# Patient Record
Sex: MALE | Race: BLACK OR AFRICAN AMERICAN | NOT HISPANIC OR LATINO | Employment: OTHER | URBAN - METROPOLITAN AREA
[De-identification: names, ages, dates, MRNs, and addresses within clinical notes are randomized per-mention and may not be internally consistent; named-entity substitution may affect disease eponyms.]

---

## 2017-02-23 RX ORDER — TAMSULOSIN HYDROCHLORIDE 0.4 MG/1
CAPSULE ORAL
Qty: 90 CAPSULE | Refills: 6 | Status: SHIPPED | OUTPATIENT
Start: 2017-02-23 | End: 2017-04-05 | Stop reason: SDUPTHER

## 2017-03-29 ENCOUNTER — LAB VISIT (OUTPATIENT)
Dept: LAB | Facility: HOSPITAL | Age: 72
End: 2017-03-29
Attending: INTERNAL MEDICINE
Payer: MEDICARE

## 2017-03-29 DIAGNOSIS — E78.2 MIXED HYPERLIPIDEMIA: ICD-10-CM

## 2017-03-29 DIAGNOSIS — I10 ESSENTIAL HYPERTENSION: ICD-10-CM

## 2017-03-29 LAB
ANION GAP SERPL CALC-SCNC: 8 MMOL/L
BUN SERPL-MCNC: 18 MG/DL
CALCIUM SERPL-MCNC: 8.8 MG/DL
CHLORIDE SERPL-SCNC: 106 MMOL/L
CHOLEST/HDLC SERPL: 5.2 {RATIO}
CO2 SERPL-SCNC: 29 MMOL/L
CREAT SERPL-MCNC: 1.6 MG/DL
EST. GFR  (AFRICAN AMERICAN): 50.1 ML/MIN/1.73 M^2
EST. GFR  (NON AFRICAN AMERICAN): 43.3 ML/MIN/1.73 M^2
GLUCOSE SERPL-MCNC: 109 MG/DL
HDL/CHOLESTEROL RATIO: 19.4 %
HDLC SERPL-MCNC: 165 MG/DL
HDLC SERPL-MCNC: 32 MG/DL
LDLC SERPL CALC-MCNC: 106.4 MG/DL
NONHDLC SERPL-MCNC: 133 MG/DL
POTASSIUM SERPL-SCNC: 3.8 MMOL/L
SODIUM SERPL-SCNC: 143 MMOL/L
TRIGL SERPL-MCNC: 133 MG/DL

## 2017-03-29 PROCEDURE — 36415 COLL VENOUS BLD VENIPUNCTURE: CPT

## 2017-03-29 PROCEDURE — 80061 LIPID PANEL: CPT

## 2017-03-29 PROCEDURE — 80048 BASIC METABOLIC PNL TOTAL CA: CPT

## 2017-04-05 ENCOUNTER — OFFICE VISIT (OUTPATIENT)
Dept: CARDIOLOGY | Facility: CLINIC | Age: 72
End: 2017-04-05
Payer: MEDICARE

## 2017-04-05 VITALS
SYSTOLIC BLOOD PRESSURE: 176 MMHG | HEART RATE: 104 BPM | HEIGHT: 75 IN | DIASTOLIC BLOOD PRESSURE: 84 MMHG | BODY MASS INDEX: 26.62 KG/M2 | WEIGHT: 214.06 LBS

## 2017-04-05 DIAGNOSIS — N18.30 KIDNEY DISEASE, CHRONIC, STAGE III (GFR 30-59 ML/MIN): ICD-10-CM

## 2017-04-05 DIAGNOSIS — I10 ESSENTIAL HYPERTENSION: Primary | ICD-10-CM

## 2017-04-05 DIAGNOSIS — E78.00 PURE HYPERCHOLESTEROLEMIA: ICD-10-CM

## 2017-04-05 DIAGNOSIS — G47.33 OSA (OBSTRUCTIVE SLEEP APNEA): ICD-10-CM

## 2017-04-05 PROCEDURE — 99214 OFFICE O/P EST MOD 30 MIN: CPT | Mod: S$PBB,,, | Performed by: INTERNAL MEDICINE

## 2017-04-05 PROCEDURE — 99999 PR PBB SHADOW E&M-EST. PATIENT-LVL III: CPT | Mod: PBBFAC,,, | Performed by: INTERNAL MEDICINE

## 2017-04-05 PROCEDURE — 99213 OFFICE O/P EST LOW 20 MIN: CPT | Mod: PBBFAC | Performed by: INTERNAL MEDICINE

## 2017-04-05 RX ORDER — HYDROCHLOROTHIAZIDE 25 MG/1
TABLET ORAL
Qty: 45 TABLET | Refills: 3 | Status: SHIPPED | OUTPATIENT
Start: 2017-04-05 | End: 2018-05-21 | Stop reason: SDUPTHER

## 2017-04-05 RX ORDER — SPIRONOLACTONE 25 MG/1
25 TABLET ORAL DAILY
Qty: 30 TABLET | Refills: 11 | Status: SHIPPED | OUTPATIENT
Start: 2017-04-05 | End: 2018-03-18 | Stop reason: SDUPTHER

## 2017-04-05 RX ORDER — TAMSULOSIN HYDROCHLORIDE 0.4 MG/1
1 CAPSULE ORAL DAILY
Qty: 90 CAPSULE | Refills: 6 | Status: SHIPPED | OUTPATIENT
Start: 2017-04-05 | End: 2018-05-25 | Stop reason: SDUPTHER

## 2017-04-05 RX ORDER — ATORVASTATIN CALCIUM 80 MG/1
80 TABLET, FILM COATED ORAL DAILY
Qty: 90 TABLET | Refills: 3 | Status: SHIPPED | OUTPATIENT
Start: 2017-04-05 | End: 2018-05-07 | Stop reason: SDUPTHER

## 2017-04-05 RX ORDER — LISINOPRIL 40 MG/1
40 TABLET ORAL DAILY
Qty: 30 TABLET | Refills: 11 | Status: SHIPPED | OUTPATIENT
Start: 2017-04-05 | End: 2018-04-19 | Stop reason: SDUPTHER

## 2017-04-05 NOTE — PROGRESS NOTES
Subjective:    Patient ID:  Teodoro Mast is a 71 y.o. male who presents for follow-up of hypertension and CKD III    HPI  71 years old male followed with hypertension, hyperlipidemia and chronic renal insuffiencey. His home BP range137-160 systolic. He continues to do well, active and has no chest pain or FRAGA. He had a vague very mild discomfrt left brachial area recently that was not associated with exertion. He snores and has hypersomnolence.    Lab Results   Component Value Date     03/29/2017    K 3.8 03/29/2017     03/29/2017    CO2 29 03/29/2017    BUN 18 03/29/2017    CREATININE 1.6 (H) 03/29/2017     03/29/2017    HGBA1C 5.9 07/28/2006    AST 22 08/19/2016    ALT 25 08/19/2016    ALBUMIN 3.6 10/31/2016    PROT 6.6 08/19/2016    BILITOT 0.4 08/19/2016    WBC 5.01 10/31/2016    HGB 13.3 (L) 10/31/2016    HCT 40.1 10/31/2016    MCV 88 10/31/2016     10/31/2016    PSA 3.9 12/17/2013         Lab Results   Component Value Date    CHOL 165 03/29/2017    HDL 32 (L) 03/29/2017    TRIG 133 03/29/2017       Lab Results   Component Value Date    LDLCALC 106.4 03/29/2017     Past Medical History:   Diagnosis Date    Hyperlipidemia     Hypertension      Current Outpatient Prescriptions   Medication Sig    amlodipine (NORVASC) 10 MG tablet TAKE 1 TABLET BY MOUTH ONCE DAILY    atorvastatin (LIPITOR) 80 MG tablet TAKE 1 TABLET BY MOUTH ONCE DAILY    hydrochlorothiazide (HYDRODIURIL) 25 MG tablet TAKE 1/2 TABLET(12.5 MG) BY MOUTH EVERY DAY    lisinopril (PRINIVIL,ZESTRIL) 40 MG tablet Take 1 tablet (40 mg total) by mouth once daily.    tamsulosin (FLOMAX) 0.4 mg Cp24 TAKE ONE CAPSULE BY MOUTH ONCE DAILY    UNABLE TO FIND PT TAKEN TEASPOON OF BAKING SODA DAILY    spironolactone (ALDACTONE) 25 MG tablet Take 1 tablet (25 mg total) by mouth once daily.     No current facility-administered medications for this visit.      Review of Systems   Constitution: Negative for decreased appetite,  "diaphoresis, fever, weakness, malaise/fatigue, weight gain and weight loss.   HENT: Negative for congestion, ear discharge, ear pain, headaches and nosebleeds.    Eyes: Negative for blurred vision, double vision and visual disturbance.   Cardiovascular: Negative for chest pain, claudication, cyanosis, dyspnea on exertion, irregular heartbeat, leg swelling, near-syncope, orthopnea, palpitations, paroxysmal nocturnal dyspnea and syncope.   Respiratory: Negative for cough, hemoptysis, shortness of breath, sleep disturbances due to breathing, snoring, sputum production and wheezing.    Endocrine: Negative for polydipsia, polyphagia and polyuria.   Hematologic/Lymphatic: Negative for adenopathy and bleeding problem. Does not bruise/bleed easily.   Skin: Negative for color change, nail changes, poor wound healing and rash.   Musculoskeletal: Negative for muscle cramps and muscle weakness.   Gastrointestinal: Negative for abdominal pain, anorexia, change in bowel habit, hematochezia, nausea and vomiting.   Genitourinary: Negative for dysuria, frequency and hematuria.   Neurological: Negative for brief paralysis, difficulty with concentration, excessive daytime sleepiness, dizziness, focal weakness, light-headedness, seizures and vertigo.   Psychiatric/Behavioral: Negative for altered mental status and depression.   Allergic/Immunologic: Negative for persistent infections.        Objective:BP (!) 176/84 (BP Location: Left arm, Patient Position: Sitting, BP Method: Automatic)  Pulse 104  Ht 6' 3" (1.905 m)  Wt 97.1 kg (214 lb 1.1 oz)  BMI 26.76 kg/m2    Physical Exam   Constitutional: He is oriented to person, place, and time. He appears well-developed and well-nourished.   HENT:   Head: Normocephalic.   Right Ear: External ear normal.   Left Ear: External ear normal.   Nose: Nose normal.   Inspection of lips, teeth and gums normal   Eyes: EOM are normal. Pupils are equal, round, and reactive to light. No scleral " icterus.   Neck: Normal range of motion. Neck supple. No JVD present. No tracheal deviation present. No thyromegaly present.   Cardiovascular: Normal rate, regular rhythm and intact distal pulses.  Exam reveals no gallop and no friction rub.    No murmur heard.  Pulses:       Dorsalis pedis pulses are 2+ on the right side, and 2+ on the left side.        Posterior tibial pulses are 2+ on the right side, and 2+ on the left side.   Pulmonary/Chest: Effort normal and breath sounds normal.   Abdominal: Bowel sounds are normal. He exhibits no distension. There is no hepatosplenomegaly. There is no tenderness. There is no guarding.   Musculoskeletal: Normal range of motion. He exhibits no edema or tenderness.   Lymphadenopathy:   Palpation of neck and groin lymph nodes normal   Neurological: He is alert and oriented to person, place, and time. No cranial nerve deficit. He exhibits normal muscle tone. Coordination normal.   Skin: Skin is dry.   Palpation of skin normal   Psychiatric: His behavior is normal. Judgment and thought content normal.         Assessment:       1. Essential hypertension    2. Pure hypercholesterolemia    3. Kidney disease, chronic, stage III (GFR 30-59 ml/min)    4. GLYNN (obstructive sleep apnea)         Plan:       Teodoro was seen today for cri ( chronic renal insuffciency ), stage 3 ( morderate) and hypertension.    Diagnoses and all orders for this visit:    Essential hypertension  -     spironolactone (ALDACTONE) 25 MG tablet; Take 1 tablet (25 mg total) by mouth once daily.  -     Basic metabolic panel; Future; Expected date: 4/19/17  -     Comprehensive metabolic panel; Future; Expected date: 10/5/17    Pure hypercholesterolemia  -     Lipid panel; Future; Expected date: 10/5/17    Kidney disease, chronic, stage III (GFR 30-59 ml/min)  -     Basic metabolic panel; Future; Expected date: 4/19/17  -     Comprehensive metabolic panel; Future; Expected date: 10/5/17  -     CBC auto differential;  Future; Expected date: 10/5/17    GLYNN (obstructive sleep apnea)  -     Ambulatory consult to Sleep Disorders    Other orders  -     UNABLE TO FIND; PT TAKEN TEASPOON OF BAKING SODA DAILY

## 2017-04-05 NOTE — MR AVS SNAPSHOT
[facility] - Cardiology  [address] [facility] [facility] [facility] [address]  Phone: [phone]                  Teodoro Mast   2017 9:30 AM   Office Visit    Description:  Male : 1945   Provider:  Karl Masterson MD   Department:  [facility] - Cardiology           Reason for Visit     CRI ( chronic renal insuffciency ), stage 3 ( morderate)     Hypertension           Diagnoses this Visit        Comments    Essential hypertension    -  Primary     Pure hypercholesterolemia         Kidney disease, chronic, stage III (GFR 30-59 ml/min)         GLYNN (obstructive sleep apnea)                To Do List           Future Appointments        Provider Department Dept Phone    2017 9:00 AM LAB, SPECIMEN [facility] [phone]    2017 9:20 AM LAB, APPOINTMENT [facility] [phone]    2017 11:30 AM Shine Mcqueen MD [facility] - Nephrology [phone]      Goals (5 Years of Data)     None      Follow-Up and Disposition     Return in about 6 months (around 10/5/2017), or if symptoms worsen or fail to improve.    Follow-up and Disposition History       These Medications        Disp Refills Start End    spironolactone (ALDACTONE) 25 MG tablet 30 tablet 11 2017    Take 1 tablet (25 mg total) by mouth once daily. - Oral    Pharmacy: [facility] [address] AT [facility] Ph #: [phone]       tamsulosin (FLOMAX) 0.4 mg Cp24 90 capsule 6 2017     Take 1 capsule (0.4 mg total) by mouth once daily. - Oral    Pharmacy: [facility] [address] AT [facility] Ph #: [phone]       Notes to Pharmacy: **Patient requests 90 days supply**    lisinopril (PRINIVIL,ZESTRIL) 40 MG tablet 30 tablet 11 2017     Take 1 tablet (40 mg total) by mouth once daily. - Oral    Pharmacy: [facility] [place] [address] [facility]  [address] AT [facility] #: [phone]       hydrochlorothiazide (HYDRODIURIL) 25 MG tablet 45 tablet 3 4/5/2017     TAKE 1/2 TABLET(12.5 MG) BY MOUTH EVERY DAY    Pharmacy: [facility] [address] AT [facility] Ph #: [phone]       Notes to Pharmacy: **Patient requests 90 days supply**    atorvastatin (LIPITOR) 80 MG tablet 90 tablet 3 4/5/2017     Take 1 tablet (80 mg total) by mouth once daily. - Oral    Pharmacy: [facility] [address] AT [facility] Ph #: [phone]       Notes to Pharmacy: **Patient requests 90 days supply**      [facility]     [facility] On Call Nurse Care Line - 24/7 Assistance  Unless otherwise directed by your provider, please contact [facility], our nurse care line that is available for 24/7 assistance.     Registered nurses in the [facility] provide: appointment scheduling, clinical advisement, health education, and other advisory services.  Call: [phone] (toll free)               Medications           Message regarding Medications     Verify the changes and/or additions to your medication regime listed below are the same as discussed with your clinician today.  If any of these changes or additions are incorrect, please notify your healthcare provider.        START taking these NEW medications        Refills    spironolactone (ALDACTONE) 25 MG tablet 11    Sig: Take 1 tablet (25 mg total) by mouth once daily.    Class: Normal    Route: Oral      CHANGE how you are taking these medications     Start Taking Instead of    tamsulosin (FLOMAX) 0.4 mg Cp24 tamsulosin (FLOMAX) 0.4 mg Cp24    Dosage:  Take 1 capsule (0.4 mg total) by mouth once daily. Dosage:  TAKE ONE CAPSULE BY MOUTH ONCE DAILY    Reason for Change:  Reorder     atorvastatin (LIPITOR) 80 MG tablet atorvastatin (LIPITOR) 80 MG tablet    Dosage:   "Take 1 tablet (80 mg total) by mouth once daily. Dosage:  TAKE 1 TABLET BY MOUTH ONCE DAILY    Reason for Change:  Reorder       STOP taking these medications     sodium bicarbonate 325 MG tablet Take 1 tablet (325 mg total) by mouth once daily.           Verify that the below list of medications is an accurate representation of the medications you are currently taking.  If none reported, the list may be blank. If incorrect, please contact your healthcare provider. Carry this list with you in case of emergency.           Current Medications     amlodipine (NORVASC) 10 MG tablet TAKE 1 TABLET BY MOUTH ONCE DAILY    atorvastatin (LIPITOR) 80 MG tablet Take 1 tablet (80 mg total) by mouth once daily.    hydrochlorothiazide (HYDRODIURIL) 25 MG tablet TAKE 1/2 TABLET(12.5 MG) BY MOUTH EVERY DAY    lisinopril (PRINIVIL,ZESTRIL) 40 MG tablet Take 1 tablet (40 mg total) by mouth once daily.    tamsulosin (FLOMAX) 0.4 mg Cp24 Take 1 capsule (0.4 mg total) by mouth once daily.    UNABLE TO FIND PT TAKEN TEASPOON OF BAKING SODA DAILY    spironolactone (ALDACTONE) 25 MG tablet Take 1 tablet (25 mg total) by mouth once daily.           Clinical Reference Information           Your Vitals Were     BP Pulse Height Weight BMI    176/84 (BP Location: Left arm, Patient Position: Sitting, BP Method: Automatic) 104 6' 3" (1.905 m) 97.1 kg (214 lb 1.1 oz) 26.76 kg/m2      Blood Pressure          Most Recent Value    Right Arm BP - Sitting  180/90    Left Arm BP - Sitting  176/84    BP  (!)  176/84      Allergies as of 4/5/2017     No Known Allergies      Immunizations Administered on Date of Encounter - 4/5/2017     None      Orders Placed During Today's Visit      Normal Orders This Visit    Ambulatory consult to Sleep Disorders     Future Labs/Procedures Expected by Expires    Basic metabolic panel  4/19/2017 (Approximate) 4/5/2018    CBC auto differential  10/5/2017 4/5/2018    Comprehensive metabolic panel  10/5/2017 (Approximate) " 4/5/2018    Lipid panel  10/5/2017 (Approximate) 4/5/2018      Instructions    BP goal < 130/80  email BP recording in 2 weeks       Language Assistance Services     ATTENTION: Language assistance services are available, free of charge. Please call [phone].      ATENCIÓN: Si habla dianelys, tiene a cedeno disposición servicios gratuitos de asistencia lingüística. Llame al [phone].     CHÚ Ý: N?u b?n nói Ti?ng Vi?t, có các d?ch v? h? tr? ngôn ng? mi?n phí dành cho b?n. G?i s? [phone].         [facility] complies with applicable Federal civil rights laws and does not discriminate on the basis of race, color, national origin, age, disability, or sex.

## 2017-04-19 ENCOUNTER — PATIENT MESSAGE (OUTPATIENT)
Dept: CARDIOLOGY | Facility: CLINIC | Age: 72
End: 2017-04-19

## 2017-04-19 ENCOUNTER — LAB VISIT (OUTPATIENT)
Dept: LAB | Facility: HOSPITAL | Age: 72
End: 2017-04-19
Attending: INTERNAL MEDICINE
Payer: MEDICARE

## 2017-04-19 DIAGNOSIS — N18.30 KIDNEY DISEASE, CHRONIC, STAGE III (GFR 30-59 ML/MIN): ICD-10-CM

## 2017-04-19 DIAGNOSIS — I10 ESSENTIAL HYPERTENSION: ICD-10-CM

## 2017-04-19 LAB
ANION GAP SERPL CALC-SCNC: 9 MMOL/L
BUN SERPL-MCNC: 24 MG/DL
CALCIUM SERPL-MCNC: 8.9 MG/DL
CHLORIDE SERPL-SCNC: 107 MMOL/L
CO2 SERPL-SCNC: 26 MMOL/L
CREAT SERPL-MCNC: 1.8 MG/DL
EST. GFR  (AFRICAN AMERICAN): 43.4 ML/MIN/1.73 M^2
EST. GFR  (NON AFRICAN AMERICAN): 37.6 ML/MIN/1.73 M^2
GLUCOSE SERPL-MCNC: 114 MG/DL
POTASSIUM SERPL-SCNC: 4.1 MMOL/L
SODIUM SERPL-SCNC: 142 MMOL/L

## 2017-04-19 PROCEDURE — 80048 BASIC METABOLIC PNL TOTAL CA: CPT

## 2017-04-19 PROCEDURE — 36415 COLL VENOUS BLD VENIPUNCTURE: CPT

## 2017-04-26 ENCOUNTER — OFFICE VISIT (OUTPATIENT)
Dept: SLEEP MEDICINE | Facility: CLINIC | Age: 72
End: 2017-04-26
Payer: MEDICARE

## 2017-04-26 VITALS
BODY MASS INDEX: 26.04 KG/M2 | DIASTOLIC BLOOD PRESSURE: 86 MMHG | SYSTOLIC BLOOD PRESSURE: 148 MMHG | HEIGHT: 75 IN | HEART RATE: 78 BPM | WEIGHT: 209.44 LBS

## 2017-04-26 DIAGNOSIS — J30.1 SEASONAL ALLERGIC RHINITIS DUE TO POLLEN: ICD-10-CM

## 2017-04-26 DIAGNOSIS — G47.30 SLEEP APNEA, UNSPECIFIED: Primary | ICD-10-CM

## 2017-04-26 PROCEDURE — 99999 PR PBB SHADOW E&M-EST. PATIENT-LVL III: CPT | Mod: PBBFAC,,, | Performed by: NURSE PRACTITIONER

## 2017-04-26 PROCEDURE — 99213 OFFICE O/P EST LOW 20 MIN: CPT | Mod: PBBFAC | Performed by: NURSE PRACTITIONER

## 2017-04-26 PROCEDURE — 99214 OFFICE O/P EST MOD 30 MIN: CPT | Mod: S$PBB,,, | Performed by: NURSE PRACTITIONER

## 2017-04-26 RX ORDER — FLUTICASONE PROPIONATE 50 MCG
2 SPRAY, SUSPENSION (ML) NASAL DAILY
Qty: 1 BOTTLE | Refills: 11 | Status: SHIPPED | OUTPATIENT
Start: 2017-04-26 | End: 2017-09-25

## 2017-04-26 RX ORDER — CETIRIZINE HYDROCHLORIDE 10 MG/1
10 TABLET ORAL DAILY
Refills: 0 | COMMUNITY
Start: 2017-04-26 | End: 2017-08-30

## 2017-04-26 RX ORDER — CHOLECALCIFEROL (VITAMIN D3) 25 MCG
2000 TABLET ORAL DAILY
COMMUNITY
End: 2021-02-17 | Stop reason: SDUPTHER

## 2017-04-26 NOTE — PROGRESS NOTES
Teodoro Mast  was seen as a new patient, self-referred, for the evaluation of obstructive sleep apnea.    CHIEF COMPLAINT:    Chief Complaint   Patient presents with    Sleep Apnea       HISTORY OF PRESENT ILLNESS: Teodoro Mast is a 71 y.o. male is here for sleep evaluation.        Diagnosed with HTN 43 years ago and BP has been controlled until a month ago. Pt's cardiologist recommended sleep eval for secondary causes of refractory HTN. Currently on 3 meds.     Recently started taking Flomax for BPH, which has improved nocturia to 3x per night vs. Several times as prior.     Patient complaints include: snoring, interrupted sleep, and excessive daytime sleepiness.     Reports nasal congestion 1 - 2 x per week usually at bedtime. Symptoms relieved by taking OTC antihistamine.     Denies symptoms of restless legs or kicking during sleep.    Occupation: Retired    [place] Sleepiness Scale score during initial sleep evaluation was 1.    SLEEP ROUTINE:  Activity the hour prior to sleep: TV. Falls asleep to TV in bedroom    Bed partner:  wife  Time to bed:  9 - 10 pm  Lights off:  Yes but TV on  Sleep onset latency:  <1 hour       Disruptions or awakenings:  2 - 3    Wakeup time:  5:30 - 6 am  Perceived sleep quality: 3/5       Daytime naps:  none  Weekend sleep routine: same as above  Caffeine use: some  Exercise habit:   No      PAST MEDICAL HISTORY:    Active Ambulatory Problems     Diagnosis Date Noted    Hyperlipidemia     Hypertension     BPH with urinary obstruction 06/11/2013    Kidney disease, chronic, stage III (GFR 30-59 ml/min) 06/25/2015    Hypertensive CKD (chronic kidney disease) 11/09/2016    Kidney cysts 11/09/2016    Proteinuria 11/09/2016    Metabolic acidosis 11/09/2016    GLYNN (obstructive sleep apnea) 04/05/2017     Resolved Ambulatory Problems     Diagnosis Date Noted    No Resolved Ambulatory Problems     Past Medical History:   Diagnosis Date    Hyperlipidemia     Hypertension                  PAST SURGICAL HISTORY:    Past Surgical History:   Procedure Laterality Date    FINGER SURGERY      hemorriodectomy           FAMILY HISTORY:                Family History   Problem Relation Age of Onset    Heart disease Mother     Hypertension Mother        SOCIAL HISTORY:          Tobacco:   History   Smoking Status    Former Smoker   Smokeless Tobacco    Not on file       Alcohol use:    History   Alcohol Use    Yes                 ALLERGIES:  Review of patient's allergies indicates:  No Known Allergies    CURRENT MEDICATIONS:    Current Outpatient Prescriptions   Medication Sig Dispense Refill    AMINO ACIDS/MV,FE,MIN (OCUVITE EXTRA ORAL) Take by mouth.      amlodipine (NORVASC) 10 MG tablet TAKE 1 TABLET BY MOUTH ONCE DAILY 90 tablet 3    atorvastatin (LIPITOR) 80 MG tablet Take 1 tablet (80 mg total) by mouth once daily. 90 tablet 3    hydrochlorothiazide (HYDRODIURIL) 25 MG tablet TAKE 1/2 TABLET(12.5 MG) BY MOUTH EVERY DAY 45 tablet 3    lisinopril (PRINIVIL,ZESTRIL) 40 MG tablet Take 1 tablet (40 mg total) by mouth once daily. 30 tablet 11    spironolactone (ALDACTONE) 25 MG tablet Take 1 tablet (25 mg total) by mouth once daily. 30 tablet 11    tamsulosin (FLOMAX) 0.4 mg Cp24 Take 1 capsule (0.4 mg total) by mouth once daily. 90 capsule 6    UNABLE TO FIND PT TAKEN TEASPOON OF BAKING SODA DAILY      vitamin D 1000 units Tab Take 2,000 mg by mouth once daily.      cetirizine (ZYRTEC) 10 MG tablet Take 1 tablet (10 mg total) by mouth once daily.  0    fluticasone (FLONASE) 50 mcg/actuation nasal spray 2 sprays by Each Nare route once daily. 1 Bottle 11     No current facility-administered medications for this visit.                   REVIEW OF SYSTEMS:     Sleep related symptoms as per HPI.  CONST:Denies weight gain    HEENT: Occasional sinus congestion  PULM: Denies dyspnea  CARD:  Denies palpitations   GI:  Denies acid reflux  : Denies polyuria  NEURO: Denies  "headaches  PSYCH: Denies mood disturbance  HEME: Denies anemia    Otherwise, a balance of 10 systems reviewed is negative.          PHYSICAL EXAM:  Vitals:    04/26/17 0812   BP: (!) 148/86   Pulse: 78   Weight: 95 kg (209 lb 7 oz)   Height: 6' 3" (1.905 m)   PainSc: 0-No pain     Body mass index is 26.18 kg/(m^2).     GENERAL: Normal development, well groomed  HEENT:  Conjunctivae are non-erythematous; Pupils equal, round, and reactive to light; Nose is symmetrical; Nasal mucosa is pink and moist; Septum is midline; Inferior turbinates are normal; Nasal airflow is normal; Posterior pharynx is pink; Modified Mallampati: IV; Posterior palate is normal; Tonsils not visualized; Uvula is normal and pink;Tongue is normal; Dentition is fair; No TMJ tenderness; Jaw opening and protrusion without click and without discomfort.  NECK: Supple. Neck circumference is 14.5 inches. No thyromegaly. No palpable nodes.    SKIN: On face and neck: No abrasions, no rashes, no lesions.  No subcutaneous nodules are palpable.  RESPIRATORY: Chest is clear to auscultation.  Normal chest expansion and non-labored breathing at rest.  CARDIOVASCULAR: Normal S1, S2.  No murmurs, gallops or rubs. No carotid bruits bilaterally.  EXTREMITIES: No edema. No clubbing. No cyanosis. Station normal. Gait normal.        NEURO/PSYCH: Oriented to time, place and person. Normal attention span and concentration. Affect is full. Mood is normal.                                              ASSESSMENT:    Sleep apnea, unspecified. The patient symptomatically has snoring, interrupted sleep, and excessive daytime sleepiness with findings of crowded oral airway and elevated body mass index. Medical co-morbidities: HTN, HLD.  This warrants further investigation for possible obstructive sleep apnea.      Allergic rhinitis, stable, currently uses OTC antihistamine prn basis with relief     PLAN:    - Diagnostic: Polysomnogram. The nature of this procedure and its " indication was discussed with the patient. Patient will be contacted after sleep study is done.  RTC to discuss sleep study results.     -Antihistamine and Flonase daily use    - Education: During our discussion today, we talked about the etiology of obstructive sleep apnea as well as the potential ramifications of untreated sleep apnea, which could include daytime sleepiness, hypertension, heart disease and/or stroke. We discussed potential treatment options, which could include weight loss, body positioning, continuous positive airway pressure (CPAP), OA, EPAP,  or referral for surgical consideration.     - Precautions: The patient was advised to abstain from driving should they feel sleepy  or drowsy.

## 2017-04-26 NOTE — PATIENT INSTRUCTIONS
Meera or Sergei will contact you to schedule your sleep study. Their number is [phone] (ext 1). The [facility] is located on 7th floor of the [facility].    We will call you when the sleep study results are ready - if you have not heard from us by 2 weeks from the date of the study, please call [phone] (ext 2).    You are advised to abstain from driving should you feel sleepy or drowsy.

## 2017-04-26 NOTE — MR AVS SNAPSHOT
[facility]  [address]  [facility] [address]  Phone: [phone]                  Teodoro Mast   2017 8:00 AM   Office Visit    Description:  Male : 1945   Provider:  Akiko Cornejo NP   Department:  [facility]           Diagnoses this Visit        Comments    Seasonal allergic rhinitis due to pollen    -  Primary     Sleep apnea, unspecified                To Do List           Future Appointments        Provider Department Dept Phone    2017 9:00 AM LAB, SPECIMEN [facility] [phone]    2017 9:20 AM LAB, APPOINTMENT [facility] [phone]    2017 11:30 AM Shine Mcqueen MD [facility] - Nephrology [phone]      Goals (5 Years of Data)     None      Follow-Up and Disposition     Return for after sleep study .       These Medications        Disp Refills Start End    fluticasone (FLONASE) 50 mcg/actuation nasal spray 1 Bottle 11 2017     2 sprays by Each Nare route once daily. - Each Nare    Pharmacy: [facility] [address] [place] [address] AT [facility] #: [phone]         PURCHASE these Medications (No prescription required)        Start End    cetirizine (ZYRTEC) 10 MG tablet 2017    Sig - Route: Take 1 tablet (10 mg total) by mouth once daily. - Oral    Class: OTC      [facility] On [facility]     [facility] On Call Nurse Care Line - 24/ Assistance  Unless otherwise directed by your provider, please contact [facility] On-Call, our nurse care line that is available for / assistance.     Registered nurses in the [facility] On Call Center provide: appointment scheduling, clinical advisement, health education, and other advisory services.  Call: [phone] (toll free)               Medications           Message regarding Medications     Verify the changes and/or additions to your medication  "regime listed below are the same as discussed with your clinician today.  If any of these changes or additions are incorrect, please notify your healthcare provider.        START taking these NEW medications        Refills    cetirizine (ZYRTEC) 10 MG tablet 0    Sig: Take 1 tablet (10 mg total) by mouth once daily.    Class: OTC    Route: Oral    fluticasone (FLONASE) 50 mcg/actuation nasal spray 11    Si sprays by Each Nare route once daily.    Class: Normal    Route: Each Nare           Verify that the below list of medications is an accurate representation of the medications you are currently taking.  If none reported, the list may be blank. If incorrect, please contact your healthcare provider. Carry this list with you in case of emergency.           Current Medications     AMINO ACIDS/MV,FE,MIN (OCUVITE EXTRA ORAL) Take by mouth.    amlodipine (NORVASC) 10 MG tablet TAKE 1 TABLET BY MOUTH ONCE DAILY    atorvastatin (LIPITOR) 80 MG tablet Take 1 tablet (80 mg total) by mouth once daily.    hydrochlorothiazide (HYDRODIURIL) 25 MG tablet TAKE 1/2 TABLET(12.5 MG) BY MOUTH EVERY DAY    lisinopril (PRINIVIL,ZESTRIL) 40 MG tablet Take 1 tablet (40 mg total) by mouth once daily.    spironolactone (ALDACTONE) 25 MG tablet Take 1 tablet (25 mg total) by mouth once daily.    tamsulosin (FLOMAX) 0.4 mg Cp24 Take 1 capsule (0.4 mg total) by mouth once daily.    UNABLE TO FIND PT TAKEN TEASPOON OF BAKING SODA DAILY    vitamin D 1000 units Tab Take 2,000 mg by mouth once daily.    cetirizine (ZYRTEC) 10 MG tablet Take 1 tablet (10 mg total) by mouth once daily.    fluticasone (FLONASE) 50 mcg/actuation nasal spray 2 sprays by Each Nare route once daily.           Clinical Reference Information           Your Vitals Were     BP Pulse Height Weight BMI    148/86 78 6' 3" (1.905 m) 95 kg (209 lb 7 oz) 26.18 kg/m2      Blood Pressure          Most Recent Value    BP  (!)  148/86      Allergies as of 2017     No Known " Allergies      Immunizations Administered on Date of Encounter - 4/26/2017     None      Orders Placed During Today's Visit     Future Labs/Procedures Expected by Expires    Polysomnogram (CPAP will be added if patient meets diagnostic criteria.)  As directed 4/26/2018      Instructions    Meera or Sergei will contact you to schedule your sleep study. Their number is [phone] (ext 1). The [facility] is located on 7th floor of the [facility].    We will call you when the sleep study results are ready - if you have not heard from us by 2 weeks from the date of the study, please call [phone] (ext 2).    You are advised to abstain from driving should you feel sleepy or drowsy.         Language Assistance Services     ATTENTION: Language assistance services are available, free of charge. Please call [phone].      ATENCIÓN: Si habla dianelys, tiene a cedeno disposición servicios gratuitos de asistencia lingüística. Llame al [phone].     CHÚ Ý: N?u b?n nói Ti?ng Vi?t, có các d?ch v? h? tr? ngôn ng? mi?n phí dành cho b?n. G?i s? [phone].         [facility] complies with applicable Federal civil rights laws and does not discriminate on the basis of race, color, national origin, age, disability, or sex.

## 2017-05-01 ENCOUNTER — TELEPHONE (OUTPATIENT)
Dept: SLEEP MEDICINE | Facility: OTHER | Age: 72
End: 2017-05-01

## 2017-05-08 ENCOUNTER — LAB VISIT (OUTPATIENT)
Dept: LAB | Facility: HOSPITAL | Age: 72
End: 2017-05-08
Attending: INTERNAL MEDICINE
Payer: MEDICARE

## 2017-05-08 DIAGNOSIS — N18.30 KIDNEY DISEASE, CHRONIC, STAGE III (GFR 30-59 ML/MIN): ICD-10-CM

## 2017-05-08 LAB
BACTERIA #/AREA URNS AUTO: ABNORMAL /HPF
BILIRUB UR QL STRIP: NEGATIVE
CLARITY UR REFRACT.AUTO: CLEAR
COLOR UR AUTO: YELLOW
CREAT UR-MCNC: 186 MG/DL
GLUCOSE UR QL STRIP: NEGATIVE
HGB UR QL STRIP: NEGATIVE
HYALINE CASTS UR QL AUTO: 4 /LPF
KETONES UR QL STRIP: NEGATIVE
LEUKOCYTE ESTERASE UR QL STRIP: NEGATIVE
MICROSCOPIC COMMENT: ABNORMAL
NITRITE UR QL STRIP: NEGATIVE
PH UR STRIP: 7 [PH] (ref 5–8)
PROT UR QL STRIP: ABNORMAL
PROT UR-MCNC: 357 MG/DL
PROT/CREAT RATIO, UR: 1.92
RBC #/AREA URNS AUTO: 0 /HPF (ref 0–4)
SP GR UR STRIP: 1.01 (ref 1–1.03)
URN SPEC COLLECT METH UR: ABNORMAL
UROBILINOGEN UR STRIP-ACNC: NEGATIVE EU/DL
WBC #/AREA URNS AUTO: 2 /HPF (ref 0–5)

## 2017-05-08 PROCEDURE — 81001 URINALYSIS AUTO W/SCOPE: CPT

## 2017-05-08 PROCEDURE — 84156 ASSAY OF PROTEIN URINE: CPT

## 2017-05-09 ENCOUNTER — TELEPHONE (OUTPATIENT)
Dept: SLEEP MEDICINE | Facility: OTHER | Age: 72
End: 2017-05-09

## 2017-05-11 ENCOUNTER — TELEPHONE (OUTPATIENT)
Dept: SLEEP MEDICINE | Facility: OTHER | Age: 72
End: 2017-05-11

## 2017-05-17 ENCOUNTER — OFFICE VISIT (OUTPATIENT)
Dept: NEPHROLOGY | Facility: CLINIC | Age: 72
End: 2017-05-17
Payer: MEDICARE

## 2017-05-17 VITALS
HEART RATE: 99 BPM | HEIGHT: 75 IN | DIASTOLIC BLOOD PRESSURE: 84 MMHG | BODY MASS INDEX: 26.24 KG/M2 | WEIGHT: 211 LBS | OXYGEN SATURATION: 96 % | SYSTOLIC BLOOD PRESSURE: 146 MMHG

## 2017-05-17 DIAGNOSIS — N18.30 KIDNEY DISEASE, CHRONIC, STAGE III (GFR 30-59 ML/MIN): Primary | ICD-10-CM

## 2017-05-17 DIAGNOSIS — R80.9 PROTEINURIA, UNSPECIFIED TYPE: ICD-10-CM

## 2017-05-17 DIAGNOSIS — I12.9 HYPERTENSIVE CKD (CHRONIC KIDNEY DISEASE): ICD-10-CM

## 2017-05-17 DIAGNOSIS — E87.20 METABOLIC ACIDOSIS: ICD-10-CM

## 2017-05-17 PROCEDURE — 99214 OFFICE O/P EST MOD 30 MIN: CPT | Mod: S$PBB,,, | Performed by: INTERNAL MEDICINE

## 2017-05-17 PROCEDURE — 99999 PR PBB SHADOW E&M-EST. PATIENT-LVL III: CPT | Mod: PBBFAC,,, | Performed by: INTERNAL MEDICINE

## 2017-05-17 PROCEDURE — 99213 OFFICE O/P EST LOW 20 MIN: CPT | Mod: PBBFAC | Performed by: INTERNAL MEDICINE

## 2017-05-17 NOTE — PROGRESS NOTES
Subjective:       Patient ID: Teodoro Mast is a 71 y.o. Black or  male who presents for new evaluation of Chronic Kidney Disease    HPI     Mr. Mast was seen for new patient evaluation of CKD. He was seen on 9/6/16 in new patient evaluation and last followed on 11/9/16. Pt has longstanding hypertension since 1974, remote h/o tobacco smoking, hyperlipidemia, BPH. He reports family h/o kidney disease in his father that was not diagnosed until he was in his 80's. Pt reports he was diagnosed with hypertension when he was 29 years old. He reports in the past he had some obstruction to the urinary tract but unable to provide any details.     Since his last visit, he reports he has been doing fine. He denies any recent acute illness. He denies any urinary symptoms. He denies any leg swelling. He reports he has changed his diet and has now improved water intake, less intake of soda, and has been watching his diet for salt. He reports he had prior episodes of gout and he feels intake of beer, craw fish triggers it for him. He does not want to take allopurinol regularly.     He has slow progression of CKD. Labs were reviewed and d/w him. His creatinine ha gradually increased from 1.4 t0 1.9 since 2010. Most recent baseline has been around 1.6 to 1.9. Prior urine studies show proteinuria. He appears to have chronic anemia. He has been on lisinopril containing regimen. Interim since his last visit, he was placed on spironolactone by his cardiologist due to uncontrolled hypertension. Since then he had labs followed which shows stable K level. He brought his BP readings since starting spironolactone and it appears to be SBP in the range of 130-140's. He denies any episode of low BP or lightheadedness. He denies any urinary symptoms.     Labs from 5/8/17 noted for Hb 13.4, Na 142, K 3.7, bicarbonate 26, BUN 22, creatinine 1.8, eGFR 43.4, Ca 8.6. Albumin 3.2, . prior labs showed SPEP/ SHELIA was normal.  Urine PC ratio 1.92, hep B/C screen was negative. US kidneys from 10/16 showed 10.1 and 12.9 cm kidneys, changes of CKD, cortical thinning, multiple cysts on both sides and enlarged prostate.     Review of Systems   Constitutional: Negative for activity change, appetite change, chills, fatigue and fever.   Eyes: Negative for pain and discharge.   Respiratory: Negative for cough, shortness of breath and wheezing.    Cardiovascular: Negative for chest pain and leg swelling.   Gastrointestinal: Negative for abdominal pain, diarrhea, nausea and vomiting.   Endocrine: Negative for polydipsia and polyuria.   Genitourinary: Negative for decreased urine volume, difficulty urinating, dysuria, flank pain, frequency and hematuria.   Musculoskeletal: Negative for back pain and myalgias.   Skin: Negative for pallor and rash.   Neurological: Negative for dizziness, light-headedness and headaches.   Psychiatric/Behavioral: The patient is not nervous/anxious.        Objective:      Physical Exam   Constitutional: He is oriented to person, place, and time. He appears well-developed and well-nourished. No distress.   Eyes: Right eye exhibits no discharge. Left eye exhibits no discharge.   Neck: Neck supple.   Cardiovascular: Normal rate, regular rhythm and normal heart sounds.    Pulmonary/Chest: Effort normal and breath sounds normal. No respiratory distress. He has no wheezes. He has no rales.   Musculoskeletal: He exhibits no edema.   Neurological: He is alert and oriented to person, place, and time.   Skin: Skin is warm. He is not diaphoretic.   Psychiatric: He has a normal mood and affect. His behavior is normal.   Vitals reviewed.      Assessment:       1. Kidney disease, chronic, stage III (GFR 30-59 ml/min)    2. Hypertensive CKD (chronic kidney disease)    3. Proteinuria, unspecified type    4. Metabolic acidosis        Plan:     Mr. Mast has longstanding hypertension, hyperlipidemia, BPH, CKD III. 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 He denies any NSAID use. He seems to have slow progression of CKD. Also he has progression of proteinuria. Most recently he had uncontrolled hypertension which could have led to progression of CKD and proteinuria. As such he has hypertension since 1970's.     I explained to him about CKD staging, potential for progression, risk factors for CKD etc. Stressed importance of good BP control, low salt diet, keeping hydrated, avoiding NSAID, nephrotoxins etc. Previously noted, cysts on both kidneys and changes of CKD on US.     Pt has hyperuricemia but does not want to take allopurinol. Will trend uric acid level. With K sparing diuretic and lisinopril, will follow K levels closely. Pt advised to avoid high K containing foods. He has mildly low phos but no muscle weakness. Advised on improving phos intake. Follow labs periodically.     Plan  - periodically monitor renal panel for electrolytes, acid base status, eGFR  - risk of CKD progression d/w patient  - low salt diet  - follow PTH levels, vit D, decrease OTC vitamin D, follow corrected Ca  - follow urine studies for proteinuria  - avoid NSAID/ bactrim/ IV contrast/ gadolinium/ aminoglycoside where possible  - continue ACE-I containing regimen for RAAS blockade. He is on lisinopril  - he has chronic anemia, periodically trend Hb  - continue to follow with urology, continue Flomax   - management of hyperlipidemia per cardiology   - continue home BP monitoring   - continue OTC sodium bicarbonate for acidosis, acidosis under control now      RTC 2-4 months to discuss labs  Plan, labs, US, recommendations were discussed with patient, his questions were answered to his satisfaction.

## 2017-05-19 ENCOUNTER — HOSPITAL ENCOUNTER (OUTPATIENT)
Dept: SLEEP MEDICINE | Facility: OTHER | Age: 72
Discharge: HOME | End: 2017-05-19
Attending: NURSE PRACTITIONER
Payer: MEDICARE

## 2017-05-19 DIAGNOSIS — G47.30 SLEEP APNEA, UNSPECIFIED: ICD-10-CM

## 2017-05-19 DIAGNOSIS — G47.33 OSA (OBSTRUCTIVE SLEEP APNEA): ICD-10-CM

## 2017-05-19 PROCEDURE — 95811 POLYSOM 6/>YRS CPAP 4/> PARM: CPT | Mod: 26,,, | Performed by: PSYCHIATRY & NEUROLOGY

## 2017-05-19 PROCEDURE — 95811 POLYSOM 6/>YRS CPAP 4/> PARM: CPT

## 2017-05-20 PROBLEM — G47.30 SLEEP APNEA, UNSPECIFIED: Status: ACTIVE | Noted: 2017-04-05

## 2017-05-20 NOTE — PROGRESS NOTES
This is a screen study for 71 year old Teodoro Mast.  EKG appeared to be NSR.  Sleep disorder breathing significant in supine position.  He stated he does not sleep on his back at home.    He met criteria for a split night study. Started CPAP with a Med. Simplus full face mask.  Explored pressures 4 - 16.   Pt did not show much supine sleep. After requesting supine several times, he laid awake.   At 5:40 he turned supine. Supine REM at a pressure of 11.

## 2017-06-01 ENCOUNTER — TELEPHONE (OUTPATIENT)
Dept: SLEEP MEDICINE | Facility: CLINIC | Age: 72
End: 2017-06-01

## 2017-06-01 NOTE — TELEPHONE ENCOUNTER
05/19/2017 in-lab sleep study results available. Please schedule patient for follow-up to discuss results.

## 2017-06-01 NOTE — TELEPHONE ENCOUNTER
Notified pt that sleep study result just became avail. And I can schedule him an appt for treatment options and those results he refused and suggested that he call back at his convenient to schedule an appt.    Pt had no further question or concerns.

## 2017-06-23 ENCOUNTER — OFFICE VISIT (OUTPATIENT)
Dept: SLEEP MEDICINE | Facility: CLINIC | Age: 72
End: 2017-06-23
Payer: MEDICARE

## 2017-06-23 VITALS
HEART RATE: 86 BPM | HEIGHT: 75 IN | BODY MASS INDEX: 26.13 KG/M2 | WEIGHT: 210.13 LBS | DIASTOLIC BLOOD PRESSURE: 70 MMHG | OXYGEN SATURATION: 96 % | SYSTOLIC BLOOD PRESSURE: 148 MMHG

## 2017-06-23 DIAGNOSIS — G47.33 OSA (OBSTRUCTIVE SLEEP APNEA): Primary | ICD-10-CM

## 2017-06-23 DIAGNOSIS — J30.9 ALLERGIC RHINITIS, UNSPECIFIED ALLERGIC RHINITIS TRIGGER, UNSPECIFIED RHINITIS SEASONALITY: ICD-10-CM

## 2017-06-23 PROBLEM — G47.30 SLEEP APNEA, UNSPECIFIED: Status: RESOLVED | Noted: 2017-04-05 | Resolved: 2017-06-23

## 2017-06-23 PROCEDURE — 99213 OFFICE O/P EST LOW 20 MIN: CPT | Mod: PBBFAC | Performed by: NURSE PRACTITIONER

## 2017-06-23 PROCEDURE — 1126F AMNT PAIN NOTED NONE PRSNT: CPT | Mod: ,,, | Performed by: NURSE PRACTITIONER

## 2017-06-23 PROCEDURE — 99999 PR PBB SHADOW E&M-EST. PATIENT-LVL III: CPT | Mod: PBBFAC,,, | Performed by: NURSE PRACTITIONER

## 2017-06-23 PROCEDURE — 1159F MED LIST DOCD IN RCRD: CPT | Mod: ,,, | Performed by: NURSE PRACTITIONER

## 2017-06-23 PROCEDURE — 99214 OFFICE O/P EST MOD 30 MIN: CPT | Mod: S$PBB,,, | Performed by: NURSE PRACTITIONER

## 2017-06-23 NOTE — PROGRESS NOTES
Teodoor Mast seen in follow-up to discuss sleep study results and potential treatment options.     CHIEF COMPLAINT:    Chief Complaint   Patient presents with    Sleep Apnea    Snoring       04/26/2017 LANRE Cornejo NP: HISTORY OF PRESENT ILLNESS: Teodoro Mast is a 72 y.o. male is here for sleep evaluation.        Diagnosed with HTN 43 years ago and BP has been controlled until a month ago. Pt's cardiologist recommended sleep eval for secondary causes of refractory HTN. Currently on 3 meds.     Recently started taking Flomax for BPH, which has improved nocturia to 3x per night vs. Several times as prior.     Patient complaints include: snoring, interrupted sleep, and excessive daytime sleepiness.     Reports nasal congestion 1 - 2 x per week usually at bedtime. Symptoms relieved by taking OTC antihistamine.     Denies symptoms of restless legs or kicking during sleep.    Occupation: Retired    [place] Sleepiness Scale score during initial sleep evaluation was 1.    SLEEP ROUTINE:  Activity the hour prior to sleep: TV. Falls asleep to TV in bedroom    Bed partner:  wife  Time to bed:  9 - 10 pm  Lights off:  Yes but TV on  Sleep onset latency:  <1 hour       Disruptions or awakenings:  2 - 3    Wakeup time:  5:30 - 6 am  Perceived sleep quality: 3/5       Daytime naps:  none  Weekend sleep routine: same as above  Caffeine use: some  Exercise habit:   No        INTERVAL HISTORY:    06/23/2017 LANRE Cornejo NP: Discussed 05/19/2017 in-lab sleep study in detail. Patient complaints of snoring, interrupted sleep, and excessive daytime sleepiness remain the same. ESS 2.     Baseline Sleep Study: 05/19/2017 Split Night Study 210 lb. The overall AHI was 49.3 with an oxygen jostin of 83.0%. Effective control of sleep disordered breathing was seen during supine REM stage sleep at a pressure of 11 cm of water. Higher pressures were associated with central apneas. Used medium Simplus FFM.       PAST MEDICAL HISTORY:    Active  Ambulatory Problems     Diagnosis Date Noted    Hyperlipidemia     Hypertension     BPH with urinary obstruction 06/11/2013    Kidney disease, chronic, stage III (GFR 30-59 ml/min) 06/25/2015    Hypertensive CKD (chronic kidney disease) 11/09/2016    Kidney cysts 11/09/2016    Proteinuria 11/09/2016    Metabolic acidosis 11/09/2016    GLYNN (obstructive sleep apnea)      Resolved Ambulatory Problems     Diagnosis Date Noted    Sleep apnea, unspecified 04/05/2017     Past Medical History:   Diagnosis Date    Hyperlipidemia     Hypertension                 PAST SURGICAL HISTORY:    Past Surgical History:   Procedure Laterality Date    FINGER SURGERY      hemorriodectomy           FAMILY HISTORY:                Family History   Problem Relation Age of Onset    Heart disease Mother     Hypertension Mother        SOCIAL HISTORY:          Tobacco:   History   Smoking Status    Former Smoker   Smokeless Tobacco    Not on file       Alcohol use:    History   Alcohol Use    Yes                 ALLERGIES:  Review of patient's allergies indicates:  No Known Allergies    CURRENT MEDICATIONS:    Current Outpatient Prescriptions   Medication Sig Dispense Refill    amlodipine (NORVASC) 10 MG tablet TAKE 1 TABLET BY MOUTH ONCE DAILY 90 tablet 3    atorvastatin (LIPITOR) 80 MG tablet Take 1 tablet (80 mg total) by mouth once daily. 90 tablet 3    hydrochlorothiazide (HYDRODIURIL) 25 MG tablet TAKE 1/2 TABLET(12.5 MG) BY MOUTH EVERY DAY 45 tablet 3    lisinopril (PRINIVIL,ZESTRIL) 40 MG tablet Take 1 tablet (40 mg total) by mouth once daily. 30 tablet 11    spironolactone (ALDACTONE) 25 MG tablet Take 1 tablet (25 mg total) by mouth once daily. 30 tablet 11    tamsulosin (FLOMAX) 0.4 mg Cp24 Take 1 capsule (0.4 mg total) by mouth once daily. 90 capsule 6    vitamin D 1000 units Tab Take 2,000 mg by mouth once daily.      AMINO ACIDS/MV,FE,MIN (OCUVITE EXTRA ORAL) Take by mouth.      cetirizine (ZYRTEC) 10 MG  "tablet Take 1 tablet (10 mg total) by mouth once daily.  0    fluticasone (FLONASE) 50 mcg/actuation nasal spray 2 sprays by Each Nare route once daily. 1 Bottle 11    UNABLE TO FIND PT TAKEN TEASPOON OF BAKING SODA DAILY       No current facility-administered medications for this visit.                   REVIEW OF SYSTEMS:     Sleep related symptoms as per HPI.  CONST:Denies weight gain    HEENT: Occasional sinus congestion  PULM: Denies dyspnea  CARD:  Denies palpitations   GI:  Denies acid reflux  : Denies polyuria  NEURO: Denies headaches  PSYCH: Denies mood disturbance  HEME: Denies anemia    Otherwise, a balance of 10 systems reviewed is negative.          PHYSICAL EXAM:  Vitals:    06/23/17 1043   BP: (!) 148/70   Pulse: 86   SpO2: 96%   Weight: 95.3 kg (210 lb 1.6 oz)   Height: 6' 3" (1.905 m)   PainSc: 0-No pain     Body mass index is 26.26 kg/m².     GENERAL: Normal development, well groomed  HEENT:  Conjunctivae are non-erythematous; Pupils equal, round, and reactive to light; Nose is symmetrical; Nasal mucosa is pink and moist; Septum is midline; Inferior turbinates are normal; Nasal airflow is normal; Posterior pharynx is pink; Modified Mallampati: IV; Posterior palate is normal; Tonsils not visualized; Uvula is normal and pink;Tongue is normal; Dentition is fair; No TMJ tenderness; Jaw opening and protrusion without click and without discomfort.  NECK: Supple. Neck circumference is 14.5 inches. No thyromegaly. No palpable nodes.    SKIN: On face and neck: No abrasions, no rashes, no lesions.  No subcutaneous nodules are palpable.  RESPIRATORY: Chest is clear to auscultation.  Normal chest expansion and non-labored breathing at rest.  CARDIOVASCULAR: Normal S1, S2.  No murmurs, gallops or rubs. No carotid bruits bilaterally.  EXTREMITIES: No edema. No clubbing. No cyanosis. Station normal. Gait normal.        NEURO/PSYCH: Oriented to time, place and person. Normal attention span and concentration. " Affect is full. Mood is normal.                                              ASSESSMENT:    Sleep apnea, unspecified. The patient symptomatically has snoring, interrupted sleep, and excessive daytime sleepiness with findings of crowded oral airway and elevated body mass index. Medical co-morbidities: HTN, HLD.  This warrants treatment for obstructive sleep apnea.  Ready to trial PAP therapy.     Allergic rhinitis, stable, currently uses OTC antihistamine prn basis with relief     PLAN:    - Education: During our discussion today, we talked about the etiology of obstructive sleep apnea as well as the potential ramifications of untreated sleep apnea, which could include daytime sleepiness, hypertension, heart disease and/or stroke. We discussed potential treatment options, which could include weight loss, body positioning, continuous positive airway pressure (CPAP), OA, EPAP,  or referral for surgical consideration.     -Treatment: CPAP 11 cm. RTC 31 - 90 days after CPAP use.      -Antihistamine and Flonase daily use    - Precautions: The patient was advised to abstain from driving should they feel sleepy  or drowsy.

## 2017-06-23 NOTE — PATIENT INSTRUCTIONS
Your prescription for CPAP has been sent through our system. You should receive a call from [facility] in the next few days regarding your CPAP set up.     For any questions regarding your machine or supplies, please contact [facility] at [phone] (Ext [phone]).

## 2017-07-17 ENCOUNTER — LAB VISIT (OUTPATIENT)
Dept: LAB | Facility: HOSPITAL | Age: 72
End: 2017-07-17
Attending: INTERNAL MEDICINE
Payer: MEDICARE

## 2017-07-17 DIAGNOSIS — N18.30 KIDNEY DISEASE, CHRONIC, STAGE III (GFR 30-59 ML/MIN): ICD-10-CM

## 2017-07-17 LAB
BACTERIA #/AREA URNS AUTO: NORMAL /HPF
BILIRUB UR QL STRIP: NEGATIVE
CLARITY UR REFRACT.AUTO: CLEAR
COLOR UR AUTO: YELLOW
CREAT UR-MCNC: 96 MG/DL
GLUCOSE UR QL STRIP: NEGATIVE
HGB UR QL STRIP: NEGATIVE
HYALINE CASTS UR QL AUTO: 1 /LPF
KETONES UR QL STRIP: NEGATIVE
LEUKOCYTE ESTERASE UR QL STRIP: NEGATIVE
MICROSCOPIC COMMENT: NORMAL
NITRITE UR QL STRIP: NEGATIVE
PH UR STRIP: 8 [PH] (ref 5–8)
PROT UR QL STRIP: ABNORMAL
PROT UR-MCNC: 111 MG/DL
PROT/CREAT RATIO, UR: 1.16
RBC #/AREA URNS AUTO: 0 /HPF (ref 0–4)
SP GR UR STRIP: 1.01 (ref 1–1.03)
URN SPEC COLLECT METH UR: ABNORMAL
UROBILINOGEN UR STRIP-ACNC: NEGATIVE EU/DL
WBC #/AREA URNS AUTO: 1 /HPF (ref 0–5)

## 2017-07-17 PROCEDURE — 81001 URINALYSIS AUTO W/SCOPE: CPT

## 2017-07-17 PROCEDURE — 82570 ASSAY OF URINE CREATININE: CPT

## 2017-08-21 RX ORDER — AMLODIPINE BESYLATE 10 MG/1
TABLET ORAL
Qty: 90 TABLET | Refills: 3 | Status: SHIPPED | OUTPATIENT
Start: 2017-08-21 | End: 2017-08-30 | Stop reason: SDUPTHER

## 2017-08-30 ENCOUNTER — OFFICE VISIT (OUTPATIENT)
Dept: NEPHROLOGY | Facility: CLINIC | Age: 72
End: 2017-08-30
Payer: MEDICARE

## 2017-08-30 VITALS
OXYGEN SATURATION: 98 % | SYSTOLIC BLOOD PRESSURE: 140 MMHG | DIASTOLIC BLOOD PRESSURE: 70 MMHG | HEIGHT: 75 IN | HEART RATE: 88 BPM | WEIGHT: 210.31 LBS | BODY MASS INDEX: 26.15 KG/M2

## 2017-08-30 DIAGNOSIS — N28.1 KIDNEY CYSTS: ICD-10-CM

## 2017-08-30 DIAGNOSIS — R80.9 PROTEINURIA, UNSPECIFIED TYPE: ICD-10-CM

## 2017-08-30 DIAGNOSIS — E79.0 HYPERURICEMIA: ICD-10-CM

## 2017-08-30 DIAGNOSIS — N18.30 KIDNEY DISEASE, CHRONIC, STAGE III (GFR 30-59 ML/MIN): Primary | ICD-10-CM

## 2017-08-30 DIAGNOSIS — I10 ESSENTIAL HYPERTENSION: ICD-10-CM

## 2017-08-30 DIAGNOSIS — I12.9 HYPERTENSIVE CKD (CHRONIC KIDNEY DISEASE): ICD-10-CM

## 2017-08-30 DIAGNOSIS — E87.20 METABOLIC ACIDOSIS: ICD-10-CM

## 2017-08-30 PROCEDURE — 3078F DIAST BP <80 MM HG: CPT | Mod: ,,, | Performed by: INTERNAL MEDICINE

## 2017-08-30 PROCEDURE — 1126F AMNT PAIN NOTED NONE PRSNT: CPT | Mod: ,,, | Performed by: INTERNAL MEDICINE

## 2017-08-30 PROCEDURE — 99999 PR PBB SHADOW E&M-EST. PATIENT-LVL III: CPT | Mod: PBBFAC,,, | Performed by: INTERNAL MEDICINE

## 2017-08-30 PROCEDURE — 3077F SYST BP >= 140 MM HG: CPT | Mod: ,,, | Performed by: INTERNAL MEDICINE

## 2017-08-30 PROCEDURE — 1159F MED LIST DOCD IN RCRD: CPT | Mod: ,,, | Performed by: INTERNAL MEDICINE

## 2017-08-30 PROCEDURE — 99213 OFFICE O/P EST LOW 20 MIN: CPT | Mod: PBBFAC | Performed by: INTERNAL MEDICINE

## 2017-08-30 PROCEDURE — 99214 OFFICE O/P EST MOD 30 MIN: CPT | Mod: S$PBB,,, | Performed by: INTERNAL MEDICINE

## 2017-08-30 RX ORDER — ALLOPURINOL 100 MG/1
100 TABLET ORAL DAILY
Qty: 30 TABLET | Refills: 6 | Status: SHIPPED | OUTPATIENT
Start: 2017-08-30 | End: 2018-02-22 | Stop reason: SDUPTHER

## 2017-08-30 NOTE — PROGRESS NOTES
Subjective:       Patient ID: Teodoro Mast is a 72 y.o. Black or  male who presents for new evaluation of Chronic Kidney Disease    HPI     Mr. Mast was seen for new patient evaluation of CKD. He was seen on 9/6/16 in new patient evaluation and last followed on 5/17/17. Pt has longstanding hypertension since 1974, remote h/o tobacco smoking, hyperlipidemia, BPH. He reports family h/o kidney disease in his father that was not diagnosed until he was in his 80's. Pt reports he was diagnosed with hypertension when he was 29 years old. He reports in the past he had some obstruction to the urinary tract but unable to provide any details. He has h/o gout and he reports in the past he was on allopurinol. It was stopped for unclear reasons. He states he takes it as needed only for now. He has hyperuricemia.    In the past he was using medicines like Indomethacin, ibuprofen. Interim since his last visit in 5/17, he states he was diagnosed with sleep apnea. He reports his BP control started to worsen and that's how further work up led to the diagnosis of sleep apnea. He has been recently started on CPAP. He states since then he has noticed his BP control has started to improve.     Overall, he reports he has been doing fine. He denies any recent acute illness. He still has symptoms of difficulty with emptying the bladder and hesitance and is under follow up of urology for BPH. He denies any leg swelling. He reports he has changed his diet and has now improved water intake, less intake of soda, and has been watching his diet for salt. He reports he had prior episodes of gout and he feels intake of beer, craw fish triggers it for him. He is now ok to use allopurinol regularly.    He has slow progression of CKD. Labs were reviewed and d/w him. His creatinine ha gradually increased from 1.4 t0 1.9 since 2010. Most recent baseline has been around 2.1. Prior urine studies show proteinuria. He appears to have  chronic anemia. He has been on lisinopril and aldactone containing regimen. He denies any episode of low BP or lightheadedness.     Labs from 7/17/17 noted for Hb 12.9, Na 139, K 4.7, bicarbonate 25, BUN 27, creatinine 2.1, eGFR 35.8, Ca 8.8, phos 2, Albumin 3.3, uric acid 8.5, , urinalysis with protein, urine PC ratio 1.16. prior labs showed SPEP/ SHELIA was normal. hep B/C screen was negative. US kidneys from 10/16 showed 10.1 and 12.9 cm kidneys, changes of CKD, cortical thinning, multiple cysts on both sides and enlarged prostate.     Review of Systems   Constitutional: Negative for activity change, appetite change, chills, fatigue and fever.   Eyes: Negative for pain and discharge.   Respiratory: Negative for cough, shortness of breath and wheezing.    Cardiovascular: Negative for chest pain and leg swelling.   Gastrointestinal: Negative for abdominal pain, diarrhea, nausea and vomiting.   Endocrine: Negative for polydipsia and polyuria.   Genitourinary: Negative for decreased urine volume, difficulty urinating, dysuria, flank pain, frequency and hematuria.   Musculoskeletal: Negative for back pain and myalgias.   Skin: Negative for pallor and rash.   Neurological: Negative for dizziness, light-headedness and headaches.   Psychiatric/Behavioral: The patient is not nervous/anxious.        Objective:      Physical Exam   Constitutional: He is oriented to person, place, and time. He appears well-developed and well-nourished. No distress.   Eyes: Right eye exhibits no discharge. Left eye exhibits no discharge.   Neck: Neck supple.   Cardiovascular: Normal rate, regular rhythm and normal heart sounds.    Pulmonary/Chest: Effort normal and breath sounds normal. No respiratory distress. He has no wheezes. He has no rales.   Musculoskeletal: He exhibits no edema.   Neurological: He is alert and oriented to person, place, and time.   Skin: Skin is warm. He is not diaphoretic.   Psychiatric: He has a normal mood and  affect. His behavior is normal.   Vitals reviewed.      Assessment:       1. Kidney disease, chronic, stage III (GFR 30-59 ml/min)    2. Hypertensive CKD (chronic kidney disease)    3. Kidney cysts    4. Proteinuria, unspecified type    5. Metabolic acidosis    6. Essential hypertension    7. Hyperuricemia        Plan:     Mr. Mast has longstanding hypertension, hyperlipidemia, BPH, CKD III. 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 He denies any NSAID use currently but it appears in the past he was taking indomethacin. He seems to have slow progression of CKD. Also he has progression of proteinuria. Most recently he had uncontrolled hypertension which could have led to progression of CKD and proteinuria. As such he has hypertension since 1970's.     I explained to him about CKD staging, potential for progression, risk factors for CKD etc. Stressed importance of good BP control, low salt diet, keeping hydrated, avoiding NSAID, nephrotoxins etc. Previously noted, cysts on both kidneys and changes of CKD on US.     Pt has hyperuricemia, d/w him again, he is now agreeable to start allopurinol, will start 100 mg daily. Will trend uric acid level. With K sparing diuretic and lisinopril, will follow K levels closely. Pt advised to avoid high K containing foods. He has mildly low phos but no muscle weakness. Advised on improving phos intake. Follow labs periodically.     Plan  - start allopurinol, renal dosing  - periodically monitor renal panel for electrolytes, acid base status, eGFR  - risk of CKD progression d/w patient  - low salt diet  - follow PTH levels, vit D, decrease OTC vitamin D, follow corrected Ca  - follow urine studies for proteinuria  - avoid NSAID/ bactrim/ IV contrast/ gadolinium/ aminoglycoside where possible  - continue ACE-I containing regimen for RAAS blockade. He is on lisinopril  - he has chronic anemia, periodically trend Hb  - continue to follow with urology, continue Flomax    - management of hyperlipidemia per cardiology   - continue home BP monitoring   - continue OTC sodium bicarbonate for acidosis, acidosis under control now  - management of sleep apnea per sleep lab  - follow labs again in 4 weeks for creatinine, eGFR      RTC 8 weeks to discuss labs  Plan, labs, recommendations were discussed with patient, his questions were answered to his satisfaction.

## 2017-09-25 ENCOUNTER — TELEPHONE (OUTPATIENT)
Dept: SLEEP MEDICINE | Facility: CLINIC | Age: 72
End: 2017-09-25

## 2017-09-25 ENCOUNTER — OFFICE VISIT (OUTPATIENT)
Dept: SLEEP MEDICINE | Facility: CLINIC | Age: 72
End: 2017-09-25
Payer: MEDICARE

## 2017-09-25 VITALS
SYSTOLIC BLOOD PRESSURE: 134 MMHG | WEIGHT: 210.31 LBS | OXYGEN SATURATION: 96 % | HEART RATE: 90 BPM | DIASTOLIC BLOOD PRESSURE: 82 MMHG | HEIGHT: 75 IN | BODY MASS INDEX: 26.15 KG/M2

## 2017-09-25 DIAGNOSIS — G47.33 OBSTRUCTIVE SLEEP APNEA: Primary | ICD-10-CM

## 2017-09-25 DIAGNOSIS — G47.33 OSA (OBSTRUCTIVE SLEEP APNEA): ICD-10-CM

## 2017-09-25 PROCEDURE — 99213 OFFICE O/P EST LOW 20 MIN: CPT | Mod: S$PBB,,, | Performed by: NURSE PRACTITIONER

## 2017-09-25 PROCEDURE — 99213 OFFICE O/P EST LOW 20 MIN: CPT | Mod: PBBFAC | Performed by: NURSE PRACTITIONER

## 2017-09-25 PROCEDURE — 99999 PR PBB SHADOW E&M-EST. PATIENT-LVL III: CPT | Mod: PBBFAC,,, | Performed by: NURSE PRACTITIONER

## 2017-09-25 PROCEDURE — 1126F AMNT PAIN NOTED NONE PRSNT: CPT | Mod: ,,, | Performed by: NURSE PRACTITIONER

## 2017-09-25 PROCEDURE — 1159F MED LIST DOCD IN RCRD: CPT | Mod: ,,, | Performed by: NURSE PRACTITIONER

## 2017-09-25 PROCEDURE — 3075F SYST BP GE 130 - 139MM HG: CPT | Mod: ,,, | Performed by: NURSE PRACTITIONER

## 2017-09-25 PROCEDURE — 3079F DIAST BP 80-89 MM HG: CPT | Mod: ,,, | Performed by: NURSE PRACTITIONER

## 2017-09-25 NOTE — TELEPHONE ENCOUNTER
· Requested pt Specific set up dated from [facility]:  Aug 22,2017  · Compliance Report- AHI    Spoke with Andrew from [facility] she said she is going to check for pt compliance report.. And might have to wait a week before that can be downloaded.    Place a remind me to check on pt compliance report from [facility]. in a week

## 2017-09-25 NOTE — PROGRESS NOTES
Teodoro Mast seen in follow-up for GLYNN management and CPAP equipment check after set up of CPAP.     CHIEF COMPLAINT:    Chief Complaint   Patient presents with    Sleep Apnea       04/26/2017 LANRE Cornejo NP: HISTORY OF PRESENT ILLNESS: Teodoro Mast is a 72 y.o. male is here for sleep evaluation.        Diagnosed with HTN 43 years ago and BP has been controlled until a month ago. Pt's cardiologist recommended sleep eval for secondary causes of refractory HTN. Currently on 3 meds.     Recently started taking Flomax for BPH, which has improved nocturia to 3x per night vs. Several times as prior.     Patient complaints include: snoring, interrupted sleep, and excessive daytime sleepiness.     Reports nasal congestion 1 - 2 x per week usually at bedtime. Symptoms relieved by taking OTC antihistamine.     Denies symptoms of restless legs or kicking during sleep.    Occupation: Retired    Pledger Sleepiness Scale score during initial sleep evaluation was 1.    SLEEP ROUTINE:  Activity the hour prior to sleep: TV. Falls asleep to TV in bedroom    Bed partner:  wife  Time to bed:  9 - 10 pm  Lights off:  Yes but TV on  Sleep onset latency:  <1 hour       Disruptions or awakenings:  2 - 3    Wakeup time:  5:30 - 6 am  Perceived sleep quality: 3/5       Daytime naps:  none  Weekend sleep routine: same as above  Caffeine use: some  Exercise habit:   No        INTERVAL HISTORY:    06/23/2017 LANRE Cornejo NP: Discussed 05/19/2017 in-lab sleep study in detail. Patient complaints of snoring, interrupted sleep, and excessive daytime sleepiness remain the same. ESS 2.     09/25/2017 LANRE Cornejo NP: Pt returns after CPAP set up on 08/22/2017 at [facility]. Pt complaints of snoring, interrupted sleep, and excessive daytime sleepiness now resolved with CPAP use. Denies oral/nasal drying with Simplus FFM.  Denies pressure intolerance. Reports that fasting blood sugars have improved since starting CPAP from 120s vs 80-90s  with CPAP and blood pressure controlled now regularly SBP <120. ESS 5.     CPAP Interrogation: Floyd Gaines, CPAP 11 cm, Limited usage information on actual machine. Total Usage: 172 hours, Average daily use: 5 hours.   Contacted [facility] 09/25/2017 while pt in clinic, and DME has not downloaded pt compliance to provide Sleep Clinic detailed compliance report. DME to fax to Sleep Clinic when available.   Addendum: Info Received via Fax on 09/29/2017 Total Days Device Used: 100% compliance, Days >4 hours: 30/30, Average Used: 5 h 13 m    Baseline Sleep Study: 05/19/2017 Split Night Study 210 lb. The overall AHI was 49.3 with an oxygen jostin of 83.0%. Effective control of sleep disordered breathing was seen during supine REM stage sleep at a pressure of 11 cm of water. Higher pressures were associated with central apneas. Used medium Simplus FFM.       PAST MEDICAL HISTORY:    Active Ambulatory Problems     Diagnosis Date Noted    Hyperlipidemia     Hypertension     BPH with urinary obstruction 06/11/2013    Kidney disease, chronic, stage III (GFR 30-59 ml/min) 06/25/2015    Hypertensive CKD (chronic kidney disease) 11/09/2016    Kidney cysts 11/09/2016    Proteinuria 11/09/2016    Metabolic acidosis 11/09/2016    GLYNN (obstructive sleep apnea)     Hyperuricemia 08/30/2017     Resolved Ambulatory Problems     Diagnosis Date Noted    Sleep apnea, unspecified 04/05/2017     Past Medical History:   Diagnosis Date    Hyperlipidemia     Hypertension                 PAST SURGICAL HISTORY:    Past Surgical History:   Procedure Laterality Date    FINGER SURGERY      hemorriodectomy           FAMILY HISTORY:                Family History   Problem Relation Age of Onset    Heart disease Mother     Hypertension Mother        SOCIAL HISTORY:          Tobacco:   History   Smoking Status    Former Smoker   Smokeless Tobacco    Not on file       Alcohol use:    History   Alcohol Use    Yes              "    ALLERGIES:  Review of patient's allergies indicates:  No Known Allergies    CURRENT MEDICATIONS:    Current Outpatient Prescriptions   Medication Sig Dispense Refill    allopurinol (ZYLOPRIM) 100 MG tablet Take 1 tablet (100 mg total) by mouth once daily. 30 tablet 6    AMINO ACIDS/MV,FE,MIN (OCUVITE EXTRA ORAL) Take by mouth.      amlodipine (NORVASC) 10 MG tablet TAKE 1 TABLET BY MOUTH ONCE DAILY 90 tablet 3    atorvastatin (LIPITOR) 80 MG tablet Take 1 tablet (80 mg total) by mouth once daily. 90 tablet 3    fluticasone (FLONASE) 50 mcg/actuation nasal spray 2 sprays by Each Nare route once daily. 1 Bottle 11    hydrochlorothiazide (HYDRODIURIL) 25 MG tablet TAKE 1/2 TABLET(12.5 MG) BY MOUTH EVERY DAY 45 tablet 3    lisinopril (PRINIVIL,ZESTRIL) 40 MG tablet Take 1 tablet (40 mg total) by mouth once daily. 30 tablet 11    spironolactone (ALDACTONE) 25 MG tablet Take 1 tablet (25 mg total) by mouth once daily. 30 tablet 11    tamsulosin (FLOMAX) 0.4 mg Cp24 Take 1 capsule (0.4 mg total) by mouth once daily. 90 capsule 6    UNABLE TO FIND PT TAKEN TEASPOON OF BAKING SODA DAILY      vitamin D 1000 units Tab Take 2,000 mg by mouth once daily.       No current facility-administered medications for this visit.                   REVIEW OF SYSTEMS:     Sleep related symptoms as per HPI.  CONST:Denies weight gain    HEENT: Occasional sinus congestion  PULM: Denies dyspnea  CARD:  Denies palpitations   GI:  Denies acid reflux  : Denies polyuria  NEURO: Denies headaches  PSYCH: Denies mood disturbance  HEME: Denies anemia    Otherwise, a balance of 10 systems reviewed is negative.          PHYSICAL EXAM:  Vitals:    09/25/17 1046   BP: 134/82   Pulse: 90   SpO2: 96%   Weight: 95.4 kg (210 lb 4.8 oz)   Height: 6' 3" (1.905 m)   PainSc: 0-No pain     Body mass index is 26.29 kg/m².     GENERAL: Normal development, well groomed  HEENT:  Conjunctivae are non-erythematous; Pupils equal, round, and reactive to " light; Nose is symmetrical; Nasal mucosa is pink and moist; Septum is midline; Inferior turbinates are normal; Nasal airflow is normal; Posterior pharynx is pink; Modified Mallampati: IV; Posterior palate is normal; Tonsils not visualized; Uvula is normal and pink;Tongue is normal; Dentition is fair; No TMJ tenderness; Jaw opening and protrusion without click and without discomfort.  NECK: Supple. Neck circumference is 14.5 inches. No thyromegaly. No palpable nodes.    SKIN: On face and neck: No abrasions, no rashes, no lesions.  No subcutaneous nodules are palpable.  RESPIRATORY: Chest is clear to auscultation.  Normal chest expansion and non-labored breathing at rest.  CARDIOVASCULAR: Normal S1, S2.  No murmurs, gallops or rubs. No carotid bruits bilaterally.  EXTREMITIES: No edema. No clubbing. No cyanosis. Station normal. Gait normal.        NEURO/PSYCH: Oriented to time, place and person. Normal attention span and concentration. Affect is full. Mood is normal.                                              ASSESSMENT:    Obstructive sleep apnea, severe by AHI.  The patient symptomatically has snoring, interrupted sleep, and excessive daytime sleepiness now resolved with CPAP use. The patient is adherent on CPAP and experiencing symptomatic benefit. Medical co-morbidities: HTN, HLD.  This warrants treatment for obstructive sleep apnea.      Allergic rhinitis, stable, currently uses OTC antihistamine prn basis with relief     PLAN:    -Treatment: continue CPAP 11 cm. RTC 6 months for adherence monitoring, then annually thereafter.     - Education: During our discussion today, we talked about the etiology of obstructive sleep apnea as well as the potential ramifications of untreated sleep apnea, which could include daytime sleepiness, hypertension, heart disease and/or stroke. We discussed potential treatment options, which could include weight loss, body positioning, continuous positive airway pressure (CPAP), OA,  EPAP,  or referral for surgical consideration.     -continue Antihistamine and Flonase daily use    - Precautions: The patient was advised to abstain from driving should they feel sleepy  or drowsy.

## 2017-09-27 ENCOUNTER — TELEPHONE (OUTPATIENT)
Dept: SLEEP MEDICINE | Facility: CLINIC | Age: 72
End: 2017-09-27

## 2017-09-27 NOTE — TELEPHONE ENCOUNTER
Receive signature request for new supplies for CPAP for [facility]. The current supplies are worn and no longer effective   Gave to provider   Fax sign DME Rx to [facility] equipment  Sent to scan

## 2017-09-29 ENCOUNTER — TELEPHONE (OUTPATIENT)
Dept: SLEEP MEDICINE | Facility: CLINIC | Age: 72
End: 2017-09-29

## 2017-09-29 NOTE — TELEPHONE ENCOUNTER
Received fax requesting signed copy of pt recent office visit and evaluation of PAP device and download with pt compliant.     Also requesting a completed provider PAP eval sign to be sent to [facility]   Gave to provider for sign and check    Faxed everything signed back to [facility]  Sent to be scan

## 2017-09-29 NOTE — PROGRESS NOTES
Received compliance report from [facility] 09/29/2017.    Total Days Device Used: 100% compliance, Days >4 hours: 30/30, Average Used: 5 h 13 m    The patient is adherent on CPAP and experiencing symptomatic benefit.

## 2017-10-09 ENCOUNTER — LAB VISIT (OUTPATIENT)
Dept: LAB | Facility: HOSPITAL | Age: 72
End: 2017-10-09
Attending: INTERNAL MEDICINE
Payer: MEDICARE

## 2017-10-09 DIAGNOSIS — N18.30 KIDNEY DISEASE, CHRONIC, STAGE III (GFR 30-59 ML/MIN): ICD-10-CM

## 2017-10-09 LAB
ALBUMIN SERPL BCP-MCNC: 3.6 G/DL
ANION GAP SERPL CALC-SCNC: 10 MMOL/L
ANION GAP SERPL CALC-SCNC: 10 MMOL/L
BASOPHILS # BLD AUTO: 0.04 K/UL
BASOPHILS NFR BLD: 0.7 %
BUN SERPL-MCNC: 23 MG/DL
BUN SERPL-MCNC: 23 MG/DL
CALCIUM SERPL-MCNC: 9.5 MG/DL
CALCIUM SERPL-MCNC: 9.5 MG/DL
CHLORIDE SERPL-SCNC: 112 MMOL/L
CHLORIDE SERPL-SCNC: 112 MMOL/L
CO2 SERPL-SCNC: 20 MMOL/L
CO2 SERPL-SCNC: 20 MMOL/L
CREAT SERPL-MCNC: 2 MG/DL
CREAT SERPL-MCNC: 2 MG/DL
DIFFERENTIAL METHOD: ABNORMAL
EOSINOPHIL # BLD AUTO: 0.1 K/UL
EOSINOPHIL NFR BLD: 0.9 %
ERYTHROCYTE [DISTWIDTH] IN BLOOD BY AUTOMATED COUNT: 14.8 %
EST. GFR  (AFRICAN AMERICAN): 38 ML/MIN/1.73 M^2
EST. GFR  (AFRICAN AMERICAN): 38 ML/MIN/1.73 M^2
EST. GFR  (NON AFRICAN AMERICAN): 32.8 ML/MIN/1.73 M^2
EST. GFR  (NON AFRICAN AMERICAN): 32.8 ML/MIN/1.73 M^2
GLUCOSE SERPL-MCNC: 114 MG/DL
GLUCOSE SERPL-MCNC: 114 MG/DL
HCT VFR BLD AUTO: 39.5 %
HGB BLD-MCNC: 13.2 G/DL
LYMPHOCYTES # BLD AUTO: 0.9 K/UL
LYMPHOCYTES NFR BLD: 16.9 %
MCH RBC QN AUTO: 29.3 PG
MCHC RBC AUTO-ENTMCNC: 33.4 G/DL
MCV RBC AUTO: 88 FL
MONOCYTES # BLD AUTO: 0.5 K/UL
MONOCYTES NFR BLD: 9.2 %
NEUTROPHILS # BLD AUTO: 3.9 K/UL
NEUTROPHILS NFR BLD: 72.1 %
PHOSPHATE SERPL-MCNC: 2 MG/DL
PLATELET # BLD AUTO: 173 K/UL
PMV BLD AUTO: 11.2 FL
POTASSIUM SERPL-SCNC: 4.7 MMOL/L
POTASSIUM SERPL-SCNC: 4.7 MMOL/L
PTH-INTACT SERPL-MCNC: 104 PG/ML
RBC # BLD AUTO: 4.5 M/UL
SODIUM SERPL-SCNC: 142 MMOL/L
SODIUM SERPL-SCNC: 142 MMOL/L
URATE SERPL-MCNC: 6.7 MG/DL
WBC # BLD AUTO: 5.43 K/UL

## 2017-10-09 PROCEDURE — 83970 ASSAY OF PARATHORMONE: CPT

## 2017-10-09 PROCEDURE — 85025 COMPLETE CBC W/AUTO DIFF WBC: CPT

## 2017-10-09 PROCEDURE — 80069 RENAL FUNCTION PANEL: CPT

## 2017-10-09 PROCEDURE — 84550 ASSAY OF BLOOD/URIC ACID: CPT

## 2017-10-09 PROCEDURE — 36415 COLL VENOUS BLD VENIPUNCTURE: CPT

## 2017-10-18 ENCOUNTER — OFFICE VISIT (OUTPATIENT)
Dept: NEPHROLOGY | Facility: CLINIC | Age: 72
End: 2017-10-18
Payer: MEDICARE

## 2017-10-18 VITALS
DIASTOLIC BLOOD PRESSURE: 80 MMHG | SYSTOLIC BLOOD PRESSURE: 140 MMHG | HEART RATE: 94 BPM | HEIGHT: 75 IN | WEIGHT: 207 LBS | OXYGEN SATURATION: 97 % | BODY MASS INDEX: 25.74 KG/M2

## 2017-10-18 DIAGNOSIS — N18.30 KIDNEY DISEASE, CHRONIC, STAGE III (GFR 30-59 ML/MIN): Primary | ICD-10-CM

## 2017-10-18 DIAGNOSIS — E87.20 METABOLIC ACIDOSIS: ICD-10-CM

## 2017-10-18 DIAGNOSIS — N13.8 BPH WITH URINARY OBSTRUCTION: ICD-10-CM

## 2017-10-18 DIAGNOSIS — I12.9 HYPERTENSIVE CKD (CHRONIC KIDNEY DISEASE): ICD-10-CM

## 2017-10-18 DIAGNOSIS — N28.1 KIDNEY CYSTS: ICD-10-CM

## 2017-10-18 DIAGNOSIS — E79.0 HYPERURICEMIA: ICD-10-CM

## 2017-10-18 DIAGNOSIS — N40.1 BPH WITH URINARY OBSTRUCTION: ICD-10-CM

## 2017-10-18 DIAGNOSIS — R80.1 PERSISTENT PROTEINURIA: ICD-10-CM

## 2017-10-18 PROCEDURE — 99213 OFFICE O/P EST LOW 20 MIN: CPT | Mod: PBBFAC | Performed by: INTERNAL MEDICINE

## 2017-10-18 PROCEDURE — 99214 OFFICE O/P EST MOD 30 MIN: CPT | Mod: S$PBB,,, | Performed by: INTERNAL MEDICINE

## 2017-10-18 PROCEDURE — 99999 PR PBB SHADOW E&M-EST. PATIENT-LVL III: CPT | Mod: PBBFAC,,, | Performed by: INTERNAL MEDICINE

## 2017-10-18 RX ORDER — SODIUM BICARBONATE 325 MG/1
325 TABLET ORAL DAILY
Qty: 120 TABLET | Refills: 11 | Status: SHIPPED | OUTPATIENT
Start: 2017-10-18 | End: 2018-09-18

## 2017-10-18 NOTE — PROGRESS NOTES
Subjective:       Patient ID: Teodoro Mast is a 72 y.o. Black or  male who presents for follow up of Chronic Kidney Disease and Proteinuria    HPI     Mr. Mast was seen for follow up of CKD. He was seen on 9/6/16 in new patient evaluation and last followed on 8/30/17. Pt has longstanding hypertension since 1974, remote h/o tobacco smoking, hyperlipidemia, BPH, CKD, proteinuria. He reports family h/o kidney disease in his father that was not diagnosed until he was in his 80's. Pt reports he was diagnosed with hypertension when he was 29 years old. He reports in the past he had some obstruction to the urinary tract but unable to provide any details. He was on allopurinol in the past for gout, he was off it for quite some time. At the time of last visit he was placed back on allopurinol. It appears his uric acid level did improve since then from 8.5 to 6.7.     In the past he was using medicines like Indomethacin, ibuprofen. He has been diagnosed with sleep apnea. He is using CPAP regularly which has helped improve his hypertension control.    Overall, he reports he has been doing fine. He denies any recent acute illness. He still has symptoms of difficulty with emptying the bladder and hesitance and is under follow up of urology for BPH. He denies any leg swelling. He reports he has changed his diet and has now improved water intake, less intake of soda, and has been watching his diet for salt. He reports he had prior episodes of gout and he feels intake of beer, craw fish triggers it for him. He is now ok to use allopurinol regularly.    He has slow progression of CKD. Labs were reviewed and d/w him. His creatinine ha gradually increased from 1.4 t0 1.9 since 2010. Most recent baseline has been around 2.1. Prior urine studies show proteinuria. He appears to have chronic anemia. He has been on lisinopril and aldactone containing regimen. He denies any episode of low BP or lightheadedness.      Labs from 10/9/17 noted for Hb 13.2, Na 142, K 4.7, bicarbonate 20, BUN 23, creatinine 2, eGFR 38, Ca 9.5, phos 2, Albumin 3.6, uric acid down to 6.7 from 8.5, , urinalysis with protein, urine PC ratio 1.04, UA with no RBC, WBC 1. prior labs showed SPEP/ SHELIA was normal. hep B/C screen was negative. US kidneys from 10/16 showed 10.1 and 12.9 cm kidneys, changes of CKD, cortical thinning, multiple cysts on both sides and enlarged prostate.     Review of Systems   Constitutional: Negative for activity change, appetite change, chills, fatigue and fever.   Eyes: Negative for pain and discharge.   Respiratory: Negative for cough, shortness of breath and wheezing.    Cardiovascular: Negative for chest pain and leg swelling.   Gastrointestinal: Negative for abdominal pain, diarrhea, nausea and vomiting.   Endocrine: Negative for polydipsia and polyuria.   Genitourinary: Negative for decreased urine volume, difficulty urinating, dysuria, flank pain, frequency and hematuria.   Musculoskeletal: Negative for back pain and myalgias.   Skin: Negative for pallor and rash.   Neurological: Negative for dizziness, light-headedness and headaches.   Psychiatric/Behavioral: The patient is not nervous/anxious.        Objective:      Physical Exam   Constitutional: He is oriented to person, place, and time. He appears well-developed and well-nourished. No distress.   Eyes: Right eye exhibits no discharge. Left eye exhibits no discharge.   Neck: Neck supple.   Cardiovascular: Normal rate, regular rhythm and normal heart sounds.    Pulmonary/Chest: Effort normal and breath sounds normal. No respiratory distress. He has no wheezes. He has no rales.   Musculoskeletal: He exhibits no edema.   Neurological: He is alert and oriented to person, place, and time.   Skin: Skin is warm. He is not diaphoretic.   Psychiatric: He has a normal mood and affect. His behavior is normal.   Vitals reviewed.      Assessment:       1. Kidney disease,  chronic, stage III (GFR 30-59 ml/min)    2. Hypertensive CKD (chronic kidney disease)    3. Persistent proteinuria    4. Metabolic acidosis    5. Kidney cysts    6. BPH with urinary obstruction    7. Hyperuricemia        Plan:     Mr. Mast has longstanding hypertension, hyperlipidemia, BPH, CKD III with proteinuria. 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 He denies any NSAID use currently but it appears in the past he was taking indomethacin. He has slow progression of CKD. Also he has slow progression of proteinuria. Most recently he had uncontrolled hypertension which could have led to progression of CKD and proteinuria. As such he has hypertension since 1970's.     Since adding allopurinol at a lower dose his hyperuricemia seems to have improved. Continue current dose for now.    I explained to him about CKD staging, potential for progression, risk factors for CKD etc. Stressed importance of good BP control, low salt diet, keeping hydrated, avoiding NSAID, nephrotoxins etc. Previously noted, cysts on both kidneys and changes of CKD on US.     With K sparing diuretic and lisinopril, will follow K levels closely. Pt advised to avoid high K containing foods. He has mildly low phos but no muscle weakness. Advised on improving phos intake. Follow labs periodically.     Add sodium bicarbonate for metabolic acidosis. Pt states his insurance does not cover for that so he was not actually taking it but he states it will cost him only small amount and he is willing to take it now. Trend labs to monitor it.     Plan  - continue allopurinol, renal dosing  - periodically monitor renal panel for electrolytes, acid base status, eGFR  - risk of CKD progression d/w patient  - sodium bicarbonate for correction of metabolic acidosis  - low salt diet  - follow PTH levels, vit D, decrease OTC vitamin D, follow corrected Ca  - trend urine studies for proteinuria  - avoid NSAID/ bactrim/ IV contrast/ gadolinium/  aminoglycoside where possible  - continue ACE-I containing regimen for RAAS blockade. He is on lisinopril  - he has chronic anemia, periodically trend Hb  - continue to follow with urology, continue Flomax   - management of hyperlipidemia per cardiology   - continue home BP monitoring   - continue OTC sodium bicarbonate for acidosis, acidosis under control now  - management of sleep apnea per sleep lab  - labs every 2 months    Renal panel every 2 months standing order   RTC 4 months         Plan, labs, recommendations were discussed with patient, his questions were answered to his satisfaction.

## 2017-10-30 ENCOUNTER — LAB VISIT (OUTPATIENT)
Dept: LAB | Facility: HOSPITAL | Age: 72
End: 2017-10-30
Attending: INTERNAL MEDICINE
Payer: MEDICARE

## 2017-10-30 DIAGNOSIS — E78.00 PURE HYPERCHOLESTEROLEMIA: ICD-10-CM

## 2017-10-30 DIAGNOSIS — I10 ESSENTIAL HYPERTENSION: ICD-10-CM

## 2017-10-30 DIAGNOSIS — N18.30 KIDNEY DISEASE, CHRONIC, STAGE III (GFR 30-59 ML/MIN): ICD-10-CM

## 2017-10-30 LAB
ALBUMIN SERPL BCP-MCNC: 3.2 G/DL
ALP SERPL-CCNC: 96 U/L
ALT SERPL W/O P-5'-P-CCNC: 24 U/L
ANION GAP SERPL CALC-SCNC: 5 MMOL/L
AST SERPL-CCNC: 22 U/L
BASOPHILS # BLD AUTO: 0.04 K/UL
BASOPHILS NFR BLD: 0.8 %
BILIRUB SERPL-MCNC: 0.5 MG/DL
BUN SERPL-MCNC: 23 MG/DL
CALCIUM SERPL-MCNC: 9.4 MG/DL
CHLORIDE SERPL-SCNC: 112 MMOL/L
CHOLEST SERPL-MCNC: 138 MG/DL
CHOLEST/HDLC SERPL: 4.6 {RATIO}
CO2 SERPL-SCNC: 25 MMOL/L
CREAT SERPL-MCNC: 1.8 MG/DL
DIFFERENTIAL METHOD: ABNORMAL
EOSINOPHIL # BLD AUTO: 0.1 K/UL
EOSINOPHIL NFR BLD: 2.8 %
ERYTHROCYTE [DISTWIDTH] IN BLOOD BY AUTOMATED COUNT: 14.4 %
EST. GFR  (AFRICAN AMERICAN): 43.1 ML/MIN/1.73 M^2
EST. GFR  (NON AFRICAN AMERICAN): 37.3 ML/MIN/1.73 M^2
GLUCOSE SERPL-MCNC: 105 MG/DL
HCT VFR BLD AUTO: 35.8 %
HDLC SERPL-MCNC: 30 MG/DL
HDLC SERPL: 21.7 %
HGB BLD-MCNC: 11.9 G/DL
IMM GRANULOCYTES # BLD AUTO: 0.01 K/UL
IMM GRANULOCYTES NFR BLD AUTO: 0.2 %
LDLC SERPL CALC-MCNC: 85 MG/DL
LYMPHOCYTES # BLD AUTO: 0.8 K/UL
LYMPHOCYTES NFR BLD: 16.4 %
MCH RBC QN AUTO: 29.5 PG
MCHC RBC AUTO-ENTMCNC: 33.2 G/DL
MCV RBC AUTO: 89 FL
MONOCYTES # BLD AUTO: 0.4 K/UL
MONOCYTES NFR BLD: 8.8 %
NEUTROPHILS # BLD AUTO: 3.5 K/UL
NEUTROPHILS NFR BLD: 71 %
NONHDLC SERPL-MCNC: 108 MG/DL
NRBC BLD-RTO: 0 /100 WBC
PLATELET # BLD AUTO: 215 K/UL
PMV BLD AUTO: 10.2 FL
POTASSIUM SERPL-SCNC: 4.5 MMOL/L
PROT SERPL-MCNC: 6.9 G/DL
RBC # BLD AUTO: 4.04 M/UL
SODIUM SERPL-SCNC: 142 MMOL/L
TRIGL SERPL-MCNC: 115 MG/DL
WBC # BLD AUTO: 4.99 K/UL

## 2017-10-30 PROCEDURE — 80061 LIPID PANEL: CPT

## 2017-10-30 PROCEDURE — 36415 COLL VENOUS BLD VENIPUNCTURE: CPT

## 2017-10-30 PROCEDURE — 85025 COMPLETE CBC W/AUTO DIFF WBC: CPT

## 2017-10-30 PROCEDURE — 80053 COMPREHEN METABOLIC PANEL: CPT

## 2017-11-03 ENCOUNTER — OFFICE VISIT (OUTPATIENT)
Dept: CARDIOLOGY | Facility: CLINIC | Age: 72
End: 2017-11-03
Payer: MEDICARE

## 2017-11-03 VITALS
SYSTOLIC BLOOD PRESSURE: 144 MMHG | BODY MASS INDEX: 25.64 KG/M2 | HEIGHT: 76 IN | WEIGHT: 210.56 LBS | DIASTOLIC BLOOD PRESSURE: 81 MMHG | HEART RATE: 86 BPM

## 2017-11-03 DIAGNOSIS — G47.33 OSA (OBSTRUCTIVE SLEEP APNEA): ICD-10-CM

## 2017-11-03 DIAGNOSIS — E78.2 MIXED HYPERLIPIDEMIA: ICD-10-CM

## 2017-11-03 DIAGNOSIS — N18.30 KIDNEY DISEASE, CHRONIC, STAGE III (GFR 30-59 ML/MIN): ICD-10-CM

## 2017-11-03 DIAGNOSIS — I10 ESSENTIAL HYPERTENSION: Primary | ICD-10-CM

## 2017-11-03 PROCEDURE — 99213 OFFICE O/P EST LOW 20 MIN: CPT | Mod: PBBFAC | Performed by: INTERNAL MEDICINE

## 2017-11-03 PROCEDURE — 99214 OFFICE O/P EST MOD 30 MIN: CPT | Mod: S$PBB,,, | Performed by: INTERNAL MEDICINE

## 2017-11-03 PROCEDURE — 99999 PR PBB SHADOW E&M-EST. PATIENT-LVL III: CPT | Mod: PBBFAC,,, | Performed by: INTERNAL MEDICINE

## 2017-11-03 NOTE — PROGRESS NOTES
Subjective:    Patient ID:  Teodoro Mast is a 72 y.o. male who presents for follow-up of hypertension    HPI     72 years old male followed with hypertension, hyperlipidemia and chronic renal insuffiencey followed by Nephrology. He continues to do well and has no chest pain or FRAGA. A recent gout attack left his with a small effusion right great MPJ. He has no chest pain or FRAGA Home BPs were improved after using CPAP.  Lab Results   Component Value Date     10/30/2017    K 4.5 10/30/2017     (H) 10/30/2017    CO2 25 10/30/2017    BUN 23 10/30/2017    CREATININE 1.8 (H) 10/30/2017     10/30/2017    HGBA1C 5.9 07/28/2006    AST 22 10/30/2017    ALT 24 10/30/2017    ALBUMIN 3.2 (L) 10/30/2017    PROT 6.9 10/30/2017    BILITOT 0.5 10/30/2017    WBC 4.99 10/30/2017    HGB 11.9 (L) 10/30/2017    HCT 35.8 (L) 10/30/2017    MCV 89 10/30/2017     10/30/2017    PSA 3.9 12/17/2013         Lab Results   Component Value Date    CHOL 138 10/30/2017    HDL 30 (L) 10/30/2017    TRIG 115 10/30/2017       Lab Results   Component Value Date    LDLCALC 85.0 10/30/2017       Past Medical History:   Diagnosis Date    Hyperlipidemia     Hypertension        Current Outpatient Prescriptions:     allopurinol (ZYLOPRIM) 100 MG tablet, Take 1 tablet (100 mg total) by mouth once daily., Disp: 30 tablet, Rfl: 6    amlodipine (NORVASC) 10 MG tablet, TAKE 1 TABLET BY MOUTH ONCE DAILY, Disp: 90 tablet, Rfl: 3    atorvastatin (LIPITOR) 80 MG tablet, Take 1 tablet (80 mg total) by mouth once daily., Disp: 90 tablet, Rfl: 3    hydrochlorothiazide (HYDRODIURIL) 25 MG tablet, TAKE 1/2 TABLET(12.5 MG) BY MOUTH EVERY DAY, Disp: 45 tablet, Rfl: 3    lisinopril (PRINIVIL,ZESTRIL) 40 MG tablet, Take 1 tablet (40 mg total) by mouth once daily., Disp: 30 tablet, Rfl: 11    sodium bicarbonate 325 MG tablet, Take 1 tablet (325 mg total) by mouth once daily., Disp: 120 tablet, Rfl: 11    spironolactone (ALDACTONE) 25 MG  tablet, Take 1 tablet (25 mg total) by mouth once daily., Disp: 30 tablet, Rfl: 11    tamsulosin (FLOMAX) 0.4 mg Cp24, Take 1 capsule (0.4 mg total) by mouth once daily., Disp: 90 capsule, Rfl: 6    VIT C/VIT E/LUTEIN/MIN/OMEGA-3 (OCUVITE ORAL), Take by mouth., Disp: , Rfl:     vitamin D 1000 units Tab, Take 2,000 mg by mouth once daily., Disp: , Rfl:         Review of Systems   Constitution: Negative for decreased appetite, diaphoresis, fever, weakness, malaise/fatigue, weight gain and weight loss.   HENT: Negative for congestion, ear discharge, ear pain and nosebleeds.    Eyes: Negative for blurred vision, double vision and visual disturbance.   Cardiovascular: Negative for chest pain, claudication, cyanosis, dyspnea on exertion, irregular heartbeat, leg swelling, near-syncope, orthopnea, palpitations, paroxysmal nocturnal dyspnea and syncope.   Respiratory: Negative for cough, hemoptysis, shortness of breath, sleep disturbances due to breathing, snoring, sputum production and wheezing.    Endocrine: Negative for polydipsia, polyphagia and polyuria.   Hematologic/Lymphatic: Negative for adenopathy and bleeding problem. Does not bruise/bleed easily.   Skin: Negative for color change, nail changes, poor wound healing and rash.   Musculoskeletal: Negative for muscle cramps and muscle weakness.   Gastrointestinal: Negative for abdominal pain, anorexia, change in bowel habit, hematochezia, nausea and vomiting.   Genitourinary: Negative for dysuria, frequency and hematuria.   Neurological: Negative for brief paralysis, difficulty with concentration, excessive daytime sleepiness, dizziness, focal weakness, headaches, light-headedness, seizures and vertigo.   Psychiatric/Behavioral: Negative for altered mental status and depression.   Allergic/Immunologic: Negative for persistent infections.        Objective:BP (!) 144/81 (BP Location: Left arm, Patient Position: Sitting, BP Method: Medium (Automatic))   Pulse 86    "Ht 6' 4" (1.93 m)   Wt 95.5 kg (210 lb 8.6 oz)   BMI 25.63 kg/m²           Physical Exam   Constitutional: He is oriented to person, place, and time. He appears well-developed and well-nourished.   HENT:   Head: Normocephalic.   Right Ear: External ear normal.   Left Ear: External ear normal.   Nose: Nose normal.   Inspection of lips, teeth and gums normal   Eyes: EOM are normal. Pupils are equal, round, and reactive to light. No scleral icterus.   Neck: Normal range of motion. Neck supple. No JVD present. No tracheal deviation present. No thyromegaly present.   Cardiovascular: Normal rate, regular rhythm and intact distal pulses.  Exam reveals no gallop and no friction rub.    No murmur heard.  Pulses:       Carotid pulses are 2+ on the right side, and 2+ on the left side.       Dorsalis pedis pulses are 2+ on the right side, and 2+ on the left side.        Posterior tibial pulses are 2+ on the right side, and 2+ on the left side.   Pulmonary/Chest: Effort normal and breath sounds normal.   Abdominal: Bowel sounds are normal. He exhibits no distension. There is no hepatosplenomegaly. There is no tenderness. There is no guarding.   Musculoskeletal: Normal range of motion. He exhibits no edema or tenderness.   Lymphadenopathy:   Palpation of neck and groin lymph nodes normal   Neurological: He is alert and oriented to person, place, and time. No cranial nerve deficit. He exhibits normal muscle tone. Coordination normal.   Skin: Skin is dry.        Palpation of skin normal   Psychiatric: His behavior is normal. Judgment and thought content normal.         Assessment:       1. Essential hypertension    2. Kidney disease, chronic, stage III (GFR 30-59 ml/min)    3. GLYNN (obstructive sleep apnea)    4. Mixed hyperlipidemia         Plan:       Teodoro was seen today for hypertension.    Diagnoses and all orders for this visit:    Essential hypertension  -     Comprehensive metabolic panel; Future; Expected date: " 05/05/2018    Kidney disease, chronic, stage III (GFR 30-59 ml/min)  -     Comprehensive metabolic panel; Future; Expected date: 05/05/2018  -     CBC auto differential; Future; Expected date: 05/05/2018    GLYNN (obstructive sleep apnea)    Mixed hyperlipidemia  -     Lipid panel; Future; Expected date: 05/05/2018    Other orders  -     VIT C/VIT E/LUTEIN/MIN/OMEGA-3 (OCUVITE ORAL); Take by mouth.

## 2017-12-06 ENCOUNTER — LAB VISIT (OUTPATIENT)
Dept: LAB | Facility: HOSPITAL | Age: 72
End: 2017-12-06
Attending: UROLOGY
Payer: MEDICARE

## 2017-12-06 DIAGNOSIS — N40.1 BENIGN PROSTATIC HYPERPLASIA WITH URINARY OBSTRUCTION: ICD-10-CM

## 2017-12-06 DIAGNOSIS — N13.8 BENIGN PROSTATIC HYPERPLASIA WITH URINARY OBSTRUCTION: ICD-10-CM

## 2017-12-06 LAB — COMPLEXED PSA SERPL-MCNC: 4.4 NG/ML

## 2017-12-06 PROCEDURE — 84153 ASSAY OF PSA TOTAL: CPT

## 2017-12-06 PROCEDURE — 36415 COLL VENOUS BLD VENIPUNCTURE: CPT

## 2017-12-11 ENCOUNTER — OFFICE VISIT (OUTPATIENT)
Dept: UROLOGY | Facility: CLINIC | Age: 72
End: 2017-12-11
Payer: MEDICARE

## 2017-12-11 VITALS
DIASTOLIC BLOOD PRESSURE: 80 MMHG | WEIGHT: 211.63 LBS | HEART RATE: 87 BPM | BODY MASS INDEX: 25.77 KG/M2 | HEIGHT: 76 IN | SYSTOLIC BLOOD PRESSURE: 147 MMHG

## 2017-12-11 DIAGNOSIS — R97.20 ELEVATED PSA: Primary | ICD-10-CM

## 2017-12-11 PROCEDURE — 99213 OFFICE O/P EST LOW 20 MIN: CPT | Mod: PBBFAC | Performed by: UROLOGY

## 2017-12-11 PROCEDURE — 99213 OFFICE O/P EST LOW 20 MIN: CPT | Mod: S$PBB,,, | Performed by: UROLOGY

## 2017-12-11 PROCEDURE — 99999 PR PBB SHADOW E&M-EST. PATIENT-LVL III: CPT | Mod: PBBFAC,,, | Performed by: UROLOGY

## 2017-12-11 NOTE — PROGRESS NOTES
Subjective:       Patient ID: Teodoro Mast is a 72 y.o. male.    Chief Complaint: family history of prostate cancer. (annual check upl last psa was 12/16/17 4.4. nocturia x2 taking flomax before flomax he state it was x4 times he does see improvement.)    HPI patient has a family history of prostate cancer in his PSAs risen to 4.4.  We discussed the pros and cons of biopsy and he had a negative 1:15 years ago.  He would like to wait a while and repeat a PSA    Past Medical History:   Diagnosis Date    Hyperlipidemia     Hypertension        Past Surgical History:   Procedure Laterality Date    FINGER SURGERY      hemorriodectomy         Family History   Problem Relation Age of Onset    Heart disease Mother     Hypertension Mother        Social History     Social History    Marital status:      Spouse name: N/A    Number of children: N/A    Years of education: N/A     Occupational History    Not on file.     Social History Main Topics    Smoking status: Former Smoker    Smokeless tobacco: Never Used    Alcohol use Yes    Drug use: No    Sexual activity: Not on file     Other Topics Concern    Not on file     Social History Narrative    No narrative on file       Allergies:  Patient has no known allergies.    Medications:    Current Outpatient Prescriptions:     allopurinol (ZYLOPRIM) 100 MG tablet, Take 1 tablet (100 mg total) by mouth once daily., Disp: 30 tablet, Rfl: 6    amlodipine (NORVASC) 10 MG tablet, TAKE 1 TABLET BY MOUTH ONCE DAILY, Disp: 90 tablet, Rfl: 3    atorvastatin (LIPITOR) 80 MG tablet, Take 1 tablet (80 mg total) by mouth once daily., Disp: 90 tablet, Rfl: 3    hydrochlorothiazide (HYDRODIURIL) 25 MG tablet, TAKE 1/2 TABLET(12.5 MG) BY MOUTH EVERY DAY, Disp: 45 tablet, Rfl: 3    lisinopril (PRINIVIL,ZESTRIL) 40 MG tablet, Take 1 tablet (40 mg total) by mouth once daily., Disp: 30 tablet, Rfl: 11    sodium bicarbonate 325 MG tablet, Take 1 tablet (325 mg total) by  mouth once daily., Disp: 120 tablet, Rfl: 11    spironolactone (ALDACTONE) 25 MG tablet, Take 1 tablet (25 mg total) by mouth once daily., Disp: 30 tablet, Rfl: 11    tamsulosin (FLOMAX) 0.4 mg Cp24, Take 1 capsule (0.4 mg total) by mouth once daily., Disp: 90 capsule, Rfl: 6    VIT C/VIT E/LUTEIN/MIN/OMEGA-3 (OCUVITE ORAL), Take by mouth., Disp: , Rfl:     vitamin D 1000 units Tab, Take 2,000 mg by mouth once daily., Disp: , Rfl:     Review of Systems   Constitutional: Negative for activity change, appetite change, chills, diaphoresis, fatigue, fever and unexpected weight change.   HENT: Negative for congestion, dental problem, hearing loss, mouth sores, postnasal drip, rhinorrhea, sinus pressure and trouble swallowing.    Eyes: Negative for pain, discharge and itching.   Respiratory: Negative for apnea, cough, choking, chest tightness, shortness of breath and wheezing.    Cardiovascular: Negative for chest pain, palpitations and leg swelling.   Gastrointestinal: Negative for abdominal distention, abdominal pain, anal bleeding, blood in stool, constipation, diarrhea, nausea, rectal pain and vomiting.   Endocrine: Negative for polydipsia and polyuria.   Genitourinary: Negative for decreased urine volume, difficulty urinating, discharge, dysuria, enuresis, flank pain, frequency, genital sores, hematuria, penile pain, penile swelling, scrotal swelling, testicular pain and urgency.   Musculoskeletal: Negative for arthralgias, back pain and myalgias.   Skin: Negative for color change, rash and wound.   Neurological: Negative for dizziness, syncope, speech difficulty, light-headedness and headaches.   Hematological: Negative for adenopathy. Does not bruise/bleed easily.   Psychiatric/Behavioral: Negative for behavioral problems, confusion, hallucinations and sleep disturbance.       Objective:      Physical Exam    Assessment:       1. Elevated PSA        Plan:       Teodoro was seen today for family history of  prostate cancer..    Diagnoses and all orders for this visit:    Elevated PSA  -     Prostate Specific Antigen, Diagnostic; Future        return to clinic 3 months with PSA and repeat truss with biopsy when necessary

## 2018-02-22 RX ORDER — ALLOPURINOL 100 MG/1
TABLET ORAL
Qty: 90 TABLET | Refills: 6 | Status: SHIPPED | OUTPATIENT
Start: 2018-02-22 | End: 2018-05-25 | Stop reason: SDUPTHER

## 2018-03-05 ENCOUNTER — LAB VISIT (OUTPATIENT)
Dept: LAB | Facility: HOSPITAL | Age: 73
End: 2018-03-05
Attending: UROLOGY
Payer: MEDICARE

## 2018-03-05 DIAGNOSIS — R97.20 ELEVATED PSA: ICD-10-CM

## 2018-03-05 LAB — COMPLEXED PSA SERPL-MCNC: 4.5 NG/ML

## 2018-03-05 PROCEDURE — 84153 ASSAY OF PSA TOTAL: CPT

## 2018-03-05 PROCEDURE — 36415 COLL VENOUS BLD VENIPUNCTURE: CPT

## 2018-03-12 ENCOUNTER — OFFICE VISIT (OUTPATIENT)
Dept: UROLOGY | Facility: CLINIC | Age: 73
End: 2018-03-12
Payer: MEDICARE

## 2018-03-12 VITALS
SYSTOLIC BLOOD PRESSURE: 159 MMHG | HEART RATE: 92 BPM | BODY MASS INDEX: 25.69 KG/M2 | DIASTOLIC BLOOD PRESSURE: 83 MMHG | WEIGHT: 211 LBS | HEIGHT: 76 IN

## 2018-03-12 DIAGNOSIS — R97.20 ELEVATED PSA: Primary | ICD-10-CM

## 2018-03-12 PROCEDURE — 99999 PR PBB SHADOW E&M-EST. PATIENT-LVL III: CPT | Mod: PBBFAC,,, | Performed by: UROLOGY

## 2018-03-12 PROCEDURE — 99213 OFFICE O/P EST LOW 20 MIN: CPT | Mod: S$PBB,,, | Performed by: UROLOGY

## 2018-03-12 PROCEDURE — 99213 OFFICE O/P EST LOW 20 MIN: CPT | Mod: PBBFAC | Performed by: UROLOGY

## 2018-03-12 NOTE — PROGRESS NOTES
Subjective:       Patient ID: Teodoro Mast is a 72 y.o. male.    Chief Complaint: Elevated PSA (last psa was 03/05/2018 psa was 4.5. pt was concern about psa level at this vist . because his wife just had stroke  about 3odays ago.)    HPI patient is here with an elevated PSA.  It's 4.5.  It was 4.4 a few months ago.  It was as high as 7.5 in the past and the patient is status post negative truss with biopsy.  He is concerned because his wife recently had a hemorrhagic stroke and he is the primary caretaker.  She seems to be recovering well    Past Medical History:   Diagnosis Date    Hyperlipidemia     Hypertension        Past Surgical History:   Procedure Laterality Date    FINGER SURGERY      hemorriodectomy         Family History   Problem Relation Age of Onset    Heart disease Mother     Hypertension Mother        Social History     Social History    Marital status:      Spouse name: N/A    Number of children: N/A    Years of education: N/A     Occupational History    Not on file.     Social History Main Topics    Smoking status: Former Smoker    Smokeless tobacco: Never Used    Alcohol use Yes    Drug use: No    Sexual activity: Not on file     Other Topics Concern    Not on file     Social History Narrative    No narrative on file       Allergies:  Patient has no known allergies.    Medications:    Current Outpatient Prescriptions:     allopurinol (ZYLOPRIM) 100 MG tablet, TAKE 1 TABLET(100 MG) BY MOUTH EVERY DAY, Disp: 90 tablet, Rfl: 6    amlodipine (NORVASC) 10 MG tablet, TAKE 1 TABLET BY MOUTH ONCE DAILY, Disp: 90 tablet, Rfl: 3    atorvastatin (LIPITOR) 80 MG tablet, Take 1 tablet (80 mg total) by mouth once daily., Disp: 90 tablet, Rfl: 3    hydrochlorothiazide (HYDRODIURIL) 25 MG tablet, TAKE 1/2 TABLET(12.5 MG) BY MOUTH EVERY DAY, Disp: 45 tablet, Rfl: 3    lisinopril (PRINIVIL,ZESTRIL) 40 MG tablet, Take 1 tablet (40 mg total) by mouth once daily., Disp: 30 tablet,  Rfl: 11    sodium bicarbonate 325 MG tablet, Take 1 tablet (325 mg total) by mouth once daily., Disp: 120 tablet, Rfl: 11    spironolactone (ALDACTONE) 25 MG tablet, Take 1 tablet (25 mg total) by mouth once daily., Disp: 30 tablet, Rfl: 11    tamsulosin (FLOMAX) 0.4 mg Cp24, Take 1 capsule (0.4 mg total) by mouth once daily., Disp: 90 capsule, Rfl: 6    VIT C/VIT E/LUTEIN/MIN/OMEGA-3 (OCUVITE ORAL), Take by mouth., Disp: , Rfl:     vitamin D 1000 units Tab, Take 2,000 mg by mouth once daily., Disp: , Rfl:     Review of Systems   Constitutional: Negative for activity change, appetite change, chills, diaphoresis, fatigue, fever and unexpected weight change.   HENT: Negative for congestion, dental problem, hearing loss, mouth sores, postnasal drip, rhinorrhea, sinus pressure and trouble swallowing.    Eyes: Negative for pain, discharge and itching.   Respiratory: Negative for apnea, cough, choking, chest tightness, shortness of breath and wheezing.    Cardiovascular: Negative for chest pain, palpitations and leg swelling.   Gastrointestinal: Negative for abdominal distention, abdominal pain, anal bleeding, blood in stool, constipation, diarrhea, nausea, rectal pain and vomiting.   Endocrine: Negative for polydipsia and polyuria.   Genitourinary: Negative for decreased urine volume, difficulty urinating, discharge, dysuria, enuresis, flank pain, frequency, genital sores, hematuria, penile pain, penile swelling, scrotal swelling, testicular pain and urgency.   Musculoskeletal: Negative for arthralgias, back pain and myalgias.   Skin: Negative for color change, rash and wound.   Neurological: Negative for dizziness, syncope, speech difficulty, light-headedness and headaches.   Hematological: Negative for adenopathy. Does not bruise/bleed easily.   Psychiatric/Behavioral: Negative for behavioral problems, confusion, hallucinations and sleep disturbance.       Objective:      Physical Exam   Constitutional: He appears  well-developed.   HENT:   Head: Normocephalic.   Cardiovascular: Normal rate.    Pulmonary/Chest: Effort normal.   Abdominal: Soft.   Genitourinary: Prostate normal.   Neurological: He is alert.   Skin: Skin is warm.     Psychiatric: He has a normal mood and affect.       Assessment:       1. Elevated PSA        Plan:       Teodoro was seen today for elevated psa.    Diagnoses and all orders for this visit:    Elevated PSA  -     Prostate Specific Antigen, Diagnostic; Future        return clinic 6 months with PSA and if stable then we will do yearly SUNIL use

## 2018-03-18 DIAGNOSIS — I10 ESSENTIAL HYPERTENSION: ICD-10-CM

## 2018-03-19 RX ORDER — SPIRONOLACTONE 25 MG/1
TABLET ORAL
Qty: 30 TABLET | Refills: 0 | Status: SHIPPED | OUTPATIENT
Start: 2018-03-19 | End: 2018-04-17 | Stop reason: SDUPTHER

## 2018-03-23 ENCOUNTER — PATIENT MESSAGE (OUTPATIENT)
Dept: CARDIOLOGY | Facility: CLINIC | Age: 73
End: 2018-03-23

## 2018-04-03 ENCOUNTER — OFFICE VISIT (OUTPATIENT)
Dept: SLEEP MEDICINE | Facility: CLINIC | Age: 73
End: 2018-04-03
Payer: MEDICARE

## 2018-04-03 VITALS
DIASTOLIC BLOOD PRESSURE: 78 MMHG | WEIGHT: 207.25 LBS | HEART RATE: 87 BPM | SYSTOLIC BLOOD PRESSURE: 130 MMHG | OXYGEN SATURATION: 97 % | BODY MASS INDEX: 25.23 KG/M2

## 2018-04-03 DIAGNOSIS — G47.33 OSA (OBSTRUCTIVE SLEEP APNEA): Primary | ICD-10-CM

## 2018-04-03 PROCEDURE — 99999 PR PBB SHADOW E&M-EST. PATIENT-LVL III: CPT | Mod: PBBFAC,,, | Performed by: NURSE PRACTITIONER

## 2018-04-03 PROCEDURE — 99214 OFFICE O/P EST MOD 30 MIN: CPT | Mod: S$PBB,,, | Performed by: NURSE PRACTITIONER

## 2018-04-03 PROCEDURE — 99213 OFFICE O/P EST LOW 20 MIN: CPT | Mod: PBBFAC | Performed by: NURSE PRACTITIONER

## 2018-04-03 NOTE — PROGRESS NOTES
CPAP set up Date :17  Overall CPAP use: nightly  Is CPAP helping?  Yes, bp has improved, sleeps a little better  Mask Style, Comfort, fit, chin strap:  Small simplus ffm, uses chin strap  Pressure Tolerance:  ok  Humidification: has heated tubing, no issues with oral/nasal  drying    CPAP Device interrogation last 30 days:   Machine type : adaffix  Machine condition:  Good, filter is ok, will change/clean  monthly  Pressure setting and range:  cpap 11        Total usage: 1286 hours      Average daily usage 30 days:  6.1  Hours    Days > 4 Hours: 30 days  Manometer readin cm H20  May want to try a new mask

## 2018-04-03 NOTE — PROGRESS NOTES
Teodoro rPo seen in follow-up for GLYNN management and CPAP equipment check.    CHIEF COMPLAINT:    Chief Complaint   Patient presents with    Sleep Apnea     INTERVAL HISTORY:    2018 LANRE Cornejo NP: Continues to use CPAP nightly. Denies breakthrough symptoms of snoring, interrupted sleep, or excessive daytime sleepiness. Nocturia improved to only once nightly, if that; continues to take Flomax as well. Denies oral/nasal drying with Simplus FFM. Denies pressure intolerance. Since initiating CPAP, reports nasal airflow improved no longer requiring nightly nasal rinses.   EPWORTH SLEEPINESS SCALE 2018   Sitting and reading 0   Watching TV 1   Sitting, inactive in a public place (e.g. a theatre or a meeting) 0   As a passenger in a car for an hour without a break 0   Lying down to rest in the afternoon when circumstances permit 2   Sitting and talking to someone 0   Sitting quietly after a lunch without alcohol 0   In a car, while stopped for a few minutes in traffic 0   Total score 3     CPAP Interrogation: Machine type : [place]  Machine condition:  Good, filter is ok, will change/clean  monthly  Pressure setting and range:  cpap 11        Total usage: 1286 hours      Average daily usage 30 days:  6.1  Hours    Days > 4 Hours: 30 days  Manometer readin cm H20    2017 LANRE Cornejo NP: Pt returns after CPAP set up on 2017 at [facility]. Pt complaints of snoring, interrupted sleep, and excessive daytime sleepiness now resolved with CPAP use. Denies oral/nasal drying with Simplus FFM.  Denies pressure intolerance. Reports that fasting blood sugars have improved since starting CPAP from 120s vs 80-90s with CPAP and blood pressure controlled now regularly SBP <120. ESS 5.     CPAP Interrogation: Floyd Gaines, CPAP 11 cm, Limited usage information on actual machine. Total Usage: 172 hours, Average daily use: 5 hours.   Contacted [facility] 2017 while pt in clinic, and [facility] has  not downloaded pt compliance to provide Sleep Clinic detailed compliance report. DME to fax to Sleep Clinic when available.   Addendum: Info Received via Fax on 09/29/2017 Total Days Device Used: 100% compliance, Days >4 hours: 30/30, Average Used: 5 h 13 m      06/23/2017 LANRE Cornejo NP: Discussed 05/19/2017 in-lab sleep study in detail. Patient complaints of snoring, interrupted sleep, and excessive daytime sleepiness remain the same. ESS 2.       04/26/2017 LANRE Cornejo NP: HISTORY OF PRESENT ILLNESS: Teodoro Mast is a 72 y.o. male is here for sleep evaluation.        Diagnosed with HTN 43 years ago and BP has been controlled until a month ago. Pt's cardiologist recommended sleep eval for secondary causes of refractory HTN. Currently on 3 meds.     Recently started taking Flomax for BPH, which has improved nocturia to 3x per night vs. Several times as prior.     Patient complaints include: snoring, interrupted sleep, and excessive daytime sleepiness.     Reports nasal congestion 1 - 2 x per week usually at bedtime. Symptoms relieved by taking OTC antihistamine.     Denies symptoms of restless legs or kicking during sleep.    Occupation: Retired    Printer Sleepiness Scale score during initial sleep evaluation was 1.    SLEEP ROUTINE:  Activity the hour prior to sleep: TV. Falls asleep to TV in bedroom    Bed partner:  wife  Time to bed:  9 - 10 pm  Lights off:  Yes but TV on  Sleep onset latency:  <1 hour       Disruptions or awakenings:  2 - 3    Wakeup time:  5:30 - 6 am  Perceived sleep quality: 3/5       Daytime naps:  none  Weekend sleep routine: same as above  Caffeine use: some  Exercise habit:   No      Baseline Sleep Study: 05/19/2017 Split Night Study 210 lb. The overall AHI was 49.3 with an oxygen jostin of 83.0%. Effective control of sleep disordered breathing was seen during supine REM stage sleep at a pressure of 11 cm of water. Higher pressures were associated with central apneas. Used medium  Simplus FFM.       PAST MEDICAL HISTORY:    Active Ambulatory Problems     Diagnosis Date Noted    Mixed hyperlipidemia     Essential hypertension     BPH with urinary obstruction 06/11/2013    Kidney disease, chronic, stage III (GFR 30-59 ml/min) 06/25/2015    Hypertensive CKD (chronic kidney disease) 11/09/2016    Kidney cysts 11/09/2016    Proteinuria 11/09/2016    Metabolic acidosis 11/09/2016    GLYNN (obstructive sleep apnea)     Hyperuricemia 08/30/2017     Resolved Ambulatory Problems     Diagnosis Date Noted    Sleep apnea, unspecified 04/05/2017     Past Medical History:   Diagnosis Date    Hyperlipidemia     Hypertension                 PAST SURGICAL HISTORY:    Past Surgical History:   Procedure Laterality Date    FINGER SURGERY      hemorriodectomy           FAMILY HISTORY:                Family History   Problem Relation Age of Onset    Heart disease Mother     Hypertension Mother        SOCIAL HISTORY:          Tobacco:   History   Smoking Status    Former Smoker   Smokeless Tobacco    Never Used       Alcohol use:    History   Alcohol Use    Yes                 ALLERGIES:  Review of patient's allergies indicates:  No Known Allergies    CURRENT MEDICATIONS:    Current Outpatient Prescriptions   Medication Sig Dispense Refill    allopurinol (ZYLOPRIM) 100 MG tablet TAKE 1 TABLET(100 MG) BY MOUTH EVERY DAY 90 tablet 6    amlodipine (NORVASC) 10 MG tablet TAKE 1 TABLET BY MOUTH ONCE DAILY 90 tablet 3    atorvastatin (LIPITOR) 80 MG tablet Take 1 tablet (80 mg total) by mouth once daily. 90 tablet 3    hydrochlorothiazide (HYDRODIURIL) 25 MG tablet TAKE 1/2 TABLET(12.5 MG) BY MOUTH EVERY DAY 45 tablet 3    lisinopril (PRINIVIL,ZESTRIL) 40 MG tablet Take 1 tablet (40 mg total) by mouth once daily. 30 tablet 11    sodium bicarbonate 325 MG tablet Take 1 tablet (325 mg total) by mouth once daily. 120 tablet 11    spironolactone (ALDACTONE) 25 MG tablet TAKE 1 TABLET(25 MG) BY MOUTH  EVERY DAY 30 tablet 0    tamsulosin (FLOMAX) 0.4 mg Cp24 Take 1 capsule (0.4 mg total) by mouth once daily. 90 capsule 6    VIT C/VIT E/LUTEIN/MIN/OMEGA-3 (OCUVITE ORAL) Take by mouth.      vitamin D 1000 units Tab Take 2,000 mg by mouth once daily.       No current facility-administered medications for this visit.                   REVIEW OF SYSTEMS:     Sleep related symptoms as per HPI.  CONST:Denies weight gain    HEENT: Occasional sinus congestion  PULM: Denies dyspnea  CARD:  Denies palpitations   GI:  Denies acid reflux  : Denies polyuria  NEURO: Denies headaches  PSYCH: Denies mood disturbance  HEME: Denies anemia    Otherwise, a balance of 10 systems reviewed is negative.          PHYSICAL EXAM:  Vitals:    04/03/18 1030   BP: 130/78   Pulse: 87   SpO2: 97%   Weight: 94 kg (207 lb 3.7 oz)   PainSc: 0-No pain     Body mass index is 25.23 kg/m².     GENERAL: Normal development, well groomed  HEENT:  Conjunctivae are non-erythematous; Pupils equal, round, and reactive to light; Nose is symmetrical; Nasal mucosa is pink and moist; Septum is midline; Inferior turbinates are normal; Nasal airflow is normal; Posterior pharynx is pink; Modified Mallampati: IV; Posterior palate is normal; Tonsils not visualized; Uvula is normal and pink;Tongue is normal; Dentition is fair; No TMJ tenderness; Jaw opening and protrusion without click and without discomfort.  NECK: Supple. Neck circumference is 14.5 inches. No thyromegaly. No palpable nodes.    SKIN: On face and neck: No abrasions, no rashes, no lesions.  No subcutaneous nodules are palpable.  RESPIRATORY: Chest is clear to auscultation.  Normal chest expansion and non-labored breathing at rest.  CARDIOVASCULAR: Normal S1, S2.  No murmurs, gallops or rubs. No carotid bruits bilaterally.  EXTREMITIES: No edema. No clubbing. No cyanosis. Station normal. Gait normal.        NEURO/PSYCH: Oriented to time, place and person. Normal attention span and concentration.  Affect is full. Mood is normal.                                              ASSESSMENT:    Obstructive sleep apnea, severe by AHI.  The patient symptomatically has snoring, interrupted sleep, and excessive daytime sleepiness now resolved with CPAP use. The patient is adherent on CPAP and experiencing symptomatic benefit. Medical co-morbidities: HTN, HLD.  This warrants treatment for obstructive sleep apnea.      Allergic rhinitis, stable, currently uses OTC antihistamine prn basis with relief     PLAN:    Treatment:   -continue CPAP 11 cm. Discussed options for different types of FFM, will leave it up to pt to contact [facility] regarding mask fitting of his choice.   -RTC 12 months, sooner if needed.     Education: During our discussion today, we talked about the etiology of obstructive sleep apnea as well as the potential ramifications of untreated sleep apnea, which could include daytime sleepiness, hypertension, heart disease and/or stroke. We discussed potential treatment options, which could include weight loss, body positioning, continuous positive airway pressure (CPAP), OA, EPAP,  or referral for surgical consideration.     continue Antihistamine and Flonase daily use    Precautions: The patient was advised to abstain from driving should they feel sleepy  or drowsy.

## 2018-04-09 ENCOUNTER — TELEPHONE (OUTPATIENT)
Dept: NEPHROLOGY | Facility: CLINIC | Age: 73
End: 2018-04-09

## 2018-04-09 NOTE — TELEPHONE ENCOUNTER
----- Message from Brittany Leal sent at 4/9/2018 10:48 AM CDT -----  Contact: Pt  Pt is needing to schedule a visit with Dr Mcqueen attempted to schedule visit only appt available was 04/10/2018. Pt says he can't come in tomorrow and needs to schedule a later date.     Pt can be reached at [phone] thanks

## 2018-04-17 DIAGNOSIS — I10 ESSENTIAL HYPERTENSION: ICD-10-CM

## 2018-04-17 RX ORDER — SPIRONOLACTONE 25 MG/1
TABLET ORAL
Qty: 30 TABLET | Refills: 5 | Status: SHIPPED | OUTPATIENT
Start: 2018-04-17 | End: 2018-05-25 | Stop reason: SDUPTHER

## 2018-04-19 DIAGNOSIS — I10 ESSENTIAL HYPERTENSION: ICD-10-CM

## 2018-04-19 RX ORDER — LISINOPRIL 40 MG/1
TABLET ORAL
Qty: 30 TABLET | Refills: 6 | Status: SHIPPED | OUTPATIENT
Start: 2018-04-19 | End: 2018-05-25 | Stop reason: SDUPTHER

## 2018-05-07 RX ORDER — ATORVASTATIN CALCIUM 80 MG/1
TABLET, FILM COATED ORAL
Qty: 90 TABLET | Refills: 0 | Status: SHIPPED | OUTPATIENT
Start: 2018-05-07 | End: 2018-05-25 | Stop reason: SDUPTHER

## 2018-05-07 RX ORDER — TAMSULOSIN HYDROCHLORIDE 0.4 MG/1
CAPSULE ORAL
Qty: 90 CAPSULE | Refills: 0 | Status: SHIPPED | OUTPATIENT
Start: 2018-05-07 | End: 2019-01-24 | Stop reason: SDUPTHER

## 2018-05-18 ENCOUNTER — LAB VISIT (OUTPATIENT)
Dept: LAB | Facility: HOSPITAL | Age: 73
End: 2018-05-18
Attending: INTERNAL MEDICINE
Payer: MEDICARE

## 2018-05-18 DIAGNOSIS — E78.2 MIXED HYPERLIPIDEMIA: ICD-10-CM

## 2018-05-18 DIAGNOSIS — I10 ESSENTIAL HYPERTENSION: ICD-10-CM

## 2018-05-18 DIAGNOSIS — N18.30 KIDNEY DISEASE, CHRONIC, STAGE III (GFR 30-59 ML/MIN): ICD-10-CM

## 2018-05-18 LAB
ALBUMIN SERPL BCP-MCNC: 3.5 G/DL
ALP SERPL-CCNC: 92 U/L
ALT SERPL W/O P-5'-P-CCNC: 23 U/L
ANION GAP SERPL CALC-SCNC: 8 MMOL/L
AST SERPL-CCNC: 20 U/L
BASOPHILS # BLD AUTO: 0.04 K/UL
BASOPHILS NFR BLD: 0.9 %
BILIRUB SERPL-MCNC: 0.5 MG/DL
BUN SERPL-MCNC: 24 MG/DL
CALCIUM SERPL-MCNC: 9.2 MG/DL
CHLORIDE SERPL-SCNC: 112 MMOL/L
CHOLEST SERPL-MCNC: 123 MG/DL
CHOLEST/HDLC SERPL: 3.5 {RATIO}
CO2 SERPL-SCNC: 22 MMOL/L
CREAT SERPL-MCNC: 1.8 MG/DL
DIFFERENTIAL METHOD: ABNORMAL
EOSINOPHIL # BLD AUTO: 0 K/UL
EOSINOPHIL NFR BLD: 0.4 %
ERYTHROCYTE [DISTWIDTH] IN BLOOD BY AUTOMATED COUNT: 14 %
EST. GFR  (AFRICAN AMERICAN): 43.1 ML/MIN/1.73 M^2
EST. GFR  (NON AFRICAN AMERICAN): 37.3 ML/MIN/1.73 M^2
GLUCOSE SERPL-MCNC: 102 MG/DL
HCT VFR BLD AUTO: 35 %
HDLC SERPL-MCNC: 35 MG/DL
HDLC SERPL: 28.5 %
HGB BLD-MCNC: 11.4 G/DL
IMM GRANULOCYTES # BLD AUTO: 0.01 K/UL
IMM GRANULOCYTES NFR BLD AUTO: 0.2 %
LDLC SERPL CALC-MCNC: 70.2 MG/DL
LYMPHOCYTES # BLD AUTO: 0.7 K/UL
LYMPHOCYTES NFR BLD: 14.6 %
MCH RBC QN AUTO: 29.9 PG
MCHC RBC AUTO-ENTMCNC: 32.6 G/DL
MCV RBC AUTO: 92 FL
MONOCYTES # BLD AUTO: 0.4 K/UL
MONOCYTES NFR BLD: 9.7 %
NEUTROPHILS # BLD AUTO: 3.4 K/UL
NEUTROPHILS NFR BLD: 74.2 %
NONHDLC SERPL-MCNC: 88 MG/DL
NRBC BLD-RTO: 0 /100 WBC
PLATELET # BLD AUTO: 185 K/UL
PMV BLD AUTO: 10.8 FL
POTASSIUM SERPL-SCNC: 4.8 MMOL/L
PROT SERPL-MCNC: 6.8 G/DL
RBC # BLD AUTO: 3.81 M/UL
SODIUM SERPL-SCNC: 142 MMOL/L
TRIGL SERPL-MCNC: 89 MG/DL
WBC # BLD AUTO: 4.53 K/UL

## 2018-05-18 PROCEDURE — 80053 COMPREHEN METABOLIC PANEL: CPT

## 2018-05-18 PROCEDURE — 80061 LIPID PANEL: CPT

## 2018-05-18 PROCEDURE — 36415 COLL VENOUS BLD VENIPUNCTURE: CPT

## 2018-05-18 PROCEDURE — 85025 COMPLETE CBC W/AUTO DIFF WBC: CPT

## 2018-05-21 DIAGNOSIS — I10 ESSENTIAL HYPERTENSION: ICD-10-CM

## 2018-05-21 RX ORDER — HYDROCHLOROTHIAZIDE 25 MG/1
TABLET ORAL
Qty: 45 TABLET | Refills: 0 | Status: SHIPPED | OUTPATIENT
Start: 2018-05-21 | End: 2018-05-25 | Stop reason: SDUPTHER

## 2018-05-25 ENCOUNTER — OFFICE VISIT (OUTPATIENT)
Dept: CARDIOLOGY | Facility: CLINIC | Age: 73
End: 2018-05-25
Payer: MEDICARE

## 2018-05-25 VITALS
WEIGHT: 203.69 LBS | BODY MASS INDEX: 25.33 KG/M2 | DIASTOLIC BLOOD PRESSURE: 78 MMHG | HEIGHT: 75 IN | SYSTOLIC BLOOD PRESSURE: 159 MMHG | HEART RATE: 87 BPM

## 2018-05-25 DIAGNOSIS — E78.2 MIXED HYPERLIPIDEMIA: ICD-10-CM

## 2018-05-25 DIAGNOSIS — G47.33 OSA (OBSTRUCTIVE SLEEP APNEA): ICD-10-CM

## 2018-05-25 DIAGNOSIS — N18.30 KIDNEY DISEASE, CHRONIC, STAGE III (GFR 30-59 ML/MIN): ICD-10-CM

## 2018-05-25 DIAGNOSIS — N13.8 BPH WITH URINARY OBSTRUCTION: ICD-10-CM

## 2018-05-25 DIAGNOSIS — I10 ESSENTIAL HYPERTENSION: Primary | ICD-10-CM

## 2018-05-25 DIAGNOSIS — N40.1 BPH WITH URINARY OBSTRUCTION: ICD-10-CM

## 2018-05-25 PROCEDURE — 99999 PR PBB SHADOW E&M-EST. PATIENT-LVL III: CPT | Mod: PBBFAC,,, | Performed by: INTERNAL MEDICINE

## 2018-05-25 PROCEDURE — 99214 OFFICE O/P EST MOD 30 MIN: CPT | Mod: S$PBB,,, | Performed by: INTERNAL MEDICINE

## 2018-05-25 PROCEDURE — 99213 OFFICE O/P EST LOW 20 MIN: CPT | Mod: PBBFAC | Performed by: INTERNAL MEDICINE

## 2018-05-25 RX ORDER — ATORVASTATIN CALCIUM 80 MG/1
80 TABLET, FILM COATED ORAL DAILY
Qty: 90 TABLET | Refills: 3 | Status: SHIPPED | OUTPATIENT
Start: 2018-05-25 | End: 2019-01-24 | Stop reason: SDUPTHER

## 2018-05-25 RX ORDER — SPIRONOLACTONE 25 MG/1
TABLET ORAL
Qty: 90 TABLET | Refills: 3 | Status: SHIPPED | OUTPATIENT
Start: 2018-05-25 | End: 2018-08-18

## 2018-05-25 RX ORDER — HYDROCHLOROTHIAZIDE 25 MG/1
12.5 TABLET ORAL DAILY
Qty: 45 TABLET | Refills: 3 | Status: SHIPPED | OUTPATIENT
Start: 2018-05-25 | End: 2018-08-18

## 2018-05-25 RX ORDER — ALLOPURINOL 100 MG/1
TABLET ORAL
Qty: 90 TABLET | Refills: 6 | Status: SHIPPED | OUTPATIENT
Start: 2018-05-25 | End: 2019-01-24 | Stop reason: SDUPTHER

## 2018-05-25 RX ORDER — AMLODIPINE BESYLATE 10 MG/1
10 TABLET ORAL DAILY
Qty: 90 TABLET | Refills: 3 | Status: SHIPPED | OUTPATIENT
Start: 2018-05-25 | End: 2019-01-24 | Stop reason: SDUPTHER

## 2018-05-25 RX ORDER — TAMSULOSIN HYDROCHLORIDE 0.4 MG/1
1 CAPSULE ORAL DAILY
Qty: 90 CAPSULE | Refills: 3 | Status: SHIPPED | OUTPATIENT
Start: 2018-05-25 | End: 2019-06-17

## 2018-05-25 RX ORDER — LISINOPRIL 40 MG/1
TABLET ORAL
Qty: 90 TABLET | Refills: 3 | Status: SHIPPED | OUTPATIENT
Start: 2018-05-25 | End: 2019-01-24 | Stop reason: SDUPTHER

## 2018-05-25 NOTE — PROGRESS NOTES
Subjective:    Patient ID:  Teodoro Mast is a 72 y.o. male who presents for follow-up of Hypertension (6 month f/u )      HPI       72 years old male followed with hypertension, hyperlipidemia and chronic renal insuffiencey followed by Nephrology. He continues to do well and has no chest pain or FRAGA. His wife recently  suddenly. He other wise has been doing well and has no chest pain or FRAGA. His home BP range 121-149 systolic and the mean appears below 130/80.  Lab Results   Component Value Date     2018    K 4.8 2018     (H) 2018    CO2 22 (L) 2018    BUN 24 (H) 2018    CREATININE 1.8 (H) 2018     2018    HGBA1C 5.9 2006    AST 20 2018    ALT 23 2018    ALBUMIN 3.5 2018    PROT 6.8 2018    BILITOT 0.5 2018    WBC 4.53 2018    HGB 11.4 (L) 2018    HCT 35.0 (L) 2018    MCV 92 2018     2018    PSA 3.9 2013         Lab Results   Component Value Date    CHOL 123 2018    HDL 35 (L) 2018    TRIG 89 2018       Lab Results   Component Value Date    LDLCALC 70.2 2018       Past Medical History:   Diagnosis Date    Hyperlipidemia     Hypertension        Current Outpatient Prescriptions:     allopurinol (ZYLOPRIM) 100 MG tablet, TAKE 1 TABLET(100 MG) BY MOUTH EVERY DAY, Disp: 90 tablet, Rfl: 6    amLODIPine (NORVASC) 10 MG tablet, Take 1 tablet (10 mg total) by mouth once daily., Disp: 90 tablet, Rfl: 3    atorvastatin (LIPITOR) 80 MG tablet, Take 1 tablet (80 mg total) by mouth once daily., Disp: 90 tablet, Rfl: 3    hydroCHLOROthiazide (HYDRODIURIL) 25 MG tablet, Take 0.5 tablets (12.5 mg total) by mouth once daily., Disp: 45 tablet, Rfl: 3    lisinopril (PRINIVIL,ZESTRIL) 40 MG tablet, TAKE 1 TABLET(40 MG) BY MOUTH EVERY DAY, Disp: 90 tablet, Rfl: 3    sodium bicarbonate 325 MG tablet, Take 1 tablet (325 mg total) by mouth once daily., Disp: 120  tablet, Rfl: 11    spironolactone (ALDACTONE) 25 MG tablet, TAKE 1 TABLET(25 MG) BY MOUTH EVERY DAY, Disp: 90 tablet, Rfl: 3    tamsulosin (FLOMAX) 0.4 mg Cp24, TAKE ONE CAPSULE BY MOUTH ONCE DAILY, Disp: 90 capsule, Rfl: 0    tamsulosin (FLOMAX) 0.4 mg Cp24, Take 1 capsule (0.4 mg total) by mouth once daily., Disp: 90 capsule, Rfl: 3    VIT C/VIT E/LUTEIN/MIN/OMEGA-3 (OCUVITE ORAL), Take by mouth., Disp: , Rfl:     vitamin D 1000 units Tab, Take 2,000 mg by mouth once daily., Disp: , Rfl:         Review of Systems   Constitution: Negative for decreased appetite, diaphoresis, fever, weakness, malaise/fatigue, weight gain and weight loss.   HENT: Negative for congestion, ear discharge, ear pain and nosebleeds.    Eyes: Negative for blurred vision, double vision and visual disturbance.   Cardiovascular: Negative for chest pain, claudication, cyanosis, dyspnea on exertion, irregular heartbeat, leg swelling, near-syncope, orthopnea, palpitations, paroxysmal nocturnal dyspnea and syncope.   Respiratory: Negative for cough, hemoptysis, shortness of breath, sleep disturbances due to breathing, snoring, sputum production and wheezing.    Endocrine: Negative for polydipsia, polyphagia and polyuria.   Hematologic/Lymphatic: Negative for adenopathy and bleeding problem. Does not bruise/bleed easily.   Skin: Negative for color change, nail changes, poor wound healing and rash.   Musculoskeletal: Negative for muscle cramps and muscle weakness.   Gastrointestinal: Negative for abdominal pain, anorexia, change in bowel habit, hematochezia, nausea and vomiting.   Genitourinary: Negative for dysuria, frequency and hematuria.   Neurological: Negative for brief paralysis, difficulty with concentration, excessive daytime sleepiness, dizziness, focal weakness, headaches, light-headedness, seizures and vertigo.   Psychiatric/Behavioral: Positive for depression. Negative for altered mental status.   Allergic/Immunologic: Negative for  "persistent infections.        Objective:BP (!) 159/78 (BP Location: Left arm, Patient Position: Sitting, BP Method: Medium (Automatic))   Pulse 87   Ht 6' 3" (1.905 m)   Wt 92.4 kg (203 lb 11.3 oz)   BMI 25.46 kg/m²           Physical Exam   Constitutional: He is oriented to person, place, and time. He appears well-developed and well-nourished.   HENT:   Head: Normocephalic.   Right Ear: External ear normal.   Left Ear: External ear normal.   Nose: Nose normal.   Inspection of lips, teeth and gums normal   Eyes: EOM are normal. Pupils are equal, round, and reactive to light. No scleral icterus.   Neck: Normal range of motion. Neck supple. No JVD present. No tracheal deviation present. No thyromegaly present.   Cardiovascular: Normal rate, regular rhythm and intact distal pulses.  Exam reveals no gallop and no friction rub.    No murmur heard.  Pulses:       Dorsalis pedis pulses are 2+ on the right side, and 2+ on the left side.        Posterior tibial pulses are 2+ on the right side, and 2+ on the left side.   Pulmonary/Chest: Effort normal and breath sounds normal.   Abdominal: Bowel sounds are normal. He exhibits no distension. There is no hepatosplenomegaly. There is no tenderness. There is no guarding.   Musculoskeletal: Normal range of motion. He exhibits no edema or tenderness.   Lymphadenopathy:   Palpation of neck and groin lymph nodes normal   Neurological: He is alert and oriented to person, place, and time. No cranial nerve deficit. He exhibits normal muscle tone. Coordination normal.   Skin: Skin is dry.   Palpation of skin normal   Psychiatric: His behavior is normal. Judgment and thought content normal.         Assessment:       1. Essential hypertension    2. Kidney disease, chronic, stage III (GFR 30-59 ml/min)    3. GLYNN (obstructive sleep apnea)    4. Mixed hyperlipidemia    5. BPH with urinary obstruction         Plan:       Teodoro was seen today for hypertension.    Diagnoses and all orders " for this visit:    Essential hypertension  -     hydroCHLOROthiazide (HYDRODIURIL) 25 MG tablet; Take 0.5 tablets (12.5 mg total) by mouth once daily.  -     lisinopril (PRINIVIL,ZESTRIL) 40 MG tablet; TAKE 1 TABLET(40 MG) BY MOUTH EVERY DAY  -     spironolactone (ALDACTONE) 25 MG tablet; TAKE 1 TABLET(25 MG) BY MOUTH EVERY DAY    Kidney disease, chronic, stage III (GFR 30-59 ml/min)  -     Basic metabolic panel; Future; Expected date: 11/24/2018  -     CBC auto differential; Future; Expected date: 11/24/2018    GLYNN (obstructive sleep apnea)    Mixed hyperlipidemia  -     Lipid panel; Future; Expected date: 11/24/2018    BPH with urinary obstruction    Other orders  -     allopurinol (ZYLOPRIM) 100 MG tablet; TAKE 1 TABLET(100 MG) BY MOUTH EVERY DAY  -     amLODIPine (NORVASC) 10 MG tablet; Take 1 tablet (10 mg total) by mouth once daily.  -     atorvastatin (LIPITOR) 80 MG tablet; Take 1 tablet (80 mg total) by mouth once daily.  -     tamsulosin (FLOMAX) 0.4 mg Cp24; Take 1 capsule (0.4 mg total) by mouth once daily.

## 2018-05-28 ENCOUNTER — LAB VISIT (OUTPATIENT)
Dept: LAB | Facility: HOSPITAL | Age: 73
End: 2018-05-28
Attending: INTERNAL MEDICINE
Payer: MEDICARE

## 2018-05-28 DIAGNOSIS — N18.30 KIDNEY DISEASE, CHRONIC, STAGE III (GFR 30-59 ML/MIN): ICD-10-CM

## 2018-05-28 LAB
ALBUMIN SERPL BCP-MCNC: 3.3 G/DL
ANION GAP SERPL CALC-SCNC: 8 MMOL/L
BUN SERPL-MCNC: 23 MG/DL
CALCIUM SERPL-MCNC: 9.2 MG/DL
CHLORIDE SERPL-SCNC: 112 MMOL/L
CO2 SERPL-SCNC: 21 MMOL/L
CREAT SERPL-MCNC: 1.6 MG/DL
EST. GFR  (AFRICAN AMERICAN): 49.7 ML/MIN/1.73 M^2
EST. GFR  (NON AFRICAN AMERICAN): 43 ML/MIN/1.73 M^2
GLUCOSE SERPL-MCNC: 103 MG/DL
PHOSPHATE SERPL-MCNC: 2.9 MG/DL
POTASSIUM SERPL-SCNC: 4.6 MMOL/L
PTH-INTACT SERPL-MCNC: 106 PG/ML
SODIUM SERPL-SCNC: 141 MMOL/L
URATE SERPL-MCNC: 6.6 MG/DL

## 2018-05-28 PROCEDURE — 84550 ASSAY OF BLOOD/URIC ACID: CPT

## 2018-05-28 PROCEDURE — 36415 COLL VENOUS BLD VENIPUNCTURE: CPT

## 2018-05-28 PROCEDURE — 83970 ASSAY OF PARATHORMONE: CPT

## 2018-05-28 PROCEDURE — 80069 RENAL FUNCTION PANEL: CPT

## 2018-06-04 PROBLEM — N25.81 SECONDARY HYPERPARATHYROIDISM: Status: ACTIVE | Noted: 2018-06-04

## 2018-06-05 ENCOUNTER — OFFICE VISIT (OUTPATIENT)
Dept: NEPHROLOGY | Facility: CLINIC | Age: 73
End: 2018-06-05
Payer: MEDICARE

## 2018-06-05 VITALS
OXYGEN SATURATION: 97 % | SYSTOLIC BLOOD PRESSURE: 152 MMHG | HEART RATE: 85 BPM | DIASTOLIC BLOOD PRESSURE: 68 MMHG | WEIGHT: 203.94 LBS | BODY MASS INDEX: 25.36 KG/M2 | HEIGHT: 75 IN

## 2018-06-05 DIAGNOSIS — E79.0 HYPERURICEMIA: ICD-10-CM

## 2018-06-05 DIAGNOSIS — N28.1 KIDNEY CYSTS: ICD-10-CM

## 2018-06-05 DIAGNOSIS — N18.30 KIDNEY DISEASE, CHRONIC, STAGE III (GFR 30-59 ML/MIN): ICD-10-CM

## 2018-06-05 DIAGNOSIS — N25.81 SECONDARY HYPERPARATHYROIDISM: ICD-10-CM

## 2018-06-05 DIAGNOSIS — I12.9 HYPERTENSIVE KIDNEY DISEASE WITH STAGE 3 CHRONIC KIDNEY DISEASE: Primary | ICD-10-CM

## 2018-06-05 DIAGNOSIS — R80.1 PERSISTENT PROTEINURIA: ICD-10-CM

## 2018-06-05 DIAGNOSIS — I10 ESSENTIAL HYPERTENSION: ICD-10-CM

## 2018-06-05 DIAGNOSIS — N18.30 HYPERTENSIVE KIDNEY DISEASE WITH STAGE 3 CHRONIC KIDNEY DISEASE: Primary | ICD-10-CM

## 2018-06-05 DIAGNOSIS — E87.20 METABOLIC ACIDOSIS: ICD-10-CM

## 2018-06-05 PROCEDURE — 99999 PR PBB SHADOW E&M-EST. PATIENT-LVL III: CPT | Mod: PBBFAC,,, | Performed by: INTERNAL MEDICINE

## 2018-06-05 PROCEDURE — 99214 OFFICE O/P EST MOD 30 MIN: CPT | Mod: S$PBB,,, | Performed by: INTERNAL MEDICINE

## 2018-06-05 PROCEDURE — 99213 OFFICE O/P EST LOW 20 MIN: CPT | Mod: PBBFAC | Performed by: INTERNAL MEDICINE

## 2018-06-05 RX ORDER — HYDROCHLOROTHIAZIDE 12.5 MG/1
TABLET ORAL
Refills: 3 | COMMUNITY
Start: 2018-05-27 | End: 2018-08-18

## 2018-06-05 NOTE — PROGRESS NOTES
Subjective:       Patient ID: Teodoro Mast is a 72 y.o. Black or  male who presents for follow up of CKD.    HPI     Mr. Mast was seen for follow up of CKD. He was seen on 9/6/16 in new patient evaluation and last followed on 10/18/17. Pt has longstanding hypertension since 1974, remote h/o tobacco smoking, hyperlipidemia, BPH, CKD, proteinuria. He reports family h/o kidney disease in his father that was not diagnosed until he was in his 80's. Pt reports he was diagnosed with hypertension when he was 29 years old. He reports in the past he had some obstruction to the urinary tract but unable to provide any details.    In the past he was using medicines like Indomethacin, ibuprofen. He has been diagnosed with sleep apnea. He is using CPAP regularly which has helped improve his hypertension control.    Overall, he reports he has been doing fine. He denies any recent acute illness. He still has symptoms of difficulty with emptying the bladder and hesitance and is under follow up of urology for BPH. He denies any leg swelling. He did not bring his home BP readings. But states he reviews his home BP readings with his PCP/ cardiologist and usually it runs in upper 130's. He admits he has higher BP readings during MD visits. He states he has been using CPAP machine. He admits he could do better with low sodium diet.     He reports he had prior episodes of gout and he feels intake of beer, craw fish triggers it for him. He has been taking allopurinol regularly which has helped lower the uric acid levels gradually.    He has slow progression of CKD. Labs were reviewed and d/w him. His creatinine ha gradually increased from 1.4 t0 1.9 since 2010. Prior urine studies show proteinuria. He appears to have chronic anemia. He has been on lisinopril and aldactone containing regimen. He denies any episode of low BP or lightheadedness.     Labs from 10/9/17 noted for Hb 13.2, Na 142, K 4.7, bicarbonate 20, BUN  23, creatinine 2, eGFR 38, Ca 9.5, phos 2, Albumin 3.6, uric acid down to 6.7 from 8.5, , urinalysis with protein, urine PC ratio 1.04, UA with no RBC, WBC 1. prior labs showed SPEP/ SHELIA was normal. hep B/C screen was negative. US kidneys from 10/16 showed 10.1 and 12.9 cm kidneys, changes of CKD, cortical thinning, multiple cysts on both sides and enlarged prostate.     Renal Function:  Lab Results   Component Value Date     05/28/2018     05/18/2018     05/28/2018     05/18/2018    K 4.6 05/28/2018    K 4.8 05/18/2018     (H) 05/28/2018     (H) 05/18/2018    CO2 21 (L) 05/28/2018    CO2 22 (L) 05/18/2018    BUN 23 05/28/2018    BUN 24 (H) 05/18/2018    CALCIUM 9.2 05/28/2018    CALCIUM 9.2 05/18/2018    CREATININE 1.6 (H) 05/28/2018    CREATININE 1.8 (H) 05/18/2018    ALBUMIN 3.3 (L) 05/28/2018    ALBUMIN 3.5 05/18/2018    PHOS 2.9 05/28/2018    PHOS 2.0 (L) 10/09/2017    ESTGFRAFRICA 49.7 (A) 05/28/2018    ESTGFRAFRICA 43.1 (A) 05/18/2018    EGFRNONAA 43.0 (A) 05/28/2018    EGFRNONAA 37.3 (A) 05/18/2018       Urinalysis:  Lab Results   Component Value Date    APPEARANCEUA Clear 05/28/2018    PHUR 5.0 05/28/2018    SPECGRAV 1.010 05/28/2018    PROTEINUA 2+ (A) 05/28/2018    GLUCUA Negative 05/28/2018    OCCULTUA Negative 05/28/2018    NITRITE Negative 05/28/2018    LEUKOCYTESUR Negative 05/28/2018       Protein/Creatinine Ratio:  Lab Results   Component Value Date    PROTEINURINE 153 (H) 05/28/2018    CREATRANDUR 89.0 05/28/2018    UTPCR 1.72 (H) 05/28/2018       CBC:  Lab Results   Component Value Date    WBC 4.53 05/18/2018    HGB 11.4 (L) 05/18/2018    HCT 35.0 (L) 05/18/2018       Uric acid 6.6    Review of Systems   Constitutional: Negative for activity change, appetite change, chills, fatigue and fever.   Eyes: Negative for pain and discharge.   Respiratory: Negative for cough, shortness of breath and wheezing.    Cardiovascular: Negative for chest pain and  leg swelling.   Gastrointestinal: Negative for abdominal pain, diarrhea, nausea and vomiting.   Endocrine: Negative for polydipsia and polyuria.   Genitourinary: Negative for decreased urine volume, difficulty urinating, dysuria, flank pain, frequency and hematuria.   Musculoskeletal: Negative for back pain and myalgias.   Skin: Negative for pallor and rash.   Neurological: Negative for dizziness, light-headedness and headaches.   Psychiatric/Behavioral: The patient is not nervous/anxious.        Objective:      Physical Exam   Constitutional: He is oriented to person, place, and time. He appears well-developed and well-nourished. No distress.   Eyes: Right eye exhibits no discharge. Left eye exhibits no discharge.   Neck: Neck supple.   Cardiovascular: Normal rate, regular rhythm and normal heart sounds.    Pulmonary/Chest: Effort normal and breath sounds normal. No respiratory distress. He has no wheezes. He has no rales.   Musculoskeletal: He exhibits no edema.   Neurological: He is alert and oriented to person, place, and time.   Skin: Skin is warm. He is not diaphoretic.   Psychiatric: He has a normal mood and affect. His behavior is normal.   Vitals reviewed.      Assessment:       1. Hypertensive kidney disease with stage 3 chronic kidney disease    2. Essential hypertension    3. Kidney cysts    4. Kidney disease, chronic, stage III (GFR 30-59 ml/min)    5. Metabolic acidosis    6. Persistent proteinuria    7. Hyperuricemia    8. Secondary hyperparathyroidism        Plan:     Mr. Mast has longstanding hypertension, hyperlipidemia, BPH, CKD III with proteinuria. He has hypertensive glomerulosclerosis causing CKD as well has NSAID induced kidney damage. He denies any NSAID use currently but it appears in the past he was taking indomethacin. He has slow progression of CKD. Also he has slow progression of proteinuria. Most recently he had uncontrolled hypertension which could have led to progression of CKD  and proteinuria. As such he has hypertension since 1970's.     Since adding allopurinol at a lower dose his hyperuricemia seems to have improved. Continue current dose for now.    I explained to him about CKD staging, potential for progression, risk factors for CKD etc. Stressed importance of good BP control, low salt diet, keeping hydrated, avoiding NSAID, nephrotoxins etc. Previously noted, cysts on both kidneys and changes of CKD on US.     With K sparing diuretic and lisinopril, will follow K levels closely. Pt advised to avoid high K containing foods.     Stressed to follow low salt diet. His goal BP would be less than 130/80 given proteinuria but he does not seem to be at goal. Patient would like to continue to follow with his PCP for hypertension control. Goal BP was discussed with him in detail, stressed to watch for hypotensive episodes. Current labs show CKD is stable.      Plan  - continue allopurinol, renal dosing  - periodically monitor renal panel for electrolytes, acid base status, eGFR  - risk of CKD progression d/w patient  - sodium bicarbonate for correction of metabolic acidosis, consider increasing dose if serum bicarbonate level drops further   - low salt diet  - follow PTH levels, vit D, decrease OTC vitamin D, follow corrected Ca  - trend urine studies for proteinuria  - avoid NSAID/ bactrim/ IV contrast/ gadolinium/ aminoglycoside where possible  - continue ACE-I containing regimen for RAAS blockade. He is on lisinopril  - he has chronic anemia, periodically trend Hb  - continue to follow with urology, continue Flomax   - management of hyperlipidemia per cardiology   - continue home BP monitoring   - management of sleep apnea per sleep lab  - repeat US kidneys for follow up on kidney cysts     RTC 4 months         Plan, labs, recommendations were discussed with patient, his questions were answered to his satisfaction.

## 2018-06-26 ENCOUNTER — HOSPITAL ENCOUNTER (OUTPATIENT)
Dept: RADIOLOGY | Facility: HOSPITAL | Age: 73
Discharge: HOME | End: 2018-06-26
Attending: INTERNAL MEDICINE
Payer: MEDICARE

## 2018-06-26 DIAGNOSIS — N18.30 HYPERTENSIVE KIDNEY DISEASE WITH STAGE 3 CHRONIC KIDNEY DISEASE: ICD-10-CM

## 2018-06-26 DIAGNOSIS — I12.9 HYPERTENSIVE KIDNEY DISEASE WITH STAGE 3 CHRONIC KIDNEY DISEASE: ICD-10-CM

## 2018-06-26 PROCEDURE — 76770 US EXAM ABDO BACK WALL COMP: CPT | Mod: TC

## 2018-06-26 PROCEDURE — 76770 US EXAM ABDO BACK WALL COMP: CPT | Mod: 26,,, | Performed by: RADIOLOGY

## 2018-06-28 ENCOUNTER — TELEPHONE (OUTPATIENT)
Dept: NEPHROLOGY | Facility: CLINIC | Age: 73
End: 2018-06-28

## 2018-06-28 NOTE — TELEPHONE ENCOUNTER
----- Message from Shine Mcqueen MD sent at 6/26/2018  4:34 PM CDT -----  Kidney cysts have not changed, no mass or blockage seen. Prostate is enlarged for which please continue to see urology doctor. Thanks and please inform this to patient.

## 2018-08-18 DIAGNOSIS — I10 ESSENTIAL HYPERTENSION: ICD-10-CM

## 2018-08-18 RX ORDER — HYDROCHLOROTHIAZIDE 25 MG/1
TABLET ORAL
Qty: 45 TABLET | Refills: 0 | Status: SHIPPED | OUTPATIENT
Start: 2018-08-18 | End: 2018-10-03

## 2018-09-04 ENCOUNTER — LAB VISIT (OUTPATIENT)
Dept: LAB | Facility: HOSPITAL | Age: 73
End: 2018-09-04
Attending: INTERNAL MEDICINE
Payer: MEDICARE

## 2018-09-04 DIAGNOSIS — N17.9 AKI (ACUTE KIDNEY INJURY): ICD-10-CM

## 2018-09-04 DIAGNOSIS — N18.30 HYPERTENSIVE KIDNEY DISEASE WITH STAGE 3 CHRONIC KIDNEY DISEASE: ICD-10-CM

## 2018-09-04 DIAGNOSIS — I12.9 HYPERTENSIVE KIDNEY DISEASE WITH STAGE 3 CHRONIC KIDNEY DISEASE: ICD-10-CM

## 2018-09-04 DIAGNOSIS — N25.81 SECONDARY HYPERPARATHYROIDISM: ICD-10-CM

## 2018-09-04 LAB
ALBUMIN SERPL BCP-MCNC: 3.6 G/DL
ANION GAP SERPL CALC-SCNC: 8 MMOL/L
BUN SERPL-MCNC: 36 MG/DL
CALCIUM SERPL-MCNC: 9.4 MG/DL
CHLORIDE SERPL-SCNC: 111 MMOL/L
CO2 SERPL-SCNC: 20 MMOL/L
CREAT SERPL-MCNC: 2.1 MG/DL
EST. GFR  (AFRICAN AMERICAN): 35.5 ML/MIN/1.73 M^2
EST. GFR  (NON AFRICAN AMERICAN): 30.7 ML/MIN/1.73 M^2
GLUCOSE SERPL-MCNC: 108 MG/DL
PHOSPHATE SERPL-MCNC: 2.7 MG/DL
POTASSIUM SERPL-SCNC: 4.7 MMOL/L
PTH-INTACT SERPL-MCNC: 119 PG/ML
SODIUM SERPL-SCNC: 139 MMOL/L

## 2018-09-04 PROCEDURE — 83970 ASSAY OF PARATHORMONE: CPT

## 2018-09-04 PROCEDURE — 36415 COLL VENOUS BLD VENIPUNCTURE: CPT

## 2018-09-04 PROCEDURE — 80069 RENAL FUNCTION PANEL: CPT

## 2018-09-05 ENCOUNTER — TELEPHONE (OUTPATIENT)
Dept: NEPHROLOGY | Facility: CLINIC | Age: 73
End: 2018-09-05

## 2018-09-07 ENCOUNTER — TELEPHONE (OUTPATIENT)
Dept: NEPHROLOGY | Facility: CLINIC | Age: 73
End: 2018-09-07

## 2018-09-18 ENCOUNTER — LAB VISIT (OUTPATIENT)
Dept: LAB | Facility: HOSPITAL | Age: 73
End: 2018-09-18
Attending: INTERNAL MEDICINE
Payer: MEDICARE

## 2018-09-18 DIAGNOSIS — N18.30 KIDNEY DISEASE, CHRONIC, STAGE III (GFR 30-59 ML/MIN): ICD-10-CM

## 2018-09-18 LAB
ALBUMIN SERPL BCP-MCNC: 3.3 G/DL
ANION GAP SERPL CALC-SCNC: 9 MMOL/L
BUN SERPL-MCNC: 30 MG/DL
CALCIUM SERPL-MCNC: 9 MG/DL
CHLORIDE SERPL-SCNC: 116 MMOL/L
CO2 SERPL-SCNC: 16 MMOL/L
CREAT SERPL-MCNC: 1.9 MG/DL
EST. GFR  (AFRICAN AMERICAN): 40.1 ML/MIN/1.73 M^2
EST. GFR  (NON AFRICAN AMERICAN): 34.7 ML/MIN/1.73 M^2
GLUCOSE SERPL-MCNC: 96 MG/DL
PHOSPHATE SERPL-MCNC: 2.4 MG/DL
POTASSIUM SERPL-SCNC: 4.7 MMOL/L
SODIUM SERPL-SCNC: 141 MMOL/L

## 2018-09-18 PROCEDURE — 80069 RENAL FUNCTION PANEL: CPT

## 2018-09-18 PROCEDURE — 36415 COLL VENOUS BLD VENIPUNCTURE: CPT

## 2018-09-18 RX ORDER — SODIUM BICARBONATE 325 MG/1
325 TABLET ORAL 2 TIMES DAILY
Qty: 180 TABLET | Refills: 11 | Status: SHIPPED | OUTPATIENT
Start: 2018-09-18 | End: 2020-08-27 | Stop reason: SDUPTHER

## 2018-09-18 NOTE — PROGRESS NOTES
Repeat labs followed. Creatinine slightly improved. But metabolic acidosis has worsened. I called him to discuss these labs. He reports he has been doing fine and has improved his fluid intake.     I advised him to increase the dose of sodium bicarbonate from 325 mg daily yo 325 mg bid.    Pt has an appointment in October, reminded to bring BP readings.

## 2018-10-01 ENCOUNTER — TELEPHONE (OUTPATIENT)
Dept: NEPHROLOGY | Facility: CLINIC | Age: 73
End: 2018-10-01

## 2018-10-03 ENCOUNTER — OFFICE VISIT (OUTPATIENT)
Dept: NEPHROLOGY | Facility: CLINIC | Age: 73
End: 2018-10-03
Payer: MEDICARE

## 2018-10-03 VITALS
SYSTOLIC BLOOD PRESSURE: 140 MMHG | DIASTOLIC BLOOD PRESSURE: 80 MMHG | BODY MASS INDEX: 25.77 KG/M2 | WEIGHT: 207.25 LBS | HEART RATE: 77 BPM | HEIGHT: 75 IN | OXYGEN SATURATION: 95 %

## 2018-10-03 DIAGNOSIS — I12.9 HYPERTENSIVE KIDNEY DISEASE WITH STAGE 3 CHRONIC KIDNEY DISEASE: ICD-10-CM

## 2018-10-03 DIAGNOSIS — N28.1 KIDNEY CYSTS: ICD-10-CM

## 2018-10-03 DIAGNOSIS — E79.0 HYPERURICEMIA: ICD-10-CM

## 2018-10-03 DIAGNOSIS — E87.20 METABOLIC ACIDOSIS: ICD-10-CM

## 2018-10-03 DIAGNOSIS — N18.30 HYPERTENSIVE KIDNEY DISEASE WITH STAGE 3 CHRONIC KIDNEY DISEASE: ICD-10-CM

## 2018-10-03 DIAGNOSIS — N18.30 KIDNEY DISEASE, CHRONIC, STAGE III (GFR 30-59 ML/MIN): Primary | ICD-10-CM

## 2018-10-03 DIAGNOSIS — N25.81 SECONDARY HYPERPARATHYROIDISM: ICD-10-CM

## 2018-10-03 PROBLEM — N17.9 AKI (ACUTE KIDNEY INJURY): Status: RESOLVED | Noted: 2018-09-04 | Resolved: 2018-10-03

## 2018-10-03 PROCEDURE — 99213 OFFICE O/P EST LOW 20 MIN: CPT | Mod: PBBFAC | Performed by: INTERNAL MEDICINE

## 2018-10-03 PROCEDURE — 99214 OFFICE O/P EST MOD 30 MIN: CPT | Mod: S$PBB,,, | Performed by: INTERNAL MEDICINE

## 2018-10-03 PROCEDURE — 99999 PR PBB SHADOW E&M-EST. PATIENT-LVL III: CPT | Mod: PBBFAC,,, | Performed by: INTERNAL MEDICINE

## 2018-10-03 RX ORDER — HYDROCHLOROTHIAZIDE 12.5 MG/1
TABLET ORAL
Refills: 3 | COMMUNITY
Start: 2018-08-24 | End: 2019-01-24 | Stop reason: ALTCHOICE

## 2018-10-03 NOTE — PROGRESS NOTES
Subjective:       Patient ID: Teodoro Mast is a 73 y.o. Black or  male who presents for follow up of CKD.    HPI     Mr. Mast was seen for follow up of CKD. He was seen on 9/6/16 in new patient evaluation and last followed on 6/5/18. Pt has longstanding hypertension since 1974, remote h/o tobacco smoking, hyperlipidemia, BPH, CKD, proteinuria. He reports family h/o kidney disease in his father that was not diagnosed until he was in his 80's. Pt reports he was diagnosed with hypertension when he was 29 years old. He reports in the past he had some obstruction to the urinary tract but unable to provide any details.    In the past he was using medicines like Indomethacin, ibuprofen. He has been diagnosed with sleep apnea. He is using CPAP regularly which has helped improve his hypertension control.    Overall, he reports he has been doing fine. He denies any recent acute illness. He still has symptoms of difficulty with emptying the bladder and hesitance and is under follow up of urology for BPH. He denies any leg swelling. He states he has been using CPAP machine. He admits he could do better with low sodium diet.     He brought his home BP readings which show SBP in 130 range mostly, DBP in 80-85 range. He did have worsened renal function on recent labs, it also showed pre renal azotemia. He has been on HCTZ. He was instructed to improve fluid intake and labs were repeated which show slight improvement in creatinine. He does not have SOB/ peripheral edema. He does have metabolic acidosis worsened on most recent labs, but dose of sodium bicarbonate was increased just recently. He denies any nausea/ vomiting/ diarrhea recently.    He reports he had prior episodes of gout and he feels intake of beer, craw fish triggers it for him. He has been taking allopurinol regularly which has helped lower the uric acid levels gradually.    He has slow progression of CKD. Labs were reviewed and d/w him. His  creatinine ha gradually increased from 1.4 t0 1.9 since 2010. Prior urine studies show proteinuria. He appears to have chronic anemia. He has been on lisinopril and aldactone containing regimen. He denies any episode of low BP or lightheadedness.     prior labs showed SPEP/ SHELIA was normal. hep B/C screen was negative. US kidneys from 10/16 showed 10.1 and 12.9 cm kidneys, changes of CKD, cortical thinning, multiple cysts on both sides and enlarged prostate.     Renal Function:  Lab Results   Component Value Date    GLU 96 09/18/2018     09/04/2018     09/18/2018     09/04/2018    K 4.7 09/18/2018    K 4.7 09/04/2018     (H) 09/18/2018     (H) 09/04/2018    CO2 16 (L) 09/18/2018    CO2 20 (L) 09/04/2018    BUN 30 (H) 09/18/2018    BUN 36 (H) 09/04/2018    CALCIUM 9.0 09/18/2018    CALCIUM 9.4 09/04/2018    CREATININE 1.9 (H) 09/18/2018    CREATININE 2.1 (H) 09/04/2018    ALBUMIN 3.3 (L) 09/18/2018    ALBUMIN 3.6 09/04/2018    PHOS 2.4 (L) 09/18/2018    PHOS 2.7 09/04/2018    ESTGFRAFRICA 40.1 (A) 09/18/2018    ESTGFRAFRICA 35.5 (A) 09/04/2018    EGFRNONAA 34.7 (A) 09/18/2018    EGFRNONAA 30.7 (A) 09/04/2018       Urinalysis:  Lab Results   Component Value Date    APPEARANCEUA Clear 09/04/2018    PHUR 5.0 09/04/2018    SPECGRAV 1.010 09/04/2018    PROTEINUA 2+ (A) 09/04/2018    GLUCUA Negative 09/04/2018    OCCULTUA Negative 09/04/2018    NITRITE Negative 09/04/2018    LEUKOCYTESUR Negative 09/04/2018       Protein/Creatinine Ratio:  Lab Results   Component Value Date    PROTEINURINE 105 (H) 09/04/2018    CREATRANDUR 96.0 09/04/2018    UTPCR 1.09 (H) 09/04/2018       CBC:  Lab Results   Component Value Date    WBC 4.53 05/18/2018    HGB 11.4 (L) 05/18/2018    HCT 35.0 (L) 05/18/2018       Uric acid 6.6    US Kidneys 6/2018  10.4 and 13.6 cm kidneys, cortical thinning, loss of corticomedullary distinction, multiple cysts on both kidneys, prostatomegaly.    Review of Systems    Constitutional: Negative for activity change, appetite change, chills, fatigue and fever.   Eyes: Negative for pain and discharge.   Respiratory: Negative for cough, shortness of breath and wheezing.    Cardiovascular: Negative for chest pain and leg swelling.   Gastrointestinal: Negative for abdominal pain, diarrhea, nausea and vomiting.   Endocrine: Negative for polydipsia and polyuria.   Genitourinary: Negative for decreased urine volume, difficulty urinating, dysuria, flank pain, frequency and hematuria.   Musculoskeletal: Negative for back pain and myalgias.   Skin: Negative for pallor and rash.   Neurological: Negative for dizziness, light-headedness and headaches.   Psychiatric/Behavioral: The patient is not nervous/anxious.        Objective:      Physical Exam   Constitutional: He is oriented to person, place, and time. He appears well-developed and well-nourished. No distress.   Eyes: Right eye exhibits no discharge. Left eye exhibits no discharge.   Neck: Neck supple.   Cardiovascular: Normal rate, regular rhythm and normal heart sounds.   Pulmonary/Chest: Effort normal and breath sounds normal. No respiratory distress. He has no wheezes. He has no rales.   Musculoskeletal: He exhibits no edema.   Neurological: He is alert and oriented to person, place, and time.   Skin: Skin is warm. He is not diaphoretic.   Psychiatric: He has a normal mood and affect. His behavior is normal.   Vitals reviewed.      Assessment:       1. Kidney disease, chronic, stage III (GFR 30-59 ml/min)    2. Hypertensive kidney disease with stage 3 chronic kidney disease    3. Metabolic acidosis    4. Secondary hyperparathyroidism    5. Hyperuricemia    6. Kidney cysts        Plan:     Mr. Mast has longstanding hypertension, hyperlipidemia, BPH, CKD III with proteinuria. He has hypertensive glomerulosclerosis causing CKD as well has NSAID induced kidney damage. He denies any NSAID use currently but it appears in the past he was  taking indomethacin. He has slow progression of CKD. Also he has slow progression of proteinuria. Most recently he had uncontrolled hypertension which could have led to progression of CKD and proteinuria. As such he has hypertension since 1970's.     Since adding allopurinol at a lower dose his hyperuricemia seems to have improved. Continue current dose for now.    I explained to him about CKD staging, potential for progression, risk factors for CKD etc. Stressed importance of good BP control, low salt diet, keeping hydrated, avoiding NSAID, nephrotoxins etc. Previously noted, cysts on both kidneys and changes of CKD on US.     With K sparing diuretic and lisinopril, will follow K levels closely. Pt advised to avoid high K containing foods.     Stressed to follow low salt diet. His goal BP would be less than 130/80 given proteinuria but he does not seem to be at goal. Patient would like to continue to follow with his PCP for hypertension control. Goal BP was discussed with him in detail, stressed to watch for hypotensive episodes. Current labs show CKD is stable. Severity of his longstanding proteinuria has been unchanged.      Plan  - continue allopurinol, renal dosing  - periodically monitor renal panel for electrolytes, acid base status, eGFR  - risk of CKD progression d/w patient  - sodium bicarbonate for correction of metabolic acidosis, consider increasing dose if serum bicarbonate level drops further, dose was increased from daily to bid recently. Follow repeat renal panel next month to see if he needs higher dose  - low salt diet  - follow PTH levels, vit D, decrease OTC vitamin D, follow corrected Ca  - trend urine studies for proteinuria  - avoid NSAID/ bactrim/ IV contrast/ gadolinium/ aminoglycoside where possible  - continue ACE-I containing regimen for RAAS blockade. He is on lisinopril  - he has chronic anemia, periodically trend Hb  - continue to follow with urology, continue Flomax. He has prostate  enlargement and elevated PSA for several years. He has occasional microhematuria seen on urine studies  - management of hyperlipidemia per cardiology   - continue home BP monitoring, home BP readings in 130 range mostly on most recent BP readings over 2 weeks  - management of sleep apnea per sleep lab  - repeat US kidneys in 2019 for follow up on kidney cysts     RTC 4 months         Plan, labs, recommendations were discussed with patient, his questions were answered to his satisfaction.

## 2018-10-11 DIAGNOSIS — R97.20 ELEVATED PSA: Primary | ICD-10-CM

## 2018-11-01 RX ORDER — SODIUM BICARBONATE 325 MG/1
TABLET ORAL
Qty: 120 TABLET | Refills: 0 | Status: SHIPPED | OUTPATIENT
Start: 2018-11-01 | End: 2019-04-25 | Stop reason: SDUPTHER

## 2018-11-02 ENCOUNTER — LAB VISIT (OUTPATIENT)
Dept: LAB | Facility: HOSPITAL | Age: 73
End: 2018-11-02
Attending: INTERNAL MEDICINE
Payer: MEDICARE

## 2018-11-02 DIAGNOSIS — N18.30 KIDNEY DISEASE, CHRONIC, STAGE III (GFR 30-59 ML/MIN): ICD-10-CM

## 2018-11-02 DIAGNOSIS — R97.20 ELEVATED PSA: ICD-10-CM

## 2018-11-02 LAB
ALBUMIN SERPL BCP-MCNC: 3.3 G/DL
ANION GAP SERPL CALC-SCNC: 7 MMOL/L
BUN SERPL-MCNC: 21 MG/DL
CALCIUM SERPL-MCNC: 9.1 MG/DL
CHLORIDE SERPL-SCNC: 113 MMOL/L
CO2 SERPL-SCNC: 23 MMOL/L
COMPLEXED PSA SERPL-MCNC: 5.5 NG/ML
CREAT SERPL-MCNC: 1.8 MG/DL
EST. GFR  (AFRICAN AMERICAN): 42.8 ML/MIN/1.73 M^2
EST. GFR  (NON AFRICAN AMERICAN): 37 ML/MIN/1.73 M^2
GLUCOSE SERPL-MCNC: 108 MG/DL
PHOSPHATE SERPL-MCNC: 2.4 MG/DL
POTASSIUM SERPL-SCNC: 4 MMOL/L
SODIUM SERPL-SCNC: 143 MMOL/L

## 2018-11-02 PROCEDURE — 84153 ASSAY OF PSA TOTAL: CPT

## 2018-11-02 PROCEDURE — 80069 RENAL FUNCTION PANEL: CPT

## 2018-11-02 PROCEDURE — 36415 COLL VENOUS BLD VENIPUNCTURE: CPT

## 2018-11-06 ENCOUNTER — OFFICE VISIT (OUTPATIENT)
Dept: UROLOGY | Facility: CLINIC | Age: 73
End: 2018-11-06
Payer: MEDICARE

## 2018-11-06 VITALS
WEIGHT: 211 LBS | DIASTOLIC BLOOD PRESSURE: 87 MMHG | HEIGHT: 75 IN | HEART RATE: 80 BPM | SYSTOLIC BLOOD PRESSURE: 165 MMHG | BODY MASS INDEX: 26.24 KG/M2

## 2018-11-06 DIAGNOSIS — R97.20 ELEVATED PSA: Primary | ICD-10-CM

## 2018-11-06 PROCEDURE — 99999 PR PBB SHADOW E&M-EST. PATIENT-LVL III: CPT | Mod: PBBFAC,,, | Performed by: UROLOGY

## 2018-11-06 PROCEDURE — 99213 OFFICE O/P EST LOW 20 MIN: CPT | Mod: PBBFAC | Performed by: UROLOGY

## 2018-11-06 PROCEDURE — 99213 OFFICE O/P EST LOW 20 MIN: CPT | Mod: S$PBB,,, | Performed by: UROLOGY

## 2018-11-06 RX ORDER — CIPROFLOXACIN 500 MG/1
500 TABLET ORAL EVERY 12 HOURS
Qty: 4 TABLET | Refills: 0 | Status: SHIPPED | OUTPATIENT
Start: 2018-11-06 | End: 2018-11-08

## 2018-11-06 NOTE — PROGRESS NOTES
Subjective:       Patient ID: Teodoro Mast is a 73 y.o. male.    Chief Complaint: Elevated PSA (rtc with psa level last psa was 11/02/2018 5.5)    HPI    Teodoro Mast is a 73 y.o. male with PMHx of HLD and HTN here for prostate evaluation. His PSA 4 days ago was 5.5 and 8 months ago was 4.5. He had a negative TRUS w/ biopsy about 15 years ago. His wife recently passed away. He is not allergic to anything.    Past Medical History:   Diagnosis Date    Hyperlipidemia     Hypertension        Past Surgical History:   Procedure Laterality Date    FINGER SURGERY      hemorriodectomy         Family History   Problem Relation Age of Onset    Heart disease Mother     Hypertension Mother        Social History     Socioeconomic History    Marital status:      Spouse name: Not on file    Number of children: Not on file    Years of education: Not on file    Highest education level: Not on file   Social Needs    Financial resource strain: Not on file    Food insecurity - worry: Not on file    Food insecurity - inability: Not on file    Transportation needs - medical: Not on file    Transportation needs - non-medical: Not on file   Occupational History    Not on file   Tobacco Use    Smoking status: Former Smoker    Smokeless tobacco: Never Used   Substance and Sexual Activity    Alcohol use: Yes    Drug use: No    Sexual activity: Not on file   Other Topics Concern    Not on file   Social History Narrative    Not on file       Allergies:  Patient has no known allergies.    Medications:    Current Outpatient Medications:     allopurinol (ZYLOPRIM) 100 MG tablet, TAKE 1 TABLET(100 MG) BY MOUTH EVERY DAY, Disp: 90 tablet, Rfl: 6    amLODIPine (NORVASC) 10 MG tablet, Take 1 tablet (10 mg total) by mouth once daily., Disp: 90 tablet, Rfl: 3    atorvastatin (LIPITOR) 80 MG tablet, Take 1 tablet (80 mg total) by mouth once daily., Disp: 90 tablet, Rfl: 3    hydroCHLOROthiazide (HYDRODIURIL)  12.5 MG Tab, TK 1 T PO D, Disp: , Rfl: 3    lisinopril (PRINIVIL,ZESTRIL) 40 MG tablet, TAKE 1 TABLET(40 MG) BY MOUTH EVERY DAY, Disp: 90 tablet, Rfl: 3    sodium bicarbonate 325 MG tablet, Take 1 tablet (325 mg total) by mouth 2 (two) times daily., Disp: 180 tablet, Rfl: 11    sodium bicarbonate 325 MG tablet, TAKE 1 TABLET BY MOUTH EVERY DAY, Disp: 120 tablet, Rfl: 0    tamsulosin (FLOMAX) 0.4 mg Cp24, TAKE ONE CAPSULE BY MOUTH ONCE DAILY, Disp: 90 capsule, Rfl: 0    tamsulosin (FLOMAX) 0.4 mg Cp24, Take 1 capsule (0.4 mg total) by mouth once daily., Disp: 90 capsule, Rfl: 3    VIT C/VIT E/LUTEIN/MIN/OMEGA-3 (OCUVITE ORAL), Take by mouth., Disp: , Rfl:     ciprofloxacin HCl (CIPRO) 500 MG tablet, Take 1 tablet (500 mg total) by mouth every 12 (twelve) hours. Start night before prostate biopsy for 4 doses, Disp: 4 tablet, Rfl: 0    vitamin D 1000 units Tab, Take 2,000 mg by mouth once daily., Disp: , Rfl:     Review of Systems   Constitutional: Negative for activity change, appetite change, chills, diaphoresis, fatigue, fever and unexpected weight change.   HENT: Negative for congestion, dental problem, hearing loss, mouth sores, postnasal drip, rhinorrhea, sinus pressure and trouble swallowing.    Eyes: Negative for pain, discharge and itching.   Respiratory: Negative for apnea, cough, choking, chest tightness, shortness of breath and wheezing.    Cardiovascular: Negative for chest pain, palpitations and leg swelling.   Gastrointestinal: Negative for abdominal distention, abdominal pain, anal bleeding, blood in stool, constipation, diarrhea, nausea, rectal pain and vomiting.   Endocrine: Negative for polydipsia and polyuria.   Genitourinary: Negative for decreased urine volume, difficulty urinating, discharge, dysuria, enuresis, flank pain, frequency, genital sores, hematuria, penile pain, penile swelling and scrotal swelling.   Musculoskeletal: Negative for arthralgias, back pain and myalgias.   Skin:  Negative for color change, rash and wound.   Neurological: Negative for dizziness, syncope, speech difficulty, light-headedness and headaches.   Hematological: Negative for adenopathy. Does not bruise/bleed easily.   Psychiatric/Behavioral: Negative for behavioral problems, confusion and sleep disturbance.       Objective:      Physical Exam   Constitutional: He appears well-developed.   HENT:   Head: Normocephalic.   Neck: Neck supple.   Cardiovascular: Normal rate.    Pulmonary/Chest: Effort normal.   Abdominal: Soft.   Neurological: He is alert.   Skin: Skin is warm.     Psychiatric: He has a normal mood and affect.       Imaging:    US Retroperitoneal 06/26/18  Bilateral medical renal disease.    Multiple bilateral renal cysts, similar to prior.    Prostatomegaly. Prostatomegaly with the prostate measuring 5.8 x 4.3 x 5.5 cm.    Assessment:       1. Elevated PSA        Plan:       Teodoro was seen today for elevated psa.    Diagnoses and all orders for this visit:    Elevated PSA  -     Transrectal Ultrasound w/ Biopsy    Other orders  -     ciprofloxacin HCl (CIPRO) 500 MG tablet; Take 1 tablet (500 mg total) by mouth every 12 (twelve) hours. Start night before prostate biopsy for 4 doses          Schedule TRUS w/ biopsy.    The patient will be scheduled for a prostate biopsy.    The risks and benefits of the procedure were discussed with the patient in detail.  The risks include but are not limited to bleeding, infection, pain, bloody ejaculation, and need for further procedures.    The patient was told to stop all blood thinners at least one week prior to the procedure.    The patient will do a fleets enema the AM of the biopsy and take antibiotics beginning the PM prior to the procedure.      Marck SKINNER, am acting as a scribe on this patient encounter in the presence and under the supervision of Dr. Salcido.    11/06/2018 9:20 AM      Dr. Omari SKINNER, personally performed the services described in this  documentation.   All medical record entries made by the scribe were at my direction and in my presence.   I have reviewed the chart and agree that the record is accurate and complete.   Akbar Salcido MD.  9:33 AM 11/06/2018

## 2018-12-10 ENCOUNTER — HOSPITAL ENCOUNTER (OUTPATIENT)
Dept: UROLOGY | Facility: HOSPITAL | Age: 73
Discharge: HOME | End: 2018-12-10
Attending: UROLOGY
Payer: MEDICARE

## 2018-12-10 VITALS
DIASTOLIC BLOOD PRESSURE: 87 MMHG | TEMPERATURE: 98 F | RESPIRATION RATE: 16 BRPM | SYSTOLIC BLOOD PRESSURE: 185 MMHG | BODY MASS INDEX: 26.37 KG/M2 | HEART RATE: 92 BPM | HEIGHT: 75 IN

## 2018-12-10 DIAGNOSIS — R97.20 ELEVATED PSA: Primary | ICD-10-CM

## 2018-12-10 PROCEDURE — 88305 TISSUE EXAM BY PATHOLOGIST: CPT | Performed by: PATHOLOGY

## 2018-12-10 PROCEDURE — 76942 ECHO GUIDE FOR BIOPSY: CPT | Mod: 26,59,, | Performed by: UROLOGY

## 2018-12-10 PROCEDURE — 88305 TISSUE EXAM BY PATHOLOGIST: CPT | Mod: 26,,, | Performed by: PATHOLOGY

## 2018-12-10 PROCEDURE — 76942 ECHO GUIDE FOR BIOPSY: CPT | Mod: 59

## 2018-12-10 PROCEDURE — 76872 US TRANSRECTAL: CPT | Mod: 26,,, | Performed by: UROLOGY

## 2018-12-10 PROCEDURE — 76872 US TRANSRECTAL: CPT

## 2018-12-10 PROCEDURE — 55700 PR BIOPSY OF PROSTATE,NEEDLE/PUNCH: CPT | Mod: ,,, | Performed by: UROLOGY

## 2018-12-10 PROCEDURE — 55700 HC BIOPSY OF PROSTATE - NEEDLE OR PUNCH: CPT

## 2018-12-10 RX ORDER — LIDOCAINE HYDROCHLORIDE 20 MG/ML
JELLY TOPICAL ONCE
Status: DISCONTINUED | OUTPATIENT
Start: 2018-12-10 | End: 2019-06-17

## 2018-12-10 RX ORDER — LIDOCAINE HYDROCHLORIDE 10 MG/ML
10 INJECTION INFILTRATION; PERINEURAL ONCE
Status: COMPLETED | OUTPATIENT
Start: 2018-12-10 | End: 2018-12-10

## 2018-12-10 RX ORDER — LIDOCAINE HYDROCHLORIDE 20 MG/ML
JELLY TOPICAL
Status: COMPLETED | OUTPATIENT
Start: 2018-12-10 | End: 2018-12-10

## 2018-12-10 RX ADMIN — LIDOCAINE HYDROCHLORIDE 10 ML: 10 INJECTION INFILTRATION; PERINEURAL at 02:12

## 2018-12-10 RX ADMIN — LIDOCAINE HYDROCHLORIDE 20 ML: 20 JELLY TOPICAL at 02:12

## 2018-12-10 NOTE — PATIENT INSTRUCTIONS

## 2018-12-11 NOTE — OP NOTE
DATE OF PROCEDURE:  12/10/2018    PREOPERATIVE DIAGNOSIS:  Elevated PSA.    POSTOPERATIVE DIAGNOSIS:  Elevated PSA.    OPERATION PERFORMED:  Transrectal prostate ultrasound-guided needle biopsy.    ANESTHESIA:  Bilateral pudendal nerve block.    ESTIMATED BLOOD LOSS:  Minimal.    SURGEON:  Akbar Salcido Jr., M.D.    NUMBER OF BIOPSY SPECIMENS:  12.    PROCEDURE IN DETAIL:  The patient was prepped and draped in the left lateral   decubitus position.  Sagittal and transverse views of the prostate were made.    No obvious hypo or hyperechoic lesions were noted.  PSA was 5.5.  Prostate   measured 5.7 x 3.9 x 5.4 cm for a total volume of 64.6 g.  PSA density was 0.09.    Bilateral pudendal nerve blocks were performed using 10 mL of 2% plain   Xylocaine bilaterally.  Random sextant biopsies were performed.  A total of 12   biopsies were made.  The patient tolerated the procedure well.  Usual   post-biopsy precautions were given.  The patient will be called with the results   within a week and will call me should he develop fever, chills, excessive   bleeding or any problems whatsoever.  If negative, he will return in six months   with a PSA.      ALISHA/ARASH  dd: 12/10/2018 14:23:10 (CST)  td: 12/10/2018 18:06:33 (CST)  Doc ID   #[id number]  Job ID #[id number]    CC:

## 2018-12-15 ENCOUNTER — TELEPHONE (OUTPATIENT)
Dept: UROLOGY | Facility: CLINIC | Age: 73
End: 2018-12-15

## 2018-12-15 NOTE — TELEPHONE ENCOUNTER
----- Message from Evette Reynolds LPN sent at 12/14/2018  2:55 PM CST -----      ----- Message -----  From: Akbar Salcido Jr., MD  Sent: 12/14/2018   1:52 PM  To: Omari MCCULLOUGH Jr Staff    Negative prostate bx

## 2019-01-16 ENCOUNTER — LAB VISIT (OUTPATIENT)
Dept: LAB | Facility: HOSPITAL | Age: 74
End: 2019-01-16
Attending: INTERNAL MEDICINE
Payer: MEDICARE

## 2019-01-16 DIAGNOSIS — E78.2 MIXED HYPERLIPIDEMIA: ICD-10-CM

## 2019-01-16 DIAGNOSIS — N18.30 KIDNEY DISEASE, CHRONIC, STAGE III (GFR 30-59 ML/MIN): ICD-10-CM

## 2019-01-16 LAB
ANION GAP SERPL CALC-SCNC: 6 MMOL/L
BASOPHILS # BLD AUTO: 0.04 K/UL
BASOPHILS NFR BLD: 0.8 %
BUN SERPL-MCNC: 23 MG/DL
CALCIUM SERPL-MCNC: 8.8 MG/DL
CHLORIDE SERPL-SCNC: 112 MMOL/L
CHOLEST SERPL-MCNC: 128 MG/DL
CHOLEST/HDLC SERPL: 3.8 {RATIO}
CO2 SERPL-SCNC: 25 MMOL/L
CREAT SERPL-MCNC: 2 MG/DL
DIFFERENTIAL METHOD: ABNORMAL
EOSINOPHIL # BLD AUTO: 0.1 K/UL
EOSINOPHIL NFR BLD: 2 %
ERYTHROCYTE [DISTWIDTH] IN BLOOD BY AUTOMATED COUNT: 14.4 %
EST. GFR  (AFRICAN AMERICAN): 37.7 ML/MIN/1.73 M^2
EST. GFR  (NON AFRICAN AMERICAN): 32.6 ML/MIN/1.73 M^2
GLUCOSE SERPL-MCNC: 107 MG/DL
HCT VFR BLD AUTO: 36 %
HDLC SERPL-MCNC: 34 MG/DL
HDLC SERPL: 26.6 %
HGB BLD-MCNC: 11.4 G/DL
IMM GRANULOCYTES # BLD AUTO: 0.02 K/UL
IMM GRANULOCYTES NFR BLD AUTO: 0.4 %
LDLC SERPL CALC-MCNC: 76.2 MG/DL
LYMPHOCYTES # BLD AUTO: 0.7 K/UL
LYMPHOCYTES NFR BLD: 13.2 %
MCH RBC QN AUTO: 29.4 PG
MCHC RBC AUTO-ENTMCNC: 31.7 G/DL
MCV RBC AUTO: 93 FL
MONOCYTES # BLD AUTO: 0.5 K/UL
MONOCYTES NFR BLD: 10 %
NEUTROPHILS # BLD AUTO: 3.6 K/UL
NEUTROPHILS NFR BLD: 73.6 %
NONHDLC SERPL-MCNC: 94 MG/DL
NRBC BLD-RTO: 0 /100 WBC
PLATELET # BLD AUTO: 191 K/UL
PMV BLD AUTO: 10.9 FL
POTASSIUM SERPL-SCNC: 4.2 MMOL/L
RBC # BLD AUTO: 3.88 M/UL
SODIUM SERPL-SCNC: 143 MMOL/L
TRIGL SERPL-MCNC: 89 MG/DL
WBC # BLD AUTO: 4.92 K/UL

## 2019-01-16 PROCEDURE — 80048 BASIC METABOLIC PNL TOTAL CA: CPT

## 2019-01-16 PROCEDURE — 80061 LIPID PANEL: CPT

## 2019-01-16 PROCEDURE — 85025 COMPLETE CBC W/AUTO DIFF WBC: CPT

## 2019-01-16 PROCEDURE — 36415 COLL VENOUS BLD VENIPUNCTURE: CPT

## 2019-01-24 ENCOUNTER — OFFICE VISIT (OUTPATIENT)
Dept: CARDIOLOGY | Facility: CLINIC | Age: 74
End: 2019-01-24
Payer: MEDICARE

## 2019-01-24 VITALS
HEART RATE: 107 BPM | DIASTOLIC BLOOD PRESSURE: 92 MMHG | BODY MASS INDEX: 26.76 KG/M2 | SYSTOLIC BLOOD PRESSURE: 193 MMHG | WEIGHT: 215.19 LBS | HEIGHT: 75 IN

## 2019-01-24 DIAGNOSIS — E78.2 MIXED HYPERLIPIDEMIA: ICD-10-CM

## 2019-01-24 DIAGNOSIS — I10 ESSENTIAL HYPERTENSION: Primary | ICD-10-CM

## 2019-01-24 DIAGNOSIS — G47.33 OSA (OBSTRUCTIVE SLEEP APNEA): ICD-10-CM

## 2019-01-24 DIAGNOSIS — N13.8 BPH WITH URINARY OBSTRUCTION: ICD-10-CM

## 2019-01-24 DIAGNOSIS — M1A.9XX0 CHRONIC GOUT WITHOUT TOPHUS, UNSPECIFIED CAUSE, UNSPECIFIED SITE: ICD-10-CM

## 2019-01-24 DIAGNOSIS — N40.1 BPH WITH URINARY OBSTRUCTION: ICD-10-CM

## 2019-01-24 DIAGNOSIS — N18.30 KIDNEY DISEASE, CHRONIC, STAGE III (GFR 30-59 ML/MIN): ICD-10-CM

## 2019-01-24 PROCEDURE — 99214 PR OFFICE/OUTPT VISIT, EST, LEVL IV, 30-39 MIN: ICD-10-PCS | Mod: S$PBB,,, | Performed by: INTERNAL MEDICINE

## 2019-01-24 PROCEDURE — 99214 OFFICE O/P EST MOD 30 MIN: CPT | Mod: S$PBB,,, | Performed by: INTERNAL MEDICINE

## 2019-01-24 PROCEDURE — 99999 PR PBB SHADOW E&M-EST. PATIENT-LVL III: ICD-10-PCS | Mod: PBBFAC,,, | Performed by: INTERNAL MEDICINE

## 2019-01-24 PROCEDURE — 99999 PR PBB SHADOW E&M-EST. PATIENT-LVL III: CPT | Mod: PBBFAC,,, | Performed by: INTERNAL MEDICINE

## 2019-01-24 PROCEDURE — 99213 OFFICE O/P EST LOW 20 MIN: CPT | Mod: PBBFAC | Performed by: INTERNAL MEDICINE

## 2019-01-24 RX ORDER — AMLODIPINE BESYLATE 10 MG/1
10 TABLET ORAL DAILY
Qty: 90 TABLET | Refills: 3 | Status: SHIPPED | OUTPATIENT
Start: 2019-01-24 | End: 2019-08-12 | Stop reason: SDUPTHER

## 2019-01-24 RX ORDER — TORSEMIDE 20 MG/1
20 TABLET ORAL DAILY
Qty: 30 TABLET | Refills: 11 | Status: SHIPPED | OUTPATIENT
Start: 2019-01-24 | End: 2019-08-12 | Stop reason: SDUPTHER

## 2019-01-24 RX ORDER — TAMSULOSIN HYDROCHLORIDE 0.4 MG/1
1 CAPSULE ORAL DAILY
Qty: 90 CAPSULE | Refills: 0 | Status: SHIPPED | OUTPATIENT
Start: 2019-01-24 | End: 2019-04-19 | Stop reason: SDUPTHER

## 2019-01-24 RX ORDER — ALLOPURINOL 100 MG/1
TABLET ORAL
Qty: 90 TABLET | Refills: 6 | Status: SHIPPED | OUTPATIENT
Start: 2019-01-24 | End: 2019-04-25 | Stop reason: SDUPTHER

## 2019-01-24 RX ORDER — LISINOPRIL 40 MG/1
TABLET ORAL
Qty: 90 TABLET | Refills: 3 | Status: SHIPPED | OUTPATIENT
Start: 2019-01-24 | End: 2019-08-12 | Stop reason: SDUPTHER

## 2019-01-24 RX ORDER — ATORVASTATIN CALCIUM 80 MG/1
80 TABLET, FILM COATED ORAL DAILY
Qty: 90 TABLET | Refills: 3 | Status: SHIPPED | OUTPATIENT
Start: 2019-01-24 | End: 2019-08-12 | Stop reason: SDUPTHER

## 2019-01-24 NOTE — PROGRESS NOTES
Subjective:    Patient ID:  Teodoro Mast is a 73 y.o. male who presents for follow-up of Essential hypertension (6 months fu)      HPI     73 years old male followed with hypertension, hyperlipidemia and chronic renal insuffiencey followed by Nephrology. He continues to do well and has no chest pain or FRAGA. His home BP range in the mid 140 systolic.  Lab Results   Component Value Date     01/16/2019    K 4.2 01/16/2019     (H) 01/16/2019    CO2 25 01/16/2019    BUN 23 01/16/2019    CREATININE 2.0 (H) 01/16/2019     01/16/2019    HGBA1C 5.9 07/28/2006    AST 20 05/18/2018    ALT 23 05/18/2018    ALBUMIN 3.3 (L) 11/02/2018    PROT 6.8 05/18/2018    BILITOT 0.5 05/18/2018    WBC 4.92 01/16/2019    HGB 11.4 (L) 01/16/2019    HCT 36.0 (L) 01/16/2019    MCV 93 01/16/2019     01/16/2019    PSA 3.9 12/17/2013         Lab Results   Component Value Date    CHOL 128 01/16/2019    HDL 34 (L) 01/16/2019    TRIG 89 01/16/2019       Lab Results   Component Value Date    LDLCALC 76.2 01/16/2019       Past Medical History:   Diagnosis Date    Hyperlipidemia     Hypertension        Current Outpatient Medications:     allopurinol (ZYLOPRIM) 100 MG tablet, TAKE 1 TABLET(100 MG) BY MOUTH EVERY DAY, Disp: 90 tablet, Rfl: 6    amLODIPine (NORVASC) 10 MG tablet, Take 1 tablet (10 mg total) by mouth once daily., Disp: 90 tablet, Rfl: 3    atorvastatin (LIPITOR) 80 MG tablet, Take 1 tablet (80 mg total) by mouth once daily., Disp: 90 tablet, Rfl: 3    lisinopril (PRINIVIL,ZESTRIL) 40 MG tablet, TAKE 1 TABLET(40 MG) BY MOUTH EVERY DAY, Disp: 90 tablet, Rfl: 3    sodium bicarbonate 325 MG tablet, Take 1 tablet (325 mg total) by mouth 2 (two) times daily., Disp: 180 tablet, Rfl: 11    tamsulosin (FLOMAX) 0.4 mg Cp24, Take 1 capsule (0.4 mg total) by mouth once daily., Disp: 90 capsule, Rfl: 3    VIT C/VIT E/LUTEIN/MIN/OMEGA-3 (OCUVITE ORAL), Take by mouth once daily. , Disp: , Rfl:     vitamin D 1000  units Tab, Take 2,000 mg by mouth once daily., Disp: , Rfl:     sodium bicarbonate 325 MG tablet, TAKE 1 TABLET BY MOUTH EVERY DAY, Disp: 120 tablet, Rfl: 0    tamsulosin (FLOMAX) 0.4 mg Cap, Take 1 capsule (0.4 mg total) by mouth once daily., Disp: 90 capsule, Rfl: 0    torsemide (DEMADEX) 20 MG Tab, Take 1 tablet (20 mg total) by mouth once daily., Disp: 30 tablet, Rfl: 11    Current Facility-Administered Medications:     lidocaine HCL 2% jelly, , Topical (Top), Once, Akbar Salcido Jr., MD          Review of Systems   Constitution: Negative for decreased appetite, diaphoresis, fever, weakness, malaise/fatigue, weight gain and weight loss.   HENT: Negative for congestion, ear discharge, ear pain and nosebleeds.    Eyes: Negative for blurred vision, double vision and visual disturbance.   Cardiovascular: Negative for chest pain, claudication, cyanosis, dyspnea on exertion, irregular heartbeat, leg swelling, near-syncope, orthopnea, palpitations, paroxysmal nocturnal dyspnea and syncope.   Respiratory: Negative for cough, hemoptysis, shortness of breath, sleep disturbances due to breathing, snoring, sputum production and wheezing.    Endocrine: Negative for polydipsia, polyphagia and polyuria.   Hematologic/Lymphatic: Negative for adenopathy and bleeding problem. Does not bruise/bleed easily.   Skin: Negative for color change, nail changes, poor wound healing and rash.   Musculoskeletal: Negative for muscle cramps and muscle weakness.   Gastrointestinal: Negative for abdominal pain, anorexia, change in bowel habit, hematochezia, nausea and vomiting.   Genitourinary: Negative for dysuria, frequency and hematuria.   Neurological: Negative for brief paralysis, difficulty with concentration, excessive daytime sleepiness, dizziness, focal weakness, headaches, light-headedness, seizures and vertigo.   Psychiatric/Behavioral: Negative for altered mental status and depression.   Allergic/Immunologic: Negative for  "persistent infections.        Objective:BP (!) 193/92 (BP Location: Left arm, Patient Position: Sitting, BP Method: Large (Automatic))   Pulse 107   Ht 6' 3" (1.905 m)   Wt 97.6 kg (215 lb 2.7 oz)   BMI 26.89 kg/m²             Physical Exam   Constitutional: He is oriented to person, place, and time. He appears well-developed and well-nourished.   HENT:   Head: Normocephalic.   Right Ear: External ear normal.   Left Ear: External ear normal.   Nose: Nose normal.   Inspection of lips, teeth and gums normal   Eyes: EOM are normal. Pupils are equal, round, and reactive to light. No scleral icterus.   Neck: Normal range of motion. Neck supple. No JVD present. No tracheal deviation present. No thyromegaly present.   Cardiovascular: Normal rate, regular rhythm and intact distal pulses. Exam reveals no gallop and no friction rub.   No murmur heard.  Pulses:       Dorsalis pedis pulses are 2+ on the right side, and 2+ on the left side.        Posterior tibial pulses are 2+ on the right side, and 2+ on the left side.   Pulmonary/Chest: Effort normal and breath sounds normal.   Abdominal: Bowel sounds are normal. He exhibits no distension. There is no hepatosplenomegaly. There is no tenderness. There is no guarding.   Musculoskeletal: Normal range of motion. He exhibits no edema or tenderness.   Lymphadenopathy:   Palpation of neck and groin lymph nodes normal   Neurological: He is alert and oriented to person, place, and time. No cranial nerve deficit. He exhibits normal muscle tone. Coordination normal.   Skin: Skin is dry.   Palpation of skin normal   Psychiatric: His behavior is normal. Judgment and thought content normal.         Assessment:       1. Essential hypertension    2. Kidney disease, chronic, stage III (GFR 30-59 ml/min)    3. GLYNN (obstructive sleep apnea)    4. Mixed hyperlipidemia    5. BPH with urinary obstruction    6. Chronic gout without tophus, unspecified cause, unspecified site         Plan:     "   Teodoro was seen today for essential hypertension.    Diagnoses and all orders for this visit:    Essential hypertension  -     lisinopril (PRINIVIL,ZESTRIL) 40 MG tablet; TAKE 1 TABLET(40 MG) BY MOUTH EVERY DAY  -     Comprehensive metabolic panel; Future; Expected date: 07/26/2019  -     CBC auto differential; Future; Expected date: 07/26/2019  -     torsemide (DEMADEX) 20 MG Tab; Take 1 tablet (20 mg total) by mouth once daily.  -     Basic metabolic panel; Future; Expected date: 02/07/2019    Kidney disease, chronic, stage III (GFR 30-59 ml/min)  -     Comprehensive metabolic panel; Future; Expected date: 07/26/2019  -     CBC auto differential; Future; Expected date: 07/26/2019    GLYNN (obstructive sleep apnea)    Mixed hyperlipidemia  -     Lipid panel; Future; Expected date: 07/26/2019    BPH with urinary obstruction    Chronic gout without tophus, unspecified cause, unspecified site  -     URIC ACID; Future; Expected date: 07/24/2019    Other orders  -     tamsulosin (FLOMAX) 0.4 mg Cap; Take 1 capsule (0.4 mg total) by mouth once daily.  -     atorvastatin (LIPITOR) 80 MG tablet; Take 1 tablet (80 mg total) by mouth once daily.  -     amLODIPine (NORVASC) 10 MG tablet; Take 1 tablet (10 mg total) by mouth once daily.  -     allopurinol (ZYLOPRIM) 100 MG tablet; TAKE 1 TABLET(100 MG) BY MOUTH EVERY DAY

## 2019-02-07 ENCOUNTER — LAB VISIT (OUTPATIENT)
Dept: LAB | Facility: HOSPITAL | Age: 74
End: 2019-02-07
Attending: INTERNAL MEDICINE
Payer: MEDICARE

## 2019-02-07 ENCOUNTER — PATIENT MESSAGE (OUTPATIENT)
Dept: CARDIOLOGY | Facility: CLINIC | Age: 74
End: 2019-02-07

## 2019-02-07 DIAGNOSIS — I10 ESSENTIAL HYPERTENSION: ICD-10-CM

## 2019-02-07 LAB
ANION GAP SERPL CALC-SCNC: 7 MMOL/L
BUN SERPL-MCNC: 39 MG/DL
CALCIUM SERPL-MCNC: 8.5 MG/DL
CHLORIDE SERPL-SCNC: 109 MMOL/L
CO2 SERPL-SCNC: 27 MMOL/L
CREAT SERPL-MCNC: 2.4 MG/DL
EST. GFR  (AFRICAN AMERICAN): 30.2 ML/MIN/1.73 M^2
EST. GFR  (NON AFRICAN AMERICAN): 26.2 ML/MIN/1.73 M^2
GLUCOSE SERPL-MCNC: 102 MG/DL
POTASSIUM SERPL-SCNC: 4 MMOL/L
SODIUM SERPL-SCNC: 143 MMOL/L

## 2019-02-07 PROCEDURE — 80048 BASIC METABOLIC PNL TOTAL CA: CPT

## 2019-02-07 PROCEDURE — 36415 COLL VENOUS BLD VENIPUNCTURE: CPT

## 2019-02-13 ENCOUNTER — PATIENT MESSAGE (OUTPATIENT)
Dept: SLEEP MEDICINE | Facility: CLINIC | Age: 74
End: 2019-02-13

## 2019-02-20 ENCOUNTER — TELEPHONE (OUTPATIENT)
Dept: SLEEP MEDICINE | Facility: CLINIC | Age: 74
End: 2019-02-20

## 2019-02-26 ENCOUNTER — OFFICE VISIT (OUTPATIENT)
Dept: SLEEP MEDICINE | Facility: CLINIC | Age: 74
End: 2019-02-26
Payer: MEDICARE

## 2019-02-26 VITALS
HEIGHT: 75 IN | BODY MASS INDEX: 26.92 KG/M2 | DIASTOLIC BLOOD PRESSURE: 88 MMHG | SYSTOLIC BLOOD PRESSURE: 155 MMHG | HEART RATE: 85 BPM | WEIGHT: 216.5 LBS

## 2019-02-26 DIAGNOSIS — G47.33 OBSTRUCTIVE SLEEP APNEA: Primary | ICD-10-CM

## 2019-02-26 PROCEDURE — 99214 OFFICE O/P EST MOD 30 MIN: CPT | Mod: S$PBB,,, | Performed by: NURSE PRACTITIONER

## 2019-02-26 PROCEDURE — 99214 PR OFFICE/OUTPT VISIT, EST, LEVL IV, 30-39 MIN: ICD-10-PCS | Mod: S$PBB,,, | Performed by: NURSE PRACTITIONER

## 2019-02-26 PROCEDURE — 99999 PR PBB SHADOW E&M-EST. PATIENT-LVL III: ICD-10-PCS | Mod: PBBFAC,,, | Performed by: NURSE PRACTITIONER

## 2019-02-26 PROCEDURE — 99213 OFFICE O/P EST LOW 20 MIN: CPT | Mod: PBBFAC | Performed by: NURSE PRACTITIONER

## 2019-02-26 PROCEDURE — 99999 PR PBB SHADOW E&M-EST. PATIENT-LVL III: CPT | Mod: PBBFAC,,, | Performed by: NURSE PRACTITIONER

## 2019-02-26 NOTE — PROGRESS NOTES
Teodoro Mast seen in follow-up for GLYNN management and CPAP equipment check.    CHIEF COMPLAINT:    Chief Complaint   Patient presents with    Sleep Apnea     INTERVAL HISTORY:    02/26/2019 LANRE Cornejo NP: Continues to use CPAP nightly. Denies breakthrough symptoms of snoring, interrupted sleep, or excessive daytime sleepiness. Denies oral/nasal drying with Simplus FFM. Denies pressure intolerance. Machine fell off counter this morning and knob fell out. Machine still runs, but knob detached.     CPAP Interrogation: F & P, CPAP 11 cm, 3441 hours, 30-day ave: 6.5 hours, > 4 hours: 100%, Manometer: 11 cm     EPWORTH SLEEPINESS SCALE 2/25/2019   Sitting and reading 0   Watching TV 0   Sitting, inactive in a public place (e.g. a theatre or a meeting) 0   As a passenger in a car for an hour without a break 0   Lying down to rest in the afternoon when circumstances permit 2   Sitting and talking to someone 0   Sitting quietly after a lunch without alcohol 0   In a car, while stopped for a few minutes in traffic 0   Total score 2       04/03/2018 LANRE Cornejo NP: Continues to use CPAP nightly. Denies breakthrough symptoms of snoring, interrupted sleep, or excessive daytime sleepiness. Nocturia improved to only once nightly, if that; continues to take Flomax as well. Denies oral/nasal drying with Simplus FFM. Denies pressure intolerance. Since initiating CPAP, reports nasal airflow improved no longer requiring nightly nasal rinses.   EPWORTH SLEEPINESS SCALE 4/2/2018   Sitting and reading 0   Watching TV 1   Sitting, inactive in a public place (e.g. a theatre or a meeting) 0   As a passenger in a car for an hour without a break 0   Lying down to rest in the afternoon when circumstances permit 2   Sitting and talking to someone 0   Sitting quietly after a lunch without alcohol 0   In a car, while stopped for a few minutes in traffic 0   Total score 3     CPAP Interrogation: Machine type : Ikon  Machine condition:  Good,  filter is ok, will change/clean  monthly  Pressure setting and range:  cpap 11        Total usage: 1286 hours      Average daily usage 30 days:  6.1  Hours    Days > 4 Hours: 30 days  Manometer readin cm H20    2017 LANRE Cornejo NP: Pt returns after CPAP set up on 2017 at [facility]. Pt complaints of snoring, interrupted sleep, and excessive daytime sleepiness now resolved with CPAP use. Denies oral/nasal drying with Simplus FFM.  Denies pressure intolerance. Reports that fasting blood sugars have improved since starting CPAP from 120s vs 80-90s with CPAP and blood pressure controlled now regularly SBP <120. ESS 5.     CPAP Interrogation: Floyd & Eder, CPAP 11 cm, Limited usage information on actual machine. Total Usage: 172 hours, Average daily use: 5 hours.   Contacted [facility] 2017 while pt in clinic, and [facility] [place] compliance to provide Sleep Clinic detailed compliance report. DME to fax to Sleep Clinic when available.   Addendum: Info Received via Fax on 2017 Total Days Device Used: 100% compliance, Days >4 hours: , Average Used: 5 h 13 m      2017 LANRE Cornejo NP: Discussed 2017 in-lab sleep study in detail. Patient complaints of snoring, interrupted sleep, and excessive daytime sleepiness remain the same. ESS 2.       2017 LANRE Cornejo NP: HISTORY OF PRESENT ILLNESS: Teodoro Mast is a 73 y.o. male is here for sleep evaluation.        Diagnosed with HTN 43 years ago and BP has been controlled until a month ago. Pt's cardiologist recommended sleep eval for secondary causes of refractory HTN. Currently on 3 meds.     Recently started taking Flomax for BPH, which has improved nocturia to 3x per night vs. Several times as prior.     Patient complaints include: snoring, interrupted sleep, and excessive daytime sleepiness.     Reports nasal congestion 1 - 2 x per week usually at bedtime. Symptoms relieved by taking OTC antihistamine.      Denies symptoms of restless legs or kicking during sleep.    Occupation: Retired    Donahue Sleepiness Scale score during initial sleep evaluation was 1.    SLEEP ROUTINE:  Activity the hour prior to sleep: TV. Falls asleep to TV in bedroom    Bed partner:  wife  Time to bed:  9 - 10 pm  Lights off:  Yes but TV on  Sleep onset latency:  <1 hour       Disruptions or awakenings:  2 - 3    Wakeup time:  5:30 - 6 am  Perceived sleep quality: 3/5       Daytime naps:  none  Weekend sleep routine: same as above  Caffeine use: some  Exercise habit:   No      Baseline Sleep Study: 05/19/2017 Split Night Study 210 lb. The overall AHI was 49.3 with an oxygen jostin of 83.0%. Effective control of sleep disordered breathing was seen during supine REM stage sleep at a pressure of 11 cm of water. Higher pressures were associated with central apneas. Used medium Simplus FFM.       PAST MEDICAL HISTORY:    Active Ambulatory Problems     Diagnosis Date Noted    Mixed hyperlipidemia     Essential hypertension     BPH with urinary obstruction 06/11/2013    Kidney disease, chronic, stage III (GFR 30-59 ml/min) 06/25/2015    Hypertensive CKD (chronic kidney disease) 11/09/2016    Kidney cysts 11/09/2016    Proteinuria 11/09/2016    Metabolic acidosis 11/09/2016    GLYNN (obstructive sleep apnea)     Hyperuricemia 08/30/2017    Secondary hyperparathyroidism 06/04/2018     Resolved Ambulatory Problems     Diagnosis Date Noted    Sleep apnea, unspecified 04/05/2017    KINGS (acute kidney injury) 09/04/2018     Past Medical History:   Diagnosis Date    Hyperlipidemia     Hypertension                 PAST SURGICAL HISTORY:    Past Surgical History:   Procedure Laterality Date    FINGER SURGERY      hemorriodectomy           FAMILY HISTORY:                Family History   Problem Relation Age of Onset    Heart disease Mother     Hypertension Mother        SOCIAL HISTORY:          Tobacco:   Social History     Tobacco Use    Smoking Status Former Smoker   Smokeless Tobacco Never Used       Alcohol use:    Social History     Substance and Sexual Activity   Alcohol Use Yes                 ALLERGIES:  Review of patient's allergies indicates:  No Known Allergies    CURRENT MEDICATIONS:    Current Outpatient Medications   Medication Sig Dispense Refill    allopurinol (ZYLOPRIM) 100 MG tablet TAKE 1 TABLET(100 MG) BY MOUTH EVERY DAY 90 tablet 6    amLODIPine (NORVASC) 10 MG tablet Take 1 tablet (10 mg total) by mouth once daily. 90 tablet 3    atorvastatin (LIPITOR) 80 MG tablet Take 1 tablet (80 mg total) by mouth once daily. 90 tablet 3    lisinopril (PRINIVIL,ZESTRIL) 40 MG tablet TAKE 1 TABLET(40 MG) BY MOUTH EVERY DAY 90 tablet 3    sodium bicarbonate 325 MG tablet Take 1 tablet (325 mg total) by mouth 2 (two) times daily. 180 tablet 11    sodium bicarbonate 325 MG tablet TAKE 1 TABLET BY MOUTH EVERY  tablet 0    tamsulosin (FLOMAX) 0.4 mg Cap Take 1 capsule (0.4 mg total) by mouth once daily. 90 capsule 0    tamsulosin (FLOMAX) 0.4 mg Cp24 Take 1 capsule (0.4 mg total) by mouth once daily. 90 capsule 3    torsemide (DEMADEX) 20 MG Tab Take 1 tablet (20 mg total) by mouth once daily. 30 tablet 11    VIT C/VIT E/LUTEIN/MIN/OMEGA-3 (OCUVITE ORAL) Take by mouth once daily.       vitamin D 1000 units Tab Take 2,000 mg by mouth once daily.       Current Facility-Administered Medications   Medication Dose Route Frequency Provider Last Rate Last Dose    lidocaine HCL 2% jelly   Topical (Top) Once Akbar Salcido Jr., MD                      REVIEW OF SYSTEMS:     Sleep related symptoms as per HPI.  CONST:Denies weight gain    HEENT: Occasional sinus congestion  PULM: Denies dyspnea  CARD:  Denies palpitations   GI:  Denies acid reflux  : Denies polyuria  NEURO: Denies headaches  PSYCH: Denies mood disturbance  HEME: Denies anemia    Otherwise, a balance of 10 systems reviewed is negative.          PHYSICAL EXAM:  BP (!)  "155/88   Pulse 85   Ht 6' 3" (1.905 m)   Wt 98.2 kg (216 lb 7.9 oz)   BMI 27.06 kg/m²   GENERAL: Normal development, well groomed    ASSESSMENT:    Obstructive sleep apnea, severe by AHI.  The patient symptomatically has snoring, interrupted sleep, and excessive daytime sleepiness now resolved with CPAP use. The patient is adherent on CPAP and experiencing symptomatic benefit. Medical co-morbidities: HTN, HLD.  This warrants treatment for obstructive sleep apnea.      Allergic rhinitis, stable, currently uses OTC antihistamine prn basis with relief     PLAN:    Treatment:   -continue CPAP 11 cm. Pt to contact [facility] to get machine knob repaired  -RTC 12 months, sooner if needed.     Education: During our discussion today, we talked about the etiology of obstructive sleep apnea as well as the potential ramifications of untreated sleep apnea, which could include daytime sleepiness, hypertension, heart disease and/or stroke. We discussed potential treatment options, which could include weight loss, body positioning, continuous positive airway pressure (CPAP), OA, EPAP,  or referral for surgical consideration.     continue Antihistamine and Flonase daily use    Precautions: The patient was advised to abstain from driving should they feel sleepy  or drowsy.               "

## 2019-04-18 ENCOUNTER — LAB VISIT (OUTPATIENT)
Dept: LAB | Facility: HOSPITAL | Age: 74
End: 2019-04-18
Attending: INTERNAL MEDICINE
Payer: MEDICARE

## 2019-04-18 DIAGNOSIS — I12.9 HYPERTENSIVE KIDNEY DISEASE WITH STAGE 3 CHRONIC KIDNEY DISEASE: ICD-10-CM

## 2019-04-18 DIAGNOSIS — E87.20 METABOLIC ACIDOSIS: ICD-10-CM

## 2019-04-18 DIAGNOSIS — E79.0 HYPERURICEMIA: ICD-10-CM

## 2019-04-18 DIAGNOSIS — N18.30 HYPERTENSIVE KIDNEY DISEASE WITH STAGE 3 CHRONIC KIDNEY DISEASE: ICD-10-CM

## 2019-04-18 DIAGNOSIS — N25.81 SECONDARY HYPERPARATHYROIDISM: ICD-10-CM

## 2019-04-18 DIAGNOSIS — N18.30 KIDNEY DISEASE, CHRONIC, STAGE III (GFR 30-59 ML/MIN): ICD-10-CM

## 2019-04-18 LAB
25(OH)D3+25(OH)D2 SERPL-MCNC: 33 NG/ML (ref 30–96)
ALBUMIN SERPL BCP-MCNC: 3.3 G/DL (ref 3.5–5.2)
ANION GAP SERPL CALC-SCNC: 7 MMOL/L (ref 8–16)
BUN SERPL-MCNC: 32 MG/DL (ref 8–23)
CALCIUM SERPL-MCNC: 9.1 MG/DL (ref 8.7–10.5)
CHLORIDE SERPL-SCNC: 112 MMOL/L (ref 95–110)
CO2 SERPL-SCNC: 23 MMOL/L (ref 23–29)
CREAT SERPL-MCNC: 2.3 MG/DL (ref 0.5–1.4)
EST. GFR  (AFRICAN AMERICAN): 31.8 ML/MIN/1.73 M^2
EST. GFR  (NON AFRICAN AMERICAN): 27.5 ML/MIN/1.73 M^2
GLUCOSE SERPL-MCNC: 97 MG/DL (ref 70–110)
PHOSPHATE SERPL-MCNC: 2.4 MG/DL (ref 2.7–4.5)
POTASSIUM SERPL-SCNC: 4.3 MMOL/L (ref 3.5–5.1)
PTH-INTACT SERPL-MCNC: 189 PG/ML (ref 9–77)
SODIUM SERPL-SCNC: 142 MMOL/L (ref 136–145)
URATE SERPL-MCNC: 7.8 MG/DL (ref 3.4–7)

## 2019-04-18 PROCEDURE — 84550 ASSAY OF BLOOD/URIC ACID: CPT

## 2019-04-18 PROCEDURE — 80069 RENAL FUNCTION PANEL: CPT

## 2019-04-18 PROCEDURE — 82306 VITAMIN D 25 HYDROXY: CPT

## 2019-04-18 PROCEDURE — 83970 ASSAY OF PARATHORMONE: CPT

## 2019-04-18 PROCEDURE — 36415 COLL VENOUS BLD VENIPUNCTURE: CPT

## 2019-04-22 RX ORDER — TAMSULOSIN HYDROCHLORIDE 0.4 MG/1
CAPSULE ORAL
Qty: 90 CAPSULE | Refills: 0 | Status: SHIPPED | OUTPATIENT
Start: 2019-04-22 | End: 2019-08-12 | Stop reason: SDUPTHER

## 2019-04-25 ENCOUNTER — OFFICE VISIT (OUTPATIENT)
Dept: NEPHROLOGY | Facility: CLINIC | Age: 74
End: 2019-04-25
Payer: MEDICARE

## 2019-04-25 VITALS
DIASTOLIC BLOOD PRESSURE: 90 MMHG | OXYGEN SATURATION: 98 % | WEIGHT: 212.5 LBS | HEIGHT: 75 IN | SYSTOLIC BLOOD PRESSURE: 180 MMHG | HEART RATE: 85 BPM | BODY MASS INDEX: 26.42 KG/M2

## 2019-04-25 DIAGNOSIS — N25.81 SECONDARY HYPERPARATHYROIDISM: ICD-10-CM

## 2019-04-25 DIAGNOSIS — N40.1 BPH WITH URINARY OBSTRUCTION: ICD-10-CM

## 2019-04-25 DIAGNOSIS — E55.9 VITAMIN D DEFICIENCY: ICD-10-CM

## 2019-04-25 DIAGNOSIS — R80.9 PROTEINURIA, UNSPECIFIED TYPE: ICD-10-CM

## 2019-04-25 DIAGNOSIS — N18.30 HYPERTENSIVE KIDNEY DISEASE WITH STAGE 3 CHRONIC KIDNEY DISEASE: Primary | ICD-10-CM

## 2019-04-25 DIAGNOSIS — N13.8 BPH WITH URINARY OBSTRUCTION: ICD-10-CM

## 2019-04-25 DIAGNOSIS — E87.20 METABOLIC ACIDOSIS: ICD-10-CM

## 2019-04-25 DIAGNOSIS — E79.0 HYPERURICEMIA: ICD-10-CM

## 2019-04-25 DIAGNOSIS — N28.1 KIDNEY CYSTS: ICD-10-CM

## 2019-04-25 DIAGNOSIS — I12.9 HYPERTENSIVE KIDNEY DISEASE WITH STAGE 3 CHRONIC KIDNEY DISEASE: Primary | ICD-10-CM

## 2019-04-25 PROCEDURE — 99213 OFFICE O/P EST LOW 20 MIN: CPT | Mod: PBBFAC | Performed by: INTERNAL MEDICINE

## 2019-04-25 PROCEDURE — 99215 OFFICE O/P EST HI 40 MIN: CPT | Mod: S$PBB,,, | Performed by: INTERNAL MEDICINE

## 2019-04-25 PROCEDURE — 99215 PR OFFICE/OUTPT VISIT, EST, LEVL V, 40-54 MIN: ICD-10-PCS | Mod: S$PBB,,, | Performed by: INTERNAL MEDICINE

## 2019-04-25 PROCEDURE — 99999 PR PBB SHADOW E&M-EST. PATIENT-LVL III: ICD-10-PCS | Mod: PBBFAC,,, | Performed by: INTERNAL MEDICINE

## 2019-04-25 PROCEDURE — 99999 PR PBB SHADOW E&M-EST. PATIENT-LVL III: CPT | Mod: PBBFAC,,, | Performed by: INTERNAL MEDICINE

## 2019-04-25 RX ORDER — SODIUM BICARBONATE 325 MG/1
325 TABLET ORAL 2 TIMES DAILY
Qty: 180 TABLET | Refills: 4 | Status: SHIPPED | OUTPATIENT
Start: 2019-04-25 | End: 2020-05-07

## 2019-04-25 RX ORDER — ALLOPURINOL 100 MG/1
TABLET ORAL
Qty: 90 TABLET | Refills: 6 | Status: SHIPPED | OUTPATIENT
Start: 2019-04-25 | End: 2020-04-09

## 2019-05-05 RX ORDER — ALLOPURINOL 100 MG/1
TABLET ORAL
Qty: 90 TABLET | Refills: 0 | Status: SHIPPED | OUTPATIENT
Start: 2019-05-05 | End: 2019-08-12 | Stop reason: SDUPTHER

## 2019-05-06 RX ORDER — TAMSULOSIN HYDROCHLORIDE 0.4 MG/1
CAPSULE ORAL
Qty: 90 CAPSULE | Refills: 0 | Status: SHIPPED | OUTPATIENT
Start: 2019-05-06 | End: 2019-06-17

## 2019-05-06 RX ORDER — AMLODIPINE BESYLATE 10 MG/1
TABLET ORAL
Qty: 90 TABLET | Refills: 0 | Status: SHIPPED | OUTPATIENT
Start: 2019-05-06 | End: 2019-08-12 | Stop reason: SDUPTHER

## 2019-05-06 RX ORDER — ATORVASTATIN CALCIUM 80 MG/1
TABLET, FILM COATED ORAL
Qty: 90 TABLET | Refills: 0 | Status: SHIPPED | OUTPATIENT
Start: 2019-05-06 | End: 2019-08-12 | Stop reason: SDUPTHER

## 2019-05-16 ENCOUNTER — TELEPHONE (OUTPATIENT)
Dept: SLEEP MEDICINE | Facility: CLINIC | Age: 74
End: 2019-05-16

## 2019-06-05 DIAGNOSIS — R97.20 ELEVATED PSA: Primary | ICD-10-CM

## 2019-06-10 ENCOUNTER — LAB VISIT (OUTPATIENT)
Dept: LAB | Facility: HOSPITAL | Age: 74
End: 2019-06-10
Payer: MEDICARE

## 2019-06-10 DIAGNOSIS — R97.20 ELEVATED PSA: ICD-10-CM

## 2019-06-10 LAB — COMPLEXED PSA SERPL-MCNC: 6.4 NG/ML (ref 0–4)

## 2019-06-10 PROCEDURE — 84153 ASSAY OF PSA TOTAL: CPT

## 2019-06-10 PROCEDURE — 36415 COLL VENOUS BLD VENIPUNCTURE: CPT

## 2019-06-17 ENCOUNTER — OFFICE VISIT (OUTPATIENT)
Dept: UROLOGY | Facility: CLINIC | Age: 74
End: 2019-06-17
Payer: MEDICARE

## 2019-06-17 VITALS
HEIGHT: 75 IN | SYSTOLIC BLOOD PRESSURE: 145 MMHG | DIASTOLIC BLOOD PRESSURE: 87 MMHG | WEIGHT: 206.38 LBS | HEART RATE: 84 BPM | BODY MASS INDEX: 25.66 KG/M2

## 2019-06-17 DIAGNOSIS — R97.20 ELEVATED PSA: Primary | ICD-10-CM

## 2019-06-17 PROCEDURE — 99213 OFFICE O/P EST LOW 20 MIN: CPT | Mod: S$PBB,,, | Performed by: UROLOGY

## 2019-06-17 PROCEDURE — 99999 PR PBB SHADOW E&M-EST. PATIENT-LVL III: ICD-10-PCS | Mod: PBBFAC,,, | Performed by: UROLOGY

## 2019-06-17 PROCEDURE — 99213 OFFICE O/P EST LOW 20 MIN: CPT | Mod: PBBFAC | Performed by: UROLOGY

## 2019-06-17 PROCEDURE — 99999 PR PBB SHADOW E&M-EST. PATIENT-LVL III: CPT | Mod: PBBFAC,,, | Performed by: UROLOGY

## 2019-06-17 PROCEDURE — 99213 PR OFFICE/OUTPT VISIT, EST, LEVL III, 20-29 MIN: ICD-10-PCS | Mod: S$PBB,,, | Performed by: UROLOGY

## 2019-06-17 NOTE — PROGRESS NOTES
Subjective:       Patient ID: Teodoro Mast is a 74 y.o. male.    Chief Complaint: Elevated PSA (6month check up last psa was 6/10/2019 6.4 pt had a truss bx in 01/2019 he state results was negative but he state his psa level keep increasing.)    HPI    Teodoro Mast is a 74 y.o. male with PMHx of HTN who presents today for management and evaluation of elevated PSA. His PSA 7 days ago was 6.4 and 7 months ago was 5.5. He had a negative TRUS w/ biopsy in 2018 when his PSA was 5.5 and measured at 64.6 g. States he drinks a lot of water and experiences nocturia at least x2. No other urological complaints at this time. Denies FHx of prostate cancer.     Past Medical History:   Diagnosis Date    Hyperlipidemia     Hypertension        Past Surgical History:   Procedure Laterality Date    FINGER SURGERY      hemorriodectomy         Family History   Problem Relation Age of Onset    Heart disease Mother     Hypertension Mother        Social History     Socioeconomic History    Marital status:      Spouse name: Not on file    Number of children: Not on file    Years of education: Not on file    Highest education level: Not on file   Occupational History    Not on file   Social Needs    Financial resource strain: Not on file    Food insecurity:     Worry: Not on file     Inability: Not on file    Transportation needs:     Medical: Not on file     Non-medical: Not on file   Tobacco Use    Smoking status: Former Smoker    Smokeless tobacco: Never Used   Substance and Sexual Activity    Alcohol use: Yes    Drug use: No    Sexual activity: Not on file   Lifestyle    Physical activity:     Days per week: Not on file     Minutes per session: Not on file    Stress: Not on file   Relationships    Social connections:     Talks on phone: Not on file     Gets together: Not on file     Attends Nondenominational service: Not on file     Active member of club or organization: Not on file     Attends meetings of  clubs or organizations: Not on file     Relationship status: Not on file   Other Topics Concern    Not on file   Social History Narrative    Not on file       Allergies:  Patient has no known allergies.    Medications:    Current Outpatient Medications:     allopurinol (ZYLOPRIM) 100 MG tablet, TAKE 1 TABLET(100 MG) BY MOUTH EVERY DAY, Disp: 90 tablet, Rfl: 6    allopurinol (ZYLOPRIM) 100 MG tablet, TAKE 1 TABLET(100 MG) BY MOUTH EVERY DAY, Disp: 90 tablet, Rfl: 0    amLODIPine (NORVASC) 10 MG tablet, Take 1 tablet (10 mg total) by mouth once daily., Disp: 90 tablet, Rfl: 3    amLODIPine (NORVASC) 10 MG tablet, TAKE 1 TABLET BY MOUTH ONCE DAILY, Disp: 90 tablet, Rfl: 0    atorvastatin (LIPITOR) 80 MG tablet, Take 1 tablet (80 mg total) by mouth once daily., Disp: 90 tablet, Rfl: 3    atorvastatin (LIPITOR) 80 MG tablet, TAKE 1 TABLET BY MOUTH DAILY, Disp: 90 tablet, Rfl: 0    lisinopril (PRINIVIL,ZESTRIL) 40 MG tablet, TAKE 1 TABLET(40 MG) BY MOUTH EVERY DAY, Disp: 90 tablet, Rfl: 3    sodium bicarbonate 325 MG tablet, Take 1 tablet (325 mg total) by mouth 2 (two) times daily., Disp: 180 tablet, Rfl: 4    tamsulosin (FLOMAX) 0.4 mg Cap, TAKE 1 CAPSULE(0.4 MG) BY MOUTH EVERY DAY, Disp: 90 capsule, Rfl: 0    torsemide (DEMADEX) 20 MG Tab, Take 1 tablet (20 mg total) by mouth once daily., Disp: 30 tablet, Rfl: 11    VIT C/VIT E/LUTEIN/MIN/OMEGA-3 (OCUVITE ORAL), Take by mouth once daily. , Disp: , Rfl:     vitamin D 1000 units Tab, Take 2,000 mg by mouth once daily., Disp: , Rfl:     sodium bicarbonate 325 MG tablet, Take 1 tablet (325 mg total) by mouth 2 (two) times daily., Disp: 180 tablet, Rfl: 11  No current facility-administered medications for this visit.     Review of Systems   Constitutional: Negative for activity change, appetite change, chills, diaphoresis, fatigue, fever and unexpected weight change.   HENT: Negative for congestion, dental problem, hearing loss, mouth sores, postnasal drip,  rhinorrhea, sinus pressure and trouble swallowing.    Eyes: Negative for pain, discharge and itching.   Respiratory: Negative for apnea, cough, choking, chest tightness, shortness of breath and wheezing.    Cardiovascular: Negative for chest pain, palpitations and leg swelling.   Gastrointestinal: Negative for abdominal distention, abdominal pain, anal bleeding, blood in stool, constipation, diarrhea, nausea, rectal pain and vomiting.   Endocrine: Negative for polydipsia and polyuria.   Genitourinary: Negative for decreased urine volume, difficulty urinating, discharge, dysuria, enuresis, flank pain, frequency, genital sores, hematuria, penile pain, penile swelling and scrotal swelling.        Positive for nocturia.    Musculoskeletal: Negative for arthralgias, back pain and myalgias.   Skin: Negative for color change, rash and wound.   Neurological: Negative for dizziness, syncope, speech difficulty, light-headedness and headaches.   Hematological: Negative for adenopathy. Does not bruise/bleed easily.   Psychiatric/Behavioral: Negative for behavioral problems, confusion and sleep disturbance.       Objective:      Physical Exam   Constitutional: He appears well-developed.   HENT:   Head: Normocephalic.   Neck: Neck supple.   Cardiovascular: Normal rate.    Pulmonary/Chest: Effort normal.   Abdominal: Soft.   Genitourinary:   Genitourinary Comments: Prostate was smooth without nodularity. No rectal masses. >40 grams. Normal perineum.     Neurological: He is alert.   Skin: Skin is warm.     Psychiatric: He has a normal mood and affect.       Assessment:       1. Elevated PSA        Plan:       Teodoro was seen today for elevated psa.    Diagnoses and all orders for this visit:    Elevated PSA  -     Prostate Specific Antigen, Diagnostic; Future          Discussed prostate MRI if PSA continues to increase.   RTC 6 months with PSA.    Marck SKINNER, am acting as a scribe on this patient encounter in the presence and  under the supervision of Dr. Salcido.    06/17/2019 7:58 AM    I, Dr. Salcido, personally performed the services described in this documentation.   All medical record entries made by the scribe were at my direction and in my presence.   I have reviewed the chart and agree that the record is accurate and complete.   Akbar Salcido MD.  8:02 AM 06/17/2019

## 2019-07-22 ENCOUNTER — TELEPHONE (OUTPATIENT)
Dept: NEPHROLOGY | Facility: CLINIC | Age: 74
End: 2019-07-22

## 2019-08-05 ENCOUNTER — LAB VISIT (OUTPATIENT)
Dept: LAB | Facility: HOSPITAL | Age: 74
End: 2019-08-05
Attending: INTERNAL MEDICINE
Payer: MEDICARE

## 2019-08-05 DIAGNOSIS — I10 ESSENTIAL HYPERTENSION: ICD-10-CM

## 2019-08-05 DIAGNOSIS — N18.30 KIDNEY DISEASE, CHRONIC, STAGE III (GFR 30-59 ML/MIN): ICD-10-CM

## 2019-08-05 DIAGNOSIS — E78.2 MIXED HYPERLIPIDEMIA: ICD-10-CM

## 2019-08-05 LAB
ALBUMIN SERPL BCP-MCNC: 3.3 G/DL (ref 3.5–5.2)
ALP SERPL-CCNC: 95 U/L (ref 55–135)
ALT SERPL W/O P-5'-P-CCNC: 22 U/L (ref 10–44)
ANION GAP SERPL CALC-SCNC: 9 MMOL/L (ref 8–16)
AST SERPL-CCNC: 18 U/L (ref 10–40)
BASOPHILS # BLD AUTO: 0.05 K/UL (ref 0–0.2)
BASOPHILS NFR BLD: 1 % (ref 0–1.9)
BILIRUB SERPL-MCNC: 0.3 MG/DL (ref 0.1–1)
BUN SERPL-MCNC: 43 MG/DL (ref 8–23)
CALCIUM SERPL-MCNC: 8.9 MG/DL (ref 8.7–10.5)
CHLORIDE SERPL-SCNC: 116 MMOL/L (ref 95–110)
CHOLEST SERPL-MCNC: 139 MG/DL (ref 120–199)
CHOLEST/HDLC SERPL: 5 {RATIO} (ref 2–5)
CO2 SERPL-SCNC: 19 MMOL/L (ref 23–29)
CREAT SERPL-MCNC: 2.6 MG/DL (ref 0.5–1.4)
DIFFERENTIAL METHOD: ABNORMAL
EOSINOPHIL # BLD AUTO: 0.1 K/UL (ref 0–0.5)
EOSINOPHIL NFR BLD: 2.2 % (ref 0–8)
ERYTHROCYTE [DISTWIDTH] IN BLOOD BY AUTOMATED COUNT: 14.3 % (ref 11.5–14.5)
EST. GFR  (AFRICAN AMERICAN): 27.3 ML/MIN/1.73 M^2
EST. GFR  (NON AFRICAN AMERICAN): 23.6 ML/MIN/1.73 M^2
GLUCOSE SERPL-MCNC: 90 MG/DL (ref 70–110)
HCT VFR BLD AUTO: 35.7 % (ref 40–54)
HDLC SERPL-MCNC: 28 MG/DL (ref 40–75)
HDLC SERPL: 20.1 % (ref 20–50)
HGB BLD-MCNC: 11.3 G/DL (ref 14–18)
IMM GRANULOCYTES # BLD AUTO: 0.02 K/UL (ref 0–0.04)
IMM GRANULOCYTES NFR BLD AUTO: 0.4 % (ref 0–0.5)
LDLC SERPL CALC-MCNC: 82.6 MG/DL (ref 63–159)
LYMPHOCYTES # BLD AUTO: 0.8 K/UL (ref 1–4.8)
LYMPHOCYTES NFR BLD: 15.5 % (ref 18–48)
MCH RBC QN AUTO: 28.8 PG (ref 27–31)
MCHC RBC AUTO-ENTMCNC: 31.7 G/DL (ref 32–36)
MCV RBC AUTO: 91 FL (ref 82–98)
MONOCYTES # BLD AUTO: 0.5 K/UL (ref 0.3–1)
MONOCYTES NFR BLD: 10.5 % (ref 4–15)
NEUTROPHILS # BLD AUTO: 3.5 K/UL (ref 1.8–7.7)
NEUTROPHILS NFR BLD: 70.4 % (ref 38–73)
NONHDLC SERPL-MCNC: 111 MG/DL
NRBC BLD-RTO: 0 /100 WBC
PLATELET # BLD AUTO: 171 K/UL (ref 150–350)
PMV BLD AUTO: 11.4 FL (ref 9.2–12.9)
POTASSIUM SERPL-SCNC: 4.2 MMOL/L (ref 3.5–5.1)
PROT SERPL-MCNC: 6.4 G/DL (ref 6–8.4)
RBC # BLD AUTO: 3.93 M/UL (ref 4.6–6.2)
SODIUM SERPL-SCNC: 144 MMOL/L (ref 136–145)
TRIGL SERPL-MCNC: 142 MG/DL (ref 30–150)
WBC # BLD AUTO: 5.03 K/UL (ref 3.9–12.7)

## 2019-08-05 PROCEDURE — 80053 COMPREHEN METABOLIC PANEL: CPT

## 2019-08-05 PROCEDURE — 85025 COMPLETE CBC W/AUTO DIFF WBC: CPT

## 2019-08-05 PROCEDURE — 80061 LIPID PANEL: CPT

## 2019-08-05 PROCEDURE — 36415 COLL VENOUS BLD VENIPUNCTURE: CPT

## 2019-08-12 ENCOUNTER — OFFICE VISIT (OUTPATIENT)
Dept: CARDIOLOGY | Facility: CLINIC | Age: 74
End: 2019-08-12
Payer: MEDICARE

## 2019-08-12 VITALS
SYSTOLIC BLOOD PRESSURE: 157 MMHG | WEIGHT: 204.13 LBS | DIASTOLIC BLOOD PRESSURE: 79 MMHG | HEART RATE: 87 BPM | BODY MASS INDEX: 25.38 KG/M2 | HEIGHT: 75 IN

## 2019-08-12 DIAGNOSIS — E79.0 HYPERURICEMIA: ICD-10-CM

## 2019-08-12 DIAGNOSIS — N40.1 BPH WITH URINARY OBSTRUCTION: ICD-10-CM

## 2019-08-12 DIAGNOSIS — N13.8 BPH WITH URINARY OBSTRUCTION: ICD-10-CM

## 2019-08-12 DIAGNOSIS — G47.33 OSA (OBSTRUCTIVE SLEEP APNEA): ICD-10-CM

## 2019-08-12 DIAGNOSIS — N18.30 KIDNEY DISEASE, CHRONIC, STAGE III (GFR 30-59 ML/MIN): ICD-10-CM

## 2019-08-12 DIAGNOSIS — I10 ESSENTIAL HYPERTENSION: ICD-10-CM

## 2019-08-12 DIAGNOSIS — E78.2 MIXED HYPERLIPIDEMIA: Primary | ICD-10-CM

## 2019-08-12 DIAGNOSIS — N18.30 HYPERTENSIVE KIDNEY DISEASE WITH STAGE 3 CHRONIC KIDNEY DISEASE: ICD-10-CM

## 2019-08-12 DIAGNOSIS — I12.9 HYPERTENSIVE KIDNEY DISEASE WITH STAGE 3 CHRONIC KIDNEY DISEASE: ICD-10-CM

## 2019-08-12 DIAGNOSIS — N25.81 SECONDARY HYPERPARATHYROIDISM: ICD-10-CM

## 2019-08-12 PROCEDURE — 99214 OFFICE O/P EST MOD 30 MIN: CPT | Mod: S$PBB,,, | Performed by: INTERNAL MEDICINE

## 2019-08-12 PROCEDURE — 99213 OFFICE O/P EST LOW 20 MIN: CPT | Mod: PBBFAC | Performed by: INTERNAL MEDICINE

## 2019-08-12 PROCEDURE — 99214 PR OFFICE/OUTPT VISIT, EST, LEVL IV, 30-39 MIN: ICD-10-PCS | Mod: S$PBB,,, | Performed by: INTERNAL MEDICINE

## 2019-08-12 PROCEDURE — 99999 PR PBB SHADOW E&M-EST. PATIENT-LVL III: CPT | Mod: PBBFAC,,, | Performed by: INTERNAL MEDICINE

## 2019-08-12 PROCEDURE — 99999 PR PBB SHADOW E&M-EST. PATIENT-LVL III: ICD-10-PCS | Mod: PBBFAC,,, | Performed by: INTERNAL MEDICINE

## 2019-08-12 RX ORDER — TORSEMIDE 20 MG/1
20 TABLET ORAL DAILY
Qty: 90 TABLET | Refills: 3 | Status: SHIPPED | OUTPATIENT
Start: 2019-08-12 | End: 2020-01-13

## 2019-08-12 RX ORDER — AMLODIPINE BESYLATE 10 MG/1
10 TABLET ORAL DAILY
Qty: 90 TABLET | Refills: 3 | Status: SHIPPED | OUTPATIENT
Start: 2019-08-12 | End: 2020-01-13

## 2019-08-12 RX ORDER — ATORVASTATIN CALCIUM 80 MG/1
80 TABLET, FILM COATED ORAL DAILY
Qty: 90 TABLET | Refills: 3 | Status: SHIPPED | OUTPATIENT
Start: 2019-08-12 | End: 2020-09-11 | Stop reason: SDUPTHER

## 2019-08-12 RX ORDER — TAMSULOSIN HYDROCHLORIDE 0.4 MG/1
CAPSULE ORAL
Qty: 90 CAPSULE | Refills: 3 | Status: SHIPPED | OUTPATIENT
Start: 2019-08-12 | End: 2020-11-23 | Stop reason: SDUPTHER

## 2019-08-12 RX ORDER — LISINOPRIL 40 MG/1
TABLET ORAL
Qty: 90 TABLET | Refills: 3 | Status: SHIPPED | OUTPATIENT
Start: 2019-08-12 | End: 2020-04-09

## 2019-08-12 NOTE — PROGRESS NOTES
Subjective:    Patient ID:  Teodoro Mast is a 74 y.o. male who presents for follow-up of Essential hypertension (6 month f/u )      HPI     73 years old male followed with hypertension, hyperlipidemia and chronic renal insuffiencey followed by Dr Mcqueen [Nephrology] and Juan[ Urology] for elevated PSA. He continues to do well and has no chest pain or FRAGA. Mean home systolic BP in low 140s  Lab Results   Component Value Date     08/05/2019    K 4.2 08/05/2019     (H) 08/05/2019    CO2 19 (L) 08/05/2019    BUN 43 (H) 08/05/2019    CREATININE 2.6 (H) 08/05/2019    GLU 90 08/05/2019    HGBA1C 5.9 07/28/2006    AST 18 08/05/2019    ALT 22 08/05/2019    ALBUMIN 3.3 (L) 08/05/2019    PROT 6.4 08/05/2019    BILITOT 0.3 08/05/2019    WBC 5.03 08/05/2019    HGB 11.3 (L) 08/05/2019    HCT 35.7 (L) 08/05/2019    MCV 91 08/05/2019     08/05/2019    PSA 3.9 12/17/2013         Lab Results   Component Value Date    CHOL 139 08/05/2019    HDL 28 (L) 08/05/2019    TRIG 142 08/05/2019       Lab Results   Component Value Date    LDLCALC 82.6 08/05/2019       Past Medical History:   Diagnosis Date    Hyperlipidemia     Hypertension        Current Outpatient Medications:     allopurinol (ZYLOPRIM) 100 MG tablet, TAKE 1 TABLET(100 MG) BY MOUTH EVERY DAY, Disp: 90 tablet, Rfl: 6    amLODIPine (NORVASC) 10 MG tablet, Take 1 tablet (10 mg total) by mouth once daily., Disp: 90 tablet, Rfl: 3    atorvastatin (LIPITOR) 80 MG tablet, Take 1 tablet (80 mg total) by mouth once daily., Disp: 90 tablet, Rfl: 3    lisinopril (PRINIVIL,ZESTRIL) 40 MG tablet, TAKE 1 TABLET(40 MG) BY MOUTH EVERY DAY, Disp: 90 tablet, Rfl: 3    sodium bicarbonate 325 MG tablet, Take 1 tablet (325 mg total) by mouth 2 (two) times daily., Disp: 180 tablet, Rfl: 11    sodium bicarbonate 325 MG tablet, Take 1 tablet (325 mg total) by mouth 2 (two) times daily., Disp: 180 tablet, Rfl: 4    tamsulosin (FLOMAX) 0.4 mg Cap, TAKE 1 CAPSULE(0.4  "MG) BY MOUTH EVERY DAY, Disp: 90 capsule, Rfl: 3    torsemide (DEMADEX) 20 MG Tab, Take 1 tablet (20 mg total) by mouth once daily., Disp: 90 tablet, Rfl: 3    VIT C/VIT E/LUTEIN/MIN/OMEGA-3 (OCUVITE ORAL), Take by mouth once daily. , Disp: , Rfl:     vitamin D 1000 units Tab, Take 2,000 mg by mouth once daily., Disp: , Rfl:           Review of Systems   Constitution: Negative for decreased appetite, diaphoresis, fever, malaise/fatigue, weight gain and weight loss.   HENT: Negative for congestion, ear discharge, ear pain and nosebleeds.    Eyes: Negative for blurred vision, double vision and visual disturbance.   Cardiovascular: Negative for chest pain, claudication, cyanosis, dyspnea on exertion, irregular heartbeat, leg swelling, near-syncope, orthopnea, palpitations, paroxysmal nocturnal dyspnea and syncope.   Respiratory: Negative for cough, hemoptysis, shortness of breath, sleep disturbances due to breathing, snoring, sputum production and wheezing.    Endocrine: Negative for polydipsia, polyphagia and polyuria.   Hematologic/Lymphatic: Negative for adenopathy and bleeding problem. Does not bruise/bleed easily.   Skin: Negative for color change, nail changes, poor wound healing and rash.   Musculoskeletal: Negative for muscle cramps and muscle weakness.   Gastrointestinal: Negative for abdominal pain, anorexia, change in bowel habit, hematochezia, nausea and vomiting.   Genitourinary: Negative for dysuria, frequency and hematuria.   Neurological: Negative for brief paralysis, difficulty with concentration, excessive daytime sleepiness, dizziness, focal weakness, headaches, light-headedness, seizures, vertigo and weakness.   Psychiatric/Behavioral: Negative for altered mental status and depression.   Allergic/Immunologic: Negative for persistent infections.        Objective:BP (!) 157/79 (BP Location: Left arm, Patient Position: Sitting, BP Method: Medium (Automatic))   Pulse 87   Ht 6' 3" (1.905 m)   Wt " 92.6 kg (204 lb 2.3 oz)   BMI 25.52 kg/m²             Physical Exam   Constitutional: He is oriented to person, place, and time. He appears well-developed and well-nourished.   HENT:   Head: Normocephalic.   Right Ear: External ear normal.   Left Ear: External ear normal.   Nose: Nose normal.   Inspection of lips, teeth and gums normal   Eyes: Pupils are equal, round, and reactive to light. EOM are normal. No scleral icterus.   Neck: Normal range of motion. Neck supple. No JVD present. No tracheal deviation present. No thyromegaly present.   Cardiovascular: Normal rate, regular rhythm and intact distal pulses. Exam reveals no gallop and no friction rub.   No murmur heard.  Pulses:       Dorsalis pedis pulses are 2+ on the right side, and 2+ on the left side.        Posterior tibial pulses are 2+ on the right side, and 2+ on the left side.   Pulmonary/Chest: Effort normal and breath sounds normal.   Abdominal: Bowel sounds are normal. He exhibits no distension. There is no hepatosplenomegaly. There is no tenderness. There is no guarding.   Musculoskeletal: Normal range of motion. He exhibits no edema or tenderness.   Lymphadenopathy:   Palpation of neck and groin lymph nodes normal   Neurological: He is alert and oriented to person, place, and time. No cranial nerve deficit. He exhibits normal muscle tone. Coordination normal.   Skin: Skin is dry.   Palpation of skin normal   Psychiatric: His behavior is normal. Judgment and thought content normal.         Assessment:       1. Mixed hyperlipidemia    2. Essential hypertension    3. Hypertensive kidney disease with stage 3 chronic kidney disease    4. BPH with urinary obstruction    5. Secondary hyperparathyroidism    6. Hyperuricemia    7. GLYNN (obstructive sleep apnea)    8. Kidney disease, chronic, stage III (GFR 30-59 ml/min)         Plan:       Teodoro was seen today for essential hypertension.    Diagnoses and all orders for this visit:    Mixed  hyperlipidemia  -     atorvastatin (LIPITOR) 80 MG tablet; Take 1 tablet (80 mg total) by mouth once daily.  -     Lipid panel; Future; Expected date: 02/11/2020    Essential hypertension  -     amLODIPine (NORVASC) 10 MG tablet; Take 1 tablet (10 mg total) by mouth once daily.  -     lisinopril (PRINIVIL,ZESTRIL) 40 MG tablet; TAKE 1 TABLET(40 MG) BY MOUTH EVERY DAY  -     torsemide (DEMADEX) 20 MG Tab; Take 1 tablet (20 mg total) by mouth once daily.  -     Comprehensive metabolic panel; Future; Expected date: 02/11/2020    Hypertensive kidney disease with stage 3 chronic kidney disease    BPH with urinary obstruction  -     tamsulosin (FLOMAX) 0.4 mg Cap; TAKE 1 CAPSULE(0.4 MG) BY MOUTH EVERY DAY    Secondary hyperparathyroidism    Hyperuricemia    GLYNN (obstructive sleep apnea)    Kidney disease, chronic, stage III (GFR 30-59 ml/min)  -     Comprehensive metabolic panel; Future; Expected date: 02/11/2020  -     CBC auto differential; Future; Expected date: 02/11/2020

## 2019-09-09 ENCOUNTER — LAB VISIT (OUTPATIENT)
Dept: LAB | Facility: HOSPITAL | Age: 74
End: 2019-09-09
Payer: MEDICARE

## 2019-09-09 DIAGNOSIS — N18.30 HYPERTENSIVE KIDNEY DISEASE WITH STAGE 3 CHRONIC KIDNEY DISEASE: ICD-10-CM

## 2019-09-09 DIAGNOSIS — E55.9 VITAMIN D DEFICIENCY: ICD-10-CM

## 2019-09-09 DIAGNOSIS — E79.0 HYPERURICEMIA: ICD-10-CM

## 2019-09-09 DIAGNOSIS — E87.20 METABOLIC ACIDOSIS: ICD-10-CM

## 2019-09-09 DIAGNOSIS — N25.81 SECONDARY HYPERPARATHYROIDISM: ICD-10-CM

## 2019-09-09 DIAGNOSIS — R80.9 PROTEINURIA, UNSPECIFIED TYPE: ICD-10-CM

## 2019-09-09 DIAGNOSIS — I12.9 HYPERTENSIVE KIDNEY DISEASE WITH STAGE 3 CHRONIC KIDNEY DISEASE: ICD-10-CM

## 2019-09-09 LAB
25(OH)D3+25(OH)D2 SERPL-MCNC: 30 NG/ML (ref 30–96)
ALBUMIN SERPL BCP-MCNC: 3.3 G/DL (ref 3.5–5.2)
ANION GAP SERPL CALC-SCNC: 8 MMOL/L (ref 8–16)
BASOPHILS # BLD AUTO: 0.02 K/UL (ref 0–0.2)
BASOPHILS NFR BLD: 0.3 % (ref 0–1.9)
BUN SERPL-MCNC: 26 MG/DL (ref 8–23)
CALCIUM SERPL-MCNC: 8.8 MG/DL (ref 8.7–10.5)
CHLORIDE SERPL-SCNC: 112 MMOL/L (ref 95–110)
CO2 SERPL-SCNC: 24 MMOL/L (ref 23–29)
CREAT SERPL-MCNC: 2.2 MG/DL (ref 0.5–1.4)
DIFFERENTIAL METHOD: ABNORMAL
EOSINOPHIL # BLD AUTO: 0.1 K/UL (ref 0–0.5)
EOSINOPHIL NFR BLD: 0.9 % (ref 0–8)
ERYTHROCYTE [DISTWIDTH] IN BLOOD BY AUTOMATED COUNT: 14.8 % (ref 11.5–14.5)
EST. GFR  (AFRICAN AMERICAN): 33.4 ML/MIN/1.73 M^2
EST. GFR  (NON AFRICAN AMERICAN): 28.9 ML/MIN/1.73 M^2
GLUCOSE SERPL-MCNC: 131 MG/DL (ref 70–110)
HCT VFR BLD AUTO: 36.5 % (ref 40–54)
HGB BLD-MCNC: 11.3 G/DL (ref 14–18)
IMM GRANULOCYTES # BLD AUTO: 0.02 K/UL (ref 0–0.04)
IMM GRANULOCYTES NFR BLD AUTO: 0.3 % (ref 0–0.5)
LYMPHOCYTES # BLD AUTO: 0.6 K/UL (ref 1–4.8)
LYMPHOCYTES NFR BLD: 9.3 % (ref 18–48)
MCH RBC QN AUTO: 29 PG (ref 27–31)
MCHC RBC AUTO-ENTMCNC: 31 G/DL (ref 32–36)
MCV RBC AUTO: 94 FL (ref 82–98)
MONOCYTES # BLD AUTO: 0.6 K/UL (ref 0.3–1)
MONOCYTES NFR BLD: 8.6 % (ref 4–15)
NEUTROPHILS # BLD AUTO: 5.2 K/UL (ref 1.8–7.7)
NEUTROPHILS NFR BLD: 80.6 % (ref 38–73)
NRBC BLD-RTO: 0 /100 WBC
PHOSPHATE SERPL-MCNC: 2.8 MG/DL (ref 2.7–4.5)
PLATELET # BLD AUTO: 183 K/UL (ref 150–350)
PMV BLD AUTO: 11.6 FL (ref 9.2–12.9)
POTASSIUM SERPL-SCNC: 4.5 MMOL/L (ref 3.5–5.1)
PTH-INTACT SERPL-MCNC: 211 PG/ML (ref 9–77)
RBC # BLD AUTO: 3.89 M/UL (ref 4.6–6.2)
SODIUM SERPL-SCNC: 144 MMOL/L (ref 136–145)
URATE SERPL-MCNC: 7.3 MG/DL (ref 3.4–7)
WBC # BLD AUTO: 6.43 K/UL (ref 3.9–12.7)

## 2019-09-09 PROCEDURE — 84550 ASSAY OF BLOOD/URIC ACID: CPT

## 2019-09-09 PROCEDURE — 83970 ASSAY OF PARATHORMONE: CPT

## 2019-09-09 PROCEDURE — 85025 COMPLETE CBC W/AUTO DIFF WBC: CPT

## 2019-09-09 PROCEDURE — 80069 RENAL FUNCTION PANEL: CPT

## 2019-09-09 PROCEDURE — 82306 VITAMIN D 25 HYDROXY: CPT

## 2019-09-09 PROCEDURE — 36415 COLL VENOUS BLD VENIPUNCTURE: CPT

## 2019-09-17 ENCOUNTER — OFFICE VISIT (OUTPATIENT)
Dept: NEPHROLOGY | Facility: CLINIC | Age: 74
End: 2019-09-17
Payer: MEDICARE

## 2019-09-17 VITALS
WEIGHT: 202.38 LBS | HEART RATE: 70 BPM | SYSTOLIC BLOOD PRESSURE: 158 MMHG | BODY MASS INDEX: 25.16 KG/M2 | HEIGHT: 75 IN | OXYGEN SATURATION: 97 % | DIASTOLIC BLOOD PRESSURE: 96 MMHG

## 2019-09-17 DIAGNOSIS — R80.9 PROTEINURIA, UNSPECIFIED TYPE: ICD-10-CM

## 2019-09-17 DIAGNOSIS — E55.9 VITAMIN D DEFICIENCY: ICD-10-CM

## 2019-09-17 DIAGNOSIS — I12.9 HYPERTENSIVE KIDNEY DISEASE WITH STAGE 3 CHRONIC KIDNEY DISEASE: ICD-10-CM

## 2019-09-17 DIAGNOSIS — N25.81 SECONDARY HYPERPARATHYROIDISM: ICD-10-CM

## 2019-09-17 DIAGNOSIS — N13.8 BPH WITH URINARY OBSTRUCTION: ICD-10-CM

## 2019-09-17 DIAGNOSIS — E79.0 HYPERURICEMIA: ICD-10-CM

## 2019-09-17 DIAGNOSIS — E87.20 METABOLIC ACIDOSIS: ICD-10-CM

## 2019-09-17 DIAGNOSIS — N40.1 BPH WITH URINARY OBSTRUCTION: ICD-10-CM

## 2019-09-17 DIAGNOSIS — N28.1 KIDNEY CYSTS: ICD-10-CM

## 2019-09-17 DIAGNOSIS — N18.30 HYPERTENSIVE KIDNEY DISEASE WITH STAGE 3 CHRONIC KIDNEY DISEASE: ICD-10-CM

## 2019-09-17 DIAGNOSIS — N18.30 KIDNEY DISEASE, CHRONIC, STAGE III (GFR 30-59 ML/MIN): Primary | ICD-10-CM

## 2019-09-17 PROCEDURE — 99214 PR OFFICE/OUTPT VISIT, EST, LEVL IV, 30-39 MIN: ICD-10-PCS | Mod: S$PBB,,, | Performed by: INTERNAL MEDICINE

## 2019-09-17 PROCEDURE — 99999 PR PBB SHADOW E&M-EST. PATIENT-LVL III: CPT | Mod: PBBFAC,,, | Performed by: INTERNAL MEDICINE

## 2019-09-17 PROCEDURE — 99999 PR PBB SHADOW E&M-EST. PATIENT-LVL III: ICD-10-PCS | Mod: PBBFAC,,, | Performed by: INTERNAL MEDICINE

## 2019-09-17 PROCEDURE — 99213 OFFICE O/P EST LOW 20 MIN: CPT | Mod: PBBFAC | Performed by: INTERNAL MEDICINE

## 2019-09-17 PROCEDURE — 99214 OFFICE O/P EST MOD 30 MIN: CPT | Mod: S$PBB,,, | Performed by: INTERNAL MEDICINE

## 2019-09-17 RX ORDER — HYDRALAZINE HYDROCHLORIDE 10 MG/1
10 TABLET, FILM COATED ORAL DAILY
Qty: 30 TABLET | Refills: 11 | Status: SHIPPED | OUTPATIENT
Start: 2019-09-17 | End: 2019-10-14

## 2019-09-17 NOTE — PATIENT INSTRUCTIONS
Send BP readings over next week since we are starting hydralazine for further BP control.    Hold all BP medicines if systolic BP is less than 120 and/or you feel dizzy.

## 2019-09-17 NOTE — PROGRESS NOTES
Subjective:       Patient ID: Teodoro Mast is a 74 y.o. Black or  male who presents for follow up of CKD.    HPI     Mr. Mast was seen for follow up of CKD. He was seen on 9/6/16 in new patient evaluation and last followed on 4/25/19. Pt has longstanding hypertension since 1974, remote h/o tobacco smoking, hyperlipidemia, BPH, CKD, proteinuria. He reports family h/o kidney disease in his father that was not diagnosed until he was in his 80's. Pt reports he was diagnosed with hypertension when he was 29 years old. He reports in the past he had some obstruction to the urinary tract but unable to provide any details.    In the past he was using medicines like Indomethacin, ibuprofen. He has been diagnosed with sleep apnea. He is using CPAP regularly.    Overall, he reports he has been doing fine. He denies any recent acute illness. He still has symptoms of difficulty with emptying the bladder and hesitance and is under follow up of urology for BPH. He feels his symptoms of prostate enlargement are better since started using Flomax. He also reports his sleep quality is better with CPAP machine.    He denies any leg swelling. He brought his home BP readings, mostly in  range, occasional in low 150 range and may be one reading in upper 130's in the last 3 weeks. He does not have SOB/ peripheral edema. He denies any nausea/ vomiting/ diarrhea recently.    Of note, he saw his PCP/ cardiologist in January 2019 when his BP was noted to be 190's/90's. He was started on torsemide by his cardiologist for better BP control. Pre clinic that time creatinine was 2.0, it did rise to 2.4 in February 2019 labs along with rise in BUN to 39. His cardiologist reduced the dose of diuretic in response to this. But overall, he has progression of CKD at this point.    He reports he had prior episodes of gout and he feels intake of beer, craw fish triggers it for him. He has been taking allopurinol regularly  which has helped lower the uric acid levels gradually.    He has slow progression of CKD. Labs were reviewed and d/w him. His creatinine ha gradually increased from 1.4 to 1.9 since 2010. Prior urine studies show proteinuria. He appears to have chronic anemia.     prior labs showed SPEP/ SHELIA was normal. hep B/C screen was negative. US kidneys from 10/16 showed 10.1 and 12.9 cm kidneys, changes of CKD, cortical thinning, multiple cysts on both sides and enlarged prostate.     Renal Function:  Lab Results   Component Value Date     (H) 09/09/2019    GLU 90 08/05/2019     09/09/2019     08/05/2019    K 4.5 09/09/2019    K 4.2 08/05/2019     (H) 09/09/2019     (H) 08/05/2019    CO2 24 09/09/2019    CO2 19 (L) 08/05/2019    BUN 26 (H) 09/09/2019    BUN 43 (H) 08/05/2019    CALCIUM 8.8 09/09/2019    CALCIUM 8.9 08/05/2019    CREATININE 2.2 (H) 09/09/2019    CREATININE 2.6 (H) 08/05/2019    ALBUMIN 3.3 (L) 09/09/2019    ALBUMIN 3.3 (L) 08/05/2019    PHOS 2.8 09/09/2019    PHOS 2.4 (L) 04/18/2019    ESTGFRAFRICA 33.4 (A) 09/09/2019    ESTGFRAFRICA 27.3 (A) 08/05/2019    EGFRNONAA 28.9 (A) 09/09/2019    EGFRNONAA 23.6 (A) 08/05/2019       Urinalysis:  Lab Results   Component Value Date    APPEARANCEUA Clear 09/09/2019    PHUR 5.0 09/09/2019    SPECGRAV 1.010 09/09/2019    PROTEINUA 3+ (A) 09/09/2019    GLUCUA Negative 09/09/2019    OCCULTUA Negative 09/09/2019    NITRITE Negative 09/09/2019    LEUKOCYTESUR Negative 09/09/2019       Protein/Creatinine Ratio:  Lab Results   Component Value Date    PROTEINURINE 288 (H) 09/09/2019    CREATRANDUR 101.0 09/09/2019    UTPCR 2.85 (H) 09/09/2019       CBC:  Lab Results   Component Value Date    WBC 6.43 09/09/2019    HGB 11.3 (L) 09/09/2019    HCT 36.5 (L) 09/09/2019       Vit D 30  Uric acid 7.3     US Kidneys 6/2018  10.4 and 13.6 cm kidneys, cortical thinning, loss of corticomedullary distinction, multiple cysts on both kidneys,  prostatomegaly.    Review of Systems   Constitutional: Negative for activity change, appetite change, chills, fatigue and fever.   Eyes: Negative for pain and discharge.   Respiratory: Negative for cough, shortness of breath and wheezing.    Cardiovascular: Negative for chest pain and leg swelling.   Gastrointestinal: Negative for abdominal pain, diarrhea, nausea and vomiting.   Endocrine: Negative for polydipsia and polyuria.   Genitourinary: Negative for decreased urine volume, difficulty urinating, dysuria, flank pain, frequency and hematuria.   Musculoskeletal: Negative for back pain and myalgias.   Skin: Negative for pallor and rash.   Neurological: Negative for dizziness, light-headedness and headaches.   Psychiatric/Behavioral: The patient is not nervous/anxious.        Objective:      Physical Exam   Constitutional: He is oriented to person, place, and time. He appears well-developed and well-nourished. No distress.   Eyes: Right eye exhibits no discharge. Left eye exhibits no discharge.   Neck: Neck supple.   Cardiovascular: Normal rate, regular rhythm and normal heart sounds.   Pulmonary/Chest: Effort normal and breath sounds normal. No respiratory distress. He has no wheezes. He has no rales.   Musculoskeletal: He exhibits no edema.   Neurological: He is alert and oriented to person, place, and time.   Skin: Skin is warm. He is not diaphoretic.   Psychiatric: He has a normal mood and affect. His behavior is normal.   Vitals reviewed.      Assessment:       1. Kidney disease, chronic, stage III (GFR 30-59 ml/min)    2. Hypertensive kidney disease with stage 3 chronic kidney disease    3. Kidney cysts    4. BPH with urinary obstruction    5. Metabolic acidosis    6. Proteinuria, unspecified type    7. Secondary hyperparathyroidism    8. Vitamin D deficiency    9. Hyperuricemia        Plan:     Mr. Mast has longstanding hypertension, hyperlipidemia, BPH, CKD III with proteinuria. He has hypertensive  glomerulosclerosis causing CKD as well has NSAID induced kidney damage. He denies any NSAID use currently but it appears in the past he was taking indomethacin. He has slow progression of CKD. Also he has slow progression of proteinuria. Most recently he had uncontrolled hypertension which could have led to progression of CKD and proteinuria. As such he has hypertension since 1970's.     He developed KINGS in January/ February which is likely due to diuretic dosing which was added to address his severely uncontrolled hypertension around that time. Overall since then he does have further somewhat rapid progression of CKD. I brought this to his attention. Stressed need for better BP control. Ideally his SBP should be < 130 with sub nephrotic proteinuria he has been having. Pt acknowledged his BP control could improve.     Given recent BP readings from home are still sub optimal, will add hydralazine 10 mg daily dose for now. Pt reports previously he was treated with HCTZ, spironolactone but did not have much effect. He was advised to send BP readings from home over next one week. Holding parameters d/w him to avoid symptomatic hypotension.    Since adding allopurinol at a lower dose his hyperuricemia seems to have improved. Continue current dose for now.    I explained to him about CKD staging, potential for progression, risk factors for CKD etc. Stressed importance of good BP control, low salt diet, keeping hydrated, avoiding NSAID, nephrotoxins etc. Previously noted, cysts on both kidneys and changes of CKD on US.     will follow K levels closely. Pt advised to avoid high K containing foods.     Plan  - continue allopurinol, renal dosing  - periodically monitor renal panel for electrolytes, acid base status, eGFR  - risk of CKD progression d/w patient  - sodium bicarbonate bid for correction of metabolic acidosis  - low salt diet  - follow PTH levels, vit D, decrease OTC vitamin D, follow corrected Ca  - trend urine  studies for proteinuria  - avoid NSAID/ bactrim/ IV contrast/ gadolinium/ aminoglycoside where possible  - continue ACE-I containing regimen for RAAS blockade. He is on lisinopril  - he has chronic anemia, periodically trend Hb  - continue to follow with urology, continue Flomax. He has prostate enlargement and elevated PSA for several years. He has occasional microhematuria seen on urine studies    RTC 3 to 4 months         Plan, labs, recommendations were discussed with patient, his questions were answered to his satisfaction.

## 2019-09-26 ENCOUNTER — TELEPHONE (OUTPATIENT)
Dept: NEPHROLOGY | Facility: CLINIC | Age: 74
End: 2019-09-26

## 2019-09-26 ENCOUNTER — PATIENT MESSAGE (OUTPATIENT)
Dept: NEPHROLOGY | Facility: CLINIC | Age: 74
End: 2019-09-26

## 2019-10-07 ENCOUNTER — PATIENT MESSAGE (OUTPATIENT)
Dept: NEPHROLOGY | Facility: CLINIC | Age: 74
End: 2019-10-07

## 2019-10-14 RX ORDER — HYDRALAZINE HYDROCHLORIDE 10 MG/1
10 TABLET, FILM COATED ORAL EVERY 8 HOURS
Qty: 90 TABLET | Refills: 11 | Status: SHIPPED | OUTPATIENT
Start: 2019-10-14 | End: 2019-10-23

## 2019-10-23 ENCOUNTER — PATIENT MESSAGE (OUTPATIENT)
Dept: NEPHROLOGY | Facility: CLINIC | Age: 74
End: 2019-10-23

## 2019-10-23 RX ORDER — HYDRALAZINE HYDROCHLORIDE 25 MG/1
25 TABLET, FILM COATED ORAL EVERY 12 HOURS
Qty: 60 TABLET | Refills: 11 | Status: SHIPPED | OUTPATIENT
Start: 2019-10-23 | End: 2019-11-12

## 2019-10-24 ENCOUNTER — TELEPHONE (OUTPATIENT)
Dept: NEPHROLOGY | Facility: CLINIC | Age: 74
End: 2019-10-24

## 2019-10-24 DIAGNOSIS — N18.30 CHRONIC KIDNEY DISEASE, STAGE III (MODERATE): Primary | ICD-10-CM

## 2019-10-28 ENCOUNTER — PATIENT MESSAGE (OUTPATIENT)
Dept: NEPHROLOGY | Facility: CLINIC | Age: 74
End: 2019-10-28

## 2019-11-01 NOTE — PROGRESS NOTES
Subjective:       Patient ID: Teodoro Mast is a 74 y.o. AA male who presents for follow-up evaluation of   HTN     HPI   Patient is new to me. Last seen by Dr. Mcqueen in Sept 2019.  Prior pertinent chart reviewed since this is patient's first appointment with me.    Patient presents for f/u of HTN.  Baseline creatinine of 1.8-2.0 mg/dL until Feb 2019, when sCr began to trend upward c KINGS after being started on torsemide for uncontrolled BP.  sCr has remained elevated since then despite dose reduction of diuretic. Baseline since February appears to be 2.3-2.4 mg/dL.  Most recent sCr 2.3.  Proteinuria has also been increasing; most recently was 2.85g/g.    Dr. Mcqueen has been titrating his BP medications c a goal of SBP < 130 mmHg.  Pt has recently started hydralazine 50 mg TID.  Also on amlodipine 10 mg, lisinopril 40 mg, torsemide 10 mg.    Home BPs: 150-160s/80s in A.M.; 160-170/70s in P.M. Home BPs appear to have increased with increasing dose of hydralazine.  Pt also states he has noticed palpitations and increased HR since starting hydralazine.    Significant hx includes HLD, HTN since 1974, BPH c urinary obstruction, renal cysts, former smoker, previously used NSAIDs, hyperuricemia. GLYNN uses CPAP.  Social history: drinks beer once per week    The patient denies taking NSAIDs, herbal supplements, or new antibiotics, recreational drugs, recent episode of dehydration, nausea or vomiting, acute illness, hospitalization or exposure to IV radiocontrast. Occasional diarrhea.    Significant family hx includes: heart disease, HTN    Last renal US: June 2018, reviewed. Right kidney 10.4 cm, left kidney 13.6 cm. Multiple renal cysts.    Review of Systems   Respiratory: Negative for cough, shortness of breath, wheezing and stridor.    Cardiovascular: Positive for palpitations. Negative for chest pain and leg swelling.   Gastrointestinal: Positive for diarrhea (occassional). Negative for constipation, nausea and  "vomiting.   Genitourinary: Negative for difficulty urinating, dysuria, flank pain, frequency, hematuria and urgency.   Neurological: Negative for dizziness, weakness, light-headedness and headaches.       Objective:       Blood pressure (!) 182/84, pulse 102, height 6' 3" (1.905 m), weight 93.7 kg (206 lb 9.1 oz), SpO2 98 %.  Physical Exam   Constitutional: He is oriented to person, place, and time. He appears well-developed and well-nourished. No distress.   Neck: Neck supple.   Cardiovascular: Regular rhythm and normal heart sounds. Exam reveals no gallop and no friction rub.   No murmur heard.  tachycardic   Pulmonary/Chest: Effort normal and breath sounds normal. No stridor. No respiratory distress. He has no wheezes. He has no rales.   Abdominal: Soft. Bowel sounds are normal. He exhibits no distension. There is no tenderness.   Musculoskeletal: He exhibits no edema.   Neurological: He is alert and oriented to person, place, and time.   Skin: Skin is warm and dry. No lesion noted. He is not diaphoretic. No cyanosis. No pallor. Nails show no clubbing.   Psychiatric: He has a normal mood and affect. His behavior is normal.   Vitals reviewed.        Crt: 2.3 mg/dL  Hgb: 11.3 g/dL  CO2: 22 mmol/L  UPCR: 2.85 g/g  Corrected Ca: 9.12 mg/dL  PTH: 211 (from 189 pg/mL)    Assessment:       1. Hypertension, unspecified type    2. Kidney disease, chronic, stage III (GFR 30-59 ml/min)        Plan:     HTN - Home BPs not controlled on amlodipine 10 mg, lisinopril 40 mg, torsemide 10 mg (sCr elevated on full 20 mg), and hydralazine 50 mg TID.  Says he was previously on HCTZ and spironolactone without much effect.  Home BPs have actually worsened since starting hydralazine. Will stop hydralazine.  Start labetalol 300 mg BID. Educated pt on side effects of labetalol and gave HR and BP monitoring parameters to report (see AVS). Patient also will continue logging home BP and reporting it via [facility].  Will also obtain " aldosterone and renin levels and doppler of renal arteries to check for DEVON, given that pt was first diagnosed c HTN at age 29 yrs.    CKD stage 3B 30-33 mL/min - likely due to HTN and NSAID use. Progressing. Proteinuria also progressing.  Educated pt on importance of BP control to help protect kidneys.    UPCR Proteinuric. On 40 mg lisinopril.   Acid-base Mild acidosis on sodium bicarb 325 mg BID.   Renal osteodystrophy Corrected Ca, phos, vit D WNL. PTH trending upward. On vit D 2000 daily.   Anemia At goal for CKD.     All questions patient had were answered.  Asked if further questions. None. F/u BP c me in 1 mos. Keep f/u in January c Dr. Mcqueen.  F/u PRN sooner if needed.  ER for emergency concerns.    Summary of Plan:  1. Stop hydralazine.  2. Start labetalol 300 mg BID  3. Continue low salt diet  4. Continue to log and report home BPs  5. RTC in 1 mos for continued management of HTN  6. F/u c Dr. Mcqueen in January

## 2019-11-04 ENCOUNTER — PATIENT MESSAGE (OUTPATIENT)
Dept: NEPHROLOGY | Facility: CLINIC | Age: 74
End: 2019-11-04

## 2019-11-11 ENCOUNTER — LAB VISIT (OUTPATIENT)
Dept: LAB | Facility: HOSPITAL | Age: 74
End: 2019-11-11
Payer: MEDICARE

## 2019-11-11 DIAGNOSIS — N18.30 CHRONIC KIDNEY DISEASE, STAGE III (MODERATE): ICD-10-CM

## 2019-11-11 LAB
ALBUMIN SERPL BCP-MCNC: 3.1 G/DL (ref 3.5–5.2)
ANION GAP SERPL CALC-SCNC: 8 MMOL/L (ref 8–16)
BUN SERPL-MCNC: 28 MG/DL (ref 8–23)
CALCIUM SERPL-MCNC: 8.4 MG/DL (ref 8.7–10.5)
CHLORIDE SERPL-SCNC: 114 MMOL/L (ref 95–110)
CO2 SERPL-SCNC: 22 MMOL/L (ref 23–29)
CREAT SERPL-MCNC: 2.3 MG/DL (ref 0.5–1.4)
EST. GFR  (AFRICAN AMERICAN): 31.6 ML/MIN/1.73 M^2
EST. GFR  (NON AFRICAN AMERICAN): 27.3 ML/MIN/1.73 M^2
GLUCOSE SERPL-MCNC: 93 MG/DL (ref 70–110)
PHOSPHATE SERPL-MCNC: 2.7 MG/DL (ref 2.7–4.5)
POTASSIUM SERPL-SCNC: 4.4 MMOL/L (ref 3.5–5.1)
SODIUM SERPL-SCNC: 144 MMOL/L (ref 136–145)

## 2019-11-11 PROCEDURE — 80069 RENAL FUNCTION PANEL: CPT

## 2019-11-11 PROCEDURE — 36415 COLL VENOUS BLD VENIPUNCTURE: CPT

## 2019-11-12 ENCOUNTER — LAB VISIT (OUTPATIENT)
Dept: LAB | Facility: HOSPITAL | Age: 74
End: 2019-11-12
Payer: MEDICARE

## 2019-11-12 ENCOUNTER — OFFICE VISIT (OUTPATIENT)
Dept: NEPHROLOGY | Facility: CLINIC | Age: 74
End: 2019-11-12
Payer: MEDICARE

## 2019-11-12 ENCOUNTER — PATIENT MESSAGE (OUTPATIENT)
Dept: NEPHROLOGY | Facility: CLINIC | Age: 74
End: 2019-11-12

## 2019-11-12 VITALS
WEIGHT: 206.56 LBS | HEART RATE: 102 BPM | OXYGEN SATURATION: 98 % | HEIGHT: 75 IN | DIASTOLIC BLOOD PRESSURE: 84 MMHG | SYSTOLIC BLOOD PRESSURE: 182 MMHG | BODY MASS INDEX: 25.68 KG/M2

## 2019-11-12 DIAGNOSIS — I10 HYPERTENSION, UNSPECIFIED TYPE: ICD-10-CM

## 2019-11-12 DIAGNOSIS — I10 HYPERTENSION, UNSPECIFIED TYPE: Primary | ICD-10-CM

## 2019-11-12 DIAGNOSIS — N18.30 KIDNEY DISEASE, CHRONIC, STAGE III (GFR 30-59 ML/MIN): ICD-10-CM

## 2019-11-12 PROCEDURE — 99999 PR PBB SHADOW E&M-EST. PATIENT-LVL III: CPT | Mod: PBBFAC,,, | Performed by: NURSE PRACTITIONER

## 2019-11-12 PROCEDURE — 99213 OFFICE O/P EST LOW 20 MIN: CPT | Mod: PBBFAC | Performed by: NURSE PRACTITIONER

## 2019-11-12 PROCEDURE — 99214 PR OFFICE/OUTPT VISIT, EST, LEVL IV, 30-39 MIN: ICD-10-PCS | Mod: S$PBB,,, | Performed by: NURSE PRACTITIONER

## 2019-11-12 PROCEDURE — 36415 COLL VENOUS BLD VENIPUNCTURE: CPT

## 2019-11-12 PROCEDURE — 99999 PR PBB SHADOW E&M-EST. PATIENT-LVL III: ICD-10-PCS | Mod: PBBFAC,,, | Performed by: NURSE PRACTITIONER

## 2019-11-12 PROCEDURE — 99214 OFFICE O/P EST MOD 30 MIN: CPT | Mod: S$PBB,,, | Performed by: NURSE PRACTITIONER

## 2019-11-12 PROCEDURE — 82088 ASSAY OF ALDOSTERONE: CPT

## 2019-11-12 RX ORDER — LABETALOL 300 MG/1
300 TABLET, FILM COATED ORAL 2 TIMES DAILY
Qty: 60 TABLET | Refills: 11 | Status: SHIPPED | OUTPATIENT
Start: 2019-11-12 | End: 2020-08-27 | Stop reason: SDUPTHER

## 2019-11-12 NOTE — Clinical Note
Hi Dr. Mcqueen,I saw this pt for HTN today. I discussed the pt with Dr. Parr.  His BPs appear to have worsened c each increased dose of hydralazine. I wanted to send this to you so you could see what changes I made.Vandana

## 2019-11-12 NOTE — PATIENT INSTRUCTIONS
Start labetalol 300 mg twice daily. Notify us if systolic number is less than 110 persistently or less than 100 ever.  Notify us if diastolic number is less than 60 persistently.  Notify us if heart rate is less than 60 with dizziness or less than 50 ever.    Get labs today.  Get kidney ultrasound.  Continue low sodium diet.  Avoid NSAID (ibuprofen, advil, motrin, aleve, naprosyn, naproxen, [place], Goody's Powder). Tylenol is okay.  Avoid bactrim/ IV contrast/ gadolinium/ aminoglycoside where possible.  Avoid herbal supplements.  Stay hydrated.  Return to clinic in 1 month with labs (bloodwork and urine) or sooner if needed.  Go to the ER with any emergency concerns.        Low-Salt Choices  Eating salt (sodium) can make your body retain too much water. Excess water makes your heart work harder. Canned, packaged, and frozen foods are easy to prepare, but they are often high in sodium. Here are some ideas for low-salt foods you can easily prepare yourself.    For breakfast  · Fruit or 100% fruit juice  · Whole-wheat bread or an English muffin. Compare sodium content on labels.  · Low-fat milk or yogurt  · Unsalted eggs  · Shredded wheat  · Corn tortillas  · Unsalted steamed rice  · Regular (not instant) hot cereal, made without salt  Stay away from:  · Sausage, polo, and ham  · Flour tortillas  · Packaged muffins, pancakes, and biscuits  · Instant hot cereals  · Cottage cheese  For lunch and dinner  · Fresh fish, chicken, turkey, or meat--baked, broiled, or roasted without salt  · Dry beans, cooked without salt  · Tofu, stir-fried without salt  · Unsalted fresh fruit and vegetables, or frozen or canned fruit and vegetables with no added salt  Stay away from:  · Lunch or deli meat that is cured or smoked  · Cheese  · Tomato juice and catsup  · Canned vegetables, soups, and fish not labeled as no-salt-added or reduced sodium  · Packaged gravies and sauces  · Olives, pickles, and relish  · Bottled salad  dressings  For snacks and desserts  · Yogurt  · Unsalted, air popped popcorn  · Unsalted nuts or seeds  Stay away from:  · Pies and cakes  · Packaged dessert mixes  · Pizza  · Canned and packaged puddings  · Pretzels, chips, crackers, and nuts--unless the label says unsalted  Date Last Reviewed: 6/17/2015  © 5783-9867 [employer]. [address] [place] [address], [place] [address]. All rights reserved. This information is not intended as a substitute for professional medical care. Always follow your healthcare professional's instructions.        Labetalol tablets  What is this medicine?  LABETALOL (la BET a lole) is a beta-blocker. Beta-blockers reduce the workload on the heart and help it to beat more regularly. This medicine is used to treat high blood pressure.  How should I use this medicine?  Take this medicine by mouth with a glass of water. Follow the directions on the prescription label. Take your doses at regular intervals. Do not take your medicine more often than directed. Do not stop taking this medicine suddenly. This could lead to serious heart-related effects.  Talk to your pediatrician regarding the use of this medicine in children. Special care may be needed.  What side effects may I notice from receiving this medicine?  Side effects that you should report to your doctor or health care professional as soon as possible:  · allergic reactions like skin rash, itching or hives, swelling of the face, lips, or tongue  · breathing problems  · cold hands or feet  · dark urine  · depression  · general ill feeling or flu-like symptoms  · irregular heartbeat  · light-colored stools  · loss of appetite, nausea  · pain or trouble passing urine  · right upper belly pain  · slow heart rate (fewer than recommended by your doctor or health care professional)  · swollen legs or ankles  · tingling of the scalp or skin  · unusually weak or tired  · vomiting  · yellowing of the eyes or skin  Side effects that  usually do not require medical attention (report to your doctor or health care professional if they continue or are bothersome):  · decreased sexual function or desire  · dry itching skin  · headache  · tiredness  What may interact with this medicine?  This medicine also interact with the following medications:  · certain medicines for blood pressure, heart disease, irregular heart beat  · cimetidine  · general anesthetics  · medicines for asthma or lung disease like albuterol  · medicines for depression  · nitroglycerin  What if I miss a dose?  If you miss a dose, take it as soon as you can. If it is almost time for your next dose, take only that dose. Do not take double or extra doses.  Where should I keep my medicine?  Keep out of the reach of children.  Store at room temperature between 15 and 30 degrees C (59 and 86 degrees F). Protect from light. Keep container tightly closed. Throw away any unused medicine after the expiration date.  What should I tell my health care provider before I take this medicine?  They need to know if you have any of these conditions:  · diabetes  · history of heart attack, heart disease or heart failure  · kidney disease  · liver disease  · lung or breathing disease, like asthma or emphysema  · pheochromocytoma  · thyroid disease  · an unusual or allergic reaction to labetalol, other beta-blockers, medicines, foods, dyes, or preservatives  · pregnant or trying to get pregnant  · breast-feeding  What should I watch for while using this medicine?  Visit your doctor or health care professional for regular check ups. Check your blood pressure and pulse rate regularly. Ask your health care professional what your blood pressure and pulse rate should be, and when you should contact him or her.  You may get drowsy or dizzy. Do not drive, use machinery, or do anything that needs mental alertness until you know how this medicine affects you. Do not stand or sit up quickly. Alcohol may  interfere with the effect of this medicine. Avoid alcoholic drinks.  This medicine can affect blood sugar levels. If you have diabetes, check with your doctor or health care professional before you change your diet or the dose of your diabetic medicine.  Do not treat yourself for coughs, colds, or pain while you are taking this medicine without asking your doctor or health care professional for advice. Some ingredients may increase your blood pressure.  NOTE:This sheet is a summary. It may not cover all possible information. If you have questions about this medicine, talk to your doctor, pharmacist, or health care provider. Copyright© 2017 [employer]

## 2019-11-13 ENCOUNTER — HOSPITAL ENCOUNTER (OUTPATIENT)
Dept: RADIOLOGY | Facility: HOSPITAL | Age: 74
Discharge: HOME | End: 2019-11-13
Attending: NURSE PRACTITIONER
Payer: MEDICARE

## 2019-11-13 ENCOUNTER — TELEPHONE (OUTPATIENT)
Dept: NEPHROLOGY | Facility: CLINIC | Age: 74
End: 2019-11-13

## 2019-11-13 DIAGNOSIS — I10 HYPERTENSION, UNSPECIFIED TYPE: ICD-10-CM

## 2019-11-13 PROCEDURE — 93975 US RENAL ARTERY STENOSIS HYPERTEN (XPD): ICD-10-PCS | Mod: 26,,, | Performed by: RADIOLOGY

## 2019-11-13 PROCEDURE — 93975 VASCULAR STUDY: CPT | Mod: TC

## 2019-11-13 PROCEDURE — 93975 VASCULAR STUDY: CPT | Mod: 26,,, | Performed by: RADIOLOGY

## 2019-11-18 ENCOUNTER — PATIENT MESSAGE (OUTPATIENT)
Dept: NEPHROLOGY | Facility: CLINIC | Age: 74
End: 2019-11-18

## 2019-11-18 LAB
ALDOST SERPL-MCNC: 3.1 NG/DL
RENIN PLAS-CCNC: 11 NG/ML/H

## 2019-11-20 ENCOUNTER — TELEPHONE (OUTPATIENT)
Dept: NEPHROLOGY | Facility: CLINIC | Age: 74
End: 2019-11-20

## 2019-12-13 ENCOUNTER — PATIENT MESSAGE (OUTPATIENT)
Dept: NEPHROLOGY | Facility: CLINIC | Age: 74
End: 2019-12-13

## 2019-12-13 NOTE — TELEPHONE ENCOUNTER
BPs look good. He was supposed to follow up with me around now for his BP, but it doesn't look like appt was ever made. His home BPs are good though, so he does not need this BP appt with me.     He does definitely still need to f/u with Dr. Mcqueen in January for CKD/proteinuria. Pls make sure he gets scheduled with her in January.  Thanks

## 2019-12-31 ENCOUNTER — TELEPHONE (OUTPATIENT)
Dept: NEPHROLOGY | Facility: CLINIC | Age: 74
End: 2019-12-31

## 2019-12-31 DIAGNOSIS — N18.30 STAGE 3 CHRONIC KIDNEY DISEASE: Primary | ICD-10-CM

## 2019-12-31 NOTE — TELEPHONE ENCOUNTER
----- Message from Shine Mcqueen MD sent at 12/31/2019 12:40 AM CST -----  Feb would be better if anything still available if not then march.      ----- Message -----  From: Madison Rey MA  Sent: 12/30/2019  11:09 AM CST  To: Shine Mcqueen MD    Good morning, this is just the reminder. You wanted to see which day fits to schd him for jan/feb f/u  ?

## 2020-01-06 ENCOUNTER — LAB VISIT (OUTPATIENT)
Dept: LAB | Facility: HOSPITAL | Age: 75
End: 2020-01-06
Attending: UROLOGY
Payer: MEDICARE

## 2020-01-06 DIAGNOSIS — R97.20 ELEVATED PSA: ICD-10-CM

## 2020-01-06 DIAGNOSIS — N18.30 HYPERTENSIVE KIDNEY DISEASE WITH STAGE 3 CHRONIC KIDNEY DISEASE: ICD-10-CM

## 2020-01-06 DIAGNOSIS — E55.9 VITAMIN D DEFICIENCY: ICD-10-CM

## 2020-01-06 DIAGNOSIS — E87.20 METABOLIC ACIDOSIS: ICD-10-CM

## 2020-01-06 DIAGNOSIS — R80.9 PROTEINURIA, UNSPECIFIED TYPE: ICD-10-CM

## 2020-01-06 DIAGNOSIS — N18.30 KIDNEY DISEASE, CHRONIC, STAGE III (GFR 30-59 ML/MIN): ICD-10-CM

## 2020-01-06 DIAGNOSIS — I12.9 HYPERTENSIVE KIDNEY DISEASE WITH STAGE 3 CHRONIC KIDNEY DISEASE: ICD-10-CM

## 2020-01-06 DIAGNOSIS — E79.0 HYPERURICEMIA: ICD-10-CM

## 2020-01-06 DIAGNOSIS — N25.81 SECONDARY HYPERPARATHYROIDISM: ICD-10-CM

## 2020-01-06 LAB
25(OH)D3+25(OH)D2 SERPL-MCNC: 30 NG/ML (ref 30–96)
ALBUMIN SERPL BCP-MCNC: 3.2 G/DL (ref 3.5–5.2)
ANION GAP SERPL CALC-SCNC: 8 MMOL/L (ref 8–16)
BASOPHILS # BLD AUTO: 0.05 K/UL (ref 0–0.2)
BASOPHILS NFR BLD: 1 % (ref 0–1.9)
BUN SERPL-MCNC: 35 MG/DL (ref 8–23)
CALCIUM SERPL-MCNC: 8.9 MG/DL (ref 8.7–10.5)
CHLORIDE SERPL-SCNC: 111 MMOL/L (ref 95–110)
CO2 SERPL-SCNC: 24 MMOL/L (ref 23–29)
COMPLEXED PSA SERPL-MCNC: 7.9 NG/ML (ref 0–4)
CREAT SERPL-MCNC: 2.7 MG/DL (ref 0.5–1.4)
DIFFERENTIAL METHOD: ABNORMAL
EOSINOPHIL # BLD AUTO: 0.1 K/UL (ref 0–0.5)
EOSINOPHIL NFR BLD: 2.4 % (ref 0–8)
ERYTHROCYTE [DISTWIDTH] IN BLOOD BY AUTOMATED COUNT: 14.4 % (ref 11.5–14.5)
EST. GFR  (AFRICAN AMERICAN): 26 ML/MIN/1.73 M^2
EST. GFR  (NON AFRICAN AMERICAN): 22.5 ML/MIN/1.73 M^2
GLUCOSE SERPL-MCNC: 102 MG/DL (ref 70–110)
HCT VFR BLD AUTO: 35.1 % (ref 40–54)
HGB BLD-MCNC: 10.9 G/DL (ref 14–18)
IMM GRANULOCYTES # BLD AUTO: 0.01 K/UL (ref 0–0.04)
IMM GRANULOCYTES NFR BLD AUTO: 0.2 % (ref 0–0.5)
LYMPHOCYTES # BLD AUTO: 0.7 K/UL (ref 1–4.8)
LYMPHOCYTES NFR BLD: 14.1 % (ref 18–48)
MCH RBC QN AUTO: 29.3 PG (ref 27–31)
MCHC RBC AUTO-ENTMCNC: 31.1 G/DL (ref 32–36)
MCV RBC AUTO: 94 FL (ref 82–98)
MONOCYTES # BLD AUTO: 0.5 K/UL (ref 0.3–1)
MONOCYTES NFR BLD: 10 % (ref 4–15)
NEUTROPHILS # BLD AUTO: 3.5 K/UL (ref 1.8–7.7)
NEUTROPHILS NFR BLD: 72.3 % (ref 38–73)
NRBC BLD-RTO: 0 /100 WBC
PHOSPHATE SERPL-MCNC: 3.4 MG/DL (ref 2.7–4.5)
PLATELET # BLD AUTO: 185 K/UL (ref 150–350)
PMV BLD AUTO: 10.7 FL (ref 9.2–12.9)
POTASSIUM SERPL-SCNC: 4.6 MMOL/L (ref 3.5–5.1)
PTH-INTACT SERPL-MCNC: 221 PG/ML (ref 9–77)
RBC # BLD AUTO: 3.72 M/UL (ref 4.6–6.2)
SODIUM SERPL-SCNC: 143 MMOL/L (ref 136–145)
URATE SERPL-MCNC: 7.2 MG/DL (ref 3.4–7)
WBC # BLD AUTO: 4.9 K/UL (ref 3.9–12.7)

## 2020-01-06 PROCEDURE — 83970 ASSAY OF PARATHORMONE: CPT

## 2020-01-06 PROCEDURE — 84550 ASSAY OF BLOOD/URIC ACID: CPT

## 2020-01-06 PROCEDURE — 85025 COMPLETE CBC W/AUTO DIFF WBC: CPT

## 2020-01-06 PROCEDURE — 82306 VITAMIN D 25 HYDROXY: CPT

## 2020-01-06 PROCEDURE — 84153 ASSAY OF PSA TOTAL: CPT

## 2020-01-06 PROCEDURE — 80069 RENAL FUNCTION PANEL: CPT

## 2020-01-06 PROCEDURE — 36415 COLL VENOUS BLD VENIPUNCTURE: CPT

## 2020-01-09 ENCOUNTER — OFFICE VISIT (OUTPATIENT)
Dept: UROLOGY | Facility: CLINIC | Age: 75
End: 2020-01-09
Payer: MEDICARE

## 2020-01-09 VITALS
DIASTOLIC BLOOD PRESSURE: 97 MMHG | SYSTOLIC BLOOD PRESSURE: 178 MMHG | BODY MASS INDEX: 26.24 KG/M2 | WEIGHT: 211 LBS | HEART RATE: 78 BPM | HEIGHT: 75 IN

## 2020-01-09 DIAGNOSIS — R97.20 ELEVATED PSA: Primary | ICD-10-CM

## 2020-01-09 PROCEDURE — 1126F AMNT PAIN NOTED NONE PRSNT: CPT | Mod: ,,, | Performed by: UROLOGY

## 2020-01-09 PROCEDURE — 1159F MED LIST DOCD IN RCRD: CPT | Mod: ,,, | Performed by: UROLOGY

## 2020-01-09 PROCEDURE — 1159F PR MEDICATION LIST DOCUMENTED IN MEDICAL RECORD: ICD-10-PCS | Mod: ,,, | Performed by: UROLOGY

## 2020-01-09 PROCEDURE — 99214 PR OFFICE/OUTPT VISIT, EST, LEVL IV, 30-39 MIN: ICD-10-PCS | Mod: S$PBB,,, | Performed by: UROLOGY

## 2020-01-09 PROCEDURE — 99999 PR PBB SHADOW E&M-EST. PATIENT-LVL III: ICD-10-PCS | Mod: PBBFAC,,, | Performed by: UROLOGY

## 2020-01-09 PROCEDURE — 99214 OFFICE O/P EST MOD 30 MIN: CPT | Mod: S$PBB,,, | Performed by: UROLOGY

## 2020-01-09 PROCEDURE — 1126F PR PAIN SEVERITY QUANTIFIED, NO PAIN PRESENT: ICD-10-PCS | Mod: ,,, | Performed by: UROLOGY

## 2020-01-09 PROCEDURE — 99213 OFFICE O/P EST LOW 20 MIN: CPT | Mod: PBBFAC | Performed by: UROLOGY

## 2020-01-09 PROCEDURE — 99999 PR PBB SHADOW E&M-EST. PATIENT-LVL III: CPT | Mod: PBBFAC,,, | Performed by: UROLOGY

## 2020-01-09 NOTE — LETTER
January 9, 2020      Karl Masterson MD  [address]  [facility] [address]           [facility] 4th Floor  [address]  [facility] [address]  Phone: [phone]          Patient: Teodoro Mast   MR Number: [id number]   YOB: 1945   Date of Visit: 1/9/2020       Dear Dr. Karl Masterson:    Thank you for referring Teodoro Mast to me for evaluation. Attached you will find relevant portions of my assessment and plan of care.    If you have questions, please do not hesitate to call me. I look forward to following Teodoro Mast along with you.    Sincerely,    Akbar Salcido Jr., MD    Enclosure  CC:  No Recipients    If you would like to receive this communication electronically, please contact [email or web address] or [phone] to request more information on [employer] access.    For providers and/or their staff who would like to refer a patient to [facility], please contact us through our one-stop-shop provider referral line, [facility], at [phone].    If you feel you have received this communication in error or would no longer like to receive these types of communications, please e-mail [email or web address]

## 2020-01-09 NOTE — PROGRESS NOTES
Subjective:       Patient ID: Teodoro Mast is a 74 y.o. male.    Chief Complaint: Elevated PSA    HPI   Teodoro Mast is a 74 y.o. male with a PMHx of HLD, HTN, and BPH with urinary obstruction who presents to the clinic for prostate management. His PSA 3 days ago was 7.9. 6 months ago it was 6.4. Patient denies any new symptoms today. Urinating well. Denies weak stream, difficulty initiating stream. He feels that he empties his bladder well. Last prostate biopsy in 2018 showed no evidence of prostate cancer.      Past Medical History:   Diagnosis Date    Hyperlipidemia     Hypertension        Past Surgical History:   Procedure Laterality Date    FINGER SURGERY      hemorriodectomy         Family History   Problem Relation Age of Onset    Heart disease Mother     Hypertension Mother        Social History     Socioeconomic History    Marital status:      Spouse name: Not on file    Number of children: Not on file    Years of education: Not on file    Highest education level: Not on file   Occupational History    Not on file   Social Needs    Financial resource strain: Not on file    Food insecurity:     Worry: Not on file     Inability: Not on file    Transportation needs:     Medical: Not on file     Non-medical: Not on file   Tobacco Use    Smoking status: Former Smoker    Smokeless tobacco: Never Used   Substance and Sexual Activity    Alcohol use: Yes    Drug use: No    Sexual activity: Not on file   Lifestyle    Physical activity:     Days per week: Not on file     Minutes per session: Not on file    Stress: Not on file   Relationships    Social connections:     Talks on phone: Not on file     Gets together: Not on file     Attends [employer] service: Not on file     Active member of club or organization: Not on file     Attends meetings of clubs or organizations: Not on file     Relationship status: Not on file   Other Topics Concern    Not on file   Social History  Narrative    Not on file       Allergies:  Patient has no known allergies.    Medications:    Current Outpatient Medications:     allopurinol (ZYLOPRIM) 100 MG tablet, TAKE 1 TABLET(100 MG) BY MOUTH EVERY DAY, Disp: 90 tablet, Rfl: 6    amLODIPine (NORVASC) 10 MG tablet, Take 1 tablet (10 mg total) by mouth once daily., Disp: 90 tablet, Rfl: 3    atorvastatin (LIPITOR) 80 MG tablet, Take 1 tablet (80 mg total) by mouth once daily., Disp: 90 tablet, Rfl: 3    labetalol (NORMODYNE) 300 MG tablet, Take 1 tablet (300 mg total) by mouth 2 (two) times daily., Disp: 60 tablet, Rfl: 11    lisinopril (PRINIVIL,ZESTRIL) 40 MG tablet, TAKE 1 TABLET(40 MG) BY MOUTH EVERY DAY, Disp: 90 tablet, Rfl: 3    sodium bicarbonate 325 MG tablet, Take 1 tablet (325 mg total) by mouth 2 (two) times daily., Disp: 180 tablet, Rfl: 11    sodium bicarbonate 325 MG tablet, Take 1 tablet (325 mg total) by mouth 2 (two) times daily., Disp: 180 tablet, Rfl: 4    tamsulosin (FLOMAX) 0.4 mg Cap, TAKE 1 CAPSULE(0.4 MG) BY MOUTH EVERY DAY, Disp: 90 capsule, Rfl: 3    torsemide (DEMADEX) 20 MG Tab, Take 1 tablet (20 mg total) by mouth once daily., Disp: 90 tablet, Rfl: 3    VIT C/VIT E/LUTEIN/MIN/OMEGA-3 (OCUVITE ORAL), Take by mouth once daily. , Disp: , Rfl:     vitamin D 1000 units Tab, Take 2,000 mg by mouth once daily., Disp: , Rfl:     Review of Systems   Constitutional: Negative for activity change, appetite change, chills, diaphoresis, fatigue, fever and unexpected weight change.   HENT: Negative for congestion, dental problem, hearing loss, mouth sores, postnasal drip, rhinorrhea, sinus pressure and trouble swallowing.    Eyes: Negative for pain, discharge and itching.   Respiratory: Negative for apnea, cough, choking, chest tightness, shortness of breath and wheezing.    Cardiovascular: Negative for chest pain, palpitations and leg swelling.   Gastrointestinal: Negative for abdominal distention, abdominal pain, anal bleeding,  blood in stool, constipation, diarrhea, nausea, rectal pain and vomiting.   Endocrine: Negative for polydipsia and polyuria.   Genitourinary: Negative for decreased urine volume, difficulty urinating, discharge, dysuria, enuresis, flank pain, frequency, genital sores, hematuria, penile pain, penile swelling, scrotal swelling and urgency.   Musculoskeletal: Negative for arthralgias and back pain.   Skin: Negative for color change, rash and wound.   Neurological: Negative for dizziness, syncope, speech difficulty, light-headedness and headaches.   Hematological: Negative for adenopathy. Does not bruise/bleed easily.   Psychiatric/Behavioral: Negative for behavioral problems and confusion.       Objective:      Physical Exam   Constitutional: He appears well-developed.   HENT:   Head: Normocephalic.   Neck: Neck supple.   Cardiovascular: Normal rate.    Pulmonary/Chest: Effort normal.   Abdominal: Soft.   Genitourinary: Rectum normal, prostate normal and penis normal.   Genitourinary Comments: No meatal stenosis. Meatus in normal position. Prostate was smooth without nodularity. No rectal masses.  40 grams. Normal perineum.     Neurological: He is alert.   Skin: Skin is warm.     Psychiatric: He has a normal mood and affect.       LABS  PSA 01/06/2020   Ref Range & Units 3d ago   PSA DIAGNOSTIC 0.00 - 4.00 ng/mL 7.9High       PSA 06/10/2019   Ref Range & Units 7mo ago   PSA DIAGNOSTIC 0.00 - 4.00 ng/mL 6.4High         Assessment:       1. Elevated PSA        Plan:       Teodoro was seen today for elevated psa.    Diagnoses and all orders for this visit:    Elevated PSA  -     MRI Prostate W W/O Contrast; Future          - MRI prostate to evaluate for suspicious lesions  - Will discuss with patient about plans for any additional prostate biopsies after imaging results    Tami SKINNER, am acting as a scribe on this patient encounter in the presence and under the supervision of Dr. Salcido.    01/09/2020 9:37 AM  SUSANNE  Dr. Salcido, personally performed the services described in this documentation.   All medical record entries made by the scribe were at my direction and in my presence.   I have reviewed the chart and agree that the record is accurate and complete.   Akbar Salcido MD.  9:37 AM 01/09/2020

## 2020-01-11 DIAGNOSIS — I10 ESSENTIAL HYPERTENSION: ICD-10-CM

## 2020-01-13 RX ORDER — TORSEMIDE 20 MG/1
TABLET ORAL
Qty: 90 TABLET | Refills: 3 | Status: SHIPPED | OUTPATIENT
Start: 2020-01-13 | End: 2020-09-11 | Stop reason: SDUPTHER

## 2020-01-13 RX ORDER — AMLODIPINE BESYLATE 10 MG/1
TABLET ORAL
Qty: 90 TABLET | Refills: 3 | Status: SHIPPED | OUTPATIENT
Start: 2020-01-13 | End: 2020-09-11 | Stop reason: SDUPTHER

## 2020-01-22 ENCOUNTER — HOSPITAL ENCOUNTER (OUTPATIENT)
Dept: RADIOLOGY | Facility: HOSPITAL | Age: 75
Discharge: HOME | End: 2020-01-22
Attending: STUDENT IN AN ORGANIZED HEALTH CARE EDUCATION/TRAINING PROGRAM
Payer: MEDICARE

## 2020-01-22 DIAGNOSIS — R97.20 ELEVATED PSA: ICD-10-CM

## 2020-01-22 PROCEDURE — 72195 MRI PELVIS W/O DYE: CPT | Mod: 26,,, | Performed by: RADIOLOGY

## 2020-01-22 PROCEDURE — 72195 MRI PELVIS WITHOUT CONTRAST: ICD-10-PCS | Mod: 26,,, | Performed by: RADIOLOGY

## 2020-01-22 PROCEDURE — 72195 MRI PELVIS W/O DYE: CPT | Mod: TC

## 2020-01-24 LAB
CREAT SERPL-MCNC: 2.8 MG/DL (ref 0.5–1.4)
SAMPLE: ABNORMAL

## 2020-02-13 ENCOUNTER — LAB VISIT (OUTPATIENT)
Dept: LAB | Facility: HOSPITAL | Age: 75
End: 2020-02-13
Attending: INTERNAL MEDICINE
Payer: MEDICARE

## 2020-02-13 DIAGNOSIS — E78.2 MIXED HYPERLIPIDEMIA: ICD-10-CM

## 2020-02-13 DIAGNOSIS — N18.30 KIDNEY DISEASE, CHRONIC, STAGE III (GFR 30-59 ML/MIN): ICD-10-CM

## 2020-02-13 DIAGNOSIS — I10 ESSENTIAL HYPERTENSION: ICD-10-CM

## 2020-02-13 LAB
ALBUMIN SERPL BCP-MCNC: 3.1 G/DL (ref 3.5–5.2)
ALP SERPL-CCNC: 91 U/L (ref 55–135)
ALT SERPL W/O P-5'-P-CCNC: 19 U/L (ref 10–44)
ANION GAP SERPL CALC-SCNC: 10 MMOL/L (ref 8–16)
AST SERPL-CCNC: 23 U/L (ref 10–40)
BASOPHILS # BLD AUTO: 0.06 K/UL (ref 0–0.2)
BASOPHILS NFR BLD: 1.1 % (ref 0–1.9)
BILIRUB SERPL-MCNC: 0.5 MG/DL (ref 0.1–1)
BUN SERPL-MCNC: 27 MG/DL (ref 8–23)
CALCIUM SERPL-MCNC: 8.2 MG/DL (ref 8.7–10.5)
CHLORIDE SERPL-SCNC: 111 MMOL/L (ref 95–110)
CHOLEST SERPL-MCNC: 139 MG/DL (ref 120–199)
CHOLEST/HDLC SERPL: 4.2 {RATIO} (ref 2–5)
CO2 SERPL-SCNC: 21 MMOL/L (ref 23–29)
CREAT SERPL-MCNC: 2.7 MG/DL (ref 0.5–1.4)
DIFFERENTIAL METHOD: ABNORMAL
EOSINOPHIL # BLD AUTO: 0.1 K/UL (ref 0–0.5)
EOSINOPHIL NFR BLD: 2.7 % (ref 0–8)
ERYTHROCYTE [DISTWIDTH] IN BLOOD BY AUTOMATED COUNT: 14.5 % (ref 11.5–14.5)
EST. GFR  (AFRICAN AMERICAN): 26 ML/MIN/1.73 M^2
EST. GFR  (NON AFRICAN AMERICAN): 22.5 ML/MIN/1.73 M^2
GLUCOSE SERPL-MCNC: 97 MG/DL (ref 70–110)
HCT VFR BLD AUTO: 35.6 % (ref 40–54)
HDLC SERPL-MCNC: 33 MG/DL (ref 40–75)
HDLC SERPL: 23.7 % (ref 20–50)
HGB BLD-MCNC: 10.8 G/DL (ref 14–18)
IMM GRANULOCYTES # BLD AUTO: 0.02 K/UL (ref 0–0.04)
IMM GRANULOCYTES NFR BLD AUTO: 0.4 % (ref 0–0.5)
LDLC SERPL CALC-MCNC: 82 MG/DL (ref 63–159)
LYMPHOCYTES # BLD AUTO: 0.8 K/UL (ref 1–4.8)
LYMPHOCYTES NFR BLD: 14.5 % (ref 18–48)
MCH RBC QN AUTO: 29 PG (ref 27–31)
MCHC RBC AUTO-ENTMCNC: 30.3 G/DL (ref 32–36)
MCV RBC AUTO: 95 FL (ref 82–98)
MONOCYTES # BLD AUTO: 0.5 K/UL (ref 0.3–1)
MONOCYTES NFR BLD: 9.9 % (ref 4–15)
NEUTROPHILS # BLD AUTO: 3.8 K/UL (ref 1.8–7.7)
NEUTROPHILS NFR BLD: 71.4 % (ref 38–73)
NONHDLC SERPL-MCNC: 106 MG/DL
NRBC BLD-RTO: 0 /100 WBC
PLATELET # BLD AUTO: 169 K/UL (ref 150–350)
PMV BLD AUTO: 11.2 FL (ref 9.2–12.9)
POTASSIUM SERPL-SCNC: 4.3 MMOL/L (ref 3.5–5.1)
PROT SERPL-MCNC: 6.5 G/DL (ref 6–8.4)
RBC # BLD AUTO: 3.73 M/UL (ref 4.6–6.2)
SODIUM SERPL-SCNC: 142 MMOL/L (ref 136–145)
TRIGL SERPL-MCNC: 120 MG/DL (ref 30–150)
WBC # BLD AUTO: 5.25 K/UL (ref 3.9–12.7)

## 2020-02-13 PROCEDURE — 80053 COMPREHEN METABOLIC PANEL: CPT

## 2020-02-13 PROCEDURE — 80061 LIPID PANEL: CPT

## 2020-02-13 PROCEDURE — 36415 COLL VENOUS BLD VENIPUNCTURE: CPT

## 2020-02-13 PROCEDURE — 85025 COMPLETE CBC W/AUTO DIFF WBC: CPT

## 2020-02-19 ENCOUNTER — OFFICE VISIT (OUTPATIENT)
Dept: CARDIOLOGY | Facility: CLINIC | Age: 75
End: 2020-02-19
Payer: MEDICARE

## 2020-02-19 VITALS
WEIGHT: 206.56 LBS | BODY MASS INDEX: 25.68 KG/M2 | DIASTOLIC BLOOD PRESSURE: 81 MMHG | SYSTOLIC BLOOD PRESSURE: 155 MMHG | HEIGHT: 75 IN | HEART RATE: 66 BPM

## 2020-02-19 DIAGNOSIS — I10 ESSENTIAL HYPERTENSION: ICD-10-CM

## 2020-02-19 DIAGNOSIS — G47.33 OSA (OBSTRUCTIVE SLEEP APNEA): ICD-10-CM

## 2020-02-19 DIAGNOSIS — I12.9 HYPERTENSIVE KIDNEY DISEASE WITH STAGE 3 CHRONIC KIDNEY DISEASE: ICD-10-CM

## 2020-02-19 DIAGNOSIS — N18.30 HYPERTENSIVE KIDNEY DISEASE WITH STAGE 3 CHRONIC KIDNEY DISEASE: ICD-10-CM

## 2020-02-19 DIAGNOSIS — E78.2 MIXED HYPERLIPIDEMIA: Primary | ICD-10-CM

## 2020-02-19 DIAGNOSIS — N40.1 BPH WITH URINARY OBSTRUCTION: ICD-10-CM

## 2020-02-19 DIAGNOSIS — N18.4 CKD (CHRONIC KIDNEY DISEASE) STAGE 4, GFR 15-29 ML/MIN: ICD-10-CM

## 2020-02-19 DIAGNOSIS — N13.8 BPH WITH URINARY OBSTRUCTION: ICD-10-CM

## 2020-02-19 PROCEDURE — 99214 PR OFFICE/OUTPT VISIT, EST, LEVL IV, 30-39 MIN: ICD-10-PCS | Mod: S$PBB,,, | Performed by: INTERNAL MEDICINE

## 2020-02-19 PROCEDURE — 99214 OFFICE O/P EST MOD 30 MIN: CPT | Mod: S$PBB,,, | Performed by: INTERNAL MEDICINE

## 2020-02-19 PROCEDURE — 99213 OFFICE O/P EST LOW 20 MIN: CPT | Mod: PBBFAC | Performed by: INTERNAL MEDICINE

## 2020-02-19 PROCEDURE — 99999 PR PBB SHADOW E&M-EST. PATIENT-LVL III: ICD-10-PCS | Mod: PBBFAC,,, | Performed by: INTERNAL MEDICINE

## 2020-02-19 PROCEDURE — 99999 PR PBB SHADOW E&M-EST. PATIENT-LVL III: CPT | Mod: PBBFAC,,, | Performed by: INTERNAL MEDICINE

## 2020-02-19 NOTE — PROGRESS NOTES
Subjective:    Patient ID:  Teodoro Mast is a 74 y.o. male who presents for follow-up of Mixed hyperlipidemia (6 months fu)      HPI     74 years old male followed with hypertension, hyperlipidemia and chronic renal insuffiencey followed by Dr Mcqueen [Nephrology] and Juan[ Urology] for elevated PSA. He continues to do well and has no chest pain or FRAGA. his average home BP is about 130/80  Lab Results   Component Value Date     02/13/2020    K 4.3 02/13/2020     (H) 02/13/2020    CO2 21 (L) 02/13/2020    BUN 27 (H) 02/13/2020    CREATININE 2.7 (H) 02/13/2020    GLU 97 02/13/2020    HGBA1C 5.9 07/28/2006    AST 23 02/13/2020    ALT 19 02/13/2020    ALBUMIN 3.1 (L) 02/13/2020    PROT 6.5 02/13/2020    BILITOT 0.5 02/13/2020    WBC 5.25 02/13/2020    HGB 10.8 (L) 02/13/2020    HCT 35.6 (L) 02/13/2020    MCV 95 02/13/2020     02/13/2020    PSA 3.9 12/17/2013         Lab Results   Component Value Date    CHOL 139 02/13/2020    HDL 33 (L) 02/13/2020    TRIG 120 02/13/2020       Lab Results   Component Value Date    LDLCALC 82.0 02/13/2020       Past Medical History:   Diagnosis Date    Hyperlipidemia     Hypertension        Current Outpatient Medications:     allopurinol (ZYLOPRIM) 100 MG tablet, TAKE 1 TABLET(100 MG) BY MOUTH EVERY DAY, Disp: 90 tablet, Rfl: 6    amLODIPine (NORVASC) 10 MG tablet, TAKE 1 TABLET BY MOUTH ONCE DAILY, Disp: 90 tablet, Rfl: 3    atorvastatin (LIPITOR) 80 MG tablet, Take 1 tablet (80 mg total) by mouth once daily., Disp: 90 tablet, Rfl: 3    labetalol (NORMODYNE) 300 MG tablet, Take 1 tablet (300 mg total) by mouth 2 (two) times daily., Disp: 60 tablet, Rfl: 11    lisinopril (PRINIVIL,ZESTRIL) 40 MG tablet, TAKE 1 TABLET(40 MG) BY MOUTH EVERY DAY, Disp: 90 tablet, Rfl: 3    sodium bicarbonate 325 MG tablet, Take 1 tablet (325 mg total) by mouth 2 (two) times daily., Disp: 180 tablet, Rfl: 11    sodium bicarbonate 325 MG tablet, Take 1 tablet (325 mg total) by  mouth 2 (two) times daily., Disp: 180 tablet, Rfl: 4    tamsulosin (FLOMAX) 0.4 mg Cap, TAKE 1 CAPSULE(0.4 MG) BY MOUTH EVERY DAY, Disp: 90 capsule, Rfl: 3    torsemide (DEMADEX) 20 MG Tab, TAKE 1 TABLET(20 MG) BY MOUTH EVERY DAY, Disp: 90 tablet, Rfl: 3    VIT C/VIT E/LUTEIN/MIN/OMEGA-3 (OCUVITE ORAL), Take by mouth once daily. , Disp: , Rfl:     vitamin D 1000 units Tab, Take 2,000 mg by mouth once daily., Disp: , Rfl:           Review of Systems   Constitution: Negative for decreased appetite, diaphoresis, fever, malaise/fatigue, weight gain and weight loss.   HENT: Negative for congestion, ear discharge, ear pain and nosebleeds.    Eyes: Negative for blurred vision, double vision and visual disturbance.   Cardiovascular: Negative for chest pain, claudication, cyanosis, dyspnea on exertion, irregular heartbeat, leg swelling, near-syncope, orthopnea, palpitations, paroxysmal nocturnal dyspnea and syncope.   Respiratory: Negative for cough, hemoptysis, shortness of breath, sleep disturbances due to breathing, snoring, sputum production and wheezing.    Endocrine: Negative for polydipsia, polyphagia and polyuria.   Hematologic/Lymphatic: Negative for adenopathy and bleeding problem. Does not bruise/bleed easily.   Skin: Negative for color change, nail changes, poor wound healing and rash.   Musculoskeletal: Negative for muscle cramps and muscle weakness.   Gastrointestinal: Negative for abdominal pain, anorexia, change in bowel habit, hematochezia, nausea and vomiting.   Genitourinary: Positive for nocturia. Negative for dysuria, frequency and hematuria.   Neurological: Negative for brief paralysis, difficulty with concentration, excessive daytime sleepiness, dizziness, focal weakness, headaches, light-headedness, seizures, vertigo and weakness.   Psychiatric/Behavioral: Negative for altered mental status and depression.   Allergic/Immunologic: Negative for persistent infections.        Objective:BP (!) 155/81  "(BP Location: Left arm, Patient Position: Sitting, BP Method: Large (Automatic))   Pulse 66   Ht 6' 3" (1.905 m)   Wt 93.7 kg (206 lb 9.1 oz)   BMI 25.82 kg/m²             Physical Exam   Constitutional: He is oriented to person, place, and time. He appears well-developed and well-nourished.   HENT:   Head: Normocephalic.   Right Ear: External ear normal.   Left Ear: External ear normal.   Nose: Nose normal.   Inspection of lips, teeth and gums normal   Eyes: Pupils are equal, round, and reactive to light. EOM are normal. No scleral icterus.   Neck: Normal range of motion. Neck supple. No JVD present. No tracheal deviation present. No thyromegaly present.   Cardiovascular: Normal rate, regular rhythm, S1 normal, S2 normal and intact distal pulses. Exam reveals no gallop and no friction rub.   No murmur heard.  Pulses:       Carotid pulses are 2+ on the right side, and 2+ on the left side.       Dorsalis pedis pulses are 2+ on the right side, and 2+ on the left side.        Posterior tibial pulses are 2+ on the right side, and 2+ on the left side.   Pulmonary/Chest: Effort normal and breath sounds normal.   Abdominal: Bowel sounds are normal. He exhibits no distension. There is no hepatosplenomegaly. There is no tenderness. There is no guarding.   Musculoskeletal: Normal range of motion. He exhibits no edema or tenderness.   Lymphadenopathy:   Palpation of neck and groin lymph nodes normal   Neurological: He is alert and oriented to person, place, and time. No cranial nerve deficit. He exhibits normal muscle tone. Coordination normal.   Skin: Skin is dry.   Palpation of skin normal   Psychiatric: His behavior is normal. Judgment and thought content normal.         Assessment:       1. Mixed hyperlipidemia    2. Essential hypertension    3. BPH with urinary obstruction    4. Hypertensive kidney disease with stage 3 chronic kidney disease    5. CKD (chronic kidney disease) stage 4, GFR 15-29 ml/min    6. GLYNN " (obstructive sleep apnea)         Plan:       Teodoro was seen today for mixed hyperlipidemia.    Diagnoses and all orders for this visit:    Mixed hyperlipidemia    Essential hypertension  -     Basic metabolic panel; Future; Expected date: 08/20/2020    BPH with urinary obstruction    Hypertensive kidney disease with stage 3 chronic kidney disease  -     Basic metabolic panel; Future; Expected date: 08/20/2020    CKD (chronic kidney disease) stage 4, GFR 15-29 ml/min    GLYNN (obstructive sleep apnea)

## 2020-02-27 ENCOUNTER — TELEPHONE (OUTPATIENT)
Dept: SLEEP MEDICINE | Facility: CLINIC | Age: 75
End: 2020-02-27

## 2020-03-05 ENCOUNTER — PATIENT MESSAGE (OUTPATIENT)
Dept: NEPHROLOGY | Facility: CLINIC | Age: 75
End: 2020-03-05

## 2020-03-12 ENCOUNTER — HOSPITAL ENCOUNTER (OUTPATIENT)
Dept: RADIOLOGY | Facility: HOSPITAL | Age: 75
Discharge: HOME | End: 2020-03-12
Attending: INTERNAL MEDICINE
Payer: MEDICARE

## 2020-03-12 DIAGNOSIS — N28.1 KIDNEY CYSTS: ICD-10-CM

## 2020-03-12 PROCEDURE — 76770 US EXAM ABDO BACK WALL COMP: CPT | Mod: 26,,, | Performed by: RADIOLOGY

## 2020-03-12 PROCEDURE — 76770 US EXAM ABDO BACK WALL COMP: CPT | Mod: TC

## 2020-03-12 PROCEDURE — 76770 US RETROPERITONEAL COMPLETE: ICD-10-PCS | Mod: 26,,, | Performed by: RADIOLOGY

## 2020-03-13 ENCOUNTER — TELEPHONE (OUTPATIENT)
Dept: NEPHROLOGY | Facility: CLINIC | Age: 75
End: 2020-03-13

## 2020-03-13 NOTE — TELEPHONE ENCOUNTER
PER DR HALL      Kidney cysts not changed. No blockage or mass seen. Sonogram shows changes related to chronic kidney disease in the form of scars. Thanks.

## 2020-03-24 ENCOUNTER — LAB VISIT (OUTPATIENT)
Dept: LAB | Facility: HOSPITAL | Age: 75
End: 2020-03-24
Attending: INTERNAL MEDICINE
Payer: MEDICARE

## 2020-03-24 ENCOUNTER — TELEPHONE (OUTPATIENT)
Dept: NEPHROLOGY | Facility: CLINIC | Age: 75
End: 2020-03-24

## 2020-03-24 DIAGNOSIS — N18.30 STAGE 3 CHRONIC KIDNEY DISEASE: ICD-10-CM

## 2020-03-24 LAB
ALBUMIN SERPL BCP-MCNC: 3 G/DL (ref 3.5–5.2)
ANION GAP SERPL CALC-SCNC: 8 MMOL/L (ref 8–16)
BUN SERPL-MCNC: 30 MG/DL (ref 8–23)
CALCIUM SERPL-MCNC: 8.2 MG/DL (ref 8.7–10.5)
CHLORIDE SERPL-SCNC: 114 MMOL/L (ref 95–110)
CO2 SERPL-SCNC: 20 MMOL/L (ref 23–29)
CREAT SERPL-MCNC: 2.5 MG/DL (ref 0.5–1.4)
EST. GFR  (AFRICAN AMERICAN): 28.6 ML/MIN/1.73 M^2
EST. GFR  (NON AFRICAN AMERICAN): 24.7 ML/MIN/1.73 M^2
GLUCOSE SERPL-MCNC: 105 MG/DL (ref 70–110)
PHOSPHATE SERPL-MCNC: 2.9 MG/DL (ref 2.7–4.5)
POTASSIUM SERPL-SCNC: 4.3 MMOL/L (ref 3.5–5.1)
PTH-INTACT SERPL-MCNC: 224 PG/ML (ref 9–77)
SODIUM SERPL-SCNC: 142 MMOL/L (ref 136–145)

## 2020-03-24 PROCEDURE — 36415 COLL VENOUS BLD VENIPUNCTURE: CPT

## 2020-03-24 PROCEDURE — 83970 ASSAY OF PARATHORMONE: CPT

## 2020-03-24 PROCEDURE — 80069 RENAL FUNCTION PANEL: CPT

## 2020-04-09 DIAGNOSIS — I10 ESSENTIAL HYPERTENSION: ICD-10-CM

## 2020-04-09 RX ORDER — ALLOPURINOL 100 MG/1
TABLET ORAL
Qty: 90 TABLET | Refills: 6 | Status: SHIPPED | OUTPATIENT
Start: 2020-04-09 | End: 2021-04-26

## 2020-04-09 RX ORDER — LISINOPRIL 40 MG/1
TABLET ORAL
Qty: 90 TABLET | Refills: 3 | Status: SHIPPED | OUTPATIENT
Start: 2020-04-09 | End: 2020-09-11 | Stop reason: SDUPTHER

## 2020-04-14 ENCOUNTER — TELEPHONE (OUTPATIENT)
Dept: SLEEP MEDICINE | Facility: CLINIC | Age: 75
End: 2020-04-14

## 2020-04-14 NOTE — TELEPHONE ENCOUNTER
----- Message from Francoise Moreno sent at 4/14/2020  1:11 PM CDT -----  Contact: NEVILLE WALL  Type:  Patient Returning Call    Who Called: NEVILLE WALL    Who Left Message for Patient: Abbe Cunningham    Does the patient know what this is regarding?: Yes    Best Call Back Number: [phone]    Additional Information:

## 2020-04-22 ENCOUNTER — OFFICE VISIT (OUTPATIENT)
Dept: SLEEP MEDICINE | Facility: CLINIC | Age: 75
End: 2020-04-22
Payer: MEDICARE

## 2020-04-22 DIAGNOSIS — G47.33 OBSTRUCTIVE SLEEP APNEA: Primary | ICD-10-CM

## 2020-04-22 PROCEDURE — 99999 PR PBB SHADOW E&M-EST. PATIENT-LVL III: ICD-10-PCS | Mod: PBBFAC,,, | Performed by: NURSE PRACTITIONER

## 2020-04-22 PROCEDURE — 99999 PR PBB SHADOW E&M-EST. PATIENT-LVL III: CPT | Mod: PBBFAC,,, | Performed by: NURSE PRACTITIONER

## 2020-04-22 PROCEDURE — 99213 OFFICE O/P EST LOW 20 MIN: CPT | Mod: PBBFAC | Performed by: NURSE PRACTITIONER

## 2020-04-22 PROCEDURE — 99212 PR OFFICE/OUTPT VISIT, EST, LEVL II, 10-19 MIN: ICD-10-PCS | Mod: S$PBB,,, | Performed by: NURSE PRACTITIONER

## 2020-04-22 PROCEDURE — 99212 OFFICE O/P EST SF 10 MIN: CPT | Mod: S$PBB,,, | Performed by: NURSE PRACTITIONER

## 2020-04-22 NOTE — PROGRESS NOTES
"The patient location is: home in [place]   The chief complaint leading to consultation is: annual f/u CPAP   Visit type: audio only  Total time spent with patient: 8 minutes   Each patient to whom he or she provides medical services by telemedicine is:  (1) informed of the relationship between the physician and patient and the respective role of any other health care provider with respect to management of the patient; and (2) notified that he or she may decline to receive medical services by telemedicine and may withdraw from such care at any time.    Teodoro Mast 74 y.o. year-old male returns for management of obstructive sleep apnea and CPAP equipment check.     Continues to use CPAP with improvement in sleep complaints. Amount of use less lately due to fragmented sleep related to BPH - going to bathroom q2h. Plans on discussing this with urologist at upcoming appt. Otherwise, denies issues with mask fit or pressure intolerance/air hunger.     Have requested compliance report from [facility], but have not received it. Per pt DME has not come by his home to collect PAP data per usual.     Reports recently developing habit of getting in bed around 8 pm to watch TV until he falls asleep around 11 pm. "bored because of this pandemic". Some nights falls asleep without PAP on, puts it on in the middle of the night.     On today's [place] Sleepiness Scale the patient scores a 4.     Baseline Sleep Study: 05/19/2017 Split Night Study 210 lb. The overall AHI was 49.3 with an oxygen jostin of 83.0%. Effective control of sleep disordered breathing was seen during supine REM stage sleep at a pressure of 11 cm of water. Higher pressures were associated with central apneas. Used medium Simplus FFM.     Review of Systems: Sleep related symptoms as per HPI. Otherwise, a balance of 10 systems reviewed is negative.    ASSESSMENT:     Obstructive sleep apnea, severe by AHI.  The patient symptomatically has snoring, " interrupted sleep, and excessive daytime sleepiness which improves with CPAP use. The patient is subjectively adherent on CPAP and experiencing symptomatic benefit. Medical co-morbidities: HTN, HLD.  This warrants continued treatment for obstructive sleep apnea.       Allergic rhinitis, stable, currently uses OTC antihistamine prn basis with relief     PLAN:     Treatment:   -continue CPAP 11 cm. Ensure minimum CPAP use of 6 hours, ultimate goal duration of sleep  -Discussed that decreased PAP use could also be contributing to nocturia  -Recommended bed for sleep and intimacy only, reserve TV watching elsewhere. Advise against getting in bed before sleepy. If not asleep within 20-30 minutes recommended to leave bed/bedroom for relaxing activity until sleepy - do not perform stimulating activities.   -RTC 3 months, sooner if needed. If compliance/efficacy improved, then resume annual visits.      Education: During our discussion today, we talked about the etiology of obstructive sleep apnea as well as the potential ramifications of untreated sleep apnea, which could include daytime sleepiness, hypertension, heart disease and/or stroke. We discussed potential treatment options, which could include weight loss, body positioning, continuous positive airway pressure (CPAP), OA, EPAP,  or referral for surgical consideration.      continue Antihistamine and Flonase daily use     Precautions: The patient was advised to abstain from driving should they feel sleepy  or drowsy.

## 2020-05-07 RX ORDER — SODIUM BICARBONATE 325 MG/1
TABLET ORAL
Qty: 180 TABLET | Refills: 4 | Status: SHIPPED | OUTPATIENT
Start: 2020-05-07 | End: 2020-09-29

## 2020-05-18 ENCOUNTER — TELEPHONE (OUTPATIENT)
Dept: NEPHROLOGY | Facility: CLINIC | Age: 75
End: 2020-05-18

## 2020-06-05 ENCOUNTER — TELEPHONE (OUTPATIENT)
Dept: NEPHROLOGY | Facility: CLINIC | Age: 75
End: 2020-06-05

## 2020-06-05 DIAGNOSIS — N18.30 STAGE 3 CHRONIC KIDNEY DISEASE: Primary | ICD-10-CM

## 2020-07-21 ENCOUNTER — TELEPHONE (OUTPATIENT)
Dept: SLEEP MEDICINE | Facility: CLINIC | Age: 75
End: 2020-07-21

## 2020-07-21 ENCOUNTER — OFFICE VISIT (OUTPATIENT)
Dept: SLEEP MEDICINE | Facility: CLINIC | Age: 75
End: 2020-07-21
Payer: MEDICARE

## 2020-07-21 VITALS
HEART RATE: 80 BPM | BODY MASS INDEX: 24.89 KG/M2 | HEIGHT: 75 IN | DIASTOLIC BLOOD PRESSURE: 76 MMHG | WEIGHT: 200.19 LBS | SYSTOLIC BLOOD PRESSURE: 152 MMHG

## 2020-07-21 DIAGNOSIS — G47.33 OSA (OBSTRUCTIVE SLEEP APNEA): Primary | ICD-10-CM

## 2020-07-21 PROCEDURE — 99999 PR PBB SHADOW E&M-EST. PATIENT-LVL III: ICD-10-PCS | Mod: PBBFAC,,, | Performed by: NURSE PRACTITIONER

## 2020-07-21 PROCEDURE — 99999 PR PBB SHADOW E&M-EST. PATIENT-LVL III: CPT | Mod: PBBFAC,,, | Performed by: NURSE PRACTITIONER

## 2020-07-21 PROCEDURE — 99212 OFFICE O/P EST SF 10 MIN: CPT | Mod: S$PBB,,, | Performed by: NURSE PRACTITIONER

## 2020-07-21 PROCEDURE — 99212 PR OFFICE/OUTPT VISIT, EST, LEVL II, 10-19 MIN: ICD-10-PCS | Mod: S$PBB,,, | Performed by: NURSE PRACTITIONER

## 2020-07-21 PROCEDURE — 99213 OFFICE O/P EST LOW 20 MIN: CPT | Mod: PBBFAC | Performed by: NURSE PRACTITIONER

## 2020-07-21 NOTE — PROGRESS NOTES
"Teodoro Mast 75 y.o. year-old male returns for management of obstructive sleep apnea and CPAP equipment check.     Seen via audio-only visit April 2020. Here for CPAP interrogation and f/u on sleep hygiene progress. Sleep fragmentation significantly improved since starting Flomax for BPH. Wakes up only once to use bathroom around 3 to 4 am vs q 2 h. Half the time no problems falling back asleep, other half may take up to 1.5 hours to fall back asleep (ruminating on day ahead). When unable to resume sleep immediately typically just postpones wake up time, which is doable since he is retired. Reports he's also been mindful of amount of TV watching which is helping him fall asleep a lot easier at night.      CPAP Interrogation: F & P, CPAP 11 cm, 2699 hours, 30-day ave: 8.2 hours, > 4 hours: 100%    Baseline Sleep Study: 05/19/2017 Split Night Study 210 lb. The overall AHI was 49.3 with an oxygen jostin of 83.0%. Effective control of sleep disordered breathing was seen during supine REM stage sleep at a pressure of 11 cm of water. Higher pressures were associated with central apneas. Used medium Simplus FFM.     Review of Systems: Sleep related symptoms as per HPI. Otherwise, a balance of 10 systems reviewed is negative.    Physical Exam:   BP (!) 152/76   Pulse 80   Ht 6' 3" (1.905 m)   Wt 90.8 kg (200 lb 2.8 oz)   BMI 25.02 kg/m²   GENERAL: Well groomed    Assessment:     Obstructive sleep apnea, severe by AHI.  The patient symptomatically has snoring, interrupted sleep, and excessive daytime sleepiness which improves with CPAP use. The patient is adherent on CPAP and experiencing symptomatic benefit. Medical co-morbidities: HTN, HLD.  This warrants continued treatment for obstructive sleep apnea.    Plan:     Continue CPAP 11 cm. Continue sleep hygiene efforts. Try list making (notepad by bed) when unable to fall asleep. RTC 12 months, sooner if needed.         "

## 2020-07-21 NOTE — TELEPHONE ENCOUNTER
Called pt to ask if he would like to convert his appointment to a virtual visit. Pt didn't answer and has a voicemail that hasn't been set up yet.

## 2020-08-07 ENCOUNTER — TELEPHONE (OUTPATIENT)
Dept: INTERNAL MEDICINE | Facility: CLINIC | Age: 75
End: 2020-08-07

## 2020-08-07 ENCOUNTER — CLINICAL SUPPORT (OUTPATIENT)
Dept: INTERNAL MEDICINE | Facility: CLINIC | Age: 75
End: 2020-08-07
Payer: MEDICARE

## 2020-08-07 ENCOUNTER — OFFICE VISIT (OUTPATIENT)
Dept: INTERNAL MEDICINE | Facility: CLINIC | Age: 75
End: 2020-08-07
Payer: MEDICARE

## 2020-08-07 ENCOUNTER — HOSPITAL ENCOUNTER (OUTPATIENT)
Dept: RADIOLOGY | Facility: OTHER | Age: 75
Discharge: HOME | End: 2020-08-07
Attending: NURSE PRACTITIONER
Payer: MEDICARE

## 2020-08-07 VITALS
HEART RATE: 57 BPM | HEIGHT: 74 IN | WEIGHT: 200.38 LBS | SYSTOLIC BLOOD PRESSURE: 138 MMHG | DIASTOLIC BLOOD PRESSURE: 62 MMHG | BODY MASS INDEX: 25.72 KG/M2 | OXYGEN SATURATION: 97 %

## 2020-08-07 DIAGNOSIS — Z00.00 ENCOUNTER FOR PREVENTIVE HEALTH EXAMINATION: Primary | ICD-10-CM

## 2020-08-07 DIAGNOSIS — N18.30 HYPERTENSIVE KIDNEY DISEASE WITH STAGE 3 CHRONIC KIDNEY DISEASE: ICD-10-CM

## 2020-08-07 DIAGNOSIS — E79.0 HYPERURICEMIA: ICD-10-CM

## 2020-08-07 DIAGNOSIS — R80.9 PROTEINURIA, UNSPECIFIED TYPE: ICD-10-CM

## 2020-08-07 DIAGNOSIS — Z13.6 ENCOUNTER FOR ABDOMINAL AORTIC ANEURYSM SCREENING: ICD-10-CM

## 2020-08-07 DIAGNOSIS — I12.9 HYPERTENSIVE KIDNEY DISEASE WITH STAGE 3 CHRONIC KIDNEY DISEASE: ICD-10-CM

## 2020-08-07 DIAGNOSIS — N25.81 SECONDARY HYPERPARATHYROIDISM: ICD-10-CM

## 2020-08-07 DIAGNOSIS — G47.33 OSA (OBSTRUCTIVE SLEEP APNEA): ICD-10-CM

## 2020-08-07 DIAGNOSIS — E78.2 MIXED HYPERLIPIDEMIA: ICD-10-CM

## 2020-08-07 DIAGNOSIS — N18.30 KIDNEY DISEASE, CHRONIC, STAGE III (GFR 30-59 ML/MIN): ICD-10-CM

## 2020-08-07 DIAGNOSIS — H91.90 HEARING LOSS, UNSPECIFIED HEARING LOSS TYPE, UNSPECIFIED LATERALITY: ICD-10-CM

## 2020-08-07 DIAGNOSIS — E55.9 VITAMIN D DEFICIENCY: ICD-10-CM

## 2020-08-07 DIAGNOSIS — E87.20 METABOLIC ACIDOSIS: ICD-10-CM

## 2020-08-07 DIAGNOSIS — N13.8 BPH WITH URINARY OBSTRUCTION: ICD-10-CM

## 2020-08-07 DIAGNOSIS — N28.1 KIDNEY CYSTS: ICD-10-CM

## 2020-08-07 DIAGNOSIS — N40.1 BPH WITH URINARY OBSTRUCTION: ICD-10-CM

## 2020-08-07 DIAGNOSIS — N18.4 CKD (CHRONIC KIDNEY DISEASE) STAGE 4, GFR 15-29 ML/MIN: ICD-10-CM

## 2020-08-07 DIAGNOSIS — I10 ESSENTIAL HYPERTENSION: ICD-10-CM

## 2020-08-07 DIAGNOSIS — Z23 IMMUNIZATION DUE: Primary | ICD-10-CM

## 2020-08-07 PROCEDURE — 99214 OFFICE O/P EST MOD 30 MIN: CPT | Mod: PBBFAC,25 | Performed by: NURSE PRACTITIONER

## 2020-08-07 PROCEDURE — G0439 PR MEDICARE ANNUAL WELLNESS SUBSEQUENT VISIT: ICD-10-PCS | Mod: S$GLB,,, | Performed by: NURSE PRACTITIONER

## 2020-08-07 PROCEDURE — 99999 PR PBB SHADOW E&M-EST. PATIENT-LVL I: CPT | Mod: PBBFAC,,,

## 2020-08-07 PROCEDURE — 76706 US ABDL AORTA SCREEN AAA: CPT | Mod: 26,,, | Performed by: RADIOLOGY

## 2020-08-07 PROCEDURE — 99999 PR PBB SHADOW E&M-EST. PATIENT-LVL IV: ICD-10-PCS | Mod: PBBFAC,,, | Performed by: NURSE PRACTITIONER

## 2020-08-07 PROCEDURE — G0009 ADMIN PNEUMOCOCCAL VACCINE: HCPCS | Mod: PBBFAC

## 2020-08-07 PROCEDURE — 99999 PR PBB SHADOW E&M-EST. PATIENT-LVL I: ICD-10-PCS | Mod: PBBFAC,,,

## 2020-08-07 PROCEDURE — G0439 PPPS, SUBSEQ VISIT: HCPCS | Mod: S$GLB,,, | Performed by: NURSE PRACTITIONER

## 2020-08-07 PROCEDURE — 99211 OFF/OP EST MAY X REQ PHY/QHP: CPT | Mod: PBBFAC,27,25

## 2020-08-07 PROCEDURE — 76706 US AAA SCREENING: ICD-10-PCS | Mod: 26,,, | Performed by: RADIOLOGY

## 2020-08-07 PROCEDURE — 99999 PR PBB SHADOW E&M-EST. PATIENT-LVL IV: CPT | Mod: PBBFAC,,, | Performed by: NURSE PRACTITIONER

## 2020-08-07 PROCEDURE — 76706 US ABDL AORTA SCREEN AAA: CPT | Mod: TC

## 2020-08-07 NOTE — PROGRESS NOTES
"Patient was given vaccine information sheet for the [id number] (pneumococcal 13-valent conjugate) vaccine. The area of injection was palpated using the acromion process as a landmark. This area was cleaned with alcohol. Using a 25g 1" safety needle, 0.5mL of the vaccine was placed into the right deltoid muscle. The injection site was dressed with a bandage. Patient experienced no complications and was discharged in stable condition. [id number] (pneumococcal 13-valent conjugate) vaccine Lot: [id number] Exp: 04/30/2022.       "

## 2020-08-07 NOTE — PATIENT INSTRUCTIONS
Counseling and Referral of Other Preventative  (Italic type indicates deductible and co-insurance are waived)    Patient Name: Teodoro Mast  Today's Date: 8/7/2020    Health Maintenance       Date Due Completion Date    TETANUS VACCINE 06/08/1965 ---    Shingles Vaccine (1 of 2) 06/08/1997 ---    Colorectal Cancer Screening 06/08/1997 ---    Pneumococcal Vaccine (65+ Low/Medium Risk) (1 of 2 - PCV13) 06/08/2012 ---    Abdominal Aortic Aneurysm Screening 06/08/2012 ---    Influenza Vaccine (1) 09/01/2020 12/13/2019    Lipid Panel 02/13/2025 2/13/2020        No orders of the defined types were placed in this encounter.    The following information is provided to all patients.  This information is to help you find resources for any of the problems found today that may be affecting your health:                Living healthy guide: www.[facility].[place].gov      Understanding Diabetes: www.diabetes.org      Eating healthy: www.cdc.gov/healthyweight      [facility] home safety checklist: www.cdc.gov/steadi/patient.html      Agency on Aging: www.goea.[place].[facility]      Alcoholics anonymous (AA): www.aa.org      Physical Activity: www.maru.nih.gov/vy4rofy      Tobacco use: www.quitwithusla.org

## 2020-08-07 NOTE — PROGRESS NOTES
"  Teodoro Mast presented for a  Medicare AWV and comprehensive Health Risk Assessment today. The following components were reviewed and updated:    · Medical history  · Family History  · Social history  · Allergies and Current Medications  · Health Risk Assessment  · Health Maintenance  · Care Team     ** See Completed Assessments for Annual Wellness Visit within the encounter summary.**         The following assessments were completed:  · Living Situation  · CAGE  · Depression Screening  · Timed Get Up and Go  · Whisper Test  · Cognitive Function Screening  · Nutrition Screening  · ADL Screening  · PAQ Screening            Vitals:    08/07/20 0956   BP: 138/62   BP Location: Left arm   Patient Position: Sitting   Pulse: (!) 57   SpO2: 97%   Weight: 90.9 kg (200 lb 6.4 oz)   Height: 6' 2" (1.88 m)     Body mass index is 25.73 kg/m².     Physical Exam  Vitals signs reviewed.   Constitutional:       Appearance: He is well-developed.   HENT:      Head: Normocephalic and atraumatic. Not macrocephalic and not microcephalic. No raccoon eyes, Eaton's sign, abrasion, contusion, right periorbital erythema, left periorbital erythema or laceration. Hair is normal.      Right Ear: No decreased hearing noted. No laceration, drainage, swelling or tenderness. No middle ear effusion. No foreign body. No mastoid tenderness. No hemotympanum. Tympanic membrane is not injected, scarred, perforated, erythematous, retracted or bulging. Tympanic membrane has normal mobility.      Left Ear: No decreased hearing noted. No laceration, drainage, swelling or tenderness.  No middle ear effusion. No foreign body. No mastoid tenderness. No hemotympanum. Tympanic membrane is not injected, scarred, perforated, erythematous, retracted or bulging. Tympanic membrane has normal mobility.      Nose: Nose normal. No nasal deformity, laceration or mucosal edema.      Mouth/Throat:      Pharynx: Uvula midline.   Eyes:      General: Lids are normal. No " scleral icterus.     Conjunctiva/sclera: Conjunctivae normal.   Neck:      Musculoskeletal: Neck supple. Normal range of motion. No edema, erythema or spinous process tenderness.      Thyroid: No thyroid mass or thyromegaly.      Trachea: Trachea normal.   Cardiovascular:      Rate and Rhythm: Normal rate and regular rhythm.      Heart sounds: No murmur. No friction rub. No gallop.    Pulmonary:      Effort: Pulmonary effort is normal. No respiratory distress.      Breath sounds: Normal breath sounds. No stridor. No wheezing, rhonchi or rales.   Chest:      Chest wall: No tenderness.   Abdominal:      Palpations: Abdomen is soft.   Musculoskeletal: Normal range of motion.   Lymphadenopathy:      Head:      Right side of head: No submental, submandibular, tonsillar, preauricular or posterior auricular adenopathy.      Left side of head: No submental, submandibular, tonsillar, preauricular, posterior auricular or occipital adenopathy.   Skin:     General: Skin is warm and dry.   Neurological:      Mental Status: He is alert and oriented to person, place, and time.   Psychiatric:         Behavior: Behavior normal.         Thought Content: Thought content normal.         Judgment: Judgment normal.           Diagnoses and health risks identified today and associated recommendations/orders:    1. Encounter for preventive health examination  Annual Health Risk Assessment (HRA) visit today.  Counseling and referral of health maintenance and preventative health measures performed.  Patient given annual wellness paperwork to take home.  Encouraged to return in 1 year for subsequent HRA visit.     2. CKD (chronic kidney disease) stage 4, GFR 15-29 ml/min  Chronic. Stable. Continue current treatment plan as previously prescribed by PCP.    3. Hypertensive kidney disease with stage 3 chronic kidney disease  Chronic. Stable. Continue current treatment plan as previously prescribed by PCP.    4. Kidney disease, chronic, stage III  (GFR 30-59 ml/min)  Chronic. Stable. Continue current treatment plan as previously prescribed by PCP.    5. Metabolic acidosis  Chronic. Stable. Continue current treatment plan as previously prescribed by PCP.    6. Essential hypertension  Chronic. Stable. Controlled. Encouraged to increase exercise as tolerated (moderate-intensity aerobic activity and muscle-strengthening activities) improve diet to heart healthy, low sodium diet. Continue current treatment plan as previously prescribed by PCP.    7. Kidney cysts  Chronic. Stable. Continue current treatment plan as previously prescribed by PCP.    8. Secondary hyperparathyroidism  Chronic. Stable. Continue current treatment plan as previously prescribed by PCP.    9. Vitamin D deficiency  Chronic. Stable. Continue current treatment plan as previously prescribed by PCP.    10. Hyperuricemia  Chronic. Stable. Continue current treatment plan as previously prescribed by PCP.    11. GLYNN (obstructive sleep apnea)  Chronic. Stable. Continue current treatment plan as previously prescribed by PCP.    12. Proteinuria, unspecified type  Chronic. Stable. Continue current treatment plan as previously prescribed by PCP.    13. Encounter for abdominal aortic aneurysm screening  -  AAA Screening; Future    14. Hearing loss, unspecified hearing loss type, unspecified laterality  - Ambulatory referral/consult to ENT; Future    15. Mixed hyperlipidemia  Chronic. Stable. Continue current treatment plan as previously prescribed by PCP.    16. BPH with urinary obstruction  Chronic. Stable. Continue current treatment plan as previously prescribed by PCP.          Provided Teodoor with a 5-10 year written screening schedule and personal prevention plan. Recommendations were developed using the USPSTF age appropriate recommendations. Education, counseling, and referrals were provided as needed. After Visit Summary printed and given to patient which includes a list of additional  screenings\tests needed.    Follow up in about 1 year (around 8/7/2021).    Tito Bello NP  I offered to discuss end of life issues, including information on how to make advance directives that the patient could use to name someone who would make medical decisions on their behalf if they became too ill to make themselves.    ___Patient declined  _X_Patient is interested, I provided paper work and offered to discuss.

## 2020-08-11 ENCOUNTER — IMMUNIZATION (OUTPATIENT)
Dept: PHARMACY | Facility: CLINIC | Age: 75
End: 2020-08-11
Payer: MEDICARE

## 2020-08-17 ENCOUNTER — OFFICE VISIT (OUTPATIENT)
Dept: OTOLARYNGOLOGY | Facility: CLINIC | Age: 75
End: 2020-08-17
Payer: MEDICARE

## 2020-08-17 ENCOUNTER — CLINICAL SUPPORT (OUTPATIENT)
Dept: OTOLARYNGOLOGY | Facility: CLINIC | Age: 75
End: 2020-08-17
Payer: MEDICARE

## 2020-08-17 VITALS
TEMPERATURE: 98 F | WEIGHT: 201.19 LBS | HEART RATE: 61 BPM | HEIGHT: 74 IN | SYSTOLIC BLOOD PRESSURE: 127 MMHG | DIASTOLIC BLOOD PRESSURE: 68 MMHG | BODY MASS INDEX: 25.82 KG/M2

## 2020-08-17 DIAGNOSIS — H90.3 BILATERAL SENSORINEURAL HEARING LOSS: Primary | ICD-10-CM

## 2020-08-17 DIAGNOSIS — H61.23 BILATERAL IMPACTED CERUMEN: ICD-10-CM

## 2020-08-17 DIAGNOSIS — H90.3 SENSORINEURAL HEARING LOSS (SNHL) OF BOTH EARS: Primary | ICD-10-CM

## 2020-08-17 DIAGNOSIS — J34.2 NASAL SEPTAL DEVIATION: ICD-10-CM

## 2020-08-17 DIAGNOSIS — R29.2 ABNORMAL ACOUSTIC REFLEX: ICD-10-CM

## 2020-08-17 PROCEDURE — 99204 PR OFFICE/OUTPT VISIT, NEW, LEVL IV, 45-59 MIN: ICD-10-PCS | Mod: 25,S$GLB,, | Performed by: SPECIALIST

## 2020-08-17 PROCEDURE — 92550 PR TYMPANOMETRY AND REFLEX THRESHOLD MEASUREMENTS: ICD-10-PCS | Mod: S$GLB,,, | Performed by: AUDIOLOGIST-HEARING AID FITTER

## 2020-08-17 PROCEDURE — 92550 TYMPANOMETRY & REFLEX THRESH: CPT | Mod: S$GLB,,, | Performed by: AUDIOLOGIST-HEARING AID FITTER

## 2020-08-17 PROCEDURE — 92557 COMPREHENSIVE HEARING TEST: CPT | Mod: S$GLB,,, | Performed by: AUDIOLOGIST-HEARING AID FITTER

## 2020-08-17 PROCEDURE — 92557 PR COMPREHENSIVE HEARING TEST: ICD-10-PCS | Mod: S$GLB,,, | Performed by: AUDIOLOGIST-HEARING AID FITTER

## 2020-08-17 PROCEDURE — 99204 OFFICE O/P NEW MOD 45 MIN: CPT | Mod: 25,S$GLB,, | Performed by: SPECIALIST

## 2020-08-17 PROCEDURE — 69210 EAR CERUMEN REMOVAL: ICD-10-PCS | Mod: S$GLB,,, | Performed by: SPECIALIST

## 2020-08-17 PROCEDURE — 69210 REMOVE IMPACTED EAR WAX UNI: CPT | Mod: S$GLB,,, | Performed by: SPECIALIST

## 2020-08-17 NOTE — LETTER
August 17, 2020      Tito Bello, LOVE  [address]  [address]  [facility] [address]           [facility] [address]  [address]  [facility] [address]  Phone: [phone]  Fax: [phone]          Patient: Teodoro Mast   MR Number: [id number]   YOB: 1945   Date of Visit: 8/17/2020       Dear Tito Bello:    Thank you for referring Teodoro Mast to me for evaluation. Attached you will find relevant portions of my assessment and plan of care.    If you have questions, please do not hesitate to call me. I look forward to following Teodoro Mast along with you.    Sincerely,    HEIDI Alvarado MD    Enclosure  CC:  No Recipients    If you would like to receive this communication electronically, please contact [email or web address] or [phone] to request more information on [employer] Link access.    For providers and/or their staff who would like to refer a patient to [facility], please contact us through our one-stop-shop provider referral line, [facility], at [phone].    If you feel you have received this communication in error or would no longer like to receive these types of communications, please e-mail [email or web address]

## 2020-08-17 NOTE — PROCEDURES
"Ear Cerumen Removal    Date/Time: 8/17/2020 10:00 AM  Performed by: HEIDI Alvarado MD  Authorized by: HEIDI Alvarado MD     Time out: Immediately prior to procedure a "time out" was called to verify the correct patient, procedure, equipment, support staff and site/side marked as required.    Consent Done?:  Yes (Verbal)    Local anesthetic:  None  Location details:  Both ears  Procedure type comment:  Wire loop on the right and wire loop and irrigation on the left  Cerumen  Removal Results:  Cerumen completely removed  Patient tolerance:  Patient tolerated the procedure well with no immediate complications      "

## 2020-08-17 NOTE — PROGRESS NOTES
Lobo Ray, CCC-A  Audiologist - [facility] [address] [place] [address]  [email or web address]  [phone]    Patient: Teodoro Mast   MRN: [id number]  [address] [facility]   Home Phone [phone]   Work Phone Not on file.   Mobile [phone]   : 1945  FRAGA: 2020      AUDIOLOGICAL EVALUATION      RECOMMENDATIONS:   It is recommended that Teodoro Mast:   Follow up medically with Dr. Alvarado to assess his hearing loss.   Receive binaural hearing aids to improve speech understanding.   Continue to receive audiological monitoring annually.   Use precaution and/or hearing protection in noisy environments.    If you should have any questions or concerns regarding the above information, please do not hesitate to contact me at [phone].      _______________________________  Lobo Ray, CCC-A, F-AAA  Audiologist

## 2020-08-17 NOTE — PROGRESS NOTES
Subjective:       Patient ID: Teodoro Mast is a 75 y.o. male.    Chief Complaint: Cerumen Impaction    The patient is referred here for wax removal.  He was initial coming in for a hearing evaluation and was noted by the audiologist to have wax in his ears.  He work as an  at the [employer] in [employer] for the last 26 years of his professional life.  On his annual hearing test at skilled nursing he was noted to have a mild hearing loss.  He does not have significant occupational a hearing exposure.  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  He does not have significant recreational noise exposure.  Family history is negative for hearing loss.  He does not have tinnitus.  He does not have vertigo.    Review of Systems   Constitutional: Negative for activity change, appetite change, chills, fatigue and fever.   HENT: Positive for hearing loss. Negative for nasal congestion, drooling, ear discharge, ear pain, mouth sores, postnasal drip, rhinorrhea, sinus pressure/congestion, sneezing, sore throat, tinnitus, trouble swallowing and voice change.         Obstructive sleep apnea   Eyes: Negative for photophobia, pain, discharge, redness, itching and visual disturbance.   Respiratory: Negative for apnea, cough, choking, shortness of breath and wheezing.    Cardiovascular: Negative for chest pain and palpitations.   Gastrointestinal: Negative for abdominal distention, abdominal pain, nausea and vomiting.   Musculoskeletal: Negative for arthralgias, myalgias, neck pain and neck stiffness.   Integumentary:  Negative.   Allergic/Immunologic: Negative for environmental allergies and food allergies.   Neurological: Negative for dizziness, facial asymmetry, speech difficulty, weakness, light-headedness, numbness and headaches.   Hematological: Negative for adenopathy. Does not bruise/bleed easily.   Psychiatric/Behavioral: Negative for agitation, confusion, decreased  concentration and sleep disturbance.         Objective:      Physical Exam  Vitals signs reviewed.   Constitutional:       General: He is not in acute distress.     Appearance: Normal appearance. He is well-developed and normal weight.   HENT:      Head: Normocephalic.      Jaw: There is normal jaw occlusion.      Salivary Glands: Right salivary gland is not diffusely enlarged. Left salivary gland is not diffusely enlarged.      Right Ear: Tympanic membrane, ear canal and external ear normal. Decreased hearing noted. There is impacted cerumen (Partial impaction on the right).      Left Ear: Tympanic membrane, ear canal and external ear normal. Decreased hearing noted. There is impacted cerumen ( total impaction on the left).      Nose: Nose normal. No septal deviation, mucosal edema or rhinorrhea.      Right Turbinates: Pale. Not enlarged or swollen.      Left Turbinates: Pale. Not enlarged or swollen.      Mouth/Throat:      Lips: No lesions.      Mouth: No oral lesions.      Dentition: No gum lesions.      Tongue: No lesions.      Palate: No mass and lesions.      Pharynx: Uvula midline. No oropharyngeal exudate.      Tonsils: 1+ on the right. 1+ on the left.   Eyes:      General: Lids are normal.         Right eye: No discharge.         Left eye: No discharge.      Conjunctiva/sclera: Conjunctivae normal.      Right eye: Right conjunctiva is not injected.      Left eye: Left conjunctiva is not injected.      Pupils: Pupils are equal, round, and reactive to light.   Neck:      Musculoskeletal: Full passive range of motion without pain, normal range of motion and neck supple.      Thyroid: No thyroid mass or thyromegaly.      Trachea: Trachea normal. No tracheal deviation.   Cardiovascular:      Rate and Rhythm: Normal rate and regular rhythm.      Pulses: Normal pulses.      Heart sounds: Normal heart sounds. No murmur. No gallop.    Pulmonary:      Effort: Pulmonary effort is normal.      Breath sounds: Normal  breath sounds. No decreased breath sounds, wheezing, rhonchi or rales.   Abdominal:      General: Bowel sounds are normal.      Palpations: Abdomen is soft.      Tenderness: There is no abdominal tenderness.   Musculoskeletal: Normal range of motion.      Right shoulder: Normal.   Lymphadenopathy:      Head:      Right side of head: No submental, submandibular or occipital adenopathy.      Left side of head: No submental, submandibular or occipital adenopathy.      Cervical: No cervical adenopathy.   Skin:     General: Skin is warm and dry.      Findings: No rash.      Nails: There is no clubbing.     Neurological:      Mental Status: He is alert and oriented to person, place, and time.      Cranial Nerves: No cranial nerve deficit.      Sensory: No sensory deficit.      Gait: Gait normal.   Psychiatric:         Speech: Speech normal.         Behavior: Behavior normal.         Procedure-wax impaction is removed from the right ear using wire loop, wax impaction on the left side is removed with wire loop and irrigation.  The patient tolerated procedure well was discharged post procedure.      I personally reviewed the results of the above audiogram and discuss them in full detail with the patient during his visit.    Assessment:       1. Sensorineural hearing loss (SNHL) of both ears    2. Bilateral impacted cerumen    3. Nasal septal deviation        Plan:       I will refer the patient to the audiologist for hearing aid evaluation.  He needs to be recheck annually for generally ENT physical examination and hearing testing.

## 2020-08-17 NOTE — Clinical Note
Your patient, Teodoro Mast, was recently seen for an audiogram.  My assessment and recommendations are enclosed.    If you should have any questions or concerns, please contact me at [phone].     Sincerely,  Lobo Ray, CCC-A, F-[facility]  Audiologist  [facility]

## 2020-08-24 ENCOUNTER — TELEPHONE (OUTPATIENT)
Dept: NEPHROLOGY | Facility: CLINIC | Age: 75
End: 2020-08-24

## 2020-08-24 ENCOUNTER — LAB VISIT (OUTPATIENT)
Dept: LAB | Facility: HOSPITAL | Age: 75
End: 2020-08-24
Attending: INTERNAL MEDICINE
Payer: MEDICARE

## 2020-08-24 DIAGNOSIS — N18.30 STAGE 3 CHRONIC KIDNEY DISEASE: ICD-10-CM

## 2020-08-24 LAB
ALBUMIN SERPL BCP-MCNC: 3.3 G/DL (ref 3.5–5.2)
ANION GAP SERPL CALC-SCNC: 7 MMOL/L (ref 8–16)
BUN SERPL-MCNC: 41 MG/DL (ref 8–23)
CALCIUM SERPL-MCNC: 8.4 MG/DL (ref 8.7–10.5)
CHLORIDE SERPL-SCNC: 113 MMOL/L (ref 95–110)
CO2 SERPL-SCNC: 23 MMOL/L (ref 23–29)
CREAT SERPL-MCNC: 3 MG/DL (ref 0.5–1.4)
EST. GFR  (AFRICAN AMERICAN): 22.8 ML/MIN/1.73 M^2
EST. GFR  (NON AFRICAN AMERICAN): 19.7 ML/MIN/1.73 M^2
GLUCOSE SERPL-MCNC: 100 MG/DL (ref 70–110)
PHOSPHATE SERPL-MCNC: 3.2 MG/DL (ref 2.7–4.5)
POTASSIUM SERPL-SCNC: 4.7 MMOL/L (ref 3.5–5.1)
PTH-INTACT SERPL-MCNC: 281 PG/ML (ref 9–77)
SODIUM SERPL-SCNC: 143 MMOL/L (ref 136–145)

## 2020-08-24 PROCEDURE — 36415 COLL VENOUS BLD VENIPUNCTURE: CPT | Mod: PN

## 2020-08-24 PROCEDURE — 80069 RENAL FUNCTION PANEL: CPT

## 2020-08-24 PROCEDURE — 83970 ASSAY OF PARATHORMONE: CPT

## 2020-08-24 NOTE — TELEPHONE ENCOUNTER
Shine Mcqueen MD   8/24/2020  4:23 PM      Kidney filtration rate is further down as a result Kidney is filtering toxins slowly and sluggishly, hope you're doing ok. Potassium is normal. Urine leaking protein continues but that is slightly improved. Continue current medicine. Please report if any new symptoms. Thanks.

## 2020-08-24 NOTE — PROGRESS NOTES
Kidney filtration rate is further down as a result Kidney is filtering toxins slowly and sluggishly, hope you're doing ok. Potassium is normal. Urine leaking protein continues but that is slightly improved. Continue current medicine. Please report if any new symptoms. Thanks.

## 2020-08-27 ENCOUNTER — TELEPHONE (OUTPATIENT)
Dept: NEPHROLOGY | Facility: CLINIC | Age: 75
End: 2020-08-27

## 2020-08-27 ENCOUNTER — OFFICE VISIT (OUTPATIENT)
Dept: NEPHROLOGY | Facility: CLINIC | Age: 75
End: 2020-08-27
Payer: MEDICARE

## 2020-08-27 ENCOUNTER — HOSPITAL ENCOUNTER (OUTPATIENT)
Dept: RADIOLOGY | Facility: OTHER | Age: 75
Discharge: HOME | End: 2020-08-27
Attending: INTERNAL MEDICINE
Payer: MEDICARE

## 2020-08-27 VITALS
DIASTOLIC BLOOD PRESSURE: 72 MMHG | BODY MASS INDEX: 25.77 KG/M2 | OXYGEN SATURATION: 97 % | SYSTOLIC BLOOD PRESSURE: 128 MMHG | HEIGHT: 74 IN | WEIGHT: 200.81 LBS | HEART RATE: 66 BPM

## 2020-08-27 DIAGNOSIS — N40.1 BPH WITH URINARY OBSTRUCTION: ICD-10-CM

## 2020-08-27 DIAGNOSIS — E87.20 METABOLIC ACIDOSIS: ICD-10-CM

## 2020-08-27 DIAGNOSIS — N18.4 CKD (CHRONIC KIDNEY DISEASE) STAGE 4, GFR 15-29 ML/MIN: ICD-10-CM

## 2020-08-27 DIAGNOSIS — N25.81 SECONDARY HYPERPARATHYROIDISM: ICD-10-CM

## 2020-08-27 DIAGNOSIS — N28.1 KIDNEY CYSTS: ICD-10-CM

## 2020-08-27 DIAGNOSIS — I12.9 HYPERTENSIVE KIDNEY DISEASE WITH STAGE 4 CHRONIC KIDNEY DISEASE: ICD-10-CM

## 2020-08-27 DIAGNOSIS — N17.9 AKI (ACUTE KIDNEY INJURY): Primary | ICD-10-CM

## 2020-08-27 DIAGNOSIS — N18.4 HYPERTENSIVE KIDNEY DISEASE WITH STAGE 4 CHRONIC KIDNEY DISEASE: ICD-10-CM

## 2020-08-27 DIAGNOSIS — N13.8 BPH WITH URINARY OBSTRUCTION: ICD-10-CM

## 2020-08-27 DIAGNOSIS — I10 HYPERTENSION, UNSPECIFIED TYPE: ICD-10-CM

## 2020-08-27 DIAGNOSIS — N17.9 AKI (ACUTE KIDNEY INJURY): ICD-10-CM

## 2020-08-27 DIAGNOSIS — R80.1 PERSISTENT PROTEINURIA: ICD-10-CM

## 2020-08-27 DIAGNOSIS — E55.9 VITAMIN D DEFICIENCY: ICD-10-CM

## 2020-08-27 PROCEDURE — 99215 OFFICE O/P EST HI 40 MIN: CPT | Mod: S$PBB,,, | Performed by: INTERNAL MEDICINE

## 2020-08-27 PROCEDURE — 99215 PR OFFICE/OUTPT VISIT, EST, LEVL V, 40-54 MIN: ICD-10-PCS | Mod: S$PBB,,, | Performed by: INTERNAL MEDICINE

## 2020-08-27 PROCEDURE — 76770 US EXAM ABDO BACK WALL COMP: CPT | Mod: 26,,, | Performed by: RADIOLOGY

## 2020-08-27 PROCEDURE — 99999 PR PBB SHADOW E&M-EST. PATIENT-LVL V: ICD-10-PCS | Mod: PBBFAC,,, | Performed by: INTERNAL MEDICINE

## 2020-08-27 PROCEDURE — 76770 US EXAM ABDO BACK WALL COMP: CPT | Mod: TC

## 2020-08-27 PROCEDURE — 99215 OFFICE O/P EST HI 40 MIN: CPT | Mod: PBBFAC,25 | Performed by: INTERNAL MEDICINE

## 2020-08-27 PROCEDURE — 99999 PR PBB SHADOW E&M-EST. PATIENT-LVL V: CPT | Mod: PBBFAC,,, | Performed by: INTERNAL MEDICINE

## 2020-08-27 PROCEDURE — 76770 US RETROPERITONEAL COMPLETE: ICD-10-PCS | Mod: 26,,, | Performed by: RADIOLOGY

## 2020-08-27 RX ORDER — SODIUM BICARBONATE 325 MG/1
325 TABLET ORAL 2 TIMES DAILY
Qty: 180 TABLET | Refills: 11 | Status: SHIPPED | OUTPATIENT
Start: 2020-08-27 | End: 2020-09-29 | Stop reason: SDUPTHER

## 2020-08-27 RX ORDER — LABETALOL 300 MG/1
300 TABLET, FILM COATED ORAL 2 TIMES DAILY
Qty: 60 TABLET | Refills: 11 | Status: SHIPPED | OUTPATIENT
Start: 2020-08-27 | End: 2020-09-11 | Stop reason: SDUPTHER

## 2020-08-27 RX ORDER — CALCITRIOL 0.25 UG/1
0.25 CAPSULE ORAL DAILY
Qty: 30 CAPSULE | Refills: 11 | Status: SHIPPED | OUTPATIENT
Start: 2020-08-27 | End: 2021-07-10

## 2020-08-27 NOTE — TELEPHONE ENCOUNTER
Result Notes     Shine Mcqueen MD   8/27/2020  1:30 PM      I called pt to discuss labs obtained after the clinic visit. His creatinine is increased slightly further. Pt is instructed to  - hold torsemide for the next 3 days  - continue home BP monitoring  - increase labetalol dose potentially to 600 mg in the morning and 300 mg at night from current 300 mg twice per day, if SBP > 140 mm hg  - pt to see urologist sooner given small post void residual  - repeat renal panel off Torsemide on Monday 8/31/20  Thanks.  [place], I informed him of above. Please schedule renal panel for Monday, thanks.

## 2020-08-27 NOTE — Clinical Note
Refer to hematology. Please help in getting appointment. Please inform him due to abnormal protein level in the blood he is referred to hematology. Thanks.

## 2020-08-31 ENCOUNTER — LAB VISIT (OUTPATIENT)
Dept: LAB | Facility: HOSPITAL | Age: 75
End: 2020-08-31
Attending: INTERNAL MEDICINE
Payer: MEDICARE

## 2020-08-31 ENCOUNTER — TELEPHONE (OUTPATIENT)
Dept: HEMATOLOGY/ONCOLOGY | Facility: CLINIC | Age: 75
End: 2020-08-31

## 2020-08-31 DIAGNOSIS — N17.9 AKI (ACUTE KIDNEY INJURY): ICD-10-CM

## 2020-08-31 LAB
ALBUMIN SERPL BCP-MCNC: 3 G/DL (ref 3.5–5.2)
ANION GAP SERPL CALC-SCNC: 6 MMOL/L (ref 8–16)
BUN SERPL-MCNC: 36 MG/DL (ref 8–23)
CALCIUM SERPL-MCNC: 8.7 MG/DL (ref 8.7–10.5)
CHLORIDE SERPL-SCNC: 116 MMOL/L (ref 95–110)
CO2 SERPL-SCNC: 20 MMOL/L (ref 23–29)
CREAT SERPL-MCNC: 2.6 MG/DL (ref 0.5–1.4)
EST. GFR  (AFRICAN AMERICAN): 27.1 ML/MIN/1.73 M^2
EST. GFR  (NON AFRICAN AMERICAN): 23.4 ML/MIN/1.73 M^2
GLUCOSE SERPL-MCNC: 98 MG/DL (ref 70–110)
PHOSPHATE SERPL-MCNC: 3.1 MG/DL (ref 2.7–4.5)
POTASSIUM SERPL-SCNC: 4.9 MMOL/L (ref 3.5–5.1)
SODIUM SERPL-SCNC: 142 MMOL/L (ref 136–145)

## 2020-08-31 PROCEDURE — 36415 COLL VENOUS BLD VENIPUNCTURE: CPT | Mod: PN

## 2020-08-31 PROCEDURE — 80069 RENAL FUNCTION PANEL: CPT

## 2020-08-31 NOTE — PROGRESS NOTES
This is an addendum to email reply from earlier today.  Creatinine is 2.6 which is improved since last week. So holding torsemide and increasing water intake is helping.  Continue to do so. There is slight build up of acid in the blood so confirm consistent intake of sodium bicarbonate.   Please repeat renal panel in 2 days.  Please schedule appointment for hematology as one of the tests since last week's labs showed abnormal protein secreted by bone marrow. Thanks.

## 2020-09-01 ENCOUNTER — TELEPHONE (OUTPATIENT)
Dept: NEPHROLOGY | Facility: CLINIC | Age: 75
End: 2020-09-01

## 2020-09-01 DIAGNOSIS — N17.9 AKI (ACUTE KIDNEY INJURY): Primary | ICD-10-CM

## 2020-09-01 NOTE — TELEPHONE ENCOUNTER
Shine Mcqueen MD   8/31/2020  1:12 PM      This is an addendum to email reply from earlier today.  Creatinine is 2.6 which is improved since last week. So holding torsemide and increasing water intake is helping.  Continue to do so. There is slight build up of acid in the blood so confirm consistent intake of sodium bicarbonate.   Please repeat renal panel in 2 days.  Please schedule appointment for hematology as one of the tests since last week's labs showed abnormal protein secreted by bone marrow. Thanks.     Still take sod bicard

## 2020-09-02 ENCOUNTER — LAB VISIT (OUTPATIENT)
Dept: LAB | Facility: HOSPITAL | Age: 75
End: 2020-09-02
Attending: INTERNAL MEDICINE
Payer: MEDICARE

## 2020-09-02 DIAGNOSIS — N17.9 AKI (ACUTE KIDNEY INJURY): ICD-10-CM

## 2020-09-02 LAB
ALBUMIN SERPL BCP-MCNC: 3.2 G/DL (ref 3.5–5.2)
ANION GAP SERPL CALC-SCNC: 7 MMOL/L (ref 8–16)
BUN SERPL-MCNC: 37 MG/DL (ref 8–23)
CALCIUM SERPL-MCNC: 8.8 MG/DL (ref 8.7–10.5)
CHLORIDE SERPL-SCNC: 116 MMOL/L (ref 95–110)
CO2 SERPL-SCNC: 19 MMOL/L (ref 23–29)
CREAT SERPL-MCNC: 2.8 MG/DL (ref 0.5–1.4)
EST. GFR  (AFRICAN AMERICAN): 24.7 ML/MIN/1.73 M^2
EST. GFR  (NON AFRICAN AMERICAN): 21.4 ML/MIN/1.73 M^2
GLUCOSE SERPL-MCNC: 95 MG/DL (ref 70–110)
PHOSPHATE SERPL-MCNC: 3 MG/DL (ref 2.7–4.5)
POTASSIUM SERPL-SCNC: 4.8 MMOL/L (ref 3.5–5.1)
SODIUM SERPL-SCNC: 142 MMOL/L (ref 136–145)

## 2020-09-02 PROCEDURE — 80069 RENAL FUNCTION PANEL: CPT

## 2020-09-02 PROCEDURE — 36415 COLL VENOUS BLD VENIPUNCTURE: CPT | Mod: PN

## 2020-09-04 ENCOUNTER — TELEPHONE (OUTPATIENT)
Dept: SLEEP MEDICINE | Facility: CLINIC | Age: 75
End: 2020-09-04

## 2020-09-04 ENCOUNTER — TELEPHONE (OUTPATIENT)
Dept: NEPHROLOGY | Facility: CLINIC | Age: 75
End: 2020-09-04

## 2020-09-04 NOTE — TELEPHONE ENCOUNTER
----- Message from Zeina Alcaraz sent at 9/4/2020  8:46 AM CDT -----  Contact: Yessenia with [employer]  Name of Who is Calling: Yessenia with [employer]    What is the request in detail: Yessenia would like last visit notes re-faxed to [phone].       Can the clinic reply by [facility]: no      What Number to Call Back if not in [facility]: [phone]

## 2020-09-04 NOTE — TELEPHONE ENCOUNTER
Just spoke with Yessenia from [employer] to inform her that I will be re-faxing the pt chart notes back over to her.

## 2020-09-04 NOTE — TELEPHONE ENCOUNTER
Result Notes     Shine Mcqueen MD   9/3/2020  9:43 PM      Repeat lab did not show any worsening. But continue oral hydration. Restart torsemide at half of previous dose only if start noticing swelling on legs. Continue blood pressure monitoring at home. Inform if swelling on legs/ systolic BP rises above 140. Thanks.

## 2020-09-09 ENCOUNTER — LAB VISIT (OUTPATIENT)
Dept: LAB | Facility: HOSPITAL | Age: 75
End: 2020-09-09
Attending: INTERNAL MEDICINE
Payer: MEDICARE

## 2020-09-09 ENCOUNTER — OFFICE VISIT (OUTPATIENT)
Dept: HEMATOLOGY/ONCOLOGY | Facility: CLINIC | Age: 75
End: 2020-09-09
Payer: MEDICARE

## 2020-09-09 VITALS
TEMPERATURE: 98 F | HEIGHT: 74 IN | DIASTOLIC BLOOD PRESSURE: 66 MMHG | OXYGEN SATURATION: 96 % | WEIGHT: 200.19 LBS | RESPIRATION RATE: 18 BRPM | SYSTOLIC BLOOD PRESSURE: 121 MMHG | HEART RATE: 62 BPM | BODY MASS INDEX: 25.69 KG/M2

## 2020-09-09 DIAGNOSIS — R76.8 ELEVATED SERUM IMMUNOGLOBULIN FREE LIGHT CHAINS: ICD-10-CM

## 2020-09-09 DIAGNOSIS — D51.3 OTHER DIETARY VITAMIN B12 DEFICIENCY ANEMIA: ICD-10-CM

## 2020-09-09 DIAGNOSIS — D64.9 NORMOCYTIC ANEMIA: ICD-10-CM

## 2020-09-09 DIAGNOSIS — C90.00 MULTIPLE MYELOMA NOT HAVING ACHIEVED REMISSION: ICD-10-CM

## 2020-09-09 DIAGNOSIS — D52.0 DIETARY FOLATE DEFICIENCY ANEMIA: ICD-10-CM

## 2020-09-09 DIAGNOSIS — D64.9 NORMOCYTIC ANEMIA: Primary | ICD-10-CM

## 2020-09-09 DIAGNOSIS — E87.20 METABOLIC ACIDOSIS: ICD-10-CM

## 2020-09-09 DIAGNOSIS — G47.33 OSA (OBSTRUCTIVE SLEEP APNEA): ICD-10-CM

## 2020-09-09 DIAGNOSIS — N17.9 AKI (ACUTE KIDNEY INJURY): ICD-10-CM

## 2020-09-09 DIAGNOSIS — D89.89 LIGHT CHAIN DISEASE, KAPPA TYPE: ICD-10-CM

## 2020-09-09 DIAGNOSIS — E55.9 VITAMIN D DEFICIENCY: ICD-10-CM

## 2020-09-09 DIAGNOSIS — R80.1 PERSISTENT PROTEINURIA: ICD-10-CM

## 2020-09-09 LAB
BASOPHILS # BLD AUTO: 0.06 K/UL (ref 0–0.2)
BASOPHILS NFR BLD: 1 % (ref 0–1.9)
DAT IGG-SP REAG RBC-IMP: NORMAL
DIFFERENTIAL METHOD: ABNORMAL
EOSINOPHIL # BLD AUTO: 0.1 K/UL (ref 0–0.5)
EOSINOPHIL NFR BLD: 1.9 % (ref 0–8)
ERYTHROCYTE [DISTWIDTH] IN BLOOD BY AUTOMATED COUNT: 14.6 % (ref 11.5–14.5)
FOLATE SERPL-MCNC: 3 NG/ML (ref 4–24)
HAPTOGLOB SERPL-MCNC: 212 MG/DL (ref 30–250)
HCT VFR BLD AUTO: 36.7 % (ref 40–54)
HGB BLD-MCNC: 11.4 G/DL (ref 14–18)
IMM GRANULOCYTES # BLD AUTO: 0.02 K/UL (ref 0–0.04)
IMM GRANULOCYTES NFR BLD AUTO: 0.3 % (ref 0–0.5)
LYMPHOCYTES # BLD AUTO: 0.7 K/UL (ref 1–4.8)
LYMPHOCYTES NFR BLD: 11.5 % (ref 18–48)
MCH RBC QN AUTO: 29.5 PG (ref 27–31)
MCHC RBC AUTO-ENTMCNC: 31.1 G/DL (ref 32–36)
MCV RBC AUTO: 95 FL (ref 82–98)
MONOCYTES # BLD AUTO: 0.5 K/UL (ref 0.3–1)
MONOCYTES NFR BLD: 8.3 % (ref 4–15)
NEUTROPHILS # BLD AUTO: 4.6 K/UL (ref 1.8–7.7)
NEUTROPHILS NFR BLD: 77 % (ref 38–73)
NRBC BLD-RTO: 0 /100 WBC
PLATELET # BLD AUTO: 189 K/UL (ref 150–350)
PMV BLD AUTO: 11 FL (ref 9.2–12.9)
RBC # BLD AUTO: 3.87 M/UL (ref 4.6–6.2)
RETICS/RBC NFR AUTO: 1.5 % (ref 0.4–2)
VIT B12 SERPL-MCNC: 208 PG/ML (ref 210–950)
WBC # BLD AUTO: 5.92 K/UL (ref 3.9–12.7)

## 2020-09-09 PROCEDURE — 82746 ASSAY OF FOLIC ACID SERUM: CPT

## 2020-09-09 PROCEDURE — 82607 VITAMIN B-12: CPT

## 2020-09-09 PROCEDURE — 83010 ASSAY OF HAPTOGLOBIN QUANT: CPT

## 2020-09-09 PROCEDURE — 82668 ASSAY OF ERYTHROPOIETIN: CPT

## 2020-09-09 PROCEDURE — 85045 AUTOMATED RETICULOCYTE COUNT: CPT

## 2020-09-09 PROCEDURE — 99999 PR PBB SHADOW E&M-EST. PATIENT-LVL V: CPT | Mod: PBBFAC,,, | Performed by: INTERNAL MEDICINE

## 2020-09-09 PROCEDURE — 36415 COLL VENOUS BLD VENIPUNCTURE: CPT

## 2020-09-09 PROCEDURE — 99999 PR PBB SHADOW E&M-EST. PATIENT-LVL V: ICD-10-PCS | Mod: PBBFAC,,, | Performed by: INTERNAL MEDICINE

## 2020-09-09 PROCEDURE — 85025 COMPLETE CBC W/AUTO DIFF WBC: CPT

## 2020-09-09 PROCEDURE — 86880 COOMBS TEST DIRECT: CPT

## 2020-09-09 PROCEDURE — 99215 OFFICE O/P EST HI 40 MIN: CPT | Mod: PBBFAC,25 | Performed by: INTERNAL MEDICINE

## 2020-09-09 PROCEDURE — 99205 PR OFFICE/OUTPT VISIT, NEW, LEVL V, 60-74 MIN: ICD-10-PCS | Mod: S$PBB,,, | Performed by: INTERNAL MEDICINE

## 2020-09-09 PROCEDURE — 99205 OFFICE O/P NEW HI 60 MIN: CPT | Mod: S$PBB,,, | Performed by: INTERNAL MEDICINE

## 2020-09-09 NOTE — Clinical Note
1. PET in the next few weeks  2. Bone marrow biopsy in clinic with NP 1st available  3. See me in clinic atleast 1 week after marrow is done

## 2020-09-09 NOTE — PROGRESS NOTES
PATIENT: Teodoro Mast  MRN: [id number]  DATE: 9/9/2020      Diagnosis:   1. Normocytic anemia    2. KINGS (acute kidney injury)    3. Dietary folate deficiency anemia     4. Other dietary vitamin B12 deficiency anemia     5. Light chain disease, kappa type    6. Multiple myeloma not having achieved remission         Chief Complaint: Consult      Subjective:    Initial History: Mr. Mast is a 75 y.o. male who has long standing history of HTN and CKD IIIB/IV who is referred to us for normocytic anemia and abnormal serum light chains. He has had chronic mild normocytic anemia for a while however over the last 3 years his Hgb has continued to drop gradually. An anemia workup was performed which included iron panel, SPEP, SHELIA and serum light chains. Iron panel was not suggestive of iron deficiency. SPEP and SHELIA were negative for monoclonal paraproteins. Serum light chains revealed elevated Kappa and Lambda light chains, K/L ratio was 2.63. His baseline serum crt is around 2.8. His CMP did not reveal any hypercalcemia. He has not had any skeletal imaging yet. He is a retired , he stays fairly active. His appetite is good. He denies any unintentional weight loss, fevers or night sweats.     Past Medical History:   Past Medical History:   Diagnosis Date    Hyperlipidemia     Hypertension        Past Surgical HIstory:   Past Surgical History:   Procedure Laterality Date    EYE SURGERY Bilateral     cataract removal    FINGER SURGERY      hemorriodectomy         Family History:   Family History   Problem Relation Age of Onset    Heart disease Mother     Hypertension Mother     Heart disease Father         pacemaker    Seizures Sister     Hammer toes Brother     Heart disease Brother     Premature birth Son     Cancer Sister         throat    No Known Problems Sister     No Known Problems Sister     No Known Problems Brother     No Known Problems Son        Social History:  reports that he  quit smoking about 46 years ago. His smoking use included cigarettes. He has a 5.00 pack-year smoking history. He has never used smokeless tobacco. He reports current alcohol use. He reports that he does not use drugs.    Allergies:  Review of patient's allergies indicates:  No Known Allergies    Medications:  Current Outpatient Medications   Medication Sig Dispense Refill    allopurinoL (ZYLOPRIM) 100 MG tablet TAKE 1 TABLET BY MOUTH EVERY DAY 90 tablet 6    amLODIPine (NORVASC) 10 MG tablet TAKE 1 TABLET BY MOUTH ONCE DAILY 90 tablet 3    atorvastatin (LIPITOR) 80 MG tablet Take 1 tablet (80 mg total) by mouth once daily. 90 tablet 3    calcitRIOL (ROCALTROL) 0.25 MCG Cap Take 1 capsule (0.25 mcg total) by mouth once daily. 30 capsule 11    labetaloL (NORMODYNE) 300 MG tablet Take 1 tablet (300 mg total) by mouth 2 (two) times daily. 60 tablet 11    lisinopriL (PRINIVIL,ZESTRIL) 40 MG tablet TAKE 1 TABLET(40 MG) BY MOUTH EVERY DAY 90 tablet 3    sodium bicarbonate 325 MG tablet TAKE 1 TABLET(325 MG) BY MOUTH TWICE DAILY 180 tablet 4    sodium bicarbonate 325 MG tablet Take 1 tablet (325 mg total) by mouth 2 (two) times daily. 180 tablet 11    tamsulosin (FLOMAX) 0.4 mg Cap TAKE 1 CAPSULE(0.4 MG) BY MOUTH EVERY DAY 90 capsule 3    VIT C/VIT E/LUTEIN/MIN/OMEGA-3 (OCUVITE ORAL) Take by mouth once daily.       vitamin D 1000 units Tab Take 2,000 mg by mouth once daily.      torsemide (DEMADEX) 20 MG Tab TAKE 1 TABLET(20 MG) BY MOUTH EVERY DAY (Patient not taking: Reported on 9/9/2020) 90 tablet 3     No current facility-administered medications for this visit.        Review of Systems   Constitutional: Negative for activity change, appetite change, chills, diaphoresis, fatigue, fever and unexpected weight change.   HENT: Negative for congestion and rhinorrhea.    Eyes: Negative for visual disturbance.   Respiratory: Negative for cough and shortness of breath.    Cardiovascular: Negative for chest pain,  "palpitations and leg swelling.   Gastrointestinal: Negative for abdominal pain, blood in stool, constipation, diarrhea, nausea and vomiting.   Genitourinary: Negative for difficulty urinating, dysuria and hematuria.   Musculoskeletal: Negative for arthralgias, back pain, joint swelling and myalgias.   Skin: Negative for rash and wound.   Neurological: Negative for dizziness, seizures, weakness, light-headedness, numbness and headaches.   Hematological: Does not bruise/bleed easily.       ECOG Performance Status: 0   Objective:      Vitals:   Vitals:    09/09/20 0746   BP: 121/66   BP Location: Left arm   Patient Position: Sitting   BP Method: Large (Automatic)   Pulse: 62   Resp: 18   Temp: 98.2 °F (36.8 °C)   TempSrc: Oral   SpO2: 96%   Weight: 90.8 kg (200 lb 2.8 oz)   Height: 6' 2" (1.88 m)     BMI: Body mass index is 25.7 kg/m².    Physical Exam  Vitals signs and nursing note reviewed.   Constitutional:       General: He is not in acute distress.     Appearance: He is well-developed. He is not diaphoretic.   HENT:      Head: Normocephalic and atraumatic.      Mouth/Throat:      Pharynx: No oropharyngeal exudate.   Eyes:      General: No scleral icterus.        Right eye: No discharge.         Left eye: No discharge.      Conjunctiva/sclera: Conjunctivae normal.   Neck:      Vascular: No JVD.   Cardiovascular:      Rate and Rhythm: Normal rate and regular rhythm.      Heart sounds: Normal heart sounds. No murmur. No friction rub. No gallop.    Pulmonary:      Effort: Pulmonary effort is normal. No respiratory distress.      Breath sounds: Normal breath sounds. No stridor. No wheezing or rales.   Abdominal:      General: Bowel sounds are normal. There is no distension.      Palpations: Abdomen is soft. There is no mass.      Tenderness: There is no abdominal tenderness. There is no guarding.   Musculoskeletal:      Right lower leg: No edema.      Left lower leg: No edema.   Lymphadenopathy:      Cervical: No " cervical adenopathy.         Laboratory Data:  Lab Visit on 09/09/2020   Component Date Value Ref Range Status    Pathologist Review 09/09/2020 Review required   Final    Retic 09/09/2020 1.5  0.4 - 2.0 % Final    WBC 09/09/2020 5.92  3.90 - 12.70 K/uL Final    RBC 09/09/2020 3.87* 4.60 - 6.20 M/uL Final    Hemoglobin 09/09/2020 11.4* 14.0 - 18.0 g/dL Final    Hematocrit 09/09/2020 36.7* 40.0 - 54.0 % Final    Mean Corpuscular Volume 09/09/2020 95  82 - 98 fL Final    Mean Corpuscular Hemoglobin 09/09/2020 29.5  27.0 - 31.0 pg Final    Mean Corpuscular Hemoglobin Conc 09/09/2020 31.1* 32.0 - 36.0 g/dL Final    RDW 09/09/2020 14.6* 11.5 - 14.5 % Final    Platelets 09/09/2020 189  150 - 350 K/uL Final    MPV 09/09/2020 11.0  9.2 - 12.9 fL Final    Immature Granulocytes 09/09/2020 0.3  0.0 - 0.5 % Final    Gran # (ANC) 09/09/2020 4.6  1.8 - 7.7 K/uL Final    Immature Grans (Abs) 09/09/2020 0.02  0.00 - 0.04 K/uL Final    Comment: Mild elevation in immature granulocytes is non specific and   can be seen in a variety of conditions including stress response,   acute inflammation, trauma and pregnancy. Correlation with other   laboratory and clinical findings is essential.      Lymph # 09/09/2020 0.7* 1.0 - 4.8 K/uL Final    Mono # 09/09/2020 0.5  0.3 - 1.0 K/uL Final    Eos # 09/09/2020 0.1  0.0 - 0.5 K/uL Final    Baso # 09/09/2020 0.06  0.00 - 0.20 K/uL Final    nRBC 09/09/2020 0  0 /100 WBC Final    Gran% 09/09/2020 77.0* 38.0 - 73.0 % Final    Lymph% 09/09/2020 11.5* 18.0 - 48.0 % Final    Mono% 09/09/2020 8.3  4.0 - 15.0 % Final    Eosinophil% 09/09/2020 1.9  0.0 - 8.0 % Final    Basophil% 09/09/2020 1.0  0.0 - 1.9 % Final    Differential Method 09/09/2020 Automated   Final           Assessment and Plan:       1. Normocytic anemia    2. KINGS (acute kidney injury)    3. Dietary folate deficiency anemia     4. Other dietary vitamin B12 deficiency anemia     5. Light chain disease, kappa type     6. Multiple myeloma not having achieved remission         Very pleasant 74 yo gentleman with anemia, CKD and abnormal serum light chains. We had a long discussion with Mr. Mast today. We explained that there is low suspicion for multiple myeloma and that his elevated light chains are likely due to renal insufficieny. We will complete the MM rule-out workup with UPEP, 24 hour urine SHELIA, and a PET CT. We will also set him up for a bone marrow biopsy. We will also work up his anemia further with B12, Folate, KARYNA, Retic count, Haptoglobin, Epo level.      RTC 7-10 days after completion of bone marrow biopsy.       The patient was seen and discussed with attending Dr. Euceda.    Jimmie Quintero MD, PGY-5  Hematology and Oncology Fellow

## 2020-09-09 NOTE — LETTER
September 10, 2020      Shine Mcqueen MD  [address]  [facility] [address]           [facility]  [address]  [facility] [address]  Phone: [phone]          Patient: Teodoro Mast   MR Number: [id number]   YOB: 1945   Date of Visit: 9/9/2020       Dear Dr. Shine Mcqueen:    Thank you for referring Teodoro Mast to me for evaluation. Attached you will find relevant portions of my assessment and plan of care.    If you have questions, please do not hesitate to call me. I look forward to following Teodoro Mast along with you.    Sincerely,    Zeina Euceda MD    Enclosure  CC:  No Recipients    If you would like to receive this communication electronically, please contact [email or web address] or [phone] to request more information on [facility] access.    For providers and/or their staff who would like to refer a patient to [facility], please contact us through our one-stop-shop provider referral line, [facility], at [phone].    If you feel you have received this communication in error or would no longer like to receive these types of communications, please e-mail [email or web address]

## 2020-09-10 PROBLEM — R76.8 ELEVATED SERUM IMMUNOGLOBULIN FREE LIGHT CHAINS: Status: ACTIVE | Noted: 2020-09-10

## 2020-09-10 LAB — EPO SERPL-ACNC: 9.1 MIU/ML (ref 2.6–18.5)

## 2020-09-11 ENCOUNTER — OFFICE VISIT (OUTPATIENT)
Dept: CARDIOLOGY | Facility: CLINIC | Age: 75
End: 2020-09-11
Payer: MEDICARE

## 2020-09-11 VITALS
SYSTOLIC BLOOD PRESSURE: 170 MMHG | WEIGHT: 203.25 LBS | HEART RATE: 80 BPM | HEIGHT: 75 IN | BODY MASS INDEX: 25.27 KG/M2 | DIASTOLIC BLOOD PRESSURE: 99 MMHG

## 2020-09-11 DIAGNOSIS — G47.33 OSA (OBSTRUCTIVE SLEEP APNEA): ICD-10-CM

## 2020-09-11 DIAGNOSIS — M1A.9XX0 CHRONIC GOUT WITHOUT TOPHUS, UNSPECIFIED CAUSE, UNSPECIFIED SITE: ICD-10-CM

## 2020-09-11 DIAGNOSIS — I10 ESSENTIAL HYPERTENSION: Primary | ICD-10-CM

## 2020-09-11 DIAGNOSIS — E78.2 MIXED HYPERLIPIDEMIA: ICD-10-CM

## 2020-09-11 DIAGNOSIS — N40.1 BPH WITH URINARY OBSTRUCTION: ICD-10-CM

## 2020-09-11 DIAGNOSIS — I10 HYPERTENSION, UNSPECIFIED TYPE: ICD-10-CM

## 2020-09-11 DIAGNOSIS — N18.4 CKD (CHRONIC KIDNEY DISEASE) STAGE 4, GFR 15-29 ML/MIN: ICD-10-CM

## 2020-09-11 DIAGNOSIS — E79.0 HYPERURICEMIA: ICD-10-CM

## 2020-09-11 DIAGNOSIS — N13.8 BPH WITH URINARY OBSTRUCTION: ICD-10-CM

## 2020-09-11 PROCEDURE — 99999 PR PBB SHADOW E&M-EST. PATIENT-LVL III: CPT | Mod: PBBFAC,,, | Performed by: INTERNAL MEDICINE

## 2020-09-11 PROCEDURE — 99214 PR OFFICE/OUTPT VISIT, EST, LEVL IV, 30-39 MIN: ICD-10-PCS | Mod: S$PBB,,, | Performed by: INTERNAL MEDICINE

## 2020-09-11 PROCEDURE — 99999 PR PBB SHADOW E&M-EST. PATIENT-LVL III: ICD-10-PCS | Mod: PBBFAC,,, | Performed by: INTERNAL MEDICINE

## 2020-09-11 PROCEDURE — 99213 OFFICE O/P EST LOW 20 MIN: CPT | Mod: PBBFAC | Performed by: INTERNAL MEDICINE

## 2020-09-11 PROCEDURE — 99214 OFFICE O/P EST MOD 30 MIN: CPT | Mod: S$PBB,,, | Performed by: INTERNAL MEDICINE

## 2020-09-11 RX ORDER — LABETALOL 300 MG/1
300 TABLET, FILM COATED ORAL 2 TIMES DAILY
Qty: 60 TABLET | Refills: 11 | Status: SHIPPED | OUTPATIENT
Start: 2020-09-11 | End: 2020-10-26

## 2020-09-11 RX ORDER — AMLODIPINE BESYLATE 10 MG/1
10 TABLET ORAL DAILY
Qty: 90 TABLET | Refills: 3 | Status: SHIPPED | OUTPATIENT
Start: 2020-09-11 | End: 2020-12-01

## 2020-09-11 RX ORDER — ATORVASTATIN CALCIUM 80 MG/1
80 TABLET, FILM COATED ORAL DAILY
Qty: 90 TABLET | Refills: 3 | Status: SHIPPED | OUTPATIENT
Start: 2020-09-11 | End: 2021-05-31 | Stop reason: SDUPTHER

## 2020-09-11 RX ORDER — TORSEMIDE 20 MG/1
20 TABLET ORAL DAILY
Qty: 90 TABLET | Refills: 3 | Status: SHIPPED | OUTPATIENT
Start: 2020-09-11 | End: 2021-01-05

## 2020-09-11 RX ORDER — LISINOPRIL 40 MG/1
40 TABLET ORAL DAILY
Qty: 90 TABLET | Refills: 3 | Status: SHIPPED | OUTPATIENT
Start: 2020-09-11 | End: 2021-05-26

## 2020-09-11 NOTE — PROGRESS NOTES
Subjective:    Patient ID:  Teodoro Mast is a 75 y.o. male who presents for follow-up of hypertension    HPI     75 years old male followed with hypertension, hyperlipidemia and chronic renal insuffiencey followed by Dr Mcqueen [Nephrology] and Juan[ Urology] for elevated PSA. He is being evaluated by Heme-Onc for possible multiple myeloma .  A bone marrow biopsy is pending. He denies chest pain or FRAGA. His home BPs have been doing well  120s-130s  Lab Results   Component Value Date     09/02/2020    K 4.8 09/02/2020     (H) 09/02/2020    CO2 19 (L) 09/02/2020    BUN 37 (H) 09/02/2020    CREATININE 2.8 (H) 09/02/2020    GLU 95 09/02/2020    HGBA1C 5.9 07/28/2006    AST 23 02/13/2020    ALT 19 02/13/2020    ALBUMIN 3.2 (L) 09/02/2020    PROT 6.5 02/13/2020    BILITOT 0.5 02/13/2020    WBC 5.92 09/09/2020    HGB 11.4 (L) 09/09/2020    HCT 36.7 (L) 09/09/2020    MCV 95 09/09/2020     09/09/2020    PSA 3.9 12/17/2013         Lab Results   Component Value Date    CHOL 139 02/13/2020    HDL 33 (L) 02/13/2020    TRIG 120 02/13/2020       Lab Results   Component Value Date    LDLCALC 82.0 02/13/2020       Past Medical History:   Diagnosis Date    Hyperlipidemia     Hypertension        Current Outpatient Medications:     allopurinoL (ZYLOPRIM) 100 MG tablet, TAKE 1 TABLET BY MOUTH EVERY DAY, Disp: 90 tablet, Rfl: 6    amLODIPine (NORVASC) 10 MG tablet, TAKE 1 TABLET BY MOUTH ONCE DAILY, Disp: 90 tablet, Rfl: 3    atorvastatin (LIPITOR) 80 MG tablet, Take 1 tablet (80 mg total) by mouth once daily., Disp: 90 tablet, Rfl: 3    calcitRIOL (ROCALTROL) 0.25 MCG Cap, Take 1 capsule (0.25 mcg total) by mouth once daily., Disp: 30 capsule, Rfl: 11    labetaloL (NORMODYNE) 300 MG tablet, Take 1 tablet (300 mg total) by mouth 2 (two) times daily., Disp: 60 tablet, Rfl: 11    lisinopriL (PRINIVIL,ZESTRIL) 40 MG tablet, TAKE 1 TABLET(40 MG) BY MOUTH EVERY DAY, Disp: 90 tablet, Rfl: 3    sodium  bicarbonate 325 MG tablet, TAKE 1 TABLET(325 MG) BY MOUTH TWICE DAILY, Disp: 180 tablet, Rfl: 4    sodium bicarbonate 325 MG tablet, Take 1 tablet (325 mg total) by mouth 2 (two) times daily., Disp: 180 tablet, Rfl: 11    tamsulosin (FLOMAX) 0.4 mg Cap, TAKE 1 CAPSULE(0.4 MG) BY MOUTH EVERY DAY, Disp: 90 capsule, Rfl: 3    torsemide (DEMADEX) 20 MG Tab, TAKE 1 TABLET(20 MG) BY MOUTH EVERY DAY (Patient not taking: Reported on 9/9/2020), Disp: 90 tablet, Rfl: 3    VIT C/VIT E/LUTEIN/MIN/OMEGA-3 (OCUVITE ORAL), Take by mouth once daily. , Disp: , Rfl:     vitamin D 1000 units Tab, Take 2,000 mg by mouth once daily., Disp: , Rfl:           Review of Systems   Constitution: Negative for decreased appetite, diaphoresis, fever, malaise/fatigue, weight gain and weight loss.   HENT: Negative for congestion, ear discharge, ear pain and nosebleeds.    Eyes: Negative for blurred vision, double vision and visual disturbance.   Cardiovascular: Negative for chest pain, claudication, cyanosis, dyspnea on exertion, irregular heartbeat, leg swelling, near-syncope, orthopnea, palpitations, paroxysmal nocturnal dyspnea and syncope.   Respiratory: Negative for cough, hemoptysis, shortness of breath, sleep disturbances due to breathing, snoring, sputum production and wheezing.    Endocrine: Negative for polydipsia, polyphagia and polyuria.   Hematologic/Lymphatic: Negative for adenopathy and bleeding problem. Does not bruise/bleed easily.   Skin: Negative for color change, nail changes, poor wound healing and rash.   Musculoskeletal: Negative for muscle cramps and muscle weakness.   Gastrointestinal: Negative for abdominal pain, anorexia, change in bowel habit, hematochezia, nausea and vomiting.   Genitourinary: Negative for dysuria, frequency and hematuria.   Neurological: Negative for brief paralysis, difficulty with concentration, excessive daytime sleepiness, dizziness, focal weakness, headaches, light-headedness, seizures,  "vertigo and weakness.   Psychiatric/Behavioral: Negative for altered mental status and depression.   Allergic/Immunologic: Negative for persistent infections.        Objective:BP (!) 170/99 (BP Location: Left arm, Patient Position: Sitting, BP Method: Medium (Automatic))   Pulse 80   Ht 6' 3" (1.905 m)   Wt 92.2 kg (203 lb 4.2 oz)   BMI 25.41 kg/m²             Physical Exam   Constitutional: He is oriented to person, place, and time. He appears well-developed and well-nourished.   HENT:   Head: Normocephalic.   Right Ear: External ear normal.   Left Ear: External ear normal.   Nose: Nose normal.   Inspection of lips, teeth and gums normal   Eyes: Pupils are equal, round, and reactive to light. EOM are normal. No scleral icterus.   Neck: Normal range of motion. Neck supple. No JVD present. No tracheal deviation present. No thyromegaly present.   Cardiovascular: Normal rate, regular rhythm and intact distal pulses. Exam reveals no gallop and no friction rub.   No murmur heard.  Pulses:       Carotid pulses are 2+ on the right side and 2+ on the left side.       Dorsalis pedis pulses are 2+ on the right side and 2+ on the left side.        Posterior tibial pulses are 2+ on the right side and 2+ on the left side.   Pulmonary/Chest: Effort normal and breath sounds normal.   Abdominal: Bowel sounds are normal. He exhibits no distension. There is no hepatosplenomegaly. There is no abdominal tenderness. There is no guarding.   Musculoskeletal: Normal range of motion.         General: No tenderness or edema.   Lymphadenopathy:   Palpation of neck and groin lymph nodes normal   Neurological: He is alert and oriented to person, place, and time. No cranial nerve deficit. He exhibits normal muscle tone. Coordination normal.   Skin: Skin is dry.   Palpation of skin normal   Psychiatric: His behavior is normal. Judgment and thought content normal.         Assessment:       1. Essential hypertension    2. Mixed hyperlipidemia  "   3. CKD (chronic kidney disease) stage 4, GFR 15-29 ml/min    4. BPH with urinary obstruction    5. GLYNN (obstructive sleep apnea)    6. Hyperuricemia    7. Chronic gout without tophus, unspecified cause, unspecified site         Plan:       Teodoro was seen today for mixed hyperlipidemia.    Diagnoses and all orders for this visit:    Essential hypertension  -     Basic metabolic panel; Future; Expected date: 03/13/2021    Mixed hyperlipidemia  -     Lipid Panel; Future; Expected date: 03/13/2021    CKD (chronic kidney disease) stage 4, GFR 15-29 ml/min  -     Basic metabolic panel; Future; Expected date: 03/13/2021  -     CBC auto differential; Future; Expected date: 03/13/2021    BPH with urinary obstruction    GLYNN (obstructive sleep apnea)    Hyperuricemia    Chronic gout without tophus, unspecified cause, unspecified site

## 2020-09-22 ENCOUNTER — LAB VISIT (OUTPATIENT)
Dept: LAB | Facility: HOSPITAL | Age: 75
End: 2020-09-22
Payer: MEDICARE

## 2020-09-22 DIAGNOSIS — D64.9 NORMOCYTIC ANEMIA: ICD-10-CM

## 2020-09-22 LAB
PROT 24H UR-MRATE: 3079 MG/SPEC (ref 0–100)
PROT UR-MCNC: 179 MG/DL (ref 0–15)
URINE COLLECTION DURATION: 24 HR
URINE VOLUME: 1720 ML

## 2020-09-22 PROCEDURE — 86335 IMMUNFIX E-PHORSIS/URINE/CSF: CPT | Mod: 26,,, | Performed by: PATHOLOGY

## 2020-09-22 PROCEDURE — 84156 ASSAY OF PROTEIN URINE: CPT

## 2020-09-22 PROCEDURE — 86335 PATHOLOGIST INTERPRETATION UIFE: ICD-10-PCS | Mod: 26,,, | Performed by: PATHOLOGY

## 2020-09-22 PROCEDURE — 86335 IMMUNFIX E-PHORSIS/URINE/CSF: CPT

## 2020-09-23 ENCOUNTER — HOSPITAL ENCOUNTER (OUTPATIENT)
Dept: RADIOLOGY | Facility: HOSPITAL | Age: 75
Discharge: HOME | End: 2020-09-23
Payer: MEDICARE

## 2020-09-23 ENCOUNTER — PROCEDURE VISIT (OUTPATIENT)
Dept: HEMATOLOGY/ONCOLOGY | Facility: CLINIC | Age: 75
End: 2020-09-23
Payer: MEDICARE

## 2020-09-23 VITALS
TEMPERATURE: 98 F | HEART RATE: 78 BPM | SYSTOLIC BLOOD PRESSURE: 148 MMHG | RESPIRATION RATE: 18 BRPM | DIASTOLIC BLOOD PRESSURE: 76 MMHG | OXYGEN SATURATION: 98 %

## 2020-09-23 DIAGNOSIS — D89.89 LIGHT CHAIN DISEASE, KAPPA TYPE: ICD-10-CM

## 2020-09-23 DIAGNOSIS — E78.2 MIXED HYPERLIPIDEMIA: ICD-10-CM

## 2020-09-23 DIAGNOSIS — D64.9 NORMOCYTIC ANEMIA: ICD-10-CM

## 2020-09-23 DIAGNOSIS — C90.00 MULTIPLE MYELOMA NOT HAVING ACHIEVED REMISSION: ICD-10-CM

## 2020-09-23 DIAGNOSIS — D64.9 NORMOCYTIC ANEMIA: Primary | ICD-10-CM

## 2020-09-23 LAB
INTERPRETATION UR IFE-IMP: NORMAL
PATHOLOGIST INTERPRETATION UIFE: NORMAL
POCT GLUCOSE: 107 MG/DL (ref 70–110)

## 2020-09-23 PROCEDURE — 88341 IMHCHEM/IMCYTCHM EA ADD ANTB: CPT | Mod: 59 | Performed by: PATHOLOGY

## 2020-09-23 PROCEDURE — 88189 PR  FLOWCYTOMETRY/READ, 16 & > MARKERS: ICD-10-PCS | Mod: ,,, | Performed by: PATHOLOGY

## 2020-09-23 PROCEDURE — 88305 TISSUE EXAM BY PATHOLOGIST: CPT | Mod: 26,,, | Performed by: PATHOLOGY

## 2020-09-23 PROCEDURE — 85097 BONE MARROW INTERPRETATION: CPT | Mod: ,,, | Performed by: PATHOLOGY

## 2020-09-23 PROCEDURE — 38222 PR BONE MARROW BIOPSY(IES) W/ASPIRATION(S); DIAGNOSTIC: ICD-10-PCS | Mod: S$PBB,LT,, | Performed by: NURSE PRACTITIONER

## 2020-09-23 PROCEDURE — 38222 DX BONE MARROW BX & ASPIR: CPT | Mod: PBBFAC | Performed by: NURSE PRACTITIONER

## 2020-09-23 PROCEDURE — 88185 FLOWCYTOMETRY/TC ADD-ON: CPT | Mod: 59 | Performed by: PATHOLOGY

## 2020-09-23 PROCEDURE — 88341 IMHCHEM/IMCYTCHM EA ADD ANTB: CPT | Mod: 26,59,, | Performed by: PATHOLOGY

## 2020-09-23 PROCEDURE — 88313 SPECIAL STAINS GROUP 2: CPT | Mod: 26,,, | Performed by: PATHOLOGY

## 2020-09-23 PROCEDURE — 88313 SPECIAL STAINS GROUP 2: CPT | Performed by: PATHOLOGY

## 2020-09-23 PROCEDURE — 88184 FLOWCYTOMETRY/ TC 1 MARKER: CPT | Performed by: PATHOLOGY

## 2020-09-23 PROCEDURE — 88341 PR IHC OR ICC EACH ADD'L SINGLE ANTIBODY  STAINPR: ICD-10-PCS | Mod: 26,59,, | Performed by: PATHOLOGY

## 2020-09-23 PROCEDURE — 88311 DECALCIFY TISSUE: CPT | Mod: 26,,, | Performed by: PATHOLOGY

## 2020-09-23 PROCEDURE — 78816 PET IMAGE W/CT FULL BODY: CPT | Mod: TC

## 2020-09-23 PROCEDURE — 78816 PET IMAGE W/CT FULL BODY: CPT | Mod: 26,PI,, | Performed by: RADIOLOGY

## 2020-09-23 PROCEDURE — 85097 PR  BONE MARROW,SMEAR INTERPRETATION: ICD-10-PCS | Mod: ,,, | Performed by: PATHOLOGY

## 2020-09-23 PROCEDURE — 38222 DX BONE MARROW BX & ASPIR: CPT | Mod: S$PBB,LT,, | Performed by: NURSE PRACTITIONER

## 2020-09-23 PROCEDURE — 88311 PR  DECALCIFY TISSUE: ICD-10-PCS | Mod: 26,,, | Performed by: PATHOLOGY

## 2020-09-23 PROCEDURE — 38220 DX BONE MARROW ASPIRATIONS: CPT | Mod: PBBFAC | Performed by: NURSE PRACTITIONER

## 2020-09-23 PROCEDURE — 88305 TISSUE EXAM BY PATHOLOGIST: ICD-10-PCS | Mod: 26,,, | Performed by: PATHOLOGY

## 2020-09-23 PROCEDURE — 88313 PR  SPECIAL STAINS,GROUP II: ICD-10-PCS | Mod: 26,,, | Performed by: PATHOLOGY

## 2020-09-23 PROCEDURE — 88342 IMHCHEM/IMCYTCHM 1ST ANTB: CPT | Mod: 26,59,, | Performed by: PATHOLOGY

## 2020-09-23 PROCEDURE — 88311 DECALCIFY TISSUE: CPT | Performed by: PATHOLOGY

## 2020-09-23 PROCEDURE — 78816 NM PET CT WHOLE BODY: ICD-10-PCS | Mod: 26,PI,, | Performed by: RADIOLOGY

## 2020-09-23 PROCEDURE — 88342 CHG IMMUNOCYTOCHEMISTRY: ICD-10-PCS | Mod: 26,59,, | Performed by: PATHOLOGY

## 2020-09-23 PROCEDURE — 88342 IMHCHEM/IMCYTCHM 1ST ANTB: CPT | Performed by: PATHOLOGY

## 2020-09-23 PROCEDURE — 88305 TISSUE EXAM BY PATHOLOGIST: CPT | Mod: 59 | Performed by: PATHOLOGY

## 2020-09-23 PROCEDURE — 88189 FLOWCYTOMETRY/READ 16 & >: CPT | Mod: ,,, | Performed by: PATHOLOGY

## 2020-09-23 NOTE — PROCEDURES
Bone marrow    Date/Time: 9/23/2020 10:00 AM  Performed by: Ashley Johnson NP  Authorized by: Jimmie Quintero MD     Consent Done?:  Yes (Written)  Aspiration?: Yes    Biopsy?: Yes      PROCEDURE NOTE:  Date of Procedure: 09/23/2020  Bone Marrow Biopsy and Aspiration  Indication: normocytic anemia  Consent: Informed consent was obtained from patient.  Timeout: Done and documented.  Position: prone  Site: Left posterior illiac crest.  Prep: Betadine.  Needle used: 11 gauge Jamshidi needle.  Anesthetic: 2% lidocaine 5 cc.  Biopsy: The biopsy needle was introduced into the marrow cavity and an aspirate was obtained without complications and sent for flow cytometry, NGS, PCPD fish, DNA/RNA hold, and cytogenetics. Core biopsy obtained without difficulty and sent for routine histologic examination.  Complications: None.  Disposition: The patient was placed supine for 20min following procedure. RN to assess bandaid for bleeding prior to discharge home.  Blood loss: Minimal.     Ashley Johnson NP  Hematology/Oncology/BMT

## 2020-09-25 LAB
BODY SITE - BONE MARROW: NORMAL
CLINICAL DIAGNOSIS - BONE MARROW: NORMAL
FLOW CYTOMETRY ANTIBODIES ANALYZED - BONE MARROW: NORMAL
FLOW CYTOMETRY COMMENT - BONE MARROW: NORMAL
FLOW CYTOMETRY INTERPRETATION - BONE MARROW: NORMAL

## 2020-09-26 LAB
DNA/RNA EXTRACT AND HOLD RESULT: NORMAL
DNA/RNA EXTRACTION: NORMAL
EXHR SPECIMEN TYPE: NORMAL

## 2020-09-29 ENCOUNTER — OFFICE VISIT (OUTPATIENT)
Dept: NEPHROLOGY | Facility: CLINIC | Age: 75
End: 2020-09-29
Payer: MEDICARE

## 2020-09-29 ENCOUNTER — PATIENT MESSAGE (OUTPATIENT)
Dept: OTHER | Facility: OTHER | Age: 75
End: 2020-09-29

## 2020-09-29 VITALS
OXYGEN SATURATION: 95 % | WEIGHT: 203.5 LBS | HEART RATE: 59 BPM | HEIGHT: 75 IN | DIASTOLIC BLOOD PRESSURE: 80 MMHG | SYSTOLIC BLOOD PRESSURE: 140 MMHG | BODY MASS INDEX: 25.3 KG/M2

## 2020-09-29 DIAGNOSIS — I12.9 HYPERTENSIVE KIDNEY DISEASE WITH STAGE 4 CHRONIC KIDNEY DISEASE: ICD-10-CM

## 2020-09-29 DIAGNOSIS — N18.4 CKD (CHRONIC KIDNEY DISEASE) STAGE 4, GFR 15-29 ML/MIN: Primary | ICD-10-CM

## 2020-09-29 DIAGNOSIS — E87.20 METABOLIC ACIDOSIS: ICD-10-CM

## 2020-09-29 DIAGNOSIS — N18.4 HYPERTENSIVE KIDNEY DISEASE WITH STAGE 4 CHRONIC KIDNEY DISEASE: ICD-10-CM

## 2020-09-29 DIAGNOSIS — R80.9 PROTEINURIA, UNSPECIFIED TYPE: ICD-10-CM

## 2020-09-29 DIAGNOSIS — N25.81 SECONDARY HYPERPARATHYROIDISM: ICD-10-CM

## 2020-09-29 DIAGNOSIS — E79.0 HYPERURICEMIA: ICD-10-CM

## 2020-09-29 DIAGNOSIS — N28.1 KIDNEY CYSTS: ICD-10-CM

## 2020-09-29 DIAGNOSIS — E55.9 VITAMIN D DEFICIENCY: ICD-10-CM

## 2020-09-29 DIAGNOSIS — R76.8 ELEVATED SERUM IMMUNOGLOBULIN FREE LIGHT CHAINS: ICD-10-CM

## 2020-09-29 LAB
CHROM BANDING METHOD: NORMAL
CHROMOSOME ANALYSIS BM ADDITIONAL INFORMATION: NORMAL
CHROMOSOME ANALYSIS BM RELEASED BY: NORMAL
CHROMOSOME ANALYSIS BM RESULT SUMMARY: NORMAL
CLINICAL CYTOGENETICIST REVIEW: NORMAL
KARYOTYP MAR: NORMAL
REASON FOR REFERRAL (NARRATIVE): NORMAL
REF LAB TEST METHOD: NORMAL
SPECIMEN SOURCE: NORMAL
SPECIMEN: NORMAL

## 2020-09-29 PROCEDURE — 99214 PR OFFICE/OUTPT VISIT, EST, LEVL IV, 30-39 MIN: ICD-10-PCS | Mod: S$PBB,,, | Performed by: INTERNAL MEDICINE

## 2020-09-29 PROCEDURE — 99999 PR PBB SHADOW E&M-EST. PATIENT-LVL IV: CPT | Mod: PBBFAC,,, | Performed by: INTERNAL MEDICINE

## 2020-09-29 PROCEDURE — 99214 OFFICE O/P EST MOD 30 MIN: CPT | Mod: S$PBB,,, | Performed by: INTERNAL MEDICINE

## 2020-09-29 PROCEDURE — 99214 OFFICE O/P EST MOD 30 MIN: CPT | Mod: PBBFAC | Performed by: INTERNAL MEDICINE

## 2020-09-29 PROCEDURE — 99999 PR PBB SHADOW E&M-EST. PATIENT-LVL IV: ICD-10-PCS | Mod: PBBFAC,,, | Performed by: INTERNAL MEDICINE

## 2020-09-29 RX ORDER — SODIUM BICARBONATE 650 MG/1
650 TABLET ORAL 2 TIMES DAILY
Qty: 180 TABLET | Refills: 6 | Status: SHIPPED | OUTPATIENT
Start: 2020-09-29 | End: 2020-12-21

## 2020-09-29 NOTE — PATIENT INSTRUCTIONS
Increase sodium bicarbonate dose to 650 mg twice per day.    Monthly renal panel blood test.    Follow up in 2.5 months with all labs pre clinic.    Follow low salt diet.    Visit [employer] website for more information.

## 2020-09-29 NOTE — PROGRESS NOTES
Subjective:       Patient ID: Teodoro Mast is a 75 y.o. Black or  male who presents for follow up of CKD.    HPI     Mr. Mast was seen for follow up of CKD. He was seen on 9/6/16 in new patient evaluation and last followed on 8/27/20. On 11/12/19 he was followed by [facility].      At the time of his visit in August 2020 he was noted to have worsening kidney function. He was clinically felt to be behind on fluids. Hence he was advised to hold Torsemide transiently, adjust the dose of labetalol per BP readings in case rising as a result of lack of Torsemide and repeat labs which showed transient improvement but it did get slightly worse again on follow up labs. Other work up was obtained around that time which showed elevated free light chain. Hence pt was referred to hematology. He is s/p bone marrow biopsy. Path reports do not indicate myeloma. Pt had US kidneys, bladder in the context of worsened renal function. It showed a small post void residual. Pt was hence advised to see his urologist. He states he will see Dr. Salcido in 2 months. Currently denies any problem with emptying of the bladder.    Due to secondary hyperparathyroidism he was started on calcitriol.     He remains off Torsemide. He brought his home BP readings which are in the range of -130's, DBP 80's. He denies any episode of low BP. Off Torsemide he denies any SOB/ leg swelling.        Per prior clinic notes:  Interim his labs show worsening of kidney function. Pre clinic labs showing KINGS superimposed on CKD IV. He admits he does not drink enough fluids. He brought his home BP readings, noted in 120-130's range, occasionally SBP in 110 range. He denies any orthostatic symptoms. He has enlarged prostate. He feels at times problems with emptying of the bladder and has noticed difficulty emptying when urinating in standing position.     He has been on multiple antihypertensive medicines. He has been on Torsemide 20 mg per day.  He denies any leg swelling/ SOB. He states previously he was on HCTZ which was not effective in controlling BP. Hence his cardiologist changed to Torsemide.    He has been watching his blood glucose. So he states he tends to eat smaller meal at dinner time. He denies any nausea/ vomiting/ diarrhea/ fever/ chills/ night sweats/ SOB/ flank pain/ cloudy urine/ bone pain.    He denies any NSAID use recently. He does not have a recent exposure to IV iodine contrast.       Pt has longstanding hypertension since 1974, remote h/o tobacco smoking, hyperlipidemia, BPH, CKD, proteinuria. He reports family h/o kidney disease in his father that was not diagnosed until he was in his 80's. Pt reports he was diagnosed with hypertension when he was 29 years old. He reports in the past he had some obstruction to the urinary tract but unable to provide any details.    In the past he was using medicines like Indomethacin, ibuprofen. He has been diagnosed with sleep apnea. He is using CPAP regularly.    He also reports his sleep quality is better with CPAP machine.    Of note, he saw his PCP/ cardiologist in January 2019 when his BP was noted to be 190's/90's. He was started on torsemide by his cardiologist for better BP control. Pre clinic that time creatinine was 2.0, it did rise to 2.4 in February 2019 labs along with rise in BUN to 39. His cardiologist reduced the dose of diuretic in response to this. But overall, he has progression of CKD since then.    He reports he had prior episodes of gout and he feels intake of beer, craw fish triggers it for him. He has been taking allopurinol regularly which has helped lower the uric acid levels gradually.    He has slow progression of CKD. Labs were reviewed and d/w him. His creatinine has gradually increased from 1.4 to 1.9 since 2010. Prior urine studies show proteinuria. He appears to have chronic anemia.     prior labs showed SPEP/ SHELIA was normal. hep B/C screen was negative. US kidneys  from 10/16 showed 10.1 and 12.9 cm kidneys, changes of CKD, cortical thinning, multiple cysts on both sides and enlarged prostate.     Renal Function:  Lab Results   Component Value Date    GLU 95 09/02/2020    GLU 98 08/31/2020     09/02/2020     08/31/2020    K 4.8 09/02/2020    K 4.9 08/31/2020     (H) 09/02/2020     (H) 08/31/2020    CO2 19 (L) 09/02/2020    CO2 20 (L) 08/31/2020    BUN 37 (H) 09/02/2020    BUN 36 (H) 08/31/2020    CALCIUM 8.8 09/02/2020    CALCIUM 8.7 08/31/2020    CREATININE 2.8 (H) 09/02/2020    CREATININE 2.6 (H) 08/31/2020    ALBUMIN 3.2 (L) 09/02/2020    ALBUMIN 3.0 (L) 08/31/2020    PHOS 3.0 09/02/2020    PHOS 3.1 08/31/2020    ESTGFRAFRICA 24.7 (A) 09/02/2020    ESTGFRAFRICA 27.1 (A) 08/31/2020    EGFRNONAA 21.4 (A) 09/02/2020    EGFRNONAA 23.4 (A) 08/31/2020       Urinalysis:  Lab Results   Component Value Date    APPEARANCEUA Clear 08/24/2020    PHUR 5.0 08/24/2020    SPECGRAV 1.010 08/24/2020    PROTEINUA 2+ (A) 08/24/2020    GLUCUA Negative 08/24/2020    OCCULTUA Negative 08/24/2020    NITRITE Negative 08/24/2020    LEUKOCYTESUR Negative 08/24/2020       Protein/Creatinine Ratio:  Lab Results   Component Value Date    PROTEINURINE 179 (H) 09/22/2020    CREATRANDUR 110.0 08/24/2020    UTPCR 1.24 (H) 08/24/2020       CBC:  Lab Results   Component Value Date    WBC 5.92 09/09/2020    HGB 11.4 (L) 09/09/2020    HCT 36.7 (L) 09/09/2020       Vit D 32  Uric acid 7.7    US Kidneys 6/2018  10.4 and 13.6 cm kidneys, cortical thinning, loss of corticomedullary distinction, multiple cysts on both kidneys, prostatomegaly.      US kidneys 8/27/20  FINDINGS:  Right kidney: Measures 11 cm.  Resistive index measures 0.75.  Mild thinning of the cortex.  Multiple anechoic cysts, the largest measuring 3 x 3.1 x 2.6 cm in the upper pole.  No hydronephrosis.  No solid mass lesions.     Left kidney: Measures 13.6 cm.  Resistive index measures 0.76.  Mild thinning of the  cortex.  Many anechoic cystic lesions, the largest measuring 2.4 x 2.1 x 2.6 cm in the upper pole.  No hydronephrosis.  No solid mass lesions.     Urinary bladder volume measures 142 mL prevoid.  Postvoid residual 48.6 mL.     Impression:     1. Multiple renal cysts, generally similar to prior.  2. Mildly elevated renal artery resistive indices bilaterally, nonspecific but may be seen in medical renal disease.  3. Small postvoid residual.    Review of Systems   Constitutional: Negative for activity change, appetite change, chills, fatigue and fever.   Eyes: Negative for pain and discharge.   Respiratory: Negative for cough, shortness of breath and wheezing.    Cardiovascular: Negative for chest pain and leg swelling.   Gastrointestinal: Negative for abdominal pain, diarrhea, nausea and vomiting.   Endocrine: Negative for polydipsia and polyuria.   Genitourinary: Negative for decreased urine volume, difficulty urinating, dysuria, flank pain, frequency and hematuria.   Musculoskeletal: Negative for back pain and myalgias.   Skin: Negative for pallor and rash.   Neurological: Negative for dizziness, light-headedness and headaches.   Psychiatric/Behavioral: The patient is not nervous/anxious.        Objective:      Physical Exam   Constitutional: He is oriented to person, place, and time. He appears well-developed and well-nourished. No distress.   Eyes: Right eye exhibits no discharge. Left eye exhibits no discharge.   Neck: Neck supple.   Cardiovascular: Normal rate, regular rhythm.   Pulmonary/Chest: Effort normal. No respiratory distress. He has no audible wheezes.    Musculoskeletal: He exhibits no edema. no asterixis.  Neurological: He is alert and oriented to person, place, and time.   Skin: Skin is warm. He is not diaphoretic.   Psychiatric: He has a normal mood and affect. His behavior is normal.   Vitals reviewed.      Assessment:       1. CKD (chronic kidney disease) stage 4, GFR 15-29 ml/min    2.  Hypertensive kidney disease with stage 4 chronic kidney disease    3. Metabolic acidosis    4. Proteinuria, unspecified type    5. Elevated serum immunoglobulin free light chains    6. Kidney cysts    7. Secondary hyperparathyroidism    8. Vitamin D deficiency    9. Hyperuricemia        Plan:     Mr. Mast has longstanding hypertension, hyperlipidemia, BPH, CKD with sub nephrotic proteinuria. He has hypertensive glomerulosclerosis causing CKD as well has NSAID induced kidney damage. He denies any NSAID use currently but it appears in the past he was taking indomethacin. He has slow progression of CKD. Also he has slow progression of proteinuria. Most recently he had uncontrolled hypertension which could have led to progression of CKD and proteinuria. As such he has hypertension since 1970's.     He developed KINGS in January/ February which is likely due to diuretic dosing which was added to address his severely uncontrolled hypertension around that time. Overall since then he does have further somewhat rapid progression of CKD. I brought this to his attention. Stressed need for better BP control. Ideally his SBP should be < 130 with sub nephrotic proteinuria he has been having. Overall his BP control was sub optimal for quite some time.    Since adding allopurinol at a lower dose his hyperuricemia seems to have improved. Continue current dose for now.    I explained to him about CKD staging, potential for progression, risk factors for CKD etc. Stressed importance of good BP control, low salt diet, keeping hydrated, avoiding NSAID, nephrotoxins etc. Previously noted, cysts on both kidneys and changes of CKD on US.     will follow K levels closely. Pt advised to avoid high K containing foods.     He has KINGS superimposed on CKD IV. This is clinically suspected due to pre renal azotemia/ volume depletion. He admits to not drinking enough fluids.     Continue to follow with hematology.    Increase the dose of sodium  bicarbonate to 650 mg bid from 325 mg bid as met acidosis is not optimally controlled.    Monthly renal panel to monitor creatinine, acid base status, eGFR, electrolytes closely    Home BP monitoring, avoid hypotensive episodes, review holding parameters  Continue current antihypertensives with lisinopril, use caution given worsened kidney function, more frequent labs to monitor for electrolyte abnormalities/ acid base changes and possible change in Rx if worsening of pre renal azotemia/ hyperkalemia   Off Torsemide due to recurrent pre renal azotemia causing KINGS superimposed on CKD IV  Pt explained to watch for SOB/ fluid retention/ edema and inform of such promptly.    Return to see urologist   - continue to follow with urology, continue Flomax. He has prostate enlargement and elevated PSA for several years. He has occasional microhematuria seen on urine studies    Explained this could be still progression of CKD and he as such has CKD onset > 10 years ago. Interim he did have problems with BP control which could have accelerated CKD progression.    Trend uric acid level  continue allopurinol, renal dosing    Continue calcitriol 0.25 mcg daily for sec hyperparathyroidism  - follow PTH levels, vit D, decrease OTC vitamin D, follow corrected Ca    - periodically monitor renal panel for electrolytes, acid base status, eGFR  - risk of CKD progression d/w patient  - low salt diet  - trend urine studies for proteinuria  - avoid NSAID/ bactrim/ IV contrast/ gadolinium/ aminoglycoside where possible  - he has chronic anemia, periodically trend Hb     refer for AVF creation if eGFR stays < 20, pt made aware    He verbalized understanding.  RTC 10 weeks

## 2020-10-01 ENCOUNTER — PATIENT MESSAGE (OUTPATIENT)
Dept: NEPHROLOGY | Facility: CLINIC | Age: 75
End: 2020-10-01

## 2020-10-01 ENCOUNTER — OFFICE VISIT (OUTPATIENT)
Dept: HEMATOLOGY/ONCOLOGY | Facility: CLINIC | Age: 75
End: 2020-10-01
Payer: MEDICARE

## 2020-10-01 VITALS
DIASTOLIC BLOOD PRESSURE: 64 MMHG | SYSTOLIC BLOOD PRESSURE: 127 MMHG | HEIGHT: 75 IN | RESPIRATION RATE: 16 BRPM | TEMPERATURE: 98 F | HEART RATE: 62 BPM | WEIGHT: 206.38 LBS | OXYGEN SATURATION: 98 % | BODY MASS INDEX: 25.66 KG/M2

## 2020-10-01 DIAGNOSIS — N17.9 AKI (ACUTE KIDNEY INJURY): ICD-10-CM

## 2020-10-01 DIAGNOSIS — E53.8 FOLATE DEFICIENCY: ICD-10-CM

## 2020-10-01 DIAGNOSIS — E53.8 VITAMIN B12 DEFICIENCY: ICD-10-CM

## 2020-10-01 DIAGNOSIS — E55.9 VITAMIN D DEFICIENCY: Primary | ICD-10-CM

## 2020-10-01 DIAGNOSIS — R76.8 ELEVATED SERUM IMMUNOGLOBULIN FREE LIGHT CHAINS: ICD-10-CM

## 2020-10-01 LAB
GENETICIST REVIEW: NORMAL
PLASMA CELL PROLIF RELEASED BY: NORMAL
PLASMA CELL PROLIF RESULT SUMMARY: NORMAL
PLASMA CELL PROLIF RESULT TABLE: NORMAL
REASON FOR REFERRAL, PLASMA CELL PROLIF (PCPD), FISH: NORMAL
REF LAB TEST METHOD: NORMAL
RESULTS, PLASMA CELL PROLIF (PCPD), FISH: NORMAL
SERVICE CMNT-IMP: NORMAL
SERVICE CMNT-IMP: NORMAL
SPECIMEN SOURCE: NORMAL
SPECIMEN, PLASMA CELL PROLIF (PCPD), FISH: NORMAL

## 2020-10-01 PROCEDURE — 99999 PR PBB SHADOW E&M-EST. PATIENT-LVL IV: ICD-10-PCS | Mod: PBBFAC,,, | Performed by: INTERNAL MEDICINE

## 2020-10-01 PROCEDURE — 99999 PR PBB SHADOW E&M-EST. PATIENT-LVL IV: CPT | Mod: PBBFAC,,, | Performed by: INTERNAL MEDICINE

## 2020-10-01 PROCEDURE — 99215 OFFICE O/P EST HI 40 MIN: CPT | Mod: S$PBB,,, | Performed by: INTERNAL MEDICINE

## 2020-10-01 PROCEDURE — 99215 PR OFFICE/OUTPT VISIT, EST, LEVL V, 40-54 MIN: ICD-10-PCS | Mod: S$PBB,,, | Performed by: INTERNAL MEDICINE

## 2020-10-01 PROCEDURE — 99214 OFFICE O/P EST MOD 30 MIN: CPT | Mod: PBBFAC | Performed by: INTERNAL MEDICINE

## 2020-10-01 NOTE — PROGRESS NOTES
PATIENT: Teodoro Mast  MRN: [id number]  DATE: 10/1/2020      Diagnosis:   Anemia     Chief Complaint: Follow-up and Results      Subjective:    Initial History: Mr. Mast is a 75 y.o. male who has long standing history of HTN and CKD IIIB/IV who is referred to us for normocytic anemia and abnormal serum light chains. He has had chronic mild normocytic anemia for a while however over the last 3 years his Hgb has continued to drop gradually. An anemia workup was performed which included iron panel, SPEP, SHELIA and serum light chains. Iron panel was not suggestive of iron deficiency. SPEP and SHELIA were negative for monoclonal paraproteins. Serum light chains revealed elevated Kappa and Lambda light chains, K/L ratio was 2.63. His baseline serum crt is around 2.8. His CMP did not reveal any hypercalcemia. He has not had any skeletal imaging yet. He is a retired , he stays fairly active. His appetite is good. He denies any unintentional weight loss, fevers or night sweats.     Interval History:  Mr. Mast did well with the work up and is pleased to hear he does not have multiple myeloma. He reports the bone marrow biopsy hurt more than expected and far more than the 5 root canals he has had.     Past Medical History:   Past Medical History:   Diagnosis Date    Hyperlipidemia     Hypertension        Past Surgical HIstory:   Past Surgical History:   Procedure Laterality Date    EYE SURGERY Bilateral     cataract removal    FINGER SURGERY      hemorriodectomy         Family History:   Family History   Problem Relation Age of Onset    Heart disease Mother     Hypertension Mother     Heart disease Father         pacemaker    Seizures Sister     Hammer toes Brother     Heart disease Brother     Premature birth Son     Cancer Sister         throat    No Known Problems Sister     No Known Problems Sister     No Known Problems Brother     No Known Problems Son        Social History:  reports  that he quit smoking about 46 years ago. His smoking use included cigarettes. He has a 5.00 pack-year smoking history. He has never used smokeless tobacco. He reports current alcohol use. He reports that he does not use drugs.    Allergies:  Review of patient's allergies indicates:  No Known Allergies    Medications:  Current Outpatient Medications   Medication Sig Dispense Refill    allopurinoL (ZYLOPRIM) 100 MG tablet TAKE 1 TABLET BY MOUTH EVERY DAY 90 tablet 6    amLODIPine (NORVASC) 10 MG tablet Take 1 tablet (10 mg total) by mouth once daily. 90 tablet 3    atorvastatin (LIPITOR) 80 MG tablet Take 1 tablet (80 mg total) by mouth once daily. 90 tablet 3    calcitRIOL (ROCALTROL) 0.25 MCG Cap Take 1 capsule (0.25 mcg total) by mouth once daily. 30 capsule 11    labetaloL (NORMODYNE) 300 MG tablet Take 1 tablet (300 mg total) by mouth 2 (two) times daily. 60 tablet 11    lisinopriL (PRINIVIL,ZESTRIL) 40 MG tablet Take 1 tablet (40 mg total) by mouth once daily. 90 tablet 3    sodium bicarbonate 650 MG tablet Take 1 tablet (650 mg total) by mouth 2 (two) times daily. 180 tablet 6    tamsulosin (FLOMAX) 0.4 mg Cap TAKE 1 CAPSULE(0.4 MG) BY MOUTH EVERY DAY 90 capsule 3    VIT C/VIT E/LUTEIN/MIN/OMEGA-3 (OCUVITE ORAL) Take by mouth once daily.       vitamin D 1000 units Tab Take 2,000 mg by mouth once daily.      torsemide (DEMADEX) 20 MG Tab Take 1 tablet (20 mg total) by mouth once daily. (Patient not taking: Reported on 9/29/2020) 90 tablet 3     No current facility-administered medications for this visit.        Review of Systems   Constitutional: Negative for activity change, appetite change, chills, diaphoresis, fatigue, fever and unexpected weight change.   HENT: Negative for congestion and rhinorrhea.    Eyes: Negative for visual disturbance.   Respiratory: Negative for cough and shortness of breath.    Cardiovascular: Negative for chest pain, palpitations and leg swelling.   Gastrointestinal:  "Negative for abdominal pain, blood in stool, constipation, diarrhea, nausea and vomiting.   Genitourinary: Negative for difficulty urinating, dysuria and hematuria.   Musculoskeletal: Negative for arthralgias, back pain, joint swelling and myalgias.   Skin: Negative for rash and wound.   Neurological: Negative for dizziness, seizures, weakness, light-headedness, numbness and headaches.   Hematological: Does not bruise/bleed easily.       ECOG Performance Status: 0   Objective:      Vitals:   Vitals:    10/01/20 0952   BP: 127/64   BP Location: Left arm   Patient Position: Sitting   BP Method: Large (Automatic)   Pulse: 62   Resp: 16   Temp: 98.2 °F (36.8 °C)   TempSrc: Oral   SpO2: 98%   Weight: 93.6 kg (206 lb 5.6 oz)   Height: 6' 3" (1.905 m)     BMI: Body mass index is 25.79 kg/m².    Physical Exam  Vitals signs and nursing note reviewed.   Constitutional:       General: He is not in acute distress.     Appearance: He is well-developed. He is not diaphoretic.   HENT:      Head: Normocephalic and atraumatic.      Mouth/Throat:      Pharynx: No oropharyngeal exudate.   Eyes:      General: No scleral icterus.        Right eye: No discharge.         Left eye: No discharge.      Conjunctiva/sclera: Conjunctivae normal.   Neck:      Vascular: No JVD.   Cardiovascular:      Rate and Rhythm: Normal rate and regular rhythm.      Heart sounds: Normal heart sounds. No murmur. No friction rub. No gallop.    Pulmonary:      Effort: Pulmonary effort is normal. No respiratory distress.      Breath sounds: Normal breath sounds. No stridor. No wheezing or rales.   Abdominal:      General: Bowel sounds are normal. There is no distension.      Palpations: Abdomen is soft. There is no mass.      Tenderness: There is no abdominal tenderness. There is no guarding.   Musculoskeletal:      Right lower leg: No edema.      Left lower leg: No edema.   Lymphadenopathy:      Cervical: No cervical adenopathy.         Laboratory Data:  Lab " Results   Component Value Date    WBC 5.92 09/09/2020    HGB 11.4 (L) 09/09/2020    HCT 36.7 (L) 09/09/2020     09/09/2020    CHOL 139 02/13/2020    TRIG 120 02/13/2020    HDL 33 (L) 02/13/2020    ALT 19 02/13/2020    AST 23 02/13/2020     09/02/2020    K 4.8 09/02/2020     (H) 09/02/2020    CREATININE 2.8 (H) 09/02/2020    BUN 37 (H) 09/02/2020    CO2 19 (L) 09/02/2020    PSA 3.9 12/17/2013    HGBA1C 5.9 07/28/2006     Urine immunofixation: No monoclonal peaks identified.     Folate: 3.0 [low]  Vitamin B12: 208 [low]  Haptoglobin: 212  Erythropoietin: 9.1      PET: No evidence of hypermetabolic lesions in this patient with history of multiple myeloma.  Bony structures are unremarkable except for degenerative disease of the spine.  2. Several subcentimeter pulmonary nodules are identified with no corresponding hypermetabolic activity, as above.  Attention follow-up is recommended.      Assessment and Plan:       Mr. Mast is a pleasant 75 year old male who presents today to discuss work up for possible cause of anemia.      Very pleasant 74 yo gentleman with anemia, CKD and abnormal serum light chains. We had a long discussion with Mr. Mast today. We explained that there is low suspicion for multiple myeloma and that his elevated light chains are likely due to renal insufficieny. We will complete the MM rule-out workup with UPEP, 24 hour urine SHELIA, and a PET CT. We will also set him up for a bone marrow biopsy. Work up included  B12, Folate, KARYNA, Retic count, Haptoglobin, Epo level.      Work up was significant for B-12 and folate deficiency  --instructed to take daily supplements of these two vitamins  --okay to follow labs with PCP  --Encouraged to continue to do age appropriate cancer screening          30 minutes were spent face to face with the patient and his  family to discuss the disease, natural history, treatment options. I have provided the patient with an opportunity to ask  questions and have all questions answered to his satisfaction.       he will return to clinic PRN, but knows to call in the interim if symptoms change or should a problem arise.        Zeina Euceda MD  Hematology and Medical Oncology  Bone Marrow Transplant  [facility]

## 2020-10-09 LAB
ANNOTATION COMMENT IMP: NORMAL
NGS CLINCIAL TRIALS: NORMAL
NGS INDICATION OF TEST: NORMAL
NGS INTERPRETATION: NORMAL
NGS ONCOHEME PANEL GENE LIST: NORMAL
NGS PATHOGENIC MUTATIONS DETECTED: NORMAL
NGS REVIEWED BY:: NORMAL
NGS VARIANTS OF UNKNOWN SIGNIFICANCE: NORMAL
NGSHM RESULT, BONE MARROW: NORMAL
REF LAB TEST METHOD: NORMAL
SPECIMEN SOURCE: NORMAL
TEST PERFORMANCE INFO SPEC: NORMAL

## 2020-10-12 LAB
COMMENT: NORMAL
FINAL PATHOLOGIC DIAGNOSIS: NORMAL
GROSS: NORMAL
Lab: NORMAL
MICROSCOPIC EXAM: NORMAL
SUPPLEMENTAL DIAGNOSIS: NORMAL

## 2020-10-15 ENCOUNTER — PATIENT MESSAGE (OUTPATIENT)
Dept: NEPHROLOGY | Facility: CLINIC | Age: 75
End: 2020-10-15

## 2020-10-26 ENCOUNTER — PATIENT MESSAGE (OUTPATIENT)
Dept: NEPHROLOGY | Facility: CLINIC | Age: 75
End: 2020-10-26

## 2020-10-26 DIAGNOSIS — I10 HYPERTENSION, UNSPECIFIED TYPE: ICD-10-CM

## 2020-10-26 RX ORDER — LABETALOL 300 MG/1
300 TABLET, FILM COATED ORAL 3 TIMES DAILY
Qty: 90 TABLET | Refills: 11 | Status: SHIPPED | OUTPATIENT
Start: 2020-10-26 | End: 2021-05-31

## 2020-10-27 ENCOUNTER — TELEPHONE (OUTPATIENT)
Dept: NEPHROLOGY | Facility: CLINIC | Age: 75
End: 2020-10-27

## 2020-10-27 ENCOUNTER — LAB VISIT (OUTPATIENT)
Dept: LAB | Facility: HOSPITAL | Age: 75
End: 2020-10-27
Attending: INTERNAL MEDICINE
Payer: MEDICARE

## 2020-10-27 DIAGNOSIS — N18.4 CKD (CHRONIC KIDNEY DISEASE) STAGE 4, GFR 15-29 ML/MIN: ICD-10-CM

## 2020-10-27 LAB
ALBUMIN SERPL BCP-MCNC: 2.8 G/DL (ref 3.5–5.2)
ANION GAP SERPL CALC-SCNC: 5 MMOL/L (ref 8–16)
BUN SERPL-MCNC: 33 MG/DL (ref 8–23)
CALCIUM SERPL-MCNC: 8.3 MG/DL (ref 8.7–10.5)
CHLORIDE SERPL-SCNC: 112 MMOL/L (ref 95–110)
CO2 SERPL-SCNC: 24 MMOL/L (ref 23–29)
CREAT SERPL-MCNC: 2.7 MG/DL (ref 0.5–1.4)
EST. GFR  (AFRICAN AMERICAN): 25.9 ML/MIN/1.73 M^2
EST. GFR  (NON AFRICAN AMERICAN): 22.4 ML/MIN/1.73 M^2
GLUCOSE SERPL-MCNC: 101 MG/DL (ref 70–110)
PHOSPHATE SERPL-MCNC: 3.4 MG/DL (ref 2.7–4.5)
POTASSIUM SERPL-SCNC: 4.2 MMOL/L (ref 3.5–5.1)
SODIUM SERPL-SCNC: 141 MMOL/L (ref 136–145)

## 2020-10-27 PROCEDURE — 80069 RENAL FUNCTION PANEL: CPT

## 2020-10-27 PROCEDURE — 36415 COLL VENOUS BLD VENIPUNCTURE: CPT | Mod: PN

## 2020-10-27 NOTE — TELEPHONE ENCOUNTER
Shine Mcqueen MD   10/27/2020  3:45 PM      Please inform patient:     Your kidney function is around the same, no drastic change from recent baseline.  Continue your medications as prescribed and follow-up as scheduled. Please stay hydrated. Return appointment in December. Thanks.

## 2020-10-27 NOTE — PROGRESS NOTES
Please inform patient:    Your kidney function is around the same, no drastic change from recent baseline.  Continue your medications as prescribed and follow-up as scheduled. Please stay hydrated. Return appointment in December. Thanks.

## 2020-11-11 ENCOUNTER — TELEPHONE (OUTPATIENT)
Dept: NEPHROLOGY | Facility: CLINIC | Age: 75
End: 2020-11-11

## 2020-11-19 ENCOUNTER — PATIENT MESSAGE (OUTPATIENT)
Dept: NEPHROLOGY | Facility: CLINIC | Age: 75
End: 2020-11-19

## 2020-11-23 DIAGNOSIS — N40.1 BPH WITH URINARY OBSTRUCTION: ICD-10-CM

## 2020-11-23 DIAGNOSIS — N13.8 BPH WITH URINARY OBSTRUCTION: ICD-10-CM

## 2020-11-24 ENCOUNTER — LAB VISIT (OUTPATIENT)
Dept: LAB | Facility: HOSPITAL | Age: 75
End: 2020-11-24
Attending: INTERNAL MEDICINE
Payer: MEDICARE

## 2020-11-24 DIAGNOSIS — N28.1 KIDNEY CYSTS: ICD-10-CM

## 2020-11-24 DIAGNOSIS — E55.9 VITAMIN D DEFICIENCY: ICD-10-CM

## 2020-11-24 DIAGNOSIS — R76.8 ELEVATED SERUM IMMUNOGLOBULIN FREE LIGHT CHAINS: ICD-10-CM

## 2020-11-24 DIAGNOSIS — R80.9 PROTEINURIA, UNSPECIFIED TYPE: ICD-10-CM

## 2020-11-24 DIAGNOSIS — E79.0 HYPERURICEMIA: ICD-10-CM

## 2020-11-24 DIAGNOSIS — E87.20 METABOLIC ACIDOSIS: ICD-10-CM

## 2020-11-24 DIAGNOSIS — N18.4 CKD (CHRONIC KIDNEY DISEASE) STAGE 4, GFR 15-29 ML/MIN: ICD-10-CM

## 2020-11-24 DIAGNOSIS — N18.4 HYPERTENSIVE KIDNEY DISEASE WITH STAGE 4 CHRONIC KIDNEY DISEASE: ICD-10-CM

## 2020-11-24 DIAGNOSIS — I12.9 HYPERTENSIVE KIDNEY DISEASE WITH STAGE 4 CHRONIC KIDNEY DISEASE: ICD-10-CM

## 2020-11-24 DIAGNOSIS — N25.81 SECONDARY HYPERPARATHYROIDISM: ICD-10-CM

## 2020-11-24 LAB
25(OH)D3+25(OH)D2 SERPL-MCNC: 30 NG/ML (ref 30–96)
ALBUMIN SERPL BCP-MCNC: 2.6 G/DL (ref 3.5–5.2)
ANION GAP SERPL CALC-SCNC: 8 MMOL/L (ref 8–16)
BASOPHILS # BLD AUTO: 0.06 K/UL (ref 0–0.2)
BASOPHILS NFR BLD: 1 % (ref 0–1.9)
BUN SERPL-MCNC: 26 MG/DL (ref 8–23)
CALCIUM SERPL-MCNC: 8 MG/DL (ref 8.7–10.5)
CHLORIDE SERPL-SCNC: 115 MMOL/L (ref 95–110)
CO2 SERPL-SCNC: 22 MMOL/L (ref 23–29)
CREAT SERPL-MCNC: 3.3 MG/DL (ref 0.5–1.4)
DIFFERENTIAL METHOD: ABNORMAL
EOSINOPHIL # BLD AUTO: 0.2 K/UL (ref 0–0.5)
EOSINOPHIL NFR BLD: 2.6 % (ref 0–8)
ERYTHROCYTE [DISTWIDTH] IN BLOOD BY AUTOMATED COUNT: 14.6 % (ref 11.5–14.5)
EST. GFR  (AFRICAN AMERICAN): 20.3 ML/MIN/1.73 M^2
EST. GFR  (NON AFRICAN AMERICAN): 17.6 ML/MIN/1.73 M^2
FERRITIN SERPL-MCNC: 164 NG/ML (ref 20–300)
GLUCOSE SERPL-MCNC: 97 MG/DL (ref 70–110)
HCT VFR BLD AUTO: 33.8 % (ref 40–54)
HGB BLD-MCNC: 10.5 G/DL (ref 14–18)
IMM GRANULOCYTES # BLD AUTO: 0.02 K/UL (ref 0–0.04)
IMM GRANULOCYTES NFR BLD AUTO: 0.3 % (ref 0–0.5)
IRON SERPL-MCNC: 51 UG/DL (ref 45–160)
LYMPHOCYTES # BLD AUTO: 0.7 K/UL (ref 1–4.8)
LYMPHOCYTES NFR BLD: 11.7 % (ref 18–48)
MCH RBC QN AUTO: 29.8 PG (ref 27–31)
MCHC RBC AUTO-ENTMCNC: 31.1 G/DL (ref 32–36)
MCV RBC AUTO: 96 FL (ref 82–98)
MONOCYTES # BLD AUTO: 0.5 K/UL (ref 0.3–1)
MONOCYTES NFR BLD: 8.9 % (ref 4–15)
NEUTROPHILS # BLD AUTO: 4.3 K/UL (ref 1.8–7.7)
NEUTROPHILS NFR BLD: 75.5 % (ref 38–73)
NRBC BLD-RTO: 0 /100 WBC
PHOSPHATE SERPL-MCNC: 3.4 MG/DL (ref 2.7–4.5)
PLATELET # BLD AUTO: 193 K/UL (ref 150–350)
PMV BLD AUTO: 11.8 FL (ref 9.2–12.9)
POTASSIUM SERPL-SCNC: 4 MMOL/L (ref 3.5–5.1)
PTH-INTACT SERPL-MCNC: 160 PG/ML (ref 9–77)
RBC # BLD AUTO: 3.52 M/UL (ref 4.6–6.2)
SATURATED IRON: 22 % (ref 20–50)
SODIUM SERPL-SCNC: 145 MMOL/L (ref 136–145)
TOTAL IRON BINDING CAPACITY: 229 UG/DL (ref 250–450)
TRANSFERRIN SERPL-MCNC: 155 MG/DL (ref 200–375)
URATE SERPL-MCNC: 7.2 MG/DL (ref 3.4–7)
WBC # BLD AUTO: 5.75 K/UL (ref 3.9–12.7)

## 2020-11-24 PROCEDURE — 80069 RENAL FUNCTION PANEL: CPT

## 2020-11-24 PROCEDURE — 83970 ASSAY OF PARATHORMONE: CPT

## 2020-11-24 PROCEDURE — 82728 ASSAY OF FERRITIN: CPT

## 2020-11-24 PROCEDURE — 85025 COMPLETE CBC W/AUTO DIFF WBC: CPT

## 2020-11-24 PROCEDURE — 84550 ASSAY OF BLOOD/URIC ACID: CPT

## 2020-11-24 PROCEDURE — 83540 ASSAY OF IRON: CPT

## 2020-11-24 PROCEDURE — 36415 COLL VENOUS BLD VENIPUNCTURE: CPT | Mod: PN

## 2020-11-24 PROCEDURE — 82306 VITAMIN D 25 HYDROXY: CPT

## 2020-11-25 DIAGNOSIS — N18.4 CKD (CHRONIC KIDNEY DISEASE) STAGE 4, GFR 15-29 ML/MIN: Primary | ICD-10-CM

## 2020-11-25 NOTE — PROGRESS NOTES
Mr. Mast, Kidney function has slightly further worsened. Although kidney filtering more slowly potassium and phosphorus levels in the blood are still stable. PTH level has improved since starting calcitriol. Protein leaking in the urine is more than before. So it is overall slowing of kidney function although you would probably feel fine and it is not a drastic drop.  Please continue with current medicines. Please report if any new symptoms. Please send BP readings as would like to confirm BP staying stable and not too high or low since recent changes to labetalol.     Additionally, please repeat renal panel by early next week, stay connected with our office. Thanks.

## 2020-11-27 ENCOUNTER — PATIENT MESSAGE (OUTPATIENT)
Dept: NEPHROLOGY | Facility: CLINIC | Age: 75
End: 2020-11-27

## 2020-11-30 ENCOUNTER — TELEPHONE (OUTPATIENT)
Dept: NEPHROLOGY | Facility: CLINIC | Age: 75
End: 2020-11-30

## 2020-11-30 RX ORDER — TAMSULOSIN HYDROCHLORIDE 0.4 MG/1
CAPSULE ORAL
Qty: 90 CAPSULE | Refills: 3 | Status: SHIPPED | OUTPATIENT
Start: 2020-11-30 | End: 2021-05-31 | Stop reason: SDUPTHER

## 2020-11-30 NOTE — TELEPHONE ENCOUNTER
Result Notes     Shine Mcqueen MD   11/25/2020 12:29 PM      Mr. Mast, Kidney function has slightly further worsened. Although kidney filtering more slowly potassium and phosphorus levels in the blood are still stable. PTH level has improved since starting calcitriol. Protein leaking in the urine is more than before. So it is overall slowing of kidney function although you would probably feel fine and it is not a drastic drop.  Please continue with current medicines. Please report if any new symptoms. Please send BP readings as would like to confirm BP staying stable and not too high or low since recent changes to labetalol.      Additionally, please repeat renal panel by early next week, stay connected with our office. Thanks.

## 2020-12-01 ENCOUNTER — OFFICE VISIT (OUTPATIENT)
Dept: NEPHROLOGY | Facility: CLINIC | Age: 75
End: 2020-12-01
Payer: MEDICARE

## 2020-12-01 VITALS
DIASTOLIC BLOOD PRESSURE: 98 MMHG | OXYGEN SATURATION: 97 % | HEART RATE: 72 BPM | HEIGHT: 76 IN | SYSTOLIC BLOOD PRESSURE: 148 MMHG | BODY MASS INDEX: 24.89 KG/M2 | WEIGHT: 204.38 LBS

## 2020-12-01 DIAGNOSIS — N18.4 ANEMIA OF CHRONIC RENAL FAILURE, STAGE 4 (SEVERE): ICD-10-CM

## 2020-12-01 DIAGNOSIS — E87.20 METABOLIC ACIDOSIS: ICD-10-CM

## 2020-12-01 DIAGNOSIS — N25.81 SECONDARY HYPERPARATHYROIDISM: ICD-10-CM

## 2020-12-01 DIAGNOSIS — R80.9 PROTEINURIA, UNSPECIFIED TYPE: ICD-10-CM

## 2020-12-01 DIAGNOSIS — D63.1 ANEMIA OF CHRONIC RENAL FAILURE, STAGE 4 (SEVERE): ICD-10-CM

## 2020-12-01 DIAGNOSIS — N18.4 HYPERTENSIVE KIDNEY DISEASE WITH STAGE 4 CHRONIC KIDNEY DISEASE: ICD-10-CM

## 2020-12-01 DIAGNOSIS — E55.9 VITAMIN D DEFICIENCY: ICD-10-CM

## 2020-12-01 DIAGNOSIS — I12.9 HYPERTENSIVE KIDNEY DISEASE WITH STAGE 4 CHRONIC KIDNEY DISEASE: ICD-10-CM

## 2020-12-01 DIAGNOSIS — E79.0 HYPERURICEMIA: ICD-10-CM

## 2020-12-01 DIAGNOSIS — D50.9 IRON DEFICIENCY ANEMIA, UNSPECIFIED IRON DEFICIENCY ANEMIA TYPE: ICD-10-CM

## 2020-12-01 DIAGNOSIS — N18.4 CKD (CHRONIC KIDNEY DISEASE) STAGE 4, GFR 15-29 ML/MIN: Primary | ICD-10-CM

## 2020-12-01 DIAGNOSIS — N28.1 KIDNEY CYSTS: ICD-10-CM

## 2020-12-01 PROCEDURE — 99999 PR PBB SHADOW E&M-EST. PATIENT-LVL IV: ICD-10-PCS | Mod: PBBFAC,,, | Performed by: INTERNAL MEDICINE

## 2020-12-01 PROCEDURE — 99215 PR OFFICE/OUTPT VISIT, EST, LEVL V, 40-54 MIN: ICD-10-PCS | Mod: S$PBB,,, | Performed by: INTERNAL MEDICINE

## 2020-12-01 PROCEDURE — 99999 PR PBB SHADOW E&M-EST. PATIENT-LVL IV: CPT | Mod: PBBFAC,,, | Performed by: INTERNAL MEDICINE

## 2020-12-01 PROCEDURE — 99215 OFFICE O/P EST HI 40 MIN: CPT | Mod: S$PBB,,, | Performed by: INTERNAL MEDICINE

## 2020-12-01 PROCEDURE — 99214 OFFICE O/P EST MOD 30 MIN: CPT | Mod: PBBFAC | Performed by: INTERNAL MEDICINE

## 2020-12-01 RX ORDER — DIPHENHYDRAMINE HYDROCHLORIDE 50 MG/ML
50 INJECTION INTRAMUSCULAR; INTRAVENOUS ONCE AS NEEDED
Status: CANCELLED | OUTPATIENT
Start: 2020-12-01

## 2020-12-01 RX ORDER — SODIUM CHLORIDE 0.9 % (FLUSH) 0.9 %
10 SYRINGE (ML) INJECTION
Status: CANCELLED | OUTPATIENT
Start: 2020-12-01

## 2020-12-01 RX ORDER — METHYLPREDNISOLONE SOD SUCC 125 MG
125 VIAL (EA) INJECTION ONCE AS NEEDED
Status: CANCELLED | OUTPATIENT
Start: 2020-12-01

## 2020-12-01 RX ORDER — NIFEDIPINE 30 MG/1
30 TABLET, FILM COATED, EXTENDED RELEASE ORAL DAILY
Qty: 30 TABLET | Refills: 11 | Status: SHIPPED | OUTPATIENT
Start: 2020-12-01 | End: 2021-02-17

## 2020-12-01 RX ORDER — SODIUM CHLORIDE 9 MG/ML
INJECTION, SOLUTION INTRAVENOUS CONTINUOUS
Status: CANCELLED | OUTPATIENT
Start: 2020-12-01

## 2020-12-01 RX ORDER — HEPARIN 100 UNIT/ML
5 SYRINGE INTRAVENOUS
Status: CANCELLED | OUTPATIENT
Start: 2020-12-01

## 2020-12-01 RX ORDER — EPINEPHRINE 0.3 MG/.3ML
0.3 INJECTION SUBCUTANEOUS ONCE AS NEEDED
Status: CANCELLED | OUTPATIENT
Start: 2020-12-01

## 2020-12-01 NOTE — PROGRESS NOTES
Subjective:       Patient ID: Teodoro Mast is a 75 y.o. Black or  male who presents for follow up of CKD.    HPI     Mr. Mast was seen for follow up of CKD. He was seen on 9/6/16 in new patient evaluation and last followed on 9/29/20.    In the interim he sent BP readings from home which showed SBP in the range of 140-150's. His labetalol dose was increased further. However he feels lately he has not seen much improvement in his BP readings. Noted differential SBP reading by 10-15 mm in both arms. Patient endorses he has noticed this difference in SBP in both arms of several mm lately.    He denies any chest pain/ SOB/ changes in appetite/ palpitations. He denies any headache.     Due to secondary hyperparathyroidism he was started on calcitriol.     He remains off Torsemide. Off Torsemide he denies any SOB/ leg swelling.    Interim he had hematologic work up to rule out myeloma. He underwent bone marrow biopsy and a PET scan and was informed he does not have myeloma.      At the time of his visit in August 2020 he was noted to have worsening kidney function. He was clinically felt to be behind on fluids. Hence he was advised to hold Torsemide transiently, adjust the dose of labetalol per BP readings in case rising as a result of lack of Torsemide and repeat labs which showed transient improvement but it did get slightly worse again on follow up labs. Other work up was obtained around that time which showed elevated free light chain. Hence pt was referred to hematology. He is s/p bone marrow biopsy. Path reports do not indicate myeloma. Pt had US kidneys, bladder in the context of worsened renal function. It showed a small post void residual. Pt was hence advised to see his urologist. Currently denies any problem with emptying of the bladder.      Per prior clinic notes:  Interim his labs show worsening of kidney function. Pre clinic labs showing KINGS superimposed on CKD IV. He admits he does  not drink enough fluids. He brought his home BP readings, noted in 120-130's range, occasionally SBP in 110 range. He denies any orthostatic symptoms. He has enlarged prostate. He feels at times problems with emptying of the bladder and has noticed difficulty emptying when urinating in standing position.     He has been on multiple antihypertensive medicines. He states previously he was on HCTZ which was not effective in controlling BP. Hence his cardiologist changed to Torsemide in the past.    He has been watching his blood glucose. So he states he tends to eat smaller meal at dinner time. He denies any nausea/ vomiting/ diarrhea/ fever/ chills/ night sweats/ SOB/ flank pain/ cloudy urine/ bone pain.    He denies any NSAID use recently. He does not have a recent exposure to IV iodine contrast.       Pt has longstanding hypertension since 1974, remote h/o tobacco smoking, hyperlipidemia, BPH, CKD, proteinuria. He reports family h/o kidney disease in his father that was not diagnosed until he was in his 80's. Pt reports he was diagnosed with hypertension when he was 29 years old. He reports in the past he had some obstruction to the urinary tract but unable to provide any details.    In the past he was using medicines like Indomethacin, ibuprofen. He has been diagnosed with sleep apnea. He is using CPAP regularly.    He also reports his sleep quality is better with CPAP machine.    Of note, he saw his PCP/ cardiologist in January 2019 when his BP was noted to be 190's/90's. He was started on torsemide by his cardiologist for better BP control. Pre clinic that time creatinine was 2.0, it did rise to 2.4 in February 2019 labs along with rise in BUN to 39. His cardiologist reduced the dose of diuretic in response to this. But overall, he has progression of CKD since then.    He reports he had prior episodes of gout and he feels intake of beer, craw fish triggers it for him. He has been taking allopurinol regularly  which has helped lower the uric acid levels gradually.    He has slow progression of CKD. Labs were reviewed and d/w him. His creatinine has gradually increased from 1.4 to 1.9 since 2010. Prior urine studies show proteinuria. He appears to have chronic anemia.     prior labs showed SPEP/ SHELIA was normal. hep B/C screen was negative. US kidneys from 10/16 showed 10.1 and 12.9 cm kidneys, changes of CKD, cortical thinning, multiple cysts on both sides and enlarged prostate.     Renal Function:  Lab Results   Component Value Date    GLU 97 11/24/2020     10/27/2020     11/24/2020     10/27/2020    K 4.0 11/24/2020    K 4.2 10/27/2020     (H) 11/24/2020     (H) 10/27/2020    CO2 22 (L) 11/24/2020    CO2 24 10/27/2020    BUN 26 (H) 11/24/2020    BUN 33 (H) 10/27/2020    CALCIUM 8.0 (L) 11/24/2020    CALCIUM 8.3 (L) 10/27/2020    CREATININE 3.3 (H) 11/24/2020    CREATININE 2.7 (H) 10/27/2020    ALBUMIN 2.6 (L) 11/24/2020    ALBUMIN 2.8 (L) 10/27/2020    PHOS 3.4 11/24/2020    PHOS 3.4 10/27/2020    ESTGFRAFRICA 20.3 (A) 11/24/2020    ESTGFRAFRICA 25.9 (A) 10/27/2020    EGFRNONAA 17.6 (A) 11/24/2020    EGFRNONAA 22.4 (A) 10/27/2020       Urinalysis:  Lab Results   Component Value Date    APPEARANCEUA Clear 11/24/2020    PHUR 6.0 11/24/2020    SPECGRAV 1.010 11/24/2020    PROTEINUA 3+ (A) 11/24/2020    GLUCUA Negative 11/24/2020    OCCULTUA Negative 11/24/2020    NITRITE Negative 11/24/2020    LEUKOCYTESUR Negative 11/24/2020       Protein/Creatinine Ratio:  Lab Results   Component Value Date    PROTEINURINE 401 (H) 11/24/2020    CREATRANDUR 81.0 11/24/2020    UTPCR 4.95 (H) 11/24/2020       CBC:  Lab Results   Component Value Date    WBC 5.75 11/24/2020    HGB 10.5 (L) 11/24/2020    HCT 33.8 (L) 11/24/2020      from 281 while on calcitriol  Vit D 30  Uric acid 7.2 while on allopurinol     US Kidneys 6/2018  10.4 and 13.6 cm kidneys, cortical thinning, loss of corticomedullary  distinction, multiple cysts on both kidneys, prostatomegaly.      US kidneys 8/27/20  FINDINGS:  Right kidney: Measures 11 cm.  Resistive index measures 0.75.  Mild thinning of the cortex.  Multiple anechoic cysts, the largest measuring 3 x 3.1 x 2.6 cm in the upper pole.  No hydronephrosis.  No solid mass lesions.     Left kidney: Measures 13.6 cm.  Resistive index measures 0.76.  Mild thinning of the cortex.  Many anechoic cystic lesions, the largest measuring 2.4 x 2.1 x 2.6 cm in the upper pole.  No hydronephrosis.  No solid mass lesions.     Urinary bladder volume measures 142 mL prevoid.  Postvoid residual 48.6 mL.     Impression:     1. Multiple renal cysts, generally similar to prior.  2. Mildly elevated renal artery resistive indices bilaterally, nonspecific but may be seen in medical renal disease.  3. Small postvoid residual.    Review of Systems   Constitutional: Negative for activity change, appetite change, chills, fatigue and fever.   Eyes: Negative for pain and discharge.   Respiratory: Negative for cough, shortness of breath and wheezing.    Cardiovascular: Negative for chest pain and leg swelling.   Gastrointestinal: Negative for abdominal pain, diarrhea, nausea and vomiting.   Endocrine: Negative for polydipsia and polyuria.   Genitourinary: Negative for decreased urine volume, difficulty urinating, dysuria, flank pain, frequency and hematuria.   Musculoskeletal: Negative for back pain and myalgias.   Skin: Negative for pallor and rash.   Neurological: Negative for dizziness, light-headedness and headaches.   Psychiatric/Behavioral: The patient is not nervous/anxious.        Objective:      Physical Exam   Constitutional: He is oriented to person, place, and time. He appears well-developed and well-nourished. No distress.   Eyes: Right eye exhibits no discharge. Left eye exhibits no discharge.   Neck: Neck supple.   Cardiovascular: Normal rate, regular rhythm.   Pulmonary/Chest: Effort normal. No  respiratory distress. He has no audible wheezes.    Musculoskeletal: He exhibits no edema. no asterixis.  Neurological: He is alert and oriented to person, place, and time.   Skin: Skin is warm. He is not diaphoretic.   Psychiatric: He has a normal mood and affect. His behavior is normal.   Vitals reviewed.      Assessment:       1. CKD (chronic kidney disease) stage 4, GFR 15-29 ml/min    2. Hypertensive kidney disease with stage 4 chronic kidney disease    3. Kidney cysts    4. Metabolic acidosis    5. Proteinuria, unspecified type    6. Hyperuricemia    7. Vitamin D deficiency    8. Secondary hyperparathyroidism    9. Iron deficiency anemia, unspecified iron deficiency anemia type    10. Anemia of chronic renal failure, stage 4 (severe)        Plan:     Mr. Mast has longstanding hypertension, hyperlipidemia, BPH, CKD with sub nephrotic proteinuria. He has hypertensive glomerulosclerosis causing CKD as well has NSAID induced kidney damage. He denies any NSAID use currently but it appears in the past he was taking indomethacin and other NSAID. He has slow progression of CKD. Also he has slow progression of proteinuria. Most recently he had uncontrolled hypertension which could have led to progression of CKD and proteinuria. As such he has hypertension since 1970's.     He developed KINGS in early 2020 which is likely due to diuretic dosing which was added to address his severely uncontrolled hypertension around that time. Overall since then he does have further somewhat rapid progression of CKD. I brought this to his attention. Stressed need for better BP control. Ideally his SBP should be < 130 with proteinuria he has been having. Overall his BP control was sub optimal for quite some time. Explained this could be still progression of CKD and he as such has CKD onset > 10 years ago. Interim he did have problems with BP control which could have accelerated CKD progression. I explained to him about CKD staging,  potential for progression, risk factors for CKD etc. Stressed importance of good BP control, low salt diet, keeping hydrated, avoiding NSAID, nephrotoxins etc.    Since adding allopurinol at a lower dose his hyperuricemia seems to have improved. Continue current dose for now. Obtain LFT with next lab draw.  Trend uric acid level  continue allopurinol, renal dosing    will follow K levels closely. Pt advised to avoid high K containing foods.     Continue sodium bicarbonate at 650 mg bid to correct met acidosis.    Monthly renal panel to monitor creatinine, acid base status, eGFR, electrolytes closely    Home BP monitoring, avoid hypotensive episodes, review holding parameters  Continue current antihypertensives with lisinopril, use caution given worsened kidney function, more frequent labs to monitor for electrolyte abnormalities/ acid base changes and possible change in Rx if worsening of pre renal azotemia/ hyperkalemia   Off Torsemide due to recurrent pre renal azotemia causing KINGS superimposed on CKD IV  Pt explained to watch for SOB/ fluid retention/ edema and inform of such promptly.    Return to see urologist   - continue to follow with urology, continue Flomax. He has prostate enlargement and elevated PSA for several years. He has occasional microhematuria seen on urine studies    Continue calcitriol 0.25 mcg daily for sec hyperparathyroidism  - follow PTH levels, vit D, decrease OTC vitamin D, follow corrected Ca    - he has chronic anemia, periodically trend Hb  - Refer for IV iron infusion.     - refer for AVF creation if eGFR stays < 20, pt made aware. He verbalized understanding.    BP reading difference in 2 sides noted  D/w patient of need to rule out coarctation of aorta  Obtain echocardiogram    Change antihypertensive regimen as antihypertensive control not as effective as it was until a few months ago  Stop amlodipine   Add nifedipine long acting at 30 mg per day and titrate dose per BP control  Pt  to continue to send BP readings    Discussed with him at length that his kidney function is slowly worsening. Clinically this is felt to be progression of hypertensive nephrosclerosis along with prior use of NSAID causing nephrotoxicity and onset of CKD, possible APOL1 gene variant, heme work up has been negative for multiple myeloma. He has incidentally found small pulmonary nodules but is asymptomatic for this. Further invasive work up like kidney biopsy can be undertaken but he already had cortical thinning and longstanding CKD already known and more chances of confirming clinical suspicions and may come across significant interstitial fibrosis at this point. Also he has multiple cysts on both kidneys so chances of bleeding complications will be higher. I did explain to him despite this we may not receive any additional information that could alter the course of his slowly worsening CKD. He stated he is not comfortable with any further invasive work up. He stated has a few family members/ friends on dialysis and older than him and he perceives that they've been handling it well. So he feels when need comes for dialysis he will not be very worried about his ability to handle it. I assured him we will refer him to vascular surgery if eGFR drops below 20 and continues to stay low. He expressed understanding.    Previously noted, cysts on both kidneys and changes of CKD on US.   Repeat kidney US for follow up on kidney cysts.      RTC 4-6 weeks. Ok for virtual visit.  Face to Face time with patient: 25 minutes  60 minutes of total time spent on the encounter, which includes face to face time and non-face to face time preparing to see the patient (eg, review of tests), Obtaining and/or reviewing separately obtained history, Documenting clinical information in the electronic or other health record, Independently interpreting results (not separately reported) and communicating results to the patient/family/caregiver, or  Care coordination (not separately reported).

## 2020-12-01 NOTE — PATIENT INSTRUCTIONS
Changing BP medicine from amlodipine to nifedipine.    Please continue to send BP readings.    Echocardiogram to rule out coarctation of aorta.    Kidney sonogram to follow on the kidney cysts.    Blood tests in 2 weeks approximately.    We have sent a referral to consider IV iron infusions for iron deficiency, pending approval by insurance.

## 2020-12-07 ENCOUNTER — PATIENT MESSAGE (OUTPATIENT)
Dept: NEPHROLOGY | Facility: CLINIC | Age: 75
End: 2020-12-07

## 2020-12-07 ENCOUNTER — HOSPITAL ENCOUNTER (OUTPATIENT)
Dept: RADIOLOGY | Facility: HOSPITAL | Age: 75
Discharge: HOME | End: 2020-12-07
Attending: INTERNAL MEDICINE
Payer: MEDICARE

## 2020-12-07 DIAGNOSIS — E79.0 HYPERURICEMIA: ICD-10-CM

## 2020-12-07 DIAGNOSIS — N18.4 HYPERTENSIVE KIDNEY DISEASE WITH STAGE 4 CHRONIC KIDNEY DISEASE: ICD-10-CM

## 2020-12-07 DIAGNOSIS — E87.20 METABOLIC ACIDOSIS: ICD-10-CM

## 2020-12-07 DIAGNOSIS — N18.4 CKD (CHRONIC KIDNEY DISEASE) STAGE 4, GFR 15-29 ML/MIN: ICD-10-CM

## 2020-12-07 DIAGNOSIS — N28.1 KIDNEY CYSTS: ICD-10-CM

## 2020-12-07 DIAGNOSIS — E55.9 VITAMIN D DEFICIENCY: ICD-10-CM

## 2020-12-07 DIAGNOSIS — N25.81 SECONDARY HYPERPARATHYROIDISM: ICD-10-CM

## 2020-12-07 DIAGNOSIS — I10 ESSENTIAL HYPERTENSION: ICD-10-CM

## 2020-12-07 DIAGNOSIS — R80.9 PROTEINURIA, UNSPECIFIED TYPE: ICD-10-CM

## 2020-12-07 DIAGNOSIS — I12.9 HYPERTENSIVE KIDNEY DISEASE WITH STAGE 4 CHRONIC KIDNEY DISEASE: ICD-10-CM

## 2020-12-07 PROCEDURE — 76770 US EXAM ABDO BACK WALL COMP: CPT | Mod: 26,,, | Performed by: RADIOLOGY

## 2020-12-07 PROCEDURE — 76770 US RETROPERITONEAL COMPLETE: ICD-10-PCS | Mod: 26,,, | Performed by: RADIOLOGY

## 2020-12-07 PROCEDURE — 76770 US EXAM ABDO BACK WALL COMP: CPT | Mod: TC

## 2020-12-07 RX ORDER — AMLODIPINE BESYLATE 10 MG/1
10 TABLET ORAL DAILY
Qty: 90 TABLET | Refills: 3 | Status: SHIPPED | OUTPATIENT
Start: 2020-12-07 | End: 2021-05-31 | Stop reason: SDUPTHER

## 2020-12-08 ENCOUNTER — PATIENT MESSAGE (OUTPATIENT)
Dept: NEPHROLOGY | Facility: CLINIC | Age: 75
End: 2020-12-08

## 2020-12-08 NOTE — PROGRESS NOTES
Sonogram again shows changes of chronic kidney disease in the form of scars, it shows cysts on both kidneys again, they're not changed. There is no solid mass.    Thanks.

## 2020-12-11 ENCOUNTER — PATIENT MESSAGE (OUTPATIENT)
Dept: OTHER | Facility: OTHER | Age: 75
End: 2020-12-11

## 2020-12-14 ENCOUNTER — INFUSION (OUTPATIENT)
Dept: INFECTIOUS DISEASES | Facility: HOSPITAL | Age: 75
End: 2020-12-14
Attending: INTERNAL MEDICINE
Payer: MEDICARE

## 2020-12-14 VITALS
HEIGHT: 76 IN | HEART RATE: 60 BPM | RESPIRATION RATE: 18 BRPM | OXYGEN SATURATION: 96 % | SYSTOLIC BLOOD PRESSURE: 144 MMHG | TEMPERATURE: 98 F | BODY MASS INDEX: 25.11 KG/M2 | WEIGHT: 206.25 LBS | DIASTOLIC BLOOD PRESSURE: 78 MMHG

## 2020-12-14 DIAGNOSIS — D63.1 ANEMIA OF CHRONIC RENAL FAILURE, STAGE 4 (SEVERE): ICD-10-CM

## 2020-12-14 DIAGNOSIS — N18.4 CKD (CHRONIC KIDNEY DISEASE) STAGE 4, GFR 15-29 ML/MIN: ICD-10-CM

## 2020-12-14 DIAGNOSIS — N18.4 ANEMIA OF CHRONIC RENAL FAILURE, STAGE 4 (SEVERE): ICD-10-CM

## 2020-12-14 DIAGNOSIS — D50.9 IRON DEFICIENCY ANEMIA, UNSPECIFIED IRON DEFICIENCY ANEMIA TYPE: Primary | ICD-10-CM

## 2020-12-14 PROCEDURE — 25000003 PHARM REV CODE 250: Performed by: INTERNAL MEDICINE

## 2020-12-14 PROCEDURE — 96365 THER/PROPH/DIAG IV INF INIT: CPT

## 2020-12-14 PROCEDURE — 63600175 PHARM REV CODE 636 W HCPCS: Mod: JG | Performed by: INTERNAL MEDICINE

## 2020-12-14 RX ORDER — EPINEPHRINE 0.3 MG/.3ML
0.3 INJECTION SUBCUTANEOUS ONCE AS NEEDED
Status: CANCELLED | OUTPATIENT
Start: 2020-12-21

## 2020-12-14 RX ORDER — SODIUM CHLORIDE 0.9 % (FLUSH) 0.9 %
10 SYRINGE (ML) INJECTION
Status: DISCONTINUED | OUTPATIENT
Start: 2020-12-14 | End: 2020-12-14 | Stop reason: HOSPADM

## 2020-12-14 RX ORDER — METHYLPREDNISOLONE SOD SUCC 125 MG
125 VIAL (EA) INJECTION ONCE AS NEEDED
Status: DISCONTINUED | OUTPATIENT
Start: 2020-12-14 | End: 2020-12-14 | Stop reason: HOSPADM

## 2020-12-14 RX ORDER — HEPARIN 100 UNIT/ML
5 SYRINGE INTRAVENOUS
Status: CANCELLED | OUTPATIENT
Start: 2020-12-21

## 2020-12-14 RX ORDER — SODIUM CHLORIDE 9 MG/ML
INJECTION, SOLUTION INTRAVENOUS CONTINUOUS
Status: DISCONTINUED | OUTPATIENT
Start: 2020-12-14 | End: 2020-12-14 | Stop reason: HOSPADM

## 2020-12-14 RX ORDER — DIPHENHYDRAMINE HYDROCHLORIDE 50 MG/ML
50 INJECTION INTRAMUSCULAR; INTRAVENOUS ONCE AS NEEDED
Status: DISCONTINUED | OUTPATIENT
Start: 2020-12-14 | End: 2020-12-14 | Stop reason: HOSPADM

## 2020-12-14 RX ORDER — EPINEPHRINE 0.3 MG/.3ML
0.3 INJECTION SUBCUTANEOUS ONCE AS NEEDED
Status: DISCONTINUED | OUTPATIENT
Start: 2020-12-14 | End: 2020-12-14 | Stop reason: HOSPADM

## 2020-12-14 RX ORDER — SODIUM CHLORIDE 9 MG/ML
INJECTION, SOLUTION INTRAVENOUS CONTINUOUS
Status: CANCELLED | OUTPATIENT
Start: 2020-12-21

## 2020-12-14 RX ORDER — METHYLPREDNISOLONE SOD SUCC 125 MG
125 VIAL (EA) INJECTION ONCE AS NEEDED
Status: CANCELLED | OUTPATIENT
Start: 2020-12-21

## 2020-12-14 RX ORDER — HEPARIN 100 UNIT/ML
5 SYRINGE INTRAVENOUS
Status: DISCONTINUED | OUTPATIENT
Start: 2020-12-14 | End: 2020-12-14 | Stop reason: HOSPADM

## 2020-12-14 RX ORDER — DIPHENHYDRAMINE HYDROCHLORIDE 50 MG/ML
50 INJECTION INTRAMUSCULAR; INTRAVENOUS ONCE AS NEEDED
Status: CANCELLED | OUTPATIENT
Start: 2020-12-21

## 2020-12-14 RX ORDER — SODIUM CHLORIDE 0.9 % (FLUSH) 0.9 %
10 SYRINGE (ML) INJECTION
Status: CANCELLED | OUTPATIENT
Start: 2020-12-21

## 2020-12-14 RX ADMIN — SODIUM CHLORIDE: 9 INJECTION, SOLUTION INTRAVENOUS at 08:12

## 2020-12-14 RX ADMIN — FERRIC CARBOXYMALTOSE INJECTION 750 MG: 50 INJECTION, SOLUTION INTRAVENOUS at 08:12

## 2020-12-14 NOTE — PROGRESS NOTES
Patient arrived for Injectafer 1/2.  Observed patient for one hour post infusion. No signs/symptoms of adverse reaction.  Tolerated infusion well and left in NAD.

## 2020-12-15 ENCOUNTER — LAB VISIT (OUTPATIENT)
Dept: LAB | Facility: HOSPITAL | Age: 75
End: 2020-12-15
Attending: INTERNAL MEDICINE
Payer: MEDICARE

## 2020-12-15 DIAGNOSIS — N28.1 KIDNEY CYSTS: ICD-10-CM

## 2020-12-15 DIAGNOSIS — N18.4 CKD (CHRONIC KIDNEY DISEASE) STAGE 4, GFR 15-29 ML/MIN: ICD-10-CM

## 2020-12-15 DIAGNOSIS — N18.4 HYPERTENSIVE KIDNEY DISEASE WITH STAGE 4 CHRONIC KIDNEY DISEASE: ICD-10-CM

## 2020-12-15 DIAGNOSIS — E79.0 HYPERURICEMIA: ICD-10-CM

## 2020-12-15 DIAGNOSIS — N25.81 SECONDARY HYPERPARATHYROIDISM: ICD-10-CM

## 2020-12-15 DIAGNOSIS — R80.9 PROTEINURIA, UNSPECIFIED TYPE: ICD-10-CM

## 2020-12-15 DIAGNOSIS — I12.9 HYPERTENSIVE KIDNEY DISEASE WITH STAGE 4 CHRONIC KIDNEY DISEASE: ICD-10-CM

## 2020-12-15 DIAGNOSIS — E87.20 METABOLIC ACIDOSIS: ICD-10-CM

## 2020-12-15 DIAGNOSIS — E55.9 VITAMIN D DEFICIENCY: ICD-10-CM

## 2020-12-15 LAB
ALBUMIN SERPL BCP-MCNC: 2.7 G/DL (ref 3.5–5.2)
ALBUMIN SERPL BCP-MCNC: 2.7 G/DL (ref 3.5–5.2)
ALP SERPL-CCNC: 93 U/L (ref 55–135)
ALT SERPL W/O P-5'-P-CCNC: 12 U/L (ref 10–44)
ANION GAP SERPL CALC-SCNC: 8 MMOL/L (ref 8–16)
AST SERPL-CCNC: 14 U/L (ref 10–40)
BASOPHILS # BLD AUTO: 0.07 K/UL (ref 0–0.2)
BASOPHILS NFR BLD: 1.2 % (ref 0–1.9)
BILIRUB DIRECT SERPL-MCNC: 0.2 MG/DL (ref 0.1–0.3)
BILIRUB SERPL-MCNC: 0.3 MG/DL (ref 0.1–1)
BUN SERPL-MCNC: 36 MG/DL (ref 8–23)
CALCIUM SERPL-MCNC: 8.1 MG/DL (ref 8.7–10.5)
CHLORIDE SERPL-SCNC: 114 MMOL/L (ref 95–110)
CO2 SERPL-SCNC: 20 MMOL/L (ref 23–29)
CREAT SERPL-MCNC: 3.7 MG/DL (ref 0.5–1.4)
DIFFERENTIAL METHOD: ABNORMAL
EOSINOPHIL # BLD AUTO: 0.2 K/UL (ref 0–0.5)
EOSINOPHIL NFR BLD: 2.6 % (ref 0–8)
ERYTHROCYTE [DISTWIDTH] IN BLOOD BY AUTOMATED COUNT: 14.7 % (ref 11.5–14.5)
EST. GFR  (AFRICAN AMERICAN): 17.7 ML/MIN/1.73 M^2
EST. GFR  (NON AFRICAN AMERICAN): 15.3 ML/MIN/1.73 M^2
FERRITIN SERPL-MCNC: 242 NG/ML (ref 20–300)
GLUCOSE SERPL-MCNC: 94 MG/DL (ref 70–110)
HCT VFR BLD AUTO: 32.1 % (ref 40–54)
HGB BLD-MCNC: 10.2 G/DL (ref 14–18)
IMM GRANULOCYTES # BLD AUTO: 0.02 K/UL (ref 0–0.04)
IMM GRANULOCYTES NFR BLD AUTO: 0.3 % (ref 0–0.5)
IRON SERPL-MCNC: 310 UG/DL (ref 45–160)
LYMPHOCYTES # BLD AUTO: 0.7 K/UL (ref 1–4.8)
LYMPHOCYTES NFR BLD: 11.3 % (ref 18–48)
MCH RBC QN AUTO: 30.4 PG (ref 27–31)
MCHC RBC AUTO-ENTMCNC: 31.8 G/DL (ref 32–36)
MCV RBC AUTO: 96 FL (ref 82–98)
MONOCYTES # BLD AUTO: 0.5 K/UL (ref 0.3–1)
MONOCYTES NFR BLD: 9.4 % (ref 4–15)
NEUTROPHILS # BLD AUTO: 4.3 K/UL (ref 1.8–7.7)
NEUTROPHILS NFR BLD: 75.2 % (ref 38–73)
NRBC BLD-RTO: 0 /100 WBC
PHOSPHATE SERPL-MCNC: 3.4 MG/DL (ref 2.7–4.5)
PLATELET # BLD AUTO: 180 K/UL (ref 150–350)
PMV BLD AUTO: 10.7 FL (ref 9.2–12.9)
POTASSIUM SERPL-SCNC: 4.2 MMOL/L (ref 3.5–5.1)
PROT SERPL-MCNC: 6.2 G/DL (ref 6–8.4)
PTH-INTACT SERPL-MCNC: 208 PG/ML (ref 9–77)
RBC # BLD AUTO: 3.35 M/UL (ref 4.6–6.2)
SATURATED IRON: 139 % (ref 20–50)
SODIUM SERPL-SCNC: 142 MMOL/L (ref 136–145)
TOTAL IRON BINDING CAPACITY: 223 UG/DL (ref 250–450)
TRANSFERRIN SERPL-MCNC: 151 MG/DL (ref 200–375)
URATE SERPL-MCNC: 6.5 MG/DL (ref 3.4–7)
WBC # BLD AUTO: 5.75 K/UL (ref 3.9–12.7)

## 2020-12-15 PROCEDURE — 85025 COMPLETE CBC W/AUTO DIFF WBC: CPT

## 2020-12-15 PROCEDURE — 83540 ASSAY OF IRON: CPT

## 2020-12-15 PROCEDURE — 84550 ASSAY OF BLOOD/URIC ACID: CPT

## 2020-12-15 PROCEDURE — 82247 BILIRUBIN TOTAL: CPT

## 2020-12-15 PROCEDURE — 80069 RENAL FUNCTION PANEL: CPT

## 2020-12-15 PROCEDURE — 83970 ASSAY OF PARATHORMONE: CPT

## 2020-12-15 PROCEDURE — 84075 ASSAY ALKALINE PHOSPHATASE: CPT

## 2020-12-15 PROCEDURE — 36415 COLL VENOUS BLD VENIPUNCTURE: CPT | Mod: PN

## 2020-12-15 PROCEDURE — 82728 ASSAY OF FERRITIN: CPT

## 2020-12-15 NOTE — PROGRESS NOTES
Please inform patient  Kidney function has worsened and now creatinine is 3.7. please report if any new symptoms. Stay hydrated.  Please repeat renal panel on Friday morning to confirm it's remaining stable.   Thanks.

## 2020-12-16 ENCOUNTER — PATIENT MESSAGE (OUTPATIENT)
Dept: NEPHROLOGY | Facility: CLINIC | Age: 75
End: 2020-12-16

## 2020-12-16 ENCOUNTER — TELEPHONE (OUTPATIENT)
Dept: NEPHROLOGY | Facility: CLINIC | Age: 75
End: 2020-12-16

## 2020-12-16 ENCOUNTER — HOSPITAL ENCOUNTER (OUTPATIENT)
Dept: CARDIOLOGY | Facility: HOSPITAL | Age: 75
Discharge: HOME | End: 2020-12-16
Attending: INTERNAL MEDICINE
Payer: MEDICARE

## 2020-12-16 VITALS
DIASTOLIC BLOOD PRESSURE: 78 MMHG | SYSTOLIC BLOOD PRESSURE: 150 MMHG | HEART RATE: 75 BPM | BODY MASS INDEX: 24.84 KG/M2 | WEIGHT: 204 LBS | HEIGHT: 76 IN

## 2020-12-16 DIAGNOSIS — N28.1 KIDNEY CYSTS: ICD-10-CM

## 2020-12-16 DIAGNOSIS — R80.9 PROTEINURIA, UNSPECIFIED TYPE: ICD-10-CM

## 2020-12-16 DIAGNOSIS — N18.4 CKD (CHRONIC KIDNEY DISEASE) STAGE 4, GFR 15-29 ML/MIN: Primary | ICD-10-CM

## 2020-12-16 DIAGNOSIS — E55.9 VITAMIN D DEFICIENCY: ICD-10-CM

## 2020-12-16 DIAGNOSIS — N18.4 CKD (CHRONIC KIDNEY DISEASE) STAGE 4, GFR 15-29 ML/MIN: ICD-10-CM

## 2020-12-16 DIAGNOSIS — E79.0 HYPERURICEMIA: ICD-10-CM

## 2020-12-16 DIAGNOSIS — N18.4 HYPERTENSIVE KIDNEY DISEASE WITH STAGE 4 CHRONIC KIDNEY DISEASE: ICD-10-CM

## 2020-12-16 DIAGNOSIS — I12.9 HYPERTENSIVE KIDNEY DISEASE WITH STAGE 4 CHRONIC KIDNEY DISEASE: ICD-10-CM

## 2020-12-16 DIAGNOSIS — N18.4 STAGE 4 CHRONIC KIDNEY DISEASE: Primary | ICD-10-CM

## 2020-12-16 DIAGNOSIS — E87.20 METABOLIC ACIDOSIS: ICD-10-CM

## 2020-12-16 DIAGNOSIS — N25.81 SECONDARY HYPERPARATHYROIDISM: ICD-10-CM

## 2020-12-16 LAB
ASCENDING AORTA: 2.44 CM
AV INDEX (PROSTH): 0.6
AV MEAN GRADIENT: 7 MMHG
AV PEAK GRADIENT: 12 MMHG
AV VALVE AREA: 2.22 CM2
AV VELOCITY RATIO: 0.66
BSA FOR ECHO PROCEDURE: 2.23 M2
CV ECHO LV RWT: 0.33 CM
DOP CALC AO PEAK VEL: 1.71 M/S
DOP CALC AO VTI: 39.3 CM
DOP CALC LVOT AREA: 3.7 CM2
DOP CALC LVOT DIAMETER: 2.18 CM
DOP CALC LVOT PEAK VEL: 1.13 M/S
DOP CALC LVOT STROKE VOLUME: 87.26 CM3
DOP CALCLVOT PEAK VEL VTI: 23.39 CM
E WAVE DECELERATION TIME: 312.61 MSEC
E/A RATIO: 0.59
E/E' RATIO: 10.89 M/S
ECHO LV POSTERIOR WALL: 0.9 CM (ref 0.6–1.1)
FRACTIONAL SHORTENING: 36 % (ref 28–44)
INTERVENTRICULAR SEPTUM: 0.87 CM (ref 0.6–1.1)
IVRT: 168.41 MSEC
LA MAJOR: 5.71 CM
LA MINOR: 5.65 CM
LA WIDTH: 4.75 CM
LEFT ATRIUM SIZE: 3.99 CM
LEFT ATRIUM VOLUME INDEX MOD: 39.1 ML/M2
LEFT ATRIUM VOLUME INDEX: 41 ML/M2
LEFT ATRIUM VOLUME MOD: 87.34 CM3
LEFT ATRIUM VOLUME: 91.5 CM3
LEFT INTERNAL DIMENSION IN SYSTOLE: 3.5 CM (ref 2.1–4)
LEFT VENTRICLE DIASTOLIC VOLUME INDEX: 57.16 ML/M2
LEFT VENTRICLE DIASTOLIC VOLUME: 127.7 ML
LEFT VENTRICLE MASS INDEX: 81 G/M2
LEFT VENTRICLE SYSTOLIC VOLUME INDEX: 22.7 ML/M2
LEFT VENTRICLE SYSTOLIC VOLUME: 50.79 ML
LEFT VENTRICULAR INTERNAL DIMENSION IN DIASTOLE: 5.5 CM (ref 3.5–6)
LEFT VENTRICULAR MASS: 181.86 G
LV LATERAL E/E' RATIO: 12.25 M/S
LV SEPTAL E/E' RATIO: 9.8 M/S
MV A" WAVE DURATION": 5.71 MSEC
MV PEAK A VEL: 0.83 M/S
MV PEAK E VEL: 0.49 M/S
PISA TR MAX VEL: 2.2 M/S
PULM VEIN S/D RATIO: 1.56
PV PEAK D VEL: 0.27 M/S
PV PEAK S VEL: 0.42 M/S
RA MAJOR: 4.89 CM
RA PRESSURE: 3 MMHG
RA WIDTH: 2.96 CM
RIGHT VENTRICULAR END-DIASTOLIC DIMENSION: 3.7 CM
RV TISSUE DOPPLER FREE WALL SYSTOLIC VELOCITY 1 (APICAL 4 CHAMBER VIEW): 12.39 CM/S
SINUS: 2.98 CM
STJ: 2.58 CM
TDI LATERAL: 0.04 M/S
TDI SEPTAL: 0.05 M/S
TDI: 0.05 M/S
TR MAX PG: 19 MMHG
TRICUSPID ANNULAR PLANE SYSTOLIC EXCURSION: 2.46 CM
TV REST PULMONARY ARTERY PRESSURE: 22 MMHG

## 2020-12-16 PROCEDURE — 93306 TTE W/DOPPLER COMPLETE: CPT

## 2020-12-16 PROCEDURE — 93306 ECHO (CUPID ONLY): ICD-10-PCS | Mod: 26,,, | Performed by: INTERNAL MEDICINE

## 2020-12-16 PROCEDURE — 93306 TTE W/DOPPLER COMPLETE: CPT | Mod: 26,,, | Performed by: INTERNAL MEDICINE

## 2020-12-16 NOTE — TELEPHONE ENCOUNTER
Result Notes     Shine Mcqueen MD   12/15/2020  3:50 PM      Please inform patient  Kidney function has worsened and now creatinine is 3.7. please report if any new symptoms. Stay hydrated.  Please repeat renal panel on Friday morning to confirm it's remaining stable.   Thanks.

## 2020-12-16 NOTE — PROGRESS NOTES
Please inform patient  Echocardiogram shows stable function of left side of heart and it appears to be pumping well. The condition that we were worried about is not reported by the interpreting cardiologist which is good news.   We are informing Dr. Masterson of this report so he remains in the loop.  Dr. Masterson, due to different readings in both arms, we checked echo which does not comment of him having any coarctation of aorta which is great, but just wanted to keep you in the loop and sharing this report for your review.  Thanks.

## 2020-12-17 ENCOUNTER — TELEPHONE (OUTPATIENT)
Dept: NEPHROLOGY | Facility: CLINIC | Age: 75
End: 2020-12-17

## 2020-12-17 ENCOUNTER — PATIENT MESSAGE (OUTPATIENT)
Dept: NEPHROLOGY | Facility: CLINIC | Age: 75
End: 2020-12-17

## 2020-12-17 NOTE — TELEPHONE ENCOUNTER
Shine Mcqeuen MD         Please inform patient  Echocardiogram shows stable function of left side of heart and it appears to be pumping well. The condition that we were worried about is not reported by the interpreting cardiologist which is good news.   We are informing Dr. Masterson of this report so he remains in the loop.

## 2020-12-18 ENCOUNTER — TELEPHONE (OUTPATIENT)
Dept: NEPHROLOGY | Facility: CLINIC | Age: 75
End: 2020-12-18

## 2020-12-18 NOTE — TELEPHONE ENCOUNTER
MD EDUIN Vazquez Staff             Please arrange for renal panel on December 18th if possible for patient, if not then on December 21st. Result note attached to renal panel result from 12/15/20 included. Thanks.     From 12/15/20 result note for renal panel:   Please inform patient   Kidney function has worsened and now creatinine is 3.7. please report if any new symptoms. Stay hydrated.   Please repeat renal panel on Friday morning to confirm it's remaining stable.   Thanks.      Spoke to patient gave results, verbalized understanding. Lab appointment scheduled.

## 2020-12-21 ENCOUNTER — INFUSION (OUTPATIENT)
Dept: INFECTIOUS DISEASES | Facility: HOSPITAL | Age: 75
End: 2020-12-21
Attending: INTERNAL MEDICINE
Payer: MEDICARE

## 2020-12-21 VITALS
TEMPERATURE: 98 F | HEART RATE: 72 BPM | BODY MASS INDEX: 24.96 KG/M2 | OXYGEN SATURATION: 99 % | DIASTOLIC BLOOD PRESSURE: 91 MMHG | HEIGHT: 76 IN | WEIGHT: 205 LBS | RESPIRATION RATE: 18 BRPM | SYSTOLIC BLOOD PRESSURE: 169 MMHG

## 2020-12-21 DIAGNOSIS — E87.20 METABOLIC ACIDOSIS: ICD-10-CM

## 2020-12-21 DIAGNOSIS — N18.4 CKD (CHRONIC KIDNEY DISEASE) STAGE 4, GFR 15-29 ML/MIN: ICD-10-CM

## 2020-12-21 DIAGNOSIS — N18.4 ANEMIA OF CHRONIC RENAL FAILURE, STAGE 4 (SEVERE): ICD-10-CM

## 2020-12-21 DIAGNOSIS — D63.1 ANEMIA OF CHRONIC RENAL FAILURE, STAGE 4 (SEVERE): ICD-10-CM

## 2020-12-21 DIAGNOSIS — D50.9 IRON DEFICIENCY ANEMIA, UNSPECIFIED IRON DEFICIENCY ANEMIA TYPE: Primary | ICD-10-CM

## 2020-12-21 PROCEDURE — 63600175 PHARM REV CODE 636 W HCPCS: Mod: JG | Performed by: INTERNAL MEDICINE

## 2020-12-21 PROCEDURE — 96365 THER/PROPH/DIAG IV INF INIT: CPT

## 2020-12-21 PROCEDURE — 25000003 PHARM REV CODE 250: Performed by: INTERNAL MEDICINE

## 2020-12-21 RX ORDER — DIPHENHYDRAMINE HYDROCHLORIDE 50 MG/ML
50 INJECTION INTRAMUSCULAR; INTRAVENOUS ONCE AS NEEDED
Status: DISCONTINUED | OUTPATIENT
Start: 2020-12-21 | End: 2020-12-21 | Stop reason: HOSPADM

## 2020-12-21 RX ORDER — DIPHENHYDRAMINE HYDROCHLORIDE 50 MG/ML
50 INJECTION INTRAMUSCULAR; INTRAVENOUS ONCE AS NEEDED
OUTPATIENT
Start: 2020-12-21

## 2020-12-21 RX ORDER — METHYLPREDNISOLONE SOD SUCC 125 MG
125 VIAL (EA) INJECTION ONCE AS NEEDED
OUTPATIENT
Start: 2020-12-21

## 2020-12-21 RX ORDER — HEPARIN 100 UNIT/ML
5 SYRINGE INTRAVENOUS
Status: CANCELLED | OUTPATIENT
Start: 2020-12-21

## 2020-12-21 RX ORDER — HEPARIN 100 UNIT/ML
5 SYRINGE INTRAVENOUS
Status: DISCONTINUED | OUTPATIENT
Start: 2020-12-21 | End: 2020-12-21 | Stop reason: HOSPADM

## 2020-12-21 RX ORDER — SODIUM CHLORIDE 9 MG/ML
INJECTION, SOLUTION INTRAVENOUS CONTINUOUS
Status: DISCONTINUED | OUTPATIENT
Start: 2020-12-21 | End: 2020-12-21 | Stop reason: HOSPADM

## 2020-12-21 RX ORDER — SODIUM BICARBONATE 650 MG/1
650 TABLET ORAL 3 TIMES DAILY
Qty: 180 TABLET | Refills: 6 | Status: SHIPPED | OUTPATIENT
Start: 2020-12-21 | End: 2021-07-13

## 2020-12-21 RX ORDER — METHYLPREDNISOLONE SOD SUCC 125 MG
125 VIAL (EA) INJECTION ONCE AS NEEDED
Status: DISCONTINUED | OUTPATIENT
Start: 2020-12-21 | End: 2020-12-21 | Stop reason: HOSPADM

## 2020-12-21 RX ORDER — EPINEPHRINE 0.3 MG/.3ML
0.3 INJECTION SUBCUTANEOUS ONCE AS NEEDED
OUTPATIENT
Start: 2020-12-21

## 2020-12-21 RX ORDER — EPINEPHRINE 0.3 MG/.3ML
0.3 INJECTION SUBCUTANEOUS ONCE AS NEEDED
Status: DISCONTINUED | OUTPATIENT
Start: 2020-12-21 | End: 2020-12-21 | Stop reason: HOSPADM

## 2020-12-21 RX ORDER — SODIUM CHLORIDE 0.9 % (FLUSH) 0.9 %
10 SYRINGE (ML) INJECTION
Status: DISCONTINUED | OUTPATIENT
Start: 2020-12-21 | End: 2020-12-21 | Stop reason: HOSPADM

## 2020-12-21 RX ORDER — SODIUM CHLORIDE 9 MG/ML
INJECTION, SOLUTION INTRAVENOUS CONTINUOUS
Status: CANCELLED | OUTPATIENT
Start: 2020-12-21

## 2020-12-21 RX ORDER — SODIUM CHLORIDE 0.9 % (FLUSH) 0.9 %
10 SYRINGE (ML) INJECTION
Status: CANCELLED | OUTPATIENT
Start: 2020-12-21

## 2020-12-21 RX ADMIN — FERRIC CARBOXYMALTOSE INJECTION 750 MG: 50 INJECTION, SOLUTION INTRAVENOUS at 08:12

## 2020-12-21 RX ADMIN — SODIUM CHLORIDE: 9 INJECTION, SOLUTION INTRAVENOUS at 08:12

## 2020-12-21 NOTE — PROGRESS NOTES
Patient arrived for Injectafer 2/2.  Observed patient for 30 minutes post infusion. No signs/symptoms of adverse reaction.  Tolerated infusion well and left in NAD.

## 2020-12-22 ENCOUNTER — TELEPHONE (OUTPATIENT)
Dept: NEPHROLOGY | Facility: CLINIC | Age: 75
End: 2020-12-22

## 2020-12-31 ENCOUNTER — LAB VISIT (OUTPATIENT)
Dept: LAB | Facility: HOSPITAL | Age: 75
End: 2020-12-31
Payer: MEDICARE

## 2020-12-31 DIAGNOSIS — N18.4 CKD (CHRONIC KIDNEY DISEASE) STAGE 4, GFR 15-29 ML/MIN: ICD-10-CM

## 2020-12-31 DIAGNOSIS — N18.4 STAGE 4 CHRONIC KIDNEY DISEASE: ICD-10-CM

## 2020-12-31 LAB
ALBUMIN SERPL BCP-MCNC: 2.8 G/DL (ref 3.5–5.2)
ALBUMIN SERPL BCP-MCNC: 2.8 G/DL (ref 3.5–5.2)
ALP SERPL-CCNC: 106 U/L (ref 55–135)
ALT SERPL W/O P-5'-P-CCNC: 20 U/L (ref 10–44)
ANION GAP SERPL CALC-SCNC: 10 MMOL/L (ref 8–16)
AST SERPL-CCNC: 17 U/L (ref 10–40)
BILIRUB DIRECT SERPL-MCNC: 0.1 MG/DL (ref 0.1–0.3)
BILIRUB SERPL-MCNC: 0.3 MG/DL (ref 0.1–1)
BUN SERPL-MCNC: 31 MG/DL (ref 8–23)
CALCIUM SERPL-MCNC: 8.3 MG/DL (ref 8.7–10.5)
CHLORIDE SERPL-SCNC: 112 MMOL/L (ref 95–110)
CO2 SERPL-SCNC: 23 MMOL/L (ref 23–29)
CREAT SERPL-MCNC: 3.9 MG/DL (ref 0.5–1.4)
EST. GFR  (AFRICAN AMERICAN): 16.6 ML/MIN/1.73 M^2
EST. GFR  (NON AFRICAN AMERICAN): 14.3 ML/MIN/1.73 M^2
GLUCOSE SERPL-MCNC: 102 MG/DL (ref 70–110)
PHOSPHATE SERPL-MCNC: 2.8 MG/DL (ref 2.7–4.5)
POTASSIUM SERPL-SCNC: 3.9 MMOL/L (ref 3.5–5.1)
PROT SERPL-MCNC: 6.3 G/DL (ref 6–8.4)
SODIUM SERPL-SCNC: 145 MMOL/L (ref 136–145)

## 2020-12-31 PROCEDURE — 36415 COLL VENOUS BLD VENIPUNCTURE: CPT | Mod: PN

## 2020-12-31 PROCEDURE — 80069 RENAL FUNCTION PANEL: CPT

## 2020-12-31 PROCEDURE — 84075 ASSAY ALKALINE PHOSPHATASE: CPT

## 2021-01-04 ENCOUNTER — TELEPHONE (OUTPATIENT)
Dept: NEPHROLOGY | Facility: CLINIC | Age: 76
End: 2021-01-04

## 2021-01-04 ENCOUNTER — PATIENT MESSAGE (OUTPATIENT)
Dept: NEPHROLOGY | Facility: CLINIC | Age: 76
End: 2021-01-04

## 2021-01-05 ENCOUNTER — LAB VISIT (OUTPATIENT)
Dept: LAB | Facility: HOSPITAL | Age: 76
End: 2021-01-05
Attending: INTERNAL MEDICINE
Payer: MEDICARE

## 2021-01-05 ENCOUNTER — OFFICE VISIT (OUTPATIENT)
Dept: NEPHROLOGY | Facility: CLINIC | Age: 76
End: 2021-01-05
Payer: MEDICARE

## 2021-01-05 VITALS
SYSTOLIC BLOOD PRESSURE: 110 MMHG | BODY MASS INDEX: 25.08 KG/M2 | WEIGHT: 205.94 LBS | DIASTOLIC BLOOD PRESSURE: 60 MMHG | HEART RATE: 55 BPM | HEIGHT: 76 IN | OXYGEN SATURATION: 95 %

## 2021-01-05 DIAGNOSIS — I12.9 HYPERTENSIVE KIDNEY DISEASE WITH STAGE 4 CHRONIC KIDNEY DISEASE: ICD-10-CM

## 2021-01-05 DIAGNOSIS — N25.81 SECONDARY HYPERPARATHYROIDISM: ICD-10-CM

## 2021-01-05 DIAGNOSIS — E87.20 METABOLIC ACIDOSIS: ICD-10-CM

## 2021-01-05 DIAGNOSIS — N28.1 KIDNEY CYSTS: ICD-10-CM

## 2021-01-05 DIAGNOSIS — N18.4 CKD (CHRONIC KIDNEY DISEASE), STAGE IV: ICD-10-CM

## 2021-01-05 DIAGNOSIS — D63.1 ANEMIA OF CHRONIC RENAL FAILURE, STAGE 4 (SEVERE): ICD-10-CM

## 2021-01-05 DIAGNOSIS — E55.9 VITAMIN D DEFICIENCY: ICD-10-CM

## 2021-01-05 DIAGNOSIS — E79.0 HYPERURICEMIA: ICD-10-CM

## 2021-01-05 DIAGNOSIS — N18.4 ANEMIA OF CHRONIC RENAL FAILURE, STAGE 4 (SEVERE): ICD-10-CM

## 2021-01-05 DIAGNOSIS — N18.4 HYPERTENSIVE KIDNEY DISEASE WITH STAGE 4 CHRONIC KIDNEY DISEASE: ICD-10-CM

## 2021-01-05 DIAGNOSIS — N18.4 CKD (CHRONIC KIDNEY DISEASE), STAGE IV: Primary | ICD-10-CM

## 2021-01-05 LAB
ALBUMIN SERPL BCP-MCNC: 2.7 G/DL (ref 3.5–5.2)
ANION GAP SERPL CALC-SCNC: 8 MMOL/L (ref 8–16)
BASOPHILS # BLD AUTO: 0.05 K/UL (ref 0–0.2)
BASOPHILS NFR BLD: 0.9 % (ref 0–1.9)
BUN SERPL-MCNC: 33 MG/DL (ref 8–23)
CALCIUM SERPL-MCNC: 8.4 MG/DL (ref 8.7–10.5)
CHLORIDE SERPL-SCNC: 113 MMOL/L (ref 95–110)
CO2 SERPL-SCNC: 24 MMOL/L (ref 23–29)
CREAT SERPL-MCNC: 3.7 MG/DL (ref 0.5–1.4)
DIFFERENTIAL METHOD: ABNORMAL
EOSINOPHIL # BLD AUTO: 0.1 K/UL (ref 0–0.5)
EOSINOPHIL NFR BLD: 1.8 % (ref 0–8)
ERYTHROCYTE [DISTWIDTH] IN BLOOD BY AUTOMATED COUNT: 15.7 % (ref 11.5–14.5)
EST. GFR  (AFRICAN AMERICAN): 17.7 ML/MIN/1.73 M^2
EST. GFR  (NON AFRICAN AMERICAN): 15.3 ML/MIN/1.73 M^2
FERRITIN SERPL-MCNC: 1001 NG/ML (ref 20–300)
GLUCOSE SERPL-MCNC: 106 MG/DL (ref 70–110)
HCT VFR BLD AUTO: 33.2 % (ref 40–54)
HGB BLD-MCNC: 10.3 G/DL (ref 14–18)
IMM GRANULOCYTES # BLD AUTO: 0.02 K/UL (ref 0–0.04)
IMM GRANULOCYTES NFR BLD AUTO: 0.4 % (ref 0–0.5)
IRON SERPL-MCNC: 65 UG/DL (ref 45–160)
LYMPHOCYTES # BLD AUTO: 0.6 K/UL (ref 1–4.8)
LYMPHOCYTES NFR BLD: 10 % (ref 18–48)
MCH RBC QN AUTO: 30.3 PG (ref 27–31)
MCHC RBC AUTO-ENTMCNC: 31 G/DL (ref 32–36)
MCV RBC AUTO: 98 FL (ref 82–98)
MONOCYTES # BLD AUTO: 0.6 K/UL (ref 0.3–1)
MONOCYTES NFR BLD: 10.3 % (ref 4–15)
NEUTROPHILS # BLD AUTO: 4.4 K/UL (ref 1.8–7.7)
NEUTROPHILS NFR BLD: 76.6 % (ref 38–73)
NRBC BLD-RTO: 0 /100 WBC
PHOSPHATE SERPL-MCNC: 2.5 MG/DL (ref 2.7–4.5)
PLATELET # BLD AUTO: 178 K/UL (ref 150–350)
PMV BLD AUTO: 10.6 FL (ref 9.2–12.9)
POTASSIUM SERPL-SCNC: 4.7 MMOL/L (ref 3.5–5.1)
RBC # BLD AUTO: 3.4 M/UL (ref 4.6–6.2)
SATURATED IRON: 31 % (ref 20–50)
SODIUM SERPL-SCNC: 145 MMOL/L (ref 136–145)
TOTAL IRON BINDING CAPACITY: 212 UG/DL (ref 250–450)
TRANSFERRIN SERPL-MCNC: 143 MG/DL (ref 200–375)
WBC # BLD AUTO: 5.71 K/UL (ref 3.9–12.7)

## 2021-01-05 PROCEDURE — 36415 COLL VENOUS BLD VENIPUNCTURE: CPT

## 2021-01-05 PROCEDURE — 82728 ASSAY OF FERRITIN: CPT

## 2021-01-05 PROCEDURE — 99215 PR OFFICE/OUTPT VISIT, EST, LEVL V, 40-54 MIN: ICD-10-PCS | Mod: S$PBB,,, | Performed by: INTERNAL MEDICINE

## 2021-01-05 PROCEDURE — 83540 ASSAY OF IRON: CPT

## 2021-01-05 PROCEDURE — 99214 OFFICE O/P EST MOD 30 MIN: CPT | Mod: PBBFAC | Performed by: INTERNAL MEDICINE

## 2021-01-05 PROCEDURE — 99999 PR PBB SHADOW E&M-EST. PATIENT-LVL IV: CPT | Mod: PBBFAC,,, | Performed by: INTERNAL MEDICINE

## 2021-01-05 PROCEDURE — 99215 OFFICE O/P EST HI 40 MIN: CPT | Mod: S$PBB,,, | Performed by: INTERNAL MEDICINE

## 2021-01-05 PROCEDURE — 85025 COMPLETE CBC W/AUTO DIFF WBC: CPT

## 2021-01-05 PROCEDURE — 99999 PR PBB SHADOW E&M-EST. PATIENT-LVL IV: ICD-10-PCS | Mod: PBBFAC,,, | Performed by: INTERNAL MEDICINE

## 2021-01-05 PROCEDURE — 80069 RENAL FUNCTION PANEL: CPT

## 2021-01-09 ENCOUNTER — IMMUNIZATION (OUTPATIENT)
Dept: INTERNAL MEDICINE | Facility: CLINIC | Age: 76
End: 2021-01-09
Payer: MEDICARE

## 2021-01-09 DIAGNOSIS — Z23 NEED FOR VACCINATION: ICD-10-CM

## 2021-01-09 PROCEDURE — 91300 COVID-19, MRNA, LNP-S, PF, 30 MCG/0.3 ML DOSE VACCINE: CPT | Mod: PBBFAC | Performed by: EMERGENCY MEDICINE

## 2021-01-12 ENCOUNTER — TELEPHONE (OUTPATIENT)
Dept: NEPHROLOGY | Facility: CLINIC | Age: 76
End: 2021-01-12

## 2021-01-20 ENCOUNTER — LAB VISIT (OUTPATIENT)
Dept: LAB | Facility: HOSPITAL | Age: 76
End: 2021-01-20
Attending: INTERNAL MEDICINE
Payer: MEDICARE

## 2021-01-20 DIAGNOSIS — N18.4 CKD (CHRONIC KIDNEY DISEASE) STAGE 4, GFR 15-29 ML/MIN: ICD-10-CM

## 2021-01-20 LAB
ALBUMIN SERPL BCP-MCNC: 2.8 G/DL (ref 3.5–5.2)
ANION GAP SERPL CALC-SCNC: 7 MMOL/L (ref 8–16)
BUN SERPL-MCNC: 29 MG/DL (ref 8–23)
CALCIUM SERPL-MCNC: 8.4 MG/DL (ref 8.7–10.5)
CHLORIDE SERPL-SCNC: 113 MMOL/L (ref 95–110)
CO2 SERPL-SCNC: 23 MMOL/L (ref 23–29)
CREAT SERPL-MCNC: 3.9 MG/DL (ref 0.5–1.4)
EST. GFR  (AFRICAN AMERICAN): 16.6 ML/MIN/1.73 M^2
EST. GFR  (NON AFRICAN AMERICAN): 14.3 ML/MIN/1.73 M^2
GLUCOSE SERPL-MCNC: 100 MG/DL (ref 70–110)
PHOSPHATE SERPL-MCNC: 3.1 MG/DL (ref 2.7–4.5)
POTASSIUM SERPL-SCNC: 4.2 MMOL/L (ref 3.5–5.1)
SODIUM SERPL-SCNC: 143 MMOL/L (ref 136–145)

## 2021-01-20 PROCEDURE — 36415 COLL VENOUS BLD VENIPUNCTURE: CPT | Mod: PN

## 2021-01-20 PROCEDURE — 80069 RENAL FUNCTION PANEL: CPT

## 2021-01-27 ENCOUNTER — OFFICE VISIT (OUTPATIENT)
Dept: NEPHROLOGY | Facility: CLINIC | Age: 76
End: 2021-01-27
Payer: MEDICARE

## 2021-01-27 VITALS
WEIGHT: 205.5 LBS | HEIGHT: 76 IN | BODY MASS INDEX: 25.02 KG/M2 | DIASTOLIC BLOOD PRESSURE: 58 MMHG | SYSTOLIC BLOOD PRESSURE: 122 MMHG | OXYGEN SATURATION: 95 % | HEART RATE: 65 BPM

## 2021-01-27 DIAGNOSIS — D63.1 ANEMIA OF CHRONIC RENAL FAILURE, STAGE 4 (SEVERE): ICD-10-CM

## 2021-01-27 DIAGNOSIS — N25.81 SECONDARY HYPERPARATHYROIDISM: ICD-10-CM

## 2021-01-27 DIAGNOSIS — N18.4 ANEMIA OF CHRONIC RENAL FAILURE, STAGE 4 (SEVERE): ICD-10-CM

## 2021-01-27 DIAGNOSIS — E79.0 HYPERURICEMIA: ICD-10-CM

## 2021-01-27 DIAGNOSIS — R80.9 PROTEINURIA, UNSPECIFIED TYPE: ICD-10-CM

## 2021-01-27 DIAGNOSIS — N18.4 HYPERTENSIVE KIDNEY DISEASE WITH STAGE 4 CHRONIC KIDNEY DISEASE: ICD-10-CM

## 2021-01-27 DIAGNOSIS — E87.20 METABOLIC ACIDOSIS: ICD-10-CM

## 2021-01-27 DIAGNOSIS — N18.4 CKD (CHRONIC KIDNEY DISEASE) STAGE 4, GFR 15-29 ML/MIN: Primary | ICD-10-CM

## 2021-01-27 DIAGNOSIS — N28.1 KIDNEY CYSTS: ICD-10-CM

## 2021-01-27 DIAGNOSIS — I12.9 HYPERTENSIVE KIDNEY DISEASE WITH STAGE 4 CHRONIC KIDNEY DISEASE: ICD-10-CM

## 2021-01-27 DIAGNOSIS — D50.9 IRON DEFICIENCY ANEMIA, UNSPECIFIED IRON DEFICIENCY ANEMIA TYPE: ICD-10-CM

## 2021-01-27 PROCEDURE — 99214 OFFICE O/P EST MOD 30 MIN: CPT | Mod: S$PBB,,, | Performed by: INTERNAL MEDICINE

## 2021-01-27 PROCEDURE — 99999 PR PBB SHADOW E&M-EST. PATIENT-LVL IV: ICD-10-PCS | Mod: PBBFAC,,, | Performed by: INTERNAL MEDICINE

## 2021-01-27 PROCEDURE — 99214 PR OFFICE/OUTPT VISIT, EST, LEVL IV, 30-39 MIN: ICD-10-PCS | Mod: S$PBB,,, | Performed by: INTERNAL MEDICINE

## 2021-01-27 PROCEDURE — 99999 PR PBB SHADOW E&M-EST. PATIENT-LVL IV: CPT | Mod: PBBFAC,,, | Performed by: INTERNAL MEDICINE

## 2021-01-27 PROCEDURE — 99214 OFFICE O/P EST MOD 30 MIN: CPT | Mod: PBBFAC | Performed by: INTERNAL MEDICINE

## 2021-01-27 RX ORDER — LANOLIN ALCOHOL/MO/W.PET/CERES
100 CREAM (GRAM) TOPICAL DAILY
COMMUNITY
End: 2022-02-14

## 2021-01-30 ENCOUNTER — IMMUNIZATION (OUTPATIENT)
Dept: INTERNAL MEDICINE | Facility: CLINIC | Age: 76
End: 2021-01-30
Payer: MEDICARE

## 2021-01-30 DIAGNOSIS — Z23 NEED FOR VACCINATION: Primary | ICD-10-CM

## 2021-01-30 PROCEDURE — 91300 PR SARS-COV- 2 COVID-19 VACCINE, NO PRSV, 30MCG/0.3ML, IM: CPT | Mod: PBBFAC

## 2021-01-30 PROCEDURE — 0002A PR IMMUNIZ ADMIN, SARS-COV-2 COVID-19 VACC, 30MCG/0.3ML, 2ND DOSE: CPT | Mod: PBBFAC

## 2021-01-30 RX ADMIN — RNA INGREDIENT BNT-162B2 0.3 ML: 0.23 INJECTION, SUSPENSION INTRAMUSCULAR at 02:01

## 2021-02-03 ENCOUNTER — TELEPHONE (OUTPATIENT)
Dept: NEPHROLOGY | Facility: CLINIC | Age: 76
End: 2021-02-03

## 2021-02-03 ENCOUNTER — PATIENT MESSAGE (OUTPATIENT)
Dept: NEPHROLOGY | Facility: CLINIC | Age: 76
End: 2021-02-03

## 2021-02-10 ENCOUNTER — TELEPHONE (OUTPATIENT)
Dept: NEPHROLOGY | Facility: CLINIC | Age: 76
End: 2021-02-10

## 2021-02-10 ENCOUNTER — LAB VISIT (OUTPATIENT)
Dept: LAB | Facility: HOSPITAL | Age: 76
End: 2021-02-10
Attending: INTERNAL MEDICINE
Payer: MEDICARE

## 2021-02-10 DIAGNOSIS — N28.1 KIDNEY CYSTS: ICD-10-CM

## 2021-02-10 DIAGNOSIS — N18.4 HYPERTENSIVE KIDNEY DISEASE WITH STAGE 4 CHRONIC KIDNEY DISEASE: ICD-10-CM

## 2021-02-10 DIAGNOSIS — D50.9 IRON DEFICIENCY ANEMIA, UNSPECIFIED IRON DEFICIENCY ANEMIA TYPE: ICD-10-CM

## 2021-02-10 DIAGNOSIS — N25.81 SECONDARY HYPERPARATHYROIDISM: ICD-10-CM

## 2021-02-10 DIAGNOSIS — I12.9 HYPERTENSIVE KIDNEY DISEASE WITH STAGE 4 CHRONIC KIDNEY DISEASE: ICD-10-CM

## 2021-02-10 DIAGNOSIS — R80.9 PROTEINURIA, UNSPECIFIED TYPE: ICD-10-CM

## 2021-02-10 DIAGNOSIS — N18.4 CKD (CHRONIC KIDNEY DISEASE) STAGE 4, GFR 15-29 ML/MIN: ICD-10-CM

## 2021-02-10 DIAGNOSIS — E87.20 METABOLIC ACIDOSIS: ICD-10-CM

## 2021-02-10 DIAGNOSIS — E79.0 HYPERURICEMIA: ICD-10-CM

## 2021-02-10 LAB
BACTERIA #/AREA URNS AUTO: NORMAL /HPF
BILIRUB UR QL STRIP: NEGATIVE
CLARITY UR REFRACT.AUTO: CLEAR
COLOR UR AUTO: YELLOW
CREAT UR-MCNC: 91 MG/DL (ref 23–375)
GLUCOSE UR QL STRIP: NEGATIVE
HGB UR QL STRIP: NEGATIVE
HYALINE CASTS UR QL AUTO: 0 /LPF
KETONES UR QL STRIP: NEGATIVE
LEUKOCYTE ESTERASE UR QL STRIP: NEGATIVE
MICROSCOPIC COMMENT: NORMAL
NITRITE UR QL STRIP: NEGATIVE
PH UR STRIP: 6 [PH] (ref 5–8)
PROT UR QL STRIP: ABNORMAL
PROT UR-MCNC: 386 MG/DL (ref 0–15)
PROT/CREAT UR: 4.24 MG/G{CREAT} (ref 0–0.2)
RBC #/AREA URNS AUTO: 0 /HPF (ref 0–4)
SP GR UR STRIP: 1.01 (ref 1–1.03)
SQUAMOUS #/AREA URNS AUTO: 0 /HPF
URN SPEC COLLECT METH UR: ABNORMAL
WBC #/AREA URNS AUTO: 1 /HPF (ref 0–5)

## 2021-02-10 PROCEDURE — 81001 URINALYSIS AUTO W/SCOPE: CPT

## 2021-02-10 PROCEDURE — 82570 ASSAY OF URINE CREATININE: CPT

## 2021-02-12 ENCOUNTER — NUTRITION (OUTPATIENT)
Dept: NUTRITION | Facility: CLINIC | Age: 76
End: 2021-02-12
Payer: MEDICARE

## 2021-02-12 VITALS — BODY MASS INDEX: 25.24 KG/M2 | WEIGHT: 207.25 LBS | HEIGHT: 76 IN

## 2021-02-12 DIAGNOSIS — Z71.3 DIETARY COUNSELING: ICD-10-CM

## 2021-02-12 DIAGNOSIS — N18.4 CKD (CHRONIC KIDNEY DISEASE) STAGE 4, GFR 15-29 ML/MIN: Primary | ICD-10-CM

## 2021-02-12 PROCEDURE — 99999 PR PBB SHADOW E&M-EST. PATIENT-LVL III: CPT | Mod: PBBFAC,,,

## 2021-02-12 PROCEDURE — 97802 MEDICAL NUTRITION INDIV IN: CPT | Mod: PBBFAC,59 | Performed by: DIETITIAN, REGISTERED

## 2021-02-12 PROCEDURE — 99213 OFFICE O/P EST LOW 20 MIN: CPT | Mod: PBBFAC

## 2021-02-12 PROCEDURE — 99999 PR PBB SHADOW E&M-EST. PATIENT-LVL III: ICD-10-PCS | Mod: PBBFAC,,,

## 2021-02-17 ENCOUNTER — OFFICE VISIT (OUTPATIENT)
Dept: NEPHROLOGY | Facility: CLINIC | Age: 76
End: 2021-02-17
Payer: MEDICARE

## 2021-02-17 VITALS
SYSTOLIC BLOOD PRESSURE: 119 MMHG | WEIGHT: 208.13 LBS | HEART RATE: 53 BPM | BODY MASS INDEX: 25.35 KG/M2 | DIASTOLIC BLOOD PRESSURE: 58 MMHG | OXYGEN SATURATION: 95 % | HEIGHT: 76 IN

## 2021-02-17 DIAGNOSIS — N25.81 SECONDARY HYPERPARATHYROIDISM: ICD-10-CM

## 2021-02-17 DIAGNOSIS — I12.9 HYPERTENSIVE KIDNEY DISEASE WITH STAGE 4 CHRONIC KIDNEY DISEASE: ICD-10-CM

## 2021-02-17 DIAGNOSIS — N18.4 CKD (CHRONIC KIDNEY DISEASE) STAGE 4, GFR 15-29 ML/MIN: Primary | ICD-10-CM

## 2021-02-17 DIAGNOSIS — E55.9 VITAMIN D DEFICIENCY: ICD-10-CM

## 2021-02-17 DIAGNOSIS — E87.20 METABOLIC ACIDOSIS: ICD-10-CM

## 2021-02-17 DIAGNOSIS — D63.1 ANEMIA OF CHRONIC RENAL FAILURE, STAGE 4 (SEVERE): ICD-10-CM

## 2021-02-17 DIAGNOSIS — E79.0 HYPERURICEMIA: ICD-10-CM

## 2021-02-17 DIAGNOSIS — N18.4 HYPERTENSIVE KIDNEY DISEASE WITH STAGE 4 CHRONIC KIDNEY DISEASE: ICD-10-CM

## 2021-02-17 DIAGNOSIS — D50.9 IRON DEFICIENCY ANEMIA, UNSPECIFIED IRON DEFICIENCY ANEMIA TYPE: ICD-10-CM

## 2021-02-17 DIAGNOSIS — N18.4 ANEMIA OF CHRONIC RENAL FAILURE, STAGE 4 (SEVERE): ICD-10-CM

## 2021-02-17 DIAGNOSIS — R80.9 PROTEINURIA, UNSPECIFIED TYPE: ICD-10-CM

## 2021-02-17 DIAGNOSIS — N28.1 KIDNEY CYSTS: ICD-10-CM

## 2021-02-17 PROBLEM — N17.9 AKI (ACUTE KIDNEY INJURY): Status: RESOLVED | Noted: 2020-08-27 | Resolved: 2021-02-17

## 2021-02-17 PROCEDURE — 99214 OFFICE O/P EST MOD 30 MIN: CPT | Mod: S$PBB,,, | Performed by: INTERNAL MEDICINE

## 2021-02-17 PROCEDURE — 99999 PR PBB SHADOW E&M-EST. PATIENT-LVL III: ICD-10-PCS | Mod: PBBFAC,,, | Performed by: INTERNAL MEDICINE

## 2021-02-17 PROCEDURE — 99214 PR OFFICE/OUTPT VISIT, EST, LEVL IV, 30-39 MIN: ICD-10-PCS | Mod: S$PBB,,, | Performed by: INTERNAL MEDICINE

## 2021-02-17 PROCEDURE — 99213 OFFICE O/P EST LOW 20 MIN: CPT | Mod: PBBFAC | Performed by: INTERNAL MEDICINE

## 2021-02-17 PROCEDURE — 99999 PR PBB SHADOW E&M-EST. PATIENT-LVL III: CPT | Mod: PBBFAC,,, | Performed by: INTERNAL MEDICINE

## 2021-02-17 RX ORDER — CHOLECALCIFEROL (VITAMIN D3) 25 MCG
3000 TABLET ORAL DAILY
Qty: 60 TABLET | Refills: 3 | Status: SHIPPED | OUTPATIENT
Start: 2021-02-17 | End: 2021-07-13 | Stop reason: DRUGHIGH

## 2021-03-10 ENCOUNTER — LAB VISIT (OUTPATIENT)
Dept: LAB | Facility: HOSPITAL | Age: 76
End: 2021-03-10
Attending: INTERNAL MEDICINE
Payer: MEDICARE

## 2021-03-10 DIAGNOSIS — N18.4 CKD (CHRONIC KIDNEY DISEASE) STAGE 4, GFR 15-29 ML/MIN: ICD-10-CM

## 2021-03-10 LAB
ALBUMIN SERPL BCP-MCNC: 2.3 G/DL (ref 3.5–5.2)
ANION GAP SERPL CALC-SCNC: 12 MMOL/L (ref 8–16)
BASOPHILS # BLD AUTO: 0.06 K/UL (ref 0–0.2)
BASOPHILS NFR BLD: 1 % (ref 0–1.9)
BUN SERPL-MCNC: 35 MG/DL (ref 8–23)
CALCIUM SERPL-MCNC: 7.9 MG/DL (ref 8.7–10.5)
CHLORIDE SERPL-SCNC: 110 MMOL/L (ref 95–110)
CO2 SERPL-SCNC: 24 MMOL/L (ref 23–29)
CREAT SERPL-MCNC: 4.7 MG/DL (ref 0.5–1.4)
DIFFERENTIAL METHOD: ABNORMAL
EOSINOPHIL # BLD AUTO: 0.1 K/UL (ref 0–0.5)
EOSINOPHIL NFR BLD: 2.2 % (ref 0–8)
ERYTHROCYTE [DISTWIDTH] IN BLOOD BY AUTOMATED COUNT: 13.6 % (ref 11.5–14.5)
EST. GFR  (AFRICAN AMERICAN): 13.2 ML/MIN/1.73 M^2
EST. GFR  (NON AFRICAN AMERICAN): 11.4 ML/MIN/1.73 M^2
GLUCOSE SERPL-MCNC: 107 MG/DL (ref 70–110)
HCT VFR BLD AUTO: 28.9 % (ref 40–54)
HGB BLD-MCNC: 9.2 G/DL (ref 14–18)
IMM GRANULOCYTES # BLD AUTO: 0.02 K/UL (ref 0–0.04)
IMM GRANULOCYTES NFR BLD AUTO: 0.3 % (ref 0–0.5)
LYMPHOCYTES # BLD AUTO: 0.6 K/UL (ref 1–4.8)
LYMPHOCYTES NFR BLD: 9.5 % (ref 18–48)
MCH RBC QN AUTO: 30.9 PG (ref 27–31)
MCHC RBC AUTO-ENTMCNC: 31.8 G/DL (ref 32–36)
MCV RBC AUTO: 97 FL (ref 82–98)
MONOCYTES # BLD AUTO: 0.6 K/UL (ref 0.3–1)
MONOCYTES NFR BLD: 10 % (ref 4–15)
NEUTROPHILS # BLD AUTO: 4.5 K/UL (ref 1.8–7.7)
NEUTROPHILS NFR BLD: 77 % (ref 38–73)
NRBC BLD-RTO: 0 /100 WBC
PHOSPHATE SERPL-MCNC: 3.7 MG/DL (ref 2.7–4.5)
PLATELET # BLD AUTO: 191 K/UL (ref 150–350)
PMV BLD AUTO: 11.7 FL (ref 9.2–12.9)
POTASSIUM SERPL-SCNC: 3.7 MMOL/L (ref 3.5–5.1)
RBC # BLD AUTO: 2.98 M/UL (ref 4.6–6.2)
SODIUM SERPL-SCNC: 146 MMOL/L (ref 136–145)
WBC # BLD AUTO: 5.89 K/UL (ref 3.9–12.7)

## 2021-03-10 PROCEDURE — 36415 COLL VENOUS BLD VENIPUNCTURE: CPT | Mod: PN | Performed by: INTERNAL MEDICINE

## 2021-03-10 PROCEDURE — 85025 COMPLETE CBC W/AUTO DIFF WBC: CPT | Performed by: INTERNAL MEDICINE

## 2021-03-10 PROCEDURE — 80069 RENAL FUNCTION PANEL: CPT | Performed by: INTERNAL MEDICINE

## 2021-03-11 ENCOUNTER — TELEPHONE (OUTPATIENT)
Dept: NEPHROLOGY | Facility: CLINIC | Age: 76
End: 2021-03-11

## 2021-03-12 ENCOUNTER — LAB VISIT (OUTPATIENT)
Dept: LAB | Facility: HOSPITAL | Age: 76
End: 2021-03-12
Attending: INTERNAL MEDICINE
Payer: MEDICARE

## 2021-03-12 DIAGNOSIS — E87.20 METABOLIC ACIDOSIS: ICD-10-CM

## 2021-03-12 DIAGNOSIS — N25.81 SECONDARY HYPERPARATHYROIDISM: ICD-10-CM

## 2021-03-12 DIAGNOSIS — D63.1 ANEMIA OF CHRONIC RENAL FAILURE, STAGE 4 (SEVERE): ICD-10-CM

## 2021-03-12 DIAGNOSIS — R80.9 PROTEINURIA, UNSPECIFIED TYPE: ICD-10-CM

## 2021-03-12 DIAGNOSIS — N28.1 KIDNEY CYSTS: ICD-10-CM

## 2021-03-12 DIAGNOSIS — N18.4 HYPERTENSIVE KIDNEY DISEASE WITH STAGE 4 CHRONIC KIDNEY DISEASE: ICD-10-CM

## 2021-03-12 DIAGNOSIS — N18.4 CKD (CHRONIC KIDNEY DISEASE) STAGE 4, GFR 15-29 ML/MIN: ICD-10-CM

## 2021-03-12 DIAGNOSIS — E55.9 VITAMIN D DEFICIENCY: ICD-10-CM

## 2021-03-12 DIAGNOSIS — D50.9 IRON DEFICIENCY ANEMIA, UNSPECIFIED IRON DEFICIENCY ANEMIA TYPE: ICD-10-CM

## 2021-03-12 DIAGNOSIS — E79.0 HYPERURICEMIA: ICD-10-CM

## 2021-03-12 DIAGNOSIS — I12.9 HYPERTENSIVE KIDNEY DISEASE WITH STAGE 4 CHRONIC KIDNEY DISEASE: ICD-10-CM

## 2021-03-12 DIAGNOSIS — N18.4 ANEMIA OF CHRONIC RENAL FAILURE, STAGE 4 (SEVERE): ICD-10-CM

## 2021-03-12 LAB
ALBUMIN SERPL BCP-MCNC: 2.3 G/DL (ref 3.5–5.2)
ANION GAP SERPL CALC-SCNC: 10 MMOL/L (ref 8–16)
BUN SERPL-MCNC: 29 MG/DL (ref 8–23)
CALCIUM SERPL-MCNC: 8.5 MG/DL (ref 8.7–10.5)
CHLORIDE SERPL-SCNC: 108 MMOL/L (ref 95–110)
CO2 SERPL-SCNC: 25 MMOL/L (ref 23–29)
CREAT SERPL-MCNC: 4.2 MG/DL (ref 0.5–1.4)
EST. GFR  (AFRICAN AMERICAN): 15.2 ML/MIN/1.73 M^2
EST. GFR  (NON AFRICAN AMERICAN): 13.1 ML/MIN/1.73 M^2
GLUCOSE SERPL-MCNC: 104 MG/DL (ref 70–110)
PHOSPHATE SERPL-MCNC: 3.1 MG/DL (ref 2.7–4.5)
POTASSIUM SERPL-SCNC: 4 MMOL/L (ref 3.5–5.1)
SODIUM SERPL-SCNC: 143 MMOL/L (ref 136–145)

## 2021-03-12 PROCEDURE — 80069 RENAL FUNCTION PANEL: CPT | Performed by: INTERNAL MEDICINE

## 2021-03-12 PROCEDURE — 36415 COLL VENOUS BLD VENIPUNCTURE: CPT | Mod: PN | Performed by: INTERNAL MEDICINE

## 2021-03-15 ENCOUNTER — TELEPHONE (OUTPATIENT)
Dept: NEPHROLOGY | Facility: CLINIC | Age: 76
End: 2021-03-15

## 2021-03-18 ENCOUNTER — PATIENT MESSAGE (OUTPATIENT)
Dept: RESEARCH | Facility: HOSPITAL | Age: 76
End: 2021-03-18

## 2021-03-26 ENCOUNTER — TELEPHONE (OUTPATIENT)
Dept: NEPHROLOGY | Facility: CLINIC | Age: 76
End: 2021-03-26

## 2021-03-26 ENCOUNTER — PATIENT MESSAGE (OUTPATIENT)
Dept: RESEARCH | Facility: HOSPITAL | Age: 76
End: 2021-03-26

## 2021-03-26 ENCOUNTER — LAB VISIT (OUTPATIENT)
Dept: LAB | Facility: HOSPITAL | Age: 76
End: 2021-03-26
Attending: INTERNAL MEDICINE
Payer: MEDICARE

## 2021-03-26 DIAGNOSIS — R80.9 PROTEINURIA, UNSPECIFIED TYPE: ICD-10-CM

## 2021-03-26 DIAGNOSIS — N18.4 ANEMIA OF CHRONIC RENAL FAILURE, STAGE 4 (SEVERE): ICD-10-CM

## 2021-03-26 DIAGNOSIS — I12.9 HYPERTENSIVE KIDNEY DISEASE WITH STAGE 4 CHRONIC KIDNEY DISEASE: ICD-10-CM

## 2021-03-26 DIAGNOSIS — N28.1 KIDNEY CYSTS: ICD-10-CM

## 2021-03-26 DIAGNOSIS — E79.0 HYPERURICEMIA: ICD-10-CM

## 2021-03-26 DIAGNOSIS — N18.4 HYPERTENSIVE KIDNEY DISEASE WITH STAGE 4 CHRONIC KIDNEY DISEASE: ICD-10-CM

## 2021-03-26 DIAGNOSIS — E55.9 VITAMIN D DEFICIENCY: ICD-10-CM

## 2021-03-26 DIAGNOSIS — E87.20 METABOLIC ACIDOSIS: ICD-10-CM

## 2021-03-26 DIAGNOSIS — D63.1 ANEMIA OF CHRONIC RENAL FAILURE, STAGE 4 (SEVERE): ICD-10-CM

## 2021-03-26 DIAGNOSIS — N18.4 CKD (CHRONIC KIDNEY DISEASE) STAGE 4, GFR 15-29 ML/MIN: ICD-10-CM

## 2021-03-26 DIAGNOSIS — D50.9 IRON DEFICIENCY ANEMIA, UNSPECIFIED IRON DEFICIENCY ANEMIA TYPE: ICD-10-CM

## 2021-03-26 DIAGNOSIS — N25.81 SECONDARY HYPERPARATHYROIDISM: ICD-10-CM

## 2021-03-26 LAB
ALBUMIN SERPL BCP-MCNC: 2.5 G/DL (ref 3.5–5.2)
ANION GAP SERPL CALC-SCNC: 9 MMOL/L (ref 8–16)
BUN SERPL-MCNC: 35 MG/DL (ref 8–23)
CALCIUM SERPL-MCNC: 8.4 MG/DL (ref 8.7–10.5)
CHLORIDE SERPL-SCNC: 113 MMOL/L (ref 95–110)
CO2 SERPL-SCNC: 20 MMOL/L (ref 23–29)
CREAT SERPL-MCNC: 4.5 MG/DL (ref 0.5–1.4)
EST. GFR  (AFRICAN AMERICAN): 13.9 ML/MIN/1.73 M^2
EST. GFR  (NON AFRICAN AMERICAN): 12.1 ML/MIN/1.73 M^2
GLUCOSE SERPL-MCNC: 101 MG/DL (ref 70–110)
PHOSPHATE SERPL-MCNC: 3.8 MG/DL (ref 2.7–4.5)
POTASSIUM SERPL-SCNC: 4.4 MMOL/L (ref 3.5–5.1)
SODIUM SERPL-SCNC: 142 MMOL/L (ref 136–145)

## 2021-03-26 PROCEDURE — 80069 RENAL FUNCTION PANEL: CPT | Performed by: INTERNAL MEDICINE

## 2021-03-26 PROCEDURE — 36415 COLL VENOUS BLD VENIPUNCTURE: CPT | Mod: PN | Performed by: INTERNAL MEDICINE

## 2021-04-06 ENCOUNTER — LAB VISIT (OUTPATIENT)
Dept: LAB | Facility: HOSPITAL | Age: 76
End: 2021-04-06
Attending: INTERNAL MEDICINE
Payer: MEDICARE

## 2021-04-06 DIAGNOSIS — N18.4 CKD (CHRONIC KIDNEY DISEASE), STAGE IV: ICD-10-CM

## 2021-04-06 LAB
BASOPHILS # BLD AUTO: 0.04 K/UL (ref 0–0.2)
BASOPHILS NFR BLD: 0.6 % (ref 0–1.9)
DIFFERENTIAL METHOD: ABNORMAL
EOSINOPHIL # BLD AUTO: 0.2 K/UL (ref 0–0.5)
EOSINOPHIL NFR BLD: 2.6 % (ref 0–8)
ERYTHROCYTE [DISTWIDTH] IN BLOOD BY AUTOMATED COUNT: 13.9 % (ref 11.5–14.5)
HCT VFR BLD AUTO: 28.7 % (ref 40–54)
HGB BLD-MCNC: 9.2 G/DL (ref 14–18)
IMM GRANULOCYTES # BLD AUTO: 0.03 K/UL (ref 0–0.04)
IMM GRANULOCYTES NFR BLD AUTO: 0.5 % (ref 0–0.5)
LYMPHOCYTES # BLD AUTO: 0.5 K/UL (ref 1–4.8)
LYMPHOCYTES NFR BLD: 8.6 % (ref 18–48)
MCH RBC QN AUTO: 30.8 PG (ref 27–31)
MCHC RBC AUTO-ENTMCNC: 32.1 G/DL (ref 32–36)
MCV RBC AUTO: 96 FL (ref 82–98)
MONOCYTES # BLD AUTO: 0.7 K/UL (ref 0.3–1)
MONOCYTES NFR BLD: 10.9 % (ref 4–15)
NEUTROPHILS # BLD AUTO: 4.8 K/UL (ref 1.8–7.7)
NEUTROPHILS NFR BLD: 76.8 % (ref 38–73)
NRBC BLD-RTO: 0 /100 WBC
PLATELET # BLD AUTO: 159 K/UL (ref 150–450)
PMV BLD AUTO: 11.6 FL (ref 9.2–12.9)
RBC # BLD AUTO: 2.99 M/UL (ref 4.6–6.2)
WBC # BLD AUTO: 6.26 K/UL (ref 3.9–12.7)

## 2021-04-06 PROCEDURE — 80069 RENAL FUNCTION PANEL: CPT | Performed by: INTERNAL MEDICINE

## 2021-04-06 PROCEDURE — 36415 COLL VENOUS BLD VENIPUNCTURE: CPT | Mod: PN | Performed by: INTERNAL MEDICINE

## 2021-04-06 PROCEDURE — 85025 COMPLETE CBC W/AUTO DIFF WBC: CPT | Performed by: INTERNAL MEDICINE

## 2021-04-06 PROCEDURE — 83540 ASSAY OF IRON: CPT | Performed by: INTERNAL MEDICINE

## 2021-04-06 PROCEDURE — 82728 ASSAY OF FERRITIN: CPT | Performed by: INTERNAL MEDICINE

## 2021-04-07 ENCOUNTER — TELEPHONE (OUTPATIENT)
Dept: NEPHROLOGY | Facility: CLINIC | Age: 76
End: 2021-04-07

## 2021-04-07 LAB
ALBUMIN SERPL BCP-MCNC: 2.4 G/DL (ref 3.5–5.2)
ANION GAP SERPL CALC-SCNC: 9 MMOL/L (ref 8–16)
BUN SERPL-MCNC: 35 MG/DL (ref 8–23)
CALCIUM SERPL-MCNC: 8.1 MG/DL (ref 8.7–10.5)
CHLORIDE SERPL-SCNC: 115 MMOL/L (ref 95–110)
CO2 SERPL-SCNC: 21 MMOL/L (ref 23–29)
CREAT SERPL-MCNC: 4.3 MG/DL (ref 0.5–1.4)
EST. GFR  (AFRICAN AMERICAN): 14.7 ML/MIN/1.73 M^2
EST. GFR  (NON AFRICAN AMERICAN): 12.7 ML/MIN/1.73 M^2
FERRITIN SERPL-MCNC: 646 NG/ML (ref 20–300)
GLUCOSE SERPL-MCNC: 91 MG/DL (ref 70–110)
IRON SERPL-MCNC: 43 UG/DL (ref 45–160)
PHOSPHATE SERPL-MCNC: 3.4 MG/DL (ref 2.7–4.5)
POTASSIUM SERPL-SCNC: 4 MMOL/L (ref 3.5–5.1)
SATURATED IRON: 20 % (ref 20–50)
SODIUM SERPL-SCNC: 145 MMOL/L (ref 136–145)
TOTAL IRON BINDING CAPACITY: 210 UG/DL (ref 250–450)
TRANSFERRIN SERPL-MCNC: 142 MG/DL (ref 200–375)

## 2021-04-12 ENCOUNTER — LAB VISIT (OUTPATIENT)
Dept: LAB | Facility: HOSPITAL | Age: 76
End: 2021-04-12
Attending: INTERNAL MEDICINE
Payer: MEDICARE

## 2021-04-12 DIAGNOSIS — D63.1 ANEMIA OF CHRONIC RENAL FAILURE, STAGE 4 (SEVERE): ICD-10-CM

## 2021-04-12 DIAGNOSIS — E87.20 METABOLIC ACIDOSIS: ICD-10-CM

## 2021-04-12 DIAGNOSIS — N18.4 CKD (CHRONIC KIDNEY DISEASE) STAGE 4, GFR 15-29 ML/MIN: ICD-10-CM

## 2021-04-12 DIAGNOSIS — D50.9 IRON DEFICIENCY ANEMIA, UNSPECIFIED IRON DEFICIENCY ANEMIA TYPE: ICD-10-CM

## 2021-04-12 DIAGNOSIS — I12.9 HYPERTENSIVE KIDNEY DISEASE WITH STAGE 4 CHRONIC KIDNEY DISEASE: ICD-10-CM

## 2021-04-12 DIAGNOSIS — N28.1 KIDNEY CYSTS: ICD-10-CM

## 2021-04-12 DIAGNOSIS — N18.4 HYPERTENSIVE KIDNEY DISEASE WITH STAGE 4 CHRONIC KIDNEY DISEASE: ICD-10-CM

## 2021-04-12 DIAGNOSIS — R80.9 PROTEINURIA, UNSPECIFIED TYPE: ICD-10-CM

## 2021-04-12 DIAGNOSIS — E79.0 HYPERURICEMIA: ICD-10-CM

## 2021-04-12 DIAGNOSIS — E55.9 VITAMIN D DEFICIENCY: ICD-10-CM

## 2021-04-12 DIAGNOSIS — N18.4 ANEMIA OF CHRONIC RENAL FAILURE, STAGE 4 (SEVERE): ICD-10-CM

## 2021-04-12 DIAGNOSIS — N25.81 SECONDARY HYPERPARATHYROIDISM: ICD-10-CM

## 2021-04-12 LAB
ALBUMIN SERPL BCP-MCNC: 2.4 G/DL (ref 3.5–5.2)
ANION GAP SERPL CALC-SCNC: 9 MMOL/L (ref 8–16)
BASOPHILS # BLD AUTO: 0.05 K/UL (ref 0–0.2)
BASOPHILS NFR BLD: 0.8 % (ref 0–1.9)
BUN SERPL-MCNC: 40 MG/DL (ref 8–23)
CALCIUM SERPL-MCNC: 8 MG/DL (ref 8.7–10.5)
CHLORIDE SERPL-SCNC: 111 MMOL/L (ref 95–110)
CO2 SERPL-SCNC: 21 MMOL/L (ref 23–29)
CREAT SERPL-MCNC: 4.8 MG/DL (ref 0.5–1.4)
DIFFERENTIAL METHOD: ABNORMAL
EOSINOPHIL # BLD AUTO: 0.2 K/UL (ref 0–0.5)
EOSINOPHIL NFR BLD: 2.4 % (ref 0–8)
ERYTHROCYTE [DISTWIDTH] IN BLOOD BY AUTOMATED COUNT: 14 % (ref 11.5–14.5)
EST. GFR  (AFRICAN AMERICAN): 12.9 ML/MIN/1.73 M^2
EST. GFR  (NON AFRICAN AMERICAN): 11.2 ML/MIN/1.73 M^2
FERRITIN SERPL-MCNC: 658 NG/ML (ref 20–300)
GLUCOSE SERPL-MCNC: 102 MG/DL (ref 70–110)
HCT VFR BLD AUTO: 28 % (ref 40–54)
HGB BLD-MCNC: 8.9 G/DL (ref 14–18)
IMM GRANULOCYTES # BLD AUTO: 0.03 K/UL (ref 0–0.04)
IMM GRANULOCYTES NFR BLD AUTO: 0.5 % (ref 0–0.5)
IRON SERPL-MCNC: 45 UG/DL (ref 45–160)
LYMPHOCYTES # BLD AUTO: 0.6 K/UL (ref 1–4.8)
LYMPHOCYTES NFR BLD: 9.4 % (ref 18–48)
MCH RBC QN AUTO: 30.5 PG (ref 27–31)
MCHC RBC AUTO-ENTMCNC: 31.8 G/DL (ref 32–36)
MCV RBC AUTO: 96 FL (ref 82–98)
MONOCYTES # BLD AUTO: 0.7 K/UL (ref 0.3–1)
MONOCYTES NFR BLD: 10.6 % (ref 4–15)
NEUTROPHILS # BLD AUTO: 4.9 K/UL (ref 1.8–7.7)
NEUTROPHILS NFR BLD: 76.3 % (ref 38–73)
NRBC BLD-RTO: 0 /100 WBC
PHOSPHATE SERPL-MCNC: 3.3 MG/DL (ref 2.7–4.5)
PLATELET # BLD AUTO: 186 K/UL (ref 150–450)
PMV BLD AUTO: 11.5 FL (ref 9.2–12.9)
POTASSIUM SERPL-SCNC: 4.1 MMOL/L (ref 3.5–5.1)
PTH-INTACT SERPL-MCNC: 147 PG/ML (ref 9–77)
RBC # BLD AUTO: 2.92 M/UL (ref 4.6–6.2)
SATURATED IRON: 22 % (ref 20–50)
SODIUM SERPL-SCNC: 141 MMOL/L (ref 136–145)
TOTAL IRON BINDING CAPACITY: 201 UG/DL (ref 250–450)
TRANSFERRIN SERPL-MCNC: 136 MG/DL (ref 200–375)
URATE SERPL-MCNC: 7 MG/DL (ref 3.4–7)
WBC # BLD AUTO: 6.35 K/UL (ref 3.9–12.7)

## 2021-04-12 PROCEDURE — 84550 ASSAY OF BLOOD/URIC ACID: CPT | Performed by: INTERNAL MEDICINE

## 2021-04-12 PROCEDURE — 83540 ASSAY OF IRON: CPT | Performed by: INTERNAL MEDICINE

## 2021-04-12 PROCEDURE — 85025 COMPLETE CBC W/AUTO DIFF WBC: CPT | Performed by: INTERNAL MEDICINE

## 2021-04-12 PROCEDURE — 82728 ASSAY OF FERRITIN: CPT | Performed by: INTERNAL MEDICINE

## 2021-04-12 PROCEDURE — 83970 ASSAY OF PARATHORMONE: CPT | Performed by: INTERNAL MEDICINE

## 2021-04-12 PROCEDURE — 36415 COLL VENOUS BLD VENIPUNCTURE: CPT | Mod: PN | Performed by: INTERNAL MEDICINE

## 2021-04-12 PROCEDURE — 80069 RENAL FUNCTION PANEL: CPT | Performed by: INTERNAL MEDICINE

## 2021-04-15 ENCOUNTER — OFFICE VISIT (OUTPATIENT)
Dept: NEPHROLOGY | Facility: CLINIC | Age: 76
End: 2021-04-15
Payer: MEDICARE

## 2021-04-15 VITALS
HEART RATE: 76 BPM | BODY MASS INDEX: 25.5 KG/M2 | DIASTOLIC BLOOD PRESSURE: 82 MMHG | OXYGEN SATURATION: 95 % | WEIGHT: 209.44 LBS | SYSTOLIC BLOOD PRESSURE: 148 MMHG | HEIGHT: 76 IN

## 2021-04-15 DIAGNOSIS — N25.81 SECONDARY HYPERPARATHYROIDISM: ICD-10-CM

## 2021-04-15 DIAGNOSIS — E88.09 HYPOALBUMINEMIA: ICD-10-CM

## 2021-04-15 DIAGNOSIS — E79.0 HYPERURICEMIA: ICD-10-CM

## 2021-04-15 DIAGNOSIS — E87.20 METABOLIC ACIDOSIS: ICD-10-CM

## 2021-04-15 DIAGNOSIS — N18.5 HYPERTENSIVE KIDNEY DISEASE WITH STAGE 5 CHRONIC KIDNEY DISEASE, NOT ON CHRONIC DIALYSIS: ICD-10-CM

## 2021-04-15 DIAGNOSIS — E55.9 VITAMIN D DEFICIENCY: ICD-10-CM

## 2021-04-15 DIAGNOSIS — R80.9 PROTEINURIA, UNSPECIFIED TYPE: ICD-10-CM

## 2021-04-15 DIAGNOSIS — I12.0 HYPERTENSIVE KIDNEY DISEASE WITH STAGE 5 CHRONIC KIDNEY DISEASE, NOT ON CHRONIC DIALYSIS: ICD-10-CM

## 2021-04-15 DIAGNOSIS — N18.5 CKD (CHRONIC KIDNEY DISEASE), STAGE V: Primary | ICD-10-CM

## 2021-04-15 DIAGNOSIS — D50.9 IRON DEFICIENCY ANEMIA, UNSPECIFIED IRON DEFICIENCY ANEMIA TYPE: ICD-10-CM

## 2021-04-15 DIAGNOSIS — N28.1 KIDNEY CYSTS: ICD-10-CM

## 2021-04-15 PROCEDURE — 99999 PR PBB SHADOW E&M-EST. PATIENT-LVL IV: CPT | Mod: PBBFAC,,, | Performed by: INTERNAL MEDICINE

## 2021-04-15 PROCEDURE — 99214 OFFICE O/P EST MOD 30 MIN: CPT | Mod: PBBFAC | Performed by: INTERNAL MEDICINE

## 2021-04-15 PROCEDURE — 99215 PR OFFICE/OUTPT VISIT, EST, LEVL V, 40-54 MIN: ICD-10-PCS | Mod: S$PBB,,, | Performed by: INTERNAL MEDICINE

## 2021-04-15 PROCEDURE — 99999 PR PBB SHADOW E&M-EST. PATIENT-LVL IV: ICD-10-PCS | Mod: PBBFAC,,, | Performed by: INTERNAL MEDICINE

## 2021-04-15 PROCEDURE — 99215 OFFICE O/P EST HI 40 MIN: CPT | Mod: S$PBB,,, | Performed by: INTERNAL MEDICINE

## 2021-04-15 RX ORDER — TORSEMIDE 20 MG/1
20 TABLET ORAL DAILY
Qty: 30 TABLET | Refills: 11 | Status: SHIPPED | OUTPATIENT
Start: 2021-04-15 | End: 2021-05-31 | Stop reason: SDUPTHER

## 2021-04-18 PROBLEM — E88.09 HYPOALBUMINEMIA: Status: ACTIVE | Noted: 2021-04-18

## 2021-04-18 PROBLEM — N18.5 CKD (CHRONIC KIDNEY DISEASE), STAGE V: Status: ACTIVE | Noted: 2021-04-18

## 2021-04-18 PROBLEM — N18.5 BENIGN HYPERTENSION WITH CKD (CHRONIC KIDNEY DISEASE) STAGE V: Status: ACTIVE | Noted: 2020-02-19

## 2021-04-18 PROBLEM — N18.5 ANEMIA OF CHRONIC RENAL FAILURE, STAGE 5: Status: ACTIVE | Noted: 2020-12-01

## 2021-04-18 PROBLEM — I12.0 BENIGN HYPERTENSION WITH CKD (CHRONIC KIDNEY DISEASE) STAGE V: Status: ACTIVE | Noted: 2020-02-19

## 2021-04-18 RX ORDER — SODIUM CHLORIDE 9 MG/ML
INJECTION, SOLUTION INTRAVENOUS CONTINUOUS
Status: CANCELLED | OUTPATIENT
Start: 2021-04-18

## 2021-04-18 RX ORDER — METHYLPREDNISOLONE SOD SUCC 125 MG
125 VIAL (EA) INJECTION ONCE AS NEEDED
Status: CANCELLED | OUTPATIENT
Start: 2021-04-18

## 2021-04-18 RX ORDER — HEPARIN 100 UNIT/ML
5 SYRINGE INTRAVENOUS
Status: CANCELLED | OUTPATIENT
Start: 2021-04-18

## 2021-04-18 RX ORDER — SODIUM CHLORIDE 0.9 % (FLUSH) 0.9 %
10 SYRINGE (ML) INJECTION
Status: CANCELLED | OUTPATIENT
Start: 2021-04-18

## 2021-04-18 RX ORDER — EPINEPHRINE 0.3 MG/.3ML
0.3 INJECTION SUBCUTANEOUS ONCE AS NEEDED
Status: CANCELLED | OUTPATIENT
Start: 2021-04-18

## 2021-04-18 RX ORDER — DIPHENHYDRAMINE HYDROCHLORIDE 50 MG/ML
50 INJECTION INTRAMUSCULAR; INTRAVENOUS ONCE AS NEEDED
Status: CANCELLED | OUTPATIENT
Start: 2021-04-18

## 2021-04-20 ENCOUNTER — OFFICE VISIT (OUTPATIENT)
Dept: SURGERY | Facility: CLINIC | Age: 76
End: 2021-04-20
Payer: MEDICARE

## 2021-04-20 VITALS
BODY MASS INDEX: 24.75 KG/M2 | HEIGHT: 76 IN | HEART RATE: 67 BPM | DIASTOLIC BLOOD PRESSURE: 81 MMHG | WEIGHT: 203.25 LBS | SYSTOLIC BLOOD PRESSURE: 168 MMHG

## 2021-04-20 DIAGNOSIS — D63.1 ANEMIA OF CHRONIC RENAL FAILURE, STAGE 4 (SEVERE): Primary | ICD-10-CM

## 2021-04-20 DIAGNOSIS — N18.5 CKD (CHRONIC KIDNEY DISEASE), STAGE V: ICD-10-CM

## 2021-04-20 DIAGNOSIS — N18.4 ANEMIA OF CHRONIC RENAL FAILURE, STAGE 4 (SEVERE): Primary | ICD-10-CM

## 2021-04-20 PROCEDURE — 99999 PR PBB SHADOW E&M-EST. PATIENT-LVL III: CPT | Mod: PBBFAC,,, | Performed by: SURGERY

## 2021-04-20 PROCEDURE — 99203 PR OFFICE/OUTPT VISIT, NEW, LEVL III, 30-44 MIN: ICD-10-PCS | Mod: S$PBB,,, | Performed by: SURGERY

## 2021-04-20 PROCEDURE — 99203 OFFICE O/P NEW LOW 30 MIN: CPT | Mod: S$PBB,,, | Performed by: SURGERY

## 2021-04-20 PROCEDURE — 99213 OFFICE O/P EST LOW 20 MIN: CPT | Mod: PBBFAC | Performed by: SURGERY

## 2021-04-20 PROCEDURE — 99999 PR PBB SHADOW E&M-EST. PATIENT-LVL III: ICD-10-PCS | Mod: PBBFAC,,, | Performed by: SURGERY

## 2021-04-21 ENCOUNTER — LAB VISIT (OUTPATIENT)
Dept: LAB | Facility: HOSPITAL | Age: 76
End: 2021-04-21
Attending: INTERNAL MEDICINE
Payer: MEDICARE

## 2021-04-21 DIAGNOSIS — N25.81 SECONDARY HYPERPARATHYROIDISM: ICD-10-CM

## 2021-04-21 DIAGNOSIS — N18.4 ANEMIA OF CHRONIC RENAL FAILURE, STAGE 4 (SEVERE): ICD-10-CM

## 2021-04-21 DIAGNOSIS — N18.4 CKD (CHRONIC KIDNEY DISEASE) STAGE 4, GFR 15-29 ML/MIN: ICD-10-CM

## 2021-04-21 DIAGNOSIS — N28.1 KIDNEY CYSTS: ICD-10-CM

## 2021-04-21 DIAGNOSIS — N18.4 HYPERTENSIVE KIDNEY DISEASE WITH STAGE 4 CHRONIC KIDNEY DISEASE: ICD-10-CM

## 2021-04-21 DIAGNOSIS — E87.20 METABOLIC ACIDOSIS: ICD-10-CM

## 2021-04-21 DIAGNOSIS — D63.1 ANEMIA OF CHRONIC RENAL FAILURE, STAGE 4 (SEVERE): ICD-10-CM

## 2021-04-21 DIAGNOSIS — E79.0 HYPERURICEMIA: ICD-10-CM

## 2021-04-21 DIAGNOSIS — E55.9 VITAMIN D DEFICIENCY: ICD-10-CM

## 2021-04-21 DIAGNOSIS — I12.9 HYPERTENSIVE KIDNEY DISEASE WITH STAGE 4 CHRONIC KIDNEY DISEASE: ICD-10-CM

## 2021-04-21 DIAGNOSIS — R80.9 PROTEINURIA, UNSPECIFIED TYPE: ICD-10-CM

## 2021-04-21 DIAGNOSIS — D50.9 IRON DEFICIENCY ANEMIA, UNSPECIFIED IRON DEFICIENCY ANEMIA TYPE: ICD-10-CM

## 2021-04-21 LAB
ALBUMIN SERPL BCP-MCNC: 2.8 G/DL (ref 3.5–5.2)
ANION GAP SERPL CALC-SCNC: 11 MMOL/L (ref 8–16)
BASOPHILS # BLD AUTO: 0.07 K/UL (ref 0–0.2)
BASOPHILS NFR BLD: 1 % (ref 0–1.9)
BUN SERPL-MCNC: 39 MG/DL (ref 8–23)
CALCIUM SERPL-MCNC: 8.1 MG/DL (ref 8.7–10.5)
CHLORIDE SERPL-SCNC: 108 MMOL/L (ref 95–110)
CO2 SERPL-SCNC: 22 MMOL/L (ref 23–29)
CREAT SERPL-MCNC: 5.5 MG/DL (ref 0.5–1.4)
DIFFERENTIAL METHOD: ABNORMAL
EOSINOPHIL # BLD AUTO: 0.2 K/UL (ref 0–0.5)
EOSINOPHIL NFR BLD: 2.8 % (ref 0–8)
ERYTHROCYTE [DISTWIDTH] IN BLOOD BY AUTOMATED COUNT: 14.1 % (ref 11.5–14.5)
EST. GFR  (AFRICAN AMERICAN): 10.9 ML/MIN/1.73 M^2
EST. GFR  (NON AFRICAN AMERICAN): 9.5 ML/MIN/1.73 M^2
GLUCOSE SERPL-MCNC: 133 MG/DL (ref 70–110)
HCT VFR BLD AUTO: 29.1 % (ref 40–54)
HGB BLD-MCNC: 9.3 G/DL (ref 14–18)
IMM GRANULOCYTES # BLD AUTO: 0.02 K/UL (ref 0–0.04)
IMM GRANULOCYTES NFR BLD AUTO: 0.3 % (ref 0–0.5)
LYMPHOCYTES # BLD AUTO: 0.7 K/UL (ref 1–4.8)
LYMPHOCYTES NFR BLD: 10.8 % (ref 18–48)
MCH RBC QN AUTO: 30.3 PG (ref 27–31)
MCHC RBC AUTO-ENTMCNC: 32 G/DL (ref 32–36)
MCV RBC AUTO: 95 FL (ref 82–98)
MONOCYTES # BLD AUTO: 0.7 K/UL (ref 0.3–1)
MONOCYTES NFR BLD: 10 % (ref 4–15)
NEUTROPHILS # BLD AUTO: 5.2 K/UL (ref 1.8–7.7)
NEUTROPHILS NFR BLD: 75.1 % (ref 38–73)
NRBC BLD-RTO: 0 /100 WBC
PHOSPHATE SERPL-MCNC: 4.2 MG/DL (ref 2.7–4.5)
PLATELET # BLD AUTO: 217 K/UL (ref 150–450)
PMV BLD AUTO: 11.1 FL (ref 9.2–12.9)
POTASSIUM SERPL-SCNC: 3.8 MMOL/L (ref 3.5–5.1)
RBC # BLD AUTO: 3.07 M/UL (ref 4.6–6.2)
SODIUM SERPL-SCNC: 141 MMOL/L (ref 136–145)
WBC # BLD AUTO: 6.88 K/UL (ref 3.9–12.7)

## 2021-04-21 PROCEDURE — 36415 COLL VENOUS BLD VENIPUNCTURE: CPT | Mod: PN | Performed by: INTERNAL MEDICINE

## 2021-04-21 PROCEDURE — 85025 COMPLETE CBC W/AUTO DIFF WBC: CPT | Performed by: INTERNAL MEDICINE

## 2021-04-21 PROCEDURE — 80069 RENAL FUNCTION PANEL: CPT | Performed by: INTERNAL MEDICINE

## 2021-04-23 ENCOUNTER — LAB VISIT (OUTPATIENT)
Dept: LAB | Facility: HOSPITAL | Age: 76
End: 2021-04-23
Attending: INTERNAL MEDICINE
Payer: MEDICARE

## 2021-04-23 DIAGNOSIS — R80.9 PROTEINURIA, UNSPECIFIED TYPE: ICD-10-CM

## 2021-04-23 DIAGNOSIS — E87.20 METABOLIC ACIDOSIS: ICD-10-CM

## 2021-04-23 DIAGNOSIS — N25.81 SECONDARY HYPERPARATHYROIDISM: ICD-10-CM

## 2021-04-23 DIAGNOSIS — N28.1 KIDNEY CYSTS: ICD-10-CM

## 2021-04-23 DIAGNOSIS — N18.4 HYPERTENSIVE KIDNEY DISEASE WITH STAGE 4 CHRONIC KIDNEY DISEASE: ICD-10-CM

## 2021-04-23 DIAGNOSIS — E79.0 HYPERURICEMIA: ICD-10-CM

## 2021-04-23 DIAGNOSIS — E55.9 VITAMIN D DEFICIENCY: ICD-10-CM

## 2021-04-23 DIAGNOSIS — N18.4 CKD (CHRONIC KIDNEY DISEASE) STAGE 4, GFR 15-29 ML/MIN: ICD-10-CM

## 2021-04-23 DIAGNOSIS — N18.4 ANEMIA OF CHRONIC RENAL FAILURE, STAGE 4 (SEVERE): ICD-10-CM

## 2021-04-23 DIAGNOSIS — D63.1 ANEMIA OF CHRONIC RENAL FAILURE, STAGE 4 (SEVERE): ICD-10-CM

## 2021-04-23 DIAGNOSIS — I12.9 HYPERTENSIVE KIDNEY DISEASE WITH STAGE 4 CHRONIC KIDNEY DISEASE: ICD-10-CM

## 2021-04-23 DIAGNOSIS — D50.9 IRON DEFICIENCY ANEMIA, UNSPECIFIED IRON DEFICIENCY ANEMIA TYPE: ICD-10-CM

## 2021-04-23 LAB
ALBUMIN SERPL BCP-MCNC: 2.7 G/DL (ref 3.5–5.2)
ANION GAP SERPL CALC-SCNC: 11 MMOL/L (ref 8–16)
BUN SERPL-MCNC: 38 MG/DL (ref 8–23)
CALCIUM SERPL-MCNC: 8 MG/DL (ref 8.7–10.5)
CHLORIDE SERPL-SCNC: 111 MMOL/L (ref 95–110)
CO2 SERPL-SCNC: 21 MMOL/L (ref 23–29)
CREAT SERPL-MCNC: 5.3 MG/DL (ref 0.5–1.4)
EST. GFR  (AFRICAN AMERICAN): 11.4 ML/MIN/1.73 M^2
EST. GFR  (NON AFRICAN AMERICAN): 9.9 ML/MIN/1.73 M^2
GLUCOSE SERPL-MCNC: 106 MG/DL (ref 70–110)
PHOSPHATE SERPL-MCNC: 3.9 MG/DL (ref 2.7–4.5)
POTASSIUM SERPL-SCNC: 3.9 MMOL/L (ref 3.5–5.1)
SODIUM SERPL-SCNC: 143 MMOL/L (ref 136–145)

## 2021-04-23 PROCEDURE — 80069 RENAL FUNCTION PANEL: CPT | Performed by: INTERNAL MEDICINE

## 2021-04-23 PROCEDURE — 36415 COLL VENOUS BLD VENIPUNCTURE: CPT | Mod: PN | Performed by: INTERNAL MEDICINE

## 2021-04-26 ENCOUNTER — OFFICE VISIT (OUTPATIENT)
Dept: NEPHROLOGY | Facility: CLINIC | Age: 76
End: 2021-04-26
Payer: MEDICARE

## 2021-04-26 ENCOUNTER — INFUSION (OUTPATIENT)
Dept: INFECTIOUS DISEASES | Facility: HOSPITAL | Age: 76
End: 2021-04-26
Attending: INTERNAL MEDICINE
Payer: MEDICARE

## 2021-04-26 VITALS
HEART RATE: 62 BPM | BODY MASS INDEX: 26.36 KG/M2 | SYSTOLIC BLOOD PRESSURE: 120 MMHG | OXYGEN SATURATION: 97 % | DIASTOLIC BLOOD PRESSURE: 62 MMHG | WEIGHT: 216.5 LBS | HEIGHT: 76 IN

## 2021-04-26 VITALS
SYSTOLIC BLOOD PRESSURE: 171 MMHG | RESPIRATION RATE: 18 BRPM | HEART RATE: 60 BPM | DIASTOLIC BLOOD PRESSURE: 79 MMHG | OXYGEN SATURATION: 97 % | WEIGHT: 204.81 LBS | TEMPERATURE: 98 F | BODY MASS INDEX: 24.94 KG/M2 | HEIGHT: 76 IN

## 2021-04-26 DIAGNOSIS — N18.5 CKD (CHRONIC KIDNEY DISEASE), STAGE V: Primary | ICD-10-CM

## 2021-04-26 DIAGNOSIS — N18.5 ANEMIA OF CHRONIC RENAL FAILURE, STAGE 5: ICD-10-CM

## 2021-04-26 DIAGNOSIS — D50.9 IRON DEFICIENCY ANEMIA, UNSPECIFIED IRON DEFICIENCY ANEMIA TYPE: ICD-10-CM

## 2021-04-26 DIAGNOSIS — N25.81 SECONDARY HYPERPARATHYROIDISM: ICD-10-CM

## 2021-04-26 DIAGNOSIS — E87.20 METABOLIC ACIDOSIS: ICD-10-CM

## 2021-04-26 DIAGNOSIS — D63.1 ANEMIA OF CHRONIC RENAL FAILURE, STAGE 5: ICD-10-CM

## 2021-04-26 DIAGNOSIS — E55.9 VITAMIN D DEFICIENCY: ICD-10-CM

## 2021-04-26 DIAGNOSIS — E79.0 HYPERURICEMIA: ICD-10-CM

## 2021-04-26 DIAGNOSIS — N28.1 KIDNEY CYSTS: ICD-10-CM

## 2021-04-26 DIAGNOSIS — N18.5 HYPERTENSIVE KIDNEY DISEASE WITH STAGE 5 CHRONIC KIDNEY DISEASE, NOT ON CHRONIC DIALYSIS: ICD-10-CM

## 2021-04-26 DIAGNOSIS — I12.0 HYPERTENSIVE KIDNEY DISEASE WITH STAGE 5 CHRONIC KIDNEY DISEASE, NOT ON CHRONIC DIALYSIS: ICD-10-CM

## 2021-04-26 DIAGNOSIS — R80.1 PERSISTENT PROTEINURIA: ICD-10-CM

## 2021-04-26 DIAGNOSIS — E88.09 HYPOALBUMINEMIA: ICD-10-CM

## 2021-04-26 PROCEDURE — 99215 PR OFFICE/OUTPT VISIT, EST, LEVL V, 40-54 MIN: ICD-10-PCS | Mod: S$PBB,,, | Performed by: INTERNAL MEDICINE

## 2021-04-26 PROCEDURE — 63600175 PHARM REV CODE 636 W HCPCS: Mod: JG | Performed by: INTERNAL MEDICINE

## 2021-04-26 PROCEDURE — 99214 OFFICE O/P EST MOD 30 MIN: CPT | Mod: PBBFAC,25 | Performed by: INTERNAL MEDICINE

## 2021-04-26 PROCEDURE — 99999 PR PBB SHADOW E&M-EST. PATIENT-LVL IV: CPT | Mod: PBBFAC,,, | Performed by: INTERNAL MEDICINE

## 2021-04-26 PROCEDURE — 96365 THER/PROPH/DIAG IV INF INIT: CPT

## 2021-04-26 PROCEDURE — 25000003 PHARM REV CODE 250: Performed by: INTERNAL MEDICINE

## 2021-04-26 PROCEDURE — 99999 PR PBB SHADOW E&M-EST. PATIENT-LVL IV: ICD-10-PCS | Mod: PBBFAC,,, | Performed by: INTERNAL MEDICINE

## 2021-04-26 PROCEDURE — 99215 OFFICE O/P EST HI 40 MIN: CPT | Mod: S$PBB,,, | Performed by: INTERNAL MEDICINE

## 2021-04-26 RX ORDER — SODIUM CHLORIDE 0.9 % (FLUSH) 0.9 %
10 SYRINGE (ML) INJECTION
Status: DISCONTINUED | OUTPATIENT
Start: 2021-04-26 | End: 2021-04-26 | Stop reason: HOSPADM

## 2021-04-26 RX ORDER — DIPHENHYDRAMINE HYDROCHLORIDE 50 MG/ML
50 INJECTION INTRAMUSCULAR; INTRAVENOUS ONCE AS NEEDED
Status: CANCELLED | OUTPATIENT
Start: 2021-05-03

## 2021-04-26 RX ORDER — HEPARIN 100 UNIT/ML
5 SYRINGE INTRAVENOUS
Status: DISCONTINUED | OUTPATIENT
Start: 2021-04-26 | End: 2021-04-26 | Stop reason: HOSPADM

## 2021-04-26 RX ORDER — SODIUM CHLORIDE 9 MG/ML
INJECTION, SOLUTION INTRAVENOUS CONTINUOUS
Status: CANCELLED | OUTPATIENT
Start: 2021-05-03

## 2021-04-26 RX ORDER — DIPHENHYDRAMINE HYDROCHLORIDE 50 MG/ML
50 INJECTION INTRAMUSCULAR; INTRAVENOUS ONCE AS NEEDED
Status: DISCONTINUED | OUTPATIENT
Start: 2021-04-26 | End: 2021-04-26 | Stop reason: HOSPADM

## 2021-04-26 RX ORDER — EPINEPHRINE 0.3 MG/.3ML
0.3 INJECTION SUBCUTANEOUS ONCE AS NEEDED
Status: CANCELLED | OUTPATIENT
Start: 2021-05-03

## 2021-04-26 RX ORDER — SODIUM CHLORIDE 0.9 % (FLUSH) 0.9 %
10 SYRINGE (ML) INJECTION
Status: CANCELLED | OUTPATIENT
Start: 2021-05-03

## 2021-04-26 RX ORDER — METHYLPREDNISOLONE SOD SUCC 125 MG
125 VIAL (EA) INJECTION ONCE AS NEEDED
Status: CANCELLED | OUTPATIENT
Start: 2021-05-03

## 2021-04-26 RX ORDER — HEPARIN 100 UNIT/ML
5 SYRINGE INTRAVENOUS
Status: CANCELLED | OUTPATIENT
Start: 2021-05-03

## 2021-04-26 RX ORDER — ALLOPURINOL 100 MG/1
TABLET ORAL
Qty: 90 TABLET | Refills: 6 | Status: SHIPPED | OUTPATIENT
Start: 2021-04-26 | End: 2021-09-30

## 2021-04-26 RX ORDER — SODIUM CHLORIDE 9 MG/ML
INJECTION, SOLUTION INTRAVENOUS CONTINUOUS
Status: DISCONTINUED | OUTPATIENT
Start: 2021-04-26 | End: 2021-04-26 | Stop reason: HOSPADM

## 2021-04-26 RX ORDER — METHYLPREDNISOLONE SOD SUCC 125 MG
125 VIAL (EA) INJECTION ONCE AS NEEDED
Status: DISCONTINUED | OUTPATIENT
Start: 2021-04-26 | End: 2021-04-26 | Stop reason: HOSPADM

## 2021-04-26 RX ORDER — EPINEPHRINE 0.3 MG/.3ML
0.3 INJECTION SUBCUTANEOUS ONCE AS NEEDED
Status: DISCONTINUED | OUTPATIENT
Start: 2021-04-26 | End: 2021-04-26 | Stop reason: HOSPADM

## 2021-04-26 RX ADMIN — SODIUM CHLORIDE: 0.9 INJECTION, SOLUTION INTRAVENOUS at 12:04

## 2021-04-26 RX ADMIN — FERRIC CARBOXYMALTOSE INJECTION 750 MG: 50 INJECTION, SOLUTION INTRAVENOUS at 12:04

## 2021-04-29 ENCOUNTER — TELEPHONE (OUTPATIENT)
Dept: NEPHROLOGY | Facility: CLINIC | Age: 76
End: 2021-04-29

## 2021-04-29 ENCOUNTER — LAB VISIT (OUTPATIENT)
Dept: LAB | Facility: HOSPITAL | Age: 76
End: 2021-04-29
Attending: INTERNAL MEDICINE
Payer: MEDICARE

## 2021-04-29 DIAGNOSIS — N18.5 ANEMIA OF CHRONIC RENAL FAILURE, STAGE 5: ICD-10-CM

## 2021-04-29 DIAGNOSIS — E79.0 HYPERURICEMIA: ICD-10-CM

## 2021-04-29 DIAGNOSIS — N25.81 SECONDARY HYPERPARATHYROIDISM: ICD-10-CM

## 2021-04-29 DIAGNOSIS — N18.5 HYPERTENSIVE KIDNEY DISEASE WITH STAGE 5 CHRONIC KIDNEY DISEASE, NOT ON CHRONIC DIALYSIS: ICD-10-CM

## 2021-04-29 DIAGNOSIS — D63.1 ANEMIA OF CHRONIC RENAL FAILURE, STAGE 5: ICD-10-CM

## 2021-04-29 DIAGNOSIS — N28.1 KIDNEY CYSTS: ICD-10-CM

## 2021-04-29 DIAGNOSIS — R80.1 PERSISTENT PROTEINURIA: ICD-10-CM

## 2021-04-29 DIAGNOSIS — E87.20 METABOLIC ACIDOSIS: ICD-10-CM

## 2021-04-29 DIAGNOSIS — E55.9 VITAMIN D DEFICIENCY: ICD-10-CM

## 2021-04-29 DIAGNOSIS — N18.5 CKD (CHRONIC KIDNEY DISEASE), STAGE V: ICD-10-CM

## 2021-04-29 DIAGNOSIS — D50.9 IRON DEFICIENCY ANEMIA, UNSPECIFIED IRON DEFICIENCY ANEMIA TYPE: ICD-10-CM

## 2021-04-29 DIAGNOSIS — E88.09 HYPOALBUMINEMIA: ICD-10-CM

## 2021-04-29 DIAGNOSIS — I12.0 HYPERTENSIVE KIDNEY DISEASE WITH STAGE 5 CHRONIC KIDNEY DISEASE, NOT ON CHRONIC DIALYSIS: ICD-10-CM

## 2021-04-29 LAB
ALBUMIN SERPL BCP-MCNC: 2.6 G/DL (ref 3.5–5.2)
ANION GAP SERPL CALC-SCNC: 11 MMOL/L (ref 8–16)
BASOPHILS # BLD AUTO: 0.04 K/UL (ref 0–0.2)
BASOPHILS NFR BLD: 0.7 % (ref 0–1.9)
BUN SERPL-MCNC: 40 MG/DL (ref 8–23)
CALCIUM SERPL-MCNC: 8.3 MG/DL (ref 8.7–10.5)
CHLORIDE SERPL-SCNC: 112 MMOL/L (ref 95–110)
CO2 SERPL-SCNC: 20 MMOL/L (ref 23–29)
CREAT SERPL-MCNC: 5.4 MG/DL (ref 0.5–1.4)
DIFFERENTIAL METHOD: ABNORMAL
EOSINOPHIL # BLD AUTO: 0.2 K/UL (ref 0–0.5)
EOSINOPHIL NFR BLD: 2.9 % (ref 0–8)
ERYTHROCYTE [DISTWIDTH] IN BLOOD BY AUTOMATED COUNT: 14.2 % (ref 11.5–14.5)
EST. GFR  (AFRICAN AMERICAN): 11.2 ML/MIN/1.73 M^2
EST. GFR  (NON AFRICAN AMERICAN): 9.7 ML/MIN/1.73 M^2
GLUCOSE SERPL-MCNC: 100 MG/DL (ref 70–110)
HCT VFR BLD AUTO: 27.7 % (ref 40–54)
HGB BLD-MCNC: 9 G/DL (ref 14–18)
IMM GRANULOCYTES # BLD AUTO: 0.02 K/UL (ref 0–0.04)
IMM GRANULOCYTES NFR BLD AUTO: 0.3 % (ref 0–0.5)
LYMPHOCYTES # BLD AUTO: 0.6 K/UL (ref 1–4.8)
LYMPHOCYTES NFR BLD: 10.6 % (ref 18–48)
MCH RBC QN AUTO: 30.4 PG (ref 27–31)
MCHC RBC AUTO-ENTMCNC: 32.5 G/DL (ref 32–36)
MCV RBC AUTO: 94 FL (ref 82–98)
MONOCYTES # BLD AUTO: 0.6 K/UL (ref 0.3–1)
MONOCYTES NFR BLD: 10.6 % (ref 4–15)
NEUTROPHILS # BLD AUTO: 4.5 K/UL (ref 1.8–7.7)
NEUTROPHILS NFR BLD: 74.9 % (ref 38–73)
NRBC BLD-RTO: 0 /100 WBC
PHOSPHATE SERPL-MCNC: 3.8 MG/DL (ref 2.7–4.5)
PLATELET # BLD AUTO: 180 K/UL (ref 150–450)
PMV BLD AUTO: 10.9 FL (ref 9.2–12.9)
POTASSIUM SERPL-SCNC: 3.4 MMOL/L (ref 3.5–5.1)
RBC # BLD AUTO: 2.96 M/UL (ref 4.6–6.2)
SODIUM SERPL-SCNC: 143 MMOL/L (ref 136–145)
WBC # BLD AUTO: 5.94 K/UL (ref 3.9–12.7)

## 2021-04-29 PROCEDURE — 36415 COLL VENOUS BLD VENIPUNCTURE: CPT | Mod: PN | Performed by: INTERNAL MEDICINE

## 2021-04-29 PROCEDURE — 85025 COMPLETE CBC W/AUTO DIFF WBC: CPT | Performed by: INTERNAL MEDICINE

## 2021-04-29 PROCEDURE — 80069 RENAL FUNCTION PANEL: CPT | Performed by: INTERNAL MEDICINE

## 2021-05-03 ENCOUNTER — INFUSION (OUTPATIENT)
Dept: INFECTIOUS DISEASES | Facility: HOSPITAL | Age: 76
End: 2021-05-03
Attending: INTERNAL MEDICINE
Payer: MEDICARE

## 2021-05-03 ENCOUNTER — LAB VISIT (OUTPATIENT)
Dept: INTERNAL MEDICINE | Facility: CLINIC | Age: 76
End: 2021-05-03
Payer: MEDICARE

## 2021-05-03 VITALS
WEIGHT: 197.44 LBS | RESPIRATION RATE: 20 BRPM | HEIGHT: 76 IN | DIASTOLIC BLOOD PRESSURE: 60 MMHG | SYSTOLIC BLOOD PRESSURE: 113 MMHG | HEART RATE: 67 BPM | BODY MASS INDEX: 24.04 KG/M2 | TEMPERATURE: 99 F | OXYGEN SATURATION: 95 %

## 2021-05-03 DIAGNOSIS — D63.1 ANEMIA OF CHRONIC RENAL FAILURE, STAGE 5: ICD-10-CM

## 2021-05-03 DIAGNOSIS — D50.9 IRON DEFICIENCY ANEMIA, UNSPECIFIED IRON DEFICIENCY ANEMIA TYPE: ICD-10-CM

## 2021-05-03 DIAGNOSIS — N18.5 CKD (CHRONIC KIDNEY DISEASE), STAGE V: ICD-10-CM

## 2021-05-03 DIAGNOSIS — N18.5 ANEMIA OF CHRONIC RENAL FAILURE, STAGE 5: ICD-10-CM

## 2021-05-03 DIAGNOSIS — N18.5 CKD (CHRONIC KIDNEY DISEASE), STAGE V: Primary | ICD-10-CM

## 2021-05-03 LAB — SARS-COV-2 RNA RESP QL NAA+PROBE: NOT DETECTED

## 2021-05-03 PROCEDURE — 25000003 PHARM REV CODE 250: Performed by: INTERNAL MEDICINE

## 2021-05-03 PROCEDURE — 63600175 PHARM REV CODE 636 W HCPCS: Mod: JG | Performed by: INTERNAL MEDICINE

## 2021-05-03 PROCEDURE — 96365 THER/PROPH/DIAG IV INF INIT: CPT

## 2021-05-03 PROCEDURE — U0003 INFECTIOUS AGENT DETECTION BY NUCLEIC ACID (DNA OR RNA); SEVERE ACUTE RESPIRATORY SYNDROME CORONAVIRUS 2 (SARS-COV-2) (CORONAVIRUS DISEASE [COVID-19]), AMPLIFIED PROBE TECHNIQUE, MAKING USE OF HIGH THROUGHPUT TECHNOLOGIES AS DESCRIBED BY CMS-2020-01-R: HCPCS | Performed by: SURGERY

## 2021-05-03 PROCEDURE — U0005 INFEC AGEN DETEC AMPLI PROBE: HCPCS | Performed by: SURGERY

## 2021-05-03 RX ORDER — METHYLPREDNISOLONE SOD SUCC 125 MG
125 VIAL (EA) INJECTION ONCE AS NEEDED
OUTPATIENT
Start: 2021-05-03

## 2021-05-03 RX ORDER — DIPHENHYDRAMINE HYDROCHLORIDE 50 MG/ML
50 INJECTION INTRAMUSCULAR; INTRAVENOUS ONCE AS NEEDED
Status: DISCONTINUED | OUTPATIENT
Start: 2021-05-03 | End: 2021-05-03 | Stop reason: HOSPADM

## 2021-05-03 RX ORDER — METHYLPREDNISOLONE SOD SUCC 125 MG
125 VIAL (EA) INJECTION ONCE AS NEEDED
Status: CANCELLED | OUTPATIENT
Start: 2021-05-03

## 2021-05-03 RX ORDER — EPINEPHRINE 0.3 MG/.3ML
0.3 INJECTION SUBCUTANEOUS ONCE AS NEEDED
OUTPATIENT
Start: 2021-05-03

## 2021-05-03 RX ORDER — HEPARIN 100 UNIT/ML
5 SYRINGE INTRAVENOUS
Status: CANCELLED | OUTPATIENT
Start: 2021-05-03

## 2021-05-03 RX ORDER — SODIUM CHLORIDE 0.9 % (FLUSH) 0.9 %
10 SYRINGE (ML) INJECTION
Status: CANCELLED | OUTPATIENT
Start: 2021-05-03

## 2021-05-03 RX ORDER — SODIUM CHLORIDE 0.9 % (FLUSH) 0.9 %
10 SYRINGE (ML) INJECTION
Status: DISCONTINUED | OUTPATIENT
Start: 2021-05-03 | End: 2021-05-03 | Stop reason: HOSPADM

## 2021-05-03 RX ORDER — EPINEPHRINE 0.3 MG/.3ML
0.3 INJECTION SUBCUTANEOUS ONCE AS NEEDED
Status: DISCONTINUED | OUTPATIENT
Start: 2021-05-03 | End: 2021-05-03 | Stop reason: HOSPADM

## 2021-05-03 RX ORDER — EPINEPHRINE 0.3 MG/.3ML
0.3 INJECTION SUBCUTANEOUS ONCE AS NEEDED
Status: CANCELLED | OUTPATIENT
Start: 2021-05-03

## 2021-05-03 RX ORDER — HEPARIN 100 UNIT/ML
5 SYRINGE INTRAVENOUS
Status: DISCONTINUED | OUTPATIENT
Start: 2021-05-03 | End: 2021-05-03 | Stop reason: HOSPADM

## 2021-05-03 RX ORDER — METHYLPREDNISOLONE SOD SUCC 125 MG
125 VIAL (EA) INJECTION ONCE AS NEEDED
Status: DISCONTINUED | OUTPATIENT
Start: 2021-05-03 | End: 2021-05-03 | Stop reason: HOSPADM

## 2021-05-03 RX ORDER — DIPHENHYDRAMINE HYDROCHLORIDE 50 MG/ML
50 INJECTION INTRAMUSCULAR; INTRAVENOUS ONCE AS NEEDED
Status: CANCELLED | OUTPATIENT
Start: 2021-05-03

## 2021-05-03 RX ORDER — OXYCODONE HYDROCHLORIDE 5 MG/1
5 TABLET ORAL EVERY 6 HOURS PRN
Qty: 15 TABLET | Refills: 0 | Status: SHIPPED | OUTPATIENT
Start: 2021-05-03 | End: 2021-05-04 | Stop reason: SDUPTHER

## 2021-05-03 RX ORDER — SODIUM CHLORIDE 9 MG/ML
INJECTION, SOLUTION INTRAVENOUS CONTINUOUS
Status: CANCELLED | OUTPATIENT
Start: 2021-05-03

## 2021-05-03 RX ORDER — SODIUM CHLORIDE 9 MG/ML
INJECTION, SOLUTION INTRAVENOUS CONTINUOUS
Status: DISCONTINUED | OUTPATIENT
Start: 2021-05-03 | End: 2021-05-03 | Stop reason: HOSPADM

## 2021-05-03 RX ORDER — DIPHENHYDRAMINE HYDROCHLORIDE 50 MG/ML
50 INJECTION INTRAMUSCULAR; INTRAVENOUS ONCE AS NEEDED
OUTPATIENT
Start: 2021-05-03

## 2021-05-03 RX ADMIN — SODIUM CHLORIDE: 0.9 INJECTION, SOLUTION INTRAVENOUS at 10:05

## 2021-05-03 RX ADMIN — FERRIC CARBOXYMALTOSE INJECTION 750 MG: 50 INJECTION, SOLUTION INTRAVENOUS at 10:05

## 2021-05-04 ENCOUNTER — TELEPHONE (OUTPATIENT)
Dept: SURGERY | Facility: CLINIC | Age: 76
End: 2021-05-04

## 2021-05-04 RX ORDER — OXYCODONE HYDROCHLORIDE 5 MG/1
5 TABLET ORAL EVERY 6 HOURS PRN
Qty: 15 TABLET | Refills: 0 | Status: SHIPPED | OUTPATIENT
Start: 2021-05-04 | End: 2021-05-05 | Stop reason: HOSPADM

## 2021-05-05 ENCOUNTER — HOSPITAL ENCOUNTER (OUTPATIENT)
Facility: HOSPITAL | Age: 76
Discharge: HOME | End: 2021-05-05
Attending: SURGERY | Admitting: SURGERY
Payer: MEDICARE

## 2021-05-05 ENCOUNTER — ANESTHESIA EVENT (OUTPATIENT)
Dept: SURGERY | Facility: HOSPITAL | Age: 76
End: 2021-05-05
Payer: MEDICARE

## 2021-05-05 ENCOUNTER — ANESTHESIA (OUTPATIENT)
Dept: SURGERY | Facility: HOSPITAL | Age: 76
End: 2021-05-05
Payer: MEDICARE

## 2021-05-05 VITALS
BODY MASS INDEX: 24.36 KG/M2 | HEIGHT: 76 IN | WEIGHT: 200 LBS | OXYGEN SATURATION: 98 % | HEART RATE: 67 BPM | SYSTOLIC BLOOD PRESSURE: 167 MMHG | RESPIRATION RATE: 16 BRPM | TEMPERATURE: 98 F | DIASTOLIC BLOOD PRESSURE: 88 MMHG

## 2021-05-05 DIAGNOSIS — N18.6 ESRD (END STAGE RENAL DISEASE): ICD-10-CM

## 2021-05-05 DIAGNOSIS — N18.5 CKD (CHRONIC KIDNEY DISEASE), STAGE V: Primary | ICD-10-CM

## 2021-05-05 PROCEDURE — 36000709 HC OR TIME LEV III EA ADD 15 MIN: Performed by: SURGERY

## 2021-05-05 PROCEDURE — 63600175 PHARM REV CODE 636 W HCPCS: Performed by: SURGERY

## 2021-05-05 PROCEDURE — C1750 CATH, HEMODIALYSIS,LONG-TERM: HCPCS | Performed by: SURGERY

## 2021-05-05 PROCEDURE — 36000708 HC OR TIME LEV III 1ST 15 MIN: Performed by: SURGERY

## 2021-05-05 PROCEDURE — 71000044 HC DOSC ROUTINE RECOVERY FIRST HOUR: Performed by: SURGERY

## 2021-05-05 PROCEDURE — 49324 PR LAP INSERTION TUNNELED INTRAPERITONEAL CATHETER: ICD-10-PCS | Mod: ,,, | Performed by: SURGERY

## 2021-05-05 PROCEDURE — 25000003 PHARM REV CODE 250: Performed by: NURSE ANESTHETIST, CERTIFIED REGISTERED

## 2021-05-05 PROCEDURE — 71000015 HC POSTOP RECOV 1ST HR: Performed by: SURGERY

## 2021-05-05 PROCEDURE — 49324 LAP INSERT TUNNEL IP CATH: CPT | Mod: ,,, | Performed by: SURGERY

## 2021-05-05 PROCEDURE — 25000003 PHARM REV CODE 250: Performed by: SURGERY

## 2021-05-05 PROCEDURE — D9220A PRA ANESTHESIA: Mod: ANES,,, | Performed by: ANESTHESIOLOGY

## 2021-05-05 PROCEDURE — 37000009 HC ANESTHESIA EA ADD 15 MINS: Performed by: SURGERY

## 2021-05-05 PROCEDURE — D9220A PRA ANESTHESIA: Mod: CRNA,,, | Performed by: NURSE ANESTHETIST, CERTIFIED REGISTERED

## 2021-05-05 PROCEDURE — D9220A PRA ANESTHESIA: ICD-10-PCS | Mod: ANES,,, | Performed by: ANESTHESIOLOGY

## 2021-05-05 PROCEDURE — 37000008 HC ANESTHESIA 1ST 15 MINUTES: Performed by: SURGERY

## 2021-05-05 PROCEDURE — 25000003 PHARM REV CODE 250: Performed by: STUDENT IN AN ORGANIZED HEALTH CARE EDUCATION/TRAINING PROGRAM

## 2021-05-05 PROCEDURE — D9220A PRA ANESTHESIA: ICD-10-PCS | Mod: CRNA,,, | Performed by: NURSE ANESTHETIST, CERTIFIED REGISTERED

## 2021-05-05 PROCEDURE — 63600175 PHARM REV CODE 636 W HCPCS: Performed by: NURSE ANESTHETIST, CERTIFIED REGISTERED

## 2021-05-05 DEVICE — CATH FLEX-NECK ADULT STANDARD: Type: IMPLANTABLE DEVICE | Site: PERITONEUM | Status: FUNCTIONAL

## 2021-05-05 RX ORDER — ONDANSETRON 2 MG/ML
INJECTION INTRAMUSCULAR; INTRAVENOUS
Status: DISCONTINUED | OUTPATIENT
Start: 2021-05-05 | End: 2021-05-05

## 2021-05-05 RX ORDER — PROPOFOL 10 MG/ML
VIAL (ML) INTRAVENOUS
Status: DISCONTINUED | OUTPATIENT
Start: 2021-05-05 | End: 2021-05-05

## 2021-05-05 RX ORDER — MIDAZOLAM HYDROCHLORIDE 1 MG/ML
INJECTION, SOLUTION INTRAMUSCULAR; INTRAVENOUS
Status: DISCONTINUED | OUTPATIENT
Start: 2021-05-05 | End: 2021-05-05

## 2021-05-05 RX ORDER — OXYCODONE HYDROCHLORIDE 5 MG/1
10 TABLET ORAL EVERY 6 HOURS PRN
Status: DISCONTINUED | OUTPATIENT
Start: 2021-05-05 | End: 2021-05-05 | Stop reason: HOSPADM

## 2021-05-05 RX ORDER — CISATRACURIUM BESYLATE 2 MG/ML
INJECTION, SOLUTION INTRAVENOUS
Status: DISCONTINUED | OUTPATIENT
Start: 2021-05-05 | End: 2021-05-05

## 2021-05-05 RX ORDER — GABAPENTIN 250 MG/5ML
125 SOLUTION ORAL EVERY 8 HOURS
Status: DISCONTINUED | OUTPATIENT
Start: 2021-05-05 | End: 2021-05-05

## 2021-05-05 RX ORDER — LIDOCAINE HYDROCHLORIDE 10 MG/ML
1 INJECTION, SOLUTION EPIDURAL; INFILTRATION; INTRACAUDAL; PERINEURAL ONCE
Status: ACTIVE | OUTPATIENT
Start: 2021-05-05

## 2021-05-05 RX ORDER — NEOSTIGMINE METHYLSULFATE 0.5 MG/ML
INJECTION, SOLUTION INTRAVENOUS
Status: DISCONTINUED | OUTPATIENT
Start: 2021-05-05 | End: 2021-05-05

## 2021-05-05 RX ORDER — CYCLOBENZAPRINE HCL 5 MG
5 TABLET ORAL 3 TIMES DAILY PRN
Status: DISCONTINUED | OUTPATIENT
Start: 2021-05-05 | End: 2021-05-05 | Stop reason: HOSPADM

## 2021-05-05 RX ORDER — DEXAMETHASONE SODIUM PHOSPHATE 4 MG/ML
INJECTION, SOLUTION INTRA-ARTICULAR; INTRALESIONAL; INTRAMUSCULAR; INTRAVENOUS; SOFT TISSUE
Status: DISCONTINUED | OUTPATIENT
Start: 2021-05-05 | End: 2021-05-05

## 2021-05-05 RX ORDER — ACETAMINOPHEN 325 MG/1
650 TABLET ORAL EVERY 6 HOURS PRN
Status: DISCONTINUED | OUTPATIENT
Start: 2021-05-05 | End: 2021-05-05 | Stop reason: HOSPADM

## 2021-05-05 RX ORDER — BUPIVACAINE HYDROCHLORIDE 2.5 MG/ML
INJECTION, SOLUTION EPIDURAL; INFILTRATION; INTRACAUDAL
Status: DISCONTINUED | OUTPATIENT
Start: 2021-05-05 | End: 2021-05-05 | Stop reason: HOSPADM

## 2021-05-05 RX ORDER — LIDOCAINE HYDROCHLORIDE 20 MG/ML
INJECTION, SOLUTION EPIDURAL; INFILTRATION; INTRACAUDAL; PERINEURAL
Status: DISCONTINUED | OUTPATIENT
Start: 2021-05-05 | End: 2021-05-05

## 2021-05-05 RX ORDER — CEFAZOLIN SODIUM 1 G/3ML
INJECTION, POWDER, FOR SOLUTION INTRAMUSCULAR; INTRAVENOUS
Status: DISCONTINUED | OUTPATIENT
Start: 2021-05-05 | End: 2021-05-05

## 2021-05-05 RX ORDER — GABAPENTIN 250 MG/5ML
125 SOLUTION ORAL EVERY OTHER DAY
Status: DISCONTINUED | OUTPATIENT
Start: 2021-05-05 | End: 2021-05-05 | Stop reason: HOSPADM

## 2021-05-05 RX ORDER — SUCCINYLCHOLINE CHLORIDE 20 MG/ML
INJECTION INTRAMUSCULAR; INTRAVENOUS
Status: DISCONTINUED | OUTPATIENT
Start: 2021-05-05 | End: 2021-05-05

## 2021-05-05 RX ORDER — HEPARIN SODIUM 1000 [USP'U]/ML
INJECTION, SOLUTION INTRAVENOUS; SUBCUTANEOUS
Status: DISCONTINUED | OUTPATIENT
Start: 2021-05-05 | End: 2021-05-05 | Stop reason: HOSPADM

## 2021-05-05 RX ORDER — OXYCODONE HYDROCHLORIDE 5 MG/1
5 TABLET ORAL EVERY 6 HOURS PRN
Status: DISCONTINUED | OUTPATIENT
Start: 2021-05-05 | End: 2021-05-05 | Stop reason: HOSPADM

## 2021-05-05 RX ORDER — OXYCODONE HYDROCHLORIDE 5 MG/1
5 TABLET ORAL EVERY 6 HOURS PRN
Qty: 20 TABLET | Refills: 0 | Status: SHIPPED | OUTPATIENT
Start: 2021-05-05 | End: 2021-05-31

## 2021-05-05 RX ORDER — OXYCODONE HYDROCHLORIDE 5 MG/1
5 TABLET ORAL EVERY 6 HOURS PRN
Status: DISCONTINUED | OUTPATIENT
Start: 2021-05-05 | End: 2021-05-05

## 2021-05-05 RX ORDER — SODIUM CHLORIDE 9 MG/ML
INJECTION, SOLUTION INTRAVENOUS CONTINUOUS
Status: ACTIVE | OUTPATIENT
Start: 2021-05-05

## 2021-05-05 RX ORDER — FENTANYL CITRATE 50 UG/ML
INJECTION, SOLUTION INTRAMUSCULAR; INTRAVENOUS
Status: DISCONTINUED | OUTPATIENT
Start: 2021-05-05 | End: 2021-05-05

## 2021-05-05 RX ADMIN — OXYCODONE 5 MG: 5 TABLET ORAL at 12:05

## 2021-05-05 RX ADMIN — NEOSTIGMINE METHYLSULFATE 5 MG: 0.5 INJECTION INTRAVENOUS at 12:05

## 2021-05-05 RX ADMIN — MIDAZOLAM 2 MG: 1 INJECTION INTRAMUSCULAR; INTRAVENOUS at 11:05

## 2021-05-05 RX ADMIN — DEXAMETHASONE SODIUM PHOSPHATE 4 MG: 4 INJECTION INTRA-ARTICULAR; INTRALESIONAL; INTRAMUSCULAR; INTRAVENOUS; SOFT TISSUE at 11:05

## 2021-05-05 RX ADMIN — CEFAZOLIN 3 G: 330 INJECTION, POWDER, FOR SOLUTION INTRAMUSCULAR; INTRAVENOUS at 11:05

## 2021-05-05 RX ADMIN — SODIUM CHLORIDE: 0.9 INJECTION, SOLUTION INTRAVENOUS at 11:05

## 2021-05-05 RX ADMIN — FENTANYL CITRATE 100 MCG: 50 INJECTION INTRAMUSCULAR; INTRAVENOUS at 11:05

## 2021-05-05 RX ADMIN — ONDANSETRON 4 MG: 2 INJECTION INTRAMUSCULAR; INTRAVENOUS at 11:05

## 2021-05-05 RX ADMIN — GLYCOPYRROLATE 0.6 MCG: 0.2 INJECTION, SOLUTION INTRAMUSCULAR; INTRAVITREAL at 12:05

## 2021-05-05 RX ADMIN — PROPOFOL 150 MG: 10 INJECTION, EMULSION INTRAVENOUS at 11:05

## 2021-05-05 RX ADMIN — LIDOCAINE HYDROCHLORIDE 100 MG: 20 INJECTION, SOLUTION EPIDURAL; INFILTRATION; INTRACAUDAL at 11:05

## 2021-05-05 RX ADMIN — CISATRACURIUM BESYLATE 4 MCG: 2 INJECTION INTRAVENOUS at 11:05

## 2021-05-05 RX ADMIN — SUCCINYLCHOLINE CHLORIDE 140 MG: 20 INJECTION, SOLUTION INTRAMUSCULAR; INTRAVENOUS; PARENTERAL at 11:05

## 2021-05-06 ENCOUNTER — LAB VISIT (OUTPATIENT)
Dept: LAB | Facility: HOSPITAL | Age: 76
End: 2021-05-06
Attending: INTERNAL MEDICINE
Payer: MEDICARE

## 2021-05-06 ENCOUNTER — DOCUMENTATION ONLY (OUTPATIENT)
Dept: SURGERY | Facility: CLINIC | Age: 76
End: 2021-05-06

## 2021-05-06 ENCOUNTER — PATIENT MESSAGE (OUTPATIENT)
Dept: NEPHROLOGY | Facility: CLINIC | Age: 76
End: 2021-05-06

## 2021-05-06 DIAGNOSIS — N25.81 SECONDARY HYPERPARATHYROIDISM: ICD-10-CM

## 2021-05-06 DIAGNOSIS — N18.5 CKD (CHRONIC KIDNEY DISEASE), STAGE V: ICD-10-CM

## 2021-05-06 DIAGNOSIS — D50.9 IRON DEFICIENCY ANEMIA, UNSPECIFIED IRON DEFICIENCY ANEMIA TYPE: ICD-10-CM

## 2021-05-06 DIAGNOSIS — R80.1 PERSISTENT PROTEINURIA: ICD-10-CM

## 2021-05-06 DIAGNOSIS — I12.0 HYPERTENSIVE KIDNEY DISEASE WITH STAGE 5 CHRONIC KIDNEY DISEASE, NOT ON CHRONIC DIALYSIS: ICD-10-CM

## 2021-05-06 DIAGNOSIS — E88.09 HYPOALBUMINEMIA: ICD-10-CM

## 2021-05-06 DIAGNOSIS — N18.5 ANEMIA OF CHRONIC RENAL FAILURE, STAGE 5: ICD-10-CM

## 2021-05-06 DIAGNOSIS — N28.1 KIDNEY CYSTS: ICD-10-CM

## 2021-05-06 DIAGNOSIS — E55.9 VITAMIN D DEFICIENCY: ICD-10-CM

## 2021-05-06 DIAGNOSIS — N18.5 HYPERTENSIVE KIDNEY DISEASE WITH STAGE 5 CHRONIC KIDNEY DISEASE, NOT ON CHRONIC DIALYSIS: ICD-10-CM

## 2021-05-06 DIAGNOSIS — D63.1 ANEMIA OF CHRONIC RENAL FAILURE, STAGE 5: ICD-10-CM

## 2021-05-06 DIAGNOSIS — E87.20 METABOLIC ACIDOSIS: ICD-10-CM

## 2021-05-06 DIAGNOSIS — E79.0 HYPERURICEMIA: ICD-10-CM

## 2021-05-06 LAB
ALBUMIN SERPL BCP-MCNC: 2.9 G/DL (ref 3.5–5.2)
ANION GAP SERPL CALC-SCNC: 15 MMOL/L (ref 8–16)
BASOPHILS # BLD AUTO: 0.01 K/UL (ref 0–0.2)
BASOPHILS NFR BLD: 0.1 % (ref 0–1.9)
BUN SERPL-MCNC: 57 MG/DL (ref 8–23)
CALCIUM SERPL-MCNC: 8.8 MG/DL (ref 8.7–10.5)
CHLORIDE SERPL-SCNC: 108 MMOL/L (ref 95–110)
CO2 SERPL-SCNC: 20 MMOL/L (ref 23–29)
CREAT SERPL-MCNC: 6.2 MG/DL (ref 0.5–1.4)
DIFFERENTIAL METHOD: ABNORMAL
EOSINOPHIL # BLD AUTO: 0 K/UL (ref 0–0.5)
EOSINOPHIL NFR BLD: 0 % (ref 0–8)
ERYTHROCYTE [DISTWIDTH] IN BLOOD BY AUTOMATED COUNT: 14.3 % (ref 11.5–14.5)
EST. GFR  (AFRICAN AMERICAN): 9.5 ML/MIN/1.73 M^2
EST. GFR  (NON AFRICAN AMERICAN): 8.2 ML/MIN/1.73 M^2
GLUCOSE SERPL-MCNC: 121 MG/DL (ref 70–110)
HCT VFR BLD AUTO: 30.7 % (ref 40–54)
HGB BLD-MCNC: 9.9 G/DL (ref 14–18)
IMM GRANULOCYTES # BLD AUTO: 0.07 K/UL (ref 0–0.04)
IMM GRANULOCYTES NFR BLD AUTO: 0.6 % (ref 0–0.5)
LYMPHOCYTES # BLD AUTO: 0.5 K/UL (ref 1–4.8)
LYMPHOCYTES NFR BLD: 4.3 % (ref 18–48)
MCH RBC QN AUTO: 30.7 PG (ref 27–31)
MCHC RBC AUTO-ENTMCNC: 32.2 G/DL (ref 32–36)
MCV RBC AUTO: 95 FL (ref 82–98)
MONOCYTES # BLD AUTO: 0.6 K/UL (ref 0.3–1)
MONOCYTES NFR BLD: 5.1 % (ref 4–15)
NEUTROPHILS # BLD AUTO: 11 K/UL (ref 1.8–7.7)
NEUTROPHILS NFR BLD: 89.9 % (ref 38–73)
NRBC BLD-RTO: 0 /100 WBC
PHOSPHATE SERPL-MCNC: 4.4 MG/DL (ref 2.7–4.5)
PLATELET # BLD AUTO: 201 K/UL (ref 150–450)
PMV BLD AUTO: 11.1 FL (ref 9.2–12.9)
POTASSIUM SERPL-SCNC: 4.4 MMOL/L (ref 3.5–5.1)
RBC # BLD AUTO: 3.23 M/UL (ref 4.6–6.2)
SODIUM SERPL-SCNC: 143 MMOL/L (ref 136–145)
WBC # BLD AUTO: 12.21 K/UL (ref 3.9–12.7)

## 2021-05-06 PROCEDURE — 85025 COMPLETE CBC W/AUTO DIFF WBC: CPT | Performed by: INTERNAL MEDICINE

## 2021-05-06 PROCEDURE — 80069 RENAL FUNCTION PANEL: CPT | Performed by: INTERNAL MEDICINE

## 2021-05-06 PROCEDURE — 36415 COLL VENOUS BLD VENIPUNCTURE: CPT | Mod: PN | Performed by: INTERNAL MEDICINE

## 2021-05-07 ENCOUNTER — HOSPITAL ENCOUNTER (OUTPATIENT)
Dept: RADIOLOGY | Facility: HOSPITAL | Age: 76
Discharge: HOME | End: 2021-05-07
Attending: INTERNAL MEDICINE
Payer: MEDICARE

## 2021-05-07 ENCOUNTER — TELEPHONE (OUTPATIENT)
Dept: NEPHROLOGY | Facility: CLINIC | Age: 76
End: 2021-05-07

## 2021-05-07 ENCOUNTER — PATIENT MESSAGE (OUTPATIENT)
Dept: NEPHROLOGY | Facility: CLINIC | Age: 76
End: 2021-05-07

## 2021-05-07 DIAGNOSIS — N18.5 CKD (CHRONIC KIDNEY DISEASE), STAGE V: ICD-10-CM

## 2021-05-07 DIAGNOSIS — N18.5 CKD (CHRONIC KIDNEY DISEASE), STAGE V: Primary | ICD-10-CM

## 2021-05-07 PROCEDURE — 71046 X-RAY EXAM CHEST 2 VIEWS: CPT | Mod: 26,,, | Performed by: RADIOLOGY

## 2021-05-07 PROCEDURE — 71046 XR CHEST PA AND LATERAL: ICD-10-PCS | Mod: 26,,, | Performed by: RADIOLOGY

## 2021-05-07 PROCEDURE — 71046 X-RAY EXAM CHEST 2 VIEWS: CPT | Mod: TC,PN

## 2021-05-10 ENCOUNTER — PATIENT MESSAGE (OUTPATIENT)
Dept: SURGERY | Facility: CLINIC | Age: 76
End: 2021-05-10

## 2021-05-12 ENCOUNTER — PATIENT MESSAGE (OUTPATIENT)
Dept: NEPHROLOGY | Facility: CLINIC | Age: 76
End: 2021-05-12

## 2021-05-13 ENCOUNTER — LAB VISIT (OUTPATIENT)
Dept: LAB | Facility: HOSPITAL | Age: 76
End: 2021-05-13
Attending: INTERNAL MEDICINE
Payer: MEDICARE

## 2021-05-13 DIAGNOSIS — N18.4 ANEMIA OF CHRONIC RENAL FAILURE, STAGE 4 (SEVERE): ICD-10-CM

## 2021-05-13 DIAGNOSIS — E79.0 HYPERURICEMIA: ICD-10-CM

## 2021-05-13 DIAGNOSIS — D50.9 IRON DEFICIENCY ANEMIA, UNSPECIFIED IRON DEFICIENCY ANEMIA TYPE: ICD-10-CM

## 2021-05-13 DIAGNOSIS — E87.20 METABOLIC ACIDOSIS: ICD-10-CM

## 2021-05-13 DIAGNOSIS — E55.9 VITAMIN D DEFICIENCY: ICD-10-CM

## 2021-05-13 DIAGNOSIS — I12.9 HYPERTENSIVE KIDNEY DISEASE WITH STAGE 4 CHRONIC KIDNEY DISEASE: ICD-10-CM

## 2021-05-13 DIAGNOSIS — N18.4 HYPERTENSIVE KIDNEY DISEASE WITH STAGE 4 CHRONIC KIDNEY DISEASE: ICD-10-CM

## 2021-05-13 DIAGNOSIS — R80.9 PROTEINURIA, UNSPECIFIED TYPE: ICD-10-CM

## 2021-05-13 DIAGNOSIS — N18.4 CKD (CHRONIC KIDNEY DISEASE) STAGE 4, GFR 15-29 ML/MIN: ICD-10-CM

## 2021-05-13 DIAGNOSIS — D63.1 ANEMIA OF CHRONIC RENAL FAILURE, STAGE 4 (SEVERE): ICD-10-CM

## 2021-05-13 DIAGNOSIS — N28.1 KIDNEY CYSTS: ICD-10-CM

## 2021-05-13 DIAGNOSIS — N25.81 SECONDARY HYPERPARATHYROIDISM: ICD-10-CM

## 2021-05-13 LAB
ALBUMIN SERPL BCP-MCNC: 2.8 G/DL (ref 3.5–5.2)
ANION GAP SERPL CALC-SCNC: 9 MMOL/L (ref 8–16)
BASOPHILS # BLD AUTO: 0.05 K/UL (ref 0–0.2)
BASOPHILS NFR BLD: 0.7 % (ref 0–1.9)
BUN SERPL-MCNC: 47 MG/DL (ref 8–23)
CALCIUM SERPL-MCNC: 8.8 MG/DL (ref 8.7–10.5)
CHLORIDE SERPL-SCNC: 107 MMOL/L (ref 95–110)
CO2 SERPL-SCNC: 26 MMOL/L (ref 23–29)
CREAT SERPL-MCNC: 5.9 MG/DL (ref 0.5–1.4)
DIFFERENTIAL METHOD: ABNORMAL
EOSINOPHIL # BLD AUTO: 0.3 K/UL (ref 0–0.5)
EOSINOPHIL NFR BLD: 3.5 % (ref 0–8)
ERYTHROCYTE [DISTWIDTH] IN BLOOD BY AUTOMATED COUNT: 14.3 % (ref 11.5–14.5)
EST. GFR  (AFRICAN AMERICAN): 10.1 ML/MIN/1.73 M^2
EST. GFR  (NON AFRICAN AMERICAN): 8.7 ML/MIN/1.73 M^2
GLUCOSE SERPL-MCNC: 123 MG/DL (ref 70–110)
HCT VFR BLD AUTO: 29.3 % (ref 40–54)
HGB BLD-MCNC: 9.3 G/DL (ref 14–18)
IMM GRANULOCYTES # BLD AUTO: 0.04 K/UL (ref 0–0.04)
IMM GRANULOCYTES NFR BLD AUTO: 0.6 % (ref 0–0.5)
LYMPHOCYTES # BLD AUTO: 0.6 K/UL (ref 1–4.8)
LYMPHOCYTES NFR BLD: 8.4 % (ref 18–48)
MCH RBC QN AUTO: 30.9 PG (ref 27–31)
MCHC RBC AUTO-ENTMCNC: 31.7 G/DL (ref 32–36)
MCV RBC AUTO: 97 FL (ref 82–98)
MONOCYTES # BLD AUTO: 0.8 K/UL (ref 0.3–1)
MONOCYTES NFR BLD: 11.2 % (ref 4–15)
NEUTROPHILS # BLD AUTO: 5.4 K/UL (ref 1.8–7.7)
NEUTROPHILS NFR BLD: 75.6 % (ref 38–73)
NRBC BLD-RTO: 0 /100 WBC
PHOSPHATE SERPL-MCNC: 4 MG/DL (ref 2.7–4.5)
PLATELET # BLD AUTO: 142 K/UL (ref 150–450)
PMV BLD AUTO: 11.2 FL (ref 9.2–12.9)
POTASSIUM SERPL-SCNC: 3.7 MMOL/L (ref 3.5–5.1)
RBC # BLD AUTO: 3.01 M/UL (ref 4.6–6.2)
SODIUM SERPL-SCNC: 142 MMOL/L (ref 136–145)
WBC # BLD AUTO: 7.17 K/UL (ref 3.9–12.7)

## 2021-05-13 PROCEDURE — 80069 RENAL FUNCTION PANEL: CPT | Performed by: INTERNAL MEDICINE

## 2021-05-13 PROCEDURE — 85025 COMPLETE CBC W/AUTO DIFF WBC: CPT | Performed by: INTERNAL MEDICINE

## 2021-05-13 PROCEDURE — 36415 COLL VENOUS BLD VENIPUNCTURE: CPT | Mod: PN | Performed by: INTERNAL MEDICINE

## 2021-05-14 ENCOUNTER — TELEPHONE (OUTPATIENT)
Dept: TRANSPLANT | Facility: CLINIC | Age: 76
End: 2021-05-14

## 2021-05-14 ENCOUNTER — PATIENT MESSAGE (OUTPATIENT)
Dept: NEPHROLOGY | Facility: CLINIC | Age: 76
End: 2021-05-14

## 2021-05-18 ENCOUNTER — PATIENT MESSAGE (OUTPATIENT)
Dept: TRANSPLANT | Facility: CLINIC | Age: 76
End: 2021-05-18

## 2021-05-18 DIAGNOSIS — Z76.82 ORGAN TRANSPLANT CANDIDATE: Primary | ICD-10-CM

## 2021-05-20 ENCOUNTER — LAB VISIT (OUTPATIENT)
Dept: LAB | Facility: HOSPITAL | Age: 76
End: 2021-05-20
Attending: INTERNAL MEDICINE
Payer: MEDICARE

## 2021-05-20 ENCOUNTER — TELEPHONE (OUTPATIENT)
Dept: TRANSPLANT | Facility: CLINIC | Age: 76
End: 2021-05-20

## 2021-05-20 DIAGNOSIS — N18.4 CKD (CHRONIC KIDNEY DISEASE) STAGE 4, GFR 15-29 ML/MIN: ICD-10-CM

## 2021-05-20 DIAGNOSIS — E87.20 METABOLIC ACIDOSIS: ICD-10-CM

## 2021-05-20 DIAGNOSIS — D50.9 IRON DEFICIENCY ANEMIA, UNSPECIFIED IRON DEFICIENCY ANEMIA TYPE: ICD-10-CM

## 2021-05-20 DIAGNOSIS — E79.0 HYPERURICEMIA: ICD-10-CM

## 2021-05-20 DIAGNOSIS — D63.1 ANEMIA OF CHRONIC RENAL FAILURE, STAGE 4 (SEVERE): ICD-10-CM

## 2021-05-20 DIAGNOSIS — E55.9 VITAMIN D DEFICIENCY: ICD-10-CM

## 2021-05-20 DIAGNOSIS — N25.81 SECONDARY HYPERPARATHYROIDISM: ICD-10-CM

## 2021-05-20 DIAGNOSIS — I12.9 HYPERTENSIVE KIDNEY DISEASE WITH STAGE 4 CHRONIC KIDNEY DISEASE: ICD-10-CM

## 2021-05-20 DIAGNOSIS — N28.1 KIDNEY CYSTS: ICD-10-CM

## 2021-05-20 DIAGNOSIS — N18.4 HYPERTENSIVE KIDNEY DISEASE WITH STAGE 4 CHRONIC KIDNEY DISEASE: ICD-10-CM

## 2021-05-20 DIAGNOSIS — R80.9 PROTEINURIA, UNSPECIFIED TYPE: ICD-10-CM

## 2021-05-20 DIAGNOSIS — N18.4 ANEMIA OF CHRONIC RENAL FAILURE, STAGE 4 (SEVERE): ICD-10-CM

## 2021-05-20 LAB
ALBUMIN SERPL BCP-MCNC: 2.7 G/DL (ref 3.5–5.2)
ANION GAP SERPL CALC-SCNC: 10 MMOL/L (ref 8–16)
BASOPHILS # BLD AUTO: 0.05 K/UL (ref 0–0.2)
BASOPHILS NFR BLD: 0.7 % (ref 0–1.9)
BUN SERPL-MCNC: 40 MG/DL (ref 8–23)
CALCIUM SERPL-MCNC: 8.8 MG/DL (ref 8.7–10.5)
CHLORIDE SERPL-SCNC: 109 MMOL/L (ref 95–110)
CO2 SERPL-SCNC: 23 MMOL/L (ref 23–29)
CREAT SERPL-MCNC: 5.2 MG/DL (ref 0.5–1.4)
DIFFERENTIAL METHOD: ABNORMAL
EOSINOPHIL # BLD AUTO: 0.3 K/UL (ref 0–0.5)
EOSINOPHIL NFR BLD: 4.3 % (ref 0–8)
ERYTHROCYTE [DISTWIDTH] IN BLOOD BY AUTOMATED COUNT: 14.4 % (ref 11.5–14.5)
EST. GFR  (AFRICAN AMERICAN): 11.7 ML/MIN/1.73 M^2
EST. GFR  (NON AFRICAN AMERICAN): 10.1 ML/MIN/1.73 M^2
GLUCOSE SERPL-MCNC: 114 MG/DL (ref 70–110)
HCT VFR BLD AUTO: 28.8 % (ref 40–54)
HGB BLD-MCNC: 9.6 G/DL (ref 14–18)
IMM GRANULOCYTES # BLD AUTO: 0.03 K/UL (ref 0–0.04)
IMM GRANULOCYTES NFR BLD AUTO: 0.4 % (ref 0–0.5)
LYMPHOCYTES # BLD AUTO: 0.7 K/UL (ref 1–4.8)
LYMPHOCYTES NFR BLD: 8.7 % (ref 18–48)
MCH RBC QN AUTO: 31.8 PG (ref 27–31)
MCHC RBC AUTO-ENTMCNC: 33.3 G/DL (ref 32–36)
MCV RBC AUTO: 95 FL (ref 82–98)
MONOCYTES # BLD AUTO: 0.6 K/UL (ref 0.3–1)
MONOCYTES NFR BLD: 8.6 % (ref 4–15)
NEUTROPHILS # BLD AUTO: 5.8 K/UL (ref 1.8–7.7)
NEUTROPHILS NFR BLD: 77.3 % (ref 38–73)
NRBC BLD-RTO: 0 /100 WBC
PHOSPHATE SERPL-MCNC: 3.5 MG/DL (ref 2.7–4.5)
PLATELET # BLD AUTO: 171 K/UL (ref 150–450)
PMV BLD AUTO: 11.4 FL (ref 9.2–12.9)
POTASSIUM SERPL-SCNC: 3.8 MMOL/L (ref 3.5–5.1)
RBC # BLD AUTO: 3.02 M/UL (ref 4.6–6.2)
SODIUM SERPL-SCNC: 142 MMOL/L (ref 136–145)
WBC # BLD AUTO: 7.47 K/UL (ref 3.9–12.7)

## 2021-05-20 PROCEDURE — 85025 COMPLETE CBC W/AUTO DIFF WBC: CPT | Mod: TXP | Performed by: INTERNAL MEDICINE

## 2021-05-20 PROCEDURE — 36415 COLL VENOUS BLD VENIPUNCTURE: CPT | Mod: PN,NTX | Performed by: INTERNAL MEDICINE

## 2021-05-20 PROCEDURE — 80069 RENAL FUNCTION PANEL: CPT | Mod: NTX | Performed by: INTERNAL MEDICINE

## 2021-05-21 ENCOUNTER — TELEPHONE (OUTPATIENT)
Dept: TRANSPLANT | Facility: CLINIC | Age: 76
End: 2021-05-21

## 2021-05-24 ENCOUNTER — LAB VISIT (OUTPATIENT)
Dept: LAB | Facility: HOSPITAL | Age: 76
End: 2021-05-24
Attending: INTERNAL MEDICINE
Payer: MEDICARE

## 2021-05-24 DIAGNOSIS — N18.4 CKD (CHRONIC KIDNEY DISEASE) STAGE 4, GFR 15-29 ML/MIN: ICD-10-CM

## 2021-05-24 DIAGNOSIS — I10 ESSENTIAL HYPERTENSION: ICD-10-CM

## 2021-05-24 LAB
ANION GAP SERPL CALC-SCNC: 10 MMOL/L (ref 8–16)
BASOPHILS # BLD AUTO: 0.06 K/UL (ref 0–0.2)
BASOPHILS NFR BLD: 1 % (ref 0–1.9)
BUN SERPL-MCNC: 49 MG/DL (ref 8–23)
CALCIUM SERPL-MCNC: 8.7 MG/DL (ref 8.7–10.5)
CHLORIDE SERPL-SCNC: 114 MMOL/L (ref 95–110)
CO2 SERPL-SCNC: 21 MMOL/L (ref 23–29)
CREAT SERPL-MCNC: 5.8 MG/DL (ref 0.5–1.4)
DIFFERENTIAL METHOD: ABNORMAL
EOSINOPHIL # BLD AUTO: 0.3 K/UL (ref 0–0.5)
EOSINOPHIL NFR BLD: 4.9 % (ref 0–8)
ERYTHROCYTE [DISTWIDTH] IN BLOOD BY AUTOMATED COUNT: 14.7 % (ref 11.5–14.5)
EST. GFR  (AFRICAN AMERICAN): 10.3 ML/MIN/1.73 M^2
EST. GFR  (NON AFRICAN AMERICAN): 8.9 ML/MIN/1.73 M^2
GLUCOSE SERPL-MCNC: 114 MG/DL (ref 70–110)
HCT VFR BLD AUTO: 29.3 % (ref 40–54)
HGB BLD-MCNC: 9.2 G/DL (ref 14–18)
IMM GRANULOCYTES # BLD AUTO: 0.03 K/UL (ref 0–0.04)
IMM GRANULOCYTES NFR BLD AUTO: 0.5 % (ref 0–0.5)
LYMPHOCYTES # BLD AUTO: 0.6 K/UL (ref 1–4.8)
LYMPHOCYTES NFR BLD: 10.2 % (ref 18–48)
MCH RBC QN AUTO: 30.6 PG (ref 27–31)
MCHC RBC AUTO-ENTMCNC: 31.4 G/DL (ref 32–36)
MCV RBC AUTO: 97 FL (ref 82–98)
MONOCYTES # BLD AUTO: 0.6 K/UL (ref 0.3–1)
MONOCYTES NFR BLD: 10.2 % (ref 4–15)
NEUTROPHILS # BLD AUTO: 4.3 K/UL (ref 1.8–7.7)
NEUTROPHILS NFR BLD: 73.2 % (ref 38–73)
NRBC BLD-RTO: 0 /100 WBC
PLATELET # BLD AUTO: 160 K/UL (ref 150–450)
PMV BLD AUTO: 11.3 FL (ref 9.2–12.9)
POTASSIUM SERPL-SCNC: 4.3 MMOL/L (ref 3.5–5.1)
RBC # BLD AUTO: 3.01 M/UL (ref 4.6–6.2)
SODIUM SERPL-SCNC: 145 MMOL/L (ref 136–145)
WBC # BLD AUTO: 5.89 K/UL (ref 3.9–12.7)

## 2021-05-24 PROCEDURE — 85025 COMPLETE CBC W/AUTO DIFF WBC: CPT | Mod: TXP | Performed by: INTERNAL MEDICINE

## 2021-05-24 PROCEDURE — 80048 BASIC METABOLIC PNL TOTAL CA: CPT | Mod: TXP | Performed by: INTERNAL MEDICINE

## 2021-05-24 PROCEDURE — 36415 COLL VENOUS BLD VENIPUNCTURE: CPT | Mod: PN,NTX | Performed by: INTERNAL MEDICINE

## 2021-05-28 PROBLEM — Z99.2 PERITONEAL DIALYSIS CATHETER IN PLACE: Status: ACTIVE | Noted: 2021-05-28

## 2021-05-31 ENCOUNTER — OFFICE VISIT (OUTPATIENT)
Dept: CARDIOLOGY | Facility: CLINIC | Age: 76
End: 2021-05-31
Payer: MEDICARE

## 2021-05-31 VITALS
SYSTOLIC BLOOD PRESSURE: 158 MMHG | HEIGHT: 76 IN | BODY MASS INDEX: 23.46 KG/M2 | WEIGHT: 192.69 LBS | DIASTOLIC BLOOD PRESSURE: 90 MMHG | HEART RATE: 81 BPM

## 2021-05-31 DIAGNOSIS — E78.2 MIXED HYPERLIPIDEMIA: ICD-10-CM

## 2021-05-31 DIAGNOSIS — N40.1 BPH WITH URINARY OBSTRUCTION: ICD-10-CM

## 2021-05-31 DIAGNOSIS — Z99.2 PERITONEAL DIALYSIS CATHETER IN PLACE: ICD-10-CM

## 2021-05-31 DIAGNOSIS — N13.8 BPH WITH URINARY OBSTRUCTION: ICD-10-CM

## 2021-05-31 DIAGNOSIS — I10 ESSENTIAL HYPERTENSION: ICD-10-CM

## 2021-05-31 DIAGNOSIS — I10 HYPERTENSION, UNSPECIFIED TYPE: ICD-10-CM

## 2021-05-31 PROCEDURE — 99213 OFFICE O/P EST LOW 20 MIN: CPT | Mod: PBBFAC,NTX | Performed by: INTERNAL MEDICINE

## 2021-05-31 PROCEDURE — 99999 PR PBB SHADOW E&M-EST. PATIENT-LVL III: CPT | Mod: PBBFAC,TXP,, | Performed by: INTERNAL MEDICINE

## 2021-05-31 PROCEDURE — 99999 PR PBB SHADOW E&M-EST. PATIENT-LVL III: ICD-10-PCS | Mod: PBBFAC,TXP,, | Performed by: INTERNAL MEDICINE

## 2021-05-31 PROCEDURE — 99214 PR OFFICE/OUTPT VISIT, EST, LEVL IV, 30-39 MIN: ICD-10-PCS | Mod: S$PBB,NTX,, | Performed by: INTERNAL MEDICINE

## 2021-05-31 PROCEDURE — 99214 OFFICE O/P EST MOD 30 MIN: CPT | Mod: S$PBB,NTX,, | Performed by: INTERNAL MEDICINE

## 2021-05-31 RX ORDER — AMLODIPINE BESYLATE 10 MG/1
5 TABLET ORAL DAILY
Qty: 90 TABLET | Refills: 3 | Status: SHIPPED | OUTPATIENT
Start: 2021-05-31 | End: 2021-07-13

## 2021-05-31 RX ORDER — ATORVASTATIN CALCIUM 80 MG/1
80 TABLET, FILM COATED ORAL DAILY
Qty: 90 TABLET | Refills: 3 | Status: SHIPPED | OUTPATIENT
Start: 2021-05-31 | End: 2021-09-21

## 2021-05-31 RX ORDER — LISINOPRIL 40 MG/1
40 TABLET ORAL DAILY
Qty: 90 TABLET | Refills: 3 | Status: SHIPPED | OUTPATIENT
Start: 2021-05-31 | End: 2021-07-13

## 2021-05-31 RX ORDER — LABETALOL 300 MG/1
TABLET, FILM COATED ORAL
Qty: 60 TABLET | Refills: 11 | Status: SHIPPED | OUTPATIENT
Start: 2021-05-31 | End: 2021-07-13 | Stop reason: DRUGHIGH

## 2021-05-31 RX ORDER — TORSEMIDE 20 MG/1
20 TABLET ORAL DAILY
Qty: 90 TABLET | Refills: 3 | Status: SHIPPED | OUTPATIENT
Start: 2021-05-31 | End: 2022-02-10 | Stop reason: ALTCHOICE

## 2021-05-31 RX ORDER — TAMSULOSIN HYDROCHLORIDE 0.4 MG/1
CAPSULE ORAL
Qty: 90 CAPSULE | Refills: 3 | Status: SHIPPED | OUTPATIENT
Start: 2021-05-31 | End: 2021-09-30

## 2021-06-22 ENCOUNTER — PATIENT MESSAGE (OUTPATIENT)
Dept: NEPHROLOGY | Facility: CLINIC | Age: 76
End: 2021-06-22

## 2021-06-22 ENCOUNTER — TELEPHONE (OUTPATIENT)
Dept: NEPHROLOGY | Facility: CLINIC | Age: 76
End: 2021-06-22

## 2021-06-23 ENCOUNTER — OFFICE VISIT (OUTPATIENT)
Dept: SLEEP MEDICINE | Facility: CLINIC | Age: 76
End: 2021-06-23
Payer: MEDICARE

## 2021-06-23 VITALS
SYSTOLIC BLOOD PRESSURE: 120 MMHG | WEIGHT: 190.06 LBS | HEIGHT: 76 IN | HEART RATE: 89 BPM | BODY MASS INDEX: 23.14 KG/M2 | DIASTOLIC BLOOD PRESSURE: 75 MMHG

## 2021-06-23 DIAGNOSIS — I10 ESSENTIAL HYPERTENSION: ICD-10-CM

## 2021-06-23 DIAGNOSIS — G47.33 OSA (OBSTRUCTIVE SLEEP APNEA): Primary | ICD-10-CM

## 2021-06-23 DIAGNOSIS — E78.2 MIXED HYPERLIPIDEMIA: ICD-10-CM

## 2021-06-23 PROCEDURE — 99999 PR PBB SHADOW E&M-EST. PATIENT-LVL III: CPT | Mod: PBBFAC,TXP,, | Performed by: INTERNAL MEDICINE

## 2021-06-23 PROCEDURE — 99213 OFFICE O/P EST LOW 20 MIN: CPT | Mod: S$PBB,NTX,, | Performed by: INTERNAL MEDICINE

## 2021-06-23 PROCEDURE — 99999 PR PBB SHADOW E&M-EST. PATIENT-LVL III: ICD-10-PCS | Mod: PBBFAC,TXP,, | Performed by: INTERNAL MEDICINE

## 2021-06-23 PROCEDURE — 99213 OFFICE O/P EST LOW 20 MIN: CPT | Mod: PBBFAC,TXP | Performed by: INTERNAL MEDICINE

## 2021-06-23 PROCEDURE — 99213 PR OFFICE/OUTPT VISIT, EST, LEVL III, 20-29 MIN: ICD-10-PCS | Mod: S$PBB,NTX,, | Performed by: INTERNAL MEDICINE

## 2021-06-25 ENCOUNTER — TELEPHONE (OUTPATIENT)
Dept: TRANSPLANT | Facility: CLINIC | Age: 76
End: 2021-06-25

## 2021-06-25 DIAGNOSIS — Z76.82 ORGAN TRANSPLANT CANDIDATE: Primary | ICD-10-CM

## 2021-07-05 ENCOUNTER — PATIENT MESSAGE (OUTPATIENT)
Dept: NEPHROLOGY | Facility: CLINIC | Age: 76
End: 2021-07-05

## 2021-07-13 ENCOUNTER — TELEPHONE (OUTPATIENT)
Dept: TRANSPLANT | Facility: CLINIC | Age: 76
End: 2021-07-13

## 2021-07-13 ENCOUNTER — HOSPITAL ENCOUNTER (OUTPATIENT)
Dept: RADIOLOGY | Facility: HOSPITAL | Age: 76
Discharge: HOME | End: 2021-07-13
Attending: NURSE PRACTITIONER
Payer: MEDICARE

## 2021-07-13 ENCOUNTER — OFFICE VISIT (OUTPATIENT)
Dept: TRANSPLANT | Facility: CLINIC | Age: 76
End: 2021-07-13
Payer: MEDICARE

## 2021-07-13 VITALS
SYSTOLIC BLOOD PRESSURE: 145 MMHG | DIASTOLIC BLOOD PRESSURE: 87 MMHG | TEMPERATURE: 97 F | OXYGEN SATURATION: 95 % | HEART RATE: 82 BPM | HEIGHT: 74 IN | WEIGHT: 186.5 LBS | RESPIRATION RATE: 18 BRPM | BODY MASS INDEX: 23.93 KG/M2

## 2021-07-13 DIAGNOSIS — N25.81 SECONDARY HYPERPARATHYROIDISM: ICD-10-CM

## 2021-07-13 DIAGNOSIS — Z99.2 ESRD ON PERITONEAL DIALYSIS: ICD-10-CM

## 2021-07-13 DIAGNOSIS — D63.1 ANEMIA IN ESRD (END-STAGE RENAL DISEASE): ICD-10-CM

## 2021-07-13 DIAGNOSIS — Z01.818 PRE-TRANSPLANT EVALUATION FOR KIDNEY TRANSPLANT: Primary | ICD-10-CM

## 2021-07-13 DIAGNOSIS — R80.1 PERSISTENT PROTEINURIA: ICD-10-CM

## 2021-07-13 DIAGNOSIS — I15.0 RENOVASCULAR HYPERTENSION: ICD-10-CM

## 2021-07-13 DIAGNOSIS — R97.20 PSA ELEVATION: ICD-10-CM

## 2021-07-13 DIAGNOSIS — N18.6 ANEMIA IN ESRD (END-STAGE RENAL DISEASE): ICD-10-CM

## 2021-07-13 DIAGNOSIS — Z76.82 ORGAN TRANSPLANT CANDIDATE: ICD-10-CM

## 2021-07-13 DIAGNOSIS — N18.6 ESRD ON PERITONEAL DIALYSIS: ICD-10-CM

## 2021-07-13 PROCEDURE — 72170 X-RAY EXAM OF PELVIS: CPT | Mod: 26,TXP,, | Performed by: RADIOLOGY

## 2021-07-13 PROCEDURE — 71046 XR CHEST PA AND LATERAL: ICD-10-PCS | Mod: 26,TXP,, | Performed by: RADIOLOGY

## 2021-07-13 PROCEDURE — 72170 X-RAY EXAM OF PELVIS: CPT | Mod: TC,TXP

## 2021-07-13 PROCEDURE — 71046 X-RAY EXAM CHEST 2 VIEWS: CPT | Mod: TC,TXP

## 2021-07-13 PROCEDURE — 99205 PR OFFICE/OUTPT VISIT, NEW, LEVL V, 60-74 MIN: ICD-10-PCS | Mod: S$PBB,TXP,, | Performed by: NURSE PRACTITIONER

## 2021-07-13 PROCEDURE — 71046 X-RAY EXAM CHEST 2 VIEWS: CPT | Mod: 26,TXP,, | Performed by: RADIOLOGY

## 2021-07-13 PROCEDURE — 99244 OFF/OP CNSLTJ NEW/EST MOD 40: CPT | Mod: S$PBB,TXP,, | Performed by: SURGERY

## 2021-07-13 PROCEDURE — 99215 OFFICE O/P EST HI 40 MIN: CPT | Mod: PBBFAC,TXP | Performed by: NURSE PRACTITIONER

## 2021-07-13 PROCEDURE — 72170 XR PELVIS ROUTINE AP: ICD-10-PCS | Mod: 26,TXP,, | Performed by: RADIOLOGY

## 2021-07-13 PROCEDURE — 99999 PR PBB SHADOW E&M-EST. PATIENT-LVL V: CPT | Mod: PBBFAC,TXP,, | Performed by: NURSE PRACTITIONER

## 2021-07-13 PROCEDURE — 99205 OFFICE O/P NEW HI 60 MIN: CPT | Mod: S$PBB,TXP,, | Performed by: NURSE PRACTITIONER

## 2021-07-13 PROCEDURE — 99244 PR OFFICE CONSULTATION,LEVEL IV: ICD-10-PCS | Mod: S$PBB,TXP,, | Performed by: SURGERY

## 2021-07-13 PROCEDURE — 99999 PR PBB SHADOW E&M-EST. PATIENT-LVL V: ICD-10-PCS | Mod: PBBFAC,TXP,, | Performed by: NURSE PRACTITIONER

## 2021-07-13 RX ORDER — GENTAMICIN SULFATE 3 MG/ML
1 SOLUTION/ DROPS OPHTHALMIC DAILY
COMMUNITY
End: 2022-09-16

## 2021-07-13 RX ORDER — CHOLECALCIFEROL (VITAMIN D3) 25 MCG
2000 TABLET ORAL DAILY
COMMUNITY

## 2021-07-13 RX ORDER — LABETALOL 300 MG/1
600 TABLET, FILM COATED ORAL EVERY OTHER DAY
COMMUNITY
End: 2022-02-10

## 2021-07-29 ENCOUNTER — OFFICE VISIT (OUTPATIENT)
Dept: CARDIOLOGY | Facility: CLINIC | Age: 76
End: 2021-07-29
Payer: MEDICARE

## 2021-07-29 ENCOUNTER — TELEPHONE (OUTPATIENT)
Dept: CARDIOLOGY | Facility: CLINIC | Age: 76
End: 2021-07-29

## 2021-07-29 VITALS
BODY MASS INDEX: 23.65 KG/M2 | WEIGHT: 194.25 LBS | DIASTOLIC BLOOD PRESSURE: 78 MMHG | SYSTOLIC BLOOD PRESSURE: 142 MMHG | OXYGEN SATURATION: 97 % | HEART RATE: 86 BPM | HEIGHT: 76 IN

## 2021-07-29 DIAGNOSIS — Z76.82 ORGAN TRANSPLANT CANDIDATE: ICD-10-CM

## 2021-07-29 DIAGNOSIS — I10 ESSENTIAL HYPERTENSION: ICD-10-CM

## 2021-07-29 DIAGNOSIS — E78.2 MIXED HYPERLIPIDEMIA: ICD-10-CM

## 2021-07-29 DIAGNOSIS — Z99.2 ENCOUNTER FOR PERITONEAL DIALYSIS FOR ESRD: Primary | ICD-10-CM

## 2021-07-29 DIAGNOSIS — R00.2 PALPITATIONS: Primary | ICD-10-CM

## 2021-07-29 DIAGNOSIS — N13.8 BPH WITH URINARY OBSTRUCTION: ICD-10-CM

## 2021-07-29 DIAGNOSIS — N18.6 ENCOUNTER FOR PERITONEAL DIALYSIS FOR ESRD: Primary | ICD-10-CM

## 2021-07-29 DIAGNOSIS — N40.1 BPH WITH URINARY OBSTRUCTION: ICD-10-CM

## 2021-07-29 PROCEDURE — 99214 OFFICE O/P EST MOD 30 MIN: CPT | Mod: PBBFAC,PN,TXP | Performed by: INTERNAL MEDICINE

## 2021-07-29 PROCEDURE — 99213 OFFICE O/P EST LOW 20 MIN: CPT | Mod: S$PBB,TXP,, | Performed by: INTERNAL MEDICINE

## 2021-07-29 PROCEDURE — 99999 PR PBB SHADOW E&M-EST. PATIENT-LVL IV: CPT | Mod: PBBFAC,TXP,, | Performed by: INTERNAL MEDICINE

## 2021-07-29 PROCEDURE — 99999 PR PBB SHADOW E&M-EST. PATIENT-LVL IV: ICD-10-PCS | Mod: PBBFAC,TXP,, | Performed by: INTERNAL MEDICINE

## 2021-07-29 PROCEDURE — 99213 PR OFFICE/OUTPT VISIT, EST, LEVL III, 20-29 MIN: ICD-10-PCS | Mod: S$PBB,TXP,, | Performed by: INTERNAL MEDICINE

## 2021-07-30 ENCOUNTER — TELEPHONE (OUTPATIENT)
Dept: CARDIOLOGY | Facility: CLINIC | Age: 76
End: 2021-07-30

## 2021-08-03 ENCOUNTER — HOSPITAL ENCOUNTER (OUTPATIENT)
Dept: CARDIOLOGY | Facility: HOSPITAL | Age: 76
Discharge: HOME | End: 2021-08-03
Attending: NURSE PRACTITIONER
Payer: MEDICARE

## 2021-08-03 DIAGNOSIS — Z76.82 ORGAN TRANSPLANT CANDIDATE: ICD-10-CM

## 2021-08-03 LAB
CV PHARM DOSE: 0.4 MG
CV STRESS BASE HR: 61 BPM
DIASTOLIC BLOOD PRESSURE: 83 MMHG
OHS CV CPX 85 PERCENT MAX PREDICTED HEART RATE MALE: 124
OHS CV CPX MAX PREDICTED HEART RATE: 146
OHS CV CPX PATIENT IS FEMALE: 0
OHS CV CPX PATIENT IS MALE: 1
OHS CV CPX PEAK DIASTOLIC BLOOD PRESSURE: 74 MMHG
OHS CV CPX PEAK HEAR RATE: 82 BPM
OHS CV CPX PEAK RATE PRESSURE PRODUCT: NORMAL
OHS CV CPX PEAK SYSTOLIC BLOOD PRESSURE: 153 MMHG
OHS CV CPX PERCENT MAX PREDICTED HEART RATE ACHIEVED: 56
OHS CV CPX RATE PRESSURE PRODUCT PRESENTING: 9272
SYSTOLIC BLOOD PRESSURE: 152 MMHG

## 2021-08-03 PROCEDURE — 93016 STRESS TEST WITH MYOCARDIAL PERFUSION (CUPID ONLY): ICD-10-PCS | Mod: TXP,,, | Performed by: INTERNAL MEDICINE

## 2021-08-03 PROCEDURE — 93016 CV STRESS TEST SUPVJ ONLY: CPT | Mod: TXP,,, | Performed by: INTERNAL MEDICINE

## 2021-08-03 PROCEDURE — A9502 TC99M TETROFOSMIN: HCPCS | Mod: TXP

## 2021-08-03 PROCEDURE — 93018 CV STRESS TEST I&R ONLY: CPT | Mod: TXP,,, | Performed by: INTERNAL MEDICINE

## 2021-08-03 PROCEDURE — 78452 HT MUSCLE IMAGE SPECT MULT: CPT | Mod: TXP

## 2021-08-03 PROCEDURE — 63600175 PHARM REV CODE 636 W HCPCS: Mod: TXP | Performed by: NURSE PRACTITIONER

## 2021-08-03 PROCEDURE — 93018 STRESS TEST WITH MYOCARDIAL PERFUSION (CUPID ONLY): ICD-10-PCS | Mod: TXP,,, | Performed by: INTERNAL MEDICINE

## 2021-08-03 PROCEDURE — 78452 STRESS TEST WITH MYOCARDIAL PERFUSION (CUPID ONLY): ICD-10-PCS | Mod: 26,TXP,, | Performed by: INTERNAL MEDICINE

## 2021-08-03 PROCEDURE — 78452 HT MUSCLE IMAGE SPECT MULT: CPT | Mod: 26,TXP,, | Performed by: INTERNAL MEDICINE

## 2021-08-03 RX ORDER — REGADENOSON 0.08 MG/ML
0.4 INJECTION, SOLUTION INTRAVENOUS ONCE
Status: COMPLETED | OUTPATIENT
Start: 2021-08-03 | End: 2021-08-03

## 2021-08-03 RX ADMIN — REGADENOSON 0.4 MG: 0.08 INJECTION, SOLUTION INTRAVENOUS at 09:08

## 2021-08-17 DIAGNOSIS — Z76.82 ORGAN TRANSPLANT CANDIDATE: Primary | ICD-10-CM

## 2021-09-14 ENCOUNTER — TELEPHONE (OUTPATIENT)
Dept: TRANSPLANT | Facility: CLINIC | Age: 76
End: 2021-09-14

## 2021-09-20 ENCOUNTER — PATIENT MESSAGE (OUTPATIENT)
Dept: NEPHROLOGY | Facility: CLINIC | Age: 76
End: 2021-09-20

## 2021-09-30 ENCOUNTER — OFFICE VISIT (OUTPATIENT)
Dept: UROLOGY | Facility: CLINIC | Age: 76
End: 2021-09-30
Payer: MEDICARE

## 2021-09-30 VITALS
DIASTOLIC BLOOD PRESSURE: 72 MMHG | HEART RATE: 85 BPM | HEIGHT: 76 IN | BODY MASS INDEX: 24.7 KG/M2 | WEIGHT: 202.81 LBS | SYSTOLIC BLOOD PRESSURE: 124 MMHG

## 2021-09-30 DIAGNOSIS — R97.20 ELEVATED PSA: Primary | ICD-10-CM

## 2021-09-30 DIAGNOSIS — Z76.82 ORGAN TRANSPLANT CANDIDATE: ICD-10-CM

## 2021-09-30 PROCEDURE — 99214 OFFICE O/P EST MOD 30 MIN: CPT | Mod: PBBFAC,TXP | Performed by: UROLOGY

## 2021-09-30 PROCEDURE — 99214 OFFICE O/P EST MOD 30 MIN: CPT | Mod: S$PBB,TXP,, | Performed by: UROLOGY

## 2021-09-30 PROCEDURE — 99214 PR OFFICE/OUTPT VISIT, EST, LEVL IV, 30-39 MIN: ICD-10-PCS | Mod: S$PBB,TXP,, | Performed by: UROLOGY

## 2021-09-30 PROCEDURE — 99999 PR PBB SHADOW E&M-EST. PATIENT-LVL IV: ICD-10-PCS | Mod: PBBFAC,TXP,, | Performed by: UROLOGY

## 2021-09-30 PROCEDURE — 99999 PR PBB SHADOW E&M-EST. PATIENT-LVL IV: CPT | Mod: PBBFAC,TXP,, | Performed by: UROLOGY

## 2021-09-30 RX ORDER — CIPROFLOXACIN 500 MG/1
500 TABLET ORAL EVERY 12 HOURS
Qty: 4 TABLET | Refills: 0 | Status: SHIPPED | OUTPATIENT
Start: 2021-09-30 | End: 2021-10-02

## 2021-10-02 ENCOUNTER — IMMUNIZATION (OUTPATIENT)
Dept: INTERNAL MEDICINE | Facility: CLINIC | Age: 76
End: 2021-10-02
Payer: MEDICARE

## 2021-10-02 DIAGNOSIS — Z23 NEED FOR VACCINATION: Primary | ICD-10-CM

## 2021-10-02 PROCEDURE — 0003A COVID-19, MRNA, LNP-S, PF, 30 MCG/0.3 ML DOSE VACCINE: CPT | Mod: PBBFAC,CV19

## 2021-10-02 PROCEDURE — 91300 COVID-19, MRNA, LNP-S, PF, 30 MCG/0.3 ML DOSE VACCINE: CPT | Mod: PBBFAC

## 2021-10-08 ENCOUNTER — TELEPHONE (OUTPATIENT)
Dept: TRANSPLANT | Facility: CLINIC | Age: 76
End: 2021-10-08

## 2021-10-08 DIAGNOSIS — Z76.82 ORGAN TRANSPLANT CANDIDATE: Primary | ICD-10-CM

## 2021-10-11 ENCOUNTER — TELEPHONE (OUTPATIENT)
Dept: ENDOSCOPY | Facility: HOSPITAL | Age: 76
End: 2021-10-11

## 2021-10-11 DIAGNOSIS — Z12.11 SCREEN FOR COLON CANCER: ICD-10-CM

## 2021-10-11 DIAGNOSIS — Z99.2 PERITONEAL DIALYSIS STATUS: Primary | ICD-10-CM

## 2021-10-11 DIAGNOSIS — Z01.818 PRE-OP TESTING: Primary | ICD-10-CM

## 2021-10-11 RX ORDER — SODIUM, POTASSIUM,MAG SULFATES 17.5-3.13G
1 SOLUTION, RECONSTITUTED, ORAL ORAL DAILY
Qty: 1 KIT | Refills: 0 | Status: SHIPPED | OUTPATIENT
Start: 2021-10-11 | End: 2021-10-13

## 2021-10-26 ENCOUNTER — PROCEDURE VISIT (OUTPATIENT)
Dept: UROLOGY | Facility: CLINIC | Age: 76
End: 2021-10-26
Payer: MEDICARE

## 2021-10-26 VITALS
BODY MASS INDEX: 23.24 KG/M2 | RESPIRATION RATE: 16 BRPM | SYSTOLIC BLOOD PRESSURE: 118 MMHG | DIASTOLIC BLOOD PRESSURE: 68 MMHG | TEMPERATURE: 98 F | HEART RATE: 89 BPM | HEIGHT: 76 IN | WEIGHT: 190.81 LBS

## 2021-10-26 DIAGNOSIS — R97.20 ELEVATED PSA: Primary | ICD-10-CM

## 2021-10-26 PROCEDURE — 55700 TRANSRECTAL ULTRASOUND W/ BIOPSY: CPT | Mod: PBBFAC,TXP | Performed by: UROLOGY

## 2021-10-26 PROCEDURE — 76942 TRANSRECTAL ULTRASOUND W/ BIOPSY: ICD-10-PCS | Mod: 26,S$PBB,TXP, | Performed by: UROLOGY

## 2021-10-26 PROCEDURE — 55700 TRANSRECTAL ULTRASOUND W/ BIOPSY: ICD-10-PCS | Mod: S$PBB,TXP,, | Performed by: UROLOGY

## 2021-10-26 PROCEDURE — 88305 TISSUE EXAM BY PATHOLOGIST: CPT | Mod: 26,NTX,, | Performed by: PATHOLOGY

## 2021-10-26 PROCEDURE — 76942 ECHO GUIDE FOR BIOPSY: CPT | Mod: 26,S$PBB,TXP, | Performed by: UROLOGY

## 2021-10-26 PROCEDURE — 88305 TISSUE EXAM BY PATHOLOGIST: ICD-10-PCS | Mod: 26,NTX,, | Performed by: PATHOLOGY

## 2021-10-26 PROCEDURE — 76872 TRANSRECTAL ULTRASOUND W/ BIOPSY: ICD-10-PCS | Mod: 26,S$PBB,TXP, | Performed by: UROLOGY

## 2021-10-26 PROCEDURE — 88305 TISSUE EXAM BY PATHOLOGIST: CPT | Mod: 59,TXP | Performed by: PATHOLOGY

## 2021-10-26 PROCEDURE — 76872 US TRANSRECTAL: CPT | Mod: 26,S$PBB,TXP, | Performed by: UROLOGY

## 2021-10-26 RX ORDER — LIDOCAINE HYDROCHLORIDE 20 MG/ML
JELLY TOPICAL
Status: COMPLETED | OUTPATIENT
Start: 2021-10-26 | End: 2021-10-26

## 2021-10-26 RX ORDER — LIDOCAINE HYDROCHLORIDE 10 MG/ML
20 INJECTION INFILTRATION; PERINEURAL
Status: COMPLETED | OUTPATIENT
Start: 2021-10-26 | End: 2021-10-26

## 2021-10-26 RX ADMIN — LIDOCAINE HYDROCHLORIDE: 20 JELLY TOPICAL at 02:10

## 2021-10-26 RX ADMIN — LIDOCAINE HYDROCHLORIDE 20 ML: 10 INJECTION, SOLUTION INFILTRATION; PERINEURAL at 02:10

## 2021-10-30 ENCOUNTER — LAB VISIT (OUTPATIENT)
Dept: SPORTS MEDICINE | Facility: CLINIC | Age: 76
End: 2021-10-30
Payer: MEDICARE

## 2021-10-30 DIAGNOSIS — Z01.818 PRE-OP TESTING: ICD-10-CM

## 2021-10-30 PROCEDURE — U0005 INFEC AGEN DETEC AMPLI PROBE: HCPCS | Mod: TXP | Performed by: CLINICAL NURSE SPECIALIST

## 2021-10-30 PROCEDURE — U0003 INFECTIOUS AGENT DETECTION BY NUCLEIC ACID (DNA OR RNA); SEVERE ACUTE RESPIRATORY SYNDROME CORONAVIRUS 2 (SARS-COV-2) (CORONAVIRUS DISEASE [COVID-19]), AMPLIFIED PROBE TECHNIQUE, MAKING USE OF HIGH THROUGHPUT TECHNOLOGIES AS DESCRIBED BY CMS-2020-01-R: HCPCS | Mod: TXP | Performed by: CLINICAL NURSE SPECIALIST

## 2021-10-31 LAB
SARS-COV-2 RNA RESP QL NAA+PROBE: NOT DETECTED
SARS-COV-2- CYCLE NUMBER: NORMAL

## 2021-11-01 ENCOUNTER — SOCIAL WORK (OUTPATIENT)
Dept: TRANSPLANT | Facility: CLINIC | Age: 76
End: 2021-11-01
Payer: MEDICARE

## 2021-11-02 ENCOUNTER — ANESTHESIA EVENT (OUTPATIENT)
Dept: ENDOSCOPY | Facility: HOSPITAL | Age: 76
End: 2021-11-02
Payer: MEDICARE

## 2021-11-02 ENCOUNTER — HOSPITAL ENCOUNTER (OUTPATIENT)
Facility: HOSPITAL | Age: 76
Discharge: HOME | End: 2021-11-02
Attending: COLON & RECTAL SURGERY | Admitting: COLON & RECTAL SURGERY
Payer: MEDICARE

## 2021-11-02 ENCOUNTER — ANESTHESIA (OUTPATIENT)
Dept: ENDOSCOPY | Facility: HOSPITAL | Age: 76
End: 2021-11-02
Payer: MEDICARE

## 2021-11-02 VITALS
HEART RATE: 74 BPM | BODY MASS INDEX: 23.14 KG/M2 | HEIGHT: 76 IN | OXYGEN SATURATION: 95 % | RESPIRATION RATE: 18 BRPM | DIASTOLIC BLOOD PRESSURE: 88 MMHG | SYSTOLIC BLOOD PRESSURE: 155 MMHG | TEMPERATURE: 95 F | WEIGHT: 190 LBS

## 2021-11-02 DIAGNOSIS — Z12.11 COLON CANCER SCREENING: Primary | ICD-10-CM

## 2021-11-02 PROCEDURE — 45380 COLONOSCOPY AND BIOPSY: CPT | Mod: TXP,,, | Performed by: COLON & RECTAL SURGERY

## 2021-11-02 PROCEDURE — 45380 COLONOSCOPY AND BIOPSY: CPT | Mod: TXP | Performed by: COLON & RECTAL SURGERY

## 2021-11-02 PROCEDURE — E9220 PRA ENDO ANESTHESIA: ICD-10-PCS | Mod: TXP,,, | Performed by: NURSE ANESTHETIST, CERTIFIED REGISTERED

## 2021-11-02 PROCEDURE — 25000003 PHARM REV CODE 250: Mod: TXP | Performed by: COLON & RECTAL SURGERY

## 2021-11-02 PROCEDURE — 88305 TISSUE EXAM BY PATHOLOGIST: CPT | Mod: TXP | Performed by: PATHOLOGY

## 2021-11-02 PROCEDURE — 88305 TISSUE EXAM BY PATHOLOGIST: ICD-10-PCS | Mod: 26,TXP,, | Performed by: PATHOLOGY

## 2021-11-02 PROCEDURE — E9220 PRA ENDO ANESTHESIA: HCPCS | Mod: TXP,,, | Performed by: NURSE ANESTHETIST, CERTIFIED REGISTERED

## 2021-11-02 PROCEDURE — 45380 PR COLONOSCOPY,BIOPSY: ICD-10-PCS | Mod: TXP,,, | Performed by: COLON & RECTAL SURGERY

## 2021-11-02 PROCEDURE — 27201012 HC FORCEPS, HOT/COLD, DISP: Mod: TXP | Performed by: COLON & RECTAL SURGERY

## 2021-11-02 PROCEDURE — 88305 TISSUE EXAM BY PATHOLOGIST: CPT | Mod: 26,TXP,, | Performed by: PATHOLOGY

## 2021-11-02 PROCEDURE — 37000009 HC ANESTHESIA EA ADD 15 MINS: Mod: TXP | Performed by: COLON & RECTAL SURGERY

## 2021-11-02 PROCEDURE — 37000008 HC ANESTHESIA 1ST 15 MINUTES: Mod: TXP | Performed by: COLON & RECTAL SURGERY

## 2021-11-02 PROCEDURE — 25000003 PHARM REV CODE 250: Mod: TXP | Performed by: NURSE ANESTHETIST, CERTIFIED REGISTERED

## 2021-11-02 PROCEDURE — 63600175 PHARM REV CODE 636 W HCPCS: Mod: TXP | Performed by: NURSE ANESTHETIST, CERTIFIED REGISTERED

## 2021-11-02 RX ORDER — SODIUM CHLORIDE 9 MG/ML
INJECTION, SOLUTION INTRAVENOUS CONTINUOUS
Status: DISCONTINUED | OUTPATIENT
Start: 2021-11-02 | End: 2021-11-02 | Stop reason: HOSPADM

## 2021-11-02 RX ORDER — LIDOCAINE HYDROCHLORIDE 20 MG/ML
INJECTION, SOLUTION EPIDURAL; INFILTRATION; INTRACAUDAL; PERINEURAL
Status: DISCONTINUED | OUTPATIENT
Start: 2021-11-02 | End: 2021-11-02

## 2021-11-02 RX ORDER — PHENYLEPHRINE HCL IN 0.9% NACL 1 MG/10 ML
SYRINGE (ML) INTRAVENOUS
Status: DISCONTINUED | OUTPATIENT
Start: 2021-11-02 | End: 2021-11-02

## 2021-11-02 RX ORDER — PROPOFOL 10 MG/ML
VIAL (ML) INTRAVENOUS
Status: DISCONTINUED | OUTPATIENT
Start: 2021-11-02 | End: 2021-11-02

## 2021-11-02 RX ORDER — PROPOFOL 10 MG/ML
VIAL (ML) INTRAVENOUS CONTINUOUS PRN
Status: DISCONTINUED | OUTPATIENT
Start: 2021-11-02 | End: 2021-11-02

## 2021-11-02 RX ADMIN — Medication 40 MG: at 11:11

## 2021-11-02 RX ADMIN — SODIUM CHLORIDE: 0.9 INJECTION, SOLUTION INTRAVENOUS at 10:11

## 2021-11-02 RX ADMIN — Medication 100 MCG: at 11:11

## 2021-11-02 RX ADMIN — PROPOFOL 150 MCG/KG/MIN: 10 INJECTION, EMULSION INTRAVENOUS at 11:11

## 2021-11-02 RX ADMIN — SODIUM CHLORIDE: 0.9 INJECTION, SOLUTION INTRAVENOUS at 11:11

## 2021-11-02 RX ADMIN — LIDOCAINE HYDROCHLORIDE 50 MG: 20 INJECTION, SOLUTION EPIDURAL; INFILTRATION; INTRACAUDAL; PERINEURAL at 11:11

## 2021-11-05 LAB
FINAL PATHOLOGIC DIAGNOSIS: NORMAL
Lab: NORMAL

## 2021-11-08 ENCOUNTER — TELEPHONE (OUTPATIENT)
Dept: UROLOGY | Facility: CLINIC | Age: 76
End: 2021-11-08
Payer: MEDICARE

## 2021-11-08 DIAGNOSIS — R97.20 ELEVATED PSA: Primary | ICD-10-CM

## 2021-11-10 LAB
FINAL PATHOLOGIC DIAGNOSIS: NORMAL
GROSS: NORMAL
Lab: NORMAL

## 2021-11-19 ENCOUNTER — PATIENT MESSAGE (OUTPATIENT)
Dept: CARDIOLOGY | Facility: CLINIC | Age: 76
End: 2021-11-19
Payer: MEDICARE

## 2021-12-22 ENCOUNTER — TELEPHONE (OUTPATIENT)
Dept: TRANSPLANT | Facility: CLINIC | Age: 76
End: 2021-12-22
Payer: MEDICARE

## 2022-01-14 ENCOUNTER — TELEPHONE (OUTPATIENT)
Dept: TRANSPLANT | Facility: CLINIC | Age: 77
End: 2022-01-14
Payer: MEDICARE

## 2022-01-14 ENCOUNTER — EPISODE CHANGES (OUTPATIENT)
Dept: TRANSPLANT | Facility: CLINIC | Age: 77
End: 2022-01-14

## 2022-01-14 ENCOUNTER — COMMITTEE REVIEW (OUTPATIENT)
Dept: TRANSPLANT | Facility: CLINIC | Age: 77
End: 2022-01-14
Payer: MEDICARE

## 2022-01-14 DIAGNOSIS — Z76.82 ORGAN TRANSPLANT CANDIDATE: Primary | ICD-10-CM

## 2022-01-14 NOTE — LETTER
January 14, 2022    Shine Mcqueen  [address] [facility] [address]     Dear Dr. Mcqueen:    Patient: Teodoro Mast   MR Number: [id number]   YOB: 1945     Your patient, Teodoro Mast, was recently discussed at the [facility] Selection Committee meeting on 1/14/2022. I am happy to inform you that Teodoro has been approved for transplantation.  He has met selection criteria for a kidney transplant related to ESRD secondary to primary diagnosis of Malignant Hypertension. Your patient will be placed on the cadaveric wait list pending final financial approval from insurance company.     We appreciate your confidence in allowing us to participate in your patients care.  If you have any questions or concerns, please do not hesitate to contact me.    Sincerely,    Brandy Chapman MD  Medical Director, Kidney & Kidney/Pancreas Transplantation    Tj/Enclosed    Cc:[facility] PD

## 2022-01-14 NOTE — COMMITTEE REVIEW
Native Organ Dx: Malignant Hypertension      SELECTION COMMITTEE NOTE    Teodoro Mast was presented at selection committee on 1/14/2022.  Patient met selection criteria for kidney transplant related to ESRD due to   Malignant Hypertension.  No absolute contraindications to transplant at this time.  Patient will be placed on the cadaveric wait list pending final financial approval from insurance company.  Patient will return to clinic for routine appointment in 6 month(s). Patient meets criteria for High KDPI kidney offer. Patient meets HCV+ kidney offer. Patient meets criteria for dual/enbloc, due to age and weight.   Obtain workup from Multiple Myeloma and updated Albumin level.     Notified the patient of the committee's decision. Verified home address, nephrologist and d/u. Myeloma w/u/ notes in Epic Dr Euceda. Patient voiced understanding and all questions were answered.    Note written by SCOTT Trujillo,RN    ===============================================    I was present at the meeting and attest to the decision of the committee.    Dom Khoury  01/14/2022

## 2022-01-26 DIAGNOSIS — N18.6 ESRD (END STAGE RENAL DISEASE): Primary | ICD-10-CM

## 2022-01-27 ENCOUNTER — TELEPHONE (OUTPATIENT)
Dept: TRANSPLANT | Facility: CLINIC | Age: 77
End: 2022-01-27
Payer: MEDICARE

## 2022-01-27 ENCOUNTER — PATIENT MESSAGE (OUTPATIENT)
Dept: CARDIOLOGY | Facility: CLINIC | Age: 77
End: 2022-01-27
Payer: MEDICARE

## 2022-01-27 NOTE — TELEPHONE ENCOUNTER
Returned pt's call. Made him aware his d/u may be able to draw the Abo with his labs next week. Marissa with d/u stated she would call me and let me know. The pt stated if they can't he can go to [facility] lab.

## 2022-01-27 NOTE — TELEPHONE ENCOUNTER
----- Message from Jack Lynne sent at 1/27/2022 10:53 AM CST -----  Regarding: Speak with office  Contact: Neida Villanueva to let coordinator know that pt blood need to be drawn at hospital.

## 2022-01-31 ENCOUNTER — TELEPHONE (OUTPATIENT)
Dept: CARDIOLOGY | Facility: CLINIC | Age: 77
End: 2022-01-31
Payer: MEDICARE

## 2022-01-31 DIAGNOSIS — I10 HYPERTENSION, UNSPECIFIED TYPE: Primary | ICD-10-CM

## 2022-02-02 ENCOUNTER — PATIENT MESSAGE (OUTPATIENT)
Dept: TRANSPLANT | Facility: CLINIC | Age: 77
End: 2022-02-02
Payer: MEDICARE

## 2022-02-02 ENCOUNTER — TELEPHONE (OUTPATIENT)
Dept: TRANSPLANT | Facility: CLINIC | Age: 77
End: 2022-02-02
Payer: MEDICARE

## 2022-02-02 DIAGNOSIS — Z76.82 ORGAN TRANSPLANT CANDIDATE: Primary | ICD-10-CM

## 2022-02-02 NOTE — TELEPHONE ENCOUNTER
Answered pt's msg via phone call. [facility] scheduling abo and hla for 2/3/22 10am for main campus. Pt agreed

## 2022-02-03 ENCOUNTER — LAB VISIT (OUTPATIENT)
Dept: LAB | Facility: HOSPITAL | Age: 77
End: 2022-02-03
Attending: COLON & RECTAL SURGERY
Payer: MEDICARE

## 2022-02-03 DIAGNOSIS — Z76.82 ORGAN TRANSPLANT CANDIDATE: ICD-10-CM

## 2022-02-03 LAB — ABO + RH BLD: NORMAL

## 2022-02-03 PROCEDURE — 36415 COLL VENOUS BLD VENIPUNCTURE: CPT | Mod: TXP | Performed by: NURSE PRACTITIONER

## 2022-02-03 PROCEDURE — 86900 BLOOD TYPING SEROLOGIC ABO: CPT | Mod: TXP | Performed by: NURSE PRACTITIONER

## 2022-02-03 PROCEDURE — 86901 BLOOD TYPING SEROLOGIC RH(D): CPT | Mod: TXP | Performed by: NURSE PRACTITIONER

## 2022-02-04 ENCOUNTER — TELEPHONE (OUTPATIENT)
Dept: TRANSPLANT | Facility: CLINIC | Age: 77
End: 2022-02-04
Payer: MEDICARE

## 2022-02-04 DIAGNOSIS — Z76.82 ORGAN TRANSPLANT CANDIDATE: Primary | ICD-10-CM

## 2022-02-04 NOTE — LETTER
2022  Teodoro Mast  [address] [facility] [address]    Dear Teodoro Mast:  MRN: [id number]    Congratulations! On 2022, you were placed on  the waiting list for a  donor transplant.    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 They can be reached at [phone] or [phone] with any questions.      What to do now?    ? Ask your living donors to call and begin testing   Give your donors our phone number, [phone]   Make sure your donors have your name and date of birth when they call   You will get transplanted much faster if you have a living donor    ? Have your blood sent to our Transplant Lab every month   If you are on dialysis - our Transplant Lab will work with your dialysis unit to send your blood every month   If you are not on dialysis   o If you live near an [facility] lab, we will schedule you to have blood drawn every month  o If you do not live near an [facility] lab, you will be sent blood kits in the mail. You will need to take a kit to your local lab or doctor to have your blood drawn every month and mail to the Transplant Lab.     ? Call us with ANY CHANGES   Phone numbers - we must be able to reach you anytime of the day or night when a kidney is available   Address   Insurance coverage   Dialysis unit or kidney doctor   Sajan: if you have surgery, stay in the hospital, have to get blood, or have an infection    ? Review your Kidney/Kidney-Pancreas Transplant Guide    This will give you detailed information about what happens when  o you are on the waiting list   o you are called when a kidney is available    The [facility] has a transplant surgeon and physician available 365 days a year, 24 hours a day to coordinate organ acceptance, procurement, surgical placement and to address urgent  patient care issues.  You will be notified in writing of any changes to our Transplant Centers staffing plan that would impact your ability to receive a transplant.    Attached is a letter from the [employer] Network for Organ Sharing (UNOS). It describes the services and information offered to patients by UNOS and the Organ Procurement and Transplant Network. We look forward to working with you while on the waiting list.     Congratulations,  Your Transplant Partner  [facility]   [facility] [place] [address]  [phone]    Tj/Enclosed    Cc: [facility]                                                                                                                  The Organ Procurement and Transplantation Network   Toll-free patient services line: Your resource for organ transplant information     If you have a question regarding your own medical care, you always should call your transplant hospital first. However, for general organ transplant-related information, you can call the Organ Procurement and Transplantation Network (OPTN) toll-free patient services line at [phone].     Anyone, including potential transplant candidates, candidates, recipients, family members, friends, living donors, and donor family members, can call this number to:     · Talk about organ donation, living donation, the transplant process, the donation process, and transplant policies.   · Get a free patient information kit with helpful booklets, waiting list and transplant information, and a list of all transplant hospitals.   · Ask questions about the OPTN website (https://optn.transplant.hrsa.gov/), the [employer] Network for Organ Sharings (UNOS) website (https://unos.org/), or the UNOS website for living donors and transplant recipients. (https://www.transplantliving.org/).   · Learn how the OPTN can help you.   · Talk about any concerns that you may have with a transplant  [facility].     The nations transplant system, the OPTN, is managed under federal contract by the [employer] Network for Organ Sharing (UNOS), which is a non-profit charitable organization. The OPTN helps create and define organ sharing policies that make the best use of donated organs. This process continuously evaluating new advances and discoveries so policies can be adapted to best serve patients waiting for transplants. To do so, the OPTN works closely with transplant professionals, transplant patients, transplant candidates, donor families, living donors, and the public. All transplant programs and organ procurement organizations throughout the country are OPTN members and are obligated to follow the policies the OPTN creates for allocating organs.     The OPTN also is responsible for:   · Providing educational material for patients, the public, and professionals.   · Raising awareness of the need for donated organs and tissue.   · Coordinating organ procurement, matching, and placement.   · Collecting information about every organ transplant and donation that occurs in the [place].     Remember, you should contact your transplant hospital directly if you have questions or concerns about your own medical care including medical records, work-up progress, and test results.     We are not your transplant hospital, and our staff will not be able to answer questions about your case, so please keep your transplant hospitals phone number handy.   However, while you research your transplant needs and learn as much as you can about transplantation and donation, we welcome your call to our toll-free patient services line at [phone].

## 2022-02-04 NOTE — TELEPHONE ENCOUNTER
"  KIDNEY WAIT LISTING NOTE    Date of Financial clearance to list: 2022    SSN/[facility]:     Organ: Kidney  Name:       Teodoro Mast   : 1945          Gender:     male    MRN#: [id number]                                 State of Permanent Residence:  [address] [place], [place] [id number]  Ethnicity: Not  or /a   Race:      Black or     CLINICAL INFORMATION   Candidate Medical Urgency Status: Active (1)  Number of Previous Kidney Transplants: 0  Number of Previous Solid Organ Transplants: 0  Did you enter number of previous kidney or other solid organ transplants? yes  Is this Candidate a Prior Living Donor: no  (If yes, please generate letter to UNOS with patient's date of donation, recipient SSN, signed by Surgical Director after patient is listed in order to receive priority points).      ABO  ABO Blood Group:   O POS     ABO Confirmation: (THESE DATES MUST BE PRIOR TO THE LIST DATE AND SUPPORTED BY SEPARATE LAB REPORTS)    Internal Results    Lab Results   Component Value Date    GROUPTRH O POS 2022    GROUPTRH O POS 2021     No results found for: ABO    External Results    ABO Date 1:    ABO Date 2  Are either of these ABO results based on External Labs? no  (If Yes, STOP and go to source document in Media Tab for verification).    VITALS  Height:  6'1"  Weight:  84.6kg  (Use height from Transplant clinic visits only).  Did you enter height/weight? Yes    HLA    Class I:  Lab Results   Component Value Date    OWQY8AK 1 2021    RJNB3RL 2 2021    SRUI9UD 44 2021    GRGM1ZX 58 2021    FAWQE2GN 4 2021    FIJJR4KM XX 2021    FGHWY5ZT 5 2021    RHRZM7NK 6 2021       Class II:  Lab Results   Component Value Date    HAZQJS17SM 12 2021    RGYEMN48LB 15 2021    MGRQGV087CM 52 2021    DGDFWN3852 51 2021    AISTN3BY 5 2021    KALOU3HS 6 2021       Tested for HLA Antibodies: Yes, " "no antibodies detected     If result is "Positive" antibodies are detected     If result is "Negative or questionable" no antibodies detected    Lab Results   Component Value Date    CIPRAS Negative 07/13/2021    CIIPRAS Negative 07/13/2021       DIALYSIS INFORMATION  Is patient Pre-Dialysis: No     GFR Information  Report GFR being used as the criteria for placement on the kidney list. If not, leave blank  GFR < or = 20 ml/min? n/a  If Yes, Specify value  ___   ml/min     Initial date GFR became 20 or less:   Is GFR obtained from an Outside lab Result? n/a  (If YES verify with source document scanned into media)    If patient on Dialysis:    Is candidate currently on dialysis for ESRD? Yes  If Yes,  Date Chronic Dialysis Started:  5/10/2021   Dialysis Unit Name: [facility] PD  [address] [facility] [address]                        Physician Name:  Dr. Brandy Leone  NPI#: [id number]    DIABETES INFORMATION  Primary Native Kidney Diagnosis: Malignant Hypertension  C-Peptide Value - No results found for: CPEPTIDE  Current Diabetes Status: None    FOR NON-KIDNEY DEPARTMENT USE ONLY:  Additional Organs Registered? none    Maximum Acceptable Number of HLA Mismatches  ABDR:     6      (0-6)               AB:               (0-4)  ADR:   _____  (0-4)              BDR: _____ (0-4)  A:        _____  (0-2)              B:      _____ (0-2)          DR: ______ (0-2)    Will Recipient Accept?   Accept HBcAB Positive Organ:            Yes  Accept HBV CHARISMA Positive Organ:        no  Accept HCV Antibody Positive Organ: yes   Accept HCV CHARISMA Positive Organ: yes    Dual Kidney and En Bloc Opt In : Yes  Dual  Local:   Yes  Dual Import:   Yes  En Bloc Local:   Yes  En Bloc Import: Yes    Accept KDPI > 85: Single: Yes     Local: Yes     Import: Yes  Accept KDPI > 85: Dual: Yes     Local: Yes     Import: Yes      ### NURSE TO VERIFY CONSENT AND MAKE ANY NECESSARY CHANGES NEEDED IN UNET AT THE TIME OF VERIFICATION " ###    Unacceptible Antigens  If yes, list     No results found for: OU4IEYA, CIABCLM, CIIAB    ### DO NOT LIST IF ANTIGEN VALUE WEAK ###     Abo, serologies and 2728 verified by myself and ANKIT Velez RN

## 2022-02-07 ENCOUNTER — TELEPHONE (OUTPATIENT)
Dept: TRANSPLANT | Facility: CLINIC | Age: 77
End: 2022-02-07
Payer: MEDICARE

## 2022-02-07 DIAGNOSIS — Z76.82 ORGAN TRANSPLANT CANDIDATE: Primary | ICD-10-CM

## 2022-02-10 ENCOUNTER — OFFICE VISIT (OUTPATIENT)
Dept: CARDIOLOGY | Facility: CLINIC | Age: 77
End: 2022-02-10
Payer: MEDICARE

## 2022-02-10 VITALS
HEART RATE: 94 BPM | BODY MASS INDEX: 24.71 KG/M2 | WEIGHT: 202.94 LBS | OXYGEN SATURATION: 98 % | HEIGHT: 76 IN | DIASTOLIC BLOOD PRESSURE: 70 MMHG | SYSTOLIC BLOOD PRESSURE: 120 MMHG

## 2022-02-10 DIAGNOSIS — I12.9 HYPERTENSIVE KIDNEY DISEASE WITH STAGE 4 CHRONIC KIDNEY DISEASE: ICD-10-CM

## 2022-02-10 DIAGNOSIS — I10 HYPERTENSION, UNSPECIFIED TYPE: ICD-10-CM

## 2022-02-10 DIAGNOSIS — Z76.82 ORGAN TRANSPLANT CANDIDATE: Primary | ICD-10-CM

## 2022-02-10 DIAGNOSIS — N18.4 CKD (CHRONIC KIDNEY DISEASE) STAGE 4, GFR 15-29 ML/MIN: ICD-10-CM

## 2022-02-10 DIAGNOSIS — Z99.2 PERITONEAL DIALYSIS CATHETER IN PLACE: ICD-10-CM

## 2022-02-10 DIAGNOSIS — N18.4 HYPERTENSIVE KIDNEY DISEASE WITH STAGE 4 CHRONIC KIDNEY DISEASE: ICD-10-CM

## 2022-02-10 DIAGNOSIS — N13.8 BPH WITH URINARY OBSTRUCTION: ICD-10-CM

## 2022-02-10 DIAGNOSIS — E78.2 MIXED HYPERLIPIDEMIA: ICD-10-CM

## 2022-02-10 DIAGNOSIS — I10 ESSENTIAL HYPERTENSION: ICD-10-CM

## 2022-02-10 DIAGNOSIS — N40.1 BPH WITH URINARY OBSTRUCTION: ICD-10-CM

## 2022-02-10 PROCEDURE — 99214 OFFICE O/P EST MOD 30 MIN: CPT | Mod: PBBFAC,PN,TXP | Performed by: INTERNAL MEDICINE

## 2022-02-10 PROCEDURE — 99213 OFFICE O/P EST LOW 20 MIN: CPT | Mod: S$PBB,TXP,, | Performed by: INTERNAL MEDICINE

## 2022-02-10 PROCEDURE — 99213 PR OFFICE/OUTPT VISIT, EST, LEVL III, 20-29 MIN: ICD-10-PCS | Mod: S$PBB,TXP,, | Performed by: INTERNAL MEDICINE

## 2022-02-10 PROCEDURE — 99999 PR PBB SHADOW E&M-EST. PATIENT-LVL IV: ICD-10-PCS | Mod: PBBFAC,TXP,, | Performed by: INTERNAL MEDICINE

## 2022-02-10 PROCEDURE — 99999 PR PBB SHADOW E&M-EST. PATIENT-LVL IV: CPT | Mod: PBBFAC,TXP,, | Performed by: INTERNAL MEDICINE

## 2022-02-10 NOTE — PROGRESS NOTES
Subjective:    Patient ID:  Teodoro Mast is a 76 y.o. male who presents for follow-up of hypertension    HPI      The patient is a 76 year old male retired  at [employer] is followed with hypertension, hyperlipidemia and chronic renal insuffiencey. He is alos followed by Dr Mcqueen [Nephrology] and Juan[ Urology] for elevated PSA. He is now on peritoneal dialysis and is being evaluated for transplant.Echo 12/16/20 revealed diastolic dysfucntion other wise unremarkable and nuclear stress was negative for ischemia. He is now on the list for kidney transplant. He doing well and is no longer on meds for hypertension. His BP is doing well. He is not exercising.  Lab Results   Component Value Date     07/13/2021    K 4.9 11/02/2021     07/13/2021    CO2 24 07/13/2021    BUN 46 (H) 07/13/2021    CREATININE 7.8 (H) 07/13/2021     (H) 07/13/2021    HGBA1C 5.9 07/28/2006    AST 12 07/13/2021    ALT 21 07/13/2021    ALBUMIN 2.9 (L) 07/13/2021    PROT 6.9 07/13/2021    BILITOT 0.4 07/13/2021    WBC 10.00 07/13/2021    HGB 9.7 (L) 07/13/2021    HCT 29.5 (L) 07/13/2021    MCV 95 07/13/2021     07/13/2021    INR 1.0 07/13/2021    PSA 14.2 (H) 07/13/2021         Lab Results   Component Value Date    CHOL 157 07/13/2021    HDL 33 (L) 07/13/2021    TRIG 130 07/13/2021       Lab Results   Component Value Date    LDLCALC 98.0 07/13/2021       Past Medical History:   Diagnosis Date    BPH (benign prostatic hyperplasia)     CKD (chronic kidney disease) stage 5, GFR less than 15 ml/min     Gout     Hyperlipidemia     Hyperparathyroidism, secondary renal     Hypertension        Current Outpatient Medications:     atorvastatin (LIPITOR) 80 MG tablet, TAKE 1 TABLET BY MOUTH ONCE DAILY, Disp: 90 tablet, Rfl: 3    calcitRIOL (ROCALTROL) 0.25 MCG Cap, Take 1 capsule (0.25 mcg total) by mouth once daily., Disp: 30 capsule, Rfl: 4    gentamicin (GARAMYCIN) 0.3 % ophthalmic solution, 1 drop by Other route  once daily. PD catheter site, Disp: , Rfl:     vitamin D (VITAMIN D3) 1000 units Tab, Take 2,000 Units by mouth once daily., Disp: , Rfl:     cyanocobalamin (VITAMIN B-12) 1000 MCG tablet, Take 100 mcg by mouth once daily., Disp: , Rfl:     labetaloL (NORMODYNE) 300 MG tablet, Take 600 mg by mouth every other day. Or if SBP >140, Disp: , Rfl:     VIT C/VIT E/LUTEIN/MIN/OMEGA-3 (OCUVITE ORAL), Take 1 capsule by mouth once daily. , Disp: , Rfl:   No current facility-administered medications for this visit.    Facility-Administered Medications Ordered in Other Visits:     0.9%  NaCl infusion, , Intravenous, Continuous, Enrico Woodruff MD, Last Rate: 100 mL/hr at 05/05/21 1115, New Bag at 05/05/21 1115    ceFAZolin (ANCEF) 3 gram in dextrose 5% IVPB, 3 g, Intravenous, On Call Procedure, Enrico Woodruff MD    LIDOcaine (PF) 10 mg/ml (1%) injection 10 mg, 1 mL, Intradermal, Once, Enrico Woodruff MD          Review of Systems   Constitutional: Negative for decreased appetite, diaphoresis, fever, malaise/fatigue, weight gain and weight loss.   HENT: Negative for congestion, ear discharge, ear pain and nosebleeds.    Eyes: Negative for blurred vision, double vision and visual disturbance.   Cardiovascular: Negative for chest pain, claudication, cyanosis, dyspnea on exertion, irregular heartbeat, leg swelling, near-syncope, orthopnea, palpitations, paroxysmal nocturnal dyspnea and syncope.   Respiratory: Negative for cough, hemoptysis, shortness of breath, sleep disturbances due to breathing, snoring, sputum production and wheezing.    Endocrine: Negative for polydipsia, polyphagia and polyuria.   Hematologic/Lymphatic: Negative for adenopathy and bleeding problem. Does not bruise/bleed easily.   Skin: Negative for color change, nail changes, poor wound healing and rash.   Musculoskeletal: Negative for muscle cramps and muscle weakness.   Gastrointestinal: Negative for abdominal pain, anorexia,  "change in bowel habit, hematochezia, nausea and vomiting.   Genitourinary: Negative for dysuria, frequency and hematuria.   Neurological: Negative for brief paralysis, difficulty with concentration, excessive daytime sleepiness, dizziness, focal weakness, headaches, light-headedness, seizures, vertigo and weakness.   Psychiatric/Behavioral: Negative for altered mental status and depression.   Allergic/Immunologic: Negative for persistent infections.        Objective:/70   Pulse 94   Ht 6' 4" (1.93 m)   Wt 92 kg (202 lb 14.9 oz)   SpO2 98%   BMI 24.70 kg/m²             Physical Exam  Vitals reviewed.   Constitutional:       Appearance: He is well-developed, normal weight and well-nourished.   HENT:      Head: Normocephalic.      Right Ear: External ear normal.      Left Ear: External ear normal.      Nose: Nose normal.        Comments: Inspection of lips, teeth and gums normalEyes:      General: No scleral icterus.     Extraocular Movements: EOM normal.      Pupils: Pupils are equal, round, and reactive to light.   Neck:      Thyroid: No thyromegaly.      Vascular: No JVD.      Trachea: No tracheal deviation.   Cardiovascular:      Rate and Rhythm: Normal rate and regular rhythm.      Pulses: Intact distal pulses.           Carotid pulses are 2+ on the right side and 2+ on the left side.       Dorsalis pedis pulses are 2+ on the right side and 2+ on the left side.        Posterior tibial pulses are 2+ on the right side and 2+ on the left side.      Heart sounds: S1 normal and S2 normal. No murmur heard.  No friction rub. No gallop.    Pulmonary:      Effort: Pulmonary effort is normal.      Breath sounds: Normal breath sounds.   Abdominal:      General: Bowel sounds are normal. There is no distension.      Palpations: There is no hepatosplenomegaly.      Tenderness: There is no abdominal tenderness. There is no guarding.   Musculoskeletal:         General: No tenderness or edema. Normal range of motion.    "   Cervical back: Normal range of motion and neck supple.   Lymphadenopathy:      Comments: Palpation of neck and groin lymph nodes normal   Skin:     General: Skin is dry.      Comments: No ankle nor pretibial edema   Neurological:      Mental Status: He is alert and oriented to person, place, and time.      Cranial Nerves: No cranial nerve deficit.      Motor: No abnormal muscle tone.      Coordination: Coordination normal.   Psychiatric:         Behavior: Behavior normal.         Thought Content: Thought content normal.         Judgment: Judgment normal.           Assessment:       1. Organ transplant candidate    2. Hypertension, unspecified type    3. Mixed hyperlipidemia    4. Hypertensive kidney disease with stage 4 chronic kidney disease    5. Peritoneal dialysis catheter in place    6. Essential hypertension    7. CKD (chronic kidney disease) stage 4, GFR 15-29 ml/min    8. BPH with urinary obstruction         Plan:       Teodoro was seen today for coronary artery disease.    Diagnoses and all orders for this visit:    Organ transplant candidate    Hypertension, unspecified type    Mixed hyperlipidemia    Hypertensive kidney disease with stage 4 chronic kidney disease    Peritoneal dialysis catheter in place    Essential hypertension    CKD (chronic kidney disease) stage 4, GFR 15-29 ml/min  -     Comprehensive Metabolic Panel; Future; Expected date: 08/12/2022  -     CBC Auto Differential; Future; Expected date: 08/12/2022    BPH with urinary obstruction    Low CV risk for kidney transplant

## 2022-02-14 ENCOUNTER — INITIAL CONSULT (OUTPATIENT)
Dept: VASCULAR SURGERY | Facility: CLINIC | Age: 77
End: 2022-02-14
Attending: SURGERY
Payer: MEDICARE

## 2022-02-14 VITALS
DIASTOLIC BLOOD PRESSURE: 77 MMHG | HEART RATE: 113 BPM | HEIGHT: 72 IN | BODY MASS INDEX: 27.47 KG/M2 | WEIGHT: 202.81 LBS | TEMPERATURE: 99 F | SYSTOLIC BLOOD PRESSURE: 124 MMHG

## 2022-02-14 DIAGNOSIS — N18.5 CKD (CHRONIC KIDNEY DISEASE), STAGE V: Primary | ICD-10-CM

## 2022-02-14 DIAGNOSIS — N18.6 ESRD (END STAGE RENAL DISEASE): ICD-10-CM

## 2022-02-14 DIAGNOSIS — Z01.818 PRE-OP EVALUATION: ICD-10-CM

## 2022-02-14 PROCEDURE — 99999 PR PBB SHADOW E&M-EST. PATIENT-LVL III: CPT | Mod: PBBFAC,TXP,, | Performed by: SURGERY

## 2022-02-14 PROCEDURE — 99202 PR OFFICE/OUTPT VISIT, NEW, LEVL II, 15-29 MIN: ICD-10-PCS | Mod: S$PBB,NTX,, | Performed by: SURGERY

## 2022-02-14 PROCEDURE — 99202 OFFICE O/P NEW SF 15 MIN: CPT | Mod: S$PBB,NTX,, | Performed by: SURGERY

## 2022-02-14 PROCEDURE — 99999 PR PBB SHADOW E&M-EST. PATIENT-LVL III: ICD-10-PCS | Mod: PBBFAC,TXP,, | Performed by: SURGERY

## 2022-02-14 PROCEDURE — 99213 OFFICE O/P EST LOW 20 MIN: CPT | Mod: PBBFAC,NTX | Performed by: SURGERY

## 2022-02-15 DIAGNOSIS — Z01.818 PRE-OP EVALUATION: Primary | ICD-10-CM

## 2022-03-07 ENCOUNTER — OFFICE VISIT (OUTPATIENT)
Dept: VASCULAR SURGERY | Facility: CLINIC | Age: 77
End: 2022-03-07
Attending: SURGERY
Payer: MEDICARE

## 2022-03-07 ENCOUNTER — HOSPITAL ENCOUNTER (OUTPATIENT)
Dept: VASCULAR SURGERY | Facility: CLINIC | Age: 77
Discharge: HOME | End: 2022-03-07
Attending: SURGERY
Payer: MEDICARE

## 2022-03-07 VITALS
HEART RATE: 76 BPM | DIASTOLIC BLOOD PRESSURE: 81 MMHG | SYSTOLIC BLOOD PRESSURE: 149 MMHG | WEIGHT: 204.56 LBS | TEMPERATURE: 98 F | HEIGHT: 76 IN | BODY MASS INDEX: 24.91 KG/M2

## 2022-03-07 DIAGNOSIS — Z01.818 PRE-OP EVALUATION: ICD-10-CM

## 2022-03-07 DIAGNOSIS — Z99.2 ESRD ON PERITONEAL DIALYSIS: Primary | ICD-10-CM

## 2022-03-07 DIAGNOSIS — N18.6 ESRD ON PERITONEAL DIALYSIS: Primary | ICD-10-CM

## 2022-03-07 DIAGNOSIS — N18.6 ESRD (END STAGE RENAL DISEASE): Primary | ICD-10-CM

## 2022-03-07 PROCEDURE — 99213 OFFICE O/P EST LOW 20 MIN: CPT | Mod: S$PBB,NTX,, | Performed by: SURGERY

## 2022-03-07 PROCEDURE — 93970 PR US DUPLEX, UPPER OR LOWER EXT VENOUS,COMPLETE BILAT: ICD-10-PCS | Mod: 26,S$PBB,NTX, | Performed by: SURGERY

## 2022-03-07 PROCEDURE — 99999 PR PBB SHADOW E&M-EST. PATIENT-LVL III: CPT | Mod: PBBFAC,TXP,, | Performed by: SURGERY

## 2022-03-07 PROCEDURE — 99999 PR PBB SHADOW E&M-EST. PATIENT-LVL III: ICD-10-PCS | Mod: PBBFAC,TXP,, | Performed by: SURGERY

## 2022-03-07 PROCEDURE — 93970 EXTREMITY STUDY: CPT | Mod: 26,S$PBB,NTX, | Performed by: SURGERY

## 2022-03-07 PROCEDURE — 93970 EXTREMITY STUDY: CPT | Mod: PBBFAC,NTX | Performed by: SURGERY

## 2022-03-07 PROCEDURE — 99213 PR OFFICE/OUTPT VISIT, EST, LEVL III, 20-29 MIN: ICD-10-PCS | Mod: S$PBB,NTX,, | Performed by: SURGERY

## 2022-03-07 PROCEDURE — 99213 OFFICE O/P EST LOW 20 MIN: CPT | Mod: PBBFAC,25,NTX | Performed by: SURGERY

## 2022-03-07 NOTE — PROGRESS NOTES
"VASCULAR SURGERY NOTE    Patient ID: Teodoro Mast is a 76 y.o. male.    I. HISTORY     Chief Complaint: Dialysis access    HPI: Teodoro Mast is a 76 y.o. male who is here today for established patient appointment. Returns for vein mapping. He denies any changes since his last appt a few weeks ago. He remains on the kidney transplant list and has continued PD. At his last appt I wrote the following:      "HPI 2/14/22:  Patient is currently dialyzing via PD, although effectiveness is reportedly decreasing. He recently was approved and added to the kidney transplant list. Patient and his nephrologist are concerned with current PD, due to diminishing effectiveness. Patient would like to began to the process of AVF placement, in the event PD is no longer effective while waiting for kidney transplant.  He has no history of prior AV access."    ALLERGIES: NKA    Current Outpatient Medications on File Prior to Visit   Medication Sig Dispense Refill    atorvastatin (LIPITOR) 80 MG tablet TAKE 1 TABLET BY MOUTH ONCE DAILY 90 tablet 3    calcitRIOL (ROCALTROL) 0.25 MCG Cap TAKE 1 CAPSULE(0.25 MCG) BY MOUTH EVERY DAY 30 capsule 4    gentamicin (GARAMYCIN) 0.3 % ophthalmic solution 1 drop by Other route once daily. PD catheter site      vitamin D (VITAMIN D3) 1000 units Tab Take 2,000 Units by mouth once daily.       Current Facility-Administered Medications on File Prior to Visit   Medication Dose Route Frequency Provider Last Rate Last Admin    0.9%  NaCl infusion   Intravenous Continuous Enrico Woodruff  mL/hr at 05/05/21 1115 New Bag at 05/05/21 1115    ceFAZolin (ANCEF) 3 gram in dextrose 5% IVPB  3 g Intravenous On Call Procedure Enrico Woodruff MD        LIDOcaine (PF) 10 mg/ml (1%) injection 10 mg  1 mL Intradermal Once Enrico Woodruff MD           Past Medical History:   Diagnosis Date    BPH (benign prostatic hyperplasia)     CKD (chronic kidney disease) stage 5, GFR less " than 15 ml/min     Gout     Hyperlipidemia     Hyperparathyroidism, secondary renal     Hypertension         Past Surgical History:   Procedure Laterality Date    COLONOSCOPY N/A 2021    Procedure: COLONOSCOPY;  Surgeon: Joe Jimenez MD;  Location: [facility] ([address] [place] [address]);  Service: Endoscopy;  Laterality: N/A;  COVID test at [facility] on 10/30-GT      dialysis pt-labs prior    EYE SURGERY Bilateral     cataract removal    FINGER SURGERY      hemorriodectomy      PERITONEAL CATHETER INSERTION         Social History     Occupational History    Not on file   Tobacco Use    Smoking status: Former Smoker     Packs/day: 0.50     Years: 10.00     Pack years: 5.00     Types: Cigarettes     Quit date:      Years since quittin.2    Smokeless tobacco: Never Used   Substance and Sexual Activity    Alcohol use: Not Currently    Drug use: No    Sexual activity: Not Currently     Partners: Female     Family History   Problem Relation Age of Onset    Heart disease Mother     Hypertension Mother     Heart disease Father         pacemaker    Seizures Sister     Hammer toes Brother     Heart disease Brother     Cancer Brother         prostate cancer    Premature birth Son     Cancer Sister         throat    No Known Problems Sister     No Known Problems Sister     No Known Problems Brother     No Known Problems Son        Review of Systems   Constitutional: Negative for weight loss.   HENT: Negative for ear pain and nosebleeds.    Eyes: Negative for discharge and pain.   Cardiovascular: Negative for chest pain and palpitations.   Respiratory: Negative for cough, shortness of breath and wheezing.    Endocrine: Negative for cold intolerance, heat intolerance and polyphagia.   Hematologic/Lymphatic: Negative for adenopathy. Does not bruise/bleed easily.   Skin: Negative for itching and rash.   Musculoskeletal: Negative for joint swelling and muscle cramps.   Gastrointestinal: Negative for abdominal  pain, diarrhea, nausea and vomiting.   Genitourinary: Negative for dysuria and flank pain.   Neurological: Negative for numbness and seizures.       II. PHYSICAL EXAM     Physical Exam  Constitutional:       Appearance: He is well-developed.   HENT:      Head: Normocephalic.      Right Ear: External ear normal.      Left Ear: External ear normal.      Nose: Nose normal.   Eyes:      General: No scleral icterus.     Pupils: Pupils are equal, round, and reactive to light.   Neck:      Thyroid: No thyromegaly.      Vascular: No JVD.      Trachea: No tracheal deviation.   Cardiovascular:      Rate and Rhythm: Normal rate and regular rhythm.      Pulses: Intact distal pulses.           Carotid pulses are 2+ on the right side and 2+ on the left side.       Dorsalis pedis pulses are 2+ on the right side and 2+ on the left side.        Posterior tibial pulses are 2+ on the right side and 2+ on the left side.      Heart sounds: No murmur heard.    No friction rub. No gallop.      Comments: 2+ radial pulses bilaterally  Pulmonary:      Effort: Pulmonary effort is normal.      Breath sounds: Normal breath sounds.   Abdominal:      General: Bowel sounds are normal. There is no distension.      Tenderness: There is no abdominal tenderness. There is no guarding.       Musculoskeletal:         General: No tenderness. Normal range of motion.      Cervical back: Normal range of motion and neck supple.   Lymphadenopathy:      Comments: Palpation of neck and groin lymph nodes normal   Skin:     General: Skin is dry.      Comments: Palpation of skin normal   Neurological:      Mental Status: He is alert and oriented to person, place, and time.      Cranial Nerves: No cranial nerve deficit.      Motor: No abnormal muscle tone.      Coordination: Coordination normal.   Psychiatric:         Behavior: Behavior normal.         Thought Content: Thought content normal.         Judgment: Judgment normal.         III. ASSESSMENT & PLAN (MEDICAL  DECISION MAKING)     Imaging Results: (I have personally reviewed all images and provided interpretation below)  BUE vein mapping:  LEFT: basilic vein >3mm throughout; cephalic 4.0-3.1mm down to wrist  RIGHT: basilic vein >3mm throughout; cephalic 4.3-3.5mm down to wrist    Assessment/Diagnosis and Plan:    1. ESRD on peritoneal dialysis        76 y.o. male with ESRD, recently added to kidney transplant list. Currently dialyzes via PD, although effectiveness of PD diminishing. Vascular consulted for AV access as back up for PD. Patient is currently listed for kidney transplant. Vein mapping completed today. Risks, benefits, altenatives to AVF creation explained ot patient who expressed full understanding and agreed to proceed.    - Plan for left radiocephalic fistula placement with local/MAC for diminishing efficacy of PD  - Continue current home meds  - Consent obtained     PEMA Martines II, MD, [facility]  Vascular Surgery  [facility]

## 2022-03-17 PROCEDURE — 86832 HLA CLASS I HIGH DEFIN QUAL: CPT | Mod: PO,TXP | Performed by: NURSE PRACTITIONER

## 2022-03-17 PROCEDURE — 86833 HLA CLASS II HIGH DEFIN QUAL: CPT | Mod: PO,TXP | Performed by: NURSE PRACTITIONER

## 2022-03-18 LAB — HPRA INTERPRETATION: NORMAL

## 2022-03-28 ENCOUNTER — LAB VISIT (OUTPATIENT)
Dept: PRIMARY CARE CLINIC | Facility: CLINIC | Age: 77
End: 2022-03-28
Attending: SURGERY
Payer: MEDICARE

## 2022-03-28 DIAGNOSIS — Z01.818 PRE-OP EVALUATION: ICD-10-CM

## 2022-03-28 DIAGNOSIS — Z76.82 ORGAN TRANSPLANT CANDIDATE: Primary | ICD-10-CM

## 2022-03-28 LAB
CLASS I ANTIBODIES - LUMINEX: NEGATIVE
CLASS II ANTIBODIES - LUMINEX: NEGATIVE
CPRA %: 0
SERUM COLLECTION DT - LUMINEX CLASS I: NORMAL
SERUM COLLECTION DT - LUMINEX CLASS II: NORMAL
SPCL1 TESTING DATE: NORMAL
SPCL2 TESTING DATE: NORMAL
SPCLU TESTING DATE: NORMAL

## 2022-03-28 PROCEDURE — U0005 INFEC AGEN DETEC AMPLI PROBE: HCPCS | Performed by: SURGERY

## 2022-03-28 PROCEDURE — U0003 INFECTIOUS AGENT DETECTION BY NUCLEIC ACID (DNA OR RNA); SEVERE ACUTE RESPIRATORY SYNDROME CORONAVIRUS 2 (SARS-COV-2) (CORONAVIRUS DISEASE [COVID-19]), AMPLIFIED PROBE TECHNIQUE, MAKING USE OF HIGH THROUGHPUT TECHNOLOGIES AS DESCRIBED BY CMS-2020-01-R: HCPCS | Mod: TXP | Performed by: SURGERY

## 2022-03-28 NOTE — PROGRESS NOTES
YEARLY LIST MANAGEMENT NOTE    Teodoro Mast's kidney transplant listing status reviewed.  Patient is due for follow-up appointments in June 2022.  Appointments will be scheduled per protocol.  HLA sample is current and being rec'd on a regular basis.

## 2022-03-29 ENCOUNTER — TELEPHONE (OUTPATIENT)
Dept: TRANSPLANT | Facility: CLINIC | Age: 77
End: 2022-03-29
Payer: MEDICARE

## 2022-03-29 DIAGNOSIS — Z76.82 ORGAN TRANSPLANT CANDIDATE: Primary | ICD-10-CM

## 2022-03-29 LAB
SARS-COV-2 RNA RESP QL NAA+PROBE: NOT DETECTED
SARS-COV-2- CYCLE NUMBER: NORMAL

## 2022-03-30 ENCOUNTER — TELEPHONE (OUTPATIENT)
Dept: VASCULAR SURGERY | Facility: CLINIC | Age: 77
End: 2022-03-30
Payer: MEDICARE

## 2022-03-30 ENCOUNTER — ANESTHESIA EVENT (OUTPATIENT)
Dept: SURGERY | Facility: HOSPITAL | Age: 77
End: 2022-03-30
Payer: MEDICARE

## 2022-03-30 RX ORDER — LABETALOL 300 MG/1
150 TABLET, FILM COATED ORAL 2 TIMES DAILY PRN
COMMUNITY
Start: 2022-03-02 | End: 2022-05-02

## 2022-03-30 RX ORDER — TORSEMIDE 20 MG/1
20 TABLET ORAL DAILY
COMMUNITY
Start: 2022-03-02 | End: 2022-05-31

## 2022-03-30 NOTE — PRE-PROCEDURE INSTRUCTIONS
Preop instructions(bathing/wear loose fitting clothing/fasting/directions/location of surgery/ and preop medication instructions reviewed with patient). Clear liquids are allowed up to 2 hours before procedure.Clear liquids are:water,apple juice, jello, gatorade & powerade. Patient instructed to hold/stop all blood thinning medications, prior to surgery, following the pre-surgery recommended guidelines. Instructed to follow the surgeon's instructions if they differ from these.    Patient verbalized understanding.    Denies any history of side effects or issues with anesthesia or sedation.    Patient advised of the updated visitor policy.  Patient aware of the need to have someone drive them home following same -day surgery.      Patient GIVEN ARRIVAL TIME OF 0900 TO [facility]

## 2022-03-31 ENCOUNTER — ANESTHESIA (OUTPATIENT)
Dept: SURGERY | Facility: HOSPITAL | Age: 77
End: 2022-03-31
Payer: MEDICARE

## 2022-03-31 ENCOUNTER — HOSPITAL ENCOUNTER (OUTPATIENT)
Facility: HOSPITAL | Age: 77
Discharge: HOME | End: 2022-03-31
Attending: SURGERY | Admitting: SURGERY
Payer: MEDICARE

## 2022-03-31 VITALS
HEART RATE: 74 BPM | HEIGHT: 75 IN | TEMPERATURE: 97 F | WEIGHT: 194 LBS | OXYGEN SATURATION: 95 % | DIASTOLIC BLOOD PRESSURE: 69 MMHG | SYSTOLIC BLOOD PRESSURE: 112 MMHG | BODY MASS INDEX: 24.12 KG/M2 | RESPIRATION RATE: 18 BRPM

## 2022-03-31 DIAGNOSIS — N18.6 ESRD (END STAGE RENAL DISEASE): Primary | ICD-10-CM

## 2022-03-31 LAB
GLUCOSE SERPL-MCNC: 148 MG/DL (ref 70–110)
HCO3 UR-SCNC: 28.2 MMOL/L (ref 24–28)
HCT VFR BLD CALC: 37 %PCV (ref 36–54)
PCO2 BLDA: 54.4 MMHG (ref 35–45)
PH SMN: 7.32 [PH] (ref 7.35–7.45)
PO2 BLDA: 16 MMHG (ref 40–60)
POC BE: 2 MMOL/L
POC IONIZED CALCIUM: 1.25 MMOL/L (ref 1.06–1.42)
POC SATURATED O2: 17 % (ref 95–100)
POC TCO2: 30 MMOL/L (ref 24–29)
POTASSIUM BLD-SCNC: 3.5 MMOL/L (ref 3.5–5.1)
SAMPLE: ABNORMAL
SODIUM BLD-SCNC: 136 MMOL/L (ref 136–145)

## 2022-03-31 PROCEDURE — 63600175 PHARM REV CODE 636 W HCPCS: Mod: TXP | Performed by: STUDENT IN AN ORGANIZED HEALTH CARE EDUCATION/TRAINING PROGRAM

## 2022-03-31 PROCEDURE — 36821 PR ANASTOMOSIS,AV,ANY SITE: ICD-10-PCS | Mod: GC,NTX,, | Performed by: SURGERY

## 2022-03-31 PROCEDURE — 36000707: Mod: NTX | Performed by: SURGERY

## 2022-03-31 PROCEDURE — 37000008 HC ANESTHESIA 1ST 15 MINUTES: Mod: NTX | Performed by: SURGERY

## 2022-03-31 PROCEDURE — 71000044 HC DOSC ROUTINE RECOVERY FIRST HOUR: Mod: NTX | Performed by: SURGERY

## 2022-03-31 PROCEDURE — D9220A PRA ANESTHESIA: ICD-10-PCS | Mod: NTX,,, | Performed by: ANESTHESIOLOGY

## 2022-03-31 PROCEDURE — 71000015 HC POSTOP RECOV 1ST HR: Mod: NTX | Performed by: SURGERY

## 2022-03-31 PROCEDURE — 25000003 PHARM REV CODE 250: Mod: NTX | Performed by: STUDENT IN AN ORGANIZED HEALTH CARE EDUCATION/TRAINING PROGRAM

## 2022-03-31 PROCEDURE — 37000009 HC ANESTHESIA EA ADD 15 MINS: Mod: NTX | Performed by: SURGERY

## 2022-03-31 PROCEDURE — 36000706: Mod: NTX | Performed by: SURGERY

## 2022-03-31 PROCEDURE — 63600175 PHARM REV CODE 636 W HCPCS: Mod: NTX | Performed by: SURGERY

## 2022-03-31 PROCEDURE — D9220A PRA ANESTHESIA: Mod: NTX,,, | Performed by: ANESTHESIOLOGY

## 2022-03-31 PROCEDURE — 36821 AV FUSION DIRECT ANY SITE: CPT | Mod: GC,NTX,, | Performed by: SURGERY

## 2022-03-31 PROCEDURE — 25000003 PHARM REV CODE 250: Mod: NTX | Performed by: SURGERY

## 2022-03-31 RX ORDER — FENTANYL CITRATE 50 UG/ML
INJECTION, SOLUTION INTRAMUSCULAR; INTRAVENOUS
Status: DISCONTINUED | OUTPATIENT
Start: 2022-03-31 | End: 2022-03-31

## 2022-03-31 RX ORDER — PAPAVERINE HYDROCHLORIDE 30 MG/ML
INJECTION INTRAMUSCULAR; INTRAVENOUS
Status: DISCONTINUED | OUTPATIENT
Start: 2022-03-31 | End: 2022-03-31 | Stop reason: HOSPADM

## 2022-03-31 RX ORDER — LIDOCAINE HYDROCHLORIDE 20 MG/ML
INJECTION, SOLUTION EPIDURAL; INFILTRATION; INTRACAUDAL; PERINEURAL
Status: DISCONTINUED | OUTPATIENT
Start: 2022-03-31 | End: 2022-03-31

## 2022-03-31 RX ORDER — HEPARIN SODIUM 1000 [USP'U]/ML
INJECTION, SOLUTION INTRAVENOUS; SUBCUTANEOUS
Status: DISCONTINUED | OUTPATIENT
Start: 2022-03-31 | End: 2022-03-31 | Stop reason: HOSPADM

## 2022-03-31 RX ORDER — VASOPRESSIN 20 [USP'U]/ML
INJECTION, SOLUTION INTRAMUSCULAR; SUBCUTANEOUS
Status: DISCONTINUED | OUTPATIENT
Start: 2022-03-31 | End: 2022-03-31

## 2022-03-31 RX ORDER — HYDROCODONE BITARTRATE AND ACETAMINOPHEN 5; 325 MG/1; MG/1
1 TABLET ORAL EVERY 6 HOURS PRN
Qty: 21 TABLET | Refills: 0 | Status: SHIPPED | OUTPATIENT
Start: 2022-03-31 | End: 2022-06-02

## 2022-03-31 RX ORDER — LIDOCAINE HYDROCHLORIDE 10 MG/ML
INJECTION INFILTRATION; PERINEURAL
Status: DISCONTINUED | OUTPATIENT
Start: 2022-03-31 | End: 2022-03-31 | Stop reason: HOSPADM

## 2022-03-31 RX ORDER — HEPARIN SODIUM 1000 [USP'U]/ML
INJECTION, SOLUTION INTRAVENOUS; SUBCUTANEOUS
Status: DISCONTINUED | OUTPATIENT
Start: 2022-03-31 | End: 2022-03-31

## 2022-03-31 RX ORDER — DEXMEDETOMIDINE HYDROCHLORIDE 100 UG/ML
INJECTION, SOLUTION INTRAVENOUS
Status: DISCONTINUED | OUTPATIENT
Start: 2022-03-31 | End: 2022-03-31

## 2022-03-31 RX ORDER — SODIUM CHLORIDE 9 MG/ML
INJECTION, SOLUTION INTRAVENOUS CONTINUOUS
Status: DISCONTINUED | OUTPATIENT
Start: 2022-03-31 | End: 2022-03-31 | Stop reason: HOSPADM

## 2022-03-31 RX ORDER — CEFAZOLIN SODIUM/WATER 2 G/20 ML
2 SYRINGE (ML) INTRAVENOUS
Status: COMPLETED | OUTPATIENT
Start: 2022-03-31 | End: 2022-03-31

## 2022-03-31 RX ORDER — ONDANSETRON 2 MG/ML
INJECTION INTRAMUSCULAR; INTRAVENOUS
Status: DISCONTINUED | OUTPATIENT
Start: 2022-03-31 | End: 2022-03-31

## 2022-03-31 RX ORDER — PROPOFOL 10 MG/ML
VIAL (ML) INTRAVENOUS
Status: DISCONTINUED | OUTPATIENT
Start: 2022-03-31 | End: 2022-03-31

## 2022-03-31 RX ORDER — SODIUM CHLORIDE 0.9 % (FLUSH) 0.9 %
10 SYRINGE (ML) INJECTION
Status: DISCONTINUED | OUTPATIENT
Start: 2022-03-31 | End: 2022-03-31 | Stop reason: HOSPADM

## 2022-03-31 RX ORDER — PHENYLEPHRINE HCL IN 0.9% NACL 1 MG/10 ML
SYRINGE (ML) INTRAVENOUS
Status: DISCONTINUED | OUTPATIENT
Start: 2022-03-31 | End: 2022-03-31

## 2022-03-31 RX ADMIN — DEXMEDETOMIDINE HYDROCHLORIDE 4 MCG: 100 INJECTION, SOLUTION INTRAVENOUS at 11:03

## 2022-03-31 RX ADMIN — PROPOFOL 50 MCG/KG/MIN: 10 INJECTION, EMULSION INTRAVENOUS at 11:03

## 2022-03-31 RX ADMIN — VASOPRESSIN 1 UNITS: 20 INJECTION, SOLUTION INTRAMUSCULAR; SUBCUTANEOUS at 11:03

## 2022-03-31 RX ADMIN — FENTANYL CITRATE 25 MCG: 50 INJECTION INTRAMUSCULAR; INTRAVENOUS at 11:03

## 2022-03-31 RX ADMIN — VASOPRESSIN 2 UNITS: 20 INJECTION, SOLUTION INTRAMUSCULAR; SUBCUTANEOUS at 12:03

## 2022-03-31 RX ADMIN — Medication 150 MCG: at 11:03

## 2022-03-31 RX ADMIN — Medication 40 MG: at 12:03

## 2022-03-31 RX ADMIN — FENTANYL CITRATE 50 MCG: 50 INJECTION INTRAMUSCULAR; INTRAVENOUS at 10:03

## 2022-03-31 RX ADMIN — LIDOCAINE HYDROCHLORIDE 40 MG: 20 INJECTION, SOLUTION EPIDURAL; INFILTRATION; INTRACAUDAL at 11:03

## 2022-03-31 RX ADMIN — SODIUM CHLORIDE: 0.9 INJECTION, SOLUTION INTRAVENOUS at 10:03

## 2022-03-31 RX ADMIN — HEPARIN SODIUM 3000 UNITS: 1000 INJECTION, SOLUTION INTRAVENOUS; SUBCUTANEOUS at 11:03

## 2022-03-31 RX ADMIN — PROPOFOL 20 MG: 10 INJECTION, EMULSION INTRAVENOUS at 11:03

## 2022-03-31 RX ADMIN — Medication 2 G: at 11:03

## 2022-03-31 RX ADMIN — SODIUM CHLORIDE: 0.9 INJECTION, SOLUTION INTRAVENOUS at 12:03

## 2022-03-31 RX ADMIN — ONDANSETRON 4 MG: 2 INJECTION INTRAMUSCULAR; INTRAVENOUS at 11:03

## 2022-03-31 RX ADMIN — DEXMEDETOMIDINE HYDROCHLORIDE 8 MCG: 100 INJECTION, SOLUTION INTRAVENOUS at 11:03

## 2022-03-31 RX ADMIN — Medication 100 MCG: at 11:03

## 2022-03-31 NOTE — TRANSFER OF CARE
"Anesthesia Transfer of Care Note    Patient: Teodoro Mast    Procedure(s) Performed: Procedure(s) (LRB):  CREATION, AV FISTULA RADIOCEPHALIC (Left)    Patient location: [facility]    Anesthesia Type: general    Transport from OR: Transported from OR on 2-3 L/min O2 by NC with adequate spontaneous ventilation    Post pain: adequate analgesia    Post assessment: no apparent anesthetic complications and tolerated procedure well    Post vital signs: stable    Level of consciousness: awake, alert and oriented    Nausea/Vomiting: no nausea/vomiting    Complications: none    Transfer of care protocol was followed      Last vitals:   Visit Vitals  /71 (BP Location: Left arm, Patient Position: Lying)   Pulse 86   Temp 36.6 °C (97.8 °F) (Oral)   Resp 20   Ht 6' 3" (1.905 m)   Wt 88 kg (194 lb)   SpO2 97%   BMI 24.25 kg/m²     "

## 2022-03-31 NOTE — BRIEF OP NOTE
Ket[facility]ton - Surgery ([facility])  Brief Operative Note    Surgery Date: 3/31/2022     Surgeon(s) and Role:     * PEMA Martines II, MD - Primary     * Cheo Story MD - Fellow    Assisting Surgeon: None    Pre-op Diagnosis:  ESRD (end stage renal disease) [N18.6]    Post-op Diagnosis:  Post-Op Diagnosis Codes:     * ESRD (end stage renal disease) [N18.6]    Procedure(s) (LRB):  CREATION, AV FISTULA RADIOCEPHALIC (Left)    Anesthesia: Regional    Operative Findings: Adequate artery and vein    Estimated Blood Loss: 10ml         Specimens:   Specimen (24h ago, onward)            None            Discharge Note    OUTCOME: Patient tolerated treatment/procedure well without complication and is now ready for discharge.    DISPOSITION: Home or Self Care    FINAL DIAGNOSIS: ESRD    FOLLOWUP: In clinic    DISCHARGE INSTRUCTIONS:  No discharge procedures on file.

## 2022-03-31 NOTE — ANESTHESIA PREPROCEDURE EVALUATION
[facility] [address]  Anesthesia Pre-Operative Evaluation         Patient Name: Teodoro Mast  YOB: 1945  MRN: [id number]    SUBJECTIVE:     Pre-operative evaluation for Procedure(s) (LRB):  CREATION, AV FISTULA RADIOCEPHALIC (Left)     03/31/2022    Teodoro Mast is a 76 y.o. male w/ a significant PMHx of GLYNN, HTN, ESRD on peritoneal dialysis, hearing loss, and anemia.    Patient now presents for the above procedure(s).      LDA: None documented.       Prev airway: None documented.    Drips: None documented.      Patient Active Problem List   Diagnosis    Mixed hyperlipidemia    Essential hypertension    BPH with urinary obstruction    Hypertensive CKD (chronic kidney disease)    Kidney cysts    Proteinuria    Metabolic acidosis    GLYNN (obstructive sleep apnea)    Hyperuricemia    Secondary hyperparathyroidism    Vitamin D deficiency    Benign hypertension with CKD (chronic kidney disease) stage V    Sensorineural hearing loss (SNHL) of both ears    Nasal septal deviation    Elevated serum immunoglobulin free light chains    Vitamin B12 deficiency    Folate deficiency    Iron deficiency anemia    Anemia of chronic renal failure, stage 5    Hypoalbuminemia    CKD (chronic kidney disease), stage V    ESRD (end stage renal disease)    Peritoneal dialysis catheter in place    Pre-op evaluation       Review of patient's allergies indicates:  No Known Allergies    Current Inpatient Medications:      Current Facility-Administered Medications on File Prior to Encounter   Medication Dose Route Frequency Provider Last Rate Last Admin    0.9%  NaCl infusion   Intravenous Continuous Enrico Woodruff  mL/hr at 05/05/21 1115 New Bag at 05/05/21 1115    ceFAZolin (ANCEF) 3 gram in dextrose 5% IVPB  3 g Intravenous On Call Procedure Enrico Woodruff MD        LIDOcaine (PF) 10 mg/ml (1%) injection 10 mg  1 mL Intradermal Once Enrico Woodruff MD          Current Outpatient Medications on File Prior to Encounter   Medication Sig Dispense Refill    calcitRIOL (ROCALTROL) 0.25 MCG Cap TAKE 1 CAPSULE(0.25 MCG) BY MOUTH EVERY DAY 30 capsule 4    labetaloL (NORMODYNE) 300 MG tablet Take 150 mg by mouth 2 (two) times daily as needed.      torsemide (DEMADEX) 20 MG Tab Take 20 mg by mouth once daily.      vitamin D (VITAMIN D3) 1000 units Tab Take 2,000 Units by mouth once daily.      atorvastatin (LIPITOR) 80 MG tablet TAKE 1 TABLET BY MOUTH ONCE DAILY 90 tablet 3    gentamicin (GARAMYCIN) 0.3 % ophthalmic solution 1 drop by Other route once daily. PD catheter site         Past Surgical History:   Procedure Laterality Date    COLONOSCOPY N/A 2021    Procedure: COLONOSCOPY;  Surgeon: Joe Jimenez MD;  Location: [address]);  Service: Endoscopy;  Laterality: N/A;  COVID test at [place] on 10/30-GT      dialysis pt-labs prior    EYE SURGERY Bilateral     cataract removal    FINGER SURGERY      hemorriodectomy      PERITONEAL CATHETER INSERTION         Social History     Socioeconomic History    Marital status:     Number of children: 2   Tobacco Use    Smoking status: Former Smoker     Packs/day: 0.50     Years: 10.00     Pack years: 5.00     Types: Cigarettes     Quit date:      Years since quittin.2    Smokeless tobacco: Never Used   Substance and Sexual Activity    Alcohol use: Not Currently    Drug use: No    Sexual activity: Not Currently     Partners: Female   Social History Narrative    Caregiver Teodoro Mast II       OBJECTIVE:     Vital Signs Range (Last 24H):         Significant Labs:  Lab Results   Component Value Date    WBC 7.97 2022    HGB 10.4 (L) 2022    HCT 32.3 (L) 2022     2022    CHOL 157 2021    TRIG 130 2021    HDL 33 (L) 2021    ALT 21 2021    AST 12 2021     2022    K 3.7 2022     2022    CREATININE  "12.4 (H) 03/07/2022    BUN 42 (H) 03/07/2022    CO2 25 03/07/2022    PSA 14.2 (H) 07/13/2021    INR 1.0 07/13/2021    HGBA1C 5.9 07/28/2006       Diagnostic Studies: No relevant studies.    EKG:   Results for orders placed or performed during the hospital encounter of 06/25/15   EKG 12-lead    Collection Time: 06/25/15  9:01 AM    Narrative    Test Reason : 401.9  Vent. Rate : 079 BPM     Atrial Rate : 079 BPM     P-R Int : 132 ms          QRS Dur : 090 ms      QT Int : 364 ms       P-R-T Axes : 056 054 030 degrees     QTc Int : 417 ms    Normal sinus rhythm  Normal ECG  When compared with ECG of 19-DEC-2013 10:46,  No significant change was found  Confirmed by KARENA CALDERON MD ([id number]) on 6/25/2015 9:49:30 AM    Referred By: RODOLFO DOMINGUEZ           Confirmed By:KARENA CALDERON MD       2D ECHO:  TTE:  Results for orders placed or performed during the hospital encounter of 12/16/20   Echo Color Flow Doppler? Yes   Result Value Ref Range    Ascending aorta 2.44 cm    STJ 2.58 cm    AV mean gradient 7 mmHg    Ao peak dick 1.71 m/s    Ao VTI 39.30 cm    IVRT 168.41 msec    IVS 0.87 0.6 - 1.1 cm    LA size 3.99 cm    Left Atrium Major Axis 5.71 cm    Left Atrium Minor Axis 5.65 cm    LVIDd 5.50 3.5 - 6.0 cm    LVIDs 3.50 2.1 - 4.0 cm    LVOT diameter 2.18 cm    LVOT peak VTI 23.39 cm    Posterior Wall 0.90 0.6 - 1.1 cm    MV Peak A Dick 0.83 m/s    E wave deceleration time 312.61 msec    MV Peak E Dick 0.49 m/s    PV Peak D Dick 0.27 m/s    PV Peak S Dick 0.42 m/s    RA Major Axis 4.89 cm    RA Width 2.96 cm    RVDD 3.70 cm    Sinus 2.98 cm    TAPSE 2.46 cm    TR Max Dick 2.20 m/s    TDI LATERAL 0.04 m/s    TDI SEPTAL 0.05 m/s    LA WIDTH 4.75 cm    LV Diastolic Volume 127.70 mL    LV Systolic Volume 50.79 mL    RV S' 12.39 cm/s    LVOT peak dick 1.13 m/s    LA volume (mod) 87.34 cm3    MV "A" wave duration 5.71 msec    LV LATERAL E/E' RATIO 12.25 m/s    LV SEPTAL E/E' RATIO 9.80 m/s    FS 36 %    LA volume 91.50 cm3    LV mass 181.86 g "    Left Ventricle Relative Wall Thickness 0.33 cm    AV valve area 2.22 cm2    AV Velocity Ratio 0.66     AV index (prosthetic) 0.60     E/A ratio 0.59     Mean e' 0.05 m/s    Pulm vein S/D ratio 1.56     LVOT area 3.7 cm2    LVOT stroke volume 87.26 cm3    AV peak gradient 12 mmHg    E/E' ratio 10.89 m/s    Triscuspid Valve Regurgitation Peak Gradient 19 mmHg    BSA 2.23 m2    LV Systolic Volume Index 22.7 mL/m2    LV Diastolic Volume Index 57.16 mL/m2    LA Volume Index 41.0 mL/m2    LV Mass Index 81 g/m2    LA Volume Index (Mod) 39.1 mL/m2    Right Atrial Pressure (from IVC) 3 mmHg    TV rest pulmonary artery pressure 22 mmHg    Narrative    · Mild left atrial enlargement.  · The left ventricle is normal in size with normal systolic function. The   estimated ejection fraction is 65%.  · Indeterminate left ventricular diastolic function.  · Normal right ventricular size with normal right ventricular systolic   function.  · There is mild tricuspid valve regurgitation.  · There is mild mitral regurgitation.  · Normal central venous pressure (3 mmHg).  · The estimated PA systolic pressure is 22 mmHg.          NARDA:  No results found for this or any previous visit.    ASSESSMENT/PLAN:                                                                                                                 03/31/2022  Teodoro Mast is a 76 y.o., male.      Pre-op Assessment    I have reviewed the Patient Summary Reports.     I have reviewed the Nursing Notes.    I have reviewed the Medications.     Review of Systems  Anesthesia Hx:  No problems with previous Anesthesia Denies Hx of Anesthetic complications  History of prior surgery of interest to airway management or planning: Previous anesthesia: General, MAC Denies Family Hx of Anesthesia complications.   Denies Personal Hx of Anesthesia complications.   Social:  Former Smoker, No Alcohol Use    Hematology/Oncology:  Hematology Normal        EENT/Dental:EENT/Dental Normal    Cardiovascular:   Hypertension Valvular problems/Murmurs (mild MR and TR), MR Denies MI.    Denies Angina. hyperlipidemia     ECG has been reviewed.   EKG 2015:  Vent. Rate : 079 BPM     Atrial Rate : 079 BPM      P-R Int : 132 ms          QRS Dur : 090 ms       QT Int : 364 ms       P-R-T Axes : 056 054 030 degrees      QTc Int : 417 ms   Normal sinus rhythm   Normal ECG   When compared with ECG of 19-DEC-2013 10:46,   No significant change was found     Echo 2020:  · Mild left atrial enlargement.  · The left ventricle is normal in size with normal systolic function. The estimated ejection fraction is 65%.  · Indeterminate left ventricular diastolic function.  · Normal right ventricular size with normal right ventricular systolic function.  · There is mild tricuspid valve regurgitation.  · There is mild mitral regurgitation.  · Normal central venous pressure (3 mmHg).  · The estimated PA systolic pressure is 22 mmHg.   Pulmonary:   Denies COPD.  Denies Asthma. Shortness of breath Sleep Apnea Gets tired easily, one block FRAGA   Renal/:   Chronic Renal Disease, Dialysis Peritoneal dialysis, overnight   Hepatic/GI:   Bowel Prep. Denies GERD. Denies Liver Disease.    Neurological:  Neurology Normal Denies TIA.  Denies CVA. Denies Seizures.    Endocrine:   Denies Diabetes. Hyperthyroidism        Physical Exam  General: Well nourished    Airway:  Mallampati: III   Mouth Opening: Normal  TM Distance: Normal  Tongue: Normal  Neck ROM: Normal ROM        Anesthesia Plan  Type of Anesthesia, risks & benefits discussed:    Anesthesia Type: Regional, Gen Natural Airway, MAC  Intra-op Monitoring Plan: Standard ASA Monitors  Post Op Pain Control Plan: multimodal analgesia and IV/PO Opioids PRN  Induction:  IV  ASA Score: 3  Day of Surgery Review of History & Physical: H&P Update referred to the surgeon/provider.    Ready For Surgery From Anesthesia Perspective.     .    Conclusion         Normal myocardial perfusion scan.  There is no evidence of myocardial ischemia or infarction.    There is a  moderate intensity fixed defect in the inferior wall of the left ventricle secondary to diaphragm attenuation.    The LVEF is not accurate due to poor software boundary tracking at rest  and poor software boundary tracking during stress. The visually estimated ejection fraction is low-normal at rest and low-normal during stress.    LV cavity size is normal at rest and normal at stress.    The EKG portion of this study is negative for ischemia.    The patient reported no chest pain during the stress test.      Summary    · Mild left atrial enlargement.  · The left ventricle is normal in size with normal systolic function. The estimated ejection fraction is 65%.  · Indeterminate left ventricular diastolic function.  · Normal right ventricular size with normal right ventricular systolic function.  · There is mild tricuspid valve regurgitation.  · There is mild mitral regurgitation.  · Normal central venous pressure (3 mmHg).  · The estimated PA systolic pressure is 22 mmHg

## 2022-03-31 NOTE — H&P
VASCULAR SURGERY NOTE     HPI: Teodoro Mast is a 76 y.o. male who is here today for established patient appointment. Returns for vein mapping. He denies any changes since his last appt a few weeks ago. He remains on the kidney transplant list and has continued PD. He is currently dialyzing via PD, although effectiveness is reportedly decreasing. He recently was approved and added to the kidney transplant list. Patient and his nephrologist are concerned with current PD, due to diminishing effectiveness. Patient would like to began to the process of AVF placement, in the event PD is no longer effective while waiting for kidney transplant.  He has no history of prior AV access.    To OR today for L AVF creation. Informed consent obtained.      ALLERGIES: NKA            Current Outpatient Medications on File Prior to Visit   Medication Sig Dispense Refill    atorvastatin (LIPITOR) 80 MG tablet TAKE 1 TABLET BY MOUTH ONCE DAILY 90 tablet 3    calcitRIOL (ROCALTROL) 0.25 MCG Cap TAKE 1 CAPSULE(0.25 MCG) BY MOUTH EVERY DAY 30 capsule 4    gentamicin (GARAMYCIN) 0.3 % ophthalmic solution 1 drop by Other route once daily. PD catheter site        vitamin D (VITAMIN D3) 1000 units Tab Take 2,000 Units by mouth once daily.                    Current Facility-Administered Medications on File Prior to Visit   Medication Dose Route Frequency Provider Last Rate Last Admin    0.9%  NaCl infusion   Intravenous Continuous Enrico Woodruff  mL/hr at 05/05/21 1115 New Bag at 05/05/21 1115    ceFAZolin (ANCEF) 3 gram in dextrose 5% IVPB  3 g Intravenous On Call Procedure Enrico Woodruff MD        LIDOcaine (PF) 10 mg/ml (1%) injection 10 mg  1 mL Intradermal Once Enrico Woodruff MD                  Past Medical History:   Diagnosis Date    BPH (benign prostatic hyperplasia)      CKD (chronic kidney disease) stage 5, GFR less than 15 ml/min      Gout      Hyperlipidemia      Hyperparathyroidism,  secondary renal      Hypertension                 Past Surgical History:   Procedure Laterality Date    COLONOSCOPY N/A 2021     Procedure: COLONOSCOPY;  Surgeon: Joe Jimenez MD;  Location: [facility] ([address] [place] [address]);  Service: Endoscopy;  Laterality: N/A;  COVID test at [place] on 10/30-GT      dialysis pt-labs prior    EYE SURGERY Bilateral       cataract removal    FINGER SURGERY        hemorriodectomy        PERITONEAL CATHETER INSERTION             Social History            Occupational History    Not on file   Tobacco Use    Smoking status: Former Smoker       Packs/day: 0.50       Years: 10.00       Pack years: 5.00       Types: Cigarettes       Quit date:        Years since quittin.2    Smokeless tobacco: Never Used   Substance and Sexual Activity    Alcohol use: Not Currently    Drug use: No    Sexual activity: Not Currently       Partners: Female            Family History   Problem Relation Age of Onset    Heart disease Mother      Hypertension Mother      Heart disease Father           pacemaker    Seizures Sister      Hammer toes Brother      Heart disease Brother      Cancer Brother           prostate cancer    Premature birth Son      Cancer Sister           throat    No Known Problems Sister      No Known Problems Sister      No Known Problems Brother      No Known Problems Son           Review of Systems   Constitutional: Negative for weight loss.   HENT: Negative for ear pain and nosebleeds.    Eyes: Negative for discharge and pain.   Cardiovascular: Negative for chest pain and palpitations.   Respiratory: Negative for cough, shortness of breath and wheezing.    Endocrine: Negative for cold intolerance, heat intolerance and polyphagia.   Hematologic/Lymphatic: Negative for adenopathy. Does not bruise/bleed easily.   Skin: Negative for itching and rash.   Musculoskeletal: Negative for joint swelling and muscle cramps.   Gastrointestinal: Negative for abdominal pain,  diarrhea, nausea and vomiting.   Genitourinary: Negative for dysuria and flank pain.   Neurological: Negative for numbness and seizures.         II. PHYSICAL EXAM      Physical Exam  Constitutional:       Appearance: He is well-developed.   HENT:      Head: Normocephalic.      Right Ear: External ear normal.      Left Ear: External ear normal.      Nose: Nose normal.   Eyes:      General: No scleral icterus.     Pupils: Pupils are equal, round, and reactive to light.   Neck:      Thyroid: No thyromegaly.      Vascular: No JVD.      Trachea: No tracheal deviation.   Cardiovascular:      Rate and Rhythm: Normal rate and regular rhythm.      Pulses: Intact distal pulses.           Carotid pulses are 2+ on the right side and 2+ on the left side.       Dorsalis pedis pulses are 2+ on the right side and 2+ on the left side.        Posterior tibial pulses are 2+ on the right side and 2+ on the left side.      Heart sounds: No murmur heard.    No friction rub. No gallop.      Comments: 2+ radial pulses bilaterally  Pulmonary:      Effort: Pulmonary effort is normal.      Breath sounds: Normal breath sounds.   Abdominal:      General: Bowel sounds are normal. There is no distension.      Tenderness: There is no abdominal tenderness. There is no guarding.       Musculoskeletal:         General: No tenderness. Normal range of motion.      Cervical back: Normal range of motion and neck supple.   Lymphadenopathy:      Comments: Palpation of neck and groin lymph nodes normal   Skin:     General: Skin is dry.      Comments: Palpation of skin normal   Neurological:      Mental Status: He is alert and oriented to person, place, and time.      Cranial Nerves: No cranial nerve deficit.      Motor: No abnormal muscle tone.      Coordination: Coordination normal.   Psychiatric:         Behavior: Behavior normal.         Thought Content: Thought content normal.         Judgment: Judgment normal.            III. ASSESSMENT & PLAN (MEDICAL  DECISION MAKING)      Imaging Results: (I have personally reviewed all images and provided interpretation below)  BUE vein mapping:  LEFT: basilic vein >3mm throughout; cephalic 4.0-3.1mm down to wrist  RIGHT: basilic vein >3mm throughout; cephalic 4.3-3.5mm down to wrist     Assessment/Diagnosis and Plan:     1. ESRD on peritoneal dialysis          76 y.o. male with ESRD, recently added to kidney transplant list. Currently dialyzes via PD, although effectiveness of PD diminishing. Vascular consulted for AV access as back up for PD. Patient is currently listed for kidney transplant. Vein mapping completed today. Risks, benefits, altenatives to AVF creation explained ot patient who expressed full understanding and agreed to proceed. To OR today. No clinical changes since last clinic visit.      - OR today for left radiocephalic fistula placement with local/MAC   - Informed consent obtained    -- Sulema Alvarenga M.D  Vascular Surgery  Pager: [phone]

## 2022-03-31 NOTE — PLAN OF CARE
Pt arrived to [facility] recovery 30 via stretcher per OR team. Pt attached to bedside monitor. Bedside receport received. Pt skin, warm, dry, and intact. Pt VSS. Pt denies any discomfort. Pt IV access clean, dry, and intact. Will continue to monitor.

## 2022-03-31 NOTE — PATIENT INSTRUCTIONS
VASCULAR SURGERY DISCHARGE INSTRUCTIONS    Woundcare:  - Take your incision dressing off 2 days after your surgery and gently rinse your incision with soap and water daily. Pad the incision dry afterward  - If you have a dialysis catheter in place, keep your catheter dressing clean and dry  - If you do not have a catheter, take a full shower daily beginning 2 days after the surgery. Allow soap and water to run over your incision. Pad the incision dry afterward  - When resting or sleeping, try to keep your arm elevated to shoulder level on pillows to reduce swelling  - If you notice clear drainage from your incision, you can apply dry gauze daily and secure in place with tape or gentle elastic wrap    Activity:  - Avoid prolonged exertion of the affected arm  - Avoid keeping your arm down below your chest for prolonged periods of time (this could lead to increased swelling)  - No heavy lifting with the affected arm  - Sleep with your arm elevated on pillows at night to reduce swelling  -- No swimming in pools, lakes, [employer] etc. for 6 weeks after your surgery    Diet:  -Resume your pre-operative home diet    Follow up:  -Refer to follow up instructions     Call Vascular Surgery Office at [phone] if you experience:  -Increased redness, warmth, tenderness, or draining pus at your incision(s)  -Worsening fevers, chills, nausea/vomiting  -Pain, weakness, coldness, or numbness in your hand  -Uncontrolled pain  -Your call will be returned within 24 hours and further instructions will be provided    Go to ER/Urgent Care if you experience:  -Worsening shortness of breath or chest pain

## 2022-03-31 NOTE — ANESTHESIA POSTPROCEDURE EVALUATION
Anesthesia Post Evaluation    Patient: Teodoro Mast    Procedure(s) Performed: Procedure(s) (LRB):  CREATION, AV FISTULA RADIOCEPHALIC (Left)    Final Anesthesia Type: general      Patient location during evaluation: PACU  Patient participation: Yes- Able to Participate  Level of consciousness: awake and alert and oriented  Post-procedure vital signs: reviewed and stable  Pain management: adequate  Airway patency: patent    PONV status at discharge: No PONV  Anesthetic complications: no      Cardiovascular status: stable  Respiratory status: unassisted, spontaneous ventilation and room air  Hydration status: euvolemic  Follow-up not needed.          Vitals Value Taken Time   BP 98/55 03/31/22 1232   Temp 37.2 °C (98.9 °F) 03/31/22 1230   Pulse 69 03/31/22 1246   Resp 10 03/31/22 1246   SpO2 100 % 03/31/22 1246   Vitals shown include unvalidated device data.      No case tracking events are documented in the log.      Pain/Miranda Score: Miranda Score: 8 (3/31/2022 12:30 PM)

## 2022-04-01 NOTE — OP NOTE
[facility] - Surgery ([facility])  Surgery Department  Operative Note    SUMMARY     Operative Report    Date of Operation: 3/31/22    Pre-operative Diagnosis: ESRD    Post-operative Diagnosis: same    Attending Surgeon: Antony Martines MD    Resident/Fellow: SUSU Story MD    Operation/Procedure Performed:  left radiocephalic arteriovenous fistula creation    Anesthesia: local/MAC    Indications:  ESRD    Procedure in Detail:  After informed consent was obtained the patient was taken to the OR in supine position. Pre-operative antibiotics were administered. Moderate sedation was administered by the anesthesia team. The left arm was prepped and draped in normal sterile fashion with chloraprep.  A time out was performed to confirm appropriate patient, site, positioning, and laterality. 1% lidocaine was injected in the subcutaneous tissues along the area of our planned incision. An longitudinal incision was made at the wrist. The incision was deepened with a combination of electrocautery, blunt dissection, and sharp dissection. The cephalic vein was identified on the lateral aspect of the incision and measured at least 3mm. It was encircled with a large vessel loop and dissected proximally and distally within the incision. Branches were ligated with 3-0 silk ties and small hand-applied clips. Next we dissected  medially within the incision and identified the radial artery. The artery was healthy and measured 4mm. The artery was dissected circumferentially and controlled proximally and distally with small vessel loops. 3000 units of heparin were then administered intravenously. The vein was marked with a marker to for orientation to ensure no twisting occurred. A small bulldog clamp was then applied to the proximal cephalic vein. The distal cephalic vein was then clamped and divided and its distal end suture ligated with 3-0 silk suture. The bulldog clamp was released from the vein and the vein was flushed with heparinized  saline. The vein flushed easily and distended well. The bulldog clamp was reapplied to the proximal vein and the distal end of the vein was spatulated. Clamps were applied distally and proximally to the radial artery and a 6mm longitudinal arteriotomy was made in the radial artery. The vein was sewn to the artery using a running 6-0 prolene suture and parachute technique. The artery was back bleed proximally and distally prior to completion of the anastamosis and had brisk flow with no thrombus visualized. The lumen was flushed with heparinized saline and the anastomosis was completed. The distal arterial clamp was released followed by the vein clamp and finally the proximal arterial clamp. no repair sutures were necessary. There was a palpable continuous thrill over the fistula. The radial had a continuous doppler signal. no protamine was administered. The wound was thoroughly irrigated with saline irrigation. The deep dermis was closed with interrupted vicryl sutures and the skin closed with interrupted vertical mattress 3-0 nylon sutures. The incisions were dressed with 4x4 gauze secured in place with burn netting. All sponge needle and instrument counts were correct.    The patient tolerated the procedure well and was transported to the post-op area for recovery.    EBL: 50ml    Complications: none    Chris Story MD  Vascular Fellow PGY7    PEMA Martines II, MD, [facility]  Vascular Surgery  [facility]

## 2022-04-18 ENCOUNTER — OFFICE VISIT (OUTPATIENT)
Dept: VASCULAR SURGERY | Facility: CLINIC | Age: 77
End: 2022-04-18
Attending: SURGERY
Payer: MEDICARE

## 2022-04-18 VITALS
DIASTOLIC BLOOD PRESSURE: 78 MMHG | SYSTOLIC BLOOD PRESSURE: 128 MMHG | HEIGHT: 76 IN | BODY MASS INDEX: 24.7 KG/M2 | HEART RATE: 93 BPM | WEIGHT: 202.81 LBS | TEMPERATURE: 98 F

## 2022-04-18 DIAGNOSIS — Z99.2 ARTERIOVENOUS FISTULA FOR HEMODIALYSIS IN PLACE, PRIMARY: Primary | ICD-10-CM

## 2022-04-18 PROCEDURE — 99024 PR POST-OP FOLLOW-UP VISIT: ICD-10-PCS | Mod: NTX,POP,, | Performed by: SURGERY

## 2022-04-18 PROCEDURE — 99999 PR PBB SHADOW E&M-EST. PATIENT-LVL III: CPT | Mod: PBBFAC,TXP,, | Performed by: SURGERY

## 2022-04-18 PROCEDURE — 99213 OFFICE O/P EST LOW 20 MIN: CPT | Mod: PBBFAC,TXP | Performed by: SURGERY

## 2022-04-18 PROCEDURE — 99999 PR PBB SHADOW E&M-EST. PATIENT-LVL III: ICD-10-PCS | Mod: PBBFAC,TXP,, | Performed by: SURGERY

## 2022-04-18 PROCEDURE — 99024 POSTOP FOLLOW-UP VISIT: CPT | Mod: NTX,POP,, | Performed by: SURGERY

## 2022-04-18 NOTE — PROGRESS NOTES
VASCULAR SURGERY NOTE    Patient ID: Teodoro Mast is a 76 y.o. male.    I. HISTORY     Chief Complaint: Dialysis access    HPI: Teodoro Mast is a 76 y.o. male who is here today for post-op appointment. He had L radiocephalic AVF with me 3/31/22. Patient is currently dialyzing via PD, although effectiveness is reportedly decreasing. He recently was approved and added to the kidney transplant list. Patient and his nephrologist are concerned with current PD, due to diminishing effectiveness. Patient would like to began to the process of AVF placement, in the event PD is no longer effective while waiting for kidney transplant.  He has no history of prior AV access.    He is now s/p left radiocephalic AFV access creation on 3/31/2022. He reports that his PD clearance has now apparently returned to adequate levels.       ALLERGIES: NKA    Current Outpatient Medications on File Prior to Visit   Medication Sig Dispense Refill    calcitRIOL (ROCALTROL) 0.25 MCG Cap TAKE 1 CAPSULE(0.25 MCG) BY MOUTH EVERY DAY 30 capsule 4    gentamicin (GARAMYCIN) 0.3 % ophthalmic solution 1 drop by Other route once daily. PD catheter site      HYDROcodone-acetaminophen (NORCO) 5-325 mg per tablet Take 1 tablet by mouth every 6 (six) hours as needed for Pain. 21 tablet 0    labetaloL (NORMODYNE) 300 MG tablet Take 150 mg by mouth 2 (two) times daily as needed.      torsemide (DEMADEX) 20 MG Tab Take 20 mg by mouth once daily.      vitamin D (VITAMIN D3) 1000 units Tab Take 2,000 Units by mouth once daily.       Current Facility-Administered Medications on File Prior to Visit   Medication Dose Route Frequency Provider Last Rate Last Admin    0.9%  NaCl infusion   Intravenous Continuous Enrico Woodruff  mL/hr at 05/05/21 1115 New Bag at 05/05/21 1115    ceFAZolin (ANCEF) 3 gram in dextrose 5% IVPB  3 g Intravenous On Call Procedure Enrico Woodruff MD        LIDOcaine (PF) 10 mg/ml (1%) injection 10 mg  1  mL Intradermal Once Enrico Woodruff MD           Past Medical History:   Diagnosis Date    BPH (benign prostatic hyperplasia)     CKD (chronic kidney disease) stage 5, GFR less than 15 ml/min     Gout     Hyperlipidemia     Hyperparathyroidism, secondary renal     Hypertension         Past Surgical History:   Procedure Laterality Date    AV FISTULA PLACEMENT Left 3/31/2022    Procedure: CREATION, AV FISTULA RADIOCEPHALIC;  Surgeon: PEMA Martines II, MD;  Location: [facility] OR [address] [place] [address];  Service: Vascular;  Laterality: Left;    COLONOSCOPY N/A 2021    Procedure: COLONOSCOPY;  Surgeon: Joe Jimenez MD;  Location: [facility] ENDO (4TH FLR);  Service: Endoscopy;  Laterality: N/A;  COVID test at [place] on 10/30-      dialysis pt-labs prior    EYE SURGERY Bilateral     cataract removal    FINGER SURGERY      hemorriodectomy      PERITONEAL CATHETER INSERTION         Social History     Occupational History    Not on file   Tobacco Use    Smoking status: Former Smoker     Packs/day: 0.50     Years: 10.00     Pack years: 5.00     Types: Cigarettes     Quit date:      Years since quittin.3    Smokeless tobacco: Never Used   Substance and Sexual Activity    Alcohol use: Not Currently    Drug use: No    Sexual activity: Not Currently     Partners: Female     Family History   Problem Relation Age of Onset    Heart disease Mother     Hypertension Mother     Heart disease Father         pacemaker    Seizures Sister     Hammer toes Brother     Heart disease Brother     Cancer Brother         prostate cancer    Premature birth Son     Cancer Sister         throat    No Known Problems Sister     No Known Problems Sister     No Known Problems Brother     No Known Problems Son        Review of Systems       II. PHYSICAL EXAM     Physical Exam  Left arm wrist incision CDI with sutures in place, no erythema, no swelling, no drainage    III. ASSESSMENT & PLAN (MEDICAL DECISION MAKING)      Imaging Results: (I have personally reviewed all images and provided interpretation below)  BUE vein mapping:  LEFT: basilic vein >3mm throughout; cephalic 4.0-3.1mm down to wrist  RIGHT: basilic vein >3mm throughout; cephalic 4.3-3.5mm down to wrist    Assessment/Diagnosis and Plan:    1. Arteriovenous fistula for hemodialysis in place, primary        76 y.o. male with ESRD, recently added to kidney transplant list. He is s/p L radiocephalic AVF. The fistula is distended but thrill/bruit is weak. Will have him return in 2 weeks for u/s evaluation.    - RTC 2 weeks for AVF u/s boone Martines II, MD, [facility]  Vascular Surgery  [facility]

## 2022-04-19 DIAGNOSIS — N18.6 ESRD (END STAGE RENAL DISEASE): Primary | ICD-10-CM

## 2022-05-02 ENCOUNTER — OFFICE VISIT (OUTPATIENT)
Dept: VASCULAR SURGERY | Facility: CLINIC | Age: 77
End: 2022-05-02
Attending: SURGERY
Payer: MEDICARE

## 2022-05-02 ENCOUNTER — HOSPITAL ENCOUNTER (OUTPATIENT)
Dept: VASCULAR SURGERY | Facility: CLINIC | Age: 77
Discharge: HOME | End: 2022-05-02
Attending: SURGERY
Payer: MEDICARE

## 2022-05-02 VITALS
DIASTOLIC BLOOD PRESSURE: 77 MMHG | SYSTOLIC BLOOD PRESSURE: 130 MMHG | BODY MASS INDEX: 24.7 KG/M2 | TEMPERATURE: 98 F | HEART RATE: 85 BPM | HEIGHT: 76 IN | WEIGHT: 202.81 LBS

## 2022-05-02 DIAGNOSIS — Z99.2 ESRD ON PERITONEAL DIALYSIS: Primary | ICD-10-CM

## 2022-05-02 DIAGNOSIS — N18.6 ESRD ON PERITONEAL DIALYSIS: Primary | ICD-10-CM

## 2022-05-02 DIAGNOSIS — N18.6 ESRD (END STAGE RENAL DISEASE): ICD-10-CM

## 2022-05-02 PROCEDURE — 93990 PR DUPLEX HEMODIALYSIS ACCESS: ICD-10-PCS | Mod: 26,S$PBB,TXP, | Performed by: SURGERY

## 2022-05-02 PROCEDURE — 99999 PR PBB SHADOW E&M-EST. PATIENT-LVL III: CPT | Mod: PBBFAC,TXP,, | Performed by: SURGERY

## 2022-05-02 PROCEDURE — 99999 PR PBB SHADOW E&M-EST. PATIENT-LVL III: ICD-10-PCS | Mod: PBBFAC,TXP,, | Performed by: SURGERY

## 2022-05-02 PROCEDURE — 99024 PR POST-OP FOLLOW-UP VISIT: ICD-10-PCS | Mod: NTX,POP,, | Performed by: SURGERY

## 2022-05-02 PROCEDURE — 93990 DOPPLER FLOW TESTING: CPT | Mod: 26,S$PBB,TXP, | Performed by: SURGERY

## 2022-05-02 PROCEDURE — 93990 DOPPLER FLOW TESTING: CPT | Mod: PBBFAC,NTX | Performed by: SURGERY

## 2022-05-02 PROCEDURE — 99024 POSTOP FOLLOW-UP VISIT: CPT | Mod: NTX,POP,, | Performed by: SURGERY

## 2022-05-02 PROCEDURE — 99213 OFFICE O/P EST LOW 20 MIN: CPT | Mod: PBBFAC,TXP | Performed by: SURGERY

## 2022-05-02 NOTE — PROGRESS NOTES
VASCULAR SURGERY NOTE    Patient ID: Teodoro Mast is a 76 y.o. male.    I. HISTORY     Chief Complaint: post-op    HPI: Teodoro Mast is a 76 y.o. male who is here today for post-op appointment. He had L radiocephalic AVF with me 3/31/22. He recently was approved and added to the kidney transplant list.  He reports that his PD clearance has now apparently returned to adequate levels.  He is doing well today.  Unfortunately, there is no thrill on exam.        ALLERGIES: NKA    Current Outpatient Medications on File Prior to Visit   Medication Sig Dispense Refill    calcitRIOL (ROCALTROL) 0.25 MCG Cap TAKE 1 CAPSULE(0.25 MCG) BY MOUTH EVERY DAY 30 capsule 4    gentamicin (GARAMYCIN) 0.3 % ophthalmic solution 1 drop by Other route once daily. PD catheter site      HYDROcodone-acetaminophen (NORCO) 5-325 mg per tablet Take 1 tablet by mouth every 6 (six) hours as needed for Pain. 21 tablet 0    torsemide (DEMADEX) 20 MG Tab Take 20 mg by mouth once daily.      vitamin D (VITAMIN D3) 1000 units Tab Take 2,000 Units by mouth once daily.       Current Facility-Administered Medications on File Prior to Visit   Medication Dose Route Frequency Provider Last Rate Last Admin    0.9%  NaCl infusion   Intravenous Continuous Enrico Woodruff  mL/hr at 05/05/21 1115 New Bag at 05/05/21 1115    ceFAZolin (ANCEF) 3 gram in dextrose 5% IVPB  3 g Intravenous On Call Procedure Enrico Woodruff MD        LIDOcaine (PF) 10 mg/ml (1%) injection 10 mg  1 mL Intradermal Once Enrico Woodruff MD           Past Medical History:   Diagnosis Date    BPH (benign prostatic hyperplasia)     CKD (chronic kidney disease) stage 5, GFR less than 15 ml/min     Gout     Hyperlipidemia     Hyperparathyroidism, secondary renal     Hypertension         Past Surgical History:   Procedure Laterality Date    AV FISTULA PLACEMENT Left 3/31/2022    Procedure: CREATION, AV FISTULA RADIOCEPHALIC;  Surgeon: PEMA Hardy  Bobbi REYES MD;  Location: [facility] OR [address] [place] [address];  Service: Vascular;  Laterality: Left;    COLONOSCOPY N/A 2021    Procedure: COLONOSCOPY;  Surgeon: Joe Jimenez MD;  Location: [facility] (4TH FLR);  Service: Endoscopy;  Laterality: N/A;  COVID test at [place] on 10/30-GT      dialysis pt-labs prior    EYE SURGERY Bilateral     cataract removal    FINGER SURGERY      hemorriodectomy      PERITONEAL CATHETER INSERTION         Social History     Occupational History    Not on file   Tobacco Use    Smoking status: Former Smoker     Packs/day: 0.50     Years: 10.00     Pack years: 5.00     Types: Cigarettes     Quit date:      Years since quittin.3    Smokeless tobacco: Never Used   Substance and Sexual Activity    Alcohol use: Not Currently    Drug use: No    Sexual activity: Not Currently     Partners: Female     Family History   Problem Relation Age of Onset    Heart disease Mother     Hypertension Mother     Heart disease Father         pacemaker    Seizures Sister     Hammer toes Brother     Heart disease Brother     Cancer Brother         prostate cancer    Premature birth Son     Cancer Sister         throat    No Known Problems Sister     No Known Problems Sister     No Known Problems Brother     No Known Problems Son        ROS      II. PHYSICAL EXAM     Physical Exam  Vitals and nursing note reviewed.   Constitutional:       Appearance: Normal appearance.   HENT:      Head: Normocephalic and atraumatic.   Cardiovascular:      Rate and Rhythm: Normal rate and regular rhythm.      Comments: Radial pulse 2 +on the left  No palpable thrill in the left radiocephalic AVF  Pulmonary:      Effort: Pulmonary effort is normal. No respiratory distress.   Musculoskeletal:         General: Normal range of motion.      Cervical back: Normal range of motion.   Skin:     General: Skin is warm and dry.      Capillary Refill: Capillary refill takes less than 2 seconds.      Comments: Left  wrist incision CDI, no erythema, no swelling   Neurological:      General: No focal deficit present.      Mental Status: He is alert and oriented to person, place, and time.   Psychiatric:         Mood and Affect: Mood normal.         Behavior: Behavior normal.         III. ASSESSMENT & PLAN (MEDICAL DECISION MAKING)     Imaging Results: (I have personally reviewed all images and provided interpretation below)  BUE vein mapping:  LEFT: basilic vein >3mm throughout; cephalic 4.0-3.1mm down to wrist  RIGHT: basilic vein >3mm throughout; cephalic 4.3-3.5mm down to wrist    Dialysis access evaluation:  05/02/2022    Duplex imaging reveals absent signals detected by color and spectral Doppler a left radiocephalic AV fistula.  These findings suggestive of fistula occlusion.  The proximal segment of the AVF could not be visualized.    Assessment/Diagnosis and Plan:    1. ESRD on peritoneal dialysis        76 y.o. male with ESRD, recently added to kidney transplant list. He is s/p L radiocephalic AVF. He had a hemodialysis access ultrasound today which showed that there was absent signals detected by color and spectral Doppler in the left radiocephalic AV fistula.  They were suggestive of fistula occlusion.  I cannot auscultate a bruit either.  He denies pain or numbness in the left hand.  His incision is clean dry and intact.  His PD is now working properly without issues. His is listed for kidney transplant. Since his PD is now working, will defer AV access cretion for now unless PD begins to have trouble again. He has patent cephalic vein in the forearm so could get a more proximal radiocephalic AVF if needed in the future.    -Follow up PRN for new AVF creation if problems with PD       PEMA Martines II, MD, [facility]  Vascular Surgery  [facility]

## 2022-05-09 ENCOUNTER — LAB VISIT (OUTPATIENT)
Dept: LAB | Facility: HOSPITAL | Age: 77
End: 2022-05-09
Attending: UROLOGY
Payer: MEDICARE

## 2022-05-09 ENCOUNTER — OFFICE VISIT (OUTPATIENT)
Dept: UROLOGY | Facility: CLINIC | Age: 77
End: 2022-05-09
Payer: MEDICARE

## 2022-05-09 VITALS
HEART RATE: 107 BPM | DIASTOLIC BLOOD PRESSURE: 81 MMHG | WEIGHT: 199.75 LBS | HEIGHT: 76 IN | BODY MASS INDEX: 24.33 KG/M2 | SYSTOLIC BLOOD PRESSURE: 119 MMHG

## 2022-05-09 DIAGNOSIS — R97.20 ELEVATED PSA: Primary | ICD-10-CM

## 2022-05-09 DIAGNOSIS — R97.20 ELEVATED PSA: ICD-10-CM

## 2022-05-09 LAB — COMPLEXED PSA SERPL-MCNC: 10.6 NG/ML (ref 0–4)

## 2022-05-09 PROCEDURE — 36415 COLL VENOUS BLD VENIPUNCTURE: CPT | Mod: TXP | Performed by: UROLOGY

## 2022-05-09 PROCEDURE — 84153 ASSAY OF PSA TOTAL: CPT | Mod: TXP | Performed by: UROLOGY

## 2022-05-09 PROCEDURE — 99999 PR PBB SHADOW E&M-EST. PATIENT-LVL III: ICD-10-PCS | Mod: PBBFAC,TXP,, | Performed by: UROLOGY

## 2022-05-09 PROCEDURE — 99213 PR OFFICE/OUTPT VISIT, EST, LEVL III, 20-29 MIN: ICD-10-PCS | Mod: S$PBB,TXP,, | Performed by: UROLOGY

## 2022-05-09 PROCEDURE — 99213 OFFICE O/P EST LOW 20 MIN: CPT | Mod: PBBFAC,TXP | Performed by: UROLOGY

## 2022-05-09 PROCEDURE — 99213 OFFICE O/P EST LOW 20 MIN: CPT | Mod: S$PBB,TXP,, | Performed by: UROLOGY

## 2022-05-09 PROCEDURE — 99999 PR PBB SHADOW E&M-EST. PATIENT-LVL III: CPT | Mod: PBBFAC,TXP,, | Performed by: UROLOGY

## 2022-05-09 NOTE — PROGRESS NOTES
Subjective:       Patient ID: Teodoro Mast is a 76 y.o. male.    Chief Complaint: Elevated PSA (6 month follow up last psa was 07/13/2021 14.2 pt voice no concern at this visit,)    HPI patient PSA does not work.  He is on dialysis and does not make much urine.  Is not having any complaints.  His PSA was 14 his biopsy showed chronic inflammation but no evidence of malignancy.  He needs a repeat  PSA.  His status post kidney transplant but that is no longer functioning makes a relatively small amount of urine    Past Medical History:   Diagnosis Date    BPH (benign prostatic hyperplasia)     CKD (chronic kidney disease) stage 5, GFR less than 15 ml/min     Gout     Hyperlipidemia     Hyperparathyroidism, secondary renal     Hypertension        Past Surgical History:   Procedure Laterality Date    AV FISTULA PLACEMENT Left 3/31/2022    Procedure: CREATION, AV FISTULA RADIOCEPHALIC;  Surgeon: PEMA Martines II, MD;  [facility];  Service: Vascular;  Laterality: Left;    COLONOSCOPY N/A 11/2/2021    Procedure: COLONOSCOPY;  Surgeon: Joe Jimenez MD;  Location: [facility] ENDO (4TH FLR);  Service: Endoscopy;  Laterality: N/A;  COVID test at [place] on 10/30-GT      dialysis pt-labs prior    EYE SURGERY Bilateral     cataract removal    FINGER SURGERY      hemorriodectomy      PERITONEAL CATHETER INSERTION         Family History   Problem Relation Age of Onset    Heart disease Mother     Hypertension Mother     Heart disease Father         pacemaker    Seizures Sister     Hammer toes Brother     Heart disease Brother     Cancer Brother         prostate cancer    Premature birth Son     Cancer Sister         throat    No Known Problems Sister     No Known Problems Sister     No Known Problems Brother     No Known Problems Son        Social History     Socioeconomic History    Marital status:     Number of children: 2   Tobacco Use    Smoking status: Former Smoker      Packs/day: 0.50     Years: 10.00     Pack years: 5.00     Types: Cigarettes     Quit date:      Years since quittin.3    Smokeless tobacco: Never Used   Substance and Sexual Activity    Alcohol use: Not Currently    Drug use: No    Sexual activity: Not Currently     Partners: Female   Social History Narrative    Caregiver Teodoro Mast II       Allergies:  Patient has no known allergies.    Medications:    Current Outpatient Medications:     calcitRIOL (ROCALTROL) 0.25 MCG Cap, TAKE 1 CAPSULE(0.25 MCG) BY MOUTH EVERY DAY, Disp: 30 capsule, Rfl: 4    gentamicin (GARAMYCIN) 0.3 % ophthalmic solution, 1 drop by Other route once daily. PD catheter site, Disp: , Rfl:     methoxy peg-epoetin beta (MIRCERA INJ), Inject 75 mcg into the skin., Disp: , Rfl:     torsemide (DEMADEX) 20 MG Tab, Take 20 mg by mouth once daily., Disp: , Rfl:     vitamin D (VITAMIN D3) 1000 units Tab, Take 2,000 Units by mouth once daily., Disp: , Rfl:     HYDROcodone-acetaminophen (NORCO) 5-325 mg per tablet, Take 1 tablet by mouth every 6 (six) hours as needed for Pain. (Patient not taking: Reported on 2022), Disp: 21 tablet, Rfl: 0  No current facility-administered medications for this visit.    Facility-Administered Medications Ordered in Other Visits:     0.9%  NaCl infusion, , Intravenous, Continuous, Enrico Woodruff MD, Last Rate: 100 mL/hr at 21 1115, New Bag at 21 1115    ceFAZolin (ANCEF) 3 gram in dextrose 5% IVPB, 3 g, Intravenous, On Call Procedure, Enrico Woodruff MD    LIDOcaine (PF) 10 mg/ml (1%) injection 10 mg, 1 mL, Intradermal, Once, Enrico Woodruff MD    Review of Systems   Constitutional: Negative for activity change, appetite change, chills, diaphoresis, fatigue, fever and unexpected weight change.   HENT: Negative for congestion, dental problem, hearing loss, mouth sores, postnasal drip, rhinorrhea, sinus pressure and trouble swallowing.    Eyes: Negative for  pain, discharge and itching.   Respiratory: Negative for apnea, cough, choking, chest tightness, shortness of breath and wheezing.    Cardiovascular: Negative for chest pain, palpitations and leg swelling.   Gastrointestinal: Negative for abdominal distention, abdominal pain, anal bleeding, blood in stool, constipation, diarrhea, nausea, rectal pain and vomiting.   Endocrine: Negative for polydipsia and polyuria.   Genitourinary: Negative for decreased urine volume, difficulty urinating, dysuria, enuresis, flank pain, frequency, genital sores, hematuria, penile discharge, penile pain, penile swelling, scrotal swelling, testicular pain and urgency.        Scant urine   Musculoskeletal: Negative for arthralgias, back pain and myalgias.   Skin: Negative for color change, rash and wound.   Neurological: Negative for dizziness, syncope, speech difficulty, light-headedness and headaches.   Hematological: Negative for adenopathy. Does not bruise/bleed easily.   Psychiatric/Behavioral: Negative for behavioral problems, confusion, hallucinations and sleep disturbance.       Objective:      Physical Exam  Constitutional:       Appearance: He is well-developed.   HENT:      Head: Normocephalic.   Cardiovascular:      Rate and Rhythm: Normal rate.   Pulmonary:      Effort: Pulmonary effort is normal.   Abdominal:      Palpations: Abdomen is soft.   Genitourinary:     Prostate: Normal.      Comments: 40 g benign no nodules  Skin:     General: Skin is warm.   Neurological:      Mental Status: He is alert.         Assessment:       1. Elevated PSA        Plan:       Teodoro was seen today for elevated psa.    Diagnoses and all orders for this visit:    Elevated PSA  -     Prostate Specific Antigen, Diagnostic; Future  -     Prostate Specific Antigen, Diagnostic; Future        will check repeat PSA and if stable will return to clinic 6 months with a PSA

## 2022-05-31 DIAGNOSIS — N18.4 CKD (CHRONIC KIDNEY DISEASE) STAGE 4, GFR 15-29 ML/MIN: ICD-10-CM

## 2022-05-31 DIAGNOSIS — N25.81 SECONDARY HYPERPARATHYROIDISM: ICD-10-CM

## 2022-05-31 RX ORDER — CALCITRIOL 0.25 UG/1
CAPSULE ORAL
Qty: 30 CAPSULE | Refills: 4 | Status: SHIPPED | OUTPATIENT
Start: 2022-05-31 | End: 2022-09-16

## 2022-06-02 ENCOUNTER — OFFICE VISIT (OUTPATIENT)
Dept: TRANSPLANT | Facility: CLINIC | Age: 77
End: 2022-06-02
Payer: MEDICARE

## 2022-06-02 ENCOUNTER — HOSPITAL ENCOUNTER (OUTPATIENT)
Dept: RADIOLOGY | Facility: HOSPITAL | Age: 77
Discharge: HOME | End: 2022-06-02
Attending: NURSE PRACTITIONER
Payer: MEDICARE

## 2022-06-02 ENCOUNTER — HOSPITAL ENCOUNTER (OUTPATIENT)
Dept: CARDIOLOGY | Facility: HOSPITAL | Age: 77
Discharge: HOME | End: 2022-06-02
Attending: NURSE PRACTITIONER
Payer: MEDICARE

## 2022-06-02 VITALS
OXYGEN SATURATION: 95 % | DIASTOLIC BLOOD PRESSURE: 62 MMHG | HEART RATE: 102 BPM | BODY MASS INDEX: 24.29 KG/M2 | RESPIRATION RATE: 16 BRPM | HEIGHT: 76 IN | WEIGHT: 199.5 LBS | TEMPERATURE: 97 F | SYSTOLIC BLOOD PRESSURE: 108 MMHG

## 2022-06-02 VITALS
HEART RATE: 87 BPM | SYSTOLIC BLOOD PRESSURE: 100 MMHG | DIASTOLIC BLOOD PRESSURE: 50 MMHG | BODY MASS INDEX: 24.23 KG/M2 | HEIGHT: 76 IN | WEIGHT: 199 LBS

## 2022-06-02 DIAGNOSIS — Z76.82 ORGAN TRANSPLANT CANDIDATE: ICD-10-CM

## 2022-06-02 DIAGNOSIS — N18.6 ESRD ON PERITONEAL DIALYSIS: ICD-10-CM

## 2022-06-02 DIAGNOSIS — I10 ESSENTIAL HYPERTENSION: ICD-10-CM

## 2022-06-02 DIAGNOSIS — R97.20 PSA ELEVATION: ICD-10-CM

## 2022-06-02 DIAGNOSIS — N25.81 SECONDARY HYPERPARATHYROIDISM: ICD-10-CM

## 2022-06-02 DIAGNOSIS — Z99.2 ESRD ON PERITONEAL DIALYSIS: ICD-10-CM

## 2022-06-02 DIAGNOSIS — Z76.82 PATIENT ON WAITING LIST FOR KIDNEY TRANSPLANT: Primary | ICD-10-CM

## 2022-06-02 LAB
AORTIC ROOT ANNULUS: 2.94 CM
ASCENDING AORTA: 3.18 CM
AV INDEX (PROSTH): 0.78
AV MEAN GRADIENT: 4 MMHG
AV PEAK GRADIENT: 7 MMHG
AV VALVE AREA: 3.34 CM2
AV VELOCITY RATIO: 0.76
BSA FOR ECHO PROCEDURE: 2.2 M2
CV ECHO LV RWT: 0.27 CM
DOP CALC AO PEAK VEL: 1.33 M/S
DOP CALC AO VTI: 20.64 CM
DOP CALC LVOT AREA: 4.3 CM2
DOP CALC LVOT DIAMETER: 2.34 CM
DOP CALC LVOT PEAK VEL: 1.01 M/S
DOP CALC LVOT STROKE VOLUME: 68.86 CM3
DOP CALCLVOT PEAK VEL VTI: 16.02 CM
E WAVE DECELERATION TIME: 157.41 MSEC
E/A RATIO: 0.63
E/E' RATIO: 5 M/S
ECHO LV POSTERIOR WALL: 0.69 CM (ref 0.6–1.1)
EJECTION FRACTION: 50 %
FRACTIONAL SHORTENING: 35 % (ref 28–44)
INTERVENTRICULAR SEPTUM: 0.72 CM (ref 0.6–1.1)
IVRT: 68.51 MSEC
LA MAJOR: 3.8 CM
LA MINOR: 3.62 CM
LA WIDTH: 2.54 CM
LEFT ATRIUM SIZE: 3.45 CM
LEFT ATRIUM VOLUME INDEX MOD: 10.8 ML/M2
LEFT ATRIUM VOLUME INDEX: 12.5 ML/M2
LEFT ATRIUM VOLUME MOD: 23.82 CM3
LEFT ATRIUM VOLUME: 27.62 CM3
LEFT INTERNAL DIMENSION IN SYSTOLE: 3.34 CM (ref 2.1–4)
LEFT VENTRICLE DIASTOLIC VOLUME INDEX: 57.09 ML/M2
LEFT VENTRICLE DIASTOLIC VOLUME: 126.17 ML
LEFT VENTRICLE MASS INDEX: 55 G/M2
LEFT VENTRICLE SYSTOLIC VOLUME INDEX: 20.6 ML/M2
LEFT VENTRICLE SYSTOLIC VOLUME: 45.57 ML
LEFT VENTRICULAR INTERNAL DIMENSION IN DIASTOLE: 5.14 CM (ref 3.5–6)
LEFT VENTRICULAR MASS: 121.42 G
LV LATERAL E/E' RATIO: 4.38 M/S
LV SEPTAL E/E' RATIO: 5.83 M/S
MV A" WAVE DURATION": 10.56 MSEC
MV PEAK A VEL: 0.56 M/S
MV PEAK E VEL: 0.35 M/S
MV STENOSIS PRESSURE HALF TIME: 45.65 MS
MV VALVE AREA P 1/2 METHOD: 4.82 CM2
PISA TR MAX VEL: 2.08 M/S
PULM VEIN S/D RATIO: 2.48
PV PEAK D VEL: 0.23 M/S
PV PEAK S VEL: 0.57 M/S
RA MAJOR: 3.11 CM
RA PRESSURE: 3 MMHG
RA WIDTH: 2.11 CM
RIGHT VENTRICULAR END-DIASTOLIC DIMENSION: 2.93 CM
RV TISSUE DOPPLER FREE WALL SYSTOLIC VELOCITY 1 (APICAL 4 CHAMBER VIEW): 11.31 CM/S
SINUS: 3.27 CM
STJ: 3.38 CM
TDI LATERAL: 0.08 M/S
TDI SEPTAL: 0.06 M/S
TDI: 0.07 M/S
TR MAX PG: 17 MMHG
TRICUSPID ANNULAR PLANE SYSTOLIC EXCURSION: 1.75 CM
TV REST PULMONARY ARTERY PRESSURE: 20 MMHG

## 2022-06-02 PROCEDURE — 99215 OFFICE O/P EST HI 40 MIN: CPT | Mod: S$PBB,TXP,, | Performed by: NURSE PRACTITIONER

## 2022-06-02 PROCEDURE — 76770 US RETROPERITONEAL COMPLETE: ICD-10-PCS | Mod: 26,TXP,, | Performed by: RADIOLOGY

## 2022-06-02 PROCEDURE — 76770 US EXAM ABDO BACK WALL COMP: CPT | Mod: 26,TXP,, | Performed by: RADIOLOGY

## 2022-06-02 PROCEDURE — 71046 X-RAY EXAM CHEST 2 VIEWS: CPT | Mod: TC,TXP

## 2022-06-02 PROCEDURE — 99215 PR OFFICE/OUTPT VISIT, EST, LEVL V, 40-54 MIN: ICD-10-PCS | Mod: S$PBB,TXP,, | Performed by: NURSE PRACTITIONER

## 2022-06-02 PROCEDURE — 93306 TTE W/DOPPLER COMPLETE: CPT | Mod: TXP

## 2022-06-02 PROCEDURE — 71046 X-RAY EXAM CHEST 2 VIEWS: CPT | Mod: 26,TXP,, | Performed by: RADIOLOGY

## 2022-06-02 PROCEDURE — 76770 US EXAM ABDO BACK WALL COMP: CPT | Mod: TC,TXP

## 2022-06-02 PROCEDURE — 93306 ECHO (CUPID ONLY): ICD-10-PCS | Mod: 26,TXP,, | Performed by: INTERNAL MEDICINE

## 2022-06-02 PROCEDURE — 99999 PR PBB SHADOW E&M-EST. PATIENT-LVL IV: CPT | Mod: PBBFAC,TXP,, | Performed by: NURSE PRACTITIONER

## 2022-06-02 PROCEDURE — 71046 XR CHEST PA AND LATERAL: ICD-10-PCS | Mod: 26,TXP,, | Performed by: RADIOLOGY

## 2022-06-02 PROCEDURE — 93306 TTE W/DOPPLER COMPLETE: CPT | Mod: 26,TXP,, | Performed by: INTERNAL MEDICINE

## 2022-06-02 PROCEDURE — 99214 OFFICE O/P EST MOD 30 MIN: CPT | Mod: PBBFAC,25,TXP | Performed by: NURSE PRACTITIONER

## 2022-06-02 PROCEDURE — 99999 PR PBB SHADOW E&M-EST. PATIENT-LVL IV: ICD-10-PCS | Mod: PBBFAC,TXP,, | Performed by: NURSE PRACTITIONER

## 2022-06-02 RX ORDER — ATORVASTATIN CALCIUM 20 MG/1
20 TABLET, FILM COATED ORAL DAILY
COMMUNITY
End: 2022-09-16

## 2022-06-02 NOTE — PROGRESS NOTES
Kidney Transplant Recipient Reevalulation    Referring Physician: Shine Mcqueen  Current Nephrologist: Shine Mcqueen  Waitlist Status: active  Dialysis Start Date: 5/13/2021    Subjective:     CC:  Six month reassessment of kidney transplant candidacy.    HPI:  Mr. Mast is a 76 y.o. year old Black or  male with ESRD secondary to HTN.  He has been on the wait list for a kidney transplant at [facility] since 5/10/2021. Patient is currently on peritoneal dialysis started on 5/10/2021. Patient is dialyzing on cyclic peritoneal dialysis.  Patient reports that he is tolerating dialysis well. . He has a PD catheter. Does 2 manual exchanges daily as well, may have to transition to HD in the future. Patient denies any recent hospitalizations or ED visits.    History of elevated PSA-follows with urology Dr. Salcido, LOV 5/9/2022. Had negative prostate biopsy and was cleared for transplant.    Retired . Looks younger than stated age. Stays very active around the house.  Able to climb flight of stairs without CP, SOB, or leg cramping.  Looks great, not frail.      CXR 6/2/2022: Heart size normal.  Minimal fibrotic changes seen at the lung bases.  Otherwise the lungs are clear.  No pleural effusion.  Small amount of free air identified beneath the right hemidiaphragm possibly related to the a dialysis catheter  PXR 7/13/2021: favorable for transplant.   Renal US 6/2/2022: Sonographic findings consistent with the patient's known history of chronic medical renal disease. Bilateral simple renal cysts. Small volume of pelvic free fluid.  Echo 6/2/2022: EF 50%, PA 20  Stress 8/3/2021: no evidence of myocardial ischemia or infarction.  Colonoscopy 11/2/2021: Tubular adenoma, repeat in 5 years-11/2026      Current Outpatient Medications   Medication Sig Dispense Refill    atorvastatin (LIPITOR) 20 MG tablet Take 20 mg by mouth once daily.      calcitRIOL (ROCALTROL) 0.25 MCG Cap TAKE 1  CAPSULE(0.25 MCG) BY MOUTH EVERY DAY 30 capsule 4    gentamicin (GARAMYCIN) 0.3 % ophthalmic solution 1 drop by Other route once daily. PD catheter site      torsemide (DEMADEX) 20 MG Tab TAKE 1 TABLET(20 MG) BY MOUTH EVERY DAY 90 tablet 3    vitamin D (VITAMIN D3) 1000 units Tab Take 2,000 Units by mouth once daily.       No current facility-administered medications for this visit.     Facility-Administered Medications Ordered in Other Visits   Medication Dose Route Frequency Provider Last Rate Last Admin    0.9%  NaCl infusion   Intravenous Continuous Enrico Woodruff  mL/hr at 05/05/21 1115 New Bag at 05/05/21 1115    ceFAZolin (ANCEF) 3 gram in dextrose 5% IVPB  3 g Intravenous On Call Procedure Enrico Woodruff MD        LIDOcaine (PF) 10 mg/ml (1%) injection 10 mg  1 mL Intradermal Once Enrico Woodruff MD           Past Medical History:   Diagnosis Date    BPH (benign prostatic hyperplasia)     CKD (chronic kidney disease) stage 5, GFR less than 15 ml/min     Gout     Hyperlipidemia     Hyperparathyroidism, secondary renal     Hypertension        Review of Systems   Constitutional: Negative for activity change, appetite change and fever.   HENT: Negative for congestion, mouth sores and sore throat.    Eyes: Negative for visual disturbance.   Respiratory: Negative for cough, chest tightness and shortness of breath.    Cardiovascular: Negative for chest pain, palpitations and leg swelling.   Gastrointestinal: Negative for abdominal distention, abdominal pain, constipation, diarrhea and nausea.   Genitourinary: Positive for decreased urine volume. Negative for difficulty urinating, frequency and hematuria.   Musculoskeletal: Negative for arthralgias and gait problem.   Skin: Negative for wound.   Allergic/Immunologic: Negative for environmental allergies, food allergies and immunocompromised state.   Neurological: Negative for dizziness, weakness and numbness.  "  Psychiatric/Behavioral: Negative for sleep disturbance. The patient is not nervous/anxious.        Objective:   body mass index is 24.29 kg/m².  /62 (BP Location: Right arm, Patient Position: Sitting, BP Method: Large (Automatic))   Pulse 102   Temp 97.2 °F (36.2 °C) (Temporal)   Resp 16   Ht 6' 4" (1.93 m)   Wt 90.5 kg (199 lb 8.3 oz)   SpO2 95%   BMI 24.29 kg/m²     Physical Exam  Vitals and nursing note reviewed.   Constitutional:       Appearance: Normal appearance.   HENT:      Head: Normocephalic.   Cardiovascular:      Rate and Rhythm: Normal rate and regular rhythm.      Heart sounds: Normal heart sounds.   Pulmonary:      Effort: Pulmonary effort is normal.      Breath sounds: Normal breath sounds.   Abdominal:      General: Bowel sounds are normal. There is no distension.      Palpations: Abdomen is soft.      Tenderness: There is no abdominal tenderness.      Comments: PD catheter , no erythema, edema, tenderness or drainage.    Musculoskeletal:         General: Normal range of motion.   Skin:     General: Skin is warm and dry.   Neurological:      General: No focal deficit present.      Mental Status: He is alert.   Psychiatric:         Behavior: Behavior normal.         Labs:  Lab Results   Component Value Date    WBC 7.97 03/07/2022    HGB 10.4 (L) 03/07/2022    HCT 37 03/31/2022     03/07/2022    K 3.7 03/07/2022     03/07/2022    CO2 25 03/07/2022    BUN 42 (H) 03/07/2022    CREATININE 12.4 (H) 03/07/2022    EGFRNONAA 3.5 (A) 03/07/2022    CALCIUM 9.0 03/07/2022    PHOS 3.4 07/13/2021    ALBUMIN 2.9 (L) 07/13/2021    AST 12 07/13/2021    ALT 21 07/13/2021    UTPCR 3.33 (H) 04/12/2021    .1 (H) 06/02/2022       Lab Results   Component Value Date    BILIRUBINUA Negative 04/12/2021    PROTEINUA 3+ (A) 04/12/2021    NITRITE Negative 04/12/2021    RBCUA 0 04/12/2021    WBCUA 2 04/12/2021       Lab Results   Component Value Date    CPRA 0 02/03/2022    AU1XWSJ Negative " 02/03/2022    CIIAB Negative 02/03/2022       Labs were reviewed with the patient.    Pre-transplant Workup:   Reviewed with the patient.    Assessment:     1. Patient on waiting list for kidney transplant    2. ESRD on peritoneal dialysis    3. Essential hypertension    4. PSA elevation    5. Secondary hyperparathyroidism    6. BMI 24.0-24.9, adult        Plan:     Transplant Candidacy:   Mr. Mast is a suitable kidney transplant candidate.  Meets center eligibility for accepting HCV+ donor offer - yes.  Patient educated on HCV+ donors. Teodoro is willing  to accept HCV+ donor offer -  yes   Patient is a candidate for KDPI > 85 kidney donor offer - no (weight >88kg).  He remains in overall stable health, and will remain active on the transplant list.    Patient advised that it is recommended that all transplant candidates, and their close contacts and household members receive Covid vaccination.    Arianne Sesay NP       Follow-up:   In addition to the tests noted in the plan, Mr. Mast will continue to have reevaluation as per the standing pre-kidney transplant protocol:  1. Monthly blood for PRA  2. Annual return to clinic, except HIV positive, > 65 years of age, or pancreas transplant candidates who will be scheduled to see transplant every 6 months while in pre-transplant phase  3. Annual re-testing: CXR, EKG, yearly mammograms for women over 40 and PSA for males over 40, cardiology follow-up as recommended by initial cardiology pre-transplant evaluation  4. Renal ultrasound every 2 years  5. Baseline colonoscopy after age 50 and repeated as recommended    UNOS Patient Status  Functional Status: 60% - Requires occasional assistance but is able to care for needs  Physical Capacity: No Limitations

## 2022-06-02 NOTE — LETTER
June 6, 2022        Shine Mcqueen  [address]  [facility] [address]  Phone: [phone]  Fax: [phone]             [facility] 1st Fl  [address]  [facility] [address]  Phone: [phone]   Patient: Teodoro Mast   MR Number: [id number]   YOB: 1945   Date of Visit: 6/2/2022       Dear Dr. Shine Mcqueen    Thank you for referring Teodoro Mast to me for evaluation. Attached you will find relevant portions of my assessment and plan of care.    If you have questions, please do not hesitate to call me. I look forward to following Teodoro Mast along with you.    Sincerely,    Arianne Sesay NP    Enclosure    If you would like to receive this communication electronically, please contact [email or web address] or [phone] to request [email or web address] Link access.    [email or web address] Link is a tool which provides read-only access to select patient information with whom you have a relationship. Its easy to use and provides real time access to review your patients record including encounter summaries, notes, results, and demographic information.    If you feel you have received this communication in error or would no longer like to receive these types of communications, please e-mail [email or web address]

## 2022-06-02 NOTE — PROGRESS NOTES
INITIAL PATIENT EDUCATION NOTE    Mr. Teodoro Mast was seen in pre-kidney transplant clinic for evaluation for kidney, kidney/pancreas or pancreas only transplant.  The patient attended a an individual video education session that discussed/reviewed the following aspects of transplantation: evaluation and selection committee process, UNOS waitlist management/multiple listings, types of organs offered (KDPI < 85%, KDPI > 85%, PHS risk, DCD, HCV+, HIV+ for HIV+ recipients and enbloc/dual), financial aspects, surgical procedures, dietary instruction pre- and post-transplant, health maintenance pre- and post-transplant, post-transplant hospitalization and outpatient follow-up, potential to participate in a research protocol, and medication management and side effects.  A question and answer session was provided after the presentation.    The patient was seen by all members of the multi-disciplinary team to include: Nephrologist/PA, Surgeon, , Transplant Coordinator, , Pharmacist and Dietician (if applicable).    The patient reviewed and signed all consents for evaluation which were witnessed and sent to scanning into the [facility] chart.    The patient was given an education book and plan for further evaluation based on his individual assessment.      Reviewed program requirement for complete COVID vaccination with documentation prior to listing.  COVID education information reviewed with patient.     The patient was informed that the transplant team would manage immediate post op pain. If the patient requires long term pain management, they will need to have that pain management addressed by their PCP or previous provider who wrote for long term pain medicines.    The patient was encouraged to call with any questions or concerns.

## 2022-06-03 ENCOUNTER — SOCIAL WORK (OUTPATIENT)
Dept: TRANSPLANT | Facility: CLINIC | Age: 77
End: 2022-06-03
Payer: MEDICARE

## 2022-06-03 NOTE — PROGRESS NOTES
"Dialysis compliance found under "Media" tab->"other patient documents" ->"dialysis compliance".  Compliance report appears acceptable.    "

## 2022-06-09 RX ORDER — MIDODRINE HYDROCHLORIDE 2.5 MG/1
2.5 TABLET ORAL DAILY PRN
Qty: 90 TABLET | Refills: 3 | Status: SHIPPED | OUTPATIENT
Start: 2022-06-09 | End: 2022-09-16

## 2022-06-13 NOTE — PROGRESS NOTES
Transplant Psychosocial Evaluation Update:  Last psychosocial evaluation for transplant was completed on 7/13/21 by Edith Obando LCSW    Pt presents today in company of son, Teodoro Mast II. Pt and son  present as alert, oriented to person, place, time, purpose of visit. Pt and son deny concerns about going through with transplant and state motivation and sense of preparedness for transplantation, caregiver role, psychosocial risk factors, medical risk factors, financial aspects, and lifetime commitments. All concerns and education points as per transplant psychosocial evaluation process addressed (also refer to psychosocial dated 3/12/2021). Pt actively participated in today's interview, with son participating as caregiver. Pt asking questions appropriately and denies changes to previous psychosocial evaluation for transplantation which we reviewed line by line with pt and, per pt choice, with pts caregiver today.    Primary Caregivers and Transportation for Transplant:  1. Teodoro Mast III, 37, son, NAM drives [phone]  2. Ripchanda MastJr, 50, Nephew, NAM, drives [phone]  3. Nilesh Mast, 34, son, drives, [phone]    Both pt and caregiver/s plan to stay locally at patient's home - information reviewed in depth. Caregivers plan to stay at hospital as appropriate for transplant and post-transplant process.    Pts Vocation: Pt is retired from [employer].  Last day worked:  2/10/2010.    DME: CPAP, PD equipment and supplies.     ADLS:  Pt states ability to independently accomplish all adls.    Household and Dependents: pt resides with his son, Teodoro Mast II and has no dependents at this time.    Income: Pt states ability to afford all monthly expenses and costs including for medications now and if transplanted based on income and on savings and assets.    Dialysis Adherence: Pt is currently on PD, however stated may be switching to HD per MD recommendations.  Pt states he is  compliant with all aspects of care.     Pt and son deny any additional concerns, stating preparedness and motivation to proceed with organ transplant.     Suitability for Transplant:   Pt remains low risk for transplant at this time based on psychosocial risk factors and strengths.    Pt hazel well overall with health needs and life in general, has stable supports, adequate insurance, financial stability, transportation and caregiver plans in place, and is using no substances unless prescribed.

## 2022-08-15 ENCOUNTER — PATIENT OUTREACH (OUTPATIENT)
Dept: ADMINISTRATIVE | Facility: HOSPITAL | Age: 77
End: 2022-08-15
Payer: MEDICARE

## 2022-08-15 NOTE — PROGRESS NOTES
[facility] CMS chart audits-PCP VISIT/ANNUAL WELLNESS VISIT. Chart review completed for HM test overdue (mammograms, Colonoscopies, pap smears, DM labs, and/or EYE EXAMs)      Care Everywhere and media, updates requested and reviewed.      RE:  Patient needs PCP Visit-2022    Attempted to call patient.  No answer.  Portal message sent.

## 2022-08-24 DIAGNOSIS — Z76.82 ORGAN TRANSPLANT CANDIDATE: Primary | ICD-10-CM

## 2022-08-24 NOTE — PROGRESS NOTES
YEARLY LIST MANAGEMENT NOTE    Teodoro Mast's kidney transplant listing status reviewed.  Patient is due for follow-up appointments in November 2022.   Appointments will be scheduled per protocol.  HLA sample is past due at this time with sample last being received on 6/28/22.

## 2022-09-08 ENCOUNTER — TELEPHONE (OUTPATIENT)
Dept: TRANSPLANT | Facility: CLINIC | Age: 77
End: 2022-09-08
Payer: MEDICARE

## 2022-09-12 ENCOUNTER — TELEPHONE (OUTPATIENT)
Dept: NEPHROLOGY | Facility: CLINIC | Age: 77
End: 2022-09-12
Payer: MEDICARE

## 2022-09-16 ENCOUNTER — HOSPITAL ENCOUNTER (OUTPATIENT)
Dept: CARDIOLOGY | Facility: CLINIC | Age: 77
Discharge: HOME | End: 2022-09-16
Attending: SURGERY
Payer: MEDICARE

## 2022-09-16 ENCOUNTER — OFFICE VISIT (OUTPATIENT)
Dept: VASCULAR SURGERY | Facility: CLINIC | Age: 77
End: 2022-09-16
Attending: SURGERY
Payer: MEDICARE

## 2022-09-16 VITALS
WEIGHT: 202.81 LBS | SYSTOLIC BLOOD PRESSURE: 130 MMHG | HEART RATE: 81 BPM | TEMPERATURE: 98 F | BODY MASS INDEX: 24.7 KG/M2 | DIASTOLIC BLOOD PRESSURE: 78 MMHG | HEIGHT: 76 IN

## 2022-09-16 DIAGNOSIS — Z01.818 PRE-OP EVALUATION: ICD-10-CM

## 2022-09-16 DIAGNOSIS — Z99.2 ESRD ON PERITONEAL DIALYSIS: Primary | ICD-10-CM

## 2022-09-16 DIAGNOSIS — N18.6 ESRD (END STAGE RENAL DISEASE): Primary | ICD-10-CM

## 2022-09-16 DIAGNOSIS — N18.6 ESRD ON PERITONEAL DIALYSIS: Primary | ICD-10-CM

## 2022-09-16 PROCEDURE — 99213 OFFICE O/P EST LOW 20 MIN: CPT | Mod: S$PBB,NTX,, | Performed by: SURGERY

## 2022-09-16 PROCEDURE — 99213 PR OFFICE/OUTPT VISIT, EST, LEVL III, 20-29 MIN: ICD-10-PCS | Mod: S$PBB,NTX,, | Performed by: SURGERY

## 2022-09-16 PROCEDURE — 99213 OFFICE O/P EST LOW 20 MIN: CPT | Mod: PBBFAC,TXP | Performed by: SURGERY

## 2022-09-16 PROCEDURE — 99999 PR PBB SHADOW E&M-EST. PATIENT-LVL III: ICD-10-PCS | Mod: PBBFAC,TXP,, | Performed by: SURGERY

## 2022-09-16 PROCEDURE — 99999 PR PBB SHADOW E&M-EST. PATIENT-LVL III: CPT | Mod: PBBFAC,TXP,, | Performed by: SURGERY

## 2022-09-16 PROCEDURE — 93010 ELECTROCARDIOGRAM REPORT: CPT | Mod: S$PBB,NTX,, | Performed by: INTERNAL MEDICINE

## 2022-09-16 PROCEDURE — 93010 EKG 12-LEAD: ICD-10-PCS | Mod: S$PBB,NTX,, | Performed by: INTERNAL MEDICINE

## 2022-09-16 PROCEDURE — 93005 ELECTROCARDIOGRAM TRACING: CPT | Mod: PBBFAC,NTX | Performed by: INTERNAL MEDICINE

## 2022-09-16 NOTE — H&P (VIEW-ONLY)
VASCULAR SURGERY NOTE    Patient ID: Teodoro Mast is a 77 y.o. male.    I. HISTORY     Chief Complaint: AV access    HPI: Teodoro Mast is a 77 y.o. male who is here today for established patient appointment. He had L radiocephalic AVF with me 3/31/22. This thrombosed. He is on the kidney transplant list.  He is currently on PD.  He returns today to clinic because once again his PD is not filtering as much as before and his nephrologist was concerned that it will soon fail.  He would like to have permanent HD access established in case the PD efficacy begins to [place] again.  The patient denies any significant changes in his health since he was last seen in clinic.  He continues to avoid any IV sticks in the side where his AV fistula was created.  He has not been checking a blood pressure on that side either.      ALLERGIES: NKA    Medications: reviewed in EMR       Past Surgical History:   Procedure Laterality Date    AV FISTULA PLACEMENT Left 3/31/2022    Procedure: CREATION, AV FISTULA RADIOCEPHALIC;  Surgeon: PEMA Martines II, MD;  Location: [facility] OR [address] [place] [address];  Service: Vascular;  Laterality: Left;    COLONOSCOPY N/A 2021    Procedure: COLONOSCOPY;  Surgeon: Joe Jimenez MD;  Location: [facility] (4TH FLR);  Service: Endoscopy;  Laterality: N/A;  COVID test at [facility] on 10/30-GT      dialysis pt-labs prior    EYE SURGERY Bilateral     cataract removal    FINGER SURGERY      hemorriodectomy      PERITONEAL CATHETER INSERTION         Social History     Occupational History    Not on file   Tobacco Use    Smoking status: Former     Packs/day: 0.50     Years: 10.00     Pack years: 5.00     Types: Cigarettes     Quit date:      Years since quittin.7    Smokeless tobacco: Never   Substance and Sexual Activity    Alcohol use: Not Currently    Drug use: No    Sexual activity: Not Currently     Partners: Female     Family History   Problem Relation Age of Onset    Heart disease Mother      Hypertension Mother     Heart disease Father         pacemaker    Seizures Sister     Hammer toes Brother     Heart disease Brother     Cancer Brother         prostate cancer    Premature birth Son     Cancer Sister         throat    No Known Problems Sister     No Known Problems Sister     No Known Problems Brother     No Known Problems Son        Review of Systems   Constitutional: Negative for weight loss.   HENT:  Negative for ear pain and nosebleeds.    Eyes:  Negative for discharge and pain.   Cardiovascular:  Negative for chest pain and palpitations.   Respiratory:  Negative for cough, shortness of breath and wheezing.    Endocrine: Negative for cold intolerance, heat intolerance and polyphagia.   Hematologic/Lymphatic: Negative for adenopathy. Does not bruise/bleed easily.   Skin:  Negative for itching and rash.   Musculoskeletal:  Negative for joint swelling and muscle cramps.   Gastrointestinal:  Negative for abdominal pain, diarrhea, nausea and vomiting.   Genitourinary:  Negative for dysuria and flank pain.   Neurological:  Negative for numbness and seizures.       II. PHYSICAL EXAM     Physical Exam  Constitutional:       General: He is not in acute distress.     Appearance: Normal appearance. He is normal weight. He is not ill-appearing or diaphoretic.   HENT:      Head: Normocephalic and atraumatic.   Eyes:      General: No scleral icterus.        Right eye: No discharge.         Left eye: No discharge.      Extraocular Movements: Extraocular movements intact.      Conjunctiva/sclera: Conjunctivae normal.   Cardiovascular:      Rate and Rhythm: Normal rate and regular rhythm.      Pulses: Normal pulses.   Pulmonary:      Effort: Pulmonary effort is normal. No respiratory distress.   Musculoskeletal:         General: Normal range of motion.      Cervical back: Normal range of motion and neck supple.   Skin:     General: Skin is warm and dry.      Comments: Left wrist scar well healed   Neurological:       General: No focal deficit present.      Mental Status: He is alert and oriented to person, place, and time.   Psychiatric:         Mood and Affect: Mood normal.         Behavior: Behavior normal.       III. ASSESSMENT & PLAN (MEDICAL DECISION MAKING)     Imaging Results: (I have personally reviewed all images and provided interpretation below)  No new imaging    Assessment/Diagnosis and Plan:    1. ESRD on peritoneal dialysis          77 y.o. male with ESRD on PD awaiting kidney transplant.  He had a failed left radiocephalic AV fistula at the wrist.  He returns today for re-evaluation for permanent HD access creation.  He still has a patent cephalic vein was visible on physical exam.  We should be able to create a new radiocephalic AV fistula in the distal forearm on the left.  Risks benefits and alternatives were explained to the patient expressed full understanding and agreed to proceed    -Plan left arm radiocephalic AVF creation  -Continue all home meds    GMagdalena Martines II, MD, VI  Vascular Surgery  [facility]

## 2022-09-16 NOTE — PROGRESS NOTES
VASCULAR SURGERY NOTE    Patient ID: Teodoro Mast is a 77 y.o. male.    I. HISTORY     Chief Complaint: AV access    HPI: Teodoro Mast is a 77 y.o. male who is here today for established patient appointment. He had L radiocephalic AVF with me 3/31/22. This thrombosed. He is on the kidney transplant list.  He is currently on PD.  He returns today to clinic because once again his PD is not filtering as much as before and his nephrologist was concerned that it will soon fail.  He would like to have permanent HD access established in case the PD efficacy begins to Alfred again.  The patient denies any significant changes in his health since he was last seen in clinic.  He continues to avoid any IV sticks in the side where his AV fistula was created.  He has not been checking a blood pressure on that side either.      ALLERGIES: NKA    Medications: reviewed in EMR       Past Surgical History:   Procedure Laterality Date    AV FISTULA PLACEMENT Left 3/31/2022    Procedure: CREATION, AV FISTULA RADIOCEPHALIC;  Surgeon: PEMA Martines II, MD;  Location: [facility] OR [address] [place] [address];  Service: Vascular;  Laterality: Left;    COLONOSCOPY N/A 2021    Procedure: COLONOSCOPY;  Surgeon: Joe Jimenez MD;  Location: [facility] (4TH FLR);  Service: Endoscopy;  Laterality: N/A;  COVID test at [place] on 10/30-GT      dialysis pt-labs prior    EYE SURGERY Bilateral     cataract removal    FINGER SURGERY      hemorriodectomy      PERITONEAL CATHETER INSERTION         Social History     Occupational History    Not on file   Tobacco Use    Smoking status: Former     Packs/day: 0.50     Years: 10.00     Pack years: 5.00     Types: Cigarettes     Quit date:      Years since quittin.7    Smokeless tobacco: Never   Substance and Sexual Activity    Alcohol use: Not Currently    Drug use: No    Sexual activity: Not Currently     Partners: Female     Family History   Problem Relation Age of Onset    Heart disease Mother      Hypertension Mother     Heart disease Father         pacemaker    Seizures Sister     Hammer toes Brother     Heart disease Brother     Cancer Brother         prostate cancer    Premature birth Son     Cancer Sister         throat    No Known Problems Sister     No Known Problems Sister     No Known Problems Brother     No Known Problems Son        Review of Systems   Constitutional: Negative for weight loss.   HENT:  Negative for ear pain and nosebleeds.    Eyes:  Negative for discharge and pain.   Cardiovascular:  Negative for chest pain and palpitations.   Respiratory:  Negative for cough, shortness of breath and wheezing.    Endocrine: Negative for cold intolerance, heat intolerance and polyphagia.   Hematologic/Lymphatic: Negative for adenopathy. Does not bruise/bleed easily.   Skin:  Negative for itching and rash.   Musculoskeletal:  Negative for joint swelling and muscle cramps.   Gastrointestinal:  Negative for abdominal pain, diarrhea, nausea and vomiting.   Genitourinary:  Negative for dysuria and flank pain.   Neurological:  Negative for numbness and seizures.       II. PHYSICAL EXAM     Physical Exam  Constitutional:       General: He is not in acute distress.     Appearance: Normal appearance. He is normal weight. He is not ill-appearing or diaphoretic.   HENT:      Head: Normocephalic and atraumatic.   Eyes:      General: No scleral icterus.        Right eye: No discharge.         Left eye: No discharge.      Extraocular Movements: Extraocular movements intact.      Conjunctiva/sclera: Conjunctivae normal.   Cardiovascular:      Rate and Rhythm: Normal rate and regular rhythm.      Pulses: Normal pulses.   Pulmonary:      Effort: Pulmonary effort is normal. No respiratory distress.   Musculoskeletal:         General: Normal range of motion.      Cervical back: Normal range of motion and neck supple.   Skin:     General: Skin is warm and dry.      Comments: Left wrist scar well healed   Neurological:       General: No focal deficit present.      Mental Status: He is alert and oriented to person, place, and time.   Psychiatric:         Mood and Affect: Mood normal.         Behavior: Behavior normal.       III. ASSESSMENT & PLAN (MEDICAL DECISION MAKING)     Imaging Results: (I have personally reviewed all images and provided interpretation below)  No new imaging    Assessment/Diagnosis and Plan:    1. ESRD on peritoneal dialysis          77 y.o. male with ESRD on PD awaiting kidney transplant.  He had a failed left radiocephalic AV fistula at the wrist.  He returns today for re-evaluation for permanent HD access creation.  He still has a patent cephalic vein was visible on physical exam.  We should be able to create a new radiocephalic AV fistula in the distal forearm on the left.  Risks benefits and alternatives were explained to the patient expressed full understanding and agreed to proceed    -Plan left arm radiocephalic AVF creation  -Continue all home meds    GMagdalena Martines II, MD, VI  Vascular Surgery  [facility]

## 2022-09-29 ENCOUNTER — ANESTHESIA EVENT (OUTPATIENT)
Dept: SURGERY | Facility: HOSPITAL | Age: 77
End: 2022-09-29
Payer: MEDICARE

## 2022-09-29 ENCOUNTER — TELEPHONE (OUTPATIENT)
Dept: VASCULAR SURGERY | Facility: CLINIC | Age: 77
End: 2022-09-29
Payer: MEDICARE

## 2022-09-29 NOTE — PRE-PROCEDURE INSTRUCTIONS
Preop instructions(bathing/wear loose fitting clothing/fasting/directions/location of surgery/ and preop medication instructions reviewed with patient).  Patient instructed to hold/stop all blood thinning medications, prior to surgery, following the pre-surgery recommended guidelines. Instructed to follow the surgeon's instructions if they differ from these.    Patient verbalized understanding.  NO SOLID FOOD, MILK OR MILK PRODUCTS 8 HOURS PRIOR TO SX START TIME. 2300  YOU MAY ONLY HAVE SIPS OF WATER WITH MORNING MEDICATIONS (1) HOUR PRIOR TO ARRIVAL TO HOSPITAL. 0400     Denies any history of side effects or issues with anesthesia or sedation.     Patient advised of the updated visitor policy.  Patient aware of the need to have someone drive them home following same -day surgery.       Patient arrival time of 0500 given per PV Sx Dept

## 2022-09-29 NOTE — TELEPHONE ENCOUNTER
Spoke with the pt and informed him of the time of arrival for surgery tomorrow is 930am at the main campus on [facility],second floor,[facility]. Pt also informed him not to eat or drink anything after midnight and he verbalizes understanding of information received.

## 2022-09-29 NOTE — ANESTHESIA PREPROCEDURE EVALUATION
[facility]  Anesthesia Pre-Operative Evaluation         Patient Name: Teodoro Mast  YOB: 1945  MRN: [id number]    SUBJECTIVE:     Pre-operative evaluation for Procedure(s) (LRB):  CREATION, AV FISTULA (Left)     09/29/2022    Teodoro Mast is a 77 y.o. male w/ a significant PMHx of HFpEF, HTN, HLD, ESRD on Peritoneal dialysis who presents for the above procedure.      LDA: None documented.    Prev airway:   Placement Date: 05/05/21; Placement Time: 1136 (created via procedure documentation); Method of Intubation: Direct laryngoscopy; Mask Ventilation: Easy; Intubated: Postinduction; Blade: Flores #2; Airway Device Size: 7.5; Cuff Inflation: Minimal occlusive pressure; Placement Verified By: Capnometry; Complicating Factors: None; Intubation Findings: Bilateral breath sounds, Atraumatic/Condition of teeth unchanged; Securment: Lips; Complications: None; Removal Date    Drips: None documented.    Patient Active Problem List   Diagnosis    Mixed hyperlipidemia    Essential hypertension    BPH with urinary obstruction    Hypertensive CKD (chronic kidney disease)    Kidney cysts    Proteinuria    Metabolic acidosis    GLYNN (obstructive sleep apnea)    Hyperuricemia    Secondary hyperparathyroidism    Vitamin D deficiency    Benign hypertension with CKD (chronic kidney disease) stage V    Sensorineural hearing loss (SNHL) of both ears    Nasal septal deviation    Elevated serum immunoglobulin free light chains    Vitamin B12 deficiency    Folate deficiency    Iron deficiency anemia    Anemia of chronic renal failure, stage 5    Hypoalbuminemia    CKD (chronic kidney disease), stage V    ESRD (end stage renal disease)    Peritoneal dialysis catheter in place    Pre-op evaluation    Patient on waiting list for kidney transplant       Review of patient's allergies indicates:  No Known Allergies    Current Inpatient Medications:      Current  Facility-Administered Medications on File Prior to Encounter   Medication Dose Route Frequency Provider Last Rate Last Admin    0.9%  NaCl infusion   Intravenous Continuous Enrico Woodruff  mL/hr at 21 1115 New Bag at 21 1115    ceFAZolin (ANCEF) 3 gram in dextrose 5% IVPB  3 g Intravenous On Call Procedure Enrico Woodruff MD        LIDOcaine (PF) 10 mg/ml (1%) injection 10 mg  1 mL Intradermal Once Enrico Woodruff MD         Current Outpatient Medications on File Prior to Encounter   Medication Sig Dispense Refill    atorvastatin (LIPITOR) 80 MG tablet TAKE 1 TABLET(80 MG) BY MOUTH EVERY DAY 90 tablet 3    vitamin D (VITAMIN D3) 1000 units Tab Take 2,000 Units by mouth once daily.         Past Surgical History:   Procedure Laterality Date    AV FISTULA PLACEMENT Left 3/31/2022    Procedure: CREATION, AV FISTULA RADIOCEPHALIC;  Surgeon: PEMA Martines II, MD;  Location: [facility] OR [address] [place] [address];  Service: Vascular;  Laterality: Left;    COLONOSCOPY N/A 2021    Procedure: COLONOSCOPY;  Surgeon: Joe Jimenez MD;  Location: [facility] (4TH [facility]);  Service: Endoscopy;  Laterality: N/A;  COVID test at [place] on 10/30-GT      dialysis pt-labs prior    EYE SURGERY Bilateral     cataract removal    FINGER SURGERY      hemorriodectomy      PERITONEAL CATHETER INSERTION         Social History     Socioeconomic History    Marital status:     Number of children: 2   Tobacco Use    Smoking status: Former     Packs/day: 0.50     Years: 10.00     Pack years: 5.00     Types: Cigarettes     Quit date:      Years since quittin.7    Smokeless tobacco: Never   Substance and Sexual Activity    Alcohol use: Not Currently    Drug use: No    Sexual activity: Not Currently     Partners: Female   Social History Narrative    Caregiver Teodoro Mast II       OBJECTIVE:     Vital Signs Range (Last 24H):         CBC:   No results for input(s): WBC, RBC, HGB, HCT,  PLT, MCV, MCH, MCHC in the last 72 hours.    CMP: No results for input(s): NA, K, CL, CO2, BUN, CREATININE, GLU, MG, PHOS, CALCIUM, ALBUMIN, PROT, ALKPHOS, ALT, AST, BILITOT in the last 72 hours.    INR:  No results for input(s): PT, INR, PROTIME, APTT in the last 72 hours.    Diagnostic Studies: No relevant studies.    EKG:   Results for orders placed or performed during the hospital encounter of 09/16/22   SCHEDULED EKG 12-LEAD (to Muse)    Collection Time: 09/16/22  9:39 AM    Narrative    Test Reason : Z01.818,    Vent. Rate : 072 BPM     Atrial Rate : 072 BPM     P-R Int : 144 ms          QRS Dur : 130 ms      QT Int : 440 ms       P-R-T Axes : 068 084 064 degrees     QTc Int : 481 ms    Normal sinus rhythm  Right bundle branch block  Abnormal ECG  When compared with ECG of 03-AUG-2021 09:28,  T wave amplitude has decreased in Inferior leads  Confirmed by Naveen Hylton MD ([id number]) on 9/16/2022 10:20:37 PM    Referred By: PEMA HDZ II           Confirmed By:Naveen Hylton MD        2D ECHO:   Results for orders placed during the hospital encounter of 06/02/22    Echo Saline Bubble? No    Interpretation Summary  · The left ventricle is normal in size with low normal systolic function.  · The estimated ejection fraction is 50%.  · Grade I left ventricular diastolic dysfunction.  · Normal right ventricular size with normal right ventricular systolic function.  · There is abnormal septal wall motion.  · Normal central venous pressure (3 mmHg).  · The estimated PA systolic pressure is 20 mmHg.         ASSESSMENT/PLAN:       Pre-op Assessment    I have reviewed the Patient Summary Reports.     I have reviewed the Nursing Notes.    I have reviewed the Medications.     Review of Systems  Anesthesia Hx:  No problems with previous Anesthesia Denies Hx of Anesthetic complications  History of prior surgery of interest to airway management or planning: Denies Family Hx of Anesthesia complications.   Denies Personal Hx of  Anesthesia complications.   Social:  Former Smoker    Hematology/Oncology:     Oncology Normal    -- Anemia:   Cardiovascular:   Hypertension, well controlled Denies MI.    Denies Angina. hyperlipidemia ECG has been reviewed.    Pulmonary:   Denies Shortness of breath.  Denies Recent URI. Sleep Apnea    Renal/:   Chronic Renal Disease, ESRD, Dialysis BPH    Hepatic/GI:   Denies GERD. Denies Liver Disease.    Neurological:   Denies TIA. Denies CVA. Denies Seizures.    Endocrine:  Endocrine Normal Denies Diabetes.        Physical Exam  General: Well nourished, Cooperative and Alert    Airway:  Mallampati: II   Mouth Opening: Normal  TM Distance: Normal  Tongue: Normal  Neck ROM: Normal ROM    Dental:  Intact, Periodontal disease        Anesthesia Plan  Type of Anesthesia, risks & benefits discussed:    Anesthesia Type: Gen Supraglottic Airway, Gen Natural Airway  Intra-op Monitoring Plan: Standard ASA Monitors  Post Op Pain Control Plan: multimodal analgesia  Induction:  IV  Airway Plan: Direct, Post-Induction  Informed Consent: Informed consent signed with the Patient and all parties understand the risks and agree with anesthesia plan.  All questions answered.   ASA Score: 3  Day of Surgery Review of History & Physical: H&P Update referred to the surgeon/provider.    Ready For Surgery From Anesthesia Perspective.     .

## 2022-09-29 NOTE — TELEPHONE ENCOUNTER
Spoke with the pt and informed him per Dr Martines of the change in his arrival time to 5am.Pt verbalized understanding of information received.

## 2022-09-30 ENCOUNTER — ANESTHESIA (OUTPATIENT)
Dept: SURGERY | Facility: HOSPITAL | Age: 77
End: 2022-09-30
Payer: MEDICARE

## 2022-09-30 ENCOUNTER — HOSPITAL ENCOUNTER (OUTPATIENT)
Facility: HOSPITAL | Age: 77
Discharge: HOME | End: 2022-09-30
Attending: SURGERY | Admitting: SURGERY
Payer: MEDICARE

## 2022-09-30 VITALS
RESPIRATION RATE: 18 BRPM | HEART RATE: 76 BPM | OXYGEN SATURATION: 95 % | DIASTOLIC BLOOD PRESSURE: 72 MMHG | WEIGHT: 200 LBS | BODY MASS INDEX: 24.36 KG/M2 | SYSTOLIC BLOOD PRESSURE: 119 MMHG | TEMPERATURE: 98 F | HEIGHT: 76 IN

## 2022-09-30 DIAGNOSIS — N18.6 ESRD (END STAGE RENAL DISEASE): ICD-10-CM

## 2022-09-30 PROCEDURE — 25000003 PHARM REV CODE 250: Mod: NTX | Performed by: STUDENT IN AN ORGANIZED HEALTH CARE EDUCATION/TRAINING PROGRAM

## 2022-09-30 PROCEDURE — 36000706: Mod: NTX | Performed by: SURGERY

## 2022-09-30 PROCEDURE — 36821 AV FUSION DIRECT ANY SITE: CPT | Mod: GC,NTX,, | Performed by: SURGERY

## 2022-09-30 PROCEDURE — 36821 PR ANASTOMOSIS,AV,ANY SITE: ICD-10-PCS | Mod: GC,NTX,, | Performed by: SURGERY

## 2022-09-30 PROCEDURE — 63600175 PHARM REV CODE 636 W HCPCS: Mod: NTX | Performed by: STUDENT IN AN ORGANIZED HEALTH CARE EDUCATION/TRAINING PROGRAM

## 2022-09-30 PROCEDURE — 37000008 HC ANESTHESIA 1ST 15 MINUTES: Mod: NTX | Performed by: SURGERY

## 2022-09-30 PROCEDURE — D9220A PRA ANESTHESIA: ICD-10-PCS | Mod: NTX,,, | Performed by: ANESTHESIOLOGY

## 2022-09-30 PROCEDURE — 71000044 HC DOSC ROUTINE RECOVERY FIRST HOUR: Mod: NTX | Performed by: SURGERY

## 2022-09-30 PROCEDURE — 71000015 HC POSTOP RECOV 1ST HR: Mod: NTX | Performed by: SURGERY

## 2022-09-30 PROCEDURE — 25000003 PHARM REV CODE 250: Mod: TXP | Performed by: SURGERY

## 2022-09-30 PROCEDURE — D9220A PRA ANESTHESIA: Mod: NTX,,, | Performed by: ANESTHESIOLOGY

## 2022-09-30 PROCEDURE — 63600175 PHARM REV CODE 636 W HCPCS: Mod: NTX | Performed by: SURGERY

## 2022-09-30 PROCEDURE — 37000009 HC ANESTHESIA EA ADD 15 MINS: Mod: NTX | Performed by: SURGERY

## 2022-09-30 PROCEDURE — 36000707: Mod: TXP | Performed by: SURGERY

## 2022-09-30 RX ORDER — CEFAZOLIN SODIUM/WATER 2 G/20 ML
SYRINGE (ML) INTRAVENOUS
Status: DISCONTINUED | OUTPATIENT
Start: 2022-09-30 | End: 2022-09-30

## 2022-09-30 RX ORDER — SODIUM CHLORIDE 9 MG/ML
INJECTION, SOLUTION INTRAVENOUS CONTINUOUS
Status: DISCONTINUED | OUTPATIENT
Start: 2022-09-30 | End: 2022-09-30 | Stop reason: HOSPADM

## 2022-09-30 RX ORDER — HEPARIN SODIUM 1000 [USP'U]/ML
INJECTION, SOLUTION INTRAVENOUS; SUBCUTANEOUS
Status: DISCONTINUED | OUTPATIENT
Start: 2022-09-30 | End: 2022-09-30

## 2022-09-30 RX ORDER — ACETAMINOPHEN 500 MG
1000 TABLET ORAL ONCE
Status: COMPLETED | OUTPATIENT
Start: 2022-09-30 | End: 2022-09-30

## 2022-09-30 RX ORDER — FENTANYL CITRATE 50 UG/ML
INJECTION, SOLUTION INTRAMUSCULAR; INTRAVENOUS
Status: DISCONTINUED | OUTPATIENT
Start: 2022-09-30 | End: 2022-09-30

## 2022-09-30 RX ORDER — HEPARIN SODIUM 1000 [USP'U]/ML
INJECTION, SOLUTION INTRAVENOUS; SUBCUTANEOUS
Status: DISCONTINUED | OUTPATIENT
Start: 2022-09-30 | End: 2022-09-30 | Stop reason: HOSPADM

## 2022-09-30 RX ORDER — FENTANYL CITRATE 50 UG/ML
25 INJECTION, SOLUTION INTRAMUSCULAR; INTRAVENOUS EVERY 5 MIN PRN
Status: DISCONTINUED | OUTPATIENT
Start: 2022-09-30 | End: 2022-09-30 | Stop reason: HOSPADM

## 2022-09-30 RX ORDER — LABETALOL 100 MG/1
100 TABLET, FILM COATED ORAL 2 TIMES DAILY
COMMUNITY
End: 2023-05-09 | Stop reason: SDUPTHER

## 2022-09-30 RX ORDER — PROCHLORPERAZINE EDISYLATE 5 MG/ML
5 INJECTION INTRAMUSCULAR; INTRAVENOUS EVERY 30 MIN PRN
Status: DISCONTINUED | OUTPATIENT
Start: 2022-09-30 | End: 2022-09-30 | Stop reason: HOSPADM

## 2022-09-30 RX ORDER — LIDOCAINE HYDROCHLORIDE 10 MG/ML
INJECTION INFILTRATION; PERINEURAL
Status: DISCONTINUED | OUTPATIENT
Start: 2022-09-30 | End: 2022-09-30 | Stop reason: HOSPADM

## 2022-09-30 RX ORDER — PHENYLEPHRINE HCL IN 0.9% NACL 1 MG/10 ML
SYRINGE (ML) INTRAVENOUS
Status: DISCONTINUED | OUTPATIENT
Start: 2022-09-30 | End: 2022-09-30

## 2022-09-30 RX ORDER — PROPOFOL 10 MG/ML
VIAL (ML) INTRAVENOUS CONTINUOUS PRN
Status: DISCONTINUED | OUTPATIENT
Start: 2022-09-30 | End: 2022-09-30

## 2022-09-30 RX ORDER — SODIUM CHLORIDE 9 MG/ML
INJECTION, SOLUTION INTRAVENOUS CONTINUOUS
Status: CANCELLED | OUTPATIENT
Start: 2022-09-30

## 2022-09-30 RX ORDER — FENTANYL CITRATE 50 UG/ML
25-200 INJECTION, SOLUTION INTRAMUSCULAR; INTRAVENOUS
Status: DISCONTINUED | OUTPATIENT
Start: 2022-09-30 | End: 2022-09-30 | Stop reason: HOSPADM

## 2022-09-30 RX ORDER — MIDAZOLAM HYDROCHLORIDE 1 MG/ML
.5-4 INJECTION INTRAMUSCULAR; INTRAVENOUS
Status: DISCONTINUED | OUTPATIENT
Start: 2022-09-30 | End: 2022-09-30 | Stop reason: HOSPADM

## 2022-09-30 RX ORDER — LIDOCAINE HYDROCHLORIDE 20 MG/ML
INJECTION, SOLUTION EPIDURAL; INFILTRATION; INTRACAUDAL; PERINEURAL
Status: DISCONTINUED | OUTPATIENT
Start: 2022-09-30 | End: 2022-09-30

## 2022-09-30 RX ORDER — OXYCODONE HYDROCHLORIDE 5 MG/1
5 TABLET ORAL EVERY 4 HOURS PRN
Qty: 15 TABLET | Refills: 0 | Status: SHIPPED | OUTPATIENT
Start: 2022-09-30 | End: 2022-10-17

## 2022-09-30 RX ADMIN — FENTANYL CITRATE 50 MCG: 0.05 INJECTION, SOLUTION INTRAMUSCULAR; INTRAVENOUS at 07:09

## 2022-09-30 RX ADMIN — Medication 200 MCG: at 07:09

## 2022-09-30 RX ADMIN — ACETAMINOPHEN 1000 MG: 500 TABLET ORAL at 06:09

## 2022-09-30 RX ADMIN — Medication 100 MCG: at 07:09

## 2022-09-30 RX ADMIN — PROPOFOL 30 MG: 10 INJECTION, EMULSION INTRAVENOUS at 07:09

## 2022-09-30 RX ADMIN — PROPOFOL 10 MG: 10 INJECTION, EMULSION INTRAVENOUS at 07:09

## 2022-09-30 RX ADMIN — Medication 300 MCG: at 08:09

## 2022-09-30 RX ADMIN — PROPOFOL 20 MG: 10 INJECTION, EMULSION INTRAVENOUS at 07:09

## 2022-09-30 RX ADMIN — SODIUM CHLORIDE: 0.9 INJECTION, SOLUTION INTRAVENOUS at 07:09

## 2022-09-30 RX ADMIN — LIDOCAINE HYDROCHLORIDE 80 MG: 20 INJECTION, SOLUTION EPIDURAL; INFILTRATION; INTRACAUDAL; PERINEURAL at 07:09

## 2022-09-30 RX ADMIN — PROPOFOL 50 MCG/KG/MIN: 10 INJECTION, EMULSION INTRAVENOUS at 07:09

## 2022-09-30 RX ADMIN — Medication 2 G: at 07:09

## 2022-09-30 RX ADMIN — HEPARIN SODIUM 3000 UNITS: 1000 INJECTION, SOLUTION INTRAVENOUS; SUBCUTANEOUS at 07:09

## 2022-09-30 NOTE — PATIENT INSTRUCTIONS
VASCULAR SURGERY DISCHARGE INSTRUCTIONS    Woundcare:  - Take your incision dressing off 2 days after your surgery and gently rinse your incision with soap and water daily. Pad the incision dry afterward  - If you have a dialysis catheter in place, keep your catheter dressing clean and dry  - If you do not have a catheter, take a full shower daily beginning 2 days after the surgery. Allow soap and water to run over your incision. Pad the incision dry afterward  - When resting or sleeping, try to keep your arm elevated to shoulder level on pillows to reduce swelling  - If you notice clear drainage from your incision, you can apply dry gauze daily and secure in place with tape or gentle elastic wrap    Activity:  - Avoid prolonged exertion of the affected arm  - Avoid keeping your arm down below your chest for prolonged periods of time (this could lead to increased swelling)  - No heavy lifting with the affected arm  - Sleep with your arm elevated on pillows at night to reduce swelling  -- No swimming in pools, lakes, [place] etc. for 6 weeks after your surgery    Diet:  -Resume your pre-operative home diet    Follow up:  -Refer to follow up instructions     Call Vascular Surgery Office at [phone] if you experience:  -Increased redness, warmth, tenderness, or draining pus at your incision(s)  -Worsening fevers, chills, nausea/vomiting  -Pain, weakness, coldness, or numbness in your hand  -Uncontrolled pain  -Your call will be returned within 24 hours and further instructions will be provided    Go to ER/Urgent Care if you experience:  -Worsening shortness of breath or chest pain

## 2022-09-30 NOTE — ANESTHESIA POSTPROCEDURE EVALUATION
Anesthesia Post Evaluation    Patient: Teodoro Mast    Procedure(s) Performed: Procedure(s) (LRB):  CREATION, AV FISTULA (Left)    Final Anesthesia Type: general      Patient location during evaluation: [facility]  Patient participation: Yes- Able to Participate  Level of consciousness: awake and alert and oriented  Post-procedure vital signs: reviewed and stable  Pain management: adequate  Airway patency: patent    PONV status at discharge: No PONV  Anesthetic complications: no      Cardiovascular status: hemodynamically stable  Respiratory status: unassisted and spontaneous ventilation  Hydration status: euvolemic  Follow-up not needed.          Vitals Value Taken Time   /72 09/30/22 0902   Temp 36.5 °C (97.7 °F) 09/30/22 0833   Pulse 76 09/30/22 0915   Resp 18 09/30/22 0915   SpO2 95 % 09/30/22 0915         No case tracking events are documented in the log.      Pain/Miranda Score: Pain Rating Prior to Med Admin: 0 (9/30/2022  6:18 AM)  Miranda Score: 10 (9/30/2022  9:00 AM)

## 2022-09-30 NOTE — PLAN OF CARE
Patient is stable and ready for discharge. Instructions and prescription given to patient and family. Questions answered. Patient tolerating po liquids with no difficulty. Patient has no pain or states it is a tolerable level for them. Anesthesia consent and surgical consent in chart.

## 2022-09-30 NOTE — TRANSFER OF CARE
"Anesthesia Transfer of Care Note    Patient: Teodoro Mast    Procedure(s) Performed: Procedure(s) (LRB):  CREATION, AV FISTULA (Left)    Patient location: PACU    Anesthesia Type: MAC    Transport from OR: Transported from OR on 6-10 L/min O2 by face mask with adequate spontaneous ventilation. Transported from OR on room air with adequate spontaneous ventilation    Post pain: adequate analgesia    Post assessment: no apparent anesthetic complications    Post vital signs: stable    Level of consciousness: awake, alert and oriented    Nausea/Vomiting: no nausea/vomiting    Complications: none    Transfer of care protocol was followed      Last vitals:   Visit Vitals  /71 (BP Location: Right arm, Patient Position: Lying)   Pulse 74   Temp 36.7 °C (98.1 °F) (Temporal)   Resp 18   Ht 6' 4" (1.93 m)   Wt 90.7 kg (200 lb)   SpO2 99%   BMI 24.34 kg/m²     "

## 2022-09-30 NOTE — BRIEF OP NOTE
[facility] - Surgery ([facility])  Brief Operative Note    Surgery Date: 9/30/2022     Surgeon(s) and Role:     * PEMA Martines II, MD - Primary     * Carina Albarran MD - Resident - Assisting        Pre-op Diagnosis:  ESRD (end stage renal disease) [N18.6]    Post-op Diagnosis:  Post-Op Diagnosis Codes:     * ESRD (end stage renal disease) [N18.6]    Procedure(s) (LRB):  CREATION, AV FISTULA (Left)    Anesthesia: Local MAC    Operative Findings: LUE radiocephalic AVF creation. Palpable thrill and 2+ radial pulse at end of case.    Estimated Blood Loss: 5cc         Specimens:   Specimen (24h ago, onward)      None              Discharge Note    OUTCOME: Patient tolerated treatment/procedure well without complication and is now ready for discharge.    DISPOSITION: Home or Self Care    FINAL DIAGNOSIS:  <principal problem not specified>    FOLLOWUP: In clinic    DISCHARGE INSTRUCTIONS:    Discharge Procedure Orders   Diet Adult Regular     Other restrictions (specify):   Order Comments: VASCULAR SURGERY DISCHARGE INSTRUCTIONS    Woundcare:  - Take your incision dressing off 2 days after your surgery and gently rinse your incision with soap and water daily. Pad the incision dry afterward  - If you have a dialysis catheter in place, keep your catheter dressing clean and dry  - If you do not have a catheter, take a full shower daily beginning 2 days after the surgery. Allow soap and water to run over your incision. Pad the incision dry afterward  - When resting or sleeping, try to keep your arm elevated to shoulder level on pillows to reduce swelling  - If you notice clear drainage from your incision, you can apply dry gauze daily and secure in place with tape or gentle elastic wrap    Activity:  - Avoid prolonged exertion of the affected arm  - Avoid keeping your arm down below your chest for prolonged periods of time (this could lead to increased swelling)  - No heavy lifting with the affected arm  - Sleep with your arm  elevated on pillows at night to reduce swelling  -- No swimming in pools, lakes, jacuzzi etc. for 6 weeks after your surgery    Diet:  -Resume your pre-operative home diet    Follow up:  -Refer to follow up instructions     Call Vascular Surgery Office at [phone] if you experience:  -Increased redness, warmth, tenderness, or draining pus at your incision(s)  -Worsening fevers, chills, nausea/vomiting  -Pain, weakness, coldness, or numbness in your hand  -Uncontrolled pain  -Your call will be returned within 24 hours and further instructions will be provided    Go to ER/Urgent Care if you experience:  -Worsening shortness of breath or chest pain     Notify your health care provider if you experience any of the following:  increased confusion or weakness     Notify your health care provider if you experience any of the following:  persistent dizziness, light-headedness, or visual disturbances     Notify your health care provider if you experience any of the following:  worsening rash     Notify your health care provider if you experience any of the following:  severe persistent headache     Notify your health care provider if you experience any of the following:  difficulty breathing or increased cough     Notify your health care provider if you experience any of the following:  redness, tenderness, or signs of infection (pain, swelling, redness, odor or green/yellow discharge around incision site)     Notify your health care provider if you experience any of the following:  severe uncontrolled pain     Notify your health care provider if you experience any of the following:  persistent nausea and vomiting or diarrhea     Notify your health care provider if you experience any of the following:  temperature >100.4     Remove dressing in 48 hours     Carina Albarran MD  General Surgery, PGY-3

## 2022-10-02 LAB
GLUCOSE SERPL-MCNC: 109 MG/DL (ref 70–110)
HCO3 UR-SCNC: 26.8 MMOL/L (ref 24–28)
HCT VFR BLD CALC: 26 %PCV (ref 36–54)
PCO2 BLDA: 42.6 MMHG (ref 35–45)
PH SMN: 7.41 [PH] (ref 7.35–7.45)
PO2 BLDA: 28 MMHG (ref 40–60)
POC BE: 2 MMOL/L
POC IONIZED CALCIUM: 1.11 MMOL/L (ref 1.06–1.42)
POC SATURATED O2: 52 % (ref 95–100)
POC TCO2: 28 MMOL/L (ref 24–29)
POTASSIUM BLD-SCNC: 2.9 MMOL/L (ref 3.5–5.1)
SAMPLE: ABNORMAL
SODIUM BLD-SCNC: 135 MMOL/L (ref 136–145)

## 2022-10-03 DIAGNOSIS — N18.6 ESRD (END STAGE RENAL DISEASE): Primary | ICD-10-CM

## 2022-10-03 NOTE — OP NOTE
Operative Report    Date of Operation: 9/30/22    Pre-operative Diagnosis: ESRD    Post-operative Diagnosis: same    Attending Surgeon: PEMA Martines II, MD    Resident/Fellow: Carina Albarran MD PGY3    Operation/Procedure Performed:  left radiocephalic arteriovenous fistula creation    Anesthesia: local/MAC    Indications:  76yo M in need of permanent AV access. He had previous attempt at radiocephalic AVF which failed. Will reattempt more proximally in the wrist.    Procedure in Detail:  After informed consent was obtained the patient was taken to the OR in supine position. Pre-operative antibiotics were administered. Moderate sedation was administered by the anesthesia team. The left arm was prepped and draped in normal sterile fashion with chloraprep.  A time out was performed to confirm appropriate patient, site, positioning, and laterality. 1% lidocaine was injected in the subcutaneous tissues along the area of our planned incision. An longitudinal incision was made at the wrist. The incision was deepened with a combination of electrocautery, blunt dissection, and sharp dissection. The cephalic vein was identified on the lateral aspect of the incision and measured at least 3mm. It was encircled with a large vessel loop and dissected proximally and distally within the incision. Branches were ligated with 3-0 silk ties and small hand-applied clips. Next we dissected  medially within the incision and identified the radial artery. The artery was healthy with no calcium and measured 3mm. The artery was dissected circumferentially and controlled proximally and distally with small vessel loops. 3000 units of heparin were then administered intravenously. The vein was marked with a marker to for orientation to ensure no twisting occurred. A small bulldog clamp was then applied to the proximal cephalic vein. The distal cephalic vein was then clamped and divided and its distal end suture ligated with 3-0 silk suture.  The bulldog clamp was released from the vein and the vein was flushed with heparinized saline. The vein flushed easily and distended well. The bulldog clamp was reapplied to the proximal vein and the distal end of the vein was spatulated. Clamps were applied distally and proximally to the radial artery and a 6mm longitudinal arteriotomy was made in the radial artery. The vein was sewn to the artery using a running 6-0 prolene suture and parachute technique. The artery was back bleed proximally and distally prior to completion of the anastamosis and had brisk flow with no thrombus visualized. The lumen was flushed with heparinized saline and the anastomosis was completed. The distal arterial clamp was released followed by the vein clamp and finally the proximal arterial clamp. No repair sutures were necessary. There was a palpable continuous thrill over the fistula. No protamine was administered. The wound was thoroughly irrigated with saline irrigation. Thrombin gelfoam was added to the wound to assist with hemostasis.  The wound was again irrigated and hemostasis was excellent. The deep dermis was closed with interrupted vicryl sutures and the skin closed with interrupted vertical mattress 3-0 nylon sutures. The incisions were dressed with 4x4 gauze secured in place with burn netting. All counts were correct.    The patient tolerated the procedure well and was transported to the post-op area for recovery.    EBL: 20ml    Complications: none    PEMA Martines II, MD, RPVI  Vascular Surgeon  [facility]

## 2022-10-11 ENCOUNTER — DOCUMENTATION ONLY (OUTPATIENT)
Dept: NEPHROLOGY | Facility: CLINIC | Age: 77
End: 2022-10-11
Payer: MEDICARE

## 2022-10-11 NOTE — PROGRESS NOTES
Pt seen in PD clinic.  Pt doing well.  Lower apetite in general. Weights stable. Bp's stable in the 120's without any Bp meds.    No Le edema  PD cathter stable.  No erythema.    159/92  99  98.1  93.8 kg.   DW of 90 kgs.    A/P:   1.ESRD: Kt/v stable at 1.78.  on 10 hrs on the cycler with fill volume of 2.8 L with 4 exchanges and last fill for 4 hrs of 1.5L -increase to 1800cc to improve kt/v.  Mid day of 1500cc for 3 hrs-increase to 1800cc.  Needs reepat kt/v in nov. Does not make urine.    2.  Lytes:  potassium of 2.8-start kcl 20 meq a day.  Start magnesium 400 mg a day with low mg of 1.2.      3.  Anemia:  hgb of 9.6  with mircera of 30 mcg of 10/3 for a hgb of 11.2 on 10/3.  Repeat pending.    4.  Bone: phos at 3+ on no binders and calcitriol 0.25 mcg every other day.    5. HTN: bp's stable on no bp meds.

## 2022-10-13 DIAGNOSIS — N18.6 ESRD (END STAGE RENAL DISEASE): Primary | ICD-10-CM

## 2022-10-14 ENCOUNTER — HOSPITAL ENCOUNTER (OUTPATIENT)
Dept: RADIOLOGY | Facility: HOSPITAL | Age: 77
Discharge: HOME | End: 2022-10-14
Attending: INTERNAL MEDICINE
Payer: MEDICARE

## 2022-10-14 DIAGNOSIS — N18.6 ESRD (END STAGE RENAL DISEASE): ICD-10-CM

## 2022-10-14 PROCEDURE — 71046 X-RAY EXAM CHEST 2 VIEWS: CPT | Mod: TC,NTX

## 2022-10-14 PROCEDURE — 71046 XR CHEST PA AND LATERAL: ICD-10-PCS | Mod: 26,NTX,, | Performed by: RADIOLOGY

## 2022-10-14 PROCEDURE — 71046 X-RAY EXAM CHEST 2 VIEWS: CPT | Mod: 26,NTX,, | Performed by: RADIOLOGY

## 2022-10-17 ENCOUNTER — OFFICE VISIT (OUTPATIENT)
Dept: VASCULAR SURGERY | Facility: CLINIC | Age: 77
End: 2022-10-17
Attending: SURGERY
Payer: MEDICARE

## 2022-10-17 VITALS
DIASTOLIC BLOOD PRESSURE: 66 MMHG | HEIGHT: 76 IN | TEMPERATURE: 98 F | BODY MASS INDEX: 25.24 KG/M2 | WEIGHT: 207.25 LBS | SYSTOLIC BLOOD PRESSURE: 118 MMHG | HEART RATE: 85 BPM

## 2022-10-17 DIAGNOSIS — Z99.2 ARTERIOVENOUS FISTULA FOR HEMODIALYSIS IN PLACE, PRIMARY: Primary | ICD-10-CM

## 2022-10-17 PROCEDURE — 99999 PR PBB SHADOW E&M-EST. PATIENT-LVL III: CPT | Mod: PBBFAC,TXP,, | Performed by: SURGERY

## 2022-10-17 PROCEDURE — 99999 PR PBB SHADOW E&M-EST. PATIENT-LVL III: ICD-10-PCS | Mod: PBBFAC,TXP,, | Performed by: SURGERY

## 2022-10-17 PROCEDURE — 99213 OFFICE O/P EST LOW 20 MIN: CPT | Mod: PBBFAC,NTX | Performed by: SURGERY

## 2022-10-17 PROCEDURE — 99024 PR POST-OP FOLLOW-UP VISIT: ICD-10-PCS | Mod: NTX,POP,, | Performed by: SURGERY

## 2022-10-17 PROCEDURE — 99024 POSTOP FOLLOW-UP VISIT: CPT | Mod: NTX,POP,, | Performed by: SURGERY

## 2022-10-17 RX ORDER — POTASSIUM CHLORIDE 20 MEQ/1
20 TABLET, EXTENDED RELEASE ORAL EVERY OTHER DAY
COMMUNITY
Start: 2022-10-11 | End: 2023-06-27

## 2022-10-17 RX ORDER — NAPROXEN SODIUM 220 MG/1
1 TABLET, FILM COATED ORAL DAILY
COMMUNITY
Start: 2022-09-30

## 2022-10-17 RX ORDER — LANOLIN ALCOHOL/MO/W.PET/CERES
1 CREAM (GRAM) TOPICAL DAILY
COMMUNITY
Start: 2022-10-10 | End: 2023-10-19

## 2022-10-17 NOTE — PROGRESS NOTES
VASCULAR SURGERY NOTE    Patient ID: Teodoro Mast is a 77 y.o. male.    I. HISTORY     Chief Complaint: AV access    HPI: Teodoro Mast is a 77 y.o. male who is here today for established patient appointment. He had L radiocephalic AVF with me 3/31/22. This thrombosed. He had repeat L radiocephalic 22 and returns for post-op visit. He is on the kidney transplant list.  He is currently on PD but PD is not filtering as much as before and his nephrologist was concerned that it will soon fail.  He denies any issues with his incision.      ALLERGIES: NKA    Medications: reviewed in EMR       Past Surgical History:   Procedure Laterality Date    AV FISTULA PLACEMENT Left 3/31/2022    Procedure: CREATION, AV FISTULA RADIOCEPHALIC;  Surgeon: PEMA Martines II, MD;  [facility];  Service: Vascular;  Laterality: Left;    AV FISTULA PLACEMENT Left 2022    Procedure: CREATION, AV FISTULA;  Surgeon: PEMA Martines II, MD;  Location: [facility] OR 2ND FLR;  Service: Vascular;  Laterality: Left;    COLONOSCOPY N/A 2021    Procedure: COLONOSCOPY;  Surgeon: Joe Jimenez MD;  Location: [facility] ENDO (4TH FLR);  Service: Endoscopy;  Laterality: N/A;  COVID test at [facility] on 10/30-GT      dialysis pt-labs prior    EYE SURGERY Bilateral     cataract removal    FINGER SURGERY      hemorriodectomy      PERITONEAL CATHETER INSERTION         Social History     Occupational History    Not on file   Tobacco Use    Smoking status: Former     Packs/day: 0.50     Years: 10.00     Pack years: 5.00     Types: Cigarettes     Quit date:      Years since quittin.8    Smokeless tobacco: Never   Substance and Sexual Activity    Alcohol use: Not Currently    Drug use: No    Sexual activity: Not Currently     Partners: Female     Family History   Problem Relation Age of Onset    Heart disease Mother     Hypertension Mother     Heart disease Father         pacemaker    Seizures Sister     Hammer toes Brother      Heart disease Brother     Cancer Brother         prostate cancer    Premature birth Son     Cancer Sister         throat    No Known Problems Sister     No Known Problems Sister     No Known Problems Brother     No Known Problems Son        ROS      II. PHYSICAL EXAM     Physical Exam  Easily palpable continuous thrill over Left wrist radiocephalic AVF  Incision CDI, no erythema, no drainage, sutures in place (removed in clinic)      III. ASSESSMENT & PLAN (MEDICAL DECISION MAKING)     Imaging Results: (I have personally reviewed all images and provided interpretation below)  No new imaging    Assessment/Diagnosis and Plan:    1. Arteriovenous fistula for hemodialysis in place, primary            77 y.o. male with ESRD on PD awaiting kidney transplant.  He had a failed left radiocephalic AV fistula at the wrist and had repeat L radiocephalic fistula creation 9/30/22.  Fistula remains patent. Incision healing well. Sutures removed.    -RTC 2 weeks for u/s of LUE AVF      PEMA Martines II, MD, [facility]  Vascular Surgery  [facility]

## 2022-10-19 LAB
25(OH)D3 SERPL-MCNC: 37.2 NG/ML (ref 30–100)
ALBUMIN SERPL-MCNC: 3.8 G/DL (ref 3.5–5.2)
ALP SERPL-CCNC: 91 U/L (ref 40–129)
ALT SERPL W P-5'-P-CCNC: 20 U/L (ref 7–52)
BASOPHILS NFR BLD: 0.4 % (ref 0–1.5)
BICARBONATE: 31 MEQ/L (ref 20–31)
BUN, PRE: 45 MG/DL (ref 6–19)
BUN/CREAT SERPL: 4.3 (ref 10–20)
CALCIUM PHOS PRODUCT, COR: 27 (ref 0–54)
CALCIUM PHOS PRODUCT: 26 (ref 0–54)
CALCIUM SERPL-MCNC: 9.4 MG/DL (ref 8.7–10.4)
CALCIUM, CORRECTED: 9.6 MG/DL (ref 8.7–10.4)
CHLORIDE: 97 MEQ/L (ref 96–108)
CHOLEST SERPL-MSCNC: 205 MG/DL (ref 0–199)
CREAT SERPL-MCNC: 10.38 MG/DL (ref 0.6–1.3)
EOSINOPHIL NFR BLD: 2.1 % (ref 0–7)
ERYTHROCYTE [DISTWIDTH] IN BLOOD BY AUTOMATED COUNT: 14.5 % (ref 11.5–14.5)
FERRITIN SERPL-MCNC: 1385 NG/ML (ref 22–322)
FOLATE: 6.6 NG/ML
GLUCOSE SERPL-MCNC: 87 MG/DL (ref 70–100)
HBV CORE IGM SERPL QL IA: NEGATIVE
HBV SURFACE AB SER-ACNC: <10 MIU/ML
HBV SURFACE AG SERPL QL IA: NEGATIVE
HCT VFR BLD AUTO: 32.5 % (ref 42–52)
HCV AB SERPL QL IA: NONREACTIVE
HCV S/CO RATIO(INDEX): 0.21 (ref 0–0.79)
HDL/CHOLESTEROL RATIO: 6.4 (ref 0–4.5)
HDLC SERPL-MCNC: 32 MG/DL
HEMOGLOBIN X 3: 32.4 % (ref 42–54)
HEPATITIS B CORE TOTAL ANTIBODY: NEGATIVE
HGB BLD-MCNC: 10.8 G/DL (ref 14–18)
IRON: 90 MCG/DL (ref 45–160)
LDLC SERPL CALC-MCNC: 121 MG/DL (ref 0–99)
LUC: 1.4 % (ref 0–4)
LYMPH%: 9.6 % (ref 19–48)
MAGNESIUM SERPL-MCNC: 1.6 MG/DL (ref 1.6–2.6)
MCH RBC QN AUTO: 32.7 PG (ref 27–31)
MCHC RBC AUTO-ENTMCNC: 33.2 G/DL (ref 30–36)
MCV RBC AUTO: 98 FL (ref 80–100)
MONO%: 5.8 % (ref 3–10)
NEUTROPHILS NFR BLD: 80.7 % (ref 40–75)
PHOSPHATE FLD-MCNC: 2.8 MG/DL (ref 2.6–4.5)
PLATELET # BLD AUTO: 276 1000/MCL (ref 130–400)
POTASSIUM SERPL-SCNC: 4.9 MEQ/L (ref 3.5–5.1)
PTH, INTACT: 619 PG/ML (ref 16–80)
RBC # BLD AUTO: 3.3 MILL/MCL (ref 4.7–6.1)
SODIUM BLD-SCNC: 138 MEQ/L (ref 136–145)
TOTAL IRON BINDING CAPACITY: 216 MCG/DL (ref 185–515)
TRANSFERRIN SATURATION: 42 % (ref 20–55)
TRIGL SERPL-MCNC: 261 MG/DL (ref 0–149)
UIBC SERPL-MCNC: 126 MCG/DL (ref 155–355)
VLDL CHOLESTEROL: 52 MG/DL (ref 10–30)
WBC # BLD AUTO: 8.08 1000/MCL (ref 4.8–10.8)

## 2022-10-20 LAB — ALUMINUM SERPL-MCNC: <5 MCG/L (ref 0–10)

## 2022-10-31 ENCOUNTER — HOSPITAL ENCOUNTER (OUTPATIENT)
Dept: VASCULAR SURGERY | Facility: CLINIC | Age: 77
Discharge: HOME | End: 2022-10-31
Attending: SURGERY
Payer: MEDICARE

## 2022-10-31 ENCOUNTER — OFFICE VISIT (OUTPATIENT)
Dept: VASCULAR SURGERY | Facility: CLINIC | Age: 77
End: 2022-10-31
Attending: SURGERY
Payer: MEDICARE

## 2022-10-31 VITALS
DIASTOLIC BLOOD PRESSURE: 60 MMHG | HEART RATE: 85 BPM | WEIGHT: 205 LBS | HEIGHT: 76 IN | TEMPERATURE: 98 F | SYSTOLIC BLOOD PRESSURE: 110 MMHG | BODY MASS INDEX: 24.96 KG/M2

## 2022-10-31 DIAGNOSIS — N18.6 ESRD (END STAGE RENAL DISEASE): ICD-10-CM

## 2022-10-31 DIAGNOSIS — Z99.2 ARTERIOVENOUS FISTULA FOR HEMODIALYSIS IN PLACE, PRIMARY: Primary | ICD-10-CM

## 2022-10-31 PROCEDURE — 93990 DOPPLER FLOW TESTING: CPT | Mod: PBBFAC,NTX | Performed by: SURGERY

## 2022-10-31 PROCEDURE — 99213 OFFICE O/P EST LOW 20 MIN: CPT | Mod: PBBFAC,TXP | Performed by: SURGERY

## 2022-10-31 PROCEDURE — 93990 DOPPLER FLOW TESTING: CPT | Mod: 26,S$PBB,TXP, | Performed by: SURGERY

## 2022-10-31 PROCEDURE — 93990 PR DUPLEX HEMODIALYSIS ACCESS: ICD-10-PCS | Mod: 26,S$PBB,TXP, | Performed by: SURGERY

## 2022-10-31 PROCEDURE — 99999 PR PBB SHADOW E&M-EST. PATIENT-LVL III: ICD-10-PCS | Mod: PBBFAC,TXP,, | Performed by: SURGERY

## 2022-10-31 PROCEDURE — 99024 PR POST-OP FOLLOW-UP VISIT: ICD-10-PCS | Mod: NTX,POP,, | Performed by: SURGERY

## 2022-10-31 PROCEDURE — 99999 PR PBB SHADOW E&M-EST. PATIENT-LVL III: CPT | Mod: PBBFAC,TXP,, | Performed by: SURGERY

## 2022-10-31 PROCEDURE — 99024 POSTOP FOLLOW-UP VISIT: CPT | Mod: NTX,POP,, | Performed by: SURGERY

## 2022-11-02 ENCOUNTER — TELEPHONE (OUTPATIENT)
Dept: TRANSPLANT | Facility: CLINIC | Age: 77
End: 2022-11-02
Payer: MEDICARE

## 2022-11-02 NOTE — LETTER
Date: 11/2/2022          Listed Patient      To: Dialysis Unit  and Charge RN From: [facility] and      Kidney Transplant Nurse Coordinators    RE: Teodoro JAMEL Championon, 1945, [id number]     At [facility], we conduct adherence checks as an important part of transplant care. Initial and listed patient assessments are not complete without adherence information.        Please complete the following information:                 Data from the last 3 months:  (data from last 3 months preferred):    Number of AMAs with dates, time, and reasons: ____________________________________________________    ______________________________________________________________________________________________    ______________________________________________________________________________________________    Number of No-Shows with dates and reasons: ______________________________________________________      ______________________________________________________________________________________________      Any concerns with Caregivers:  YES / NO    If yes, please explain:  ___________________________________________________________________________    ______________________________________________________________________________________________     Any concerns with Transportation:  YES / NO    If yes, please explain:  ___________________________________________________________________________    ______________________________________________________________________________________________    Any Psychiatric and/or Psychosocial concerns:  YES / NO     If yes, please explain: ___________________________________________________________________________    ______________________________________________________________________________________________      PLEASE RETURN TO: FAX: [phone]     Thank you for collaborating with us in the care of this patient.           [address]  ?  [place] [address]  ?  phone [phone]  ?  fax [phone]  ?  [email or web address]  Confidentiality notice: The accompanying facsimile is intended solely for the use of the recipient designated above. Document(s) transmitted herewith may contain information that is confidential and privileged. Delivery, distribution or dissemination of this communication other than to the intended recipient is strictly prohibited. If you have received this facsimile in error, please notify us immediately by telephone.

## 2022-11-03 LAB
ALBUMIN SERPL-MCNC: 3.9 G/DL (ref 3.5–5.2)
ALP SERPL-CCNC: 113 U/L (ref 40–129)
ALT SERPL W P-5'-P-CCNC: 30 U/L (ref 7–52)
BASOPHILS NFR BLD: 0.9 % (ref 0–1.5)
BICARBONATE: 27 MEQ/L (ref 20–31)
BUN, PRE: 62 MG/DL (ref 6–19)
BUN/CREAT SERPL: 5.6 (ref 10–20)
CALCIUM PHOS PRODUCT, COR: 23 (ref 0–54)
CALCIUM PHOS PRODUCT: 23 (ref 0–54)
CALCIUM SERPL-MCNC: 10 MG/DL (ref 8.7–10.4)
CALCIUM, CORRECTED: 10.1 MG/DL (ref 8.7–10.4)
CHLORIDE: 98 MEQ/L (ref 96–108)
CREAT SERPL-MCNC: 11.16 MG/DL (ref 0.6–1.3)
EOSINOPHIL NFR BLD: 2.1 % (ref 0–7)
ERYTHROCYTE [DISTWIDTH] IN BLOOD BY AUTOMATED COUNT: 13.8 % (ref 11.5–14.5)
GLUCOSE SERPL-MCNC: 128 MG/DL (ref 70–100)
HBV SURFACE AG SERPL QL IA: NEGATIVE
HCT VFR BLD AUTO: 35.8 % (ref 42–52)
HEMOGLOBIN X 3: 35.4 % (ref 42–54)
HGB BLD-MCNC: 11.8 G/DL (ref 14–18)
IRON: 96 MCG/DL (ref 45–160)
LUC: 1.1 % (ref 0–4)
LYMPH%: 9.5 % (ref 19–48)
MCH RBC QN AUTO: 32.4 PG (ref 27–31)
MCHC RBC AUTO-ENTMCNC: 33.1 G/DL (ref 30–36)
MCV RBC AUTO: 98 FL (ref 80–100)
MONO%: 8.5 % (ref 3–10)
NEUTROPHILS NFR BLD: 77.9 % (ref 40–75)
PHOSPHATE FLD-MCNC: 2.3 MG/DL (ref 2.6–4.5)
PLATELET # BLD AUTO: 267 1000/MCL (ref 130–400)
POTASSIUM SERPL-SCNC: 5.2 MEQ/L (ref 3.5–5.1)
PTH, INTACT: 352 PG/ML (ref 16–80)
RBC # BLD AUTO: 3.65 MILL/MCL (ref 4.7–6.1)
SODIUM BLD-SCNC: 138 MEQ/L (ref 136–145)
TOTAL IRON BINDING CAPACITY: 251 MCG/DL (ref 185–515)
TRANSFERRIN SATURATION: 38 % (ref 20–55)
UIBC SERPL-MCNC: 155 MCG/DL (ref 155–355)
WBC # BLD AUTO: 7.71 1000/MCL (ref 4.8–10.8)

## 2022-11-07 LAB — FERRITIN SERPL-MCNC: 1192 NG/ML (ref 22–322)

## 2022-11-10 ENCOUNTER — HOSPITAL ENCOUNTER (OUTPATIENT)
Dept: RADIOLOGY | Facility: HOSPITAL | Age: 77
Discharge: HOME | End: 2022-11-10
Attending: NURSE PRACTITIONER
Payer: MEDICARE

## 2022-11-10 ENCOUNTER — OFFICE VISIT (OUTPATIENT)
Dept: TRANSPLANT | Facility: CLINIC | Age: 77
End: 2022-11-10
Payer: MEDICARE

## 2022-11-10 VITALS
WEIGHT: 200.81 LBS | OXYGEN SATURATION: 96 % | HEART RATE: 102 BPM | SYSTOLIC BLOOD PRESSURE: 127 MMHG | BODY MASS INDEX: 25.77 KG/M2 | HEIGHT: 74 IN | RESPIRATION RATE: 17 BRPM | TEMPERATURE: 97 F | DIASTOLIC BLOOD PRESSURE: 68 MMHG

## 2022-11-10 DIAGNOSIS — Z99.2 ESRD ON PERITONEAL DIALYSIS: ICD-10-CM

## 2022-11-10 DIAGNOSIS — N18.5 ANEMIA OF CHRONIC RENAL FAILURE, STAGE 5: ICD-10-CM

## 2022-11-10 DIAGNOSIS — N18.6 ESRD (END STAGE RENAL DISEASE): ICD-10-CM

## 2022-11-10 DIAGNOSIS — I10 ESSENTIAL HYPERTENSION: ICD-10-CM

## 2022-11-10 DIAGNOSIS — N18.6 ESRD ON PERITONEAL DIALYSIS: ICD-10-CM

## 2022-11-10 DIAGNOSIS — Z76.82 ORGAN TRANSPLANT CANDIDATE: ICD-10-CM

## 2022-11-10 DIAGNOSIS — N25.81 SECONDARY HYPERPARATHYROIDISM: ICD-10-CM

## 2022-11-10 DIAGNOSIS — R97.20 ELEVATED PSA: ICD-10-CM

## 2022-11-10 DIAGNOSIS — D63.1 ANEMIA OF CHRONIC RENAL FAILURE, STAGE 5: ICD-10-CM

## 2022-11-10 DIAGNOSIS — Z76.82 PATIENT ON WAITING LIST FOR KIDNEY TRANSPLANT: Primary | ICD-10-CM

## 2022-11-10 PROCEDURE — 93978 US DOPP ILIACS BILATERAL: ICD-10-PCS | Mod: 26,TXP,, | Performed by: RADIOLOGY

## 2022-11-10 PROCEDURE — 99214 OFFICE O/P EST MOD 30 MIN: CPT | Mod: PBBFAC,25,TXP | Performed by: NURSE PRACTITIONER

## 2022-11-10 PROCEDURE — 93978 VASCULAR STUDY: CPT | Mod: 26,TXP,, | Performed by: RADIOLOGY

## 2022-11-10 PROCEDURE — 99215 OFFICE O/P EST HI 40 MIN: CPT | Mod: S$PBB,TXP,, | Performed by: NURSE PRACTITIONER

## 2022-11-10 PROCEDURE — 99999 PR PBB SHADOW E&M-EST. PATIENT-LVL IV: CPT | Mod: PBBFAC,TXP,, | Performed by: NURSE PRACTITIONER

## 2022-11-10 PROCEDURE — 93978 VASCULAR STUDY: CPT | Mod: TC,TXP

## 2022-11-10 PROCEDURE — 99999 PR PBB SHADOW E&M-EST. PATIENT-LVL IV: ICD-10-PCS | Mod: PBBFAC,TXP,, | Performed by: NURSE PRACTITIONER

## 2022-11-10 PROCEDURE — 99215 PR OFFICE/OUTPT VISIT, EST, LEVL V, 40-54 MIN: ICD-10-PCS | Mod: S$PBB,TXP,, | Performed by: NURSE PRACTITIONER

## 2022-11-10 NOTE — PROGRESS NOTES
Kidney Transplant Recipient Reevalulation    Referring Physician: Shine Mcqueen  Current Nephrologist: Shine Mcqueen  Waitlist Status: active  Dialysis Start Date: 5/13/2021    Subjective:     CC:  Six month reassessment of kidney transplant candidacy.    HPI:  Mr. Mast is a 77 y.o. year old Black or  male with ESRD secondary to HTN.  He has been on the wait list for a kidney transplant at [facility] since 5/10/2021. Patient is currently on peritoneal dialysis started on 5/10/2021. Patient is dialyzing on cyclic peritoneal dialysis.  Patient reports that he is tolerating dialysis well. . He has a PD catheter.  Recently had a LUE AV fistula placed and may have to transition to HD in the future. Patient denies any recent hospitalizations or ED visits.    Overall feels well. No health concerns today. Stays very active around the house.  Able to climb flight of stairs without CP, SOB, or leg cramping. Fx status unchanged, looks good not frail    History of elevated PSA-follows with urology Dr. Salcido, LOV 5/9/2022. Had negative prostate biopsy and was cleared for transplant.  Urology consult      5/9/22:    Plan:   Teodoro was seen today for elevated psa.  Diagnoses and all orders for this visit:  Elevated PSA  -     Prostate Specific Antigen, Diagnostic; Future  -     Prostate Specific Antigen, Diagnostic; Future  will check repeat PSA and if stable will return to clinic 6 months with a PSA     PSA, Screen (ng/mL)   Date Value   07/13/2021 14.2 (H)       CXR 10/14/2022:   Heart size normal.  The lungs are clear.  No pleural effusion  PXR 7/13/2021: favorable for transplant.   Renal US 6/2/2022:  Bilateral simple renal cysts.    Stress 8/3/2021: no evidence of myocardial ischemia or infarction.  Colonoscopy 11/2/2021: Tubular adenoma, repeat in 5 years-11/2026  Echo 6/2/2022: EF 50%, /3/21 Lexiscan     Normal myocardial perfusion scan. There is no evidence of myocardial ischemia or  infarction.    There is a  moderate intensity fixed defect in the inferior wall of the left ventricle secondary to diaphragm attenuation.    The LVEF is not accurate due to poor software boundary tracking at rest  and poor software boundary tracking during stress. The visually estimated ejection fraction is low-normal at rest and low-normal during stress.    LV cavity size is normal at rest and normal at stress.    The EKG portion of this study is negative for ischemia.    The patient reported no chest pain during the stress test    Current Outpatient Medications   Medication Sig Dispense Refill    aspirin 81 MG Chew Take 1 tablet by mouth.      atorvastatin (LIPITOR) 80 MG tablet TAKE 1 TABLET(80 MG) BY MOUTH EVERY DAY 90 tablet 3    calcitRIOL (ROCALTROL) 0.25 MCG Cap Take 0.25 mcg by mouth 3 (three) times a week.      labetaloL (NORMODYNE) 100 MG tablet Take 100 mg by mouth once. PRN SBP > 140. Pt take 1/4 of a tablet as needed      magnesium oxide (MAG-OX) 400 mg (241.3 mg magnesium) tablet Take 1 tablet by mouth once daily.      potassium chloride SA (K-DUR,KLOR-CON) 20 MEQ tablet Take 20 mEq by mouth once daily.      vitamin D (VITAMIN D3) 1000 units Tab Take 2,000 Units by mouth once daily.       No current facility-administered medications for this visit.     Facility-Administered Medications Ordered in Other Visits   Medication Dose Route Frequency Provider Last Rate Last Admin    0.9%  NaCl infusion   Intravenous Continuous Enrico Woodruff  mL/hr at 05/05/21 1115 New Bag at 05/05/21 1115    ceFAZolin (ANCEF) 3 gram in dextrose 5% IVPB  3 g Intravenous On Call Procedure Enrico Woodruff MD        LIDOcaine (PF) 10 mg/ml (1%) injection 10 mg  1 mL Intradermal Once Enrico Woodruff MD           Past Medical History:   Diagnosis Date    BPH (benign prostatic hyperplasia)     CKD (chronic kidney disease) stage 5, GFR less than 15 ml/min     Gout     Hyperlipidemia      "Hyperparathyroidism, secondary renal     Hypertension        Review of Systems   Constitutional:  Negative for activity change, appetite change and fever.   HENT:  Negative for congestion, mouth sores and sore throat.    Eyes:  Negative for visual disturbance.   Respiratory:  Negative for cough, chest tightness and shortness of breath.    Cardiovascular:  Negative for chest pain, palpitations and leg swelling.   Gastrointestinal:  Negative for abdominal distention, abdominal pain, constipation, diarrhea and nausea.   Genitourinary:  Positive for decreased urine volume. Negative for difficulty urinating, frequency and hematuria.   Musculoskeletal:  Negative for arthralgias and gait problem.   Skin:  Negative for wound.   Allergic/Immunologic: Negative for environmental allergies, food allergies and immunocompromised state.   Neurological:  Negative for dizziness, weakness and numbness.   Psychiatric/Behavioral:  Negative for sleep disturbance. The patient is not nervous/anxious.      Objective:   body mass index is 25.97 kg/m².  /68 (BP Location: Right arm, Patient Position: Sitting, BP Method: Large (Automatic))   Pulse 102   Temp 97.3 °F (36.3 °C) (Temporal)   Resp 17   Ht 6' 1.74" (1.873 m)   Wt 91.1 kg (200 lb 13.4 oz)   SpO2 96%   BMI 25.97 kg/m²     Physical Exam  Vitals and nursing note reviewed.   Constitutional:       Appearance: Normal appearance.   HENT:      Head: Normocephalic.   Cardiovascular:      Rate and Rhythm: Normal rate and regular rhythm.      Heart sounds: Normal heart sounds.   Pulmonary:      Effort: Pulmonary effort is normal.      Breath sounds: Normal breath sounds.   Abdominal:      General: Bowel sounds are normal. There is no distension.      Palpations: Abdomen is soft.      Tenderness: There is no abdominal tenderness.      Comments: PD catheter , no erythema, edema, tenderness or drainage.    Musculoskeletal:         General: Normal range of motion.        " Arms:    Skin:     General: Skin is warm and dry.   Neurological:      General: No focal deficit present.      Mental Status: He is alert.   Psychiatric:         Behavior: Behavior normal.       Labs:  Lab Results   Component Value Date    WBC 7.71 11/02/2022    HGB 11.8 (L) 11/02/2022    HCT 35.8 (L) 11/02/2022     11/02/2022    K 5.2 (H) 11/02/2022    CL 98 11/02/2022    CO2 27 09/16/2022    BUN 46 (H) 09/16/2022    CREATININE 11.16 (H) 11/02/2022    EGFRNONAA 3.5 (A) 03/07/2022    CALCIUM 10.0 11/02/2022    PHOS 2.3 (L) 11/02/2022    MG 1.6 10/17/2022    ALBUMIN 3.9 11/02/2022    AST 12 07/13/2021    ALT 30 11/02/2022    UTPCR 3.33 (H) 04/12/2021     (H) 11/02/2022       Lab Results   Component Value Date    BILIRUBINUA Negative 04/12/2021    PROTEINUA 3+ (A) 04/12/2021    NITRITE Negative 04/12/2021    RBCUA 0 04/12/2021    WBCUA 2 04/12/2021       Lab Results   Component Value Date    CPRA 0 09/01/2022    HX9ENBA Negative 09/01/2022    CIIAB Negative 09/01/2022       Labs were reviewed with the patient.    Pre-transplant Workup:   Reviewed with the patient.    Assessment:     1. Patient on waiting list for kidney transplant    2. ESRD (end stage renal disease)    3. ESRD on peritoneal dialysis    4. Essential hypertension    5. Anemia of chronic renal failure, stage 5    6. Secondary hyperparathyroidism    7. Elevated PSA          Plan:   Hx elevated PSA--  Due for urology follow up 11/2022    Transplant Candidacy:   Mr. Mast is a suitable kidney transplant candidate.  Meets center eligibility for accepting HCV+ donor offer - yes.  Patient educated on HCV+ donors. Teodoro is willing  to accept HCV+ donor offer -  yes   Patient is a candidate for KDPI > 85 kidney donor offer - no (weight >88kg).  He remains in overall stable health, and will remain active on the transplant list.    Patient advised that it is recommended that all transplant candidates, and their close contacts and household  members receive Covid vaccination.    Jazmin Badillo NP       Follow-up:   In addition to the tests noted in the plan, Mr. Mast will continue to have reevaluation as per the standing pre-kidney transplant protocol:  Monthly blood for PRA  Annual return to clinic, except HIV positive, > 65 years of age, or pancreas transplant candidates who will be scheduled to see transplant every 6 months while in pre-transplant phase  Annual re-testing: CXR, EKG, yearly mammograms for women over 40 and PSA for males over 40, cardiology follow-up as recommended by initial cardiology pre-transplant evaluation  Renal ultrasound every 2 years  Baseline colonoscopy after age 50 and repeated as recommended    UNOS Patient Status  Functional Status: 60% - Requires occasional assistance but is able to care for needs  Physical Capacity: No Limitations

## 2022-11-10 NOTE — LETTER
November 14, 2022        Shine Mcqueen  [address]  [facility] [address]  Phone: [phone]  Fax: [phone]             [facility] 1st Fl  [address]  [facility] [address]  Phone: [phone]   Patient: Teodoro Mast   MR Number: [id number]   YOB: 1945   Date of Visit: 11/10/2022       Dear Dr. Shine Mcqueen    Thank you for referring Teodoro Mast to me for evaluation. Attached you will find relevant portions of my assessment and plan of care.    If you have questions, please do not hesitate to call me. I look forward to following Teodoro Mast along with you.    Sincerely,    Jazmin Badillo, NP    Enclosure    If you would like to receive this communication electronically, please contact [email or web address] or [phone] to request [employer] Link access.    [employer] Link is a tool which provides read-only access to select patient information with whom you have a relationship. Its easy to use and provides real time access to review your patients record including encounter summaries, notes, results, and demographic information.    If you feel you have received this communication in error or would no longer like to receive these types of communications, please e-mail [email or web address]

## 2022-11-16 DIAGNOSIS — Z76.82 PATIENT ON WAITING LIST FOR KIDNEY TRANSPLANT: Primary | ICD-10-CM

## 2022-11-16 LAB
BSA (DUBOIS): 2.19 SQ. M.
BUN, PRE: 46 MG/DL (ref 6–19)
BUN/CREAT SERPL: 3.9 (ref 10–20)
CREAT 24H UR-MRATE: 0 G/24 HR (ref 0.7–1.8)
CREAT CLEAR, PDF NORM WKLY: 52 L/WK
CREAT CLEAR, PDF WKLY: 66 L/WK
CREAT CLEAR, TOT WKLY: 69 L/WK
CREAT CLEAR, URINE NOR WKLY: 2 L/WK
CREAT CLEAR, URINE NORM: 0.2 ML/MIN (ref 94–122)
CREAT CLEAR, URINE WKLY: 3 L/WK
CREAT SERPL-MCNC: 11.94 MG/DL (ref 0.6–1.3)
CREAT, PDF OVERNIGHT COR: 6.7 MG/DL
CREAT, PDF OVERNIGHT UNCOR: 7 MG/DL
CREATININE CLEAR, PDF NORM: 5.2 ML/MIN
CREATININE CLEAR, PDF: 6.5 ML/MIN
CREATININE, PDF 24 HR: 1121.6 MG/24 HR
CREATININE, PDF TIMED COR: 6.7 MG/DL
CREATININE, PDF TIMED UNCOR: 7 MG/DL
CREATININE, TIMED URINE: 149.7 MG/DL
GLUCOSE SERPL-MCNC: 133 MG/DL (ref 70–100)
GLUCOSE, PDF OVERNIGHT: 1345 MG/DL
GLUCOSE, PDF TIMED: 1334 MG/DL
KT/V, PERITONEAL: 1.78
KT/V, RESIDUAL: 0.02
KT/V, TOTAL: 1.8
Lab: 189 CM
Lab: 24
Lab: 74.4
Lab: 91.6 KG
Lab: NORMAL
MAGNESIUM SERPL-MCNC: 1.6 MG/DL (ref 1.6–2.6)
PATIENT WEIGHT (LBS): 201.5
PNA, NORMALIZED: 0.7 G/KG/DAY
PNA: 60 G/DAY
POTASSIUM SERPL-SCNC: 4.5 MEQ/L (ref 3.5–5.1)
UREA CLEAR, PDF NORM WKLY: 89 L/WK
UREA CLEAR, TOT NORM WKLY: 90 L/WK
UREA CLEAR, URINE NORM WKLY: 1 L/WK
UREA CLEAR, URINE NORM: 0.1 ML/MIN (ref 64–99)
UREA CLEARANCE, PD FLUID: 8.8 ML/MIN
UREA CLEARANCE, PDF NORM: 7 ML/MIN
UREA NITROGEN, BODY FLUID: 35 MG/DL
UREA NITROGEN, PDF 24 HR: 5859.4 MG/24 HR
UREA NITROGEN,TIMED URINE: 147 MG/DL
UREA VOL DIST (HUME): 50 L
URINE COLLECTION DURATION: 24
URINE CREATININE CLEARANCE: 0.3 ML/MIN
URINE UREA CLEARANCE: 0.1 ML/MIN (ref 64–99)
UUN 24H UR-MRATE: 0 G/24 HR (ref 12–20)
VOLUME: 30

## 2022-11-17 ENCOUNTER — OFFICE VISIT (OUTPATIENT)
Dept: CARDIOLOGY | Facility: CLINIC | Age: 77
End: 2022-11-17
Payer: MEDICARE

## 2022-11-17 ENCOUNTER — TELEPHONE (OUTPATIENT)
Dept: CARDIOLOGY | Facility: CLINIC | Age: 77
End: 2022-11-17
Payer: MEDICARE

## 2022-11-17 VITALS
BODY MASS INDEX: 25.49 KG/M2 | OXYGEN SATURATION: 98 % | HEART RATE: 111 BPM | WEIGHT: 205 LBS | SYSTOLIC BLOOD PRESSURE: 100 MMHG | DIASTOLIC BLOOD PRESSURE: 60 MMHG | HEIGHT: 75 IN

## 2022-11-17 DIAGNOSIS — Z76.82 PATIENT ON WAITING LIST FOR KIDNEY TRANSPLANT: ICD-10-CM

## 2022-11-17 DIAGNOSIS — N18.5 ANEMIA OF CHRONIC RENAL FAILURE, STAGE 5: ICD-10-CM

## 2022-11-17 DIAGNOSIS — I10 ESSENTIAL HYPERTENSION: ICD-10-CM

## 2022-11-17 DIAGNOSIS — R76.8 ELEVATED SERUM IMMUNOGLOBULIN FREE LIGHT CHAINS: ICD-10-CM

## 2022-11-17 DIAGNOSIS — E78.2 MIXED HYPERLIPIDEMIA: ICD-10-CM

## 2022-11-17 DIAGNOSIS — I12.0 BENIGN HYPERTENSION WITH CKD (CHRONIC KIDNEY DISEASE) STAGE V: ICD-10-CM

## 2022-11-17 DIAGNOSIS — R00.2 PALPITATIONS: ICD-10-CM

## 2022-11-17 DIAGNOSIS — G47.33 OSA (OBSTRUCTIVE SLEEP APNEA): ICD-10-CM

## 2022-11-17 DIAGNOSIS — N18.5 BENIGN HYPERTENSION WITH CKD (CHRONIC KIDNEY DISEASE) STAGE V: ICD-10-CM

## 2022-11-17 DIAGNOSIS — R00.0 TACHYCARDIA: Primary | ICD-10-CM

## 2022-11-17 DIAGNOSIS — D63.1 ANEMIA OF CHRONIC RENAL FAILURE, STAGE 5: ICD-10-CM

## 2022-11-17 DIAGNOSIS — R97.20 ELEVATED PSA: ICD-10-CM

## 2022-11-17 DIAGNOSIS — R00.2 PALPITATIONS: Primary | ICD-10-CM

## 2022-11-17 PROCEDURE — 99213 OFFICE O/P EST LOW 20 MIN: CPT | Mod: S$PBB,TXP,, | Performed by: INTERNAL MEDICINE

## 2022-11-17 PROCEDURE — 93010 ELECTROCARDIOGRAM REPORT: CPT | Mod: S$PBB,TXP,, | Performed by: INTERNAL MEDICINE

## 2022-11-17 PROCEDURE — 93010 EKG 12-LEAD: ICD-10-PCS | Mod: S$PBB,TXP,, | Performed by: INTERNAL MEDICINE

## 2022-11-17 PROCEDURE — 99999 PR PBB SHADOW E&M-EST. PATIENT-LVL III: ICD-10-PCS | Mod: PBBFAC,TXP,, | Performed by: INTERNAL MEDICINE

## 2022-11-17 PROCEDURE — 99213 PR OFFICE/OUTPT VISIT, EST, LEVL III, 20-29 MIN: ICD-10-PCS | Mod: S$PBB,TXP,, | Performed by: INTERNAL MEDICINE

## 2022-11-17 PROCEDURE — 99999 PR PBB SHADOW E&M-EST. PATIENT-LVL III: CPT | Mod: PBBFAC,TXP,, | Performed by: INTERNAL MEDICINE

## 2022-11-17 PROCEDURE — 99213 OFFICE O/P EST LOW 20 MIN: CPT | Mod: PBBFAC,PN,TXP | Performed by: INTERNAL MEDICINE

## 2022-11-17 NOTE — PROGRESS NOTES
Subjective:    Patient ID:  Teodoro Mast is a 77 y.o. male who presents for follow-up of Hypertension      HPI     The patient is a 77 year old male retired  at [employer] is followed with hypertension, hyperlipidemia , ESRD on PD and Transplant list. He has noted increased heart rate for the last 2 week wihtoutoncreae in SOB or FRAGA. EKG revealed sibnus tachycardia at 116 and slowed with carotic message    Lab Results   Component Value Date     11/02/2022    K 4.5 11/15/2022    CL 98 11/02/2022    CO2 27 09/16/2022    BUN 46 (H) 09/16/2022    CREATININE 11.94 (H) 11/15/2022     (H) 11/15/2022    HGBA1C 5.9 07/28/2006    MG 1.6 11/15/2022    AST 12 07/13/2021    ALT 30 11/02/2022    ALBUMIN 3.9 11/02/2022    PROT 6.9 07/13/2021    BILITOT 0.4 07/13/2021    WBC 7.71 11/02/2022    HGB 11.8 (L) 11/02/2022    HCT 35.8 (L) 11/02/2022    HCT 26 (L) 09/30/2022    MCV 98 11/02/2022     11/02/2022    INR 1.0 07/13/2021    PSA 14.2 (H) 07/13/2021         Lab Results   Component Value Date    CHOL 205 (H) 10/17/2022    HDL 32 10/17/2022    TRIG 261 (H) 10/17/2022       Lab Results   Component Value Date    LDLCALC 121 (H) 10/17/2022       Past Medical History:   Diagnosis Date    BPH (benign prostatic hyperplasia)     CKD (chronic kidney disease) stage 5, GFR less than 15 ml/min     Gout     Hyperlipidemia     Hyperparathyroidism, secondary renal     Hypertension        Current Outpatient Medications:     aspirin 81 MG Chew, Take 1 tablet by mouth., Disp: , Rfl:     atorvastatin (LIPITOR) 80 MG tablet, TAKE 1 TABLET(80 MG) BY MOUTH EVERY DAY, Disp: 90 tablet, Rfl: 3    calcitRIOL (ROCALTROL) 0.25 MCG Cap, Take 0.25 mcg by mouth 3 (three) times a week., Disp: , Rfl:     labetaloL (NORMODYNE) 100 MG tablet, Take 100 mg by mouth once. PRN SBP > 140. Pt take 1/4 of a tablet as needed, Disp: , Rfl:     magnesium oxide (MAG-OX) 400 mg (241.3 mg magnesium) tablet, Take 1 tablet by mouth once daily., Disp: ,  Rfl:     vitamin D (VITAMIN D3) 1000 units Tab, Take 2,000 Units by mouth once daily., Disp: , Rfl:     potassium chloride SA (K-DUR,KLOR-CON) 20 MEQ tablet, Take 20 mEq by mouth once daily., Disp: , Rfl:   No current facility-administered medications for this visit.    Facility-Administered Medications Ordered in Other Visits:     0.9%  NaCl infusion, , Intravenous, Continuous, Enrico Woodruff MD, Last Rate: 100 mL/hr at 05/05/21 1115, New Bag at 05/05/21 1115    ceFAZolin (ANCEF) 3 gram in dextrose 5% IVPB, 3 g, Intravenous, On Call Procedure, Enrico Woodruff MD    LIDOcaine (PF) 10 mg/ml (1%) injection 10 mg, 1 mL, Intradermal, Once, Enrico Woodruff MD          Review of Systems   Constitutional: Negative for decreased appetite, diaphoresis, fever, malaise/fatigue, weight gain and weight loss.   HENT:  Negative for congestion, ear discharge, ear pain and nosebleeds.    Eyes:  Negative for blurred vision, double vision and visual disturbance.   Cardiovascular:  Positive for palpitations. Negative for chest pain, claudication, cyanosis, dyspnea on exertion, irregular heartbeat, leg swelling, near-syncope, orthopnea, paroxysmal nocturnal dyspnea and syncope.   Respiratory:  Negative for cough, hemoptysis, shortness of breath, sleep disturbances due to breathing, snoring, sputum production and wheezing.    Endocrine: Negative for polydipsia, polyphagia and polyuria.   Hematologic/Lymphatic: Negative for adenopathy and bleeding problem. Does not bruise/bleed easily.   Skin:  Negative for color change, nail changes, poor wound healing and rash.   Musculoskeletal:  Negative for muscle cramps and muscle weakness.   Gastrointestinal:  Negative for abdominal pain, anorexia, change in bowel habit, hematochezia, nausea and vomiting.   Genitourinary:  Negative for dysuria, frequency and hematuria.   Neurological:  Negative for brief paralysis, difficulty with concentration, excessive daytime sleepiness,  "dizziness, focal weakness, headaches, light-headedness, seizures, vertigo and weakness.   Psychiatric/Behavioral:  Negative for altered mental status and depression.    Allergic/Immunologic: Negative for persistent infections.      Objective:/60   Pulse (!) 111   Ht 6' 3" (1.905 m)   Wt 93 kg (205 lb 0.4 oz)   SpO2 98%   BMI 25.63 kg/m²             Physical Exam  Constitutional:       Appearance: He is well-developed and normal weight.   HENT:      Head: Normocephalic.      Right Ear: External ear normal.      Left Ear: External ear normal.      Nose: Nose normal.   Eyes:      General: No scleral icterus.     Pupils: Pupils are equal, round, and reactive to light.   Neck:      Thyroid: No thyromegaly.      Vascular: No JVD.      Trachea: No tracheal deviation.   Cardiovascular:      Rate and Rhythm: Regular rhythm. Tachycardia present.      Pulses: Intact distal pulses.           Carotid pulses are 2+ on the right side and 2+ on the left side.       Dorsalis pedis pulses are 2+ on the right side and 2+ on the left side.      Heart sounds: No murmur heard.    No friction rub. No gallop.   Pulmonary:      Effort: Pulmonary effort is normal.      Breath sounds: Normal breath sounds.   Abdominal:      General: Bowel sounds are normal. There is no distension.      Tenderness: There is no abdominal tenderness. There is no guarding.   Musculoskeletal:         General: No tenderness. Normal range of motion.      Cervical back: Normal range of motion and neck supple.   Lymphadenopathy:      Comments: Palpation of neck and groin lymph nodes normal   Skin:     General: Skin is dry.      Comments: Palpation of skin normal   Neurological:      Mental Status: He is alert and oriented to person, place, and time.      Cranial Nerves: No cranial nerve deficit.      Motor: No abnormal muscle tone.      Coordination: Coordination normal.   Psychiatric:         Behavior: Behavior normal.         Thought Content: Thought " content normal.         Judgment: Judgment normal.       Assessment:       1. Benign hypertension with CKD (chronic kidney disease) stage V    2. Essential hypertension    3. Mixed hyperlipidemia    4. Elevated PSA    5. Patient on waiting list for kidney transplant    6. Anemia of chronic renal failure, stage 5    7. Elevated serum immunoglobulin free light chains    8. GLYNN (obstructive sleep apnea)         Plan:       Teodoro was seen today for hypertension.    Diagnoses and all orders for this visit:    Benign hypertension with CKD (chronic kidney disease) stage V    Essential hypertension    Mixed hyperlipidemia    Elevated PSA    Patient on waiting list for kidney transplant    Anemia of chronic renal failure, stage 5    Elevated serum immunoglobulin free light chains    GLYNN (obstructive sleep apnea)

## 2022-11-21 ENCOUNTER — IMMUNIZATION (OUTPATIENT)
Dept: INTERNAL MEDICINE | Facility: CLINIC | Age: 77
End: 2022-11-21
Payer: MEDICARE

## 2022-11-21 DIAGNOSIS — Z23 NEED FOR VACCINATION: Primary | ICD-10-CM

## 2022-11-21 PROCEDURE — 0124A COVID-19, MRNA, LNP-S, BIVALENT BOOSTER, PF, 30 MCG/0.3 ML DOSE: CPT | Mod: PBBFAC,CV19

## 2022-11-21 PROCEDURE — 91312 COVID-19, MRNA, LNP-S, BIVALENT BOOSTER, PF, 30 MCG/0.3 ML DOSE: CPT | Mod: PBBFAC

## 2022-11-22 LAB — CREAT CLEAR, TOT NORM WKLY: 54 L/WK

## 2022-11-28 ENCOUNTER — TELEPHONE (OUTPATIENT)
Dept: TRANSPLANT | Facility: CLINIC | Age: 77
End: 2022-11-28
Payer: MEDICARE

## 2022-11-28 ENCOUNTER — CLINICAL SUPPORT (OUTPATIENT)
Dept: CARDIOLOGY | Facility: HOSPITAL | Age: 77
End: 2022-11-28
Attending: INTERNAL MEDICINE
Payer: MEDICARE

## 2022-11-28 DIAGNOSIS — R00.0 TACHYCARDIA: ICD-10-CM

## 2022-11-28 PROCEDURE — 93227 XTRNL ECG REC<48 HR R&I: CPT | Mod: NTX,,, | Performed by: INTERNAL MEDICINE

## 2022-11-28 PROCEDURE — 93225 XTRNL ECG REC<48 HRS REC: CPT | Mod: TXP

## 2022-11-28 PROCEDURE — 93227 HOLTER MONITOR - 48 HOUR (CUPID ONLY): ICD-10-PCS | Mod: NTX,,, | Performed by: INTERNAL MEDICINE

## 2022-12-02 LAB
25(OH)D3 SERPL-MCNC: 51.3 NG/ML (ref 30–100)
ALBUMIN SERPL-MCNC: 3.8 G/DL (ref 3.5–5.2)
ALP SERPL-CCNC: 93 U/L (ref 40–129)
ALT SERPL W P-5'-P-CCNC: 22 U/L (ref 7–52)
BASOPHILS NFR BLD: 0.5 % (ref 0–1.5)
BICARBONATE: 29 MEQ/L (ref 20–31)
BUN, PRE: 64 MG/DL (ref 6–19)
BUN/CREAT SERPL: 5.2 (ref 10–20)
CALCIUM PHOS PRODUCT, COR: 30 (ref 0–54)
CALCIUM PHOS PRODUCT: 30 (ref 0–54)
CALCIUM SERPL-MCNC: 9.3 MG/DL (ref 8.7–10.4)
CALCIUM, CORRECTED: 9.5 MG/DL (ref 8.7–10.4)
CHLORIDE: 97 MEQ/L (ref 96–108)
CREAT SERPL-MCNC: 12.32 MG/DL (ref 0.6–1.3)
EOSINOPHIL NFR BLD: 1.7 % (ref 0–7)
ERYTHROCYTE [DISTWIDTH] IN BLOOD BY AUTOMATED COUNT: 14 % (ref 11.5–14.5)
FERRITIN SERPL-MCNC: 1246 NG/ML (ref 22–322)
GLUCOSE SERPL-MCNC: 82 MG/DL (ref 70–100)
HBV SURFACE AG SERPL QL IA: NEGATIVE
HCT VFR BLD AUTO: 33.3 % (ref 42–52)
HEMOGLOBIN X 3: 33.9 % (ref 42–54)
HGB BLD-MCNC: 11.3 G/DL (ref 14–18)
IRON: 81 MCG/DL (ref 45–160)
LUC: 1.3 % (ref 0–4)
LYMPH%: 9.1 % (ref 19–48)
MCH RBC QN AUTO: 32.8 PG (ref 27–31)
MCHC RBC AUTO-ENTMCNC: 34 G/DL (ref 30–36)
MCV RBC AUTO: 96 FL (ref 80–100)
MONO%: 8.5 % (ref 3–10)
NEUTROPHILS NFR BLD: 78.9 % (ref 40–75)
OHS CV EVENT MONITOR DAY: 0
OHS CV HOLTER LENGTH DECIMAL HOURS: 48
OHS CV HOLTER LENGTH HOURS: 48
OHS CV HOLTER LENGTH MINUTES: 0
OHS CV HOLTER SINUS AVERAGE HR: 93
OHS CV HOLTER SINUS MAX HR: 130
OHS CV HOLTER SINUS MIN HR: 68
PHOSPHATE FLD-MCNC: 3.2 MG/DL (ref 2.6–4.5)
PLATELET # BLD AUTO: 269 1000/MCL (ref 130–400)
POTASSIUM SERPL-SCNC: 3.9 MEQ/L (ref 3.5–5.1)
PTH, INTACT: 434 PG/ML (ref 16–80)
RBC # BLD AUTO: 3.45 MILL/MCL (ref 4.7–6.1)
SODIUM BLD-SCNC: 138 MEQ/L (ref 136–145)
TOTAL IRON BINDING CAPACITY: 244 MCG/DL (ref 185–515)
TRANSFERRIN SATURATION: 33 % (ref 20–55)
UIBC SERPL-MCNC: 163 MCG/DL (ref 155–355)
WBC # BLD AUTO: 8.36 1000/MCL (ref 4.8–10.8)

## 2022-12-05 NOTE — PROGRESS NOTES
Transplant Psychosocial Evaluation Update:  Last psychosocial evaluation for transplant was completed on 6/2/22 by DILAN Almanzar    Pt presents today in company of Teodoro carrion Jr. Pt and son  present as alert, oriented to person, place, time, purpose of visit. Pt and son deny concerns about going through with transplant and state motivation and sense of preparedness for transplantation, caregiver role, psychosocial risk factors, medical risk factors, financial aspects, and lifetime commitments. All concerns and education points as per transplant psychosocial evaluation process addressed (also refer to psychosocial dated 6/2/22 ). Pt actively participated in today's interview, with son participating as caregiver. Pt asking questions appropriately and denies changes to previous psychosocial evaluation for transplantation which we reviewed line by line with pt and, per pt choice, with pts caregiver today.    Primary Caregivers and Transportation for Transplant:  1. Teodoro Mast II, 38 y/o son, lives with pt, [phone], drives  2. Nilesh Mast, 34 y/o son, [phone], drives.  3. Rip Ryanxton , 52 y/o nephew, [phone], drives    Both pt and caregiver/s plan to stay locally at pt's home - information reviewed in depth. Recommend fundraising to offset costs associated with transplant.  Pt and caregiver informed that they are responsible for transplant related lodging arrangements and expense.   Pt and caregiver informed that lodging assistance will be unavailable beginning 2023. - information reviewed in depth.  Caregivers plan to stay at hospital as appropriate for transplant and post-transplant process.    Pts Vocation: Pt is retired since 2010 .    DME: CPAP, PD equipment and supplies (may start HD).     ADLS:  Pt states ability to independently accomplish all adls.     Household and Dependents: pt resides with Teodoro carrion II and has no dependents at this time.    Income: Pt states ability to  "afford all monthly expenses and costs including for medications now and if transplanted based on income and on savings and assets.    Dialysis Adherence: Pt states current and expected compliance with all healthcare recommendations, including dialysis.  Dialysis compliance found under "Media" tab->"dialysis compliance".  Compliance reported as satisfactory.    Pt and son deny any additional concerns, stating preparedness and motivation to proceed with organ transplant.     Suitability for Transplant:   Pt remains for transplant at this time based on psychosocial risk factors and strengths.    Pt hazel well overall with health needs and life in general, denies current or past suicidal/homicidal ideation, has stable supports, adequate insurance, financial stability, transportation and caregiver plans in place, and is using no substances unless prescribed.     Payer/Plan Subscr  Sex Relation Sub. Ins. ID Effective Group Num   1. MEDICARE - ME* NEVILLE WALL 1945 Male Self [id number]                                    [address]   2. [employer]* NEVILLE WALL 1945 Male Self [id number] PLANG                                   [id number]            "

## 2022-12-12 ENCOUNTER — LAB VISIT (OUTPATIENT)
Dept: LAB | Facility: HOSPITAL | Age: 77
End: 2022-12-12
Payer: MEDICARE

## 2022-12-12 DIAGNOSIS — Z76.82 PATIENT ON WAITING LIST FOR KIDNEY TRANSPLANT: ICD-10-CM

## 2022-12-12 LAB — COMPLEXED PSA SERPL-MCNC: 10.3 NG/ML (ref 0–4)

## 2022-12-12 PROCEDURE — 84153 ASSAY OF PSA TOTAL: CPT | Mod: TXP | Performed by: NURSE PRACTITIONER

## 2022-12-12 PROCEDURE — 36415 COLL VENOUS BLD VENIPUNCTURE: CPT | Mod: TXP | Performed by: NURSE PRACTITIONER

## 2022-12-14 ENCOUNTER — OFFICE VISIT (OUTPATIENT)
Dept: UROLOGY | Facility: CLINIC | Age: 77
End: 2022-12-14
Payer: MEDICARE

## 2022-12-14 VITALS
HEART RATE: 102 BPM | DIASTOLIC BLOOD PRESSURE: 78 MMHG | BODY MASS INDEX: 27.5 KG/M2 | WEIGHT: 214.31 LBS | SYSTOLIC BLOOD PRESSURE: 152 MMHG | HEIGHT: 74 IN

## 2022-12-14 DIAGNOSIS — R97.20 ELEVATED PSA: Primary | ICD-10-CM

## 2022-12-14 PROCEDURE — 99999 PR PBB SHADOW E&M-EST. PATIENT-LVL III: ICD-10-PCS | Mod: PBBFAC,TXP,, | Performed by: UROLOGY

## 2022-12-14 PROCEDURE — 99214 OFFICE O/P EST MOD 30 MIN: CPT | Mod: S$PBB,TXP,, | Performed by: UROLOGY

## 2022-12-14 PROCEDURE — 99214 PR OFFICE/OUTPT VISIT, EST, LEVL IV, 30-39 MIN: ICD-10-PCS | Mod: S$PBB,TXP,, | Performed by: UROLOGY

## 2022-12-14 PROCEDURE — 99213 OFFICE O/P EST LOW 20 MIN: CPT | Mod: PBBFAC,TXP | Performed by: UROLOGY

## 2022-12-14 PROCEDURE — 99999 PR PBB SHADOW E&M-EST. PATIENT-LVL III: CPT | Mod: PBBFAC,TXP,, | Performed by: UROLOGY

## 2022-12-14 RX ORDER — GENTAMICIN SULFATE 3 MG/ML
SOLUTION/ DROPS OPHTHALMIC
COMMUNITY
Start: 2022-12-13

## 2022-12-14 NOTE — PROGRESS NOTES
Subjective:       Patient ID: Teodoro Mast is a 77 y.o. male.    Chief Complaint: Elevated PSA ( 6 month follow up with PSA. 12/12/22 10.3)    HPI patient has an elevated PSA of over 12.  He is status post MRI 2 years ago and was found have P reds 2 lesion.  He is on the transplant list.  And receives dialysis      Past Medical History:   Diagnosis Date    BPH (benign prostatic hyperplasia)     CKD (chronic kidney disease) stage 5, GFR less than 15 ml/min     Gout     Hyperlipidemia     Hyperparathyroidism, secondary renal     Hypertension        Past Surgical History:   Procedure Laterality Date    AV FISTULA PLACEMENT Left 3/31/2022    Procedure: CREATION, AV FISTULA RADIOCEPHALIC;  Surgeon: PEMA Martines II, MD;  Location: [facility] OR 2ND FLR;  Service: Vascular;  Laterality: Left;    AV FISTULA PLACEMENT Left 9/30/2022    Procedure: CREATION, AV FISTULA;  Surgeon: PEMA Martines II, MD;  Location: [facility] OR 2ND FLR;  Service: Vascular;  Laterality: Left;    COLONOSCOPY N/A 11/2/2021    Procedure: COLONOSCOPY;  Surgeon: Joe Jimenez MD;  Location: [facility] ENDO (4TH FLR);  Service: Endoscopy;  Laterality: N/A;  COVID test at [place] on 10/30-GT      dialysis pt-labs prior    EYE SURGERY Bilateral     cataract removal    FINGER SURGERY      hemorriodectomy      PERITONEAL CATHETER INSERTION         Family History   Problem Relation Age of Onset    Heart disease Mother     Hypertension Mother     Heart disease Father         pacemaker    Seizures Sister     Hammer toes Brother     Heart disease Brother     Cancer Brother         prostate cancer    Premature birth Son     Cancer Sister         throat    No Known Problems Sister     No Known Problems Sister     No Known Problems Brother     No Known Problems Son        Social History     Socioeconomic History    Marital status:     Number of children: 2   Tobacco Use    Smoking status: Former     Packs/day: 0.50     Years: 10.00     Pack years: 5.00      Types: Cigarettes     Quit date:      Years since quittin.9    Smokeless tobacco: Never   Substance and Sexual Activity    Alcohol use: Not Currently    Drug use: No    Sexual activity: Not Currently     Partners: Female   Social History Narrative    Caregiver Teodoro Mast II       Allergies:  Patient has no known allergies.    Medications:    Current Outpatient Medications:     aspirin 81 MG Chew, Take 1 tablet by mouth., Disp: , Rfl:     atorvastatin (LIPITOR) 80 MG tablet, TAKE 1 TABLET(80 MG) BY MOUTH EVERY DAY, Disp: 90 tablet, Rfl: 3    calcitRIOL (ROCALTROL) 0.25 MCG Cap, Take 0.25 mcg by mouth once a week., Disp: , Rfl:     labetaloL (NORMODYNE) 100 MG tablet, Take 100 mg by mouth once. PRN SBP > 140. Pt take 1/4 of a tablet as needed, Disp: , Rfl:     magnesium oxide (MAG-OX) 400 mg (241.3 mg magnesium) tablet, Take 1 tablet by mouth once daily., Disp: , Rfl:     potassium chloride SA (K-DUR,KLOR-CON) 20 MEQ tablet, Take 20 mEq by mouth once daily., Disp: , Rfl:     vitamin D (VITAMIN D3) 1000 units Tab, Take 2,000 Units by mouth once daily., Disp: , Rfl:     gentamicin (GARAMYCIN) 0.3 % ophthalmic solution, Place into both eyes., Disp: , Rfl:   No current facility-administered medications for this visit.    Facility-Administered Medications Ordered in Other Visits:     0.9%  NaCl infusion, , Intravenous, Continuous, Enrico Woodruff MD, Last Rate: 100 mL/hr at 21 1115, New Bag at 21 1115    ceFAZolin (ANCEF) 3 gram in dextrose 5% IVPB, 3 g, Intravenous, On Call Procedure, Enrico Woodruff MD    LIDOcaine (PF) 10 mg/ml (1%) injection 10 mg, 1 mL, Intradermal, Once, Enrico Woodruff MD    Review of Systems   Constitutional:  Negative for activity change, appetite change, chills, diaphoresis, fatigue, fever and unexpected weight change.   HENT:  Negative for congestion, dental problem, hearing loss, mouth sores, postnasal drip, rhinorrhea, sinus pressure and  trouble swallowing.    Eyes:  Negative for pain, discharge and itching.   Respiratory:  Negative for apnea, cough, choking, chest tightness, shortness of breath and wheezing.    Cardiovascular:  Negative for chest pain, palpitations and leg swelling.   Gastrointestinal:  Negative for abdominal distention, abdominal pain, anal bleeding, blood in stool, constipation, diarrhea, nausea, rectal pain and vomiting.   Endocrine: Negative for polydipsia and polyuria.   Genitourinary:  Negative for decreased urine volume, difficulty urinating, dysuria, enuresis, flank pain, frequency, genital sores, hematuria, penile discharge, penile pain, penile swelling, scrotal swelling, testicular pain and urgency.   Musculoskeletal:  Negative for arthralgias, back pain and myalgias.   Skin:  Negative for color change, rash and wound.   Neurological:  Negative for dizziness, syncope, speech difficulty, light-headedness and headaches.   Hematological:  Negative for adenopathy. Does not bruise/bleed easily.   Psychiatric/Behavioral:  Negative for behavioral problems, confusion, hallucinations and sleep disturbance.      Objective:      Physical Exam  Constitutional:       Appearance: He is well-developed.   HENT:      Head: Normocephalic.   Cardiovascular:      Rate and Rhythm: Normal rate.   Pulmonary:      Effort: Pulmonary effort is normal.   Abdominal:      Palpations: Abdomen is soft.   Genitourinary:     Prostate: Normal.      Comments: 30 g benign no nodules    Skin:     General: Skin is warm.   Neurological:      Mental Status: He is alert.       Assessment:       1. Elevated PSA          Plan:       Teodoro was seen today for elevated psa.    Diagnoses and all orders for this visit:    Elevated PSA  -     MRI Pelvis Without Contrast; Future

## 2023-01-13 ENCOUNTER — HOSPITAL ENCOUNTER (OUTPATIENT)
Dept: RADIOLOGY | Facility: HOSPITAL | Age: 78
Discharge: HOME | End: 2023-01-13
Attending: UROLOGY
Payer: MEDICARE

## 2023-01-13 DIAGNOSIS — T17.918S: ICD-10-CM

## 2023-01-13 DIAGNOSIS — R97.20 ELEVATED PSA: ICD-10-CM

## 2023-01-13 PROCEDURE — 72195 MRI PELVIS WITHOUT CONTRAST: ICD-10-PCS | Mod: 26,TXP,, | Performed by: STUDENT IN AN ORGANIZED HEALTH CARE EDUCATION/TRAINING PROGRAM

## 2023-01-13 PROCEDURE — 71046 XR CHEST PA AND LATERAL: ICD-10-PCS | Mod: 26,NTX,, | Performed by: RADIOLOGY

## 2023-01-13 PROCEDURE — 72195 MRI PELVIS W/O DYE: CPT | Mod: TC,NTX

## 2023-01-13 PROCEDURE — 72195 MRI PELVIS W/O DYE: CPT | Mod: 26,TXP,, | Performed by: STUDENT IN AN ORGANIZED HEALTH CARE EDUCATION/TRAINING PROGRAM

## 2023-01-13 PROCEDURE — 71046 X-RAY EXAM CHEST 2 VIEWS: CPT | Mod: TC,TXP

## 2023-01-13 PROCEDURE — 71046 X-RAY EXAM CHEST 2 VIEWS: CPT | Mod: 26,NTX,, | Performed by: RADIOLOGY

## 2023-01-13 NOTE — PROGRESS NOTES
"Approximately at 14:50 [facility] called and informed radiology float nurse they were concerned about a patient who was on the MRI table and began vomiting / staff concerned about aspiration due to pt unresponsive per MRI staff / nurse informed [id number] called / upon my arrival @ 14:55 pt in restroom and cleaning self up and clean gown / pt placed to WC by nurse and evaluated stating "I feel fine" / nurse spoke with Dr Dahl and verbal order for 2 view stat chest film / pt moved to radiology and film done / pt moved back to MRI via WC and stated he wants to finish the MRI scan / pt to table for remainder of scan atiya 6 minutes / pt AAO w/ NAD / upon completion of scan pt informed [facility] policy and nurse recommendation for transport to ED for evaluation . Pt understanding with several staff present and refusing / pt elected [facility] (against medical advice) of own choice and own free will / pt called son who was waiting in parking lot and reports D/C home / nurse again offered transport and pt choose [facility] / nurse advised pt if SOB or has a cough he should call 911 for medical evaluation   Dr Dahl called MRI and reading of chest film clear and no aspiration noted @ 15:17   "

## 2023-01-17 ENCOUNTER — TELEPHONE (OUTPATIENT)
Dept: UROLOGY | Facility: CLINIC | Age: 78
End: 2023-01-17
Payer: MEDICARE

## 2023-01-17 NOTE — TELEPHONE ENCOUNTER
----- Message from Akbar Salcido Jr., MD sent at 1/17/2023 12:34 PM CST -----  PIRADS 2  RTC 6 mos w psa. No MRI at present

## 2023-02-28 DIAGNOSIS — Z76.82 ORGAN TRANSPLANT CANDIDATE: Primary | ICD-10-CM

## 2023-02-28 NOTE — PROGRESS NOTES
YEARLY LIST MANAGEMENT NOTE    Teodoro Msat's kidney transplant listing status reviewed.  Patient is due for follow-up appointments in May 2023.  Appointments will be scheduled per protocol.  HLA sample is up to date and being received on a regular basis.

## 2023-04-27 ENCOUNTER — TELEPHONE (OUTPATIENT)
Dept: TRANSPLANT | Facility: CLINIC | Age: 78
End: 2023-04-27
Payer: MEDICARE

## 2023-04-28 ENCOUNTER — TELEPHONE (OUTPATIENT)
Dept: TRANSPLANT | Facility: CLINIC | Age: 78
End: 2023-04-28
Payer: MEDICARE

## 2023-04-28 NOTE — TELEPHONE ENCOUNTER
MA notes per adherence form     FOR THE PAST THREE MONTHS:    0-AMA's  0-No-shows    No concerns with care giving, transportation, or mental health    Per adherence form pt's son Teodoro Mast Jr is his primary caregiver. Pt is very compliant.     Brought over to clinic to be scanned in.    Amelia Matt  Abdominal Transplant MA

## 2023-05-09 ENCOUNTER — OFFICE VISIT (OUTPATIENT)
Dept: TRANSPLANT | Facility: CLINIC | Age: 78
End: 2023-05-09
Payer: MEDICARE

## 2023-05-09 ENCOUNTER — HOSPITAL ENCOUNTER (OUTPATIENT)
Dept: RADIOLOGY | Facility: HOSPITAL | Age: 78
Discharge: HOME | End: 2023-05-09
Attending: NURSE PRACTITIONER
Payer: MEDICARE

## 2023-05-09 VITALS
HEIGHT: 74 IN | DIASTOLIC BLOOD PRESSURE: 68 MMHG | OXYGEN SATURATION: 98 % | WEIGHT: 207.69 LBS | TEMPERATURE: 97 F | BODY MASS INDEX: 26.66 KG/M2 | HEART RATE: 77 BPM | SYSTOLIC BLOOD PRESSURE: 134 MMHG | RESPIRATION RATE: 18 BRPM

## 2023-05-09 DIAGNOSIS — N25.81 SECONDARY HYPERPARATHYROIDISM: ICD-10-CM

## 2023-05-09 DIAGNOSIS — R97.20 ELEVATED PSA: ICD-10-CM

## 2023-05-09 DIAGNOSIS — Z76.82 PATIENT ON WAITING LIST FOR KIDNEY TRANSPLANT: Primary | ICD-10-CM

## 2023-05-09 DIAGNOSIS — I10 ESSENTIAL HYPERTENSION: ICD-10-CM

## 2023-05-09 DIAGNOSIS — Z76.82 ORGAN TRANSPLANT CANDIDATE: ICD-10-CM

## 2023-05-09 DIAGNOSIS — N18.6 ESRD ON PERITONEAL DIALYSIS: ICD-10-CM

## 2023-05-09 DIAGNOSIS — Z99.2 ESRD ON PERITONEAL DIALYSIS: ICD-10-CM

## 2023-05-09 PROCEDURE — 76770 US EXAM ABDO BACK WALL COMP: CPT | Mod: TC,TXP

## 2023-05-09 PROCEDURE — 99215 OFFICE O/P EST HI 40 MIN: CPT | Mod: S$PBB,TXP,, | Performed by: NURSE PRACTITIONER

## 2023-05-09 PROCEDURE — 76770 US EXAM ABDO BACK WALL COMP: CPT | Mod: 26,TXP,, | Performed by: STUDENT IN AN ORGANIZED HEALTH CARE EDUCATION/TRAINING PROGRAM

## 2023-05-09 PROCEDURE — 99215 PR OFFICE/OUTPT VISIT, EST, LEVL V, 40-54 MIN: ICD-10-PCS | Mod: S$PBB,TXP,, | Performed by: NURSE PRACTITIONER

## 2023-05-09 PROCEDURE — 99999 PR PBB SHADOW E&M-EST. PATIENT-LVL IV: ICD-10-PCS | Mod: PBBFAC,TXP,, | Performed by: NURSE PRACTITIONER

## 2023-05-09 PROCEDURE — 99999 PR PBB SHADOW E&M-EST. PATIENT-LVL IV: CPT | Mod: PBBFAC,TXP,, | Performed by: NURSE PRACTITIONER

## 2023-05-09 PROCEDURE — 76770 US RETROPERITONEAL COMPLETE: ICD-10-PCS | Mod: 26,TXP,, | Performed by: STUDENT IN AN ORGANIZED HEALTH CARE EDUCATION/TRAINING PROGRAM

## 2023-05-09 PROCEDURE — 99214 OFFICE O/P EST MOD 30 MIN: CPT | Mod: PBBFAC,25,TXP | Performed by: NURSE PRACTITIONER

## 2023-05-09 NOTE — LETTER
May 11, 2023        Shine Mcqueen  [address]  [facility] [address]  Phone: [phone]  Fax: [phone]             [facility] Transplant 1st Fl  [address]  [facility] [address]  Phone: [phone]   Patient: Teodoro Mast   MR Number: [id number]   YOB: 1945   Date of Visit: 5/9/2023       Dear Dr. Shine Mcqueen    Thank you for referring Teodoro Mast to me for evaluation. Attached you will find relevant portions of my assessment and plan of care.    If you have questions, please do not hesitate to call me. I look forward to following Teodoro Mast along with you.    Sincerely,    Arianne Sesay NP    Enclosure    If you would like to receive this communication electronically, please contact [email or web address] or [phone] to request [employer] access.    [employer] is a tool which provides read-only access to select patient information with whom you have a relationship. Its easy to use and provides real time access to review your patients record including encounter summaries, notes, results, and demographic information.    If you feel you have received this communication in error or would no longer like to receive these types of communications, please e-mail [email or web address]

## 2023-05-09 NOTE — PROGRESS NOTES
Kidney Transplant Recipient Reevalulation    Referring Physician: Shine Mcqueen  Current Nephrologist: Shine Mcqueen  Waitlist Status: active  Dialysis Start Date: 5/13/2021    Subjective:     CC:  Six month reassessment of kidney transplant candidacy.    HPI:  Mr. Mast is a 77 y.o. year old Black or  male with ESRD secondary to HTN.  PMH BPH, anemia, HTN, proteinuria, and hyperparathyroidism. He has been on the wait list for a kidney transplant at [facility] since 5/10/2021. Patient is currently on peritoneal dialysis started on 5/13/2021. Patient is dialyzing on cyclic peritoneal dialysis.  Patient reports that he is tolerating dialysis well. . He has a PD catheter. Does 1 manual exchange daily as well. Denies peritonitis or exit site infections.  Patient denies any recent hospitalizations or ED visits.    History of elevated PSA-follows with urology Dr. Salcido, LOV 12/14/2022. Had negative prostate biopsy and was cleared for transplant. Last MRI 1/2023 reviewed by Dr. Salcido- PIRADS 2  RTC 6 mos w psa. No MRI at present    Retired . Looks younger than stated age. Stays very active around the house.  Able to climb flight of stairs without CP, SOB, or leg cramping.  Looks great, not frail.      CXR 1/13/2023: No acute cardiopulmonary disease  PXR 7/13/2021: favorable for transplant.   Iliacs 11/10/2022: favorable for transplant   Renal US 5/9/2023: Bilateral chronic medical renal disease. Bilateral simple and minimally complex renal cysts. Small nonobstructing left renal stones. Trace free fluid, likely related to reported peritoneal dialysis.  Echo 6/2/2022: EF 50%, PA 20  Stress 8/3/2021: no evidence of myocardial ischemia or infarction.  Colonoscopy 11/3/2021: Tubular adenoma, repeat in 5 years-11/2026    Current Outpatient Medications   Medication Sig Dispense Refill    aspirin 81 MG Chew Take 1 tablet by mouth.      atorvastatin (LIPITOR) 80 MG tablet TAKE 1 TABLET(80 MG)  BY MOUTH EVERY DAY 90 tablet 3    calcitRIOL (ROCALTROL) 0.25 MCG Cap Take 0.25 mcg by mouth once a week.      gentamicin (GARAMYCIN) 0.3 % ophthalmic solution Apply 1 drop to PD exit site daily with dressing changes.      labetaloL (NORMODYNE) 100 MG tablet Take 1 tablet (100 mg total) by mouth 2 (two) times daily. 180 tablet 2    magnesium oxide (MAG-OX) 400 mg (241.3 mg magnesium) tablet Take 1 tablet by mouth once daily.      potassium chloride SA (K-DUR,KLOR-CON) 20 MEQ tablet Take 20 mEq by mouth every other day.      vitamin D (VITAMIN D3) 1000 units Tab Take 2,000 Units by mouth once daily.      megestroL (MEGACE) 20 MG Tab Take 1 tablet (20 mg total) by mouth once daily. (Patient not taking: Reported on 5/4/2023.) 30 tablet 0     No current facility-administered medications for this visit.     Facility-Administered Medications Ordered in Other Visits   Medication Dose Route Frequency Provider Last Rate Last Admin    0.9%  NaCl infusion   Intravenous Continuous Enrico Woodruff  mL/hr at 05/05/21 1115 New Bag at 05/05/21 1115    ceFAZolin (ANCEF) 3 gram in dextrose 5% IVPB  3 g Intravenous On Call Procedure Enrico Woodruff MD        LIDOcaine (PF) 10 mg/ml (1%) injection 10 mg  1 mL Intradermal Once Enrico Woodruff MD           Past Medical History:   Diagnosis Date    BPH (benign prostatic hyperplasia)     CKD (chronic kidney disease) stage 5, GFR less than 15 ml/min     Gout     Hyperlipidemia     Hyperparathyroidism, secondary renal     Hypertension        Review of Systems   Constitutional:  Negative for activity change, appetite change and fever.   HENT:  Negative for congestion, mouth sores and sore throat.    Eyes:  Negative for visual disturbance.   Respiratory:  Negative for cough, chest tightness and shortness of breath.    Cardiovascular:  Negative for chest pain, palpitations and leg swelling.   Gastrointestinal:  Negative for abdominal distention, abdominal pain,  "constipation, diarrhea and nausea.   Genitourinary:  Positive for decreased urine volume. Negative for difficulty urinating, frequency and hematuria.        Making about 10-12 cc urine daily   Musculoskeletal:  Negative for arthralgias and gait problem.   Skin:  Negative for wound.   Allergic/Immunologic: Negative for environmental allergies, food allergies and immunocompromised state.   Neurological:  Negative for dizziness, weakness and numbness.   Psychiatric/Behavioral:  Negative for sleep disturbance. The patient is not nervous/anxious.      Objective:   body mass index is 26.79 kg/m².  /68 (BP Location: Right arm, Patient Position: Sitting, BP Method: Medium (Automatic))   Pulse 77   Temp 97.3 °F (36.3 °C) (Temporal)   Resp 18   Ht 6' 1.82" (1.875 m)   Wt 94.2 kg (207 lb 10.8 oz)   SpO2 98%   BMI 26.79 kg/m²     Physical Exam  Vitals and nursing note reviewed.   Constitutional:       Appearance: Normal appearance.   HENT:      Head: Normocephalic.   Cardiovascular:      Rate and Rhythm: Normal rate and regular rhythm.      Heart sounds: Normal heart sounds.   Pulmonary:      Effort: Pulmonary effort is normal.      Breath sounds: Normal breath sounds.   Abdominal:      General: Bowel sounds are normal. There is no distension.      Palpations: Abdomen is soft.      Tenderness: There is no abdominal tenderness.      Comments: PD catheter , no erythema, edema, tenderness or drainage.    Musculoskeletal:         General: Normal range of motion.   Skin:     General: Skin is warm and dry.   Neurological:      General: No focal deficit present.      Mental Status: He is alert.   Psychiatric:         Behavior: Behavior normal.       Labs:  Lab Results   Component Value Date    WBC 6.35 05/04/2023    HGB 10.9 (L) 05/04/2023    HCT 32.4 (L) 05/04/2023     (L) 05/04/2023    K 4.2 05/04/2023    CL 96 05/04/2023    CO2 27 09/16/2022    BUN 46 (H) 09/16/2022    CREATININE 11.30 (H) 05/04/2023    EGFRNONAA " 3.5 (A) 03/07/2022    CALCIUM 9.3 05/04/2023    PHOS 4.6 (H) 05/04/2023    MG 1.8 01/03/2023    ALBUMIN 3.6 05/04/2023    AST 12 07/13/2021    ALT 14 05/04/2023    UTPCR 3.33 (H) 04/12/2021     (H) 05/04/2023       Lab Results   Component Value Date    BILIRUBINUA Negative 04/12/2021    PROTEINUA 3+ (A) 04/12/2021    NITRITE Negative 04/12/2021    RBCUA 0 04/12/2021    WBCUA 2 04/12/2021       Lab Results   Component Value Date    CPRA 0 03/03/2023    DS0FRJY B82 03/03/2023    CIIAB Negative 03/03/2023    ABCMT WEAK DQA1*05:05 11/02/2022       Labs were reviewed with the patient.    Pre-transplant Workup:   Reviewed with the patient.    Assessment:     1. Patient on waiting list for kidney transplant    2. ESRD on peritoneal dialysis    3. Essential hypertension    4. Elevated PSA    5. Secondary hyperparathyroidism    6. BMI 26.0-26.9,adult      Plan:   Despite his advanced age he remains active and functional. I strongly encouraged seeking out living donors due to age as well as blood type O. He understands if his functional status were to deteriorate in the future he may be removed from the wait list.        Transplant Candidacy:   Mr. Mast is a suitable kidney transplant candidate.  Meets center eligibility for accepting HCV+ donor offer - yes.  Patient educated on HCV+ donors. Teodoro is willing  to accept HCV+ donor offer -  yes   Patient is a candidate for KDPI > 85 kidney donor offer - no (weight >88kg).  He remains in overall stable health, and will remain active on the transplant list.    Patient advised that it is recommended that all transplant candidates, and their close contacts and household members receive Covid vaccination.    Arianne Sesay NP       Follow-up:   In addition to the tests noted in the plan, Mr. Mast will continue to have reevaluation as per the standing pre-kidney transplant protocol:  Monthly blood for PRA  Annual return to clinic, except HIV positive, > 65 years of  age, or pancreas transplant candidates who will be scheduled to see transplant every 6 months while in pre-transplant phase  Annual re-testing: CXR, EKG, yearly mammograms for women over 40 and PSA for males over 40, cardiology follow-up as recommended by initial cardiology pre-transplant evaluation  Renal ultrasound every 2 years  Baseline colonoscopy after age 50 and repeated as recommended    [employer] Patient Status  Functional Status: 60% - Requires occasional assistance but is able to care for needs  Physical Capacity: No Limitations

## 2023-05-23 NOTE — PROGRESS NOTES
Transplant Psychosocial Evaluation Update:  Last psychosocial evaluation for transplant was completed on 11/10/22 by Ivone    Pt presents today in company of Teodoro carrion Jr. . Pt and son  present as alert, oriented to person, place, time, purpose of visit. Pt and son deny concerns about going through with transplant and state motivation and sense of preparedness for transplantation, caregiver role, psychosocial risk factors, medical risk factors, financial aspects, and lifetime commitments. All concerns and education points as per transplant psychosocial evaluation process addressed (also refer to psychosocial dated 6/2/22). Pt actively participated in today's interview, with son participating as caregiver. Pt asking questions appropriately and denies changes to previous psychosocial evaluation for transplantation which we reviewed line by line with pt and, per pt choice, with pts caregiver today.    Primary Caregivers and Transportation for Transplant:  1. Teodoro Mast, 37 y/o son, [facility], [phone], drives  2. Nilesh Pro, 37 y/o son, [phone], drives.  3. Rip Jr Pro, 51 y/o nephew, [phone], drives    Both pt and caregiver/s plan to stay locally at pt's home in [facility] - information reviewed in depth. Caregivers plan to stay at hospital as appropriate for transplant and post-transplant process.    Pts Vocation: Pt works at [employer].  Last day worked:  2/26/2010.    DME: CPAP, PD equipment and supplies.     ADLS:  Pt states ability to independently accomplish all adls.     Household and Dependents: pt resides with Teodoro carrion Jr. and has no dependents at this time.    Income: Pt states ability to afford all monthly expenses and costs including for medications now and if transplanted based on income and on savings and assets.    Dialysis Adherence: Pt states current and expected compliance with all healthcare recommendations, including dialysis.    Pt and son deny any additional  concerns, stating preparedness and motivation to proceed with organ transplant.     Suitability for Transplant:   Pt remains low risk for transplant at this time based on psychosocial risk factors and strengths.    Pt hazel well overall with health needs and life in general, denies current or past suicidal/homicidal ideation, has stable supports, adequate insurance, financial stability, transportation and caregiver plans in place, and is using no substances unless prescribed.   Payer/Plan Subscr  Sex Relation Sub. Ins. ID Effective Group Num   1. [place] - [place]* [place] 1945 Male Self [id number]                                    [address]

## 2023-05-27 NOTE — PROGRESS NOTES
Repeat lab did not show any worsening. But continue oral hydration. Restart torsemide at half of previous dose only if start noticing swelling on legs. Continue blood pressure monitoring at home. Inform if swelling on legs/ systolic BP rises above 140. Thanks.
While in Labor & Delivery

## 2023-05-31 ENCOUNTER — DOCUMENTATION ONLY (OUTPATIENT)
Dept: TRANSPLANT | Facility: CLINIC | Age: 78
End: 2023-05-31
Payer: MEDICARE

## 2023-05-31 NOTE — PROGRESS NOTES
Medical record, including care everywhere, reviewed for lab results which meet criteria for an eGFR Waiting Time Modification based on OPTN Policy 8.3.A  Lab testing from 8/24/20 found to meet criteria. Reviewed with nephrology and Waiting Time Modification Form was submitted to [employer].

## 2023-06-08 DIAGNOSIS — N18.6 ESRD (END STAGE RENAL DISEASE): ICD-10-CM

## 2023-06-08 RX ORDER — MEGESTROL ACETATE 40 MG/ML
200 SUSPENSION ORAL DAILY
Qty: 150 ML | Refills: 0 | Status: SHIPPED | OUTPATIENT
Start: 2023-06-08 | End: 2023-09-16

## 2023-06-14 ENCOUNTER — DOCUMENTATION ONLY (OUTPATIENT)
Dept: TRANSPLANT | Facility: CLINIC | Age: 78
End: 2023-06-14
Payer: MEDICARE

## 2023-06-14 ENCOUNTER — PATIENT MESSAGE (OUTPATIENT)
Dept: TRANSPLANT | Facility: CLINIC | Age: 78
End: 2023-06-14
Payer: MEDICARE

## 2023-06-14 NOTE — PROGRESS NOTES
Patient notified that the review of their medical record was completed and it was found that additional time may be due based on past lab results. Informed that the information was submitted to [employer] and approved.    Their waiting time for a kidney transplant previously began on 5/10/21. This has now been adjusted to 8/24/20 based on past lab values.    All questions were answered and understanding verbalized.

## 2023-07-27 ENCOUNTER — OFFICE VISIT (OUTPATIENT)
Dept: CARDIOLOGY | Facility: CLINIC | Age: 78
End: 2023-07-27
Payer: MEDICARE

## 2023-07-27 VITALS
BODY MASS INDEX: 27.67 KG/M2 | DIASTOLIC BLOOD PRESSURE: 70 MMHG | OXYGEN SATURATION: 96 % | HEART RATE: 95 BPM | WEIGHT: 215.63 LBS | HEIGHT: 74 IN | SYSTOLIC BLOOD PRESSURE: 140 MMHG

## 2023-07-27 DIAGNOSIS — I10 ESSENTIAL HYPERTENSION: ICD-10-CM

## 2023-07-27 DIAGNOSIS — Z99.2 PERITONEAL DIALYSIS CATHETER IN PLACE: ICD-10-CM

## 2023-07-27 DIAGNOSIS — N18.5 BENIGN HYPERTENSION WITH CKD (CHRONIC KIDNEY DISEASE) STAGE V: Primary | ICD-10-CM

## 2023-07-27 DIAGNOSIS — Z76.82 PATIENT ON WAITING LIST FOR KIDNEY TRANSPLANT: ICD-10-CM

## 2023-07-27 DIAGNOSIS — E78.2 MIXED HYPERLIPIDEMIA: ICD-10-CM

## 2023-07-27 DIAGNOSIS — I12.0 BENIGN HYPERTENSION WITH CKD (CHRONIC KIDNEY DISEASE) STAGE V: Primary | ICD-10-CM

## 2023-07-27 PROCEDURE — 99213 PR OFFICE/OUTPT VISIT, EST, LEVL III, 20-29 MIN: ICD-10-PCS | Mod: S$PBB,TXP,, | Performed by: INTERNAL MEDICINE

## 2023-07-27 PROCEDURE — 99213 OFFICE O/P EST LOW 20 MIN: CPT | Mod: S$PBB,TXP,, | Performed by: INTERNAL MEDICINE

## 2023-07-27 PROCEDURE — 99214 OFFICE O/P EST MOD 30 MIN: CPT | Mod: PBBFAC,PN,TXP | Performed by: INTERNAL MEDICINE

## 2023-07-27 PROCEDURE — 99999 PR PBB SHADOW E&M-EST. PATIENT-LVL IV: ICD-10-PCS | Mod: PBBFAC,TXP,, | Performed by: INTERNAL MEDICINE

## 2023-07-27 PROCEDURE — 99999 PR PBB SHADOW E&M-EST. PATIENT-LVL IV: CPT | Mod: PBBFAC,TXP,, | Performed by: INTERNAL MEDICINE

## 2023-07-27 NOTE — PROGRESS NOTES
Subjective:    Patient ID:  Teodoro Mast is a 78 y.o. male who presents for follow-up of     [facility]   The patient is a 78 year old male retired  at [employer] is followed with hypertension, hyperlipidemia , ESRD on PD and Transplant list. He is doing relatively  well but is not exercising. No chest pain but has FRAGA.  Lab Results   Component Value Date     07/06/2023    K 3.8 07/06/2023    CL 96 07/06/2023    CO2 27 09/16/2022    BUN 46 (H) 09/16/2022    CREATININE 11.69 (H) 07/06/2023     (H) 07/06/2023    HGBA1C 5.9 07/28/2006    MG 1.8 01/03/2023    AST 12 07/13/2021    ALT 17 07/06/2023    ALBUMIN 3.3 (L) 07/06/2023    PROT 6.9 07/13/2021    BILITOT 0.4 07/13/2021    WBC 8.70 07/06/2023    HGB 9.0 (L) 07/17/2023    HCT 25.7 (L) 07/17/2023    HCT 26 (L) 09/30/2022    MCV 97 07/06/2023     07/06/2023    INR 1.0 07/13/2021    PSA 14.2 (H) 07/13/2021         Lab Results   Component Value Date    CHOL 205 (H) 10/17/2022    HDL 32 10/17/2022    TRIG 261 (H) 10/17/2022       Lab Results   Component Value Date    LDLCALC 121 (H) 10/17/2022       Past Medical History:   Diagnosis Date    BPH (benign prostatic hyperplasia)     CKD (chronic kidney disease) stage 5, GFR less than 15 ml/min     Gout     Hyperlipidemia     Hyperparathyroidism, secondary renal     Hypertension        Current Outpatient Medications:     aspirin 81 MG Chew, Take 1 tablet by mouth., Disp: , Rfl:     atorvastatin (LIPITOR) 80 MG tablet, TAKE 1 TABLET(80 MG) BY MOUTH EVERY DAY, Disp: 90 tablet, Rfl: 3    calcitRIOL (ROCALTROL) 0.25 MCG Cap, Take 0.25 mcg by mouth once a week., Disp: , Rfl:     gentamicin (GARAMYCIN) 0.3 % ophthalmic solution, Apply 1 drop to PD exit site daily with dressing changes., Disp: , Rfl:     labetaloL (NORMODYNE) 100 MG tablet, Take 1 tablet (100 mg total) by mouth 2 (two) times daily., Disp: 180 tablet, Rfl: 2    magnesium oxide (MAG-OX) 400 mg (241.3 mg magnesium) tablet, Take 1 tablet by mouth  once daily., Disp: , Rfl:     megestroL (MEGACE) 400 mg/10 mL (40 mg/mL) Susp, Take 5 mLs (200 mg total) by mouth once daily., Disp: 150 mL, Rfl: 0    vitamin D (VITAMIN D3) 1000 units Tab, Take 2,000 Units by mouth once daily., Disp: , Rfl:   No current facility-administered medications for this visit.    Facility-Administered Medications Ordered in Other Visits:     0.9%  NaCl infusion, , Intravenous, Continuous, Enrico Woodruff MD, Last Rate: 100 mL/hr at 05/05/21 1115, New Bag at 05/05/21 1115    ceFAZolin (ANCEF) 3 gram in dextrose 5% IVPB, 3 g, Intravenous, On Call Procedure, Enrico Woodruff MD    LIDOcaine (PF) 10 mg/ml (1%) injection 10 mg, 1 mL, Intradermal, Once, Enrico Woodruff MD          Review of Systems   Constitutional: Negative for decreased appetite, diaphoresis, fever, malaise/fatigue, weight gain and weight loss.   HENT:  Negative for congestion, ear discharge, ear pain and nosebleeds.    Eyes:  Negative for blurred vision, double vision and visual disturbance.   Cardiovascular:  Positive for dyspnea on exertion. Negative for chest pain, claudication, cyanosis, irregular heartbeat, leg swelling, near-syncope, orthopnea, palpitations, paroxysmal nocturnal dyspnea and syncope.   Respiratory:  Negative for cough, hemoptysis, shortness of breath, sleep disturbances due to breathing, snoring, sputum production and wheezing.    Endocrine: Negative for polydipsia, polyphagia and polyuria.   Hematologic/Lymphatic: Negative for adenopathy and bleeding problem. Does not bruise/bleed easily.   Skin:  Negative for color change, nail changes, poor wound healing and rash.   Musculoskeletal:  Negative for muscle cramps and muscle weakness.   Gastrointestinal:  Negative for abdominal pain, anorexia, change in bowel habit, hematochezia, nausea and vomiting.   Genitourinary:  Negative for dysuria, frequency and hematuria.   Neurological:  Negative for brief paralysis, difficulty with  "concentration, excessive daytime sleepiness, dizziness, focal weakness, headaches, light-headedness, seizures, vertigo and weakness.   Psychiatric/Behavioral:  Negative for altered mental status and depression.    Allergic/Immunologic: Negative for persistent infections.      Objective:BP (!) 140/70   Pulse 95   Ht 6' 2" (1.88 m)   Wt 97.8 kg (215 lb 9.8 oz)   SpO2 96%   BMI 27.68 kg/m²             Physical Exam  Constitutional:       Appearance: Normal appearance. He is well-developed.   HENT:      Head: Normocephalic.      Right Ear: External ear normal.      Left Ear: External ear normal.      Nose: Nose normal.   Eyes:      General: No scleral icterus.     Pupils: Pupils are equal, round, and reactive to light.   Neck:      Thyroid: No thyromegaly.      Vascular: No JVD.      Trachea: No tracheal deviation.   Cardiovascular:      Rate and Rhythm: Normal rate and regular rhythm.      Pulses: Intact distal pulses.           Carotid pulses are 2+ on the right side and 2+ on the left side.       Dorsalis pedis pulses are 2+ on the right side and 2+ on the left side.        Posterior tibial pulses are 2+ on the right side and 2+ on the left side.      Heart sounds: No murmur heard.    No friction rub. No gallop.   Pulmonary:      Effort: Pulmonary effort is normal.      Breath sounds: Normal breath sounds.   Abdominal:      General: Bowel sounds are normal. There is no distension.      Tenderness: There is no abdominal tenderness. There is no guarding.   Musculoskeletal:         General: No tenderness. Normal range of motion.      Cervical back: Normal range of motion and neck supple.   Lymphadenopathy:      Comments: Palpation of neck and groin lymph nodes normal   Skin:     General: Skin is dry.      Comments: Palpation of skin normal   Neurological:      Mental Status: He is alert and oriented to person, place, and time.      Cranial Nerves: No cranial nerve deficit.      Motor: No abnormal muscle tone.      " Coordination: Coordination normal.   Psychiatric:         Behavior: Behavior normal.         Thought Content: Thought content normal.         Judgment: Judgment normal.         Assessment:       1. Benign hypertension with CKD (chronic kidney disease) stage V    2. Mixed hyperlipidemia    3. Essential hypertension    4. Peritoneal dialysis catheter in place    5. Patient on waiting list for kidney transplant         Plan:       Teodoro was seen today for hypertension.    Diagnoses and all orders for this visit:    Benign hypertension with CKD (chronic kidney disease) stage V    Mixed hyperlipidemia    Essential hypertension    Peritoneal dialysis catheter in place    Patient on waiting list for kidney transplant

## 2023-08-08 ENCOUNTER — HOSPITAL ENCOUNTER (EMERGENCY)
Facility: HOSPITAL | Age: 78
Discharge: HOME | End: 2023-08-08
Attending: EMERGENCY MEDICINE
Payer: MEDICARE

## 2023-08-08 VITALS
HEART RATE: 90 BPM | DIASTOLIC BLOOD PRESSURE: 82 MMHG | WEIGHT: 215 LBS | SYSTOLIC BLOOD PRESSURE: 173 MMHG | RESPIRATION RATE: 18 BRPM | TEMPERATURE: 99 F | OXYGEN SATURATION: 98 % | BODY MASS INDEX: 27.6 KG/M2

## 2023-08-08 DIAGNOSIS — R55 SYNCOPE: ICD-10-CM

## 2023-08-08 LAB
ALBUMIN SERPL BCP-MCNC: 2.3 G/DL (ref 3.5–5.2)
ALP SERPL-CCNC: 57 U/L (ref 55–135)
ALT SERPL W/O P-5'-P-CCNC: 14 U/L (ref 10–44)
ANION GAP SERPL CALC-SCNC: 17 MMOL/L (ref 8–16)
AST SERPL-CCNC: 16 U/L (ref 10–40)
BASOPHILS # BLD AUTO: 0.08 K/UL (ref 0–0.2)
BASOPHILS NFR BLD: 0.6 % (ref 0–1.9)
BILIRUB SERPL-MCNC: 0.4 MG/DL (ref 0.1–1)
BUN SERPL-MCNC: 70 MG/DL (ref 8–23)
CALCIUM SERPL-MCNC: 9.3 MG/DL (ref 8.7–10.5)
CHLORIDE SERPL-SCNC: 92 MMOL/L (ref 95–110)
CO2 SERPL-SCNC: 24 MMOL/L (ref 23–29)
CREAT SERPL-MCNC: 11.8 MG/DL (ref 0.5–1.4)
DIFFERENTIAL METHOD: ABNORMAL
EOSINOPHIL # BLD AUTO: 0.2 K/UL (ref 0–0.5)
EOSINOPHIL NFR BLD: 1.2 % (ref 0–8)
ERYTHROCYTE [DISTWIDTH] IN BLOOD BY AUTOMATED COUNT: 14 % (ref 11.5–14.5)
EST. GFR  (NO RACE VARIABLE): 4 ML/MIN/1.73 M^2
GLUCOSE SERPL-MCNC: 155 MG/DL (ref 70–110)
HCT VFR BLD AUTO: 23.3 % (ref 40–54)
HGB BLD-MCNC: 7.8 G/DL (ref 14–18)
IMM GRANULOCYTES # BLD AUTO: 0.1 K/UL (ref 0–0.04)
IMM GRANULOCYTES NFR BLD AUTO: 0.8 % (ref 0–0.5)
LYMPHOCYTES # BLD AUTO: 0.7 K/UL (ref 1–4.8)
LYMPHOCYTES NFR BLD: 5.7 % (ref 18–48)
MAGNESIUM SERPL-MCNC: 1.6 MG/DL (ref 1.6–2.6)
MCH RBC QN AUTO: 31.5 PG (ref 27–31)
MCHC RBC AUTO-ENTMCNC: 33.5 G/DL (ref 32–36)
MCV RBC AUTO: 94 FL (ref 82–98)
MONOCYTES # BLD AUTO: 1.1 K/UL (ref 0.3–1)
MONOCYTES NFR BLD: 8.5 % (ref 4–15)
NEUTROPHILS # BLD AUTO: 10.9 K/UL (ref 1.8–7.7)
NEUTROPHILS NFR BLD: 83.2 % (ref 38–73)
NRBC BLD-RTO: 0 /100 WBC
PHOSPHATE SERPL-MCNC: 2.9 MG/DL (ref 2.7–4.5)
PLATELET # BLD AUTO: 276 K/UL (ref 150–450)
PMV BLD AUTO: 10.4 FL (ref 9.2–12.9)
POCT GLUCOSE: 182 MG/DL (ref 70–110)
POTASSIUM SERPL-SCNC: 3.1 MMOL/L (ref 3.5–5.1)
PROT SERPL-MCNC: 6 G/DL (ref 6–8.4)
RBC # BLD AUTO: 2.48 M/UL (ref 4.6–6.2)
SODIUM SERPL-SCNC: 133 MMOL/L (ref 136–145)
WBC # BLD AUTO: 13.04 K/UL (ref 3.9–12.7)

## 2023-08-08 PROCEDURE — 82962 GLUCOSE BLOOD TEST: CPT | Mod: NTX

## 2023-08-08 PROCEDURE — 93010 ELECTROCARDIOGRAM REPORT: CPT | Mod: NTX,,, | Performed by: INTERNAL MEDICINE

## 2023-08-08 PROCEDURE — 80053 COMPREHEN METABOLIC PANEL: CPT | Mod: NTX | Performed by: EMERGENCY MEDICINE

## 2023-08-08 PROCEDURE — 99284 EMERGENCY DEPT VISIT MOD MDM: CPT | Mod: NTX

## 2023-08-08 PROCEDURE — 84100 ASSAY OF PHOSPHORUS: CPT | Mod: NTX | Performed by: EMERGENCY MEDICINE

## 2023-08-08 PROCEDURE — 83735 ASSAY OF MAGNESIUM: CPT | Mod: NTX | Performed by: EMERGENCY MEDICINE

## 2023-08-08 PROCEDURE — 93005 ELECTROCARDIOGRAM TRACING: CPT | Mod: NTX

## 2023-08-08 PROCEDURE — 93010 EKG 12-LEAD: ICD-10-PCS | Mod: NTX,,, | Performed by: INTERNAL MEDICINE

## 2023-08-08 PROCEDURE — 85025 COMPLETE CBC W/AUTO DIFF WBC: CPT | Mod: NTX | Performed by: EMERGENCY MEDICINE

## 2023-08-08 NOTE — ED PROVIDER NOTES
Encounter Date: 8/8/2023       History     Chief Complaint   Patient presents with    Loss of Consciousness     Had witnessed syncopal episode at dialysis clinic for checkup, pt endorses he has not eaten anything in 24 hours. This same event has happened previously at an MRI appt due to lack of eating     HPI  78-year-old male with past medical history as noted below, history of end-stage renal disease on peritoneal dialysis, coming in with syncopal episode.  He was last dialyzed overnight last night (PD).  He says he does not eat much in usually, only has breakfast.  Last night he had a shake did not have lunch yesterday, did have breakfast yesterday.  This morning he skipped breakfast and went to an appointment for kidney testing.  He was sitting in a chair when he felt weak and then passed out the nurse says it he was unresponsive for about 10 seconds.  After that he came to.  No postictal confusion, no bowel or bladder incontinence, no tongue biting, he currently feels back to baseline.  At no time did he experience any chest pain, shortness of breath, abdominal pain, headache, lateralizing weakness, numbness or tingling.  Denies any black or bloody stools.  No recent fevers, nausea vomiting diarrhea.  No cough.    He says happened 1 time before when he skipped breakfast.    Review of patient's allergies indicates:  No Known Allergies  Past Medical History:   Diagnosis Date    BPH (benign prostatic hyperplasia)     CKD (chronic kidney disease) stage 5, GFR less than 15 ml/min     Gout     Hyperlipidemia     Hyperparathyroidism, secondary renal     Hypertension      Past Surgical History:   Procedure Laterality Date    AV FISTULA PLACEMENT Left 3/31/2022    Procedure: CREATION, AV FISTULA RADIOCEPHALIC;  Surgeon: PEMA Martines II, MD;  Location: [facility] OR [address] [place] [address];  Service: Vascular;  Laterality: Left;    AV FISTULA PLACEMENT Left 9/30/2022    Procedure: CREATION, AV FISTULA;  Surgeon: PEMA Martines II, MD;   Location: [facility] OR 2ND FLR;  Service: Vascular;  Laterality: Left;    COLONOSCOPY N/A 2021    Procedure: COLONOSCOPY;  Surgeon: Joe Jimenez MD;  Location: [facility] (4TH FLR);  Service: Endoscopy;  Laterality: N/A;  COVID test at [place] on 10/30-GT      dialysis pt-labs prior    EYE SURGERY Bilateral     cataract removal    FINGER SURGERY      hemorriodectomy      PERITONEAL CATHETER INSERTION       Family History   Problem Relation Age of Onset    Heart disease Mother     Hypertension Mother     Heart disease Father         pacemaker    Seizures Sister     Hammer toes Brother     Heart disease Brother     Cancer Brother         prostate cancer    Premature birth Son     Cancer Sister         throat    No Known Problems Sister     No Known Problems Sister     No Known Problems Brother     No Known Problems Son      Social History     Tobacco Use    Smoking status: Former     Current packs/day: 0.00     Average packs/day: 0.5 packs/day for 10.0 years (5.0 ttl pk-yrs)     Types: Cigarettes     Start date:      Quit date:      Years since quittin.6    Smokeless tobacco: Never   Substance Use Topics    Alcohol use: Not Currently    Drug use: No     Review of Systems    Physical Exam     Initial Vitals   BP Pulse Resp Temp SpO2   23 1047 23 1047 23 1047 23 1048 23 1047   129/65 76 18 98.3 °F (36.8 °C) 100 %      MAP       --                Physical Exam    Physical Exam:  CONSTITUTIONAL: Well developed, well nourished, in no acute distress.  HENT: Normocephalic, atraumatic    EYES: Sclerae anicteric, pupils equal round reactive, extraocular is are intact,  NECK: Supple, no thyroid enlargement  CARDIOVASCULAR: Regular rate and rhythm without any murmurs, gallops, rubs.  RESPIRATORY: Speaking in full sentences. Breathing comfortably. Auscultation of the lungs revealed normal breath sounds b/l, no wheezing, no rales, no rhonchi.  ABDOMEN: Soft and nontender, no masses, no  rebound or guarding.  PD catheter site is clean dry intact, no tenderness at the catheter.  NEUROLOGIC: Alert, interacting normally. No facial droop.  5/5 strength bilaterally upper and lower extremities.  MSK: Moving all four extremities.  Skin: Warm and dry. No visible rash on exposed areas of skin.    Psych: Mood and affect normal.       ED Course   Procedures  Labs Reviewed   CBC W/ AUTO DIFFERENTIAL - Abnormal; Notable for the following components:       Result Value    WBC 13.04 (*)     RBC 2.48 (*)     Hemoglobin 7.8 (*)     Hematocrit 23.3 (*)     MCH 31.5 (*)     Immature Granulocytes 0.8 (*)     Gran # (ANC) 10.9 (*)     Immature Grans (Abs) 0.10 (*)     Lymph # 0.7 (*)     Mono # 1.1 (*)     Gran % 83.2 (*)     Lymph % 5.7 (*)     All other components within normal limits   COMPREHENSIVE METABOLIC PANEL - Abnormal; Notable for the following components:    Sodium 133 (*)     Potassium 3.1 (*)     Chloride 92 (*)     Glucose 155 (*)     BUN 70 (*)     Creatinine 11.8 (*)     Albumin 2.3 (*)     eGFR 4.0 (*)     Anion Gap 17 (*)     All other components within normal limits   POCT GLUCOSE - Abnormal; Notable for the following components:    POCT Glucose 182 (*)     All other components within normal limits   MAGNESIUM   PHOSPHORUS     EKG Readings: (Independently Interpreted)   Normal sinus rhythm at a rate of 74 with right bundle-branch block, nonspecific ST abnormalities.  Normal axis.     ECG Results              EKG 12-lead (Final result)  Result time 08/08/23 12:46:17      Final result by Interface, Lab In [facility] (08/08/23 12:46:17)                   Narrative:    Test Reason : R55,    Vent. Rate : 074 BPM     Atrial Rate : 074 BPM     P-R Int : 140 ms          QRS Dur : 128 ms      QT Int : 484 ms       P-R-T Axes : 061 081 064 degrees     QTc Int : 537 ms    Normal sinus rhythm  Right bundle branch block  Abnormal ECG  When compared with ECG of 17-NOV-2022 15:08,  Fusion complexes are no longer  Present  Vent. rate has decreased BY  42 BPM  Confirmed by MONTRELL WINSLOW MD ([id number]) on 8/8/2023 12:46:03 PM    Referred By: ZAK   SELF           Confirmed By:MONTRELL WINSLOW MD                                  Imaging Results    None          Medications - No data to display  Medical Decision Making:   Independently Interpreted Test(s):   I have ordered and independently interpreted EKG Reading(s) - see prior notes  Clinical Tests:   Lab Tests: Ordered and Reviewed    78-year-old male with past medical history as noted coming in with syncopal episode currently back to baseline.  History of similar before.  Did have some weakness/lightheadedness prior to the episode, no palpitations, no chest pain or any other types of pain with the episode.  No headache or any focal neurologic complaints or findings.    Fingerstick at this time is the 170s.    EKG is as noted above and similar to prior.    Differential includes vasovagal.  Lower suspicion for orthostatics as he was sitting during the episode.  Not consistent with ACS or dangerous cardiopulmonary pathology given lack of chest pain or shortness of breath at any time.  Clinically not consistent with seizures.  Breathing is possible, although he did have preceding symptoms pain this less likely.    Will obtain labs to look for any metabolic hematologic abnormalities explaining patient's syncope.  Will continue to monitor emergency department.    If ED workup is unremarkable, and patient continues to feel well, anticipate discharge home with outpatient follow-up and ED return precautions.    Update:  Patient continues to be well-appearing and no acute distress in the emergency department.  Continues to be asymptomatic.    Patient with elevated white count compared to prior however this is nonspecific.  He does not complain or endorse any infectious symptoms at this time.  There is a mild drop in his hemoglobin however, no reported history of bleeding.  He is on  aspirin but no other anticoagulants.  No hypotension, not currently symptomatic, not consistent with dangerous symptomatic anemia, no indication for transfusion at this time.  Mild hypokalemia, will not treat given his end-stage renal disease status.  He was using a stronger dialysis bath over the last few days in the setting of kidney testing but will return to his usual dialysis bath starting tonight.  This should help correct the electrolyte abnormalities.    At this time safe for outpatient follow-up, ED return precautions for any new, recurring or worrisome symptoms.    Findings of ED work up and return precautions verbally explained to patient. Patient agrees with discharge plan, return instructions and verbalizes understanding of return precautions.                             Clinical Impression:   Final diagnoses:  [R55] Syncope        ED Disposition Condition    Discharge Stable          ED Prescriptions    None       Follow-up Information       Follow up With Specialties Details Why Contact Info    Kidney dialysis doctor                 Tahir Manning MD  08/09/23 1475

## 2023-08-08 NOTE — DISCHARGE INSTRUCTIONS
Your physical exam, labs, EKG did not show any signs of immediately dangerous medical conditions.    Please follow-up with your primary doctor as well as your dialysis doctor.    Please attempt to have meals or at least protein shake/ensure in the morning.    If you develop any new episodes of passing out, chest pain, trouble breathing, weakness, or any other new, worsening worrisome symptoms please return to emergency department for re-evaluation.

## 2023-08-11 ENCOUNTER — PATIENT MESSAGE (OUTPATIENT)
Dept: CARDIOLOGY | Facility: CLINIC | Age: 78
End: 2023-08-11
Payer: MEDICARE

## 2023-08-11 DIAGNOSIS — N18.6 ESRD (END STAGE RENAL DISEASE): Primary | ICD-10-CM

## 2023-08-12 DIAGNOSIS — R73.9 HYPERGLYCEMIA: Primary | ICD-10-CM

## 2023-08-25 DIAGNOSIS — Z76.82 ORGAN TRANSPLANT CANDIDATE: Primary | ICD-10-CM

## 2023-08-25 NOTE — PROGRESS NOTES
YEARLY LIST MANAGEMENT NOTE    Teodoro Mast's kidney transplant listing status reviewed.  Patient is due for follow-up appointments in November 2023.  Appointments will be scheduled per protocol.  HLA sample is up to date and being received on a regular basis.

## 2023-08-25 NOTE — TELEPHONE ENCOUNTER
----- Message from Carolee Vieira MA sent at 10/24/2019 10:06 AM CDT -----  Contact: Self/[phone]  Pt states he received a letter stating that he needs to schedule an appt with in 2 weeks urgent.    Dressing (No Sutures): dry sterile dressing

## 2023-08-29 ENCOUNTER — HOSPITAL ENCOUNTER (OUTPATIENT)
Dept: RADIOLOGY | Facility: HOSPITAL | Age: 78
Discharge: HOME | End: 2023-08-29
Attending: NURSE PRACTITIONER
Payer: MEDICARE

## 2023-08-29 ENCOUNTER — HOSPITAL ENCOUNTER (OUTPATIENT)
Dept: CARDIOLOGY | Facility: HOSPITAL | Age: 78
Discharge: HOME | End: 2023-08-29
Attending: NURSE PRACTITIONER
Payer: MEDICARE

## 2023-08-29 ENCOUNTER — TELEPHONE (OUTPATIENT)
Dept: ENDOCRINOLOGY | Facility: CLINIC | Age: 78
End: 2023-08-29
Payer: MEDICARE

## 2023-08-29 VITALS
WEIGHT: 215 LBS | DIASTOLIC BLOOD PRESSURE: 70 MMHG | SYSTOLIC BLOOD PRESSURE: 140 MMHG | HEART RATE: 85 BPM | BODY MASS INDEX: 27.59 KG/M2 | HEIGHT: 74 IN

## 2023-08-29 DIAGNOSIS — Z76.82 ORGAN TRANSPLANT CANDIDATE: ICD-10-CM

## 2023-08-29 LAB
ASCENDING AORTA: 3.44 CM
AV INDEX (PROSTH): 0.59
AV MEAN GRADIENT: 11 MMHG
AV PEAK GRADIENT: 22 MMHG
AV VALVE AREA BY VELOCITY RATIO: 1.85 CM²
AV VALVE AREA: 2.13 CM²
AV VELOCITY RATIO: 0.51
BSA FOR ECHO PROCEDURE: 2.26 M2
CV ECHO LV RWT: 0.43 CM
DOP CALC AO PEAK VEL: 2.35 M/S
DOP CALC AO VTI: 40.53 CM
DOP CALC LVOT AREA: 3.6 CM2
DOP CALC LVOT DIAMETER: 2.14 CM
DOP CALC LVOT PEAK VEL: 1.21 M/S
DOP CALC LVOT STROKE VOLUME: 86.17 CM3
DOP CALCLVOT PEAK VEL VTI: 23.97 CM
E WAVE DECELERATION TIME: 146.26 MSEC
E/A RATIO: 0.46
E/E' RATIO: 7 M/S
ECHO LV POSTERIOR WALL: 1.16 CM (ref 0.6–1.1)
FRACTIONAL SHORTENING: 26 % (ref 28–44)
INTERVENTRICULAR SEPTUM: 0.94 CM (ref 0.6–1.1)
LA MAJOR: 5.24 CM
LA MINOR: 4.78 CM
LA WIDTH: 4.34 CM
LEFT ATRIUM SIZE: 3.93 CM
LEFT ATRIUM VOLUME INDEX MOD: 37.2 ML/M2
LEFT ATRIUM VOLUME INDEX: 32.4 ML/M2
LEFT ATRIUM VOLUME MOD: 83.27 CM3
LEFT ATRIUM VOLUME: 72.48 CM3
LEFT INTERNAL DIMENSION IN SYSTOLE: 3.97 CM (ref 2.1–4)
LEFT VENTRICLE DIASTOLIC VOLUME INDEX: 62.34 ML/M2
LEFT VENTRICLE DIASTOLIC VOLUME: 139.65 ML
LEFT VENTRICLE MASS INDEX: 98 G/M2
LEFT VENTRICLE SYSTOLIC VOLUME INDEX: 30.8 ML/M2
LEFT VENTRICLE SYSTOLIC VOLUME: 68.94 ML
LEFT VENTRICULAR INTERNAL DIMENSION IN DIASTOLE: 5.37 CM (ref 3.5–6)
LEFT VENTRICULAR MASS: 218.57 G
LV LATERAL E/E' RATIO: 6 M/S
LV SEPTAL E/E' RATIO: 8.4 M/S
MV A" WAVE DURATION": 15.7 MSEC
MV PEAK A VEL: 0.92 M/S
MV PEAK E VEL: 0.42 M/S
MV STENOSIS PRESSURE HALF TIME: 42.42 MS
MV VALVE AREA P 1/2 METHOD: 5.19 CM2
PISA TR MAX VEL: 3.06 M/S
PULM VEIN S/D RATIO: 1.78
PV PEAK D VEL: 0.27 M/S
PV PEAK S VEL: 0.48 M/S
RA MAJOR: 4.26 CM
RA PRESSURE ESTIMATED: 3 MMHG
RA WIDTH: 3.74 CM
RIGHT VENTRICULAR END-DIASTOLIC DIMENSION: 3.67 CM
RV TB RVSP: 6 MMHG
SINUS: 3.56 CM
STJ: 3.03 CM
TDI LATERAL: 0.07 M/S
TDI SEPTAL: 0.05 M/S
TDI: 0.06 M/S
TR MAX PG: 37 MMHG
TRICUSPID ANNULAR PLANE SYSTOLIC EXCURSION: 2.36 CM
TV REST PULMONARY ARTERY PRESSURE: 40 MMHG
Z-SCORE OF LEFT VENTRICULAR DIMENSION IN END DIASTOLE: -4
Z-SCORE OF LEFT VENTRICULAR DIMENSION IN END SYSTOLE: -1.59

## 2023-08-29 PROCEDURE — 71046 X-RAY EXAM CHEST 2 VIEWS: CPT | Mod: 26,TXP,, | Performed by: RADIOLOGY

## 2023-08-29 PROCEDURE — 93306 TTE W/DOPPLER COMPLETE: CPT | Mod: 26,TXP,, | Performed by: INTERNAL MEDICINE

## 2023-08-29 PROCEDURE — 72170 X-RAY EXAM OF PELVIS: CPT | Mod: TC,PN,TXP

## 2023-08-29 PROCEDURE — 93306 TTE W/DOPPLER COMPLETE: CPT | Mod: TXP

## 2023-08-29 PROCEDURE — 72170 XR PELVIS ROUTINE AP: ICD-10-PCS | Mod: 26,TXP,, | Performed by: RADIOLOGY

## 2023-08-29 PROCEDURE — 93306 ECHO (CUPID ONLY): ICD-10-PCS | Mod: 26,TXP,, | Performed by: INTERNAL MEDICINE

## 2023-08-29 PROCEDURE — 71046 XR CHEST PA AND LATERAL: ICD-10-PCS | Mod: 26,TXP,, | Performed by: RADIOLOGY

## 2023-08-29 PROCEDURE — 71046 X-RAY EXAM CHEST 2 VIEWS: CPT | Mod: TC,PN,TXP

## 2023-08-29 PROCEDURE — 72170 X-RAY EXAM OF PELVIS: CPT | Mod: 26,TXP,, | Performed by: RADIOLOGY

## 2023-08-29 NOTE — TELEPHONE ENCOUNTER
----- Message from Analisa Lancaster sent at 8/29/2023  1:36 PM CDT -----  Regarding: Appointment  Contact: [phone]  Pt calling to get an appt for N18.6 (ICD-10-CM) - ESRD (end stage renal disease). Please call pt to schedule appt. Referral is in

## 2023-08-31 ENCOUNTER — TELEPHONE (OUTPATIENT)
Dept: TRANSPLANT | Facility: CLINIC | Age: 78
End: 2023-08-31
Payer: MEDICARE

## 2023-08-31 NOTE — TELEPHONE ENCOUNTER
Spoke to pt confirming appts on 11/02/2023. Appt reminders were mailed on 08/31/2023 and pt is aware to bring care giver.

## 2023-09-06 ENCOUNTER — PATIENT MESSAGE (OUTPATIENT)
Dept: ENDOCRINOLOGY | Facility: CLINIC | Age: 78
End: 2023-09-06

## 2023-09-06 ENCOUNTER — OFFICE VISIT (OUTPATIENT)
Dept: ENDOCRINOLOGY | Facility: CLINIC | Age: 78
End: 2023-09-06
Payer: MEDICARE

## 2023-09-06 VITALS
WEIGHT: 213.06 LBS | OXYGEN SATURATION: 96 % | SYSTOLIC BLOOD PRESSURE: 134 MMHG | HEART RATE: 80 BPM | BODY MASS INDEX: 27.36 KG/M2 | DIASTOLIC BLOOD PRESSURE: 70 MMHG

## 2023-09-06 DIAGNOSIS — Z99.2 PERITONEAL DIALYSIS CATHETER IN PLACE: ICD-10-CM

## 2023-09-06 DIAGNOSIS — E11.65 TYPE 2 DIABETES MELLITUS WITH HYPERGLYCEMIA, WITH LONG-TERM CURRENT USE OF INSULIN: Primary | ICD-10-CM

## 2023-09-06 DIAGNOSIS — Z79.4 TYPE 2 DIABETES MELLITUS WITH HYPERGLYCEMIA, WITH LONG-TERM CURRENT USE OF INSULIN: Primary | ICD-10-CM

## 2023-09-06 DIAGNOSIS — N18.6 ESRD (END STAGE RENAL DISEASE): ICD-10-CM

## 2023-09-06 PROCEDURE — 99999 PR PBB SHADOW E&M-EST. PATIENT-LVL IV: CPT | Mod: PBBFAC,TXP,, | Performed by: INTERNAL MEDICINE

## 2023-09-06 PROCEDURE — 99204 PR OFFICE/OUTPT VISIT, NEW, LEVL IV, 45-59 MIN: ICD-10-PCS | Mod: S$PBB,NTX,, | Performed by: INTERNAL MEDICINE

## 2023-09-06 PROCEDURE — 99214 OFFICE O/P EST MOD 30 MIN: CPT | Mod: PBBFAC,TXP | Performed by: INTERNAL MEDICINE

## 2023-09-06 PROCEDURE — 99204 OFFICE O/P NEW MOD 45 MIN: CPT | Mod: S$PBB,NTX,, | Performed by: INTERNAL MEDICINE

## 2023-09-06 PROCEDURE — 99999 PR PBB SHADOW E&M-EST. PATIENT-LVL IV: ICD-10-PCS | Mod: PBBFAC,TXP,, | Performed by: INTERNAL MEDICINE

## 2023-09-06 RX ORDER — PEN NEEDLE, DIABETIC 30 GX3/16"
NEEDLE, DISPOSABLE MISCELLANEOUS
Qty: 100 EACH | Refills: 11 | Status: SHIPPED | OUTPATIENT
Start: 2023-09-06 | End: 2023-12-19

## 2023-09-06 RX ORDER — INSULIN DETEMIR 100 [IU]/ML
8 INJECTION, SOLUTION SUBCUTANEOUS NIGHTLY
Qty: 9 ML | Refills: 3 | Status: SHIPPED | OUTPATIENT
Start: 2023-09-06 | End: 2023-10-24 | Stop reason: SDUPTHER

## 2023-09-06 RX ORDER — POTASSIUM CHLORIDE 20 MEQ/1
20 TABLET, EXTENDED RELEASE ORAL
Status: ON HOLD | COMMUNITY
Start: 2023-08-28 | End: 2023-09-18 | Stop reason: HOSPADM

## 2023-09-06 RX ORDER — GLUCAGON 3 MG/1
1 POWDER NASAL
Qty: 1 EACH | Refills: 2 | Status: ON HOLD | OUTPATIENT
Start: 2023-09-06 | End: 2023-09-18 | Stop reason: HOSPADM

## 2023-09-06 RX ORDER — B,C/FERROUS FUM/FA/D3/ZINC OX 8MG-800MCG
1 TABLET ORAL
COMMUNITY
Start: 2023-06-20 | End: 2023-09-16

## 2023-09-06 NOTE — PROGRESS NOTES
NEW PATIENT VISIT    Subjective:      Chief Complaint: Referred for newly diagnosed type 2 diabetes    HPI: Teodoro Mast is a 78 y.o. male who is here for type 2 diabetes mellitus      Past Medical History:   Diagnosis Date    BPH (benign prostatic hyperplasia)     CKD (chronic kidney disease) stage 5, GFR less than 15 ml/min     Gout     Hyperlipidemia     Hyperparathyroidism, secondary renal     Hypertension          With regards to the type 2 diabetes:    History of ESRD on long-term home PD nightly for the past couple of years. Blood sugars have been borderline elevated over the years, but overall stable. More recently, his blood sugars started going much higher. He attributes this to taking megestrol, which stimulated his appetite. He admits to frequent dietary indiscretions prior to knowing he was diabetic. Since the A1c result, he's been doing much better from a diet standpoint, and his blood sugars have responded positively (see log below). He makes very little urine at baseline (~60 mL/day). Denies any symptoms associated with high blood sugars.    Most recent a1c was 8.4 on 08/10/2023. He does have significant anemia, so this may be falsely low.    Medical Therapy:  Current diabetic medications include:   None          Weight trend:  Wt Readings from Last 10 Encounters:   09/06/23 96.6 kg (213 lb 1.2 oz)   08/29/23 97.5 kg (215 lb)   08/08/23 97.5 kg (215 lb)   07/27/23 97.8 kg (215 lb 9.8 oz)   05/09/23 94.2 kg (207 lb 10.8 oz)   12/14/22 97.2 kg (214 lb 4.6 oz)   11/17/22 93 kg (205 lb 0.4 oz)   11/10/22 91.1 kg (200 lb 13.4 oz)   10/31/22 93 kg (205 lb 0.4 oz)   10/17/22 94 kg (207 lb 3.7 oz)       Diabetes Management Status    Statin: Taking  ACE/ARB: Not taking    Hypertension Medications               labetaloL (NORMODYNE) 100 MG tablet Take 1 tablet (100 mg total) by mouth 2 (two) times daily.            BP Readings from Last 5 Encounters:   09/06/23 134/70   08/29/23 (!) 140/70   08/08/23 (!)  "173/82   07/27/23 (!) 140/70   05/09/23 134/68       Hyperlipidemia Medications               atorvastatin (LIPITOR) 80 MG tablet TAKE 1 TABLET(80 MG) BY MOUTH EVERY DAY             Lab Results   Component Value Date    LDLCALC 121 (H) 10/17/2022    LDLCALC 98.0 07/13/2021    LDLCALC 82.0 02/13/2020    HDL 32 10/17/2022    HDL 33 (L) 07/13/2021    HDL 33 (L) 02/13/2020    TRIG 261 (H) 10/17/2022    TRIG 130 07/13/2021    TRIG 120 02/13/2020        Screening or Prevention Patient's value Goal Complete/Controlled?   HgA1C Testing and Control   Lab Results   Component Value Date    HGBA1C 8.4 (H) 08/10/2023      Annually/Less than 8% No   Lipid profile : 10/17/2022 Annually Yes   LDL control Lab Results   Component Value Date    LDLCALC 121 (H) 10/17/2022    Annually/Less than 100 mg/dl  No   Nephropathy screening No results found for: "LABMICR"  Lab Results   Component Value Date    PROTEINUA 3+ (A) 04/12/2021     No results found for: "MICALBCREAT" Annually No   Blood pressure BP Readings from Last 1 Encounters:   09/06/23 134/70    Less than 140/90 Yes   Dilated retinal exam Most Recent Eye Exam Date: Not Found Annually Yes   Foot exam   Most Recent Foot Exam Date: Not Found Annually Yes        History:  The patient was initially diagnosed with Type 2 diabetes mellitus:  8/2023 - elevated HbA1c.       Lab Results   Component Value Date    HGBA1C 8.4 (H) 08/10/2023    HGBA1C 5.9 07/28/2006     No results found for: "CPEPTIDE", "GLUTAMICACID"    Other medications tried:  None    Lifestyle:    Last visit with Diabetes Educator: Last Education Visit: Not Found         Objective:     Vitals:    09/06/23 1305   BP: 134/70   Pulse: 80         BP Readings from Last 5 Encounters:   09/06/23 134/70   08/29/23 (!) 140/70   08/08/23 (!) 173/82   07/27/23 (!) 140/70   05/09/23 134/68         Physical Exam  Vitals and nursing note reviewed.   Constitutional:       General: He is not in acute distress.     Appearance: He is " well-developed. He is not ill-appearing.   HENT:      Head: Normocephalic and atraumatic.   Eyes:      General:         Right eye: No discharge.         Left eye: No discharge.      Conjunctiva/sclera: Conjunctivae normal.   Neck:      Thyroid: No thyromegaly.      Trachea: No tracheal deviation.   Pulmonary:      Effort: Pulmonary effort is normal. No respiratory distress.   Musculoskeletal:      Comments: No digital clubbing or extremity cyanosis   Neurological:      Mental Status: He is alert and oriented to person, place, and time.      Coordination: Coordination normal.   Psychiatric:         Mood and Affect: Mood normal.         Behavior: Behavior normal.           Wt Readings from Last 3 Encounters:   09/06/23 1305 96.6 kg (213 lb 1.2 oz)   08/29/23 1458 97.5 kg (215 lb)   08/08/23 1047 97.5 kg (215 lb)       Assessment/Plan:     Peritoneal dialysis catheter in place  Hyperglycemia is a known effect of peritoneal dialysis.  Will initiate basal insulin to be given approximately 1 hour prior to starting PD.  See above.    ESRD (end stage renal disease)  Given end-stage renal disease, will carefully dose insulin to prevent fasting hypoglycemia.    Type 2 diabetes mellitus with hyperglycemia, with long-term current use of insulin  Patient has new onset diabetes, most likely exacerbated by a combination of dietary indiscretion and peritoneal dialysis.    Reviewed goals of therapy are to get the best control we can without hypoglycemia.    A1c may be falsely low in the setting of significant anemia.  However, his blood sugar readings by fingerstick over the last week have been just slightly above goal.      Referral sent for diabetes education    Medication changes:   Stop:  N/a  Start:   Levemir 8 units daily-to be given 1 hour prior to start of peritoneal dialysis.       Reviewed patient's current insulin regimen. Clarified proper insulin dose and timing in relation to meals, etc. Insulin injection sites and  proper rotation instructed.      Advised frequent self blood glucose monitoring. Patient encouraged to document glucose results and bring them to every clinic visit.  I asked that he send in blood sugar logs every two weeks for the next few months so that we can evaluate efficacy of the Levemir and also adjust his doses.    Hypoglycemia precautions discussed.  Discussed the rule of 15/15.  Baqsimi intranasal glucagon Rx was prescribed.    Discussed diet and exercise.        Lab Results   Component Value Date    HGBA1C 8.4 (H) 08/10/2023    HGBA1C 5.9 07/28/2006       Schedule Diabetes Education  Follow up in about 4 months (around 1/6/2024).

## 2023-09-06 NOTE — ASSESSMENT & PLAN NOTE
Patient has new onset diabetes, most likely exacerbated by a combination of dietary indiscretion and peritoneal dialysis.    Reviewed goals of therapy are to get the best control we can without hypoglycemia.    A1c may be falsely low in the setting of significant anemia.  However, his blood sugar readings by fingerstick over the last week have been just slightly above goal.      Referral sent for diabetes education    Medication changes:   Stop:   N/a  Start:    Levemir 8 units daily-to be given 1 hour prior to start of peritoneal dialysis.       Reviewed patient's current insulin regimen. Clarified proper insulin dose and timing in relation to meals, etc. Insulin injection sites and proper rotation instructed.      Advised frequent self blood glucose monitoring. Patient encouraged to document glucose results and bring them to every clinic visit.  I asked that he send in blood sugar logs every two weeks for the next few months so that we can evaluate efficacy of the Levemir and also adjust his doses.    Hypoglycemia precautions discussed.  Discussed the rule of 15/15.  Baqsimi intranasal glucagon Rx was prescribed.    Discussed diet and exercise.        Lab Results   Component Value Date    HGBA1C 8.4 (H) 08/10/2023    HGBA1C 5.9 07/28/2006

## 2023-09-06 NOTE — ASSESSMENT & PLAN NOTE
Hyperglycemia is a known effect of peritoneal dialysis.  Will initiate basal insulin to be given approximately 1 hour prior to starting PD.  See above.

## 2023-09-16 ENCOUNTER — HOSPITAL ENCOUNTER (INPATIENT)
Facility: HOSPITAL | Age: 78
LOS: 1 days | Discharge: HOME | DRG: 149 | End: 2023-09-18
Attending: EMERGENCY MEDICINE | Admitting: EMERGENCY MEDICINE
Payer: MEDICARE

## 2023-09-16 DIAGNOSIS — E78.2 MIXED HYPERLIPIDEMIA: ICD-10-CM

## 2023-09-16 DIAGNOSIS — N18.6 ESRD (END STAGE RENAL DISEASE): ICD-10-CM

## 2023-09-16 DIAGNOSIS — R07.9 CHEST PAIN: ICD-10-CM

## 2023-09-16 DIAGNOSIS — R42 DIZZINESS: ICD-10-CM

## 2023-09-16 DIAGNOSIS — R42 VERTIGO: Primary | ICD-10-CM

## 2023-09-16 LAB
ALBUMIN SERPL BCP-MCNC: 2.3 G/DL (ref 3.5–5.2)
ALP SERPL-CCNC: 52 U/L (ref 55–135)
ALT SERPL W/O P-5'-P-CCNC: 16 U/L (ref 10–44)
ANION GAP SERPL CALC-SCNC: 14 MMOL/L (ref 8–16)
AST SERPL-CCNC: 16 U/L (ref 10–40)
BASOPHILS # BLD AUTO: 0.04 K/UL (ref 0–0.2)
BASOPHILS NFR BLD: 0.5 % (ref 0–1.9)
BILIRUB SERPL-MCNC: 0.4 MG/DL (ref 0.1–1)
BUN SERPL-MCNC: 56 MG/DL (ref 8–23)
CALCIUM SERPL-MCNC: 9.1 MG/DL (ref 8.7–10.5)
CHLORIDE SERPL-SCNC: 95 MMOL/L (ref 95–110)
CO2 SERPL-SCNC: 27 MMOL/L (ref 23–29)
CREAT SERPL-MCNC: 12.4 MG/DL (ref 0.5–1.4)
DIFFERENTIAL METHOD: ABNORMAL
EOSINOPHIL # BLD AUTO: 0.1 K/UL (ref 0–0.5)
EOSINOPHIL NFR BLD: 0.9 % (ref 0–8)
ERYTHROCYTE [DISTWIDTH] IN BLOOD BY AUTOMATED COUNT: 14.4 % (ref 11.5–14.5)
EST. GFR  (NO RACE VARIABLE): 3.8 ML/MIN/1.73 M^2
GLUCOSE SERPL-MCNC: 178 MG/DL (ref 70–110)
HCT VFR BLD AUTO: 26.2 % (ref 40–54)
HGB BLD-MCNC: 8.6 G/DL (ref 14–18)
IMM GRANULOCYTES # BLD AUTO: 0.02 K/UL (ref 0–0.04)
IMM GRANULOCYTES NFR BLD AUTO: 0.2 % (ref 0–0.5)
LYMPHOCYTES # BLD AUTO: 0.4 K/UL (ref 1–4.8)
LYMPHOCYTES NFR BLD: 4.7 % (ref 18–48)
MCH RBC QN AUTO: 31.6 PG (ref 27–31)
MCHC RBC AUTO-ENTMCNC: 32.8 G/DL (ref 32–36)
MCV RBC AUTO: 96 FL (ref 82–98)
MONOCYTES # BLD AUTO: 0.4 K/UL (ref 0.3–1)
MONOCYTES NFR BLD: 5.3 % (ref 4–15)
NEUTROPHILS # BLD AUTO: 7.2 K/UL (ref 1.8–7.7)
NEUTROPHILS NFR BLD: 88.4 % (ref 38–73)
NRBC BLD-RTO: 0 /100 WBC
PLATELET # BLD AUTO: 202 K/UL (ref 150–450)
PMV BLD AUTO: 10.7 FL (ref 9.2–12.9)
POCT GLUCOSE: 101 MG/DL (ref 70–110)
POCT GLUCOSE: 102 MG/DL (ref 70–110)
POCT GLUCOSE: 103 MG/DL (ref 70–110)
POTASSIUM SERPL-SCNC: 3.6 MMOL/L (ref 3.5–5.1)
PROT SERPL-MCNC: 5.7 G/DL (ref 6–8.4)
RBC # BLD AUTO: 2.72 M/UL (ref 4.6–6.2)
SODIUM SERPL-SCNC: 136 MMOL/L (ref 136–145)
TROPONIN I SERPL DL<=0.01 NG/ML-MCNC: 0.05 NG/ML (ref 0–0.03)
TROPONIN I SERPL DL<=0.01 NG/ML-MCNC: 0.06 NG/ML (ref 0–0.03)
TROPONIN I SERPL DL<=0.01 NG/ML-MCNC: 0.06 NG/ML (ref 0–0.03)
WBC # BLD AUTO: 8.12 K/UL (ref 3.9–12.7)

## 2023-09-16 PROCEDURE — 99214 OFFICE O/P EST MOD 30 MIN: CPT | Mod: NTX,,, | Performed by: NURSE PRACTITIONER

## 2023-09-16 PROCEDURE — 99499 UNLISTED E&M SERVICE: CPT | Mod: NTX,,, | Performed by: INTERNAL MEDICINE

## 2023-09-16 PROCEDURE — 25000003 PHARM REV CODE 250: Mod: NTX

## 2023-09-16 PROCEDURE — 80053 COMPREHEN METABOLIC PANEL: CPT | Mod: NTX

## 2023-09-16 PROCEDURE — 84484 ASSAY OF TROPONIN QUANT: CPT | Mod: 91,NTX

## 2023-09-16 PROCEDURE — 63600175 PHARM REV CODE 636 W HCPCS: Mod: NTX

## 2023-09-16 PROCEDURE — 85025 COMPLETE CBC W/AUTO DIFF WBC: CPT | Mod: NTX

## 2023-09-16 PROCEDURE — 96372 THER/PROPH/DIAG INJ SC/IM: CPT

## 2023-09-16 PROCEDURE — 99223 1ST HOSP IP/OBS HIGH 75: CPT | Mod: NTX,,,

## 2023-09-16 PROCEDURE — 99223 PR INITIAL HOSPITAL CARE,LEVL III: ICD-10-PCS | Mod: NTX,,,

## 2023-09-16 PROCEDURE — 99214 PR OFFICE/OUTPT VISIT, EST, LEVL IV, 30-39 MIN: ICD-10-PCS | Mod: NTX,,, | Performed by: NURSE PRACTITIONER

## 2023-09-16 PROCEDURE — 93010 ELECTROCARDIOGRAM REPORT: CPT | Mod: NTX,,, | Performed by: INTERNAL MEDICINE

## 2023-09-16 PROCEDURE — 93005 ELECTROCARDIOGRAM TRACING: CPT | Mod: NTX

## 2023-09-16 PROCEDURE — G0378 HOSPITAL OBSERVATION PER HR: HCPCS | Mod: NTX

## 2023-09-16 PROCEDURE — 96374 THER/PROPH/DIAG INJ IV PUSH: CPT | Mod: NTX

## 2023-09-16 PROCEDURE — 90945 DIALYSIS ONE EVALUATION: CPT | Mod: NTX

## 2023-09-16 PROCEDURE — 99499 NO LOS: ICD-10-PCS | Mod: NTX,,, | Performed by: INTERNAL MEDICINE

## 2023-09-16 PROCEDURE — 99285 EMERGENCY DEPT VISIT HI MDM: CPT | Mod: NTX

## 2023-09-16 PROCEDURE — 93010 ELECTROCARDIOGRAM REPORT: CPT | Mod: 76,NTX,, | Performed by: INTERNAL MEDICINE

## 2023-09-16 PROCEDURE — 93010 EKG 12-LEAD: ICD-10-PCS | Mod: 76,NTX,, | Performed by: INTERNAL MEDICINE

## 2023-09-16 RX ORDER — BISACODYL 10 MG
10 SUPPOSITORY, RECTAL RECTAL DAILY PRN
Status: DISCONTINUED | OUTPATIENT
Start: 2023-09-16 | End: 2023-09-18 | Stop reason: HOSPADM

## 2023-09-16 RX ORDER — ATORVASTATIN CALCIUM 40 MG/1
80 TABLET, FILM COATED ORAL DAILY
Status: DISCONTINUED | OUTPATIENT
Start: 2023-09-16 | End: 2023-09-18 | Stop reason: HOSPADM

## 2023-09-16 RX ORDER — CHOLECALCIFEROL (VITAMIN D3) 25 MCG
2000 TABLET ORAL DAILY
Status: DISCONTINUED | OUTPATIENT
Start: 2023-09-17 | End: 2023-09-18 | Stop reason: HOSPADM

## 2023-09-16 RX ORDER — IBUPROFEN 200 MG
16 TABLET ORAL
Status: DISCONTINUED | OUTPATIENT
Start: 2023-09-16 | End: 2023-09-16

## 2023-09-16 RX ORDER — IBUPROFEN 200 MG
24 TABLET ORAL
Status: DISCONTINUED | OUTPATIENT
Start: 2023-09-16 | End: 2023-09-18 | Stop reason: HOSPADM

## 2023-09-16 RX ORDER — MECLIZINE HCL 12.5 MG 12.5 MG/1
25 TABLET ORAL
Status: COMPLETED | OUTPATIENT
Start: 2023-09-16 | End: 2023-09-16

## 2023-09-16 RX ORDER — IBUPROFEN 200 MG
24 TABLET ORAL
Status: DISCONTINUED | OUTPATIENT
Start: 2023-09-16 | End: 2023-09-16

## 2023-09-16 RX ORDER — ENOXAPARIN SODIUM 100 MG/ML
30 INJECTION SUBCUTANEOUS EVERY 24 HOURS
Status: DISCONTINUED | OUTPATIENT
Start: 2023-09-16 | End: 2023-09-17

## 2023-09-16 RX ORDER — ONDANSETRON 8 MG/1
8 TABLET, ORALLY DISINTEGRATING ORAL EVERY 8 HOURS PRN
Status: DISCONTINUED | OUTPATIENT
Start: 2023-09-16 | End: 2023-09-18 | Stop reason: HOSPADM

## 2023-09-16 RX ORDER — ACETAMINOPHEN 325 MG/1
650 TABLET ORAL EVERY 4 HOURS PRN
Status: DISCONTINUED | OUTPATIENT
Start: 2023-09-16 | End: 2023-09-18 | Stop reason: HOSPADM

## 2023-09-16 RX ORDER — GLUCAGON 1 MG
1 KIT INJECTION
Status: DISCONTINUED | OUTPATIENT
Start: 2023-09-16 | End: 2023-09-18 | Stop reason: HOSPADM

## 2023-09-16 RX ORDER — GLUCAGON 1 MG
1 KIT INJECTION
Status: DISCONTINUED | OUTPATIENT
Start: 2023-09-16 | End: 2023-09-17

## 2023-09-16 RX ORDER — LANOLIN ALCOHOL/MO/W.PET/CERES
400 CREAM (GRAM) TOPICAL DAILY
Status: DISCONTINUED | OUTPATIENT
Start: 2023-09-17 | End: 2023-09-18 | Stop reason: HOSPADM

## 2023-09-16 RX ORDER — SODIUM CHLORIDE 0.9 % (FLUSH) 0.9 %
10 SYRINGE (ML) INJECTION EVERY 12 HOURS PRN
Status: DISCONTINUED | OUTPATIENT
Start: 2023-09-16 | End: 2023-09-18 | Stop reason: HOSPADM

## 2023-09-16 RX ORDER — IBUPROFEN 200 MG
16 TABLET ORAL
Status: DISCONTINUED | OUTPATIENT
Start: 2023-09-16 | End: 2023-09-18 | Stop reason: HOSPADM

## 2023-09-16 RX ORDER — NALOXONE HCL 0.4 MG/ML
0.02 VIAL (ML) INJECTION
Status: DISCONTINUED | OUTPATIENT
Start: 2023-09-16 | End: 2023-09-18 | Stop reason: HOSPADM

## 2023-09-16 RX ORDER — LABETALOL 100 MG/1
100 TABLET, FILM COATED ORAL
Status: COMPLETED | OUTPATIENT
Start: 2023-09-16 | End: 2023-09-16

## 2023-09-16 RX ORDER — INSULIN ASPART 100 [IU]/ML
0-5 INJECTION, SOLUTION INTRAVENOUS; SUBCUTANEOUS
Status: DISCONTINUED | OUTPATIENT
Start: 2023-09-16 | End: 2023-09-18 | Stop reason: HOSPADM

## 2023-09-16 RX ORDER — LABETALOL 100 MG/1
100 TABLET, FILM COATED ORAL 2 TIMES DAILY
Status: DISCONTINUED | OUTPATIENT
Start: 2023-09-16 | End: 2023-09-18 | Stop reason: HOSPADM

## 2023-09-16 RX ORDER — GLUCAGON 1 MG
1 KIT INJECTION
Status: DISCONTINUED | OUTPATIENT
Start: 2023-09-16 | End: 2023-09-16

## 2023-09-16 RX ORDER — PROCHLORPERAZINE EDISYLATE 5 MG/ML
5 INJECTION INTRAMUSCULAR; INTRAVENOUS
Status: COMPLETED | OUTPATIENT
Start: 2023-09-16 | End: 2023-09-16

## 2023-09-16 RX ORDER — PROCHLORPERAZINE EDISYLATE 5 MG/ML
5 INJECTION INTRAMUSCULAR; INTRAVENOUS EVERY 6 HOURS PRN
Status: DISCONTINUED | OUTPATIENT
Start: 2023-09-16 | End: 2023-09-18 | Stop reason: HOSPADM

## 2023-09-16 RX ORDER — CALCITRIOL 0.25 UG/1
0.25 CAPSULE ORAL WEEKLY
Status: DISCONTINUED | OUTPATIENT
Start: 2023-09-16 | End: 2023-09-18 | Stop reason: HOSPADM

## 2023-09-16 RX ORDER — POLYETHYLENE GLYCOL 3350 17 G/17G
17 POWDER, FOR SOLUTION ORAL DAILY PRN
Status: DISCONTINUED | OUTPATIENT
Start: 2023-09-16 | End: 2023-09-18 | Stop reason: HOSPADM

## 2023-09-16 RX ORDER — POTASSIUM CHLORIDE 20 MEQ/1
40 TABLET, EXTENDED RELEASE ORAL DAILY
Status: DISCONTINUED | OUTPATIENT
Start: 2023-09-17 | End: 2023-09-18 | Stop reason: HOSPADM

## 2023-09-16 RX ORDER — TALC
6 POWDER (GRAM) TOPICAL NIGHTLY PRN
Status: DISCONTINUED | OUTPATIENT
Start: 2023-09-16 | End: 2023-09-18 | Stop reason: HOSPADM

## 2023-09-16 RX ORDER — MECLIZINE HYDROCHLORIDE 25 MG/1
25 TABLET ORAL 3 TIMES DAILY PRN
Qty: 20 TABLET | Refills: 0 | Status: SHIPPED | OUTPATIENT
Start: 2023-09-16 | End: 2023-10-31 | Stop reason: ALTCHOICE

## 2023-09-16 RX ORDER — NAPROXEN SODIUM 220 MG/1
81 TABLET, FILM COATED ORAL DAILY
Status: DISCONTINUED | OUTPATIENT
Start: 2023-09-16 | End: 2023-09-18 | Stop reason: HOSPADM

## 2023-09-16 RX ADMIN — ATORVASTATIN CALCIUM 80 MG: 40 TABLET, FILM COATED ORAL at 04:09

## 2023-09-16 RX ADMIN — LABETALOL HYDROCHLORIDE 100 MG: 100 TABLET, FILM COATED ORAL at 09:09

## 2023-09-16 RX ADMIN — MECLIZINE 25 MG: 12.5 TABLET ORAL at 05:09

## 2023-09-16 RX ADMIN — ASPIRIN 81 MG 81 MG: 81 TABLET ORAL at 04:09

## 2023-09-16 RX ADMIN — PROCHLORPERAZINE EDISYLATE 5 MG: 5 INJECTION INTRAMUSCULAR; INTRAVENOUS at 05:09

## 2023-09-16 RX ADMIN — MECLIZINE 25 MG: 12.5 TABLET ORAL at 10:09

## 2023-09-16 RX ADMIN — INSULIN DETEMIR 1 UNITS: 100 INJECTION, SOLUTION SUBCUTANEOUS at 12:09

## 2023-09-16 RX ADMIN — CALCITRIOL CAPSULES 0.25 MCG 0.25 MCG: 0.25 CAPSULE ORAL at 04:09

## 2023-09-16 RX ADMIN — ENOXAPARIN SODIUM 30 MG: 30 INJECTION SUBCUTANEOUS at 04:09

## 2023-09-16 RX ADMIN — LABETALOL HYDROCHLORIDE 100 MG: 100 TABLET, FILM COATED ORAL at 07:09

## 2023-09-16 NOTE — PHARMACY MED REC
"Admission Medication History     The home medication history was taken by Erasto Urbina.    You may go to "Admission" then "Reconcile Home Medications" tabs to review and/or act upon these items.     The home medication list has been updated by the Pharmacy department.   Please read ALL comments highlighted in yellow.   Please address this information as you see fit.    Feel free to contact us if you have any questions or require assistance.      The medications listed below were removed from the home medication list. Please reorder if appropriate:  Patient reports no longer taking the following medication(s):  MEGESTROL 400 MG/10 ML SUSPENSION  PRORENAL 8 MG TABLET      Current Outpatient Medications on File Prior to Encounter   Medication Sig    aspirin 81 MG Chew   Take 1 tablet by mouth once daily.    gentamicin (GARAMYCIN) 0.3 % ophthalmic solution   Apply 1 drop to PD exit site daily with dressing changes.    insulin detemir U-100, Levemir, (LEVEMIR FLEXPEN) 100 unit/mL (3 mL) InPn pen   Inject 8 Units into the skin every evening.    labetaloL (NORMODYNE) 100 MG tablet   Take 1 tablet (100 mg total) by mouth 2 (two) times daily.    magnesium oxide (MAG-OX) 400 mg (241.3 mg magnesium) tablet   Take 1 tablet by mouth once daily.    vitamin D (VITAMIN D3) 1000 units Tab   Take 2,000 Units by mouth once daily.    atorvastatin (LIPITOR) 80 MG tablet   TAKE 1 TABLET(80 MG) BY MOUTH EVERY DAY (Patient not taking: Reported on 9/16/2023)    calcitRIOL (ROCALTROL) 0.25 MCG Cap   Take 0.25 mcg by mouth once a week.    glucagon (BAQSIMI) 3 mg/actuation Spry   1 Dose by Nasal route as needed (hypoglycemia). (Patient not taking: Reported on 9/16/2023)    pen needle, diabetic (BD ULTRA-FINE KITA PEN NEEDLE) 32 gauge x 5/32" Ndle   1 injection daily for insulin    potassium chloride SA (K-DUR,KLOR-CON) 20 MEQ tablet   Take 20 mEq by mouth daily.  (Patient not taking: Reported on 9/16/2023)     Potential issues to be " addressed PRIOR TO DISCHARGE  Please discuss with the patient barriers to adherence with medication treatment plans  Patient requires education regarding drug therapies     Erasto Urbina  EXT [id number]                  .

## 2023-09-16 NOTE — SUBJECTIVE & OBJECTIVE
Past Medical History:   Diagnosis Date    BPH (benign prostatic hyperplasia)     CKD (chronic kidney disease) stage 5, GFR less than 15 ml/min     Gout     Hyperlipidemia     Hyperparathyroidism, secondary renal     Hypertension        Past Surgical History:   Procedure Laterality Date    AV FISTULA PLACEMENT Left 3/31/2022    Procedure: CREATION, AV FISTULA RADIOCEPHALIC;  Surgeon: PEMA Martines II, MD;  Location: [facility] OR 2ND FLR;  Service: Vascular;  Laterality: Left;    AV FISTULA PLACEMENT Left 9/30/2022    Procedure: CREATION, AV FISTULA;  Surgeon: PEMA Martines II, MD;  Location: [facility] OR 2ND FLR;  Service: Vascular;  Laterality: Left;    COLONOSCOPY N/A 11/2/2021    Procedure: COLONOSCOPY;  Surgeon: Joe Jimenez MD;  Location: [facility] ENDO (4TH FLR);  Service: Endoscopy;  Laterality: N/A;  COVID test at [place] on 10/30-GT      dialysis pt-labs prior    EYE SURGERY Bilateral     cataract removal    FINGER SURGERY      hemorriodectomy      PERITONEAL CATHETER INSERTION         Review of patient's allergies indicates:  No Known Allergies  Current Facility-Administered Medications   Medication Frequency    acetaminophen tablet 650 mg Q4H PRN    bisacodyL suppository 10 mg Daily PRN    dextrose 10% bolus 125 mL 125 mL PRN    dextrose 10% bolus 125 mL 125 mL PRN    dextrose 10% bolus 125 mL 125 mL PRN    dextrose 10% bolus 250 mL 250 mL PRN    dextrose 10% bolus 250 mL 250 mL PRN    dextrose 10% bolus 250 mL 250 mL PRN    enoxaparin injection 30 mg Daily    glucagon (human recombinant) injection 1 mg PRN    glucagon (human recombinant) injection 1 mg PRN    glucose chewable tablet 16 g PRN    glucose chewable tablet 24 g PRN    insulin aspart U-100 pen 0-5 Units QID (AC + HS) PRN    insulin detemir U-100 (Levemir) pen 1 Units Daily    melatonin tablet 6 mg Nightly PRN    naloxone 0.4 mg/mL injection 0.02 mg PRN    ondansetron disintegrating tablet 8 mg Q8H PRN    polyethylene glycol packet 17 g Daily PRN     "prochlorperazine injection Soln 5 mg Q6H PRN    psyllium husk (aspartame) 3.4 gram packet 1 packet QHS    sodium chloride 0.9% flush 10 mL Q12H PRN     Current Outpatient Medications   Medication    aspirin 81 MG Chew    atorvastatin (LIPITOR) 80 MG tablet    calcitRIOL (ROCALTROL) 0.25 MCG Cap    gentamicin (GARAMYCIN) 0.3 % ophthalmic solution    glucagon (BAQSIMI) 3 mg/actuation Spry    insulin detemir U-100, Levemir, (LEVEMIR FLEXPEN) 100 unit/mL (3 mL) InPn pen    labetaloL (NORMODYNE) 100 MG tablet    magnesium oxide (MAG-OX) 400 mg (241.3 mg magnesium) tablet    meclizine (ANTIVERT) 25 mg tablet    megestroL (MEGACE) 400 mg/10 mL (40 mg/mL) Susp    pen needle, diabetic (BD ULTRA-FINE KITA PEN NEEDLE) 32 gauge x 5/32" Ndle    potassium chloride SA (K-DUR,KLOR-CON) 20 MEQ tablet    PRORENAL 8 mg iron-800 mcg-1,000 unit Tab    vitamin D (VITAMIN D3) 1000 units Tab     Facility-Administered Medications Ordered in Other Encounters   Medication Frequency    0.9%  NaCl infusion Continuous    ceFAZolin (ANCEF) 3 gram in dextrose 5% IVPB On Call Procedure    LIDOcaine (PF) 10 mg/ml (1%) injection 10 mg Once     Family History       Problem Relation (Age of Onset)    Cancer Brother, Sister    Hammer toes Brother    Heart disease Mother, Father, Brother    Hypertension Mother    No Known Problems Sister, Sister, Brother, Son    Premature birth Son    Seizures Sister          Tobacco Use    Smoking status: Former     Current packs/day: 0.00     Average packs/day: 0.5 packs/day for 10.0 years (5.0 ttl pk-yrs)     Types: Cigarettes     Start date:      Quit date:      Years since quittin.7    Smokeless tobacco: Never   Substance and Sexual Activity    Alcohol use: Not Currently    Drug use: No    Sexual activity: Not Currently     Partners: Female     Review of Systems   Constitutional:  Negative for activity change, chills and fever.   HENT:  Negative for trouble swallowing.    Eyes:  Negative for visual " disturbance.   Respiratory:  Negative for chest tightness, shortness of breath and wheezing.    Cardiovascular:  Negative for chest pain, palpitations and leg swelling.   Gastrointestinal:  Positive for nausea and vomiting. Negative for abdominal pain, constipation and diarrhea.   Genitourinary:  Positive for decreased urine volume. Negative for dysuria, frequency, hematuria and urgency.   Musculoskeletal:  Negative for arthralgias, back pain and gait problem.   Skin:  Negative for color change and rash.   Neurological:  Positive for dizziness. Negative for syncope, weakness, light-headedness, numbness and headaches.   Psychiatric/Behavioral:  Negative for agitation and confusion. The patient is not nervous/anxious.      Objective:     Vital Signs (Most Recent):  Temp: 98.7 °F (37.1 °C) (09/16/23 1026)  Pulse: 83 (09/16/23 1026)  Resp: 20 (09/16/23 1026)  BP: (!) 177/86 (09/16/23 1026)  SpO2: 96 % (09/16/23 1026) Vital Signs (24h Range):  Temp:  [98.7 °F (37.1 °C)-98.8 °F (37.1 °C)] 98.7 °F (37.1 °C)  Pulse:  [76-87] 83  Resp:  [15-20] 20  SpO2:  [95 %-99 %] 96 %  BP: (174-199)/(86-98) 177/86     Weight: 95 kg (209 lb 7 oz) (09/16/23 0413)  Body mass index is 26.89 kg/m².  Body surface area is 2.23 meters squared.    No intake/output data recorded.     Physical Exam  Vitals and nursing note reviewed.   Constitutional:       General: He is not in acute distress.     Appearance: He is well-developed.   HENT:      Head: Normocephalic and atraumatic.      Mouth/Throat:      Pharynx: No oropharyngeal exudate.   Eyes:      Conjunctiva/sclera: Conjunctivae normal.   Cardiovascular:      Rate and Rhythm: Normal rate and regular rhythm.      Heart sounds: Normal heart sounds.   Pulmonary:      Effort: Pulmonary effort is normal. No respiratory distress.      Breath sounds: Normal breath sounds.   Abdominal:      General: Bowel sounds are normal. There is no distension.      Palpations: Abdomen is soft.      Tenderness: There  is no abdominal tenderness. There is no right CVA tenderness or left CVA tenderness.      Comments: PD cath to LLQ   Musculoskeletal:         General: No tenderness. Normal range of motion.      Cervical back: Normal range of motion and neck supple.      Right lower leg: No edema.      Left lower leg: No edema.   Lymphadenopathy:      Cervical: No cervical adenopathy.   Skin:     General: Skin is warm and dry.   Neurological:      Mental Status: He is alert and oriented to person, place, and time.   Psychiatric:         Mood and Affect: Mood normal.         Behavior: Behavior normal.         Thought Content: Thought content normal.         Judgment: Judgment normal.          Significant Labs:  CBC:   Recent Labs   Lab 09/16/23  0521   WBC 8.12   RBC 2.72*   HGB 8.6*   HCT 26.2*      MCV 96   MCH 31.6*   MCHC 32.8     CMP:   Recent Labs   Lab 09/16/23  0521   *   CALCIUM 9.1   ALBUMIN 2.3*   PROT 5.7*      K 3.6   CO2 27   CL 95   BUN 56*   CREATININE 12.4*   ALKPHOS 52*   ALT 16   AST 16   BILITOT 0.4     All labs within the past 24 hours have been reviewed.

## 2023-09-16 NOTE — SUBJECTIVE & OBJECTIVE
Past Medical History:   Diagnosis Date    BPH (benign prostatic hyperplasia)     CKD (chronic kidney disease) stage 5, GFR less than 15 ml/min     Gout     Hyperlipidemia     Hyperparathyroidism, secondary renal     Hypertension        Past Surgical History:   Procedure Laterality Date    AV FISTULA PLACEMENT Left 3/31/2022    Procedure: CREATION, AV FISTULA RADIOCEPHALIC;  Surgeon: PEMA Martines II, MD;  Location: [facility] OR 2ND FLR;  Service: Vascular;  Laterality: Left;    AV FISTULA PLACEMENT Left 9/30/2022    Procedure: CREATION, AV FISTULA;  Surgeon: PEMA Martines II, MD;  Location: [facility] OR 2ND FLR;  Service: Vascular;  Laterality: Left;    COLONOSCOPY N/A 11/2/2021    Procedure: COLONOSCOPY;  Surgeon: Joe Jimenez MD;  Location: [facility] ENDO (4TH FLR);  Service: Endoscopy;  Laterality: N/A;  COVID test at [place] on 10/30-GT      dialysis pt-labs prior    EYE SURGERY Bilateral     cataract removal    FINGER SURGERY      hemorriodectomy      PERITONEAL CATHETER INSERTION         Review of patient's allergies indicates:  No Known Allergies    Current Facility-Administered Medications on File Prior to Encounter   Medication    0.9%  NaCl infusion    ceFAZolin (ANCEF) 3 gram in dextrose 5% IVPB    LIDOcaine (PF) 10 mg/ml (1%) injection 10 mg     Current Outpatient Medications on File Prior to Encounter   Medication Sig    aspirin 81 MG Chew Take 1 tablet by mouth.    atorvastatin (LIPITOR) 80 MG tablet TAKE 1 TABLET(80 MG) BY MOUTH EVERY DAY    calcitRIOL (ROCALTROL) 0.25 MCG Cap Take 0.25 mcg by mouth once a week.    gentamicin (GARAMYCIN) 0.3 % ophthalmic solution Apply 1 drop to PD exit site daily with dressing changes.    glucagon (BAQSIMI) 3 mg/actuation Spry 1 Dose by Nasal route as needed (hypoglycemia).    insulin detemir U-100, Levemir, (LEVEMIR FLEXPEN) 100 unit/mL (3 mL) InPn pen Inject 8 Units into the skin every evening.    labetaloL (NORMODYNE) 100 MG tablet Take 1 tablet (100 mg total) by mouth  "2 (two) times daily.    magnesium oxide (MAG-OX) 400 mg (241.3 mg magnesium) tablet Take 1 tablet by mouth once daily.    megestroL (MEGACE) 400 mg/10 mL (40 mg/mL) Susp Take 5 mLs (200 mg total) by mouth once daily.    pen needle, diabetic (BD ULTRA-FINE KITA PEN NEEDLE) 32 gauge x 5/32" Ndle 1 injection daily for insulin    potassium chloride SA (K-DUR,KLOR-CON) 20 MEQ tablet Take 20 mEq by mouth.    PRORENAL 8 mg iron-800 mcg-1,000 unit Tab Take 1 tablet by mouth.    vitamin D (VITAMIN D3) 1000 units Tab Take 2,000 Units by mouth once daily.     Family History       Problem Relation (Age of Onset)    Cancer Brother, Sister    Hammer toes Brother    Heart disease Mother, Father, Brother    Hypertension Mother    No Known Problems Sister, Sister, Brother, Son    Premature birth Son    Seizures Sister          Tobacco Use    Smoking status: Former     Current packs/day: 0.00     Average packs/day: 0.5 packs/day for 10.0 years (5.0 ttl pk-yrs)     Types: Cigarettes     Start date:      Quit date:      Years since quittin.7    Smokeless tobacco: Never   Substance and Sexual Activity    Alcohol use: Not Currently    Drug use: No    Sexual activity: Not Currently     Partners: Female     Review of Systems   Constitutional:  Negative for activity change, chills and fever.   HENT:  Negative for trouble swallowing.    Eyes:  Negative for photophobia and visual disturbance.   Respiratory:  Negative for chest tightness, shortness of breath and wheezing.    Cardiovascular:  Negative for chest pain, palpitations and leg swelling.   Gastrointestinal:  Positive for nausea and vomiting. Negative for abdominal pain, constipation and diarrhea.   Genitourinary:  Positive for decreased urine volume. Negative for dysuria, frequency, hematuria and urgency.   Musculoskeletal:  Negative for arthralgias, back pain and gait problem.   Skin:  Negative for color change and rash.   Neurological:  Positive for dizziness. Negative " for syncope, weakness, light-headedness, numbness and headaches.   Psychiatric/Behavioral:  Negative for agitation and confusion. The patient is not nervous/anxious.      Objective:     Vital Signs (Most Recent):  Temp: 98.7 °F (37.1 °C) (09/16/23 0528)  Pulse: 87 (09/16/23 0913)  Resp: 16 (09/16/23 0903)  BP: (!) 177/93 (09/16/23 0913)  SpO2: 95 % (09/16/23 0913) Vital Signs (24h Range):  Temp:  [98.7 °F (37.1 °C)-98.8 °F (37.1 °C)] 98.7 °F (37.1 °C)  Pulse:  [76-87] 87  Resp:  [15-20] 16  SpO2:  [95 %-99 %] 95 %  BP: (174-199)/(88-98) 177/93     Weight: 95 kg (209 lb 7 oz)  Body mass index is 26.89 kg/m².     Physical Exam  Vitals and nursing note reviewed.   Constitutional:       General: He is not in acute distress.     Appearance: He is well-developed.   HENT:      Head: Normocephalic and atraumatic.      Mouth/Throat:      Pharynx: No oropharyngeal exudate.   Eyes:      Conjunctiva/sclera: Conjunctivae normal.      Pupils: Pupils are equal, round, and reactive to light.   Cardiovascular:      Rate and Rhythm: Normal rate and regular rhythm.      Heart sounds: Normal heart sounds.   Pulmonary:      Effort: Pulmonary effort is normal. No respiratory distress.      Breath sounds: Normal breath sounds. No wheezing.   Abdominal:      General: Bowel sounds are normal. There is no distension.      Palpations: Abdomen is soft.      Tenderness: There is no abdominal tenderness. There is no right CVA tenderness or left CVA tenderness.   Musculoskeletal:         General: No tenderness. Normal range of motion.      Cervical back: Normal range of motion and neck supple.      Right lower leg: No edema.      Left lower leg: No edema.   Lymphadenopathy:      Cervical: No cervical adenopathy.   Skin:     General: Skin is warm and dry.      Capillary Refill: Capillary refill takes less than 2 seconds.      Findings: No rash.   Neurological:      Mental Status: He is alert and oriented to person, place, and time.      Cranial  Nerves: No cranial nerve deficit.      Sensory: No sensory deficit.      Coordination: Coordination normal.   Psychiatric:         Behavior: Behavior normal.         Thought Content: Thought content normal.         Judgment: Judgment normal.              CRANIAL NERVES     CN III, IV, VI   Pupils are equal, round, and reactive to light.       Significant Labs: All pertinent labs within the past 24 hours have been reviewed.  CBC:   Recent Labs   Lab 09/16/23  0521   WBC 8.12   HGB 8.6*   HCT 26.2*        CMP:   Recent Labs   Lab 09/16/23  0521      K 3.6   CL 95   CO2 27   *   BUN 56*   CREATININE 12.4*   CALCIUM 9.1   PROT 5.7*   ALBUMIN 2.3*   BILITOT 0.4   ALKPHOS 52*   AST 16   ALT 16   ANIONGAP 14       Significant Imaging: I have reviewed all pertinent imaging results/findings within the past 24 hours.  Imaging Results    None

## 2023-09-16 NOTE — HPI
Teodoro Mast is a 78 y.o. male with PMHx ESRD on peritoneal dialysis, HTN, HLD, and T2DM who [place] [facility] [place] with chief complaint of dizziness. He reports dizziness started around midnight and he has been unable to walk secondary to severity. Dizziness is worsened when he moves his head. He has associated nausea and NBNB vomiting. He reports a similar incident a few months prior but that event lasted roughly 5 minutes. He denies associated HA, fever, chills, chest pain, shortness of breath, abdominal pain, diarrhea, urinary symptoms, numbness or tingling.     In the ED: Hypertensive, other VSSAF. CBC without leukocytosis. CMP consistent with ESRD. Troponin 0.058 >> 0.061. EKG NSR RBBB, no ischemic changes. Received labetalol 100mg, meclizine 25mg and compazine 5mg. Admitted to  for further management.

## 2023-09-16 NOTE — ED NOTES
Teodoro Mast, a 78 y.o. male presents to the ED w/ complaint of dizziness, n/v says it started around 12am this morning.. Patient was having  peritoneal dialysis at home when EMS arrived to pick him up. Alert x 4, son at bedside. Denies any pain at the moment.     Triage note:  Chief Complaint   Patient presents with    Dizziness     Bib ems for dizziness and vomiting.states whenever he turns his head he gets dizzy and vomits. Peritoneal dialysis was in process on ems arrival. 18g rac     Review of patient's allergies indicates:  No Known Allergies  Past Medical History:   Diagnosis Date    BPH (benign prostatic hyperplasia)     CKD (chronic kidney disease) stage 5, GFR less than 15 ml/min     Gout     Hyperlipidemia     Hyperparathyroidism, secondary renal     Hypertension

## 2023-09-16 NOTE — CONSULTS
[facility] - Emergency Dept  Nephrology  Consult Note    Patient Name: Teodoro Mast  MRN: [id number]  Admission Date: 9/16/2023  Hospital Length of Stay: 0 days  Attending Provider: Raffi Magdaleno MD   Primary Care Physician: Oralia, Primary Doctor  Principal Problem:Dizziness    Inpatient consult to Nephrology  Consult performed by: Lucille Gaines, VIOLETA, FNP-C  Consult ordered by: Woody Gunderson, PA-C  Reason for consult: ESRD        Subjective:     HPI: The patient is a 78 y.o. Black or  Male with multiple co morbidities including ESRD on peritoneal dialysis, HTN, HLD, and T2DM  who presents to ED on 9/16/2023 with complaints of dizziness. He reports 1-day history of dizziness worsen with head movement and has been limiting ADLs. He has associated nausea and vomiting. He denies associated HA, fever, chills, chest pain, shortness of breath, abdominal pain, diarrhea, urinary symptoms, numbness or tingling. He was last seen by Dr. Martell in August with changes being made to his dialysis prescription, however, patient denies any issues after adjustments.      In the ED: Hypertensive, other VSSAF. CBC without leukocytosis. CMP consistent with ESRD. Troponin 0.058 >> 0.061. EKG NSR RBBB, no ischemic changes. Received labetalol 100mg, meclizine 25mg and compazine 5mg. Admitted to  for further management.    Current Prescription:   CONTINUOUS CYCLING PERITONEAL DIALYSIS  Every visit  Last Fill (ml): 1800  Therapy Time (hours): 10 hours  Midday Exchange? Yes  Solutions: 2.5%  Dwell time: 1 hr 45 min  Total Volume (L): 14.8  Exchange Volume (L): 2.8L  Total Treatment Time: 10  Total number of daily exchanges: 5      Past Medical History:   Diagnosis Date    BPH (benign prostatic hyperplasia)     CKD (chronic kidney disease) stage 5, GFR less than 15 ml/min     Gout     Hyperlipidemia     Hyperparathyroidism, secondary renal     Hypertension        Past Surgical History:   Procedure Laterality Date    AV  FISTULA PLACEMENT Left 3/31/2022    Procedure: CREATION, AV FISTULA RADIOCEPHALIC;  Surgeon: PEMA Martines II, MD;  Location: [facility] OR 2ND FLR;  Service: Vascular;  Laterality: Left;    AV FISTULA PLACEMENT Left 9/30/2022    Procedure: CREATION, AV FISTULA;  Surgeon: PEMA Martines II, MD;  Location: [facility] OR 2ND FLR;  Service: Vascular;  Laterality: Left;    COLONOSCOPY N/A 11/2/2021    Procedure: COLONOSCOPY;  Surgeon: Joe Jimenez MD;  Location: [facility] ENDO (4TH FLR);  Service: Endoscopy;  Laterality: N/A;  COVID test at [facility] on 10/30-GT      dialysis pt-labs prior    EYE SURGERY Bilateral     cataract removal    FINGER SURGERY      hemorriodectomy      PERITONEAL CATHETER INSERTION         Review of patient's allergies indicates:  No Known Allergies  Current Facility-Administered Medications   Medication Frequency    acetaminophen tablet 650 mg Q4H PRN    bisacodyL suppository 10 mg Daily PRN    dextrose 10% bolus 125 mL 125 mL PRN    dextrose 10% bolus 125 mL 125 mL PRN    dextrose 10% bolus 125 mL 125 mL PRN    dextrose 10% bolus 250 mL 250 mL PRN    dextrose 10% bolus 250 mL 250 mL PRN    dextrose 10% bolus 250 mL 250 mL PRN    enoxaparin injection 30 mg Daily    glucagon (human recombinant) injection 1 mg PRN    glucagon (human recombinant) injection 1 mg PRN    glucose chewable tablet 16 g PRN    glucose chewable tablet 24 g PRN    insulin aspart U-100 pen 0-5 Units QID (AC + HS) PRN    insulin detemir U-100 (Levemir) pen 1 Units Daily    melatonin tablet 6 mg Nightly PRN    naloxone 0.4 mg/mL injection 0.02 mg PRN    ondansetron disintegrating tablet 8 mg Q8H PRN    polyethylene glycol packet 17 g Daily PRN    prochlorperazine injection Soln 5 mg Q6H PRN    psyllium husk (aspartame) 3.4 gram packet 1 packet QHS    sodium chloride 0.9% flush 10 mL Q12H PRN     Current Outpatient Medications   Medication    aspirin 81 MG Chew    atorvastatin (LIPITOR) 80 MG tablet    calcitRIOL (ROCALTROL) 0.25 MCG  "Cap    gentamicin (GARAMYCIN) 0.3 % ophthalmic solution    glucagon (BAQSIMI) 3 mg/actuation Spry    insulin detemir U-100, Levemir, (LEVEMIR FLEXPEN) 100 unit/mL (3 mL) InPn pen    labetaloL (NORMODYNE) 100 MG tablet    magnesium oxide (MAG-OX) 400 mg (241.3 mg magnesium) tablet    meclizine (ANTIVERT) 25 mg tablet    megestroL (MEGACE) 400 mg/10 mL (40 mg/mL) Susp    pen needle, diabetic (BD ULTRA-FINE KITA PEN NEEDLE) 32 gauge x " Ndle    potassium chloride SA (K-DUR,KLOR-CON) 20 MEQ tablet    PRORENAL 8 mg iron-800 mcg-1,000 unit Tab    vitamin D (VITAMIN D3) 1000 units Tab     Facility-Administered Medications Ordered in Other Encounters   Medication Frequency    0.9%  NaCl infusion Continuous    ceFAZolin (ANCEF) 3 gram in dextrose 5% IVPB On Call Procedure    LIDOcaine (PF) 10 mg/ml (1%) injection 10 mg Once     Family History       Problem Relation (Age of Onset)    Cancer Brother, Sister    Hammer toes Brother    Heart disease Mother, Father, Brother    Hypertension Mother    No Known Problems Sister, Sister, Brother, Son    Premature birth Son    Seizures Sister          Tobacco Use    Smoking status: Former     Current packs/day: 0.00     Average packs/day: 0.5 packs/day for 10.0 years (5.0 ttl pk-yrs)     Types: Cigarettes     Start date:      Quit date: 1974     Years since quittin.7    Smokeless tobacco: Never   Substance and Sexual Activity    Alcohol use: Not Currently    Drug use: No    Sexual activity: Not Currently     Partners: Female     Review of Systems   Constitutional:  Negative for activity change, chills and fever.   HENT:  Negative for trouble swallowing.    Eyes:  Negative for visual disturbance.   Respiratory:  Negative for chest tightness, shortness of breath and wheezing.    Cardiovascular:  Negative for chest pain, palpitations and leg swelling.   Gastrointestinal:  Positive for nausea and vomiting. Negative for abdominal pain, constipation and diarrhea.   Genitourinary: "  Positive for decreased urine volume. Negative for dysuria, frequency, hematuria and urgency.   Musculoskeletal:  Negative for arthralgias, back pain and gait problem.   Skin:  Negative for color change and rash.   Neurological:  Positive for dizziness. Negative for syncope, weakness, light-headedness, numbness and headaches.   Psychiatric/Behavioral:  Negative for agitation and confusion. The patient is not nervous/anxious.      Objective:     Vital Signs (Most Recent):  Temp: 98.7 °F (37.1 °C) (09/16/23 1026)  Pulse: 83 (09/16/23 1026)  Resp: 20 (09/16/23 1026)  BP: (!) 177/86 (09/16/23 1026)  SpO2: 96 % (09/16/23 1026) Vital Signs (24h Range):  Temp:  [98.7 °F (37.1 °C)-98.8 °F (37.1 °C)] 98.7 °F (37.1 °C)  Pulse:  [76-87] 83  Resp:  [15-20] 20  SpO2:  [95 %-99 %] 96 %  BP: (174-199)/(86-98) 177/86     Weight: 95 kg (209 lb 7 oz) (09/16/23 0413)  Body mass index is 26.89 kg/m².  Body surface area is 2.23 meters squared.    No intake/output data recorded.     Physical Exam  Vitals and nursing note reviewed.   Constitutional:       General: He is not in acute distress.     Appearance: He is well-developed.   HENT:      Head: Normocephalic and atraumatic.      Mouth/Throat:      Pharynx: No oropharyngeal exudate.   Eyes:      Conjunctiva/sclera: Conjunctivae normal.   Cardiovascular:      Rate and Rhythm: Normal rate and regular rhythm.      Heart sounds: Normal heart sounds.   Pulmonary:      Effort: Pulmonary effort is normal. No respiratory distress.      Breath sounds: Normal breath sounds.   Abdominal:      General: Bowel sounds are normal. There is no distension.      Palpations: Abdomen is soft.      Tenderness: There is no abdominal tenderness. There is no right CVA tenderness or left CVA tenderness.      Comments: PD cath to LLQ   Musculoskeletal:         General: No tenderness. Normal range of motion.      Cervical back: Normal range of motion and neck supple.      Right lower leg: No edema.      Left  lower leg: No edema.   Lymphadenopathy:      Cervical: No cervical adenopathy.   Skin:     General: Skin is warm and dry.   Neurological:      Mental Status: He is alert and oriented to person, place, and time.   Psychiatric:         Mood and Affect: Mood normal.         Behavior: Behavior normal.         Thought Content: Thought content normal.         Judgment: Judgment normal.          Significant Labs:  CBC:   Recent Labs   Lab 09/16/23  0521   WBC 8.12   RBC 2.72*   HGB 8.6*   HCT 26.2*      MCV 96   MCH 31.6*   MCHC 32.8     CMP:   Recent Labs   Lab 09/16/23  0521   *   CALCIUM 9.1   ALBUMIN 2.3*   PROT 5.7*      K 3.6   CO2 27   CL 95   BUN 56*   CREATININE 12.4*   ALKPHOS 52*   ALT 16   AST 16   BILITOT 0.4     All labs within the past 24 hours have been reviewed.      Assessment/Plan:     Renal/  ESRD (end stage renal disease)  78 y.o. Black or  Male ESRD-PD [place] presents to ED on 9/16/2023 with diagnosis of: Dizziness [R42]   Nephrology consulted for inpatient ESRD-HD management    He was last seen by Dr. Martell in August with changes being made to his dialysis prescription, however, patient denies any issues after adjustments.  Pre records, LF (4 hrs) increased to 1.8 L to improve kt/v. Also, midday exchange volume increased to 1.8 L. EDW increased to 92 kg. Mainly using 1.5%, but can alternate with 2.5% if needed. Issues with orthostatic BPs, but better with increased hydration. - Note (8/10/23)     Current Prescription:   CONTINUOUS CYCLING PERITONEAL DIALYSIS  Every visit  Last Fill (ml): 1800  Therapy Time (hours): 4 hours (Last Fill dwell)  Midday Exchange? Yes  Solutions: 2.5%  Midday Treatment Time: 3 hrs  Midday Exchange Volume: 1800  Dwell time: 1 hr 45 min  Total Volume (L): 14.8  Exchange Volume (L): 2.8L  Total Treatment Time: 10  Total number of daily exchanges: 5  EDW: 92 kg     Assessment:   - Will provide nightly CCPD tonight (09/16/2023) for  clearance and volume management. No abdominal discomfort on exam. Patient requesting 2.5 L exchange volume vs 2.8 L per prescription.   - Recommend bowel regimen for daily bowel movements to help maintain the PD catheter position and avoid poor outflow issues  - Please restart KCl 40 meq daily per OP records   - Please restart Magnesium 400 mg daily per OP records  - Recommend daily renal function panels   - Continue to monitor intake and output  - Please avoid gadolinium, fleets, phos-based laxatives, NSAIDs  - Dialysis thrice weekly unless more urgent indications arise. Will evaluate RRT requirements Daily.    Anemia of ESRD   Recent Labs   Lab 09/16/23  0521   WBC 8.12   HGB 8.6*   HCT 26.2*        Lab Results   Component Value Date    FESATURATED 64 (H) 05/07/2021    FERRITIN 888 (H) 09/06/2023       - Goal in ESRD is Hgb of 10-11. Hgb 8.6.  - Will review chronic care plan from outpatient dialysis center for VAN dosing.     Mineral Bone Disease in ESRD   Lab Results   Component Value Date     (H) 09/06/2023    CALCIUM 9.1 09/16/2023    ALBUMIN 2.3 (L) 09/16/2023    PHOS 3.2 09/06/2023       - F/U PO4, Mg, Calcium. And albumin levels daily.   - Renal diet with protein intake goal 1.5 g/kg/d with 1 L fluid restriction   - Novasource with meals  - Phos 3.2, no binders       Other  * Dizziness  - defer to primary team         Thank you for your consult. I will follow-up with patient. Please contact us if you have any additional questions.    Lucille Gaines, VIOLETA, FNP-C  Nephrology  [facility] - Emergency Dept

## 2023-09-16 NOTE — PROVIDER PROGRESS NOTES - EMERGENCY DEPT.
Encounter Date: 9/16/2023    ED Physician Progress Notes        Physician Note:   Signout Note  I received signout from the previous providers.     Chief complaint:  Dizziness (Bib ems for dizziness and vomiting.states whenever he turns his head he gets dizzy and vomits. Peritoneal dialysis was in process on ems arrival. 18g rac)      Per sign out and chart review: Teodoro Mast is a 78 y.o. male presented with dizziness    During ED stay patient was noted to have raised troponin but dizziness improved after compazine and meclizine.    Pt signed out to me pending: repeat troponin    Update/ Disposition:  2nd troponin without significant increase.  I suspect troponin is due to ESRD in the absence of chest pain.  Patient had ongoing dizziness which was reproducible and pt was unable to ambulate. I decided to admit patient to EDOU. I anticipate patient will be able to be discharged with ENT referral and meclizine prescription.    Patient, caregiver and/or family understands the plan and verbalized agreement. All questions answered.     Diagnostic Impression:    Dizziness

## 2023-09-16 NOTE — ASSESSMENT & PLAN NOTE
Peritoneal dialysis catheter in place  - peritoneal dialysis everyday   - cont metamucil  - nephrology consulted

## 2023-09-16 NOTE — ASSESSMENT & PLAN NOTE
- started at midnight, associated n/v, NBNB  - epley maneuver in ED  - EKG NSR RBBB, no ischemic changes  - cont meclizine and prn antiemetics  - when patient able to safely ambulate will be safe for d/c  - ENT follow up, outpatient vestibular therapy

## 2023-09-16 NOTE — ED PROVIDER NOTES
Encounter Date: 9/16/2023     Source of History:   Patient, patient's son, and medical record, without language barrier or      Chief complaint:  Dizziness (Bib ems for dizziness and vomiting.states whenever he turns his head he gets dizzy and vomits. Peritoneal dialysis was in process on ems arrival. 18g rac)    HPI:  Teodoro Mast is a 78 y.o. male with history of ESRD on peritoneal dialysis, HTN, dm presenting with chief complaint of dizziness.  Patient states around midnight patient developed acute onset of severe dizziness.  He states that he has been unable to walk due to the severity of the dizziness.  Any time he moves his head in any direction he gets dizzy and vomits.  Patient endorses multiple episodes of nonbloody emesis and dry heaving.  Patient states this has happened once before a few months ago but lasted for about 5 minutes.  This episode has been lasting for about 5 hours now.  He denies abdominal pain, weakness, visual changes, sensory changes    This is the extent to the patients complaints today here in the emergency department.    ROS: As per HPI and below:  ROS    Review of patient's allergies indicates:  No Known Allergies  PMH:  As per HPI and below:  Past Medical History:   Diagnosis Date    BPH (benign prostatic hyperplasia)     CKD (chronic kidney disease) stage 5, GFR less than 15 ml/min     Gout     Hyperlipidemia     Hyperparathyroidism, secondary renal     Hypertension      Past Surgical History:   Procedure Laterality Date    AV FISTULA PLACEMENT Left 3/31/2022    Procedure: CREATION, AV FISTULA RADIOCEPHALIC;  Surgeon: PEMA Martines II, MD;  Location: [facility] OR [address] [place] [address];  Service: Vascular;  Laterality: Left;    AV FISTULA PLACEMENT Left 9/30/2022    Procedure: CREATION, AV FISTULA;  Surgeon: PEMA Martines II, MD;  Location: [facility] OR [address] [place] [address];  Service: Vascular;  Laterality: Left;    COLONOSCOPY N/A 11/2/2021    Procedure: COLONOSCOPY;  Surgeon: Joe Jimenez MD;  " Location: [facility] (4TH FLR);  Service: Endoscopy;  Laterality: N/A;  COVID test at [facility] on 10/30-GT      dialysis pt-labs prior    EYE SURGERY Bilateral     cataract removal    FINGER SURGERY      hemorriodectomy      PERITONEAL CATHETER INSERTION       Social History     Tobacco Use    Smoking status: Former     Current packs/day: 0.00     Average packs/day: 0.5 packs/day for 10.0 years (5.0 ttl pk-yrs)     Types: Cigarettes     Start date:      Quit date:      Years since quittin.7    Smokeless tobacco: Never   Substance Use Topics    Alcohol use: Not Currently    Drug use: No     Vitals:    BP (!) 199/96 (BP Location: Right arm, Patient Position: Lying)   Pulse 80   Temp 98.7 °F (37.1 °C) (Oral)   Resp 18   Ht 6' 2" (1.88 m)   Wt 95 kg (209 lb 7 oz)   SpO2 97%   BMI 26.89 kg/m²     Physical Exam  Vitals and nursing note reviewed.   Constitutional:       General: He is not in acute distress.     Appearance: Normal appearance. He is not toxic-appearing or diaphoretic.   HENT:      Head: Normocephalic and atraumatic.      Right Ear: External ear normal.      Left Ear: External ear normal.   Eyes:      General: No scleral icterus.     Conjunctiva/sclera: Conjunctivae normal.   Cardiovascular:      Rate and Rhythm: Normal rate and regular rhythm.   Pulmonary:      Effort: Pulmonary effort is normal. No respiratory distress.      Breath sounds: No stridor.   Abdominal:      General: Abdomen is flat. There is no distension.   Musculoskeletal:         General: No swelling.      Cervical back: Normal range of motion and neck supple.      Right lower leg: No edema.   Skin:     General: Skin is dry.      Coloration: Skin is not jaundiced.   Neurological:      Mental Status: He is alert and oriented to person, place, and time. Mental status is at baseline.      Comments:   -Moves all extremities and carries on conversation  -II: PERRL; III/IV/VI: EOMI w/out evidence of nystagmus or pain;  -V: no " deficits appreciated to light touch bilateral face;   -VII: no facial weakness, no facial asymmetry. Eyebrow raise symmetric. Smile symmetric;   -IX/X: palate midline, and raises symmetrically; XI: shoulder shrug 5/5 bilaterally; XII: tongue is midline w/out asymmetry.   -Strength 5/5 to right upper and lower extremities,  -Strength 5/5 to left upper and lower extremities,  -Sensation intact to light touch to bilateral upper and lower extremities  -F2N WNL.    Psychiatric:         Mood and Affect: Mood normal.         Behavior: Behavior normal.       Procedures    Laboratory Studies:  Labs that have been ordered have been independently reviewed and interpreted by myself.  Labs Reviewed   CBC W/ AUTO DIFFERENTIAL - Abnormal; Notable for the following components:       Result Value    RBC 2.72 (*)     Hemoglobin 8.6 (*)     Hematocrit 26.2 (*)     MCH 31.6 (*)     Lymph # 0.4 (*)     Gran % 88.4 (*)     Lymph % 4.7 (*)     All other components within normal limits   COMPREHENSIVE METABOLIC PANEL - Abnormal; Notable for the following components:    Glucose 178 (*)     BUN 56 (*)     Creatinine 12.4 (*)     Total Protein 5.7 (*)     Albumin 2.3 (*)     Alkaline Phosphatase 52 (*)     eGFR 3.8 (*)     All other components within normal limits   TROPONIN I - Abnormal; Notable for the following components:    Troponin I 0.058 (*)     All other components within normal limits   TROPONIN I     Imaging Results    None       EKG (independently interpreted by me): Rhythm: NSR, rate of  80 BPM, no ST elevations or depressions, QTc 542    I decided to obtain the patient's medical records.  Summary of Medical Records:      Medications   meclizine tablet 25 mg (25 mg Oral Given 9/16/23 0525)   prochlorperazine injection Soln 5 mg (5 mg Intravenous Given 9/16/23 0522)     MDM:    78 y.o. male with dizziness    Differential Dx:  Differential includes but is not limited to vertigo, central vertigo, doubt cervical artery  dissection    ED Management:  Basic workup started for an acute presentation of an emergent condition with multiple comorbidities.  Will treat patient's vertigo with meclizine and Compazine.  CBC unremarkable with baseline hemoglobin.  Troponin mildly elevated, will repeat to ensure is up trending.  Patient signed out to oncoming team pending troponin      Medical Decision Making  Problems Addressed:  Dizziness: acute illness or injury    Amount and/or Complexity of Data Reviewed  Labs: ordered. Decision-making details documented in ED Course.  ECG/medicine tests: ordered and independent interpretation performed. Decision-making details documented in ED Course.    Risk  Prescription drug management.         ED Course as of 09/16/23 0703   Sat Sep 16, 2023   0701 Troponin I(!): 0.058  Will repeat to ensure is not up trending.  Is likely secondary to ESRD [AW]      ED Course User Index  [AW] Jackson Rubio MD     Diagnostic Impression:    Final diagnoses:  [R42] Dizziness               Jackson Rubio MD  Resident  09/16/23 0703

## 2023-09-16 NOTE — H&P
[facility] - Emergency Dept  [facility] Medicine  History & Physical    Patient Name: Teodoro Mast  MRN: [id number]  Patient Class: OP- Observation  Admission Date: 9/16/2023  Attending Physician: Raffi Magdaleno MD   Primary Care Provider: Oralia Primary Doctor         Patient information was obtained from patient, relative(s) and ER records.     Subjective:     Principal Problem:Dizziness    Chief Complaint:   Chief Complaint   Patient presents with    Dizziness     Bib ems for dizziness and vomiting.states whenever he turns his head he gets dizzy and vomits. Peritoneal dialysis was in process on ems arrival. 18g rac        HPI: Teodoro Mast is a 78 y.o. male with PMHx ESRD on peritoneal dialysis, HTN, HLD, and T2DM who presents to [facility] ED with chief complaint of dizziness. He reports dizziness started around midnight and he has been unable to walk secondary to severity. Dizziness is worsened when he moves his head. He has associated nausea and NBNB vomiting. He reports a similar incident a few months prior but that event lasted roughly 5 minutes. He denies associated HA, fever, chills, chest pain, shortness of breath, abdominal pain, diarrhea, urinary symptoms, numbness or tingling.     In the ED: Hypertensive, other VSSAF. CBC without leukocytosis. CMP consistent with ESRD. Troponin 0.058 >> 0.061. EKG NSR RBBB, no ischemic changes. Received labetalol 100mg, meclizine 25mg and compazine 5mg. Admitted to  for further management.      Past Medical History:   Diagnosis Date    BPH (benign prostatic hyperplasia)     CKD (chronic kidney disease) stage 5, GFR less than 15 ml/min     Gout     Hyperlipidemia     Hyperparathyroidism, secondary renal     Hypertension        Past Surgical History:   Procedure Laterality Date    AV FISTULA PLACEMENT Left 3/31/2022    Procedure: CREATION, AV FISTULA RADIOCEPHALIC;  Surgeon: PEMA Martines II, MD;  Location: [facility] OR [address] [place] [address];  Service: Vascular;  Laterality: Left;     "AV FISTULA PLACEMENT Left 9/30/2022    Procedure: CREATION, AV FISTULA;  Surgeon: PEMA Martines II, MD;  Location: [facility] OR 2ND FLR;  Service: Vascular;  Laterality: Left;    COLONOSCOPY N/A 11/2/2021    Procedure: COLONOSCOPY;  Surgeon: Joe Jimenez MD;  Location: [facility] ENDO (4TH FLR);  Service: Endoscopy;  Laterality: N/A;  COVID test at [place] on 10/30-GT      dialysis pt-labs prior    EYE SURGERY Bilateral     cataract removal    FINGER SURGERY      hemorriodectomy      PERITONEAL CATHETER INSERTION         Review of patient's allergies indicates:  No Known Allergies    Current Facility-Administered Medications on File Prior to Encounter   Medication    0.9%  NaCl infusion    ceFAZolin (ANCEF) 3 gram in dextrose 5% IVPB    LIDOcaine (PF) 10 mg/ml (1%) injection 10 mg     Current Outpatient Medications on File Prior to Encounter   Medication Sig    aspirin 81 MG Chew Take 1 tablet by mouth.    atorvastatin (LIPITOR) 80 MG tablet TAKE 1 TABLET(80 MG) BY MOUTH EVERY DAY    calcitRIOL (ROCALTROL) 0.25 MCG Cap Take 0.25 mcg by mouth once a week.    gentamicin (GARAMYCIN) 0.3 % ophthalmic solution Apply 1 drop to PD exit site daily with dressing changes.    glucagon (BAQSIMI) 3 mg/actuation Spry 1 Dose by Nasal route as needed (hypoglycemia).    insulin detemir U-100, Levemir, (LEVEMIR FLEXPEN) 100 unit/mL (3 mL) InPn pen Inject 8 Units into the skin every evening.    labetaloL (NORMODYNE) 100 MG tablet Take 1 tablet (100 mg total) by mouth 2 (two) times daily.    magnesium oxide (MAG-OX) 400 mg (241.3 mg magnesium) tablet Take 1 tablet by mouth once daily.    megestroL (MEGACE) 400 mg/10 mL (40 mg/mL) Susp Take 5 mLs (200 mg total) by mouth once daily.    pen needle, diabetic (BD ULTRA-FINE KITA PEN NEEDLE) 32 gauge x 5/32" Ndle 1 injection daily for insulin    potassium chloride SA (K-DUR,KLOR-CON) 20 MEQ tablet Take 20 mEq by mouth.    PRORENAL 8 mg iron-800 mcg-1,000 unit Tab Take 1 tablet " by mouth.    vitamin D (VITAMIN D3) 1000 units Tab Take 2,000 Units by mouth once daily.     Family History       Problem Relation (Age of Onset)    Cancer Brother, Sister    Hammer toes Brother    Heart disease Mother, Father, Brother    Hypertension Mother    No Known Problems Sister, Sister, Brother, Son    Premature birth Son    Seizures Sister          Tobacco Use    Smoking status: Former     Current packs/day: 0.00     Average packs/day: 0.5 packs/day for 10.0 years (5.0 ttl pk-yrs)     Types: Cigarettes     Start date:      Quit date:      Years since quittin.7    Smokeless tobacco: Never   Substance and Sexual Activity    Alcohol use: Not Currently    Drug use: No    Sexual activity: Not Currently     Partners: Female     Review of Systems   Constitutional:  Negative for activity change, chills and fever.   HENT:  Negative for trouble swallowing.    Eyes:  Negative for photophobia and visual disturbance.   Respiratory:  Negative for chest tightness, shortness of breath and wheezing.    Cardiovascular:  Negative for chest pain, palpitations and leg swelling.   Gastrointestinal:  Positive for nausea and vomiting. Negative for abdominal pain, constipation and diarrhea.   Genitourinary:  Positive for decreased urine volume. Negative for dysuria, frequency, hematuria and urgency.   Musculoskeletal:  Negative for arthralgias, back pain and gait problem.   Skin:  Negative for color change and rash.   Neurological:  Positive for dizziness. Negative for syncope, weakness, light-headedness, numbness and headaches.   Psychiatric/Behavioral:  Negative for agitation and confusion. The patient is not nervous/anxious.      Objective:     Vital Signs (Most Recent):  Temp: 98.7 °F (37.1 °C) (23)  Pulse: 87 (23)  Resp: 16 (23)  BP: (!) 177/93 (23)  SpO2: 95 % (23) Vital Signs (24h Range):  Temp:  [98.7 °F (37.1 °C)-98.8 °F (37.1 °C)] 98.7 °F (37.1  °C)  Pulse:  [76-87] 87  Resp:  [15-20] 16  SpO2:  [95 %-99 %] 95 %  BP: (174-199)/(88-98) 177/93     Weight: 95 kg (209 lb 7 oz)  Body mass index is 26.89 kg/m².     Physical Exam  Vitals and nursing note reviewed.   Constitutional:       General: He is not in acute distress.     Appearance: He is well-developed.   HENT:      Head: Normocephalic and atraumatic.      Mouth/Throat:      Pharynx: No oropharyngeal exudate.   Eyes:      Conjunctiva/sclera: Conjunctivae normal.      Pupils: Pupils are equal, round, and reactive to light.   Cardiovascular:      Rate and Rhythm: Normal rate and regular rhythm.      Heart sounds: Normal heart sounds.   Pulmonary:      Effort: Pulmonary effort is normal. No respiratory distress.      Breath sounds: Normal breath sounds. No wheezing.   Abdominal:      General: Bowel sounds are normal. There is no distension.      Palpations: Abdomen is soft.      Tenderness: There is no abdominal tenderness. There is no right CVA tenderness or left CVA tenderness.   Musculoskeletal:         General: No tenderness. Normal range of motion.      Cervical back: Normal range of motion and neck supple.      Right lower leg: No edema.      Left lower leg: No edema.   Lymphadenopathy:      Cervical: No cervical adenopathy.   Skin:     General: Skin is warm and dry.      Capillary Refill: Capillary refill takes less than 2 seconds.      Findings: No rash.   Neurological:      Mental Status: He is alert and oriented to person, place, and time.      Cranial Nerves: No cranial nerve deficit.      Sensory: No sensory deficit.      Coordination: Coordination normal.   Psychiatric:         Behavior: Behavior normal.         Thought Content: Thought content normal.         Judgment: Judgment normal.              CRANIAL NERVES     CN III, IV, VI   Pupils are equal, round, and reactive to light.       Significant Labs: All pertinent labs within the past 24 hours have been reviewed.  CBC:   Recent Labs   Lab  09/16/23  0521   WBC 8.12   HGB 8.6*   HCT 26.2*        CMP:   Recent Labs   Lab 09/16/23  0521      K 3.6   CL 95   CO2 27   *   BUN 56*   CREATININE 12.4*   CALCIUM 9.1   PROT 5.7*   ALBUMIN 2.3*   BILITOT 0.4   ALKPHOS 52*   AST 16   ALT 16   ANIONGAP 14       Significant Imaging: I have reviewed all pertinent imaging results/findings within the past 24 hours.  Imaging Results    None           Assessment/Plan:     * Dizziness  - started at midnight, associated n/v, NBNB  - epley maneuver in ED  - EKG NSR RBBB, no ischemic changes  - cont meclizine and prn antiemetics  - when patient able to safely ambulate will be safe for d/c  - ENT follow up, outpatient vestibular therapy    Type 2 diabetes mellitus with hyperglycemia, with long-term current use of insulin  - low dose SSI  - levermir 1U daily  - monitor BG    ESRD (end stage renal disease)  Peritoneal dialysis catheter in place  - peritoneal dialysis everyday   - cont metamucil  - nephrology consulted    Essential hypertension  - home labetalol 100mg bid    Mixed hyperlipidemia  - home atorvastatin 80mg       VTE Risk Mitigation (From admission, onward)         Ordered     enoxaparin injection 30 mg  Daily         09/16/23 0944     IP VTE HIGH RISK PATIENT  Once         09/16/23 0944     Place sequential compression device  Until discontinued         09/16/23 0944                     On 09/16/2023, patient should be placed in hospital observation services under my care in collaboration with Raffi Magdaleno MD.      ROSELINE Reyes-C  Department of Hospital Medicine  [facility] - Emergency Dept

## 2023-09-16 NOTE — HPI
The patient is a 78 y.o. Black or  Male with multiple co morbidities including ESRD on peritoneal dialysis, HTN, HLD, and T2DM  who presents to ED on 9/16/2023 with complaints of dizziness. He reports 1-day history of dizziness worsen with head movement and has been limiting ADLs. He has associated nausea and vomiting. He denies associated HA, fever, chills, chest pain, shortness of breath, abdominal pain, diarrhea, urinary symptoms, numbness or tingling. He was last seen by Dr. Martell in August with changes being made to his dialysis prescription, however, patient denies any issues after adjustments.      In the ED: Hypertensive, other VSSAF. CBC without leukocytosis. CMP consistent with ESRD. Troponin 0.058 >> 0.061. EKG NSR RBBB, no ischemic changes. Received labetalol 100mg, meclizine 25mg and compazine 5mg. Admitted to  for further management.    Current Prescription:   CONTINUOUS CYCLING PERITONEAL DIALYSIS  Every visit  Last Fill (ml): 1800  Therapy Time (hours): 10 hours  Midday Exchange? Yes  Solutions: 2.5%  Dwell time: 1 hr 45 min  Total Volume (L): 14.8  Exchange Volume (L): 2.8L  Total Treatment Time: 10  Total number of daily exchanges: 5

## 2023-09-16 NOTE — ASSESSMENT & PLAN NOTE
78 y.o. Black or  Male ESRD-PD Clarita presents to ED on 9/16/2023 with diagnosis of: Dizziness [R42]   Nephrology consulted for inpatient ESRD-HD management    He was last seen by Dr. Martell in August with changes being made to his dialysis prescription, however, patient denies any issues after adjustments.  Pre records, LF (4 hrs) increased to 1.8 L to improve kt/v. Also, midday exchange volume increased to 1.8 L. EDW increased to 92 kg. Mainly using 1.5%, but can alternate with 2.5% if needed. Issues with orthostatic BPs, but better with increased hydration. - Note (8/10/23)     Current Prescription:   CONTINUOUS CYCLING PERITONEAL DIALYSIS  Every visit  Last Fill (ml): 1800  Therapy Time (hours): 4 hours (Last Fill dwell)  Midday Exchange? Yes  Solutions: 2.5%  Midday Treatment Time: 3 hrs  Midday Exchange Volume: 1800  Dwell time: 1 hr 45 min  Total Volume (L): 14.8  Exchange Volume (L): 2.8L  Total Treatment Time: 10  Total number of daily exchanges: 5  EDW: 92 kg     Assessment:   - Will provide nightly CCPD tonight (09/16/2023) for clearance and volume management. No abdominal discomfort on exam.   - Recommend bowel regimen for daily bowel movements to help maintain the PD catheter position and avoid poor outflow issues  - Please restart KCl 40 meq daily per OP records   - Please restart Magnesium 400 mg daily per OP records  - Recommend daily renal function panels   - Continue to monitor intake and output  - Please avoid gadolinium, fleets, phos-based laxatives, NSAIDs  - Dialysis thrice weekly unless more urgent indications arise. Will evaluate RRT requirements Daily.    Anemia of ESRD   Recent Labs   Lab 09/16/23  0521   WBC 8.12   HGB 8.6*   HCT 26.2*        Lab Results   Component Value Date    FESATURATED 64 (H) 05/07/2021    FERRITIN 888 (H) 09/06/2023       - Goal in ESRD is Hgb of 10-11. Hgb 8.6.  - Will review chronic care plan from outpatient dialysis center for VAN  dosing.     Mineral Bone Disease in ESRD   Lab Results   Component Value Date     (H) 09/06/2023    CALCIUM 9.1 09/16/2023    ALBUMIN 2.3 (L) 09/16/2023    PHOS 3.2 09/06/2023       - F/U PO4, Mg, Calcium. And albumin levels daily.   - Renal diet with protein intake goal 1.5 g/kg/d with 1 L fluid restriction   - Novasource with meals  - Phos 3.2, no binders

## 2023-09-16 NOTE — PLAN OF CARE
[facility] - Emergency Dept  Initial Discharge Assessment       Primary Care Provider: Oralia, Primary Doctor    Admission Diagnosis: Dizziness [R42]    Admission Date: 9/16/2023  Expected Discharge Date: 9/17/2023    Pt stated he is independent with his ADL's and ambulation and does not require assistance or equipment.     Pt to d/c home with no needs when ready    Transition of Care Barriers: (P) None    Payor: MEDICARE / Plan: MEDICARE PART A & B / Product Type: Government /     Extended Emergency Contact Information  Primary Emergency Contact: Teodoro Mast II  Address: [address] [place] [address] [facility]  Home Phone: [phone]  Mobile Phone: [phone]  Relation: Son   needed? No    Discharge Plan A: (P) Home  Discharge Plan B: (P) Home      [id number] [place], [place] - [address] AT [facility] [address]  [address]  [facility] [address]  Phone: [phone] Fax: [phone]      Initial Assessment (most recent)       Adult Discharge Assessment - 09/16/23 1003          Discharge Assessment    Assessment Type Discharge Planning Assessment     Confirmed/corrected address, phone number and insurance Yes     Confirmed Demographics Correct on Facesheet     Source of Information patient     Communicated RALPH with patient/caregiver Yes     People in Home child(matty), adult     Facility Arrived From: home (P)      Do you expect to return to your current living situation? Yes (P)      Do you have help at home or someone to help you manage your care at home? No (P)      Prior to hospitilization cognitive status: Alert/Oriented;No Deficits (P)      Current cognitive status: Alert/Oriented;No Deficits (P)      Home Accessibility stairs to enter home;stairs within home (P)      Number of Stairs, Within Home, Primary other (see comments) (P)    one flight    Number of Stairs, Main Entrance one (P)      Home Layout Able to live on  1st floor;Bathroom on 2nd floor;Bedroom on 2nd floor (P)      Equipment Currently Used at Home -- (P)    home PD    Patient currently being followed by outpatient case management? No (P)      Do you currently have service(s) that help you manage your care at home? No (P)      Do you have any problems affording any of your prescribed medications? No (P)      Is the patient taking medications as prescribed? yes (P)      Who is going to help you get home at discharge? family (P)      How do you get to doctors appointments? car, drives self;family or friend will provide (P)      Are you on dialysis? Yes (P)      Dialysis Name and Scheduled days  Main follows home PD (P)      Do you take coumadin? No (P)      DME Needed Upon Discharge  none (P)      Discharge Plan discussed with: Patient (P)      Transition of Care Barriers None (P)      Discharge Plan A Home (P)      Discharge Plan B Home (P)         OTHER    Name(s) of People in Home murali Valerio (P)                    Stephanie Albarran, KESHAWN, MSW, LMSW, RSW   Case Management  [facility]  Email: [email or web address]

## 2023-09-16 NOTE — ED NOTES
Received report and assumed care of patient at this time. Patient is AAOx4 with a calm affect and aware of environment. Pt presented to ED w/ c/o dizziness and loss of balance, denies falls. Airway is open and patent, respirations are spontaneous, normal effort and rate noted. Pt denies chest pain at this time. Skin warm and dry. Movement to all extremities noted. No apparent distress noted. Bed placed in low position, side rails up x 2, call light is within reach of patient. Explanation of care provided to patient, pt placed on BP, pulse-ox and cardiac monitoring. Patient offers no complaints at this time. Awaiting further MD orders and bed assignment, POC continues.

## 2023-09-17 LAB
ALBUMIN SERPL BCP-MCNC: 2.1 G/DL (ref 3.5–5.2)
ALP SERPL-CCNC: 39 U/L (ref 55–135)
ALT SERPL W/O P-5'-P-CCNC: 16 U/L (ref 10–44)
ANION GAP SERPL CALC-SCNC: 18 MMOL/L (ref 8–16)
AST SERPL-CCNC: 17 U/L (ref 10–40)
BASOPHILS # BLD AUTO: 0.05 K/UL (ref 0–0.2)
BASOPHILS NFR BLD: 0.7 % (ref 0–1.9)
BILIRUB SERPL-MCNC: 0.5 MG/DL (ref 0.1–1)
BUN SERPL-MCNC: 61 MG/DL (ref 8–23)
CALCIUM SERPL-MCNC: 8.7 MG/DL (ref 8.7–10.5)
CHLORIDE SERPL-SCNC: 96 MMOL/L (ref 95–110)
CO2 SERPL-SCNC: 22 MMOL/L (ref 23–29)
CREAT SERPL-MCNC: 14 MG/DL (ref 0.5–1.4)
DIFFERENTIAL METHOD: ABNORMAL
EOSINOPHIL # BLD AUTO: 0.2 K/UL (ref 0–0.5)
EOSINOPHIL NFR BLD: 2.1 % (ref 0–8)
ERYTHROCYTE [DISTWIDTH] IN BLOOD BY AUTOMATED COUNT: 14.2 % (ref 11.5–14.5)
EST. GFR  (NO RACE VARIABLE): 3.3 ML/MIN/1.73 M^2
GLUCOSE SERPL-MCNC: 127 MG/DL (ref 70–110)
HCT VFR BLD AUTO: 25.7 % (ref 40–54)
HGB BLD-MCNC: 8.3 G/DL (ref 14–18)
IMM GRANULOCYTES # BLD AUTO: 0.02 K/UL (ref 0–0.04)
IMM GRANULOCYTES NFR BLD AUTO: 0.3 % (ref 0–0.5)
LYMPHOCYTES # BLD AUTO: 0.6 K/UL (ref 1–4.8)
LYMPHOCYTES NFR BLD: 7.5 % (ref 18–48)
MAGNESIUM SERPL-MCNC: 1.7 MG/DL (ref 1.6–2.6)
MCH RBC QN AUTO: 30.9 PG (ref 27–31)
MCHC RBC AUTO-ENTMCNC: 32.3 G/DL (ref 32–36)
MCV RBC AUTO: 96 FL (ref 82–98)
MONOCYTES # BLD AUTO: 0.7 K/UL (ref 0.3–1)
MONOCYTES NFR BLD: 9.2 % (ref 4–15)
NEUTROPHILS # BLD AUTO: 6 K/UL (ref 1.8–7.7)
NEUTROPHILS NFR BLD: 80.2 % (ref 38–73)
NRBC BLD-RTO: 0 /100 WBC
PHOSPHATE SERPL-MCNC: 6.4 MG/DL (ref 2.7–4.5)
PLATELET # BLD AUTO: 198 K/UL (ref 150–450)
PMV BLD AUTO: 10.5 FL (ref 9.2–12.9)
POCT GLUCOSE: 101 MG/DL (ref 70–110)
POCT GLUCOSE: 138 MG/DL (ref 70–110)
POCT GLUCOSE: 147 MG/DL (ref 70–110)
POCT GLUCOSE: 95 MG/DL (ref 70–110)
POTASSIUM SERPL-SCNC: 3.5 MMOL/L (ref 3.5–5.1)
PROT SERPL-MCNC: 5.1 G/DL (ref 6–8.4)
RBC # BLD AUTO: 2.69 M/UL (ref 4.6–6.2)
SODIUM SERPL-SCNC: 136 MMOL/L (ref 136–145)
WBC # BLD AUTO: 7.5 K/UL (ref 3.9–12.7)

## 2023-09-17 PROCEDURE — 99232 PR SUBSEQUENT HOSPITAL CARE,LEVL II: ICD-10-PCS | Mod: NTX,,, | Performed by: INTERNAL MEDICINE

## 2023-09-17 PROCEDURE — 25000242 PHARM REV CODE 250 ALT 637 W/ HCPCS: Mod: NTX

## 2023-09-17 PROCEDURE — 85025 COMPLETE CBC W/AUTO DIFF WBC: CPT | Mod: NTX

## 2023-09-17 PROCEDURE — 80053 COMPREHEN METABOLIC PANEL: CPT | Mod: NTX

## 2023-09-17 PROCEDURE — 99499 UNLISTED E&M SERVICE: CPT | Mod: NTX,,, | Performed by: INTERNAL MEDICINE

## 2023-09-17 PROCEDURE — 84100 ASSAY OF PHOSPHORUS: CPT | Mod: NTX

## 2023-09-17 PROCEDURE — 36415 COLL VENOUS BLD VENIPUNCTURE: CPT | Mod: NTX

## 2023-09-17 PROCEDURE — 25000003 PHARM REV CODE 250: Mod: NTX

## 2023-09-17 PROCEDURE — 99232 SBSQ HOSP IP/OBS MODERATE 35: CPT | Mod: NTX,,, | Performed by: INTERNAL MEDICINE

## 2023-09-17 PROCEDURE — 96372 THER/PROPH/DIAG INJ SC/IM: CPT

## 2023-09-17 PROCEDURE — 90945 DIALYSIS ONE EVALUATION: CPT | Mod: NTX

## 2023-09-17 PROCEDURE — 63600175 PHARM REV CODE 636 W HCPCS: Mod: NTX

## 2023-09-17 PROCEDURE — 99233 PR SUBSEQUENT HOSPITAL CARE,LEVL III: ICD-10-PCS | Mod: NTX,,,

## 2023-09-17 PROCEDURE — 99233 SBSQ HOSP IP/OBS HIGH 50: CPT | Mod: NTX,,,

## 2023-09-17 PROCEDURE — 21400001 HC TELEMETRY ROOM: Mod: NTX

## 2023-09-17 PROCEDURE — 99499 NO LOS: ICD-10-PCS | Mod: NTX,,, | Performed by: INTERNAL MEDICINE

## 2023-09-17 PROCEDURE — 83735 ASSAY OF MAGNESIUM: CPT | Mod: NTX

## 2023-09-17 RX ORDER — HYDRALAZINE HYDROCHLORIDE 25 MG/1
25 TABLET, FILM COATED ORAL EVERY 8 HOURS PRN
Status: DISCONTINUED | OUTPATIENT
Start: 2023-09-17 | End: 2023-09-18 | Stop reason: HOSPADM

## 2023-09-17 RX ORDER — AMLODIPINE BESYLATE 5 MG/1
5 TABLET ORAL 2 TIMES DAILY
Status: DISCONTINUED | OUTPATIENT
Start: 2023-09-17 | End: 2023-09-18 | Stop reason: HOSPADM

## 2023-09-17 RX ORDER — MECLIZINE HCL 12.5 MG 12.5 MG/1
25 TABLET ORAL 3 TIMES DAILY
Status: DISCONTINUED | OUTPATIENT
Start: 2023-09-17 | End: 2023-09-18 | Stop reason: HOSPADM

## 2023-09-17 RX ORDER — HEPARIN SODIUM 5000 [USP'U]/ML
5000 INJECTION, SOLUTION INTRAVENOUS; SUBCUTANEOUS EVERY 8 HOURS
Status: DISCONTINUED | OUTPATIENT
Start: 2023-09-17 | End: 2023-09-18 | Stop reason: HOSPADM

## 2023-09-17 RX ADMIN — HEPARIN SODIUM 5000 UNITS: 5000 INJECTION INTRAVENOUS; SUBCUTANEOUS at 08:09

## 2023-09-17 RX ADMIN — ASPIRIN 81 MG 81 MG: 81 TABLET ORAL at 08:09

## 2023-09-17 RX ADMIN — MECLIZINE HYDROCHLORIDE 25 MG: 12.5 TABLET ORAL at 08:09

## 2023-09-17 RX ADMIN — CHOLECALCIFEROL TAB 25 MCG (1000 UNIT) 2000 UNITS: 25 TAB at 08:09

## 2023-09-17 RX ADMIN — LABETALOL HYDROCHLORIDE 100 MG: 100 TABLET, FILM COATED ORAL at 08:09

## 2023-09-17 RX ADMIN — Medication 400 MG: at 08:09

## 2023-09-17 RX ADMIN — ATORVASTATIN CALCIUM 80 MG: 40 TABLET, FILM COATED ORAL at 08:09

## 2023-09-17 RX ADMIN — INSULIN DETEMIR 1 UNITS: 100 INJECTION, SOLUTION SUBCUTANEOUS at 08:09

## 2023-09-17 RX ADMIN — HEPARIN SODIUM 5000 UNITS: 5000 INJECTION INTRAVENOUS; SUBCUTANEOUS at 02:09

## 2023-09-17 RX ADMIN — POTASSIUM CHLORIDE 40 MEQ: 1500 TABLET, EXTENDED RELEASE ORAL at 08:09

## 2023-09-17 RX ADMIN — MECLIZINE HYDROCHLORIDE 25 MG: 12.5 TABLET ORAL at 02:09

## 2023-09-17 RX ADMIN — AMLODIPINE BESYLATE 5 MG: 5 TABLET ORAL at 08:09

## 2023-09-17 RX ADMIN — AMLODIPINE BESYLATE 5 MG: 5 TABLET ORAL at 02:09

## 2023-09-17 RX ADMIN — POLYETHYLENE GLYCOL 3350 17 G: 17 POWDER, FOR SOLUTION ORAL at 04:09

## 2023-09-17 NOTE — HOSPITAL COURSE
Teodoro Mast is placed in  Observation for management of dizziness and vertigo. EKG reviewed. Troponin peaked and down trending. Started on meclizine with improvement in symptoms. Unsuccessful ambulation today, unable to walk. Will continue meclizine. PT consulted, successful ambulation trial with patient, recommending walker for discharge. Walker ordered. Blood pressure better controlled throughout hospital course. Patient will follow up with ENT and PCP. Meclizine and amlodipine sent to preferred pharmacy. Patient medically ready for discharge. Plan of care discussed with patient, patient agreeable to plan, and all questions answered.

## 2023-09-17 NOTE — NURSING
Try walking pt per MD to see for discharge. Pt has a very unstable gait, began feeling dizzy.Return pt back in room, he realized that he wasn't stable to continue. Only walked short distance. From bed to right outside of room back to bed.MD informed.

## 2023-09-17 NOTE — PROGRESS NOTES
Pd tx complete. Pt 13 ml positive total after tx. Line clamped new cap in place under aseptic technique. See flow sheet.

## 2023-09-17 NOTE — PLAN OF CARE
Problem: Adult Inpatient Plan of Care  Goal: Plan of Care Review  9/16/2023 2349 by Ashley Shi RN  Outcome: Ongoing, Progressing  9/16/2023 2348 by Ashley Shi RN  Outcome: Ongoing, Progressing  Goal: Patient-Specific Goal (Individualized)  9/16/2023 2349 by Ashley Shi RN  Outcome: Ongoing, Progressing  9/16/2023 2348 by Ashley Shi RN  Outcome: Ongoing, Progressing  Goal: Absence of Hospital-Acquired Illness or Injury  9/16/2023 2349 by Ashley Shi RN  Outcome: Ongoing, Progressing  9/16/2023 2348 by Ashley Shi RN  Outcome: Ongoing, Progressing  Goal: Optimal Comfort and Wellbeing  9/16/2023 2349 by Ashley Shi RN  Outcome: Ongoing, Progressing  9/16/2023 2348 by Ashley Shi RN  Outcome: Ongoing, Progressing  Goal: Readiness for Transition of Care  9/16/2023 2349 by Ashley Shi RN  Outcome: Ongoing, Progressing  9/16/2023 2348 by Ashley Shi RN  Outcome: Ongoing, Progressing     Problem: Infection  Goal: Absence of Infection Signs and Symptoms  9/16/2023 2349 by Ashley Shi RN  Outcome: Ongoing, Progressing  9/16/2023 2348 by Ashley Shi RN  Outcome: Ongoing, Progressing     Problem: Diabetes Comorbidity  Goal: Blood Glucose Level Within Targeted Range  9/16/2023 2349 by Ashley Shi RN  Outcome: Ongoing, Progressing  9/16/2023 2348 by Ashley Shi RN  Outcome: Ongoing, Progressing     Problem: Fall Injury Risk  Goal: Absence of Fall and Fall-Related Injury  Outcome: Ongoing, Progressing     Problem: Hypertension Acute  Goal: Blood Pressure Within Desired Range  Outcome: Ongoing, Progressing     Problem: Adjustment to Illness (Chronic Kidney Disease)  Goal: Optimal Coping with Chronic Illness  Outcome: Ongoing, Progressing     Problem: Electrolyte Imbalance (Chronic Kidney Disease)  Goal: Electrolyte Balance  Outcome: Ongoing, Progressing     Problem: Fluid Volume Excess (Chronic Kidney Disease)  Goal: Fluid  Balance  Outcome: Ongoing, Progressing     Problem: Functional Decline (Chronic Kidney Disease)  Goal: Optimal Functional Ability  Outcome: Ongoing, Progressing     Problem: Hematologic Alteration (Chronic Kidney Disease)  Goal: Absence of Anemia Signs and Symptoms  Outcome: Ongoing, Progressing     Problem: Oral Intake Inadequate (Chronic Kidney Disease)  Goal: Optimal Oral Intake  Outcome: Ongoing, Progressing     Problem: Pain (Chronic Kidney Disease)  Goal: Acceptable Pain Control  Outcome: Ongoing, Progressing     Problem: Renal Function Impairment (Chronic Kidney Disease)  Goal: Minimize Renal Failure Effects  Outcome: Ongoing, Progressing

## 2023-09-17 NOTE — ASSESSMENT & PLAN NOTE
- started at midnight, associated n/v, NBNB  - epley maneuver in ED  - EKG NSR RBBB, no ischemic changes  - cont meclizine and prn antiemetics  - when patient able to safely ambulate will be safe for d/c  - ENT follow up, outpatient vestibular therapy  - 9/16 - Unsuccessful ambulation trial today, patient unable to stand on own, very dizzy, unable to complete ambulation trial. Continue scheduled meclizine

## 2023-09-17 NOTE — PROGRESS NOTES
09/16/23 2240   Peritoneal Dialysis   Exchange Type Cycler   Peritoneal Treatment Status Started   Dianeal Solution Dextrose 1.5% in 6000 mL;Dextrose 1.5% in 2000 mL;Dextrose 1.5% in 2500 mL   Cycler Peritoneal Dialysis   Number of Cycles 5   Fill Volume (mL) 2500 mL   Total Volume (mL) 58469 mL   Cycler Treatment Comments CCPD tx started     CCPD tx started per NP orders.  Lines connected aseptically. Sterile dressing change done on PD catheter site. POC discussed with patient. Report given to primary nurse.

## 2023-09-17 NOTE — ASSESSMENT & PLAN NOTE
Peritoneal dialysis catheter in place  - peritoneal dialysis everyday   - cont metamucil  - nephrology consulted  - 9/15 - PD overnight

## 2023-09-17 NOTE — NURSING
Pt is AAOx4. Pt has peritoneal cath in place clean dry intact, being dialyzed at bedside.No pain, no distress noted. Call light in reach. Bed in low locked position. Side rails x2. Belongings at bedside. Pt free of fall and injuries Questions and concerns voiced and answered.    Plan of care continues.

## 2023-09-17 NOTE — PLAN OF CARE
Problem: Adult Inpatient Plan of Care  Goal: Plan of Care Review  Outcome: Ongoing, Progressing  Goal: Patient-Specific Goal (Individualized)  Outcome: Ongoing, Progressing  Goal: Absence of Hospital-Acquired Illness or Injury  Outcome: Ongoing, Progressing  Goal: Optimal Comfort and Wellbeing  Outcome: Ongoing, Progressing  Goal: Readiness for Transition of Care  Outcome: Ongoing, Progressing     Problem: Infection  Goal: Absence of Infection Signs and Symptoms  Outcome: Ongoing, Progressing     Problem: Diabetes Comorbidity  Goal: Blood Glucose Level Within Targeted Range  Outcome: Ongoing, Progressing     Problem: Fall Injury Risk  Goal: Absence of Fall and Fall-Related Injury  Outcome: Ongoing, Progressing     Problem: Hypertension Acute  Goal: Blood Pressure Within Desired Range  Outcome: Ongoing, Progressing     Problem: Adjustment to Illness (Chronic Kidney Disease)  Goal: Optimal Coping with Chronic Illness  Outcome: Ongoing, Progressing     Problem: Electrolyte Imbalance (Chronic Kidney Disease)  Goal: Electrolyte Balance  Outcome: Ongoing, Progressing     Problem: Fluid Volume Excess (Chronic Kidney Disease)  Goal: Fluid Balance  Outcome: Ongoing, Progressing     Problem: Functional Decline (Chronic Kidney Disease)  Goal: Optimal Functional Ability  Outcome: Ongoing, Progressing     Problem: Hematologic Alteration (Chronic Kidney Disease)  Goal: Absence of Anemia Signs and Symptoms  Outcome: Ongoing, Progressing     Problem: Oral Intake Inadequate (Chronic Kidney Disease)  Goal: Optimal Oral Intake  Outcome: Ongoing, Progressing     Problem: Pain (Chronic Kidney Disease)  Goal: Acceptable Pain Control  Outcome: Ongoing, Progressing     Problem: Renal Function Impairment (Chronic Kidney Disease)  Goal: Minimize Renal Failure Effects  Outcome: Ongoing, Progressing     Pt progressing towards goals. No distress notice. No falls or injuries during shift. Bed in lowest position, call light within reach,  belonging at bedside. Safety precaution maintain.Plan of Care reviewed. Pt verbalized understanding.

## 2023-09-17 NOTE — SUBJECTIVE & OBJECTIVE
Interval History: NAEON, hypertensive, other VSSAF. Adding amlodipine bid in addition to his home labetalol for better BP control. Hydralazine prn. Unsuccessful ambulation trial today, patient unable to stand on own, very dizzy, unable to complete ambulation trial. Continue scheduled meclizine. Peritoneal dialysis completed overnight.     Review of Systems   Constitutional:  Negative for activity change, chills and fever.   HENT:  Negative for trouble swallowing.    Eyes:  Negative for photophobia and visual disturbance.   Respiratory:  Negative for chest tightness, shortness of breath and wheezing.    Cardiovascular:  Negative for chest pain, palpitations and leg swelling.   Gastrointestinal:  Positive for nausea and vomiting. Negative for abdominal pain, constipation and diarrhea.   Genitourinary:  Positive for decreased urine volume. Negative for dysuria, frequency, hematuria and urgency.   Musculoskeletal:  Negative for arthralgias, back pain and gait problem.   Skin:  Negative for color change and rash.   Neurological:  Positive for dizziness. Negative for syncope, weakness, light-headedness, numbness and headaches.   Psychiatric/Behavioral:  Negative for agitation and confusion. The patient is not nervous/anxious.      Objective:     Vital Signs (Most Recent):  Temp: 98.6 °F (37 °C) (09/17/23 1142)  Pulse: 76 (09/17/23 1142)  Resp: 18 (09/17/23 1142)  BP: (!) 181/84 (09/17/23 1142)  SpO2: 95 % (09/17/23 1142) Vital Signs (24h Range):  Temp:  [97.4 °F (36.3 °C)-98.6 °F (37 °C)] 98.6 °F (37 °C)  Pulse:  [76-94] 76  Resp:  [12-20] 18  SpO2:  [94 %-96 %] 95 %  BP: (158-188)/(80-93) 181/84     Weight: 97.9 kg (215 lb 14.2 oz)  Body mass index is 27.72 kg/m².  No intake or output data in the 24 hours ending 09/17/23 1245      Physical Exam  Vitals and nursing note reviewed.   Constitutional:       General: He is not in acute distress.     Appearance: He is well-developed.   HENT:      Head: Normocephalic and  atraumatic.      Mouth/Throat:      Pharynx: No oropharyngeal exudate.   Eyes:      Conjunctiva/sclera: Conjunctivae normal.      Pupils: Pupils are equal, round, and reactive to light.   Cardiovascular:      Rate and Rhythm: Normal rate and regular rhythm.      Heart sounds: Normal heart sounds.   Pulmonary:      Effort: Pulmonary effort is normal. No respiratory distress.      Breath sounds: Normal breath sounds. No wheezing.   Abdominal:      General: Bowel sounds are normal. There is no distension.      Palpations: Abdomen is soft.      Tenderness: There is no abdominal tenderness. There is no right CVA tenderness or left CVA tenderness.      Comments: PD cath to LLQ.    Musculoskeletal:         General: No tenderness. Normal range of motion.      Cervical back: Normal range of motion and neck supple.      Right lower leg: No edema.      Left lower leg: No edema.   Lymphadenopathy:      Cervical: No cervical adenopathy.   Skin:     General: Skin is warm and dry.      Capillary Refill: Capillary refill takes less than 2 seconds.      Findings: No rash.   Neurological:      General: No focal deficit present.      Mental Status: He is alert and oriented to person, place, and time.      Cranial Nerves: No cranial nerve deficit.      Sensory: No sensory deficit.      Coordination: Coordination normal.      Gait: Gait abnormal (2/2 dizziness).   Psychiatric:         Behavior: Behavior normal.         Thought Content: Thought content normal.         Judgment: Judgment normal.             Significant Labs: All pertinent labs within the past 24 hours have been reviewed.  CBC:   Recent Labs   Lab 09/16/23  0521 09/17/23  0509   WBC 8.12 7.50   HGB 8.6* 8.3*   HCT 26.2* 25.7*    198     CMP:   Recent Labs   Lab 09/16/23  0521 09/17/23  0509    136   K 3.6 3.5   CL 95 96   CO2 27 22*   * 127*   BUN 56* 61*   CREATININE 12.4* 14.0*   CALCIUM 9.1 8.7   PROT 5.7* 5.1*   ALBUMIN 2.3* 2.1*   BILITOT 0.4 0.5    ALKPHOS 52* 39*   AST 16 17   ALT 16 16   ANIONGAP 14 18*       Significant Imaging: I have reviewed all pertinent imaging results/findings within the past 24 hours.

## 2023-09-17 NOTE — PROGRESS NOTES
[facility] [address] [place] [address] Medicine  Progress Note    Patient Name: Teodoro Mast  MRN: [id number]  Patient Class: OP- Observation   Admission Date: 9/16/2023  Length of Stay: 0 days  Attending Physician: Raffi Magdaleno MD  Primary Care Provider: Oralia, Primary Doctor        Subjective:     Principal Problem:Dizziness        HPI:  Teodoro Mast is a 78 y.o. male with PMHx ESRD on peritoneal dialysis, HTN, HLD, and T2DM who presents to [facility] ED with chief complaint of dizziness. He reports dizziness started around midnight and he has been unable to walk secondary to severity. Dizziness is worsened when he moves his head. He has associated nausea and NBNB vomiting. He reports a similar incident a few months prior but that event lasted roughly 5 minutes. He denies associated HA, fever, chills, chest pain, shortness of breath, abdominal pain, diarrhea, urinary symptoms, numbness or tingling.     In the ED: Hypertensive, other VSSAF. CBC without leukocytosis. CMP consistent with ESRD. Troponin 0.058 >> 0.061. EKG NSR RBBB, no ischemic changes. Received labetalol 100mg, meclizine 25mg and compazine 5mg. Admitted to  for further management.      Overview/Hospital Course:  Teodoro Mast is placed in  Observation for management of dizziness and vertigo. EKG reviewed. Troponin peaked and down trending. Started on meclizine with improvement in symptoms. Unsuccessful ambulation today, unable to walk. Will continue meclizine. Ambulation trial tomorrow.       Interval History: NAEON, hypertensive, other VSSAF. Adding amlodipine bid in addition to his home labetalol for better BP control. Hydralazine prn. Unsuccessful ambulation trial today, patient unable to stand on own, very dizzy, unable to complete ambulation trial. Continue scheduled meclizine. Peritoneal dialysis completed overnight.     Review of Systems   Constitutional:  Negative for activity change, chills and fever.   HENT:  Negative for trouble  swallowing.    Eyes:  Negative for photophobia and visual disturbance.   Respiratory:  Negative for chest tightness, shortness of breath and wheezing.    Cardiovascular:  Negative for chest pain, palpitations and leg swelling.   Gastrointestinal:  Positive for nausea and vomiting. Negative for abdominal pain, constipation and diarrhea.   Genitourinary:  Positive for decreased urine volume. Negative for dysuria, frequency, hematuria and urgency.   Musculoskeletal:  Negative for arthralgias, back pain and gait problem.   Skin:  Negative for color change and rash.   Neurological:  Positive for dizziness. Negative for syncope, weakness, light-headedness, numbness and headaches.   Psychiatric/Behavioral:  Negative for agitation and confusion. The patient is not nervous/anxious.      Objective:     Vital Signs (Most Recent):  Temp: 98.6 °F (37 °C) (09/17/23 1142)  Pulse: 76 (09/17/23 1142)  Resp: 18 (09/17/23 1142)  BP: (!) 181/84 (09/17/23 1142)  SpO2: 95 % (09/17/23 1142) Vital Signs (24h Range):  Temp:  [97.4 °F (36.3 °C)-98.6 °F (37 °C)] 98.6 °F (37 °C)  Pulse:  [76-94] 76  Resp:  [12-20] 18  SpO2:  [94 %-96 %] 95 %  BP: (158-188)/(80-93) 181/84     Weight: 97.9 kg (215 lb 14.2 oz)  Body mass index is 27.72 kg/m².  No intake or output data in the 24 hours ending 09/17/23 1245      Physical Exam  Vitals and nursing note reviewed.   Constitutional:       General: He is not in acute distress.     Appearance: He is well-developed.   HENT:      Head: Normocephalic and atraumatic.      Mouth/Throat:      Pharynx: No oropharyngeal exudate.   Eyes:      Conjunctiva/sclera: Conjunctivae normal.      Pupils: Pupils are equal, round, and reactive to light.   Cardiovascular:      Rate and Rhythm: Normal rate and regular rhythm.      Heart sounds: Normal heart sounds.   Pulmonary:      Effort: Pulmonary effort is normal. No respiratory distress.      Breath sounds: Normal breath sounds. No wheezing.   Abdominal:      General:  Bowel sounds are normal. There is no distension.      Palpations: Abdomen is soft.      Tenderness: There is no abdominal tenderness. There is no right CVA tenderness or left CVA tenderness.      Comments: PD cath to LLQ.    Musculoskeletal:         General: No tenderness. Normal range of motion.      Cervical back: Normal range of motion and neck supple.      Right lower leg: No edema.      Left lower leg: No edema.   Lymphadenopathy:      Cervical: No cervical adenopathy.   Skin:     General: Skin is warm and dry.      Capillary Refill: Capillary refill takes less than 2 seconds.      Findings: No rash.   Neurological:      General: No focal deficit present.      Mental Status: He is alert and oriented to person, place, and time.      Cranial Nerves: No cranial nerve deficit.      Sensory: No sensory deficit.      Coordination: Coordination normal.      Gait: Gait abnormal (2/2 dizziness).   Psychiatric:         Behavior: Behavior normal.         Thought Content: Thought content normal.         Judgment: Judgment normal.             Significant Labs: All pertinent labs within the past 24 hours have been reviewed.  CBC:   Recent Labs   Lab 09/16/23  0521 09/17/23  0509   WBC 8.12 7.50   HGB 8.6* 8.3*   HCT 26.2* 25.7*    198     CMP:   Recent Labs   Lab 09/16/23  0521 09/17/23  0509    136   K 3.6 3.5   CL 95 96   CO2 27 22*   * 127*   BUN 56* 61*   CREATININE 12.4* 14.0*   CALCIUM 9.1 8.7   PROT 5.7* 5.1*   ALBUMIN 2.3* 2.1*   BILITOT 0.4 0.5   ALKPHOS 52* 39*   AST 16 17   ALT 16 16   ANIONGAP 14 18*       Significant Imaging: I have reviewed all pertinent imaging results/findings within the past 24 hours.      Assessment/Plan:      * Dizziness  - started at midnight, associated n/v, NBNB  - epley maneuver in ED  - EKG NSR RBBB, no ischemic changes  - cont meclizine and prn antiemetics  - when patient able to safely ambulate will be safe for d/c  - ENT follow up, outpatient vestibular  therapy  - 9/16 - Unsuccessful ambulation trial today, patient unable to stand on own, very dizzy, unable to complete ambulation trial. Continue scheduled meclizine    Type 2 diabetes mellitus with hyperglycemia, with long-term current use of insulin  - low dose SSI  - levermir 1U daily  - monitor BG    ESRD (end stage renal disease)  Peritoneal dialysis catheter in place  - peritoneal dialysis everyday   - cont metamucil  - nephrology consulted  - 9/15 - PD overnight    Essential hypertension  - home labetalol 100mg bid  - start amlodipine bid for better BP control  - prn hydralazine    Mixed hyperlipidemia  - home atorvastatin 80mg       VTE Risk Mitigation (From admission, onward)         Ordered     heparin (porcine) injection 5,000 Units  Every 8 hours         09/17/23 0758     IP VTE HIGH RISK PATIENT  Once         09/16/23 0944     Place sequential compression device  Until discontinued         09/16/23 0944                Discharge Planning   RALPH: 9/17/2023     Code Status: Full Code   Is the patient medically ready for discharge?: No    Reason for patient still in hospital (select all that apply): Patient trending condition and Treatment  Discharge Plan A: Home                  Woody Gunderson PA-C  Department of Hospital Medicine   [facility] - [facility] 11H

## 2023-09-18 VITALS
BODY MASS INDEX: 27.7 KG/M2 | OXYGEN SATURATION: 95 % | HEART RATE: 79 BPM | TEMPERATURE: 98 F | WEIGHT: 215.88 LBS | HEIGHT: 74 IN | SYSTOLIC BLOOD PRESSURE: 153 MMHG | RESPIRATION RATE: 16 BRPM | DIASTOLIC BLOOD PRESSURE: 74 MMHG

## 2023-09-18 LAB
ALBUMIN SERPL BCP-MCNC: 2.1 G/DL (ref 3.5–5.2)
ALP SERPL-CCNC: 42 U/L (ref 55–135)
ALT SERPL W/O P-5'-P-CCNC: 17 U/L (ref 10–44)
ANION GAP SERPL CALC-SCNC: 13 MMOL/L (ref 8–16)
AST SERPL-CCNC: 16 U/L (ref 10–40)
BASOPHILS # BLD AUTO: 0.04 K/UL (ref 0–0.2)
BASOPHILS NFR BLD: 0.6 % (ref 0–1.9)
BILIRUB SERPL-MCNC: 0.5 MG/DL (ref 0.1–1)
BUN SERPL-MCNC: 60 MG/DL (ref 8–23)
CALCIUM SERPL-MCNC: 8.8 MG/DL (ref 8.7–10.5)
CHLORIDE SERPL-SCNC: 95 MMOL/L (ref 95–110)
CO2 SERPL-SCNC: 27 MMOL/L (ref 23–29)
CREAT SERPL-MCNC: 13.7 MG/DL (ref 0.5–1.4)
DIFFERENTIAL METHOD: ABNORMAL
EOSINOPHIL # BLD AUTO: 0.2 K/UL (ref 0–0.5)
EOSINOPHIL NFR BLD: 2.9 % (ref 0–8)
ERYTHROCYTE [DISTWIDTH] IN BLOOD BY AUTOMATED COUNT: 13.9 % (ref 11.5–14.5)
EST. GFR  (NO RACE VARIABLE): 3.4 ML/MIN/1.73 M^2
GLUCOSE SERPL-MCNC: 166 MG/DL (ref 70–110)
HCT VFR BLD AUTO: 24.6 % (ref 40–54)
HGB BLD-MCNC: 8.3 G/DL (ref 14–18)
IMM GRANULOCYTES # BLD AUTO: 0.01 K/UL (ref 0–0.04)
IMM GRANULOCYTES NFR BLD AUTO: 0.2 % (ref 0–0.5)
LYMPHOCYTES # BLD AUTO: 0.6 K/UL (ref 1–4.8)
LYMPHOCYTES NFR BLD: 9.4 % (ref 18–48)
MAGNESIUM SERPL-MCNC: 1.8 MG/DL (ref 1.6–2.6)
MCH RBC QN AUTO: 31 PG (ref 27–31)
MCHC RBC AUTO-ENTMCNC: 33.7 G/DL (ref 32–36)
MCV RBC AUTO: 92 FL (ref 82–98)
MONOCYTES # BLD AUTO: 0.8 K/UL (ref 0.3–1)
MONOCYTES NFR BLD: 12.3 % (ref 4–15)
NEUTROPHILS # BLD AUTO: 4.9 K/UL (ref 1.8–7.7)
NEUTROPHILS NFR BLD: 74.6 % (ref 38–73)
NRBC BLD-RTO: 0 /100 WBC
PHOSPHATE SERPL-MCNC: 6.3 MG/DL (ref 2.7–4.5)
PLATELET # BLD AUTO: 187 K/UL (ref 150–450)
PMV BLD AUTO: 10.3 FL (ref 9.2–12.9)
POCT GLUCOSE: 125 MG/DL (ref 70–110)
POTASSIUM SERPL-SCNC: 3.9 MMOL/L (ref 3.5–5.1)
PROT SERPL-MCNC: 5.2 G/DL (ref 6–8.4)
RBC # BLD AUTO: 2.68 M/UL (ref 4.6–6.2)
SODIUM SERPL-SCNC: 135 MMOL/L (ref 136–145)
WBC # BLD AUTO: 6.59 K/UL (ref 3.9–12.7)

## 2023-09-18 PROCEDURE — 80053 COMPREHEN METABOLIC PANEL: CPT | Mod: NTX

## 2023-09-18 PROCEDURE — 63600175 PHARM REV CODE 636 W HCPCS: Mod: NTX

## 2023-09-18 PROCEDURE — 97162 PT EVAL MOD COMPLEX 30 MIN: CPT | Mod: NTX

## 2023-09-18 PROCEDURE — 36415 COLL VENOUS BLD VENIPUNCTURE: CPT | Mod: NTX

## 2023-09-18 PROCEDURE — 99499 UNLISTED E&M SERVICE: CPT | Mod: NTX,,, | Performed by: INTERNAL MEDICINE

## 2023-09-18 PROCEDURE — 84100 ASSAY OF PHOSPHORUS: CPT | Mod: NTX

## 2023-09-18 PROCEDURE — 99232 SBSQ HOSP IP/OBS MODERATE 35: CPT | Mod: NTX,,, | Performed by: INTERNAL MEDICINE

## 2023-09-18 PROCEDURE — 99239 PR HOSPITAL DISCHARGE DAY,>30 MIN: ICD-10-PCS | Mod: NTX,,,

## 2023-09-18 PROCEDURE — 25000003 PHARM REV CODE 250: Mod: NTX

## 2023-09-18 PROCEDURE — 97112 NEUROMUSCULAR REEDUCATION: CPT | Mod: NTX

## 2023-09-18 PROCEDURE — 99232 PR SUBSEQUENT HOSPITAL CARE,LEVL II: ICD-10-PCS | Mod: NTX,,, | Performed by: INTERNAL MEDICINE

## 2023-09-18 PROCEDURE — 99499 NO LOS: ICD-10-PCS | Mod: NTX,,, | Performed by: INTERNAL MEDICINE

## 2023-09-18 PROCEDURE — 99239 HOSP IP/OBS DSCHRG MGMT >30: CPT | Mod: NTX,,,

## 2023-09-18 PROCEDURE — 85025 COMPLETE CBC W/AUTO DIFF WBC: CPT | Mod: NTX

## 2023-09-18 PROCEDURE — 83735 ASSAY OF MAGNESIUM: CPT | Mod: NTX

## 2023-09-18 RX ORDER — SEVELAMER CARBONATE 800 MG/1
800 TABLET, FILM COATED ORAL
Status: DISCONTINUED | OUTPATIENT
Start: 2023-09-18 | End: 2023-09-18 | Stop reason: HOSPADM

## 2023-09-18 RX ORDER — AMLODIPINE BESYLATE 5 MG/1
10 TABLET ORAL DAILY
Qty: 60 TABLET | Refills: 11 | Status: SHIPPED | OUTPATIENT
Start: 2023-09-18 | End: 2024-09-17

## 2023-09-18 RX ADMIN — CHOLECALCIFEROL TAB 25 MCG (1000 UNIT) 2000 UNITS: 25 TAB at 08:09

## 2023-09-18 RX ADMIN — POTASSIUM CHLORIDE 40 MEQ: 1500 TABLET, EXTENDED RELEASE ORAL at 08:09

## 2023-09-18 RX ADMIN — LABETALOL HYDROCHLORIDE 100 MG: 100 TABLET, FILM COATED ORAL at 08:09

## 2023-09-18 RX ADMIN — HEPARIN SODIUM 5000 UNITS: 5000 INJECTION INTRAVENOUS; SUBCUTANEOUS at 05:09

## 2023-09-18 RX ADMIN — MECLIZINE HYDROCHLORIDE 25 MG: 12.5 TABLET ORAL at 02:09

## 2023-09-18 RX ADMIN — ONDANSETRON 8 MG: 8 TABLET, ORALLY DISINTEGRATING ORAL at 08:09

## 2023-09-18 RX ADMIN — AMLODIPINE BESYLATE 5 MG: 5 TABLET ORAL at 08:09

## 2023-09-18 RX ADMIN — POLYETHYLENE GLYCOL 3350 17 G: 17 POWDER, FOR SOLUTION ORAL at 08:09

## 2023-09-18 RX ADMIN — ATORVASTATIN CALCIUM 80 MG: 40 TABLET, FILM COATED ORAL at 08:09

## 2023-09-18 RX ADMIN — MECLIZINE HYDROCHLORIDE 25 MG: 12.5 TABLET ORAL at 08:09

## 2023-09-18 RX ADMIN — HEPARIN SODIUM 5000 UNITS: 5000 INJECTION INTRAVENOUS; SUBCUTANEOUS at 02:09

## 2023-09-18 RX ADMIN — ASPIRIN 81 MG 81 MG: 81 TABLET ORAL at 08:09

## 2023-09-18 RX ADMIN — Medication 400 MG: at 08:09

## 2023-09-18 RX ADMIN — INSULIN DETEMIR 1 UNITS: 100 INJECTION, SOLUTION SUBCUTANEOUS at 08:09

## 2023-09-18 NOTE — PLAN OF CARE
PT Lesly complete, appropriate goals created    Problem: Physical Therapy  Goal: Physical Therapy Goal  Description: Goals to be met by: 10/18/23     Patient will increase functional independence with mobility by performin. Supine to sit with [place]  2. Sit to stand transfer with Modified [place]  3. Bed to chair transfer with Modified [place] using Rolling Walker  4. Gait  x 350 feet with Modified [place] using Rolling Walker.     Outcome: Ongoing, Progressing

## 2023-09-18 NOTE — PROGRESS NOTES
Nephrology Progress Note      Consult Requested By: Raffi Magdaleno MD  Reason for Consult: ESRD on PD    History of Present Illness:  The patient is a 78 y.o. Black or  Male with multiple co morbidities including ESRD on peritoneal dialysis, HTN, HLD, and T2DM  who presents to ED on 9/16/2023 with complaints of dizziness. He reports 1-day history of dizziness worsen with head movement and has been limiting ADLs. He has associated nausea and vomiting. He denies associated HA, fever, chills, chest pain, shortness of breath, abdominal pain, diarrhea, urinary symptoms, numbness or tingling. He was last seen by Dr. Martell in August with changes being made to his dialysis prescription, however, patient denies any issues after adjustments.       Past Medical History:   Diagnosis Date    BPH (benign prostatic hyperplasia)     CKD (chronic kidney disease) stage 5, GFR less than 15 ml/min     Gout     Hyperlipidemia     Hyperparathyroidism, secondary renal     Hypertension          Current Facility-Administered Medications:     acetaminophen tablet 650 mg, 650 mg, Oral, Q4H PRN, Woody Gunderson PA-C    amLODIPine tablet 5 mg, 5 mg, Oral, BID, Woody Gunderson PA-C, 5 mg at 09/17/23 2019    aspirin chewable tablet 81 mg, 81 mg, Oral, Daily, Woody Gunderson PA-C, 81 mg at 09/17/23 0815    atorvastatin tablet 80 mg, 80 mg, Oral, Daily, Woody Gunderson PA-C, 80 mg at 09/17/23 0814    bisacodyL suppository 10 mg, 10 mg, Rectal, Daily PRN, Woody Gunderson PA-C    calcitRIOL capsule 0.25 mcg, 0.25 mcg, Oral, Weekly, Woody Gunderson PA-C, 0.25 mcg at 09/16/23 1600    dextrose 10% bolus 125 mL 125 mL, 12.5 g, Intravenous, PRNNetpali Ian J., PA-C    dextrose 10% bolus 250 mL 250 mL, 25 g, Intravenous, PRNNeptali Ian J., PA-C    glucagon (human recombinant) injection 1 mg, 1 mg, Intramuscular, PRN, Woody Gunderson PA-C    glucose chewable tablet 16 g, 16 g, Oral, PRN, Woody Gunderson, PAADEN    glucose chewable  tablet 24 g, 24 g, Oral, PRN, Woody Gunderson PA-C    heparin (porcine) injection 5,000 Units, 5,000 Units, Subcutaneous, Q8H, Woody Gunderson PA-C, 5,000 Units at 09/17/23 2019    hydrALAZINE tablet 25 mg, 25 mg, Oral, Q8H PRN, Woody Gunderson PA-C    insulin aspart U-100 pen 0-5 Units, 0-5 Units, Subcutaneous, QID (AC + HS) PRN, Woody Gunderson PA-C    insulin detemir U-100 (Levemir) pen 1 Units, 1 Units, Subcutaneous, Daily, Woody Gunderson PA-C, 1 Units at 09/17/23 0815    labetaloL tablet 100 mg, 100 mg, Oral, BID, Woody Gunderson PA-C, 100 mg at 09/17/23 2019    magnesium oxide tablet 400 mg, 400 mg, Oral, Daily, Woody Gunderson PA-C, 400 mg at 09/17/23 0814    meclizine tablet 25 mg, 25 mg, Oral, TID, Woody Gunderson PA-C, 25 mg at 09/17/23 2019    melatonin tablet 6 mg, 6 mg, Oral, Nightly PRN, Woody Gunderson PA-C    naloxone 0.4 mg/mL injection 0.02 mg, 0.02 mg, Intravenous, PRN, Woody Gunderson PA-C    ondansetron disintegrating tablet 8 mg, 8 mg, Oral, Q8H PRN, Woody Gunderson PA-C    polyethylene glycol packet 17 g, 17 g, Oral, Daily PRN, Woody Gunderson PA-C, 17 g at 09/17/23 1630    potassium chloride SA CR tablet 40 mEq, 40 mEq, Oral, Daily, Woody Gunderson PA-C, 40 mEq at 09/17/23 0814    prochlorperazine injection Soln 5 mg, 5 mg, Intravenous, Q6H PRN, Woody Gunderson PA-C    psyllium husk (aspartame) 3.4 gram packet 1 packet, 1 packet, Oral, QHS, Woody Gunderson PA-C    sodium chloride 0.9% flush 10 mL, 10 mL, Intravenous, Q12H PRN, Woody Gunderson PA-C    vitamin D 1000 units tablet 2,000 Units, 2,000 Units, Oral, Daily, Woody Gunderson PA-C, 2,000 Units at 09/17/23 0814    Facility-Administered Medications Ordered in Other Encounters:     0.9%  NaCl infusion, , Intravenous, Continuous, Enrico Woodruff MD, Last Rate: 100 mL/hr at 05/05/21 1115, New Bag at 05/05/21 1115    ceFAZolin (ANCEF) 3 gram in dextrose 5% IVPB, 3 g, Intravenous, On Call Procedure, Ranjan,  Enrico WHELAN MD    LIDOcaine (PF) 10 mg/ml (1%) injection 10 mg, 1 mL, Intradermal, Once, Enrico Woodruff MD  Review of patient's allergies indicates:  No Known Allergies     Past Surgical History:   Procedure Laterality Date    AV FISTULA PLACEMENT Left 3/31/2022    Procedure: CREATION, AV FISTULA RADIOCEPHALIC;  Surgeon: PEMA Martines II, MD;  Location: [facility] OR 2ND FLR;  Service: Vascular;  Laterality: Left;    AV FISTULA PLACEMENT Left 2022    Procedure: CREATION, AV FISTULA;  Surgeon: PEMA Martines II, MD;  Location: [facility] OR 2ND FLR;  Service: Vascular;  Laterality: Left;    COLONOSCOPY N/A 2021    Procedure: COLONOSCOPY;  Surgeon: Joe Jimenez MD;  Location: [facility] ENDO (4TH FLR);  Service: Endoscopy;  Laterality: N/A;  COVID test at [place] on 10/30-GT      dialysis pt-labs prior    EYE SURGERY Bilateral     cataract removal    FINGER SURGERY      hemorriodectomy      PERITONEAL CATHETER INSERTION       Family History   Problem Relation Age of Onset    Heart disease Mother     Hypertension Mother     Heart disease Father         pacemaker    Seizures Sister     Hammer toes Brother     Heart disease Brother     Cancer Brother         prostate cancer    Premature birth Son     Cancer Sister         throat    No Known Problems Sister     No Known Problems Sister     No Known Problems Brother     No Known Problems Son      Social History     Tobacco Use    Smoking status: Former     Current packs/day: 0.00     Average packs/day: 0.5 packs/day for 10.0 years (5.0 ttl pk-yrs)     Types: Cigarettes     Start date:      Quit date:      Years since quittin.7    Smokeless tobacco: Never   Substance Use Topics    Alcohol use: Not Currently    Drug use: No       ROS    Vitals:    23 2307   BP:    Pulse: 88   Resp:    Temp:        PHYSICAL EXAMINATION:  General: no distress, well nourished  Lungs: Bilateral moderate air entry  Cardiovascular: S1, S2 normal,  Abdomen: soft,  non-tender non-distented;   Neurologic: AAOx3,      LABORATORY DATA:  Lab Results   Component Value Date    CREATININE 14.0 (H) 09/17/2023       Prot/Creat Ratio, Urine   Date Value Ref Range Status   04/12/2021 3.33 (H) 0.00 - 0.20 Final   02/10/2021 4.24 (H) 0.00 - 0.20 Final   12/31/2020 4.64 (H) 0.00 - 0.20 Final       Lab Results   Component Value Date     09/17/2023    K 3.5 09/17/2023    CO2 22 (L) 09/17/2023       Lab Results   Component Value Date     (H) 09/06/2023    CALCIUM 8.7 09/17/2023    PHOS 6.4 (H) 09/17/2023       Lab Results   Component Value Date    HGB 8.3 (L) 09/17/2023        Lab Results   Component Value Date    HGBA1C 8.4 (H) 08/10/2023       Lab Results   Component Value Date    LDLCALC 121 (H) 10/17/2022         IMAGING STUDIES  Kidney US: Reviewed  Echocardiogram: Reviewed    IMPRESSION / RECOMMENDATIONS:     1) ESRD on PD   No UF overnight. Changing the prescription to 2 Li, 2.5% 2 Li each for 8 hours. Patient in agreement.

## 2023-09-18 NOTE — PLAN OF CARE
Problem: Adult Inpatient Plan of Care  Goal: Plan of Care Review  9/18/2023 0744 by Sabas Morton RN  Outcome: Ongoing, Progressing  9/18/2023 0743 by Sabas Morton RN  Outcome: Ongoing, Progressing  Goal: Patient-Specific Goal (Individualized)  9/18/2023 0744 by Sabas Morton RN  Outcome: Ongoing, Progressing  9/18/2023 0743 by Sabas Morton RN  Outcome: Ongoing, Progressing  Goal: Absence of Hospital-Acquired Illness or Injury  9/18/2023 0744 by Sabas Morton RN  Outcome: Ongoing, Progressing  9/18/2023 0743 by Sabas Morton RN  Outcome: Ongoing, Progressing  Goal: Optimal Comfort and Wellbeing  9/18/2023 0744 by Sabas Morton RN  Outcome: Ongoing, Progressing  9/18/2023 0743 by Sabas Morton RN  Outcome: Ongoing, Progressing  Goal: Readiness for Transition of Care  9/18/2023 0744 by Sabas Morton RN  Outcome: Ongoing, Progressing  9/18/2023 0743 by Sabas Morton RN  Outcome: Ongoing, Progressing     Problem: Infection  Goal: Absence of Infection Signs and Symptoms  9/18/2023 0744 by Sabas Morton RN  Outcome: Ongoing, Progressing  9/18/2023 0743 by Sabas Morton RN  Outcome: Ongoing, Progressing     Problem: Diabetes Comorbidity  Goal: Blood Glucose Level Within Targeted Range  9/18/2023 0744 by Sabas Morton RN  Outcome: Ongoing, Progressing  9/18/2023 0743 by Sabas Morton RN  Outcome: Ongoing, Progressing     Problem: Fall Injury Risk  Goal: Absence of Fall and Fall-Related Injury  9/18/2023 0744 by Sabas Morton RN  Outcome: Ongoing, Progressing  9/18/2023 0743 by Sabas Morton RN  Outcome: Ongoing, Progressing     Problem: Hypertension Acute  Goal: Blood Pressure Within Desired Range  9/18/2023 0744 by Sabas Morton RN  Outcome: Ongoing, Progressing  9/18/2023 0743 by Sabas Morton RN  Outcome: Ongoing, Progressing     Problem: Adjustment to Illness (Chronic Kidney Disease)  Goal: Optimal Coping with Chronic Illness  9/18/2023 0744 by Sabas Morton RN  Outcome:  Ongoing, Progressing  9/18/2023 0743 by Sabas Morton RN  Outcome: Ongoing, Progressing     Problem: Electrolyte Imbalance (Chronic Kidney Disease)  Goal: Electrolyte Balance  9/18/2023 0744 by Sabas Morton RN  Outcome: Ongoing, Progressing  9/18/2023 0743 by Sabas Morton RN  Outcome: Ongoing, Progressing     Problem: Fluid Volume Excess (Chronic Kidney Disease)  Goal: Fluid Balance  9/18/2023 0744 by Sabas Morton RN  Outcome: Ongoing, Progressing  9/18/2023 0743 by Sabas Morton RN  Outcome: Ongoing, Progressing     Problem: Functional Decline (Chronic Kidney Disease)  Goal: Optimal Functional Ability  9/18/2023 0744 by Sabas Morton RN  Outcome: Ongoing, Progressing  9/18/2023 0743 by Sabas Morton RN  Outcome: Ongoing, Progressing     Problem: Hematologic Alteration (Chronic Kidney Disease)  Goal: Absence of Anemia Signs and Symptoms  9/18/2023 0744 by Sabas Morton RN  Outcome: Ongoing, Progressing  9/18/2023 0743 by Sabas Morton RN  Outcome: Ongoing, Progressing     Problem: Oral Intake Inadequate (Chronic Kidney Disease)  Goal: Optimal Oral Intake  9/18/2023 0744 by Sabas Morton RN  Outcome: Ongoing, Progressing  9/18/2023 0743 by Sabas Morton RN  Outcome: Ongoing, Progressing     Problem: Pain (Chronic Kidney Disease)  Goal: Acceptable Pain Control  9/18/2023 0744 by Sabas oMrton RN  Outcome: Ongoing, Progressing  9/18/2023 0743 by Sabas Morton RN  Outcome: Ongoing, Progressing     Problem: Renal Function Impairment (Chronic Kidney Disease)  Goal: Minimize Renal Failure Effects  9/18/2023 0744 by Sabas Morton RN  Outcome: Ongoing, Progressing  9/18/2023 0743 by Sabas Morton RN  Outcome: Ongoing, Progressing     Problem: Pain Acute  Goal: Acceptable Pain Control and Functional Ability  Outcome: Ongoing, Progressing

## 2023-09-18 NOTE — PROGRESS NOTES
09/18/23 0730        Peritoneal Dialysis Catheter 05/05/21 1152   Placement Date/Time: 05/05/21 1152   Present Prior to Hospital Arrival?: No  Inserted by: MD   Site Assessment Clean;Dry;Intact;No redness;No swelling   Dressing Intervention Integrity maintained   Status Deaccessed   Dressing Status Clean;Dry;Intact   Dressing Gauze   Securement secured to abdomen w/ adhesive device   Clamp Status clamped   Peritoneal Dialysis   Exchange Type Cycler   Peritoneal Treatment Status Completed   Cycler Peritoneal Dialysis   Initial Drain Volume (mL) 29 mL   Effluent Appearance Clear   Number of Cycles 4   Fill Volume (mL) 2000 mL   Total Volume (mL) 8001 mL   Average Dwell Time (specify hr/min) 89   Cycler PD Total UF (mL) 317 mL   Cycler Treatment Comments pd tx complete     PD treatment complete.  Total UF: 317 ml  Line clamped and new cap placed using aseptic technique.

## 2023-09-18 NOTE — ASSESSMENT & PLAN NOTE
- strong suspicion for BPPV  - PT consulted for consideration of Epley maneuver   - currently on meclizine  - management per primary team

## 2023-09-18 NOTE — PLAN OF CARE
Problem: Adult Inpatient Plan of Care  Goal: Plan of Care Review  Outcome: Ongoing, Progressing  Goal: Patient-Specific Goal (Individualized)  Outcome: Ongoing, Progressing  Goal: Absence of Hospital-Acquired Illness or Injury  Outcome: Ongoing, Progressing  Goal: Optimal Comfort and Wellbeing  Outcome: Ongoing, Progressing  Goal: Readiness for Transition of Care  Outcome: Ongoing, Progressing     Problem: Infection  Goal: Absence of Infection Signs and Symptoms  Outcome: Ongoing, Progressing     Problem: Diabetes Comorbidity  Goal: Blood Glucose Level Within Targeted Range  Outcome: Ongoing, Progressing     Problem: Fall Injury Risk  Goal: Absence of Fall and Fall-Related Injury  Outcome: Ongoing, Progressing     Problem: Hypertension Acute  Goal: Blood Pressure Within Desired Range  Outcome: Ongoing, Progressing     Problem: Adjustment to Illness (Chronic Kidney Disease)  Goal: Optimal Coping with Chronic Illness  Outcome: Ongoing, Progressing     Problem: Electrolyte Imbalance (Chronic Kidney Disease)  Goal: Electrolyte Balance  Outcome: Ongoing, Progressing     Problem: Fluid Volume Excess (Chronic Kidney Disease)  Goal: Fluid Balance  Outcome: Ongoing, Progressing     Problem: Functional Decline (Chronic Kidney Disease)  Goal: Optimal Functional Ability  Outcome: Ongoing, Progressing     Problem: Hematologic Alteration (Chronic Kidney Disease)  Goal: Absence of Anemia Signs and Symptoms  Outcome: Ongoing, Progressing     Problem: Oral Intake Inadequate (Chronic Kidney Disease)  Goal: Optimal Oral Intake  Outcome: Ongoing, Progressing     Problem: Pain (Chronic Kidney Disease)  Goal: Acceptable Pain Control  Outcome: Ongoing, Progressing     Problem: Renal Function Impairment (Chronic Kidney Disease)  Goal: Minimize Renal Failure Effects  Outcome: Ongoing, Progressing

## 2023-09-18 NOTE — DISCHARGE SUMMARY
[facility] - Observation [address] [place] [address] Medicine  Discharge Summary      Patient Name: Teodoro Mast  MRN: [id number]  [facility]: [id number]  Patient Class: IP- Inpatient  Admission Date: 9/16/2023  Hospital Length of Stay: 1 days  Discharge Date and Time:  09/18/2023 3:27 PM  Attending Physician: Raffi Magdaleno MD   Discharging Provider: Woody Gunderson PA-C  Primary Care Provider: Oralia Primary Doctor  [facility] Medicine Team: [facility]  Woody Gunderson PA-C  Primary Care Team: [facility]    HPI:   Teodoro Mast is a 78 y.o. male with PMHx ESRD on peritoneal dialysis, HTN, HLD, and T2DM who [place] [facility] with chief complaint of dizziness. He reports dizziness started around midnight and he has been unable to walk secondary to severity. Dizziness is worsened when he moves his head. He has associated nausea and NBNB vomiting. He reports a similar incident a few months prior but that event lasted roughly 5 minutes. He denies associated HA, fever, chills, chest pain, shortness of breath, abdominal pain, diarrhea, urinary symptoms, numbness or tingling.     In the ED: Hypertensive, other VSSAF. CBC without leukocytosis. CMP consistent with ESRD. Troponin 0.058 >> 0.061. EKG NSR RBBB, no ischemic changes. Received labetalol 100mg, meclizine 25mg and compazine 5mg. Admitted to  for further management.      * No surgery found *      Hospital Course:   Teodoro Mast is placed in  Observation for management of dizziness and vertigo. EKG reviewed. Troponin peaked and down trending. Started on meclizine with improvement in symptoms. Unsuccessful ambulation today, unable to walk. Will continue meclizine. PT consulted, successful ambulation trial with patient, recommending walker for discharge. Walker ordered. Blood pressure better controlled throughout hospital course. Patient will follow up with ENT and PCP. Meclizine and amlodipine sent to preferred pharmacy. Patient medically ready for discharge. Plan of care  "discussed with patient, patient agreeable to plan, and all questions answered.         Goals of Care Treatment Preferences:  Code Status: Full Code      Consults:   Consults (From admission, onward)        Status Ordering Provider     Inpatient consult to Nephrology  Once        Provider:  (Not yet assigned)    Completed CHIO DEJESUS          No new Assessment & Plan notes have been filed under this hospital service since the last note was generated.  Service: Hospital Medicine    Final Active Diagnoses:    Diagnosis Date Noted POA    PRINCIPAL PROBLEM:  Dizziness [R42] 09/16/2023 Yes    Type 2 diabetes mellitus with hyperglycemia, with long-term current use of insulin [E11.65, Z79.4] 09/06/2023 Not Applicable    Peritoneal dialysis catheter in place [Z99.2] 05/28/2021 Not Applicable    ESRD (end stage renal disease) [N18.6] 05/05/2021 Yes    Mixed hyperlipidemia [E78.2]  Yes    Essential hypertension [I10]  Yes      Problems Resolved During this Admission:       Discharged Condition: stable    Disposition: Home or Self Care    Follow Up:   Follow-up Information     [facility] - Emergency Dept .    Specialty: Emergency Medicine  Why: As needed, If symptoms worsen  Contact information:  [address]  [facility] [address] [phone]           PCP In 3 days.           [facility]. Go to .    Specialty: Otolaryngology  Contact information:  [address]  [facility] [address] [phone]                     Patient Instructions:      WALKER FOR HOME USE     Order Specific Question Answer Comments   Type of Walker: Adult (5'4"-6'6")    With wheels? Yes    Height: 6' 2" (1.88 m)    Weight: 97.9 kg (215 lb 14.2 oz)    Length of need (1-99 months): 12    Does patient have medical equipment at home? none    Please check all that apply: Patient is unable to safely ambulate without equipment.    Please check all that apply: Patient's condition impairs ambulation.      Ambulatory " referral/consult to ENT   Standing Status: Future   Referral Priority: Routine Referral Type: Consultation   Referral Reason: Specialty Services Required   Requested Specialty: Otolaryngology   Number of Visits Requested: 1     Ambulatory referral/consult to Internal Medicine   Standing Status: Future   Referral Priority: Urgent Referral Type: Consultation   Referral Reason: Specialty Services Required   Requested Specialty: Internal Medicine   Number of Visits Requested: 1     Diet renal     Notify your health care provider if you experience any of the following:  persistent nausea and vomiting or diarrhea     Notify your health care provider if you experience any of the following:  severe persistent headache     Notify your health care provider if you experience any of the following:  persistent dizziness, light-headedness, or visual disturbances     Notify your health care provider if you experience any of the following:  increased confusion or weakness     Activity as tolerated       Significant Diagnostic Studies: N/A    Pending Diagnostic Studies:     None         Medications:  Reconciled Home Medications:      Medication List      START taking these medications    amLODIPine 5 MG tablet  Commonly known as: NORVASC  Take 2 tablets (10 mg total) by mouth once daily.     meclizine 25 mg tablet  Commonly known as: ANTIVERT  Take 1 tablet (25 mg total) by mouth 3 (three) times daily as needed.        CONTINUE taking these medications    aspirin 81 MG Chew  Take 1 tablet by mouth once daily.     calcitRIOL 0.25 MCG Cap  Commonly known as: ROCALTROL  Take 0.25 mcg by mouth once a week.     gentamicin 0.3 % ophthalmic solution  Commonly known as: GARAMYCIN  Apply 1 drop to PD exit site daily with dressing changes.     labetaloL 100 MG tablet  Commonly known as: NORMODYNE  Take 1 tablet (100 mg total) by mouth 2 (two) times daily.     LEVEMIR FLEXPEN 100 unit/mL (3 mL) Inpn pen  Generic drug: insulin detemir U-100  "(Levemir)  Inject 8 Units into the skin every evening.     magnesium oxide 400 mg (241.3 mg magnesium) tablet  Commonly known as: MAG-OX  Take 1 tablet by mouth once daily.     pen needle, diabetic 32 gauge x 5/32" Ndle  Commonly known as: BD ULTRA-FINE KITA PEN NEEDLE  1 injection daily for insulin     vitamin D 1000 units Tab  Commonly known as: VITAMIN D3  Take 2,000 Units by mouth once daily.        STOP taking these medications    BAQSIMI 3 mg/actuation Spry  Generic drug: glucagon     potassium chloride SA 20 MEQ tablet  Commonly known as: K-DUR,KLOR-CON        ASK your doctor about these medications    atorvastatin 80 MG tablet  Commonly known as: LIPITOR  TAKE 1 TABLET(80 MG) BY MOUTH EVERY DAY            Indwelling Lines/Drains at time of discharge:   Lines/Drains/Airways     Drain  Duration                Hemodialysis AV Fistula 09/30/22 0000 Left forearm 353 days                Time spent on the discharge of patient: 33 minutes         Woody Gunderson PA-C  Department of Hospital Medicine  [facility]  "

## 2023-09-18 NOTE — PT/OT/SLP EVAL
Physical Therapy Evaluation and Treatment    Patient Name:  Teodoro Mast   MRN:  [id number]    Recommendations:     Discharge Recommendations: other (see comments)   Discharge Equipment Recommendations: walker, rolling   Barriers to discharge: None    Assessment:     Teodoro Mast is a 78 y.o. male admitted with a medical diagnosis of Dizziness.  He presents with the following impairments/functional limitations: impaired self care skills, impaired functional mobility, impaired balance, gait instability, decreased safety awareness. PT consulted to assess pt for root cause of vertigo. Pt demonstrates nystagmus at end point vision in all directions during smooth pursuits and corrective saccades w/ both vertical and horizontal saccade testing, making it likely that his vestibular dysfunction is central rather than peripheral. Both Epley's and Log Roll testing were negative, ruling out BPPV. Pt educated on use of focal points for gaze stabilization and use of RW to assist w/ balance until vestibular deficits resolve. He would benefit from continued vestibular PT treatment on d/c but is safe to return home w/ family when medically ready.    Rehab Prognosis: Good; patient would benefit from acute skilled PT services to address these deficits and reach maximum level of function.    Recent Surgery: * No surgery found *      Plan:     During this hospitalization, patient to be seen 3 x/week to address the identified rehab impairments via gait training, therapeutic activities, therapeutic exercises, neuromuscular re-education and progress toward the following goals:    Plan of Care Expires:  10/18/23    Subjective     Chief Complaint: dizziness  Patient/Family Comments/goals: pt wants to get dizziness controlled  Pain/Comfort:  Pain Rating 1: 0/10  Pain Rating Post-Intervention 1: 0/10    Patients cultural, spiritual, Hoahaoism conflicts given the current situation: no    Living Environment:  Pt lives w/ son in a 2SH w/  1 [address] and can remain on first floor if needed.  Prior to admission, patients level of function was independent.  Equipment used at home: none.  DME owned (not currently used): none.  Upon discharge, patient will have assistance from son, home w/ pt 24/7.    Objective:     Communicated with RN prior to session.  Patient found HOB elevated with telemetry  upon PT entry to room.    General Precautions: Standard, fall  Orthopedic Precautions:N/A   Braces: N/A  Respiratory Status: Room air    Exams:  Cognitive Exam:  Patient is oriented to Person, Place, Time, and Situation  Fine Motor Coordination:    -       Intact  Sensation:    -       Intact  RLE ROM: WFL  RLE Strength: WFL  LLE ROM: WFL  LLE Strength: WFL  Vestibular testing:  Smooth pursuits: (+) nystagmus at end point in all directions  Saccades:  Vertical: (+) for corrective saccade   Horizontal: (+) for corrective saccade  Epley's: (-)  Log Roll: (-)    Functional Mobility:  Bed Mobility:     Supine to Sit: stand by assistance  Sit to Supine: stand by assistance  Transfers:     Sit to Stand:  contact guard assistance with hand-held assist  Bed to Chair: minimum assistance with  hand-held assist  using  Step Transfer  Gait: 100' RW SBA; 10' w/ HHA Abiodun  Balance: Static standing balance supervision w/ eyes open and closed; dynamic standing balance CGA      AM-PAC 6 CLICK MOBILITY  Total Score:18       Treatment & Education:  Pt educated on PT POC/goals, d/c recs, and continued treatment. All questions answered and pt in agreement w/ POC.  Pt educated on use of stationary visual cues to assist w/ gaze stabilization when dizziness occurs, safety measures including use of RW for balance and having son w/ him when doing stairs, and rec for continued vestibular PT    Patient left HOB elevated with all lines intact, call button in reach, RN notified, and son present.    GOALS:   Multidisciplinary Problems       Physical Therapy Goals          Problem: Physical Therapy     Goal Priority Disciplines Outcome Goal Variances Interventions   Physical Therapy Goal     PT, PT/OT Ongoing, Progressing     Description: Goals to be met by: 10/18/23     Patient will increase functional independence with mobility by performin. Supine to sit with Dickey  2. Sit to stand transfer with Modified Dickey  3. Bed to chair transfer with Modified Dickey using Rolling Walker  4. Gait  x 350 feet with Modified Dickey using Rolling Walker.                          History:     Past Medical History:   Diagnosis Date    BPH (benign prostatic hyperplasia)     CKD (chronic kidney disease) stage 5, GFR less than 15 ml/min     Gout     Hyperlipidemia     Hyperparathyroidism, secondary renal     Hypertension        Past Surgical History:   Procedure Laterality Date    AV FISTULA PLACEMENT Left 3/31/2022    Procedure: CREATION, AV FISTULA RADIOCEPHALIC;  Surgeon: PEMA Martines II, MD;  [facility];  Service: Vascular;  Laterality: Left;    AV FISTULA PLACEMENT Left 2022    Procedure: CREATION, AV FISTULA;  Surgeon: PEMA Martines II, MD;  Location: [facility] OR 2ND FLR;  Service: Vascular;  Laterality: Left;    COLONOSCOPY N/A 2021    Procedure: COLONOSCOPY;  Surgeon: Joe Jimenez MD;  Location: [facility] ENDO (4TH FLR);  Service: Endoscopy;  Laterality: N/A;  COVID test at [place] on 10/30-GT      dialysis pt-labs prior    EYE SURGERY Bilateral     cataract removal    FINGER SURGERY      hemorriodectomy      PERITONEAL CATHETER INSERTION         Time Tracking:     PT Received On: 23  PT Start Time: 1136     PT Stop Time: 1154  PT Total Time (min): 18 min     Billable Minutes: Evaluation 8 and Neuromuscular Re-education 10      2023

## 2023-09-18 NOTE — NURSING
Peripheral IV removed and AVS discharge education provided. Waiting on DME walker to be delivered bedside.     Walker could not be delivered today but patient has family bringing his walker from home and his son will be his ride home.

## 2023-09-18 NOTE — SUBJECTIVE & OBJECTIVE
Interval History: Patient seen and examined this AM. CCPD overnight for which he tolerated well however UF only ~315 mL. Reports some improvement in dizziness this morning. Afebrile with pulse ranging from 80-70s bpm. Systolic blood pressures ranging from 160-130s mmHg. He is saturating +93% on room air. Plans to work with PT today with Epley maneuver, pending response may be able to discharge thereafter per primary team.    Review of patient's allergies indicates:  No Known Allergies  Current Facility-Administered Medications   Medication Frequency    acetaminophen tablet 650 mg Q4H PRN    amLODIPine tablet 5 mg BID    aspirin chewable tablet 81 mg Daily    atorvastatin tablet 80 mg Daily    bisacodyL suppository 10 mg Daily PRN    calcitRIOL capsule 0.25 mcg Weekly    dextrose 10% bolus 125 mL 125 mL PRN    dextrose 10% bolus 250 mL 250 mL PRN    glucagon (human recombinant) injection 1 mg PRN    glucose chewable tablet 16 g PRN    glucose chewable tablet 24 g PRN    heparin (porcine) injection 5,000 Units Q8H    hydrALAZINE tablet 25 mg Q8H PRN    insulin aspart U-100 pen 0-5 Units QID (AC + HS) PRN    insulin detemir U-100 (Levemir) pen 1 Units Daily    labetaloL tablet 100 mg BID    magnesium oxide tablet 400 mg Daily    meclizine tablet 25 mg TID    melatonin tablet 6 mg Nightly PRN    naloxone 0.4 mg/mL injection 0.02 mg PRN    ondansetron disintegrating tablet 8 mg Q8H PRN    polyethylene glycol packet 17 g Daily PRN    potassium chloride SA CR tablet 40 mEq Daily    prochlorperazine injection Soln 5 mg Q6H PRN    psyllium husk (aspartame) 3.4 gram packet 1 packet QHS    sodium chloride 0.9% flush 10 mL Q12H PRN    vitamin D 1000 units tablet 2,000 Units Daily     Facility-Administered Medications Ordered in Other Encounters   Medication Frequency    0.9%  NaCl infusion Continuous    ceFAZolin (ANCEF) 3 gram in dextrose 5% IVPB On Call Procedure    LIDOcaine (PF) 10 mg/ml (1%) injection 10 mg Once        Objective:     Vital Signs (Most Recent):  Temp: 98.2 °F (36.8 °C) (09/18/23 0748)  Pulse: 75 (09/18/23 0748)  Resp: 16 (09/18/23 0748)  BP: (!) 141/76 (09/18/23 0748)  SpO2: 95 % (09/18/23 0748) Vital Signs (24h Range):  Temp:  [96.4 °F (35.8 °C)-98.6 °F (37 °C)] 98.2 °F (36.8 °C)  Pulse:  [75-88] 75  Resp:  [16-18] 16  SpO2:  [93 %-95 %] 95 %  BP: (137-181)/(71-84) 141/76     Weight: 97.9 kg (215 lb 14.2 oz) (09/16/23 2139)  Body mass index is 27.72 kg/m².  Body surface area is 2.26 meters squared.    No intake/output data recorded.     Physical Exam  Vitals and nursing note reviewed.   Constitutional:       General: He is awake. He is not in acute distress.     Appearance: He is overweight. He is not ill-appearing or diaphoretic.   HENT:      Head: Normocephalic and atraumatic.      Right Ear: External ear normal.      Left Ear: External ear normal.      Nose: Nose normal.      Mouth/Throat:      Mouth: Mucous membranes are moist.      Pharynx: Oropharynx is clear. No oropharyngeal exudate or posterior oropharyngeal erythema.   Eyes:      General: No scleral icterus.        Right eye: No discharge.         Left eye: No discharge.      Extraocular Movements: Extraocular movements intact.      Conjunctiva/sclera: Conjunctivae normal.   Cardiovascular:      Rate and Rhythm: Normal rate.      Heart sounds: Murmur heard.      Systolic murmur is present with a grade of 1/6.      No friction rub. No gallop.   Pulmonary:      Effort: Pulmonary effort is normal. No respiratory distress.      Breath sounds: No wheezing, rhonchi or rales.   Abdominal:      General: Bowel sounds are normal. There is no distension.      Palpations: Abdomen is soft.      Tenderness: There is no abdominal tenderness.      Comments: PD catheter in place. Currently dressed/bandaged. No tenderness to palpation throughout abdomen.     Musculoskeletal:      Cervical back: Neck supple.      Right lower leg: No edema.      Left lower leg: No  edema.   Skin:     General: Skin is warm and dry.      Coloration: Skin is not jaundiced.   Neurological:      General: No focal deficit present.      Mental Status: He is alert. Mental status is at baseline.      Cranial Nerves: No cranial nerve deficit.      Motor: No weakness.   Psychiatric:         Mood and Affect: Mood normal.         Behavior: Behavior normal. Behavior is cooperative.          Significant Labs:  BMP:   Recent Labs   Lab 09/18/23 0420   *   *   K 3.9   CL 95   CO2 27   BUN 60*   CREATININE 13.7*   CALCIUM 8.8   MG 1.8     CBC:   Recent Labs   Lab 09/18/23 0420   WBC 6.59   RBC 2.68*   HGB 8.3*   HCT 24.6*      MCV 92   MCH 31.0   MCHC 33.7     CMP:   Recent Labs   Lab 09/18/23 0420   *   CALCIUM 8.8   ALBUMIN 2.1*   PROT 5.2*   *   K 3.9   CO2 27   CL 95   BUN 60*   CREATININE 13.7*   ALKPHOS 42*   ALT 17   AST 16   BILITOT 0.5     LFTs:   Recent Labs   Lab 09/18/23 0420   ALT 17   AST 16   ALKPHOS 42*   BILITOT 0.5   PROT 5.2*   ALBUMIN 2.1*     Microbiology Results (last 7 days)       ** No results found for the last 168 hours. **          Specimen (24h ago, onward)      None          Significant Imaging:  I have reviewed all imagining in the last 24 hours.

## 2023-09-18 NOTE — ASSESSMENT & PLAN NOTE
78 y.o. Black or  Male ESRD-PD Clarita presents to ED on 9/16/2023 with diagnosis of: Dizziness [R42];Chest pain [R07.9]   Nephrology consulted for inpatient ESRD-HD management    He was last seen by Dr. Martell in August with changes being made to his dialysis prescription, however, patient denies any issues after adjustments.  Pre records, LF (4 hrs) increased to 1.8 L to improve kt/v. Also, midday exchange volume increased to 1.8 L. EDW increased to 92 kg. Mainly using 1.5%, but can alternate with 2.5% if needed. Issues with orthostatic BPs, but better with increased hydration. - Note (8/10/23)     Current Prescription:   CONTINUOUS CYCLING PERITONEAL DIALYSIS  Every visit  Last Fill (ml): 1800  Therapy Time (hours): 4 hours (Last Fill dwell)  Midday Exchange? Yes  Solutions: 2.5%  Midday Treatment Time: 3 hrs  Midday Exchange Volume: 1800  Dwell time: 1 hr 45 min  Total Volume (L): 14.8  Exchange Volume (L): 2.8L  Total Treatment Time: 10  Total number of daily exchanges: 5  EDW: 92 kg     Plan/Recommendations:   - will provide nightly CCPD tonight (09/18/2023) for clearance and volume management. No abdominal discomfort on exam.   - recommend bowel regimen for daily bowel movements to help maintain the PD catheter position and avoid poor outflow issues  - continue home dose KCl 40 mEq daily and magnesium oxide 400 mg daily   - daily renal function panels and magnesium levels  - renal diet/tube feeds when not NPO   - strict I/O's and daily weights  - renally all dose medications to eGFR  - avoid gadolinium, fleets, phos-based laxatives, NSAIDs, etc.    Anemia of ESRD   Recent Labs   Lab 09/16/23  0521 09/17/23  0509 09/18/23  0420   WBC 8.12 7.50 6.59   HGB 8.6* 8.3* 8.3*   HCT 26.2* 25.7* 24.6*    198 187     Lab Results   Component Value Date    FESATURATED 64 (H) 05/07/2021    FERRITIN 888 (H) 09/06/2023     - goal in ESRD is Hgb of 10-12, hemoglobin this morning 8.3  - iron 70,  UIBC 180, TIMC 250 and ferritin 888  - if remains inpatient will consider epogen    Mineral Bone Disease in ESRD   Lab Results   Component Value Date     (H) 09/06/2023    CALCIUM 8.8 09/18/2023    ALBUMIN 2.1 (L) 09/18/2023    PHOS 6.3 (H) 09/18/2023       - daily RFPs   - renal diet with protein intake goal 1.5 g/kg/d with 1 L fluid restriction   - Novasource with meals  - will start Renvela 800 mg TIDWM  - continue calcitriol 0.25 mcg once weekly

## 2023-09-18 NOTE — PROGRESS NOTES
09/17/23 2145   Peritoneal Dialysis   Exchange Type Cycler   Peritoneal Treatment Status Started   Dianeal Solution Dextrose 2.5% in 6000 mL   Cycler Peritoneal Dialysis   Number of Cycles 4   Fill Volume (mL) 2000 mL   Total Volume (mL) 8000 mL   Cycler Treatment Comments CCPD started per MD orders     Dressing changed per protocol; lines and drains secured

## 2023-09-18 NOTE — PROGRESS NOTES
[facility]  Nephrology  Progress Note    Patient Name: Teodoro Mast  MRN: [id number]  Admission Date: 9/16/2023  Hospital Length of Stay: 1 days  Attending Provider: Raffi Magdaleno MD   Primary Care Physician: No, Primary Doctor  Principal Problem:Dizziness    Subjective:     HPI: The patient is a 78 y.o. Black or  Male with multiple co morbidities including ESRD on peritoneal dialysis, HTN, HLD, and T2DM  who presents to ED on 9/16/2023 with complaints of dizziness. He reports 1-day history of dizziness worsen with head movement and has been limiting ADLs. He has associated nausea and vomiting. He denies associated HA, fever, chills, chest pain, shortness of breath, abdominal pain, diarrhea, urinary symptoms, numbness or tingling. He was last seen by Dr. Martell in August with changes being made to his dialysis prescription, however, patient denies any issues after adjustments.      In the ED: Hypertensive, other VSSAF. CBC without leukocytosis. CMP consistent with ESRD. Troponin 0.058 >> 0.061. EKG NSR RBBB, no ischemic changes. Received labetalol 100mg, meclizine 25mg and compazine 5mg. Admitted to  for further management.    Current Prescription:   CONTINUOUS CYCLING PERITONEAL DIALYSIS  Every visit  Last Fill (ml): 1800  Therapy Time (hours): 10 hours  Midday Exchange? Yes  Solutions: 2.5%  Dwell time: 1 hr 45 min  Total Volume (L): 14.8  Exchange Volume (L): 2.8L  Total Treatment Time: 10  Total number of daily exchanges: 5      Interval History: Patient seen and examined this AM. CCPD overnight for which he tolerated well however UF only ~315 mL. Reports some improvement in dizziness this morning. Afebrile with pulse ranging from 80-70s bpm. Systolic blood pressures ranging from 160-130s mmHg. He is saturating +93% on room air. Plans to work with PT today with Epley maneuver, pending response may be able to discharge thereafter per primary team.    Review of patient's allergies  indicates:  No Known Allergies  Current Facility-Administered Medications   Medication Frequency    acetaminophen tablet 650 mg Q4H PRN    amLODIPine tablet 5 mg BID    aspirin chewable tablet 81 mg Daily    atorvastatin tablet 80 mg Daily    bisacodyL suppository 10 mg Daily PRN    calcitRIOL capsule 0.25 mcg Weekly    dextrose 10% bolus 125 mL 125 mL PRN    dextrose 10% bolus 250 mL 250 mL PRN    glucagon (human recombinant) injection 1 mg PRN    glucose chewable tablet 16 g PRN    glucose chewable tablet 24 g PRN    heparin (porcine) injection 5,000 Units Q8H    hydrALAZINE tablet 25 mg Q8H PRN    insulin aspart U-100 pen 0-5 Units QID (AC + HS) PRN    insulin detemir U-100 (Levemir) pen 1 Units Daily    labetaloL tablet 100 mg BID    magnesium oxide tablet 400 mg Daily    meclizine tablet 25 mg TID    melatonin tablet 6 mg Nightly PRN    naloxone 0.4 mg/mL injection 0.02 mg PRN    ondansetron disintegrating tablet 8 mg Q8H PRN    polyethylene glycol packet 17 g Daily PRN    potassium chloride SA CR tablet 40 mEq Daily    prochlorperazine injection Soln 5 mg Q6H PRN    psyllium husk (aspartame) 3.4 gram packet 1 packet QHS    sodium chloride 0.9% flush 10 mL Q12H PRN    vitamin D 1000 units tablet 2,000 Units Daily     Facility-Administered Medications Ordered in Other Encounters   Medication Frequency    0.9%  NaCl infusion Continuous    ceFAZolin (ANCEF) 3 gram in dextrose 5% IVPB On Call Procedure    LIDOcaine (PF) 10 mg/ml (1%) injection 10 mg Once       Objective:     Vital Signs (Most Recent):  Temp: 98.2 °F (36.8 °C) (09/18/23 0748)  Pulse: 75 (09/18/23 0748)  Resp: 16 (09/18/23 0748)  BP: (!) 141/76 (09/18/23 0748)  SpO2: 95 % (09/18/23 0748) Vital Signs (24h Range):  Temp:  [96.4 °F (35.8 °C)-98.6 °F (37 °C)] 98.2 °F (36.8 °C)  Pulse:  [75-88] 75  Resp:  [16-18] 16  SpO2:  [93 %-95 %] 95 %  BP: (137-181)/(71-84) 141/76     Weight: 97.9 kg (215 lb 14.2 oz) (09/16/23 2139)  Body mass index is 27.72  kg/m².  Body surface area is 2.26 meters squared.    No intake/output data recorded.     Physical Exam  Vitals and nursing note reviewed.   Constitutional:       General: He is awake. He is not in acute distress.     Appearance: He is overweight. He is not ill-appearing or diaphoretic.   HENT:      Head: Normocephalic and atraumatic.      Right Ear: External ear normal.      Left Ear: External ear normal.      Nose: Nose normal.      Mouth/Throat:      Mouth: Mucous membranes are moist.      Pharynx: Oropharynx is clear. No oropharyngeal exudate or posterior oropharyngeal erythema.   Eyes:      General: No scleral icterus.        Right eye: No discharge.         Left eye: No discharge.      Extraocular Movements: Extraocular movements intact.      Conjunctiva/sclera: Conjunctivae normal.   Cardiovascular:      Rate and Rhythm: Normal rate.      Heart sounds: Murmur heard.      Systolic murmur is present with a grade of 1/6.      No friction rub. No gallop.   Pulmonary:      Effort: Pulmonary effort is normal. No respiratory distress.      Breath sounds: No wheezing, rhonchi or rales.   Abdominal:      General: Bowel sounds are normal. There is no distension.      Palpations: Abdomen is soft.      Tenderness: There is no abdominal tenderness.      Comments: PD catheter in place. Currently dressed/bandaged. No tenderness to palpation throughout abdomen.     Musculoskeletal:      Cervical back: Neck supple.      Right lower leg: No edema.      Left lower leg: No edema.   Skin:     General: Skin is warm and dry.      Coloration: Skin is not jaundiced.   Neurological:      General: No focal deficit present.      Mental Status: He is alert. Mental status is at baseline.      Cranial Nerves: No cranial nerve deficit.      Motor: No weakness.   Psychiatric:         Mood and Affect: Mood normal.         Behavior: Behavior normal. Behavior is cooperative.          Significant Labs:  BMP:   Recent Labs   Lab 09/18/23  0218    *   *   K 3.9   CL 95   CO2 27   BUN 60*   CREATININE 13.7*   CALCIUM 8.8   MG 1.8     CBC:   Recent Labs   Lab 09/18/23  0420   WBC 6.59   RBC 2.68*   HGB 8.3*   HCT 24.6*      MCV 92   MCH 31.0   MCHC 33.7     CMP:   Recent Labs   Lab 09/18/23  0420   *   CALCIUM 8.8   ALBUMIN 2.1*   PROT 5.2*   *   K 3.9   CO2 27   CL 95   BUN 60*   CREATININE 13.7*   ALKPHOS 42*   ALT 17   AST 16   BILITOT 0.5     LFTs:   Recent Labs   Lab 09/18/23  0420   ALT 17   AST 16   ALKPHOS 42*   BILITOT 0.5   PROT 5.2*   ALBUMIN 2.1*     Microbiology Results (last 7 days)       ** No results found for the last 168 hours. **          Specimen (24h ago, onward)      None          Significant Imaging:  I have reviewed all imagining in the last 24 hours.    Assessment/Plan:     Cardiac/Vascular  Essential hypertension  - management per primary team  - currently on labetalol 100 mg BID     Renal/  Peritoneal dialysis catheter in place  -    ESRD (end stage renal disease)  78 y.o. Black or  Male ESRD-PD Clarita presents to ED on 9/16/2023 with diagnosis of: Dizziness [R42];Chest pain [R07.9]   Nephrology consulted for inpatient ESRD-HD management    He was last seen by Dr. Martell in August with changes being made to his dialysis prescription, however, patient denies any issues after adjustments.  Pre records, LF (4 hrs) increased to 1.8 L to improve kt/v. Also, midday exchange volume increased to 1.8 L. EDW increased to 92 kg. Mainly using 1.5%, but can alternate with 2.5% if needed. Issues with orthostatic BPs, but better with increased hydration. - Note (8/10/23)     Current Prescription:   CONTINUOUS CYCLING PERITONEAL DIALYSIS  Every visit  Last Fill (ml): 1800  Therapy Time (hours): 4 hours (Last Fill dwell)  Midday Exchange? Yes  Solutions: 2.5%  Midday Treatment Time: 3 hrs  Midday Exchange Volume: 1800  Dwell time: 1 hr 45 min  Total Volume (L): 14.8  Exchange Volume (L):  2.8L  Total Treatment Time: 10  Total number of daily exchanges: 5  EDW: 92 kg     Plan/Recommendations:   - will provide nightly CCPD tonight (09/18/2023) for clearance and volume management. No abdominal discomfort on exam.   - recommend bowel regimen for daily bowel movements to help maintain the PD catheter position and avoid poor outflow issues  - continue home dose KCl 40 mEq daily and magnesium oxide 400 mg daily   - daily renal function panels and magnesium levels  - renal diet/tube feeds when not NPO   - strict I/O's and daily weights  - renally all dose medications to eGFR  - avoid gadolinium, fleets, phos-based laxatives, NSAIDs, etc.    Anemia of ESRD   Recent Labs   Lab 09/16/23  0521 09/17/23  0509 09/18/23  0420   WBC 8.12 7.50 6.59   HGB 8.6* 8.3* 8.3*   HCT 26.2* 25.7* 24.6*    198 187     Lab Results   Component Value Date    FESATURATED 64 (H) 05/07/2021    FERRITIN 888 (H) 09/06/2023     - goal in ESRD is Hgb of 10-12, hemoglobin this morning 8.3  - iron 70, UIBC 180, TIMC 250 and ferritin 888  - if remains inpatient will consider epogen    Mineral Bone Disease in ESRD   Lab Results   Component Value Date     (H) 09/06/2023    CALCIUM 8.8 09/18/2023    ALBUMIN 2.1 (L) 09/18/2023    PHOS 6.3 (H) 09/18/2023       - daily RFPs   - renal diet with protein intake goal 1.5 g/kg/d with 1 L fluid restriction   - Novasource with meals  - will start Renvela 800 mg TIDWM  - continue calcitriol 0.25 mcg once weekly     Other  * Dizziness  - strong suspicion for BPPV  - PT consulted for consideration of Epley maneuver   - currently on meclizine  - management per primary team    Thank you for your consult. I will follow-up with patient. Please contact us if you have any additional questions.    Rashi Batres MD  Nephrology  [facility]    ATTENDING PHYSICIAN ATTESTATION  I have personally verified the history and examined the patient. I thoroughly reviewed the demographic,  clinical, laboratorial and imaging information available in medical records. I agree with the assessment and recommendations provided by the subspecialty resident who was under my supervision.

## 2023-09-19 NOTE — PLAN OF CARE
09/18/23 1905   Post-Acute Status   Post-Acute Authorization [facility] Status Set-up Complete/Auth obtained     Rolling Walker approved by [facility] and scheduled for home delivery.    So Benitez LMSW

## 2023-09-19 NOTE — PLAN OF CARE
[facility]  Discharge Final Note    Primary Care Provider: No, Primary Doctor    Expected Discharge Date: 9/18/2023    Final Discharge Note (most recent)       Final Note - 09/18/23 1907          Final Note    Assessment Type Final Discharge Note (P)      Anticipated Discharge Disposition Home or Self Care (P)      Hospital Resources/Appts/Education Provided Provided patient/caregiver with written discharge plan information;Provided education on problems/symptoms using teachback;Appointments scheduled and added to AVS (P)         Post-Acute Status    Post-Acute Authorization [facility] (P)      [facility]ME Status Set-up Complete/Auth obtained (P)      Discharge Delays None known at this time (P)                      Important Message from Medicare  Important Message from Medicare regarding Discharge Appeal Rights: Given to patient/caregiver, Explained to patient/caregiver, Signed/date by patient/caregiver     Date IMM was signed: 09/18/23  Time IMM was signed: 1044    Contact Info       [facility]   Specialty: Emergency Medicine    [address]  [facility] [address]   Phone: [phone]       Next Steps: Follow up    Instructions: As needed, If symptoms worsen    PCP        Next Steps: Follow up in 3 day(s)    [facility]   Specialty: Otolaryngology    [address]  [facility] [address]   Phone: [phone]       Next Steps: Go to          Pt. discharged home with HME (rolling walker). Family providing transportation home upon discharge.     So Benitez LMSW

## 2023-09-19 NOTE — PLAN OF CARE
CHW met with patient/family at bedside. Patient experience rounding completed and reviewed the following.     Do you know your discharge plan? Yes         If yes, what is the plan? Home    Have you discussed your needs and preferences with your SW/CM? Yes     If you are discharging home, do you have help at home? Yes     Do you think you will need help additional at home at discharge? No     Do you currently have difficulty keeping up with bills, affording medicine or buying food? No    Assigned SW/CM notified of any patient/family needs or concerns. Appropriate resources provided to address patient's needs.

## 2023-10-04 ENCOUNTER — CLINICAL SUPPORT (OUTPATIENT)
Dept: DIABETES | Facility: CLINIC | Age: 78
End: 2023-10-04
Payer: MEDICARE

## 2023-10-04 VITALS — BODY MASS INDEX: 26.45 KG/M2 | WEIGHT: 206 LBS

## 2023-10-04 DIAGNOSIS — E11.65 TYPE 2 DIABETES MELLITUS WITH HYPERGLYCEMIA, WITH LONG-TERM CURRENT USE OF INSULIN: ICD-10-CM

## 2023-10-04 DIAGNOSIS — Z79.4 TYPE 2 DIABETES MELLITUS WITH HYPERGLYCEMIA, WITH LONG-TERM CURRENT USE OF INSULIN: ICD-10-CM

## 2023-10-04 PROCEDURE — 99999 PR PBB SHADOW E&M-EST. PATIENT-LVL II: CPT | Mod: PBBFAC,TXP,,

## 2023-10-04 PROCEDURE — 99999 PR PBB SHADOW E&M-EST. PATIENT-LVL II: ICD-10-PCS | Mod: PBBFAC,TXP,,

## 2023-10-04 PROCEDURE — 99212 OFFICE O/P EST SF 10 MIN: CPT | Mod: PBBFAC,TXP

## 2023-10-04 PROCEDURE — 99999PBSHW PR PBB SHADOW TECHNICAL ONLY FILED TO HB: ICD-10-PCS | Mod: PBBFAC,NTX,,

## 2023-10-04 PROCEDURE — 99999PBSHW PR PBB SHADOW TECHNICAL ONLY FILED TO HB: Mod: PBBFAC,NTX,,

## 2023-10-04 PROCEDURE — G0108 DIAB MANAGE TRN  PER INDIV: HCPCS | Mod: PBBFAC,NTX

## 2023-10-04 NOTE — PROGRESS NOTES
Diabetes Care Specialist Progress Note  Author: Desirae Obregon RN, CDE  Date: 10/4/2023    Program Intake  Reason for Diabetes Program Visit:: Initial Diabetes Assessment (Newly diagnosed)  Current diabetes risk level:: moderate  In the last 12 months, have you:: used emergency room services  Was the ER or hospital admission related to diabetes?: No  Permission to speak with others about care:: no    Lab Results   Component Value Date    HGBA1C 8.4 (H) 08/10/2023   Current Diabetic Medications:  Levemir 8 units in evening    Clinical    Weight: 93.5 kg (206 lb 0.3 oz)       Body mass index is 26.45 kg/m².         Problem Review  Active comorbidities affecting diabetes self-care.: yes  Comorbidities: End Stage Renal Disease, Hypertension, Organ Transplant    Clinical Assessment  Current Diabetes Treatment: Insulin (Levemir 8 units in evening)  Have you ever experienced hypoglycemia (low blood sugar)?: yes  In the last month, how often have you experienced low blood sugar?: other (see comments) (States happened once bg 68)  Are you able to tell when your blood sugar is low?: No  Have you ever been hospitalized because your blood sugar was too low?: no  How do you treat hypoglycemia (low blood sugar)?: other  Have you ever experienced hyperglycemia (high blood sugar)?: no    Medication Information  How do you obtain your medications?: Family picks up  How many days a week do you miss your medications?: Never  Medication adherence impacting ability to self-manage diabetes?: No         Nutritional Status  Diet: Regular  Meal Plan 24 Hour Recall: Breakfast, Lunch, Dinner, Snack  Meal Plan 24 Hour Recall - Breakfast: eggs, toast, polo coffee  Meal Plan 24 Hour Recall - Lunch: 1/2 sandwich, or gumbo sometimes restaurant food  Meal Plan 24 Hour Recall - Dinner: Dialysis shake in evening  Meal Plan 24 Hour Recall - Snack: none, drinks water  Change in appetite?: Yes  Recent Changes in Weight: Weight Loss  Was weight loss  intentional or unintentional?: Unintentional  Current nutritional status an area of need that is impacting patient's ability to self-manage diabetes?: No    Additional Social History    Support  Does anyone support you with your diabetes care?: yes  Who supports you?: son/daughter  Who takes you to your medical appointments?: son/daughter  Does the current support meet the patient's needs?: Yes  Is Support an area impacting ability to self-manage diabetes?: No    Access to Mass Media & Technology  Does the patient have access to any of the following devices or technologies?: Smart phone  Media or technology needs impacting ability to self-manage diabetes?: No    Cognitive/Behavioral Health  Alert and Oriented: Yes  Difficulty Thinking: No  Requires Prompting: No  Requires assistance for routine expression?: No         Communication  Language preference: English  Hearing Problems: No  Vision Problems: No  Communication needs impacting ability to self-manage diabetes?: No    Health Literacy  Preferred Learning Method: Face to Face, Reading Materials, Demonstration  How often do you need to have someone help you read instructions, pamphlets, or written material from your doctor or pharmacy?: Never  Health literacy needs impacting ability to self-manage diabetes?: No      Diabetes Self-Management Skills Assessment    Diabetes Disease Process/Treatment Options  Patient/caregiver able to state what happens when someone has diabetes.: somewhat  Patient/caregiver knows what type of diabetes they have.: no  Patient/caregiver able to identify at least three signs and symptoms of diabetes.: no  Diabetes Disease Process/Treatment Options: Skills Assessment Completed: Yes  Assessment indicates:: Instruction Needed  Area of need?: Yes    Nutrition/Healthy Eating  Challenges to healthy eating:: snacking between meals and at night  Method of carbohydrate measurement:: no method  Patient can identify foods that impact blood sugar.:  yes  Patient-identified foods:: starches (bread, pasta, rice, cereal), sweets  Nutrition/Healthy Eating Skills Assessment Completed:: Yes  Assessment indicates:: Instruction Needed, Knowledge deficit  Area of need?: Yes    Physical Activity/Exercise  Patient's daily activity level:: sedentary  Patient formally exercises outside of work.: no  Reasons for not exercising:: physically unable to exercise currently  Physical Activity/Exercise Skills Assessment Completed: : Yes  Area of need?: Yes    Medications  Patient is able to describe current diabetes management routine.: yes  Diabetes management routine:: insulin  Patient is able to identify current diabetes medications, dosages, and appropriate timing of medications.: yes  Patient understands the purpose of the medications taken for diabetes.: yes  Patient reports problems or concerns with current medication regimen.: no  Medication Skills Assessment Completed:: Yes  Assessment indicates:: Adequate understanding  Area of need?: No    Home Blood Glucose Monitoring  Patient states that blood sugar is checked at home daily.: yes  Monitoring Method:: home glucometer  How often do you check your blood sugar?: 4 times a day  When do you check your blood sugar?: Before breakfast, Before lunch, Before dinner, 2 hours after meal  When you check what is your typical blood sugar range? : see logs  Blood glucose logs:: yes, encouraged to keep logs, encouraged to bring logs to provider visits  Blood glucose logs reviewed today?: yes  Home Blood Glucose Monitoring Skills Assessment Completed: : Yes  Assessment indicates:: Adequate understanding  Area of need?: No    Acute Complications  Patient is able to identify types of acute complications: No  Acute Complications Skills Assessment Completed: : Yes  Assessment indicates:: Instruction Needed  Area of need?: Yes    Chronic Complications  Patient can identify major chronic complications of diabetes.: no  Patient is taking  statin?: Yes  Chronic Complications Skills Assessment Completed: : Yes  Assessment indicates:: Knowledge deficit  Area of need?: Yes    Psychosocial/Coping  Patient can identify ways of coping with chronic disease.: yes  Patient-stated ways of coping with chronic disease:: support from loved ones  Psychosocial/Coping Skills Assessment Completed: : Yes  Assessment indicates:: Adequate understanding  Area of need?: No      Assessment Summary and Plan    Based on today's diabetes care assessment, the following areas of need were identified:          10/4/2023    12:01 AM   Social   Support No   Access to Mass Media/Tech No   Communication No   Health Literacy No            10/4/2023    12:01 AM   Clinical   Medication Adherence No   Nutritional Status No            10/4/2023    12:01 AM   Diabetes Self-Management Skills   Diabetes Disease Process/Treatment Options Yes, Provided DM management guide and provided instruction regarding signs and symptoms, risk factors of diabetes, increased risk for cardio and cerebral vascular events.  Instructed patient on what is diabetes and the progression of the disease. Discussed significance of current A1c and blood glucose goals.   Nutrition/Healthy Eating Yes, see care planning   Physical Activity/Exercise Yes - discussed benefits of exercise as it relates to insulin resistance and weight loss    Medication No, reviewed MOA of insulin, rotation of sites, changing needle after each shot, disposal of used needles.    Home Blood Glucose Monitoring No   Acute Complications Yes - Reviewed blood glucose goals, prevention, detection, signs and symptoms, and treatment of hypoglycemia following rule of 15 and hyperglycemia, and when to contact the clinic. Advised to carry a source of fast acting carbs.    Chronic Complications Yes, Discussed long term complications of diabetes. Patient agrees to have the following diabetes lex maintenance screenings/appointments yearly eye exams, foot  exams.    Psychosocial/Coping No          Today's interventions were provided through individual discussion, instruction, and written materials were provided.      Patient verbalized understanding of instruction and written materials.  Pt was able to return back demonstration of instructions today. Patient understood key points, needs reinforcement and further instruction.     Diabetes Self-Management Care Plan:    Today's Diabetes Self-Management Care Plan was developed with Teodoro's input. Teodoro has agreed to work toward the following goal(s) to improve his/her overall diabetes control.      Care Plan: Diabetes Management   Updates made since 9/4/2023 12:00 AM        Problem: Healthy Eating         Long-Range Goal: Eat 2-3 meals daily with 45-60g/3-4 servings of Carbohydrate per meal. Limit snacking in between meal to 1 serving (15 grams).    Start Date: 10/4/2023   Priority: High   Note:    Reviewed meal planning and general nutritional counseling with regards to diabetes management. Typical food intake obtained from patient. Patient currently is not measuring foods or carb counting. Has made changes to his diet since diagnosis.  Was snacking on sweets before.  States has stopped.     We concentrated on portion sizes at today's session.  Encouraged increased consumption of non-starchy veggies to diet.  Overall meal planning and making better choices for meals. Encouraged to avoid sources of sugar in diet.  Written education information provided to patient for use at home.          Task: Reviewed the sources and role of Carbohydrate, Protein, and Fat and how each nutrient impacts blood sugar. Completed 10/4/2023        Task: Provided visual examples using dry measuring cups, food models, and other familiar objects such as computer mouse, deck or cards, tennis ball etc. to help with visualization of portions. Completed 10/4/2023        Task: Explained how to count carbohydrates using the food label and the use  of dry measuring cups for accurate carb counting. Completed 10/4/2023        Task: Discussed strategies for choosing healthier menu options when dining out. Completed 10/4/2023        Task: Recommended replacing beverages containing high sugar content with noncaloric/sugar free options and/or water. Completed 10/4/2023        Task: Review the importance of balancing carbohydrates with each meal using portion control techniques to count servings of carbohydrate and label reading to identify serving size and amount of total carbs per serving. Completed 10/4/2023        Task: Provided Sample plate method and reviewed the use of the plate to estimate amounts of carbohydrate per meal. Completed 10/4/2023          Follow Up Plan     Follow up in about 1 year or as needed.    Today's care plan and follow up schedule was discussed with patient.  Teodoro verbalized understanding of the care plan, goals, and agrees to follow up plan.        The patient was encouraged to communicate with his/her health care provider/physician and care team regarding his/her condition(s) and treatment.  I provided the patient with my contact information today and encouraged to contact me via phone or [facility] Patient Portal as needed.     Length of Visit   Total Time: 60 Minutes

## 2023-10-23 ENCOUNTER — TELEPHONE (OUTPATIENT)
Dept: TRANSPLANT | Facility: CLINIC | Age: 78
End: 2023-10-23
Payer: MEDICARE

## 2023-10-24 ENCOUNTER — PATIENT MESSAGE (OUTPATIENT)
Dept: ENDOCRINOLOGY | Facility: CLINIC | Age: 78
End: 2023-10-24
Payer: MEDICARE

## 2023-10-24 ENCOUNTER — PATIENT MESSAGE (OUTPATIENT)
Dept: DIABETES | Facility: CLINIC | Age: 78
End: 2023-10-24
Payer: MEDICARE

## 2023-10-24 DIAGNOSIS — E11.65 TYPE 2 DIABETES MELLITUS WITH HYPERGLYCEMIA, WITH LONG-TERM CURRENT USE OF INSULIN: ICD-10-CM

## 2023-10-24 DIAGNOSIS — Z79.4 TYPE 2 DIABETES MELLITUS WITH HYPERGLYCEMIA, WITH LONG-TERM CURRENT USE OF INSULIN: ICD-10-CM

## 2023-10-24 RX ORDER — INSULIN DETEMIR 100 [IU]/ML
10 INJECTION, SOLUTION SUBCUTANEOUS NIGHTLY
Qty: 9 ML | Refills: 3 | Status: SHIPPED | OUTPATIENT
Start: 2023-10-24 | End: 2024-02-14 | Stop reason: CLARIF

## 2023-10-26 ENCOUNTER — TELEPHONE (OUTPATIENT)
Dept: TRANSPLANT | Facility: CLINIC | Age: 78
End: 2023-10-26
Payer: MEDICARE

## 2023-10-26 NOTE — TELEPHONE ENCOUNTER
MA notes per Adherence form     FOR THE PAST THREE MONTHS:    0-AMA's  0-No-shows    No concerns with care giving, transportation, or mental health    Scanned in pt's media    Amelia Matt  Abdominal Transplant MA

## 2023-10-31 ENCOUNTER — CLINICAL SUPPORT (OUTPATIENT)
Dept: AUDIOLOGY | Facility: CLINIC | Age: 78
End: 2023-10-31
Payer: MEDICARE

## 2023-10-31 ENCOUNTER — TELEPHONE (OUTPATIENT)
Dept: CARDIOLOGY | Facility: HOSPITAL | Age: 78
End: 2023-10-31
Payer: MEDICARE

## 2023-10-31 ENCOUNTER — OFFICE VISIT (OUTPATIENT)
Dept: OTOLARYNGOLOGY | Facility: CLINIC | Age: 78
End: 2023-10-31
Payer: MEDICARE

## 2023-10-31 DIAGNOSIS — H90.3 SENSORINEURAL HEARING LOSS (SNHL) OF BOTH EARS: ICD-10-CM

## 2023-10-31 DIAGNOSIS — R42 DIZZINESS AND GIDDINESS: Primary | ICD-10-CM

## 2023-10-31 DIAGNOSIS — R11.0 NAUSEA: ICD-10-CM

## 2023-10-31 DIAGNOSIS — H90.3 SENSORINEURAL HEARING LOSS, BILATERAL: ICD-10-CM

## 2023-10-31 DIAGNOSIS — R26.89 IMBALANCE: ICD-10-CM

## 2023-10-31 DIAGNOSIS — H81.11 BPPV (BENIGN PAROXYSMAL POSITIONAL VERTIGO), RIGHT: Primary | ICD-10-CM

## 2023-10-31 PROCEDURE — 99211 OFF/OP EST MAY X REQ PHY/QHP: CPT | Mod: 25,PBBFAC

## 2023-10-31 PROCEDURE — 95992 CANALITH REPOSITIONING PROC: CPT | Mod: PBBFAC

## 2023-10-31 PROCEDURE — 95992 PR CANALITH REPOSITIONING PROCEDURE, PER DAY: ICD-10-PCS | Mod: S$PBB,,,

## 2023-10-31 PROCEDURE — 99999 PR PBB SHADOW E&M-EST. PATIENT-LVL I: ICD-10-PCS | Mod: PBBFAC,,,

## 2023-10-31 PROCEDURE — 92557 COMPREHENSIVE HEARING TEST: CPT | Mod: PBBFAC | Performed by: AUDIOLOGIST

## 2023-10-31 PROCEDURE — 99213 OFFICE O/P EST LOW 20 MIN: CPT | Mod: 25,S$PBB,,

## 2023-10-31 PROCEDURE — 99213 PR OFFICE/OUTPT VISIT, EST, LEVL III, 20-29 MIN: ICD-10-PCS | Mod: 25,S$PBB,,

## 2023-10-31 PROCEDURE — 99999 PR PBB SHADOW E&M-EST. PATIENT-LVL I: CPT | Mod: PBBFAC,,,

## 2023-10-31 PROCEDURE — 92567 TYMPANOMETRY: CPT | Mod: PBBFAC | Performed by: AUDIOLOGIST

## 2023-10-31 PROCEDURE — 95992 CANALITH REPOSITIONING PROC: CPT | Mod: S$PBB,,,

## 2023-10-31 RX ORDER — ONDANSETRON 4 MG/1
4 TABLET, FILM COATED ORAL EVERY 8 HOURS PRN
Qty: 5 TABLET | Refills: 0 | Status: SHIPPED | OUTPATIENT
Start: 2023-10-31

## 2023-10-31 NOTE — PROGRESS NOTES
"Subjective:   Teodoro Mast is a 78 y.o. male who presents for evaluation of dizziness.   The symptoms started several months ago and have been intermittent.   He describes the dizziness as off balance, "vision flutters" not room spinning.   The attacks occur intermittently and last  seconds.   Positions that worsen symptoms: any motion and turning head (not sure right or left)   Associated ear symptoms (with episodes): hearing loss (longstanding). denies any otalgia, otorrhea, ear fullness/pressure, tinnitus     Associated symptoms include: denies any weakness, paresthesias, numbness, LOC, confusion, difficulty swallowing, or difficulty with speech.  .   Recent infections: none.   Head trauma: denied.   Previous ear surgery: No.   Noise exposure:  (  and skilled saws/tools)    Previous workup: none.   Previous treatment includes: meclizine (not currently using)   Headache HX none   Family hx of migraine : none  Cardiac hx : HTN (stable BP)  Diabetic hx: Type 2 DM ( no hypoglycemic events)  New medication or changes:   There is not a family history of hearing loss at a young age.   He has a past medical history of BPH (benign prostatic hyperplasia), CKD (chronic kidney disease) stage 5, GFR less than 15 ml/min, Gout, Hyperlipidemia, Hyperparathyroidism, secondary renal, and Hypertension.    Past Surgical History  He has a past surgical history that includes hemorriodectomy; Finger surgery; Eye surgery (Bilateral); Peritoneal catheter insertion; Colonoscopy (N/A, 11/2/2021); AV fistula placement (Left, 3/31/2022); and AV fistula placement (Left, 9/30/2022).    Family History  His family history includes Cancer in his brother and sister; Hammer toes in his brother; Heart disease in his brother, father, and mother; Hypertension in his mother; No Known Problems in his brother, sister, sister, and son; Premature birth in his son; Seizures in his sister.    Social History  He reports that he quit " smoking about 49 years ago. His smoking use included cigarettes. He started smoking about 59 years ago. He has a 5.0 pack-year smoking history. He has never used smokeless tobacco. He reports that he does not currently use alcohol. He reports that he does not use drugs.    Allergies  He has No Known Allergies.    Medications  He has a current medication list which includes the following prescription(s): amlodipine, aspirin, atorvastatin, calcitriol, gentamicin, levemir flexpen, labetalol, magnesium oxide, ondansetron, pen needle, diabetic, sevelamer carbonate, and vitamin d, and the following Facility-Administered Medications: sodium chloride 0.9%, ceFAZolin (ANCEF) 3 gram in dextrose 5% IVPB, and lidocaine (pf) 10 mg/ml (1%).  Review of Systems     Constitutional: Positive for appetite change and fatigue.      HENT: Positive for hearing loss.      Eyes:  Negative for change in eyesight, eye drainage, eye itching and photophobia.     Respiratory:  Negative for cough, shortness of breath, sleep apnea, snoring and wheezing.      Cardiovascular:  Negative for chest pain, foot swelling, irregular heartbeat and swollen veins.     Gastrointestinal:  Positive for constipation.     Genitourinary: Positive for difficulty urinating.     Musc: Negative for aching joints, aching muscles, back pain and neck pain.     Skin: Negative for rash.     Allergy: Negative for food allergies and seasonal allergies.     Endocrine: Negative for cold intolerance and heat intolerance.      Neurological: Positive for dizziness and light-headedness.     Hematologic: Negative for bruises/bleeds easily and swollen glands.      Psychiatric: Positive for sleep disturbance.         Objective:     Constitutional:   He is oriented to person, place, and time. He appears well-developed and well-nourished. He appears alert. He is cooperative.  Non-toxic appearance. He does not have a sickly appearance. He does not appear ill. Normal speech.       Head:  Normocephalic and atraumatic. Head is without right periorbital erythema, without left periorbital erythema and without TMJ tenderness. Salivary glands normal.  Facial strength is normal.      Ears:    Right Ear: No lacerations. No drainage, swelling or tenderness. No foreign bodies. No mastoid tenderness. Tympanic membrane is not injected, not scarred, not perforated, not erythematous, not retracted and not bulging. Tympanic membrane mobility is normal. No middle ear effusion. No PE tube. No hemotympanum. Decreased hearing is noted.   Left Ear: No lacerations. No drainage, swelling or tenderness. No foreign bodies. No mastoid tenderness. Tympanic membrane is not injected, not scarred, not perforated, not erythematous, not retracted and not bulging. Tympanic membrane mobility is normal.  No middle ear effusion.  No PE tube. No hemotympanum. Decreased hearing is noted.     Nose:  No mucosal edema, rhinorrhea, sinus tenderness or polyps. No epistaxis. Turbinates normal, no turbinate masses and no turbinate hypertrophy.  Right sinus exhibits no maxillary sinus tenderness and no frontal sinus tenderness. Left sinus exhibits no maxillary sinus tenderness and no frontal sinus tenderness.     Mouth/Throat  Oropharynx clear and moist without lesions or asymmetry and normal uvula midline. Normal dentition. No uvula swelling, oral lesions, trismus or mucous membrane lesions. No oropharyngeal exudate, posterior oropharyngeal edema or posterior oropharyngeal erythema.     Neck:  Trachea normal, phonation normal and no adenopathy. Thyroid tenderness is present. No edema, no erythema and no neck rigidity present. No thyroid mass and no thyromegaly present.     He has no cervical adenopathy.     Pulmonary/Chest:   Effort normal.     Psychiatric:   He has a normal mood and affect. His speech is normal and behavior is normal.     Neurological:   He is alert and oriented to person, place, and time. He has neurological  normal, alert and oriented. No cranial nerve deficit or sensory deficit. Coordination and gait (wide gait. using wheelchair) abnormal.   [place]-Hallpike Left: Negative for torsional and up-beating nystagmus    [place]-Hallpike Right: Positive for torsional and up-beating nystagmus      Heel to [place]: normal    Romberg: Positive (unstable)     Head thrust: normal    Fukada test: wide stance, slow. Slight imbalance noted.     EOM: saccade     Audiogram    I independently reviewed the tracings of the complete audiometric evaluation performed today. I reviewed the audiogram with the patient as well. Pertinent findings include: right ear with normal hearing at 250 Hz, with mild SNHL at 500 HZ to a moderate severe SNHL and left ear with mild SNHL at 500 HZ to a moderate severe SNHL. Bilateral type A tympanogram.         Assessment:     1. BPPV (benign paroxysmal positional vertigo), right    2. Imbalance    3. Nausea    4. Sensorineural hearing loss (SNHL) of both ears      Plan:   Diagnoses and all orders for this visit:    Diagnoses and all orders for this visit:    BPPV (benign paroxysmal positional vertigo), right  -     Ambulatory referral/consult to Physical/Occupational Therapy; Future  -     Electronystagmographyx; Future  HPI and physical exam consistent with BPPV.  We discussed the pathology of BPPV including the displacement of crystals (canaliths) within the inner ear.  Epley maneuver can be performed in-office, but may require multiple treatments for success.  Right Canalith Repositioning procedure performed in clinic.  Provided him with at home Epley maneuver instructions.    I recommended vestibular therapy to help Mr. Mast with Epley maneuver.   If not improvement after one month of therapy, I suggested a VNG to further evaluate his dizziness.     Imbalance  -     Ambulatory referral/consult to Physical/Occupational Therapy; Future  -     Electronystagmographyx; Future    Nausea  -     ondansetron (ZOFRAN) 4 MG  tablet; Take 1 tablet (4 mg total) by mouth every 8 (eight) hours as needed for Nausea.    Sensorineural hearing loss (SNHL) of both ears  Audiometric testing interpretation consistent with sensorineural hearing loss.  Discussed the etiology of SNHL. Medically cleared for hearing amplification, and will follow-up with Audiology if interested. Hearing conservation in noisy environments. Return to clinic every year for audiometric testing.     RTC as needed or if symptoms persist.  Questions answered.

## 2023-10-31 NOTE — PROGRESS NOTES
Teodoor Mast, a 78 y.o. male, was seen today in the clinic for an audiologic evaluation.  The patient's main complaint was dizziness.  Mr. Mast reported that beginning 6 weeks ago he began experiencing dizziness that he described as the world moving around him.  He stated that the sensation was reduced when supine.  He denied otalgia, aural fullness, and tinnitus.    Tympanometry revealed Type A in the right ear and Type A in the left ear. Audiogram results revealed normal sloping to moderate sensorineural hearing loss (SNHL) in the right ear and normal sloping to moderate SNHL in the left ear.  Speech reception thresholds were noted at 30 dB in the right ear and 30 dB in the left ear.  Speech discrimination scores were 88% in the right ear and 84% in the left ear.    Recommendations:  Otologic evaluation  Annual audiogram  Hearing protection when in noise  Hearing aid consultation

## 2023-11-02 ENCOUNTER — HOSPITAL ENCOUNTER (OUTPATIENT)
Dept: CARDIOLOGY | Facility: HOSPITAL | Age: 78
Discharge: HOME | DRG: 682 | End: 2023-11-02
Attending: NURSE PRACTITIONER
Payer: MEDICARE

## 2023-11-02 ENCOUNTER — HOSPITAL ENCOUNTER (INPATIENT)
Facility: HOSPITAL | Age: 78
LOS: 1 days | Discharge: HOME | DRG: 682 | End: 2023-11-03
Attending: EMERGENCY MEDICINE | Admitting: INTERNAL MEDICINE
Payer: MEDICARE

## 2023-11-02 VITALS
DIASTOLIC BLOOD PRESSURE: 96 MMHG | HEART RATE: 86 BPM | BODY MASS INDEX: 26.44 KG/M2 | SYSTOLIC BLOOD PRESSURE: 211 MMHG | WEIGHT: 206 LBS | HEIGHT: 74 IN

## 2023-11-02 DIAGNOSIS — E11.65 TYPE 2 DIABETES MELLITUS WITH HYPERGLYCEMIA, WITH LONG-TERM CURRENT USE OF INSULIN: ICD-10-CM

## 2023-11-02 DIAGNOSIS — Z76.82 ORGAN TRANSPLANT CANDIDATE: ICD-10-CM

## 2023-11-02 DIAGNOSIS — Z79.4 TYPE 2 DIABETES MELLITUS WITH HYPERGLYCEMIA, WITH LONG-TERM CURRENT USE OF INSULIN: ICD-10-CM

## 2023-11-02 DIAGNOSIS — D64.9 ANEMIA, UNSPECIFIED TYPE: Primary | ICD-10-CM

## 2023-11-02 DIAGNOSIS — R07.9 CHEST PAIN: ICD-10-CM

## 2023-11-02 DIAGNOSIS — N18.5 CKD (CHRONIC KIDNEY DISEASE), STAGE V: ICD-10-CM

## 2023-11-02 DIAGNOSIS — I12.0 BENIGN HYPERTENSION WITH CKD (CHRONIC KIDNEY DISEASE) STAGE V: ICD-10-CM

## 2023-11-02 DIAGNOSIS — N18.5 BENIGN HYPERTENSION WITH CKD (CHRONIC KIDNEY DISEASE) STAGE V: ICD-10-CM

## 2023-11-02 PROBLEM — E87.6 HYPOKALEMIA: Status: ACTIVE | Noted: 2023-11-02

## 2023-11-02 LAB
ABO + RH BLD: NORMAL
ALBUMIN SERPL BCP-MCNC: 2.3 G/DL (ref 3.5–5.2)
ALP SERPL-CCNC: 52 U/L (ref 55–135)
ALT SERPL W/O P-5'-P-CCNC: 18 U/L (ref 10–44)
ANION GAP SERPL CALC-SCNC: 16 MMOL/L (ref 8–16)
AST SERPL-CCNC: 20 U/L (ref 10–40)
BASOPHILS # BLD AUTO: 0.07 K/UL (ref 0–0.2)
BASOPHILS # BLD AUTO: 0.07 K/UL (ref 0–0.2)
BASOPHILS NFR BLD: 0.9 % (ref 0–1.9)
BASOPHILS NFR BLD: 0.9 % (ref 0–1.9)
BILIRUB SERPL-MCNC: 0.4 MG/DL (ref 0.1–1)
BLD GP AB SCN CELLS X3 SERPL QL: NORMAL
BUN SERPL-MCNC: 53 MG/DL (ref 8–23)
CALCIUM SERPL-MCNC: 9.5 MG/DL (ref 8.7–10.5)
CHLORIDE SERPL-SCNC: 98 MMOL/L (ref 95–110)
CO2 SERPL-SCNC: 24 MMOL/L (ref 23–29)
CREAT SERPL-MCNC: 12.3 MG/DL (ref 0.5–1.4)
CV PHARM DOSE: 0.4 MG
CV STRESS BASE HR: 86 BPM
DIASTOLIC BLOOD PRESSURE: 96 MMHG
DIFFERENTIAL METHOD: ABNORMAL
DIFFERENTIAL METHOD: ABNORMAL
EJECTION FRACTION- HIGH: 59 %
END DIASTOLIC INDEX-HIGH: 155 ML/M2
END DIASTOLIC INDEX-LOW: 91 ML/M2
END SYSTOLIC INDEX-HIGH: 78 ML/M2
END SYSTOLIC INDEX-LOW: 40 ML/M2
EOSINOPHIL # BLD AUTO: 0.1 K/UL (ref 0–0.5)
EOSINOPHIL # BLD AUTO: 0.1 K/UL (ref 0–0.5)
EOSINOPHIL NFR BLD: 1.5 % (ref 0–8)
EOSINOPHIL NFR BLD: 1.5 % (ref 0–8)
ERYTHROCYTE [DISTWIDTH] IN BLOOD BY AUTOMATED COUNT: 14.7 % (ref 11.5–14.5)
ERYTHROCYTE [DISTWIDTH] IN BLOOD BY AUTOMATED COUNT: 14.8 % (ref 11.5–14.5)
EST. GFR  (NO RACE VARIABLE): 3.8 ML/MIN/1.73 M^2
FERRITIN SERPL-MCNC: 1495 NG/ML (ref 20–300)
FOLATE SERPL-MCNC: 8.1 NG/ML (ref 4–24)
GLUCOSE SERPL-MCNC: 107 MG/DL (ref 70–110)
GLUCOSE SERPL-MCNC: 108 MG/DL (ref 70–110)
HCT VFR BLD AUTO: 24 % (ref 40–54)
HCT VFR BLD AUTO: 25.9 % (ref 40–54)
HGB BLD-MCNC: 7.9 G/DL (ref 14–18)
HGB BLD-MCNC: 8.7 G/DL (ref 14–18)
IMM GRANULOCYTES # BLD AUTO: 0.03 K/UL (ref 0–0.04)
IMM GRANULOCYTES # BLD AUTO: 0.06 K/UL (ref 0–0.04)
IMM GRANULOCYTES NFR BLD AUTO: 0.4 % (ref 0–0.5)
IMM GRANULOCYTES NFR BLD AUTO: 0.8 % (ref 0–0.5)
IRON SERPL-MCNC: 105 UG/DL (ref 45–160)
LYMPHOCYTES # BLD AUTO: 0.6 K/UL (ref 1–4.8)
LYMPHOCYTES # BLD AUTO: 0.6 K/UL (ref 1–4.8)
LYMPHOCYTES NFR BLD: 7.7 % (ref 18–48)
LYMPHOCYTES NFR BLD: 7.9 % (ref 18–48)
MCH RBC QN AUTO: 29.9 PG (ref 27–31)
MCH RBC QN AUTO: 30.1 PG (ref 27–31)
MCHC RBC AUTO-ENTMCNC: 32.9 G/DL (ref 32–36)
MCHC RBC AUTO-ENTMCNC: 33.6 G/DL (ref 32–36)
MCV RBC AUTO: 90 FL (ref 82–98)
MCV RBC AUTO: 91 FL (ref 82–98)
MONOCYTES # BLD AUTO: 0.8 K/UL (ref 0.3–1)
MONOCYTES # BLD AUTO: 0.8 K/UL (ref 0.3–1)
MONOCYTES NFR BLD: 10.3 % (ref 4–15)
MONOCYTES NFR BLD: 10.5 % (ref 4–15)
NEUTROPHILS # BLD AUTO: 5.9 K/UL (ref 1.8–7.7)
NEUTROPHILS # BLD AUTO: 6.3 K/UL (ref 1.8–7.7)
NEUTROPHILS NFR BLD: 78.6 % (ref 38–73)
NEUTROPHILS NFR BLD: 79 % (ref 38–73)
NRBC BLD-RTO: 0 /100 WBC
NRBC BLD-RTO: 0 /100 WBC
NUC REST DIASTOLIC VOLUME INDEX: 117
NUC REST EJECTION FRACTION: 50
NUC REST SYSTOLIC VOLUME INDEX: 59
NUC STRESS DIASTOLIC VOLUME INDEX: 111
NUC STRESS EJECTION FRACTION: 51 %
NUC STRESS SYSTOLIC VOLUME INDEX: 54
OHS CV CPX 1 MINUTE RECOVERY HEART RATE: 86 BPM
OHS CV CPX 85 PERCENT MAX PREDICTED HEART RATE MALE: 122
OHS CV CPX MAX PREDICTED HEART RATE: 144
OHS CV CPX PATIENT IS FEMALE: 0
OHS CV CPX PATIENT IS MALE: 1
OHS CV CPX PEAK DIASTOLIC BLOOD PRESSURE: 60 MMHG
OHS CV CPX PEAK HEAR RATE: 86 BPM
OHS CV CPX PEAK RATE PRESSURE PRODUCT: NORMAL
OHS CV CPX PEAK SYSTOLIC BLOOD PRESSURE: 179 MMHG
OHS CV CPX PERCENT MAX PREDICTED HEART RATE ACHIEVED: 60
OHS CV CPX RATE PRESSURE PRODUCT PRESENTING: NORMAL
PLATELET # BLD AUTO: 214 K/UL (ref 150–450)
PLATELET # BLD AUTO: 240 K/UL (ref 150–450)
PMV BLD AUTO: 9.5 FL (ref 9.2–12.9)
PMV BLD AUTO: 9.5 FL (ref 9.2–12.9)
POCT GLUCOSE: 102 MG/DL (ref 70–110)
POCT GLUCOSE: 107 MG/DL (ref 70–110)
POTASSIUM SERPL-SCNC: 3 MMOL/L (ref 3.5–5.1)
PROT SERPL-MCNC: 5.7 G/DL (ref 6–8.4)
RBC # BLD AUTO: 2.64 M/UL (ref 4.6–6.2)
RBC # BLD AUTO: 2.89 M/UL (ref 4.6–6.2)
RETIRED EF AND QEF - SEE NOTES: 47 %
SATURATED IRON: 50 % (ref 20–50)
SODIUM SERPL-SCNC: 138 MMOL/L (ref 136–145)
SPECIMEN OUTDATE: NORMAL
SYSTOLIC BLOOD PRESSURE: 211 MMHG
TOTAL IRON BINDING CAPACITY: 209 UG/DL (ref 250–450)
TRANSFERRIN SERPL-MCNC: 141 MG/DL (ref 200–375)
VIT B12 SERPL-MCNC: 403 PG/ML (ref 210–950)
WBC # BLD AUTO: 7.48 K/UL (ref 3.9–12.7)
WBC # BLD AUTO: 8.02 K/UL (ref 3.9–12.7)

## 2023-11-02 PROCEDURE — 82607 VITAMIN B-12: CPT | Mod: NTX | Performed by: PHYSICIAN ASSISTANT

## 2023-11-02 PROCEDURE — 84466 ASSAY OF TRANSFERRIN: CPT | Mod: NTX | Performed by: PHYSICIAN ASSISTANT

## 2023-11-02 PROCEDURE — 78452 NUCLEAR STRESS - CARDIOLOGY INTERPRETED (CUPID ONLY): ICD-10-PCS | Mod: 26,TXP,, | Performed by: INTERNAL MEDICINE

## 2023-11-02 PROCEDURE — 20600001 HC STEP DOWN PRIVATE ROOM: Mod: NTX

## 2023-11-02 PROCEDURE — 83540 ASSAY OF IRON: CPT | Mod: NTX | Performed by: PHYSICIAN ASSISTANT

## 2023-11-02 PROCEDURE — 82728 ASSAY OF FERRITIN: CPT | Mod: NTX | Performed by: PHYSICIAN ASSISTANT

## 2023-11-02 PROCEDURE — 82746 ASSAY OF FOLIC ACID SERUM: CPT | Mod: NTX | Performed by: PHYSICIAN ASSISTANT

## 2023-11-02 PROCEDURE — 25000003 PHARM REV CODE 250: Mod: NTX | Performed by: PHYSICIAN ASSISTANT

## 2023-11-02 PROCEDURE — 94761 N-INVAS EAR/PLS OXIMETRY MLT: CPT | Mod: NTX

## 2023-11-02 PROCEDURE — 86900 BLOOD TYPING SEROLOGIC ABO: CPT | Mod: NTX

## 2023-11-02 PROCEDURE — 63600175 PHARM REV CODE 636 W HCPCS: Mod: NTX | Performed by: NURSE PRACTITIONER

## 2023-11-02 PROCEDURE — 78452 HT MUSCLE IMAGE SPECT MULT: CPT | Mod: 26,TXP,, | Performed by: INTERNAL MEDICINE

## 2023-11-02 PROCEDURE — 93016 CV STRESS TEST SUPVJ ONLY: CPT | Mod: TXP,,, | Performed by: INTERNAL MEDICINE

## 2023-11-02 PROCEDURE — 82668 ASSAY OF ERYTHROPOIETIN: CPT | Mod: NTX

## 2023-11-02 PROCEDURE — 80053 COMPREHEN METABOLIC PANEL: CPT | Mod: NTX

## 2023-11-02 PROCEDURE — 85025 COMPLETE CBC W/AUTO DIFF WBC: CPT | Mod: NTX

## 2023-11-02 PROCEDURE — 82962 GLUCOSE BLOOD TEST: CPT | Mod: NTX

## 2023-11-02 PROCEDURE — 99285 EMERGENCY DEPT VISIT HI MDM: CPT | Mod: 25,NTX

## 2023-11-02 PROCEDURE — 93016 NUCLEAR STRESS - CARDIOLOGY INTERPRETED (CUPID ONLY): ICD-10-PCS | Mod: TXP,,, | Performed by: INTERNAL MEDICINE

## 2023-11-02 PROCEDURE — 93018 CV STRESS TEST I&R ONLY: CPT | Mod: TXP,,, | Performed by: INTERNAL MEDICINE

## 2023-11-02 PROCEDURE — 93018 NUCLEAR STRESS - CARDIOLOGY INTERPRETED (CUPID ONLY): ICD-10-PCS | Mod: TXP,,, | Performed by: INTERNAL MEDICINE

## 2023-11-02 PROCEDURE — 25000242 PHARM REV CODE 250 ALT 637 W/ HCPCS: Mod: NTX | Performed by: PHYSICIAN ASSISTANT

## 2023-11-02 RX ORDER — ACETAMINOPHEN 325 MG/1
650 TABLET ORAL EVERY 4 HOURS PRN
Status: DISCONTINUED | OUTPATIENT
Start: 2023-11-02 | End: 2023-11-03 | Stop reason: HOSPADM

## 2023-11-02 RX ORDER — SEVELAMER CARBONATE 800 MG/1
800 TABLET, FILM COATED ORAL
Status: DISCONTINUED | OUTPATIENT
Start: 2023-11-03 | End: 2023-11-03 | Stop reason: HOSPADM

## 2023-11-02 RX ORDER — NAPROXEN SODIUM 220 MG/1
81 TABLET, FILM COATED ORAL DAILY
Status: DISCONTINUED | OUTPATIENT
Start: 2023-11-03 | End: 2023-11-03 | Stop reason: HOSPADM

## 2023-11-02 RX ORDER — POLYETHYLENE GLYCOL 3350 17 G/17G
17 POWDER, FOR SOLUTION ORAL DAILY PRN
Status: DISCONTINUED | OUTPATIENT
Start: 2023-11-02 | End: 2023-11-03 | Stop reason: HOSPADM

## 2023-11-02 RX ORDER — REGADENOSON 0.08 MG/ML
0.4 INJECTION, SOLUTION INTRAVENOUS
Status: COMPLETED | OUTPATIENT
Start: 2023-11-02 | End: 2023-11-02

## 2023-11-02 RX ORDER — ONDANSETRON 8 MG/1
8 TABLET, ORALLY DISINTEGRATING ORAL EVERY 8 HOURS PRN
Status: DISCONTINUED | OUTPATIENT
Start: 2023-11-02 | End: 2023-11-03 | Stop reason: HOSPADM

## 2023-11-02 RX ORDER — AMLODIPINE BESYLATE 5 MG/1
5 TABLET ORAL 2 TIMES DAILY
Status: DISCONTINUED | OUTPATIENT
Start: 2023-11-02 | End: 2023-11-03 | Stop reason: HOSPADM

## 2023-11-02 RX ORDER — IBUPROFEN 200 MG
16 TABLET ORAL
Status: DISCONTINUED | OUTPATIENT
Start: 2023-11-02 | End: 2023-11-03 | Stop reason: HOSPADM

## 2023-11-02 RX ORDER — LABETALOL 100 MG/1
200 TABLET, FILM COATED ORAL 2 TIMES DAILY
Status: DISCONTINUED | OUTPATIENT
Start: 2023-11-02 | End: 2023-11-03 | Stop reason: HOSPADM

## 2023-11-02 RX ORDER — TALC
6 POWDER (GRAM) TOPICAL NIGHTLY PRN
Status: DISCONTINUED | OUTPATIENT
Start: 2023-11-02 | End: 2023-11-03 | Stop reason: HOSPADM

## 2023-11-02 RX ORDER — PROMETHAZINE HYDROCHLORIDE 25 MG/1
25 TABLET ORAL EVERY 6 HOURS PRN
Status: DISCONTINUED | OUTPATIENT
Start: 2023-11-02 | End: 2023-11-03 | Stop reason: HOSPADM

## 2023-11-02 RX ORDER — GLUCAGON 1 MG
1 KIT INJECTION
Status: DISCONTINUED | OUTPATIENT
Start: 2023-11-02 | End: 2023-11-03 | Stop reason: HOSPADM

## 2023-11-02 RX ORDER — IBUPROFEN 200 MG
24 TABLET ORAL
Status: DISCONTINUED | OUTPATIENT
Start: 2023-11-02 | End: 2023-11-03 | Stop reason: HOSPADM

## 2023-11-02 RX ORDER — CHOLECALCIFEROL (VITAMIN D3) 25 MCG
2000 TABLET ORAL DAILY
Status: DISCONTINUED | OUTPATIENT
Start: 2023-11-03 | End: 2023-11-03 | Stop reason: HOSPADM

## 2023-11-02 RX ORDER — INSULIN ASPART 100 [IU]/ML
0-5 INJECTION, SOLUTION INTRAVENOUS; SUBCUTANEOUS
Status: DISCONTINUED | OUTPATIENT
Start: 2023-11-02 | End: 2023-11-03 | Stop reason: HOSPADM

## 2023-11-02 RX ORDER — BISACODYL 10 MG
10 SUPPOSITORY, RECTAL RECTAL DAILY PRN
Status: DISCONTINUED | OUTPATIENT
Start: 2023-11-02 | End: 2023-11-03 | Stop reason: HOSPADM

## 2023-11-02 RX ADMIN — PSYLLIUM HUSK 1 PACKET: 3.4 POWDER ORAL at 11:11

## 2023-11-02 RX ADMIN — REGADENOSON 0.4 MG: 0.08 INJECTION, SOLUTION INTRAVENOUS at 10:11

## 2023-11-02 RX ADMIN — POTASSIUM BICARBONATE 20 MEQ: 391 TABLET, EFFERVESCENT ORAL at 11:11

## 2023-11-02 RX ADMIN — LABETALOL HYDROCHLORIDE 200 MG: 100 TABLET, FILM COATED ORAL at 11:11

## 2023-11-02 RX ADMIN — AMLODIPINE BESYLATE 5 MG: 5 TABLET ORAL at 11:11

## 2023-11-02 NOTE — ED PROVIDER NOTES
Encounter Date: 11/2/2023       History     Chief Complaint   Patient presents with    Low Hemoglobin      Labs today; sent from Dialysis center [facility] for blood transfusion. On PD nightly      HPI  Teodoro Mast is a 78 y.o.  male with ah hx of CKD, on peritoneal dialysis presenting with c/o low Hb. Pt had a blood draw at nephrology clinic 2 days ago which resulted in 6.8Hb. He was instructed today to go to the ED due to the low Hb. He denied dizziness, lightheaded, falls, bleeding from his peritoneal catheter, bleeding in his stools or dark stool. He endorsed chronic weakness in the lower extremity muscles. He dialyzes himself daily for about 10 hours for the last 2 years. He has no other complaint.    They denied IV drug use  They denied tobacco use.   They reported 1oz with coffee daily.   They denied immunosuppressant medications.  They denied chemotherapy.  They denied radiation therapy.     Review of patient's allergies indicates:  No Known Allergies  Past Medical History:   Diagnosis Date    BPH (benign prostatic hyperplasia)     CKD (chronic kidney disease) stage 5, GFR less than 15 ml/min     Gout     Hyperlipidemia     Hyperparathyroidism, secondary renal     Hypertension      Past Surgical History:   Procedure Laterality Date    AV FISTULA PLACEMENT Left 3/31/2022    Procedure: CREATION, AV FISTULA RADIOCEPHALIC;  Surgeon: PEMA Martines II, MD;  Location: [facility] OR [address] [place] [address];  Service: Vascular;  Laterality: Left;    AV FISTULA PLACEMENT Left 9/30/2022    Procedure: CREATION, AV FISTULA;  Surgeon: PEMA Martines II, MD;  [facility];  Service: Vascular;  Laterality: Left;    COLONOSCOPY N/A 11/2/2021    Procedure: COLONOSCOPY;  Surgeon: Joe Jimenez MD;  Location: [facility] ENDO (4TH FLR);  Service: Endoscopy;  Laterality: N/A;  COVID test at [place] on 10/30-GT      dialysis pt-labs prior    EYE SURGERY Bilateral     cataract removal    FINGER SURGERY      hemorriodectomy       PERITONEAL CATHETER INSERTION       Family History   Problem Relation Age of Onset    Heart disease Mother     Hypertension Mother     Heart disease Father         pacemaker    Seizures Sister     Hammer toes Brother     Heart disease Brother     Cancer Brother         prostate cancer    Premature birth Son     Cancer Sister         throat    No Known Problems Sister     No Known Problems Sister     No Known Problems Brother     No Known Problems Son      Social History     Tobacco Use    Smoking status: Former     Current packs/day: 0.00     Average packs/day: 0.5 packs/day for 10.0 years (5.0 ttl pk-yrs)     Types: Cigarettes     Start date:      Quit date:      Years since quittin.8    Smokeless tobacco: Never   Substance Use Topics    Alcohol use: Not Currently    Drug use: No     Review of Systems  See HPI  Physical Exam     Initial Vitals [23 1537]   BP Pulse Resp Temp SpO2   120/83 75 18 98.2 °F (36.8 °C) 97 %      MAP       --         Physical Exam    Constitutional: He appears well-developed and well-nourished. No distress.   HENT:   Head: Normocephalic and atraumatic.   Eyes: EOM are normal. Pupils are equal, round, and reactive to light. Right eye exhibits no discharge. Left eye exhibits no discharge.   Neck: Neck supple.   Normal range of motion.  Cardiovascular:  Normal rate, regular rhythm and normal heart sounds.           Pulmonary/Chest: No respiratory distress. He has no wheezes. He has no rhonchi. He has no rales. He exhibits no tenderness.   Abdominal: Bowel sounds are normal. He exhibits distension. There is no abdominal tenderness. There is no rebound and no guarding.   Musculoskeletal:         General: Normal range of motion.      Cervical back: Normal range of motion and neck supple.     Neurological: He is alert and oriented to person, place, and time.   Skin: No erythema. No pallor.     SUNIL was negative.    ED Course   Procedures  Labs Reviewed   CBC W/ AUTO DIFFERENTIAL  - Abnormal; Notable for the following components:       Result Value    RBC 2.89 (*)     Hemoglobin 8.7 (*)     Hematocrit 25.9 (*)     RDW 14.7 (*)     Immature Granulocytes 0.8 (*)     Immature Grans (Abs) 0.06 (*)     Lymph # 0.6 (*)     Gran % 78.6 (*)     Lymph % 7.9 (*)     All other components within normal limits   COMPREHENSIVE METABOLIC PANEL - Abnormal; Notable for the following components:    Potassium 3.0 (*)     BUN 53 (*)     Creatinine 12.3 (*)     Total Protein 5.7 (*)     Albumin 2.3 (*)     Alkaline Phosphatase 52 (*)     eGFR 3.8 (*)     All other components within normal limits   CBC W/ AUTO DIFFERENTIAL - Abnormal; Notable for the following components:    RBC 2.64 (*)     Hemoglobin 7.9 (*)     Hematocrit 24.0 (*)     RDW 14.8 (*)     Lymph # 0.6 (*)     Gran % 79.0 (*)     Lymph % 7.7 (*)     All other components within normal limits   ERYTHROPOIETIN   TYPE & SCREEN          Imaging Results    None          Medications - No data to display  Medical Decision Making  Teodoro Mast is a 78 y.o.  male with ah hx of CKD, on peritoneal dialysis presenting with c/o low Hb (6.8 from a draw 2 days ago). Pt was non-symptomatic throughout his ED stay. His Hb on presentation was 8.7 while repeat Hb 1.5 hours later was 7.9. He denied a hx of black stool or blood in stool. SUNIL was negative. He was admitted to hospital medicine for evaluation of his anemia.    Amount and/or Complexity of Data Reviewed  External Data Reviewed: labs.     Details: Previous labs reviewed  Labs: ordered.               ED Course as of 11/02/23 2008   Thu Nov 02, 2023   1921 78-year-old male past medical history of hypertension hyperlipidemia and end-stage renal disease on peritoneal dialysis presents today with worsening anemia.  Per outside report hemoglobin was 6.8 and patient thought it was 6.1.  Unable to access records however our initial hemoglobin here was 8.7.  Repeat after 2 hours was 7.9.  Given downtrend will admit  for further management including serial hemoglobin and potential transfusion. [BD]      ED Course User Index  [BD] Flakito Cadena MD                    Clinical Impression:   Final diagnoses:  [D64.9] Anemia, unspecified type (Primary)        ED Disposition Condition    Admit Stable                Edinson Canas MD  Resident  11/02/23 2008

## 2023-11-02 NOTE — Clinical Note
Diagnosis: Anemia, unspecified type [5371744]   Admitting Provider:: ANNELISE BANUELOS [[id number]]   Reason for IP Medical Treatment  (Clinical interventions that can only be accomplished in the IP setting? ) :: anemia   I certify that Inpatient services for greater than or equal to 2 midnights are medically necessary:: No   Plans for Post-Acute care--if anticipated (pick the single best option):: A. No post acute care anticipated at this time

## 2023-11-02 NOTE — ED NOTES
LOC: The patient is awake, alert and aware of environment with an appropriate affect, the patient is oriented x 3 and speaking appropriately.  APPEARANCE: patient is clean and well groomed, patient's clothing is properly fastened.  SKIN: The skin is warm and dry, color consistent with ethnicity  MUSCULOSKELETAL: Patient moving all extremities spontaneously, no obvious swelling or deformities noted.  RESPIRATORY: Airway is open and patent, respirations are spontaneous, patient has a normal effort and rate  ABDOMEN: Soft and non tender to palpation, no distention noted    Patient arrives from dialysis center with the complaint of low H&H. Patient had routine labs drawn today and was told to come to the ED for eval after noting his H&H was low. Patient currently undergoes peritoneal dialysis nightly. Patient placed in hospital gown and on continuous cardiac monitoring, no acute distress noted, will continue to monitor.

## 2023-11-03 VITALS
DIASTOLIC BLOOD PRESSURE: 67 MMHG | HEIGHT: 74 IN | BODY MASS INDEX: 27.22 KG/M2 | SYSTOLIC BLOOD PRESSURE: 147 MMHG | TEMPERATURE: 98 F | RESPIRATION RATE: 12 BRPM | WEIGHT: 212.06 LBS | OXYGEN SATURATION: 94 % | HEART RATE: 76 BPM

## 2023-11-03 LAB
ANION GAP SERPL CALC-SCNC: 15 MMOL/L (ref 8–16)
BASOPHILS # BLD AUTO: 0.07 K/UL (ref 0–0.2)
BASOPHILS NFR BLD: 1 % (ref 0–1.9)
BUN SERPL-MCNC: 60 MG/DL (ref 8–23)
CALCIUM SERPL-MCNC: 8.6 MG/DL (ref 8.7–10.5)
CHLORIDE SERPL-SCNC: 99 MMOL/L (ref 95–110)
CO2 SERPL-SCNC: 22 MMOL/L (ref 23–29)
CREAT SERPL-MCNC: 13.6 MG/DL (ref 0.5–1.4)
DIFFERENTIAL METHOD: ABNORMAL
EOSINOPHIL # BLD AUTO: 0.2 K/UL (ref 0–0.5)
EOSINOPHIL NFR BLD: 2.2 % (ref 0–8)
ERYTHROCYTE [DISTWIDTH] IN BLOOD BY AUTOMATED COUNT: 14.7 % (ref 11.5–14.5)
EST. GFR  (NO RACE VARIABLE): 3.4 ML/MIN/1.73 M^2
GLUCOSE SERPL-MCNC: 94 MG/DL (ref 70–110)
HCT VFR BLD AUTO: 24 % (ref 40–54)
HGB BLD-MCNC: 7.7 G/DL (ref 14–18)
IMM GRANULOCYTES # BLD AUTO: 0.04 K/UL (ref 0–0.04)
IMM GRANULOCYTES NFR BLD AUTO: 0.6 % (ref 0–0.5)
LYMPHOCYTES # BLD AUTO: 0.7 K/UL (ref 1–4.8)
LYMPHOCYTES NFR BLD: 9.7 % (ref 18–48)
MAGNESIUM SERPL-MCNC: 1.6 MG/DL (ref 1.6–2.6)
MCH RBC QN AUTO: 29.4 PG (ref 27–31)
MCHC RBC AUTO-ENTMCNC: 32.1 G/DL (ref 32–36)
MCV RBC AUTO: 92 FL (ref 82–98)
MONOCYTES # BLD AUTO: 0.8 K/UL (ref 0.3–1)
MONOCYTES NFR BLD: 11.6 % (ref 4–15)
NEUTROPHILS # BLD AUTO: 5.4 K/UL (ref 1.8–7.7)
NEUTROPHILS NFR BLD: 74.9 % (ref 38–73)
NRBC BLD-RTO: 0 /100 WBC
PHOSPHATE SERPL-MCNC: 3.5 MG/DL (ref 2.7–4.5)
PLATELET # BLD AUTO: 213 K/UL (ref 150–450)
PMV BLD AUTO: 9.7 FL (ref 9.2–12.9)
POCT GLUCOSE: 127 MG/DL (ref 70–110)
POCT GLUCOSE: 156 MG/DL (ref 70–110)
POTASSIUM SERPL-SCNC: 3.1 MMOL/L (ref 3.5–5.1)
RBC # BLD AUTO: 2.62 M/UL (ref 4.6–6.2)
SODIUM SERPL-SCNC: 136 MMOL/L (ref 136–145)
WBC # BLD AUTO: 7.25 K/UL (ref 3.9–12.7)

## 2023-11-03 PROCEDURE — 25000003 PHARM REV CODE 250: Mod: NTX | Performed by: PHYSICIAN ASSISTANT

## 2023-11-03 PROCEDURE — 84100 ASSAY OF PHOSPHORUS: CPT | Mod: NTX | Performed by: PHYSICIAN ASSISTANT

## 2023-11-03 PROCEDURE — 99239 HOSP IP/OBS DSCHRG MGMT >30: CPT | Mod: NTX,,,

## 2023-11-03 PROCEDURE — 83735 ASSAY OF MAGNESIUM: CPT | Mod: NTX | Performed by: PHYSICIAN ASSISTANT

## 2023-11-03 PROCEDURE — 36415 COLL VENOUS BLD VENIPUNCTURE: CPT | Mod: NTX | Performed by: PHYSICIAN ASSISTANT

## 2023-11-03 PROCEDURE — G0378 HOSPITAL OBSERVATION PER HR: HCPCS | Mod: NTX

## 2023-11-03 PROCEDURE — 85025 COMPLETE CBC W/AUTO DIFF WBC: CPT | Mod: NTX | Performed by: PHYSICIAN ASSISTANT

## 2023-11-03 PROCEDURE — 99239 PR HOSPITAL DISCHARGE DAY,>30 MIN: ICD-10-PCS | Mod: NTX,,,

## 2023-11-03 PROCEDURE — 99214 OFFICE O/P EST MOD 30 MIN: CPT | Mod: NTX,,, | Performed by: INTERNAL MEDICINE

## 2023-11-03 PROCEDURE — 99214 PR OFFICE/OUTPT VISIT, EST, LEVL IV, 30-39 MIN: ICD-10-PCS | Mod: NTX,,, | Performed by: INTERNAL MEDICINE

## 2023-11-03 PROCEDURE — 25000003 PHARM REV CODE 250: Mod: NTX

## 2023-11-03 PROCEDURE — 80048 BASIC METABOLIC PNL TOTAL CA: CPT | Mod: NTX | Performed by: PHYSICIAN ASSISTANT

## 2023-11-03 RX ORDER — POTASSIUM CHLORIDE 20 MEQ/1
40 TABLET, EXTENDED RELEASE ORAL ONCE
Status: COMPLETED | OUTPATIENT
Start: 2023-11-03 | End: 2023-11-03

## 2023-11-03 RX ORDER — REGADENOSON 0.08 MG/ML
0.4 INJECTION, SOLUTION INTRAVENOUS
Status: SHIPPED | OUTPATIENT
Start: 2023-11-03

## 2023-11-03 RX ADMIN — CHOLECALCIFEROL TAB 25 MCG (1000 UNIT) 2000 UNITS: 25 TAB at 09:11

## 2023-11-03 RX ADMIN — AMLODIPINE BESYLATE 5 MG: 5 TABLET ORAL at 09:11

## 2023-11-03 RX ADMIN — SEVELAMER CARBONATE 800 MG: 800 TABLET, FILM COATED ORAL at 09:11

## 2023-11-03 RX ADMIN — ASPIRIN 81 MG CHEWABLE TABLET 81 MG: 81 TABLET CHEWABLE at 09:11

## 2023-11-03 RX ADMIN — POTASSIUM CHLORIDE 40 MEQ: 1500 TABLET, EXTENDED RELEASE ORAL at 01:11

## 2023-11-03 RX ADMIN — SEVELAMER CARBONATE 800 MG: 800 TABLET, FILM COATED ORAL at 01:11

## 2023-11-03 RX ADMIN — LABETALOL HYDROCHLORIDE 200 MG: 100 TABLET, FILM COATED ORAL at 09:11

## 2023-11-03 NOTE — PLAN OF CARE
1:47 PM  The SW contacted [facility] and notified the facility that this patient is scheduled for d/c today.     2:06 PM  The SW contacted [facility] scheduling and scheduled the patient a PCP follow appt.   The patient's appointment has been placed on the AVS for review.       The SW will continue to follow.    Mitzi Barr LMSW  Case Management [facility]

## 2023-11-03 NOTE — ASSESSMENT & PLAN NOTE
- on nightly PD  - nephro consulted for iPD  -scr 13.6/bun60  - continue phos binder  - strict I/Os, daily weights

## 2023-11-03 NOTE — NURSING
Pt transported to TSU via stretcher. Pt ambulatory, oriented to room, family at bedside. Pt to be NPO at midnight, instructed to call for assistance.

## 2023-11-03 NOTE — PLAN OF CARE
Pt AAOx4, VSS. SpO2 >95% on RA, NSR on tele. LFA Fistula +/+. Pt with peritoneal dialysis catheter in LLQ, covered with gauze - CDI. PT maintained on NPO diet since midnight. BG monitored, PM Levemir held d/t not receiving dialysis. Most recent H/H of 7.9/24.0 ---- rpt H/H 7.7/24.0. Pt with no complaints at this time. Bed in lowest position, wheels locked, call light within reach, POC ongoing.

## 2023-11-03 NOTE — SUBJECTIVE & OBJECTIVE
Past Medical History:   Diagnosis Date    BPH (benign prostatic hyperplasia)     CKD (chronic kidney disease) stage 5, GFR less than 15 ml/min     Gout     Hyperlipidemia     Hyperparathyroidism, secondary renal     Hypertension        Past Surgical History:   Procedure Laterality Date    AV FISTULA PLACEMENT Left 3/31/2022    Procedure: CREATION, AV FISTULA RADIOCEPHALIC;  Surgeon: PEMA Martines II, MD;  Location: [facility] OR 2ND FLR;  Service: Vascular;  Laterality: Left;    AV FISTULA PLACEMENT Left 9/30/2022    Procedure: CREATION, AV FISTULA;  Surgeon: PEMA Martines II, MD;  Location: [facility] OR 2ND FLR;  Service: Vascular;  Laterality: Left;    COLONOSCOPY N/A 11/2/2021    Procedure: COLONOSCOPY;  Surgeon: Joe Jimenez MD;  Location: [facility] ENDO (4TH FLR);  Service: Endoscopy;  Laterality: N/A;  COVID test at [place] on 10/30-GT      dialysis pt-labs prior    EYE SURGERY Bilateral     cataract removal    FINGER SURGERY      hemorriodectomy      PERITONEAL CATHETER INSERTION         Review of patient's allergies indicates:  No Known Allergies    Current Facility-Administered Medications on File Prior to Encounter   Medication    0.9%  NaCl infusion    ceFAZolin (ANCEF) 3 gram in dextrose 5% IVPB    LIDOcaine (PF) 10 mg/ml (1%) injection 10 mg     Current Outpatient Medications on File Prior to Encounter   Medication Sig    amLODIPine (NORVASC) 5 MG tablet Take 2 tablets (10 mg total) by mouth once daily.    aspirin 81 MG Chew Take 1 tablet by mouth once daily.    atorvastatin (LIPITOR) 80 MG tablet TAKE 1 TABLET(80 MG) BY MOUTH EVERY DAY    calcitRIOL (ROCALTROL) 0.25 MCG Cap Take 0.25 mcg by mouth once a week.    gentamicin (GARAMYCIN) 0.3 % ophthalmic solution Apply 1 drop to PD exit site daily with dressing changes.    insulin detemir U-100, Levemir, (LEVEMIR FLEXPEN) 100 unit/mL (3 mL) InPn pen Inject 10 Units into the skin every evening.    labetaloL (NORMODYNE) 100 MG tablet Take 1 tablet (100 mg total)  "by mouth 2 (two) times daily.    magnesium oxide (MAG-OX) 400 mg (241.3 mg magnesium) tablet TAKE 1 TABLET BY MOUTH DAILY    ondansetron (ZOFRAN) 4 MG tablet Take 1 tablet (4 mg total) by mouth every 8 (eight) hours as needed for Nausea.    pen needle, diabetic (BD ULTRA-FINE KITA PEN NEEDLE) 32 gauge x 5/32" Ndle 1 injection daily for insulin    sevelamer carbonate (RENVELA) 800 mg Tab Take 1 tablet (800 mg total) by mouth 3 (three) times daily with meals.    vitamin D (VITAMIN D3) 1000 units Tab Take 2,000 Units by mouth once daily.     Family History       Problem Relation (Age of Onset)    Cancer Brother, Sister    Hammer toes Brother    Heart disease Mother, Father, Brother    Hypertension Mother    No Known Problems Sister, Sister, Brother, Son    Premature birth Son    Seizures Sister          Tobacco Use    Smoking status: Former     Current packs/day: 0.00     Average packs/day: 0.5 packs/day for 10.0 years (5.0 ttl pk-yrs)     Types: Cigarettes     Start date:      Quit date:      Years since quittin.8    Smokeless tobacco: Never   Substance and Sexual Activity    Alcohol use: Not Currently    Drug use: No    Sexual activity: Not Currently     Partners: Female     Review of Systems   Constitutional:  Negative for activity change, chills and fever.   HENT:  Negative for congestion, trouble swallowing and voice change.    Eyes:  Negative for photophobia and visual disturbance.   Respiratory:  Negative for chest tightness, shortness of breath and wheezing.    Cardiovascular:  Negative for chest pain, palpitations and leg swelling.   Gastrointestinal:  Negative for abdominal pain, constipation, diarrhea, nausea and vomiting.   Genitourinary:  Negative for dysuria, frequency, hematuria and urgency.   Musculoskeletal:  Negative for arthralgias, back pain and gait problem.   Skin:  Negative for color change and rash.   Neurological:  Negative for dizziness, syncope, weakness, light-headedness, " numbness and headaches.   Psychiatric/Behavioral:  Negative for agitation and confusion. The patient is not nervous/anxious.      Objective:     Vital Signs (Most Recent):  Temp: 98.2 °F (36.8 °C) (11/02/23 2230)  Pulse: 93 (11/02/23 2245)  Resp: 18 (11/02/23 2230)  BP: (!) 172/83 (11/02/23 2230)  SpO2: 96 % (11/02/23 2230) Vital Signs (24h Range):  Temp:  [98.2 °F (36.8 °C)] 98.2 °F (36.8 °C)  Pulse:  [75-94] 93  Resp:  [12-18] 18  SpO2:  [95 %-97 %] 96 %  BP: (120-211)/(75-96) 172/83     Weight: 95 kg (209 lb 7 oz)  Body mass index is 26.89 kg/m².     Physical Exam  Vitals and nursing note reviewed.   Constitutional:       General: He is not in acute distress.     Appearance: He is well-developed.   HENT:      Head: Normocephalic and atraumatic.      Mouth/Throat:      Pharynx: No oropharyngeal exudate.   Eyes:      General: No scleral icterus.     Conjunctiva/sclera: Conjunctivae normal.   Cardiovascular:      Rate and Rhythm: Normal rate and regular rhythm.      Heart sounds: Normal heart sounds.   Pulmonary:      Effort: Pulmonary effort is normal. No respiratory distress.      Breath sounds: Normal breath sounds. No wheezing.   Abdominal:      General: Bowel sounds are normal. There is no distension.      Palpations: Abdomen is soft.      Tenderness: There is no abdominal tenderness.   Musculoskeletal:         General: No tenderness. Normal range of motion.      Cervical back: Normal range of motion and neck supple.   Lymphadenopathy:      Cervical: No cervical adenopathy.   Skin:     General: Skin is warm and dry.      Capillary Refill: Capillary refill takes less than 2 seconds.      Findings: No rash.   Neurological:      Mental Status: He is alert and oriented to person, place, and time.      Cranial Nerves: No cranial nerve deficit.      Sensory: No sensory deficit.      Coordination: Coordination normal.   Psychiatric:         Behavior: Behavior normal.         Thought Content: Thought content normal.          Judgment: Judgment normal.                Significant Labs: All pertinent labs within the past 24 hours have been reviewed.  CBC:   Recent Labs   Lab 11/02/23  1713 11/02/23  1854   WBC 7.48 8.02   HGB 8.7* 7.9*   HCT 25.9* 24.0*    214     CMP:   Recent Labs   Lab 11/02/23  1713      K 3.0*   CL 98   CO2 24      BUN 53*   CREATININE 12.3*   CALCIUM 9.5   PROT 5.7*   ALBUMIN 2.3*   BILITOT 0.4   ALKPHOS 52*   AST 20   ALT 18   ANIONGAP 16       Significant Imaging: I have reviewed all pertinent imaging results/findings within the past 24 hours.

## 2023-11-03 NOTE — ASSESSMENT & PLAN NOTE
- home regimen: detemir 10U qhs  - inpatient regimen: detemir 4U qhs + LDSSI  - ACHS accuchecks  - PRN hypoglycemia protocol  Lab Results   Component Value Date    HGBA1C 8.4 (H) 08/10/2023

## 2023-11-03 NOTE — ASSESSMENT & PLAN NOTE
- Hgb 7.7, baseline ~8.5-10  -TIBC 209,IRON 105, IRON SAT 50.TRANSFERRIN 141, FERRITIN 1495  -FOLATE 8.1,  -B12 403  - suspect anemia of chronic disease from ESRD  - no overt signs of acute GIB  - iron/anemia panel ordered  - hold VTE ppx for now as precaution but can likely resume in AM

## 2023-11-03 NOTE — PLAN OF CARE
VSS  No signs of evident distress or complaint of pain  PIV removed and dressed  Serum potassium 3.1.  replacement potassium admin as per MAR  Discharge paperwork given to patient.  Pt. Expressed understanding of discharge instructions.

## 2023-11-03 NOTE — HOSPITAL COURSE
Teodoro Mast is a 78 y.o. male who was admitted to hospital medicine for anemia. Hgb trended 8.7 >> 7.9 >> 7.7. Iron studies consistent with anemia of chronic disease. Nephrology consulted due to patient history of PD. Recommending patient follow up in clinic with them on Monday. On my evaluation this morning, the patient reports feeling well and is eager to discharge home. All questions were answered. Patient acknowledged understanding of discharge instructions and feels safe to discharge home. Patient was discharged on 11/3/2023 in stable condition with nephrology follow-up. Education regarding condition provided and return precautions given.

## 2023-11-03 NOTE — ASSESSMENT & PLAN NOTE
Folate deficiency  Vitamin B12 deficiency  - Hgb 8.7>>7.9, baseline ~8.5-10  - suspect anemia of chronic disease from ESRD  - no overt signs of acute GIB  - iron/anemia panel ordered  - type & screen  - blood consent obtained  - transfuse for Hgb >7  - hold VTE ppx for now as precaution but can likely resume in AM

## 2023-11-03 NOTE — H&P
[facility] - Transplant [facility]  History & Physical    Patient Name: Teodoro Mast  MRN: [id number]  Patient Class: IP- Inpatient  Admission Date: 11/2/2023  Attending Physician: Jimmy Majano MD   Primary Care Provider: Oralia Primary Doctor         Patient information was obtained from patient, past medical records and ER records.     Subjective:     Principal Problem:Anemia    Chief Complaint:   Chief Complaint   Patient presents with    Low Hemoglobin      Labs today; sent from Dialysis center down street for blood transfusion. On PD nightly         HPI: Teodoro Mast is a 78 y.o. male with a PMHx of ESRD on peritoneal dialysis, chronic anemia, T2DM who [place] [facility] [place] of [place]. Patient had routine labs done as an outpatient which showed his Hgb was 6.8 compared to baseline ~9. He was advised to present to the ED for further eval. Patient is currently asymptomatic without any fatigue, dark/bloody stools, SOB or abdominal pain. Denies recent NSAID use. He had peritoneal dialysis last night without any issues. Denies fever, chills, chest pain, N/V, diarrhea, HA, vision changes, or syncope.     ED: AFVSS. No leukocytosis. Hgb 8.7>>7.9 (BL ~8.5-10), K 3.0. remainder of CMP consistent with ESRD. SUNIL reportedly negative for occult blood. Admitted to hospital medicine for anemia workup.       Past Medical History:   Diagnosis Date    BPH (benign prostatic hyperplasia)     CKD (chronic kidney disease) stage 5, GFR less than 15 ml/min     Gout     Hyperlipidemia     Hyperparathyroidism, secondary renal     Hypertension        Past Surgical History:   Procedure Laterality Date    AV FISTULA PLACEMENT Left 3/31/2022    Procedure: CREATION, AV FISTULA RADIOCEPHALIC;  Surgeon: PEMA Martines II, MD;  Location: [facility] OR [address] [place] [address];  Service: Vascular;  Laterality: Left;    AV FISTULA PLACEMENT Left 9/30/2022    Procedure: CREATION, AV FISTULA;  Surgeon: PEMA Martines II, MD;  " Location: [facility] OR 2ND FLR;  Service: Vascular;  Laterality: Left;    COLONOSCOPY N/A 11/2/2021    Procedure: COLONOSCOPY;  Surgeon: Joe Jimenez MD;  Location: [facility] (4TH FLR);  Service: Endoscopy;  Laterality: N/A;  COVID test at [place] on 10/30-GT      dialysis pt-labs prior    EYE SURGERY Bilateral     cataract removal    FINGER SURGERY      hemorriodectomy      PERITONEAL CATHETER INSERTION         Review of patient's allergies indicates:  No Known Allergies    Current Facility-Administered Medications on File Prior to Encounter   Medication    0.9%  NaCl infusion    ceFAZolin (ANCEF) 3 gram in dextrose 5% IVPB    LIDOcaine (PF) 10 mg/ml (1%) injection 10 mg     Current Outpatient Medications on File Prior to Encounter   Medication Sig    amLODIPine (NORVASC) 5 MG tablet Take 2 tablets (10 mg total) by mouth once daily.    aspirin 81 MG Chew Take 1 tablet by mouth once daily.    atorvastatin (LIPITOR) 80 MG tablet TAKE 1 TABLET(80 MG) BY MOUTH EVERY DAY    calcitRIOL (ROCALTROL) 0.25 MCG Cap Take 0.25 mcg by mouth once a week.    gentamicin (GARAMYCIN) 0.3 % ophthalmic solution Apply 1 drop to PD exit site daily with dressing changes.    insulin detemir U-100, Levemir, (LEVEMIR FLEXPEN) 100 unit/mL (3 mL) InPn pen Inject 10 Units into the skin every evening.    labetaloL (NORMODYNE) 100 MG tablet Take 1 tablet (100 mg total) by mouth 2 (two) times daily.    magnesium oxide (MAG-OX) 400 mg (241.3 mg magnesium) tablet TAKE 1 TABLET BY MOUTH DAILY    ondansetron (ZOFRAN) 4 MG tablet Take 1 tablet (4 mg total) by mouth every 8 (eight) hours as needed for Nausea.    pen needle, diabetic (BD ULTRA-FINE KITA PEN NEEDLE) 32 gauge x 5/32" Ndle 1 injection daily for insulin    sevelamer carbonate (RENVELA) 800 mg Tab Take 1 tablet (800 mg total) by mouth 3 (three) times daily with meals.    vitamin D (VITAMIN D3) 1000 units Tab Take 2,000 Units by mouth once daily.     Family History       " Problem Relation (Age of Onset)    Cancer Brother, Sister    Hammer toes Brother    Heart disease Mother, Father, Brother    Hypertension Mother    No Known Problems Sister, Sister, Brother, Son    Premature birth Son    Seizures Sister          Tobacco Use    Smoking status: Former     Current packs/day: 0.00     Average packs/day: 0.5 packs/day for 10.0 years (5.0 ttl pk-yrs)     Types: Cigarettes     Start date:      Quit date:      Years since quittin.8    Smokeless tobacco: Never   Substance and Sexual Activity    Alcohol use: Not Currently    Drug use: No    Sexual activity: Not Currently     Partners: Female     Review of Systems   Constitutional:  Negative for activity change, chills and fever.   HENT:  Negative for congestion, trouble swallowing and voice change.    Eyes:  Negative for photophobia and visual disturbance.   Respiratory:  Negative for chest tightness, shortness of breath and wheezing.    Cardiovascular:  Negative for chest pain, palpitations and leg swelling.   Gastrointestinal:  Negative for abdominal pain, constipation, diarrhea, nausea and vomiting.   Genitourinary:  Negative for dysuria, frequency, hematuria and urgency.   Musculoskeletal:  Negative for arthralgias, back pain and gait problem.   Skin:  Negative for color change and rash.   Neurological:  Negative for dizziness, syncope, weakness, light-headedness, numbness and headaches.   Psychiatric/Behavioral:  Negative for agitation and confusion. The patient is not nervous/anxious.      Objective:     Vital Signs (Most Recent):  Temp: 98.2 °F (36.8 °C) (23)  Pulse: 93 (23)  Resp: 18 (23)  BP: (!) 172/83 (23)  SpO2: 96 % (23) Vital Signs (24h Range):  Temp:  [98.2 °F (36.8 °C)] 98.2 °F (36.8 °C)  Pulse:  [75-94] 93  Resp:  [12-18] 18  SpO2:  [95 %-97 %] 96 %  BP: (120-211)/(75-96) 172/83     Weight: 95 kg (209 lb 7 oz)  Body mass index is 26.89 kg/m².      Physical Exam  Vitals and nursing note reviewed.   Constitutional:       General: He is not in acute distress.     Appearance: He is well-developed.   HENT:      Head: Normocephalic and atraumatic.      Mouth/Throat:      Pharynx: No oropharyngeal exudate.   Eyes:      General: No scleral icterus.     Conjunctiva/sclera: Conjunctivae normal.   Cardiovascular:      Rate and Rhythm: Normal rate and regular rhythm.      Heart sounds: Normal heart sounds.   Pulmonary:      Effort: Pulmonary effort is normal. No respiratory distress.      Breath sounds: Normal breath sounds. No wheezing.   Abdominal:      General: Bowel sounds are normal. There is no distension.      Palpations: Abdomen is soft.      Tenderness: There is no abdominal tenderness.   Musculoskeletal:         General: No tenderness. Normal range of motion.      Cervical back: Normal range of motion and neck supple.   Lymphadenopathy:      Cervical: No cervical adenopathy.   Skin:     General: Skin is warm and dry.      Capillary Refill: Capillary refill takes less than 2 seconds.      Findings: No rash.   Neurological:      Mental Status: He is alert and oriented to person, place, and time.      Cranial Nerves: No cranial nerve deficit.      Sensory: No sensory deficit.      Coordination: Coordination normal.   Psychiatric:         Behavior: Behavior normal.         Thought Content: Thought content normal.         Judgment: Judgment normal.                Significant Labs: All pertinent labs within the past 24 hours have been reviewed.  CBC:   Recent Labs   Lab 11/02/23  1713 11/02/23  1854   WBC 7.48 8.02   HGB 8.7* 7.9*   HCT 25.9* 24.0*    214     CMP:   Recent Labs   Lab 11/02/23  1713      K 3.0*   CL 98   CO2 24      BUN 53*   CREATININE 12.3*   CALCIUM 9.5   PROT 5.7*   ALBUMIN 2.3*   BILITOT 0.4   ALKPHOS 52*   AST 20   ALT 18   ANIONGAP 16       Significant Imaging: I have reviewed all pertinent imaging results/findings within  the past 24 hours.    Assessment/Plan:     * Anemia  Folate deficiency  Vitamin B12 deficiency  - Hgb 8.7>>7.9, baseline ~8.5-10  - suspect anemia of chronic disease from ESRD  - no overt signs of acute GIB  - iron/anemia panel ordered  - type & screen  - blood consent obtained  - transfuse for Hgb >7  - hold VTE ppx for now as precaution but can likely resume in AM    Hypokalemia  - K 3.0  - 20 mEq PO [place] ordered  - trend BMP  - tele     Type 2 diabetes mellitus with hyperglycemia, with long-term current use of insulin  - home regimen: detemir 10U qhs  - inpatient regimen: detemir 4U qhs + LDSSI  - ACHS accuchecks  - PRN hypoglycemia protocol  Lab Results   Component Value Date    HGBA1C 8.4 (H) 08/10/2023         ESRD (end stage renal disease)  Peritoneal dialysis catheter in place  - on nightly PD  - nephro consulted for iPD  - monitor lytes  - continue phos binder  - strict I/Os, daily weights    GLYNN (obstructive sleep apnea)  - CPAP qhs     Essential hypertension  - continue amlodipin 5mg BID and labetalol 200mg BID    Mixed hyperlipidemia  - noncompliant with statin  - continue ASA      VTE Risk Mitigation (From admission, onward)         Ordered     Reason for No Pharmacological VTE Prophylaxis  Once        Question:  Reasons:  Answer:  Risk of Bleeding    11/02/23 2014     IP VTE HIGH RISK PATIENT  Once         11/02/23 2014                               Vivienne Valerio PA-C  Department of Hospital Medicine  [facility] - Transplant Stepdown

## 2023-11-03 NOTE — CONSULTS
[facility] - Transplant Stepdown  Nephrology  Consult Note    Patient Name: Teodoro Mast  MRN: [id number]  Admission Date: 11/2/2023  Hospital Length of Stay: 1 days  Attending Provider: Jimmy Majano MD   Primary Care Physician: Oralia, Primary Doctor  Principal Problem:Anemia    Inpatient consult to Nephrology  Consult performed by: Niurka Winters MD  Consult ordered by: Vivienne Valerio PA-C  Reason for consult: anemia evaluation        Subjective:     HPI: HPI: Teodoro Mast is a 78 y.o. male with a PMHx of ESRD on peritoneal dialysis, chronic anemia, T2DM [place] [facility] [place]. Patient had routine labs done as an outpatient which showed his Hgb was 6.8 compared to baseline ~9. He was advised to present to the ED for further eval. Patient is currently asymptomatic without any fatigue, dark/bloody stools, SOB or abdominal pain. Denies recent NSAID use. He had peritoneal dialysis last night without any issues. Denies fever, chills, chest pain, N/V, diarrhea, HA, vision changes, or syncope.      ED: AFVSS. No leukocytosis. Hgb 8.7>>7.9 (BL ~8.5-10), K 3.0. remainder of CMP consistent with ESRD. SUNIL reportedly negative for occult blood. Admitted to hospital medicine for anemia workup.          Past Medical History:   Diagnosis Date    BPH (benign prostatic hyperplasia)     CKD (chronic kidney disease) stage 5, GFR less than 15 ml/min     Gout     Hyperlipidemia     Hyperparathyroidism, secondary renal     Hypertension        Past Surgical History:   Procedure Laterality Date    AV FISTULA PLACEMENT Left 3/31/2022    Procedure: CREATION, AV FISTULA RADIOCEPHALIC;  Surgeon: PEMA Martines II, MD;  Location: [facility] OR [address] [place] [address];  Service: Vascular;  Laterality: Left;    AV FISTULA PLACEMENT Left 9/30/2022    Procedure: CREATION, AV FISTULA;  Surgeon: PEMA Martines II, MD;  Location: [facility] OR [address] [place] [address];  Service: Vascular;  Laterality: Left;    COLONOSCOPY N/A 11/2/2021    Procedure:  COLONOSCOPY;  Surgeon: Joe Jimenez MD;  Location: [facility] ([address] [place] [address]);  Service: Endoscopy;  Laterality: N/A;  COVID test at [place] on 10/30-GT      dialysis pt-labs prior    EYE SURGERY Bilateral     cataract removal    FINGER SURGERY      hemorriodectomy      PERITONEAL CATHETER INSERTION         Review of patient's allergies indicates:  No Known Allergies  Current Facility-Administered Medications   Medication Frequency    acetaminophen tablet 650 mg Q4H PRN    amLODIPine tablet 5 mg BID    aspirin chewable tablet 81 mg Daily    bisacodyL suppository 10 mg Daily PRN    dextrose 10% bolus 125 mL 125 mL PRN    dextrose 10% bolus 250 mL 250 mL PRN    glucagon (human recombinant) injection 1 mg PRN    glucose chewable tablet 16 g PRN    glucose chewable tablet 24 g PRN    insulin aspart U-100 pen 0-5 Units QID (AC + HS) PRN    insulin detemir U-100 (Levemir) pen 4 Units Daily    labetaloL tablet 200 mg BID    melatonin tablet 6 mg Nightly PRN    ondansetron disintegrating tablet 8 mg Q8H PRN    polyethylene glycol packet 17 g Daily PRN    promethazine tablet 25 mg Q6H PRN    psyllium husk (aspartame) 3.4 gram packet 1 packet QHS    sevelamer carbonate tablet 800 mg TID WM    vitamin D 1000 units tablet 2,000 Units Daily     Facility-Administered Medications Ordered in Other Encounters   Medication Frequency    0.9%  NaCl infusion Continuous    ceFAZolin (ANCEF) 3 gram in dextrose 5% IVPB On Call Procedure    LIDOcaine (PF) 10 mg/ml (1%) injection 10 mg Once     Family History       Problem Relation (Age of Onset)    Cancer Brother, Sister    Hammer toes Brother    Heart disease Mother, Father, Brother    Hypertension Mother    No Known Problems Sister, Sister, Brother, Son    Premature birth Son    Seizures Sister          Tobacco Use    Smoking status: Former     Current packs/day: 0.00     Average packs/day: 0.5 packs/day for 10.0 years (5.0 ttl pk-yrs)     Types: Cigarettes     Start date: 1964     Quit date:  "1974     Years since quittin.8    Smokeless tobacco: Never   Substance and Sexual Activity    Alcohol use: Not Currently    Drug use: No    Sexual activity: Not Currently     Partners: Female     Review of Systems  Objective:     Vital Signs (Most Recent):  Temp: 99.1 °F (37.3 °C) (23 050)  Pulse: 81 (23)  Resp: 18 (23)  BP: (!) 153/73 (23)  SpO2: 96 % (23) Vital Signs (24h Range):  Temp:  [98.2 °F (36.8 °C)-99.1 °F (37.3 °C)] 99.1 °F (37.3 °C)  Pulse:  [75-94] 81  Resp:  [12-18] 18  SpO2:  [95 %-97 %] 96 %  BP: (120-211)/(73-96) 153/73     Weight: 96.2 kg (212 lb 1.3 oz) (23 0400)  Body mass index is 27.23 kg/m².  Body surface area is 2.24 meters squared.    No intake/output data recorded.     Physical Exam  HENT:      Head: Normocephalic.      Mouth/Throat:      Mouth: Mucous membranes are moist.   Cardiovascular:      Rate and Rhythm: Normal rate.      Pulses: Normal pulses.   Pulmonary:      Effort: Pulmonary effort is normal.   Musculoskeletal:      Cervical back: Normal range of motion.   Skin:     General: Skin is warm.   Neurological:      General: No focal deficit present.      Mental Status: He is alert.   Psychiatric:         Mood and Affect: Mood normal.          Significant Labs:  ABGs: No results for input(s): "PH", "PCO2", "HCO3", "POCSATURATED", "BE" in the last 168 hours.  BMP:   Recent Labs   Lab 23  0530   GLU 94      K 3.1*   CL 99   CO2 22*   BUN 60*   CREATININE 13.6*   CALCIUM 8.6*   MG 1.6     Cardiac Markers: No results for input(s): "CKMB", "TROPONINT", "MYOGLOBIN" in the last 168 hours.  CBC:   Recent Labs   Lab 23  0530   WBC 7.25   RBC 2.62*   HGB 7.7*   HCT 24.0*      MCV 92   MCH 29.4   MCHC 32.1     CMP:   Recent Labs   Lab 23  1713 23  0530    94   CALCIUM 9.5 8.6*   ALBUMIN 2.3*  --    PROT 5.7*  --     136   K 3.0* 3.1*   CO2 24 22*   CL 98 99   BUN 53* 60*   CREATININE " "12.3* 13.6*   ALKPHOS 52*  --    ALT 18  --    AST 20  --    BILITOT 0.4  --      Coagulation: No results for input(s): "PT", "INR", "APTT" in the last 168 hours.  LFTs:   Recent Labs   Lab 11/02/23  1713   ALT 18   AST 20   ALKPHOS 52*   BILITOT 0.4   PROT 5.7*   ALBUMIN 2.3*     Microbiology Results (last 7 days)       ** No results found for the last 168 hours. **          Specimen (24h ago, onward)      None          PTH:   Recent Labs   Lab 11/01/23  0000   *     Assessment/Plan:     Cardiac/Vascular  Essential hypertension  continue amlodipin 5mg BID and labetalol 200mg BID    Renal/  Hypokalemia  - K 3.1  - 60 mEq PO K      Peritoneal dialysis catheter in place  LLQ    ESRD (end stage renal disease)  PD 4 CYCLES 2.8 L  5TH CYCLE 1.8  1.5 DEXTROSE  - on nightly PD  - will do PD session today  -scr 13.6/bun60  - continue phos binder  - strict I/Os, daily weights    Oncology  * Anemia  Complained of mild dizziness  - Hgb 7.7, baseline ~8.5-10  -TIBC 209,IRON 105, IRON SAT 50.TRANSFERRIN 141, FERRITIN 1495  -FOLATE 8.1,  -B12 403  - suspect anemia of chronic disease from ESRD  - no overt signs of acute GIB    Endocrine  Type 2 diabetes mellitus with hyperglycemia, with long-term current use of insulin  HBA1C 8.4        Thank you for your consult. I will follow-up with patient. Please contact us if you have any additional questions.    Niurka Winters MD  Nephrology  [facility] - Transplant Stepdown  "

## 2023-11-03 NOTE — HPI
Teodoro Mast is a 78 y.o. male with a PMHx of ESRD on peritoneal dialysis, chronic anemia, T2DM who presents to [facility] for evaluation of anemia. Patient had routine labs done as an outpatient which showed his Hgb was 6.8 compared to baseline ~9. He was advised to present to the ED for further eval. Patient is currently asymptomatic without any fatigue, dark/bloody stools, SOB or abdominal pain. Denies recent NSAID use. He had peritoneal dialysis last night without any issues. Denies fever, chills, chest pain, N/V, diarrhea, HA, vision changes, or syncope.     ED: AFVSS. No leukocytosis. Hgb 8.7>>7.9 (BL ~8.5-10), K 3.0. remainder of CMP consistent with ESRD. SUNIL reportedly negative for occult blood. Admitted to hospital medicine for anemia workup.

## 2023-11-03 NOTE — ED NOTES
Report received from [facility] Mcallister. Care assumed at this time. Pt is resting comfortably in ED stretcher, and is awake, alert, and oriented x4. Respirations are even and unlabored, no distress noted at this time. Pt oriented to room and educated on use of call bell. Fall risk reviewed with patient and understanding verbalized. Explanation of wait time and plan of care update provided to patient and patient's family at bedside. Pt verbalized understanding and all questions and concerns addressed. Pt was offered restroom assistance and denies need to void at this time. Pt remains on continuous cardiac monitor, automated BP cuff cycling Q30 minutes, and continuous pulse oximeter. Pt denies pain at this time and verbalizes no further needs. SR raised x2, bed locked and in lowest position. Pt encouraged to call for assistance using call bell when needed and verbalized understanding.

## 2023-11-03 NOTE — ASSESSMENT & PLAN NOTE
Peritoneal dialysis catheter in place  - on nightly PD  - nephro consulted for iPD  - monitor lytes  - continue phos binder  - strict I/Os, daily weights

## 2023-11-03 NOTE — PLAN OF CARE
[facility] - Transplant Stepdown  Discharge Final Note    Primary Care Provider: No, Primary Doctor    Expected Discharge Date: 11/3/2023    Final Discharge Note (most recent)       Final Note - 11/03/23 1443          Final Note    Assessment Type Final Discharge Note     Anticipated Discharge Disposition Home or [facility] Care     Hospital Resources/Appts/Education Provided Provided patient/caregiver with written discharge plan information;Appointments scheduled and added to AVS   Information provided by the patient's bedside nurse       Post-Acute Status    Post-Acute Authorization Other     Other Status No Post-Acute Service Needs                     The patient is getting d/c today with no stated needs.       Mitzi Barr LMSW  Case Management [facility]

## 2023-11-03 NOTE — SUBJECTIVE & OBJECTIVE
Past Medical History:   Diagnosis Date    BPH (benign prostatic hyperplasia)     CKD (chronic kidney disease) stage 5, GFR less than 15 ml/min     Gout     Hyperlipidemia     Hyperparathyroidism, secondary renal     Hypertension        Past Surgical History:   Procedure Laterality Date    AV FISTULA PLACEMENT Left 3/31/2022    Procedure: CREATION, AV FISTULA RADIOCEPHALIC;  Surgeon: PEMA Martines II, MD;  Location: [facility] OR 2ND FLR;  Service: Vascular;  Laterality: Left;    AV FISTULA PLACEMENT Left 9/30/2022    Procedure: CREATION, AV FISTULA;  Surgeon: PEMA Martines II, MD;  Location: [facility] OR 2ND FLR;  Service: Vascular;  Laterality: Left;    COLONOSCOPY N/A 11/2/2021    Procedure: COLONOSCOPY;  Surgeon: Joe Jimenez MD;  Location: [facility] ENDO (4TH FLR);  Service: Endoscopy;  Laterality: N/A;  COVID test at [place] on 10/30-GT      dialysis pt-labs prior    EYE SURGERY Bilateral     cataract removal    FINGER SURGERY      hemorriodectomy      PERITONEAL CATHETER INSERTION         Review of patient's allergies indicates:  No Known Allergies  Current Facility-Administered Medications   Medication Frequency    acetaminophen tablet 650 mg Q4H PRN    amLODIPine tablet 5 mg BID    aspirin chewable tablet 81 mg Daily    bisacodyL suppository 10 mg Daily PRN    dextrose 10% bolus 125 mL 125 mL PRN    dextrose 10% bolus 250 mL 250 mL PRN    glucagon (human recombinant) injection 1 mg PRN    glucose chewable tablet 16 g PRN    glucose chewable tablet 24 g PRN    insulin aspart U-100 pen 0-5 Units QID (AC + HS) PRN    insulin detemir U-100 (Levemir) pen 4 Units Daily    labetaloL tablet 200 mg BID    melatonin tablet 6 mg Nightly PRN    ondansetron disintegrating tablet 8 mg Q8H PRN    polyethylene glycol packet 17 g Daily PRN    promethazine tablet 25 mg Q6H PRN    psyllium husk (aspartame) 3.4 gram packet 1 packet QHS    sevelamer carbonate tablet 800 mg TID WM    vitamin D 1000 units tablet 2,000 Units Daily      Facility-Administered Medications Ordered in Other Encounters   Medication Frequency    0.9%  NaCl infusion Continuous    ceFAZolin (ANCEF) 3 gram in dextrose 5% IVPB On Call Procedure    LIDOcaine (PF) 10 mg/ml (1%) injection 10 mg Once     Family History       Problem Relation (Age of Onset)    Cancer Brother, Sister    Hammer toes Brother    Heart disease Mother, Father, Brother    Hypertension Mother    No Known Problems Sister, Sister, Brother, Son    Premature birth Son    Seizures Sister          Tobacco Use    Smoking status: Former     Current packs/day: 0.00     Average packs/day: 0.5 packs/day for 10.0 years (5.0 ttl pk-yrs)     Types: Cigarettes     Start date:      Quit date:      Years since quittin.8    Smokeless tobacco: Never   Substance and Sexual Activity    Alcohol use: Not Currently    Drug use: No    Sexual activity: Not Currently     Partners: Female     Review of Systems  Objective:     Vital Signs (Most Recent):  Temp: 99.1 °F (37.3 °C) (23 050)  Pulse: 81 (23 050)  Resp: 18 (23 050)  BP: (!) 153/73 (23 0507)  SpO2: 96 % (23 050) Vital Signs (24h Range):  Temp:  [98.2 °F (36.8 °C)-99.1 °F (37.3 °C)] 99.1 °F (37.3 °C)  Pulse:  [75-94] 81  Resp:  [12-18] 18  SpO2:  [95 %-97 %] 96 %  BP: (120-211)/(73-96) 153/73     Weight: 96.2 kg (212 lb 1.3 oz) (23 0400)  Body mass index is 27.23 kg/m².  Body surface area is 2.24 meters squared.    No intake/output data recorded.     Physical Exam  HENT:      Head: Normocephalic.      Mouth/Throat:      Mouth: Mucous membranes are moist.   Cardiovascular:      Rate and Rhythm: Normal rate.      Pulses: Normal pulses.   Pulmonary:      Effort: Pulmonary effort is normal.   Musculoskeletal:      Cervical back: Normal range of motion.   Skin:     General: Skin is warm.   Neurological:      General: No focal deficit present.      Mental Status: He is alert.   Psychiatric:         Mood and Affect: Mood  "normal.          Significant Labs:  ABGs: No results for input(s): "PH", "PCO2", "HCO3", "POCSATURATED", "BE" in the last 168 hours.  BMP:   Recent Labs   Lab 11/03/23  0530   GLU 94      K 3.1*   CL 99   CO2 22*   BUN 60*   CREATININE 13.6*   CALCIUM 8.6*   MG 1.6     Cardiac Markers: No results for input(s): "CKMB", "TROPONINT", "MYOGLOBIN" in the last 168 hours.  CBC:   Recent Labs   Lab 11/03/23  0530   WBC 7.25   RBC 2.62*   HGB 7.7*   HCT 24.0*      MCV 92   MCH 29.4   MCHC 32.1     CMP:   Recent Labs   Lab 11/02/23  1713 11/03/23  0530    94   CALCIUM 9.5 8.6*   ALBUMIN 2.3*  --    PROT 5.7*  --     136   K 3.0* 3.1*   CO2 24 22*   CL 98 99   BUN 53* 60*   CREATININE 12.3* 13.6*   ALKPHOS 52*  --    ALT 18  --    AST 20  --    BILITOT 0.4  --      Coagulation: No results for input(s): "PT", "INR", "APTT" in the last 168 hours.  LFTs:   Recent Labs   Lab 11/02/23  1713   ALT 18   AST 20   ALKPHOS 52*   BILITOT 0.4   PROT 5.7*   ALBUMIN 2.3*     Microbiology Results (last 7 days)       ** No results found for the last 168 hours. **          Specimen (24h ago, onward)      None          PTH:   Recent Labs   Lab 11/01/23  0000   *     "

## 2023-11-03 NOTE — DISCHARGE SUMMARY
[facility] - Transplant [facility] Medicine  Discharge Summary      Patient Name: Teodoro Mast  MRN: [id number]  XIMENA: [id number]  Patient Class: OP- Observation  Admission Date: 11/2/2023  Hospital Length of Stay: 1 days  Discharge Date and Time: 11/3/2023  3:24 PM  Attending Physician: Oralia att. providers found   Discharging Provider: Vandnaa Gomez PA-C  Primary Care Provider: Oralia Primary Doctor  [facility] Medicine Team: [facility]OSP MED F Vandana Gomez PA-C  Primary Care Team: [facility] HOSP MED     HPI:   Teodoro Mast is a 78 y.o. male with a PMHx of ESRD on peritoneal dialysis, chronic anemia, T2DM who presents to [facility] for evaluation of anemia. Patient had routine labs done as an outpatient which showed his Hgb was 6.8 compared to baseline ~9. He was advised to present to the ED for further eval. Patient is currently asymptomatic without any fatigue, dark/bloody stools, SOB or abdominal pain. Denies recent NSAID use. He had peritoneal dialysis last night without any issues. Denies fever, chills, chest pain, N/V, diarrhea, HA, vision changes, or syncope.     ED: AFVSS. No leukocytosis. Hgb 8.7>>7.9 (BL ~8.5-10), K 3.0. remainder of CMP consistent with ESRD. SUNIL reportedly negative for occult blood. Admitted to hospital medicine for anemia workup.       * No surgery found *      Hospital Course:   Teodoro Mast is a 78 y.o. male who was admitted to hospital medicine for anemia. Hgb trended 8.7 >> 7.9 >> 7.7. Iron studies consistent with anemia of chronic disease. Nephrology consulted due to patient history of PD. Recommending patient follow up in clinic with them on Monday. On my evaluation this morning, the patient reports feeling well and is eager to discharge home. All questions were answered. Patient acknowledged understanding of discharge instructions and feels safe to discharge home. Patient was discharged on 11/3/2023 in stable condition with nephrology follow-up. Education regarding condition provided  and return precautions given.          Goals of Care Treatment Preferences:  Code Status: Full Code      Consults:   Consults (From admission, onward)        Status Ordering Provider     Inpatient consult to Nephrology  Once        Provider:  (Not yet assigned)    DOMENICO Schwartz          No new Assessment & Plan notes have been filed under this hospital service since the last note was generated.  Service: Hospital Medicine    Final Active Diagnoses:    Diagnosis Date Noted POA    PRINCIPAL PROBLEM:  Anemia [D64.9] 11/02/2023 Yes    Hypokalemia [E87.6] 11/02/2023 Yes    Type 2 diabetes mellitus with hyperglycemia, with long-term current use of insulin [E11.65, Z79.4] 09/06/2023 Not Applicable    Peritoneal dialysis catheter in place [Z99.2] 05/28/2021 Not Applicable    ESRD (end stage renal disease) [N18.6] 05/05/2021 Yes    Folate deficiency [E53.8] 10/01/2020 Yes    Vitamin B12 deficiency [E53.8] 10/01/2020 Yes    GLYNN (obstructive sleep apnea) [G47.33]  Yes    Essential hypertension [I10]  Yes    Mixed hyperlipidemia [E78.2]  Yes      Problems Resolved During this Admission:       Discharged Condition: stable    Disposition: Home or Self Care    Follow Up:    Patient Instructions:      Ambulatory referral/consult to Nephrology   Standing Status: Future   Referral Priority: Routine Referral Type: Consultation   Referral Reason: Specialty Services Required   Requested Specialty: Nephrology   Number of Visits Requested: 1     Diet renal     Diet diabetic     Notify your health care provider if you experience any of the following:  difficulty breathing or increased cough     Notify your health care provider if you experience any of the following:  persistent dizziness, light-headedness, or visual disturbances     Notify your health care provider if you experience any of the following:  increased confusion or weakness     Activity as tolerated       Significant Diagnostic Studies: Labs:   CBC  "  Recent Labs   Lab 11/02/23  1713 11/02/23  1854 11/03/23  0530   WBC 7.48 8.02 7.25   HGB 8.7* 7.9* 7.7*   HCT 25.9* 24.0* 24.0*    214 213       Pending Diagnostic Studies:     Procedure Component Value Units Date/Time    Erythropoietin [[id number]] Collected: 11/02/23 1713    Order Status: Sent Lab Status: In process Updated: 11/02/23 1724    Specimen: Blood          Medications:  Reconciled Home Medications:      Medication List      CONTINUE taking these medications    amLODIPine 5 MG tablet  Commonly known as: NORVASC  Take 2 tablets (10 mg total) by mouth once daily.     aspirin 81 MG Chew  Take 1 tablet by mouth once daily.     atorvastatin 80 MG tablet  Commonly known as: LIPITOR  TAKE 1 TABLET(80 MG) BY MOUTH EVERY DAY     calcitRIOL 0.25 MCG Cap  Commonly known as: ROCALTROL  Take 0.25 mcg by mouth once a week.     gentamicin 0.3 % ophthalmic solution  Commonly known as: GARAMYCIN  Apply 1 drop to PD exit site daily with dressing changes.     labetaloL 100 MG tablet  Commonly known as: NORMODYNE  Take 1 tablet (100 mg total) by mouth 2 (two) times daily.     LEVEMIR FLEXPEN 100 unit/mL (3 mL) Inpn pen  Generic drug: insulin detemir U-100 (Levemir)  Inject 10 Units into the skin every evening.     magnesium oxide 400 mg (241.3 mg magnesium) tablet  Commonly known as: MAG-OX  TAKE 1 TABLET BY MOUTH DAILY     ondansetron 4 MG tablet  Commonly known as: ZOFRAN  Take 1 tablet (4 mg total) by mouth every 8 (eight) hours as needed for Nausea.     pen needle, diabetic 32 gauge x 5/32" Ndle  Commonly known as: BD ULTRA-FINE KITA PEN NEEDLE  1 injection daily for insulin     sevelamer carbonate 800 mg Tab  Commonly known as: RENVELA  Take 1 tablet (800 mg total) by mouth 3 (three) times daily with meals.     vitamin D 1000 units Tab  Commonly known as: VITAMIN D3  Take 2,000 Units by mouth once daily.            Indwelling Lines/Drains at time of discharge:   Lines/Drains/Airways     Drain  Duration        "         Hemodialysis AV Fistula 09/30/22 0000 Left forearm 399 days                Time spent on the discharge of patient: 36 minutes         Vandana Gomez PA-C  Department of Hospital Medicine  [facility] - Transplant Stepdown

## 2023-11-03 NOTE — HPI
HPI: Teodoro Mast is a 78 y.o. male with a PMHx of ESRD on peritoneal dialysis, chronic anemia, T2DM who presents to [facility] for evaluation of anemia. Patient had routine labs done as an outpatient which showed his Hgb was 6.8 compared to baseline ~9. He was advised to present to the ED for further eval. Patient is currently asymptomatic without any fatigue, dark/bloody stools, SOB or abdominal pain. Denies recent NSAID use. He had peritoneal dialysis last night without any issues. Denies fever, chills, chest pain, N/V, diarrhea, HA, vision changes, or syncope.      ED: AFVSS. No leukocytosis. Hgb 8.7>>7.9 (BL ~8.5-10), K 3.0. remainder of CMP consistent with ESRD. SUNIL reportedly negative for occult blood. Admitted to hospital medicine for anemia workup.

## 2023-11-03 NOTE — ED NOTES
Telemetry Verification   Patient placed on Telemetry Box  Verified with War Room  Box # [id number]   Monitor Tech    Rate NS   Rhythm 90

## 2023-11-03 NOTE — PLAN OF CARE
[facility] - Transplant Stepdown  Initial Discharge Assessment       Primary Care Provider: No, Primary Doctor    Admission Diagnosis: Chest pain [R07.9]  Anemia, unspecified type [D64.9]    Admission Date: 11/2/2023  Expected Discharge Date: 11/3/2023         Payor: MEDICARE / Plan: MEDICARE PART A & B / Product Type: Government /     Extended Emergency Contact Information  Primary Emergency Contact: Teodoro Mast II  Address: [address] [place] [address] [facility]  Home Phone: [phone]  Mobile Phone: [phone]  Relation: Son   needed? No    Discharge Plan A: Home, Home with family  Discharge Plan B: [facility]      [id number] - [place], [place] - [address] AT [facility]  [address]  [facility] [address]  Phone: [phone] Fax: [phone]      Initial Assessment (most recent)       Adult Discharge Assessment - 11/03/23 1027          Discharge Assessment    Assessment Type Discharge Planning Assessment     Confirmed/corrected address, phone number and insurance Yes     Confirmed Demographics Correct on Facesheet     Source of Information patient     Communicated RALPH with patient/caregiver Date not available/Unable to determine     Reason For Admission Anemia     People in Home child(matty), adult     Do you expect to return to your current living situation? Yes     Do you have help at home or someone to help you manage your care at home? Yes     Who are your caregiver(s) and their phone number(s)? Teodoro Mast II ( Son ) [phone]     Prior to hospitilization cognitive status: Alert/Oriented     Current cognitive status: Alert/Oriented     Equipment Currently Used at Home walker, rolling     Patient currently being followed by outpatient case management? No     Do you currently have service(s) that help you manage your care at home? No     Do you take prescription medications? Yes     Do you have  prescription coverage? Yes     Coverage MEDICARE - MEDICARE PART A & B     Is the patient taking medications as prescribed? yes     Who is going to help you get home at discharge? Teodoro Mast II     Are you on dialysis? Yes     Dialysis Name and Scheduled days [facility]     Do you take coumadin? No     DME Needed Upon Discharge  other (see comments)   TBD    Discharge Plan discussed with: Patient     Discharge Plan A Home;Home with family     Discharge Plan B Home Health        Physical Activity    On average, how many days per week do you engage in moderate to strenuous exercise (like a brisk walk)? 0 days     On average, how many minutes do you engage in exercise at this level? 0 min        Financial Resource Strain    How hard is it for you to pay for the very basics like food, housing, medical care, and heating? Not hard at all        Housing Stability    In the last 12 months, was there a time when you were not able to pay the mortgage or rent on time? No     In the last 12 months, was there a time when you did not have a steady place to sleep or slept in a shelter (including now)? No        Transportation Needs    In the past 12 months, has lack of transportation kept you from medical appointments or from getting medications? No     In the past 12 months, has lack of transportation kept you from meetings, work, or from getting things needed for daily living? No        Food Insecurity    Within the past 12 months, you worried that your food would run out before you got the money to buy more. Never true     Within the past 12 months, the food you bought just didn't last and you didn't have money to get more. Never true        Social Connections    In a typical week, how many times do you talk on the phone with family, friends, or neighbors? More than three times a week     How often do you get together with friends or relatives? More than three times a week        Alcohol Use    Q1: How often do  you have a drink containing alcohol? Patient refused     Q2: How many drinks containing alcohol do you have on a typical day when you are drinking? Patient refused     Q3: How often do you have six or more drinks on one occasion? Patient refused        OTHER    Name(s) of People in Home Teodoro Mats II                          Anders SW met with patient at bedside to complete DPA. Questions answered / contact numbers provided.  Use PREFERRED PHARMACY / BEDSIDE DELIVERY for any necessary medications at time of discharge. The patient  is independent with all ADLs - does use a walker.   The patient is not on Coumadin. The patient is on Peritoneal Dialysis.  The patient's son will be assisting with help upon discharge. The patient's son will be providing transportation home. Hospital follow up will be scheduled with PCP. Will continue to follow for course of hospitalization.       Discharge Plan A  home and Plan B home with home health have been determined by review of patient's clinical status, future medical and therapeutic needs, and coverage/benefits for post-acute care in coordination with multidisciplinary team members.    Mitzi Barr LMSW  Case Management [facility]

## 2023-11-06 ENCOUNTER — CLINICAL SUPPORT (OUTPATIENT)
Dept: AUDIOLOGY | Facility: CLINIC | Age: 78
End: 2023-11-06
Payer: MEDICARE

## 2023-11-06 DIAGNOSIS — H90.3 SENSORINEURAL HEARING LOSS, BILATERAL: Primary | ICD-10-CM

## 2023-11-06 LAB — EPO SERPL-ACNC: 241.3 MIU/ML (ref 2.6–18.5)

## 2023-11-06 NOTE — PROGRESS NOTES
Teodoro Mast, a 78 y.o. male, was seen today for a hearing aid consultation.  We discussed the patients hearing loss and its effects on his daily life in detail.  Pt stated the hearing loss makes it difficult to understand speech.  Hearing aid options were presented.  Pt selected a set of advanced hearing aids.  A hearing aid order form was completed and signed.  A set of Phonak Audeo L70-Rts will be ordered.  Patient acknowledged understanding of the 30 day trial period, $250 restocking fee from the time of order, and the fact that we do not file insurance on behalf of the patient.  Patient will return for a hearing aid evaluation in two weeks.

## 2023-11-08 DIAGNOSIS — Z76.82 ORGAN TRANSPLANT CANDIDATE: Primary | ICD-10-CM

## 2023-11-09 ENCOUNTER — PATIENT OUTREACH (OUTPATIENT)
Dept: ADMINISTRATIVE | Facility: CLINIC | Age: 78
End: 2023-11-09
Payer: MEDICARE

## 2023-11-09 NOTE — PROGRESS NOTES
C3 nurse spoke with Teodoro Mast for a TCC post hospital discharge follow up call. The patient has a scheduled [facility] appointment with Carson Killian MD (Internal medicine) on 11/13/23 @ 10:30.

## 2023-11-13 ENCOUNTER — OFFICE VISIT (OUTPATIENT)
Dept: INTERNAL MEDICINE | Facility: CLINIC | Age: 78
End: 2023-11-13
Payer: MEDICARE

## 2023-11-13 ENCOUNTER — LAB VISIT (OUTPATIENT)
Dept: LAB | Facility: HOSPITAL | Age: 78
End: 2023-11-13
Attending: INTERNAL MEDICINE
Payer: MEDICARE

## 2023-11-13 VITALS
DIASTOLIC BLOOD PRESSURE: 78 MMHG | HEART RATE: 81 BPM | WEIGHT: 215.81 LBS | SYSTOLIC BLOOD PRESSURE: 140 MMHG | OXYGEN SATURATION: 94 % | BODY MASS INDEX: 27.7 KG/M2 | HEIGHT: 74 IN

## 2023-11-13 DIAGNOSIS — E78.2 MIXED HYPERLIPIDEMIA: ICD-10-CM

## 2023-11-13 DIAGNOSIS — Z99.2 ESRD (END STAGE RENAL DISEASE) ON DIALYSIS: ICD-10-CM

## 2023-11-13 DIAGNOSIS — I10 PRIMARY HYPERTENSION: ICD-10-CM

## 2023-11-13 DIAGNOSIS — N18.6 ANEMIA DUE TO CHRONIC KIDNEY DISEASE, ON CHRONIC DIALYSIS: ICD-10-CM

## 2023-11-13 DIAGNOSIS — Z99.2 ANEMIA DUE TO CHRONIC KIDNEY DISEASE, ON CHRONIC DIALYSIS: ICD-10-CM

## 2023-11-13 DIAGNOSIS — R94.39 ABNORMAL STRESS TEST: Primary | ICD-10-CM

## 2023-11-13 DIAGNOSIS — E11.65 TYPE 2 DIABETES MELLITUS WITH HYPERGLYCEMIA, WITH LONG-TERM CURRENT USE OF INSULIN: ICD-10-CM

## 2023-11-13 DIAGNOSIS — N18.6 ESRD (END STAGE RENAL DISEASE) ON DIALYSIS: ICD-10-CM

## 2023-11-13 DIAGNOSIS — D63.1 ANEMIA DUE TO CHRONIC KIDNEY DISEASE, ON CHRONIC DIALYSIS: ICD-10-CM

## 2023-11-13 DIAGNOSIS — R94.39 ABNORMAL CARDIOVASCULAR STRESS TEST: ICD-10-CM

## 2023-11-13 DIAGNOSIS — N18.6 ESRD (END STAGE RENAL DISEASE): ICD-10-CM

## 2023-11-13 DIAGNOSIS — Z79.4 TYPE 2 DIABETES MELLITUS WITH HYPERGLYCEMIA, WITH LONG-TERM CURRENT USE OF INSULIN: ICD-10-CM

## 2023-11-13 DIAGNOSIS — N18.6 ESRD (END STAGE RENAL DISEASE): Primary | ICD-10-CM

## 2023-11-13 LAB
ANION GAP SERPL CALC-SCNC: 15 MMOL/L (ref 8–16)
BASOPHILS # BLD AUTO: 0.06 K/UL (ref 0–0.2)
BASOPHILS NFR BLD: 1 % (ref 0–1.9)
BUN SERPL-MCNC: 43 MG/DL (ref 8–23)
CALCIUM SERPL-MCNC: 9.3 MG/DL (ref 8.7–10.5)
CHLORIDE SERPL-SCNC: 98 MMOL/L (ref 95–110)
CHOLEST SERPL-MCNC: 225 MG/DL (ref 120–199)
CHOLEST/HDLC SERPL: 7 {RATIO} (ref 2–5)
CO2 SERPL-SCNC: 27 MMOL/L (ref 23–29)
CREAT SERPL-MCNC: 11.8 MG/DL (ref 0.5–1.4)
DIFFERENTIAL METHOD: ABNORMAL
EOSINOPHIL # BLD AUTO: 0.2 K/UL (ref 0–0.5)
EOSINOPHIL NFR BLD: 2.6 % (ref 0–8)
ERYTHROCYTE [DISTWIDTH] IN BLOOD BY AUTOMATED COUNT: 15.3 % (ref 11.5–14.5)
EST. GFR  (NO RACE VARIABLE): 4 ML/MIN/1.73 M^2
ESTIMATED AVG GLUCOSE: 120 MG/DL (ref 68–131)
GLUCOSE SERPL-MCNC: 110 MG/DL (ref 70–110)
HBA1C MFR BLD: 5.8 % (ref 4–5.6)
HCT VFR BLD AUTO: 30.5 % (ref 40–54)
HDLC SERPL-MCNC: 32 MG/DL (ref 40–75)
HDLC SERPL: 14.2 % (ref 20–50)
HGB BLD-MCNC: 9.6 G/DL (ref 14–18)
IMM GRANULOCYTES # BLD AUTO: 0.02 K/UL (ref 0–0.04)
IMM GRANULOCYTES NFR BLD AUTO: 0.3 % (ref 0–0.5)
LDLC SERPL CALC-MCNC: 156.2 MG/DL (ref 63–159)
LYMPHOCYTES # BLD AUTO: 0.5 K/UL (ref 1–4.8)
LYMPHOCYTES NFR BLD: 9.2 % (ref 18–48)
MCH RBC QN AUTO: 30.1 PG (ref 27–31)
MCHC RBC AUTO-ENTMCNC: 31.5 G/DL (ref 32–36)
MCV RBC AUTO: 96 FL (ref 82–98)
MONOCYTES # BLD AUTO: 0.5 K/UL (ref 0.3–1)
MONOCYTES NFR BLD: 7.9 % (ref 4–15)
NEUTROPHILS # BLD AUTO: 4.6 K/UL (ref 1.8–7.7)
NEUTROPHILS NFR BLD: 79 % (ref 38–73)
NONHDLC SERPL-MCNC: 193 MG/DL
NRBC BLD-RTO: 0 /100 WBC
PLATELET # BLD AUTO: 247 K/UL (ref 150–450)
PMV BLD AUTO: 10.2 FL (ref 9.2–12.9)
POTASSIUM SERPL-SCNC: 4.8 MMOL/L (ref 3.5–5.1)
RBC # BLD AUTO: 3.19 M/UL (ref 4.6–6.2)
SODIUM SERPL-SCNC: 140 MMOL/L (ref 136–145)
TRIGL SERPL-MCNC: 184 MG/DL (ref 30–150)
WBC # BLD AUTO: 5.79 K/UL (ref 3.9–12.7)

## 2023-11-13 PROCEDURE — 80048 BASIC METABOLIC PNL TOTAL CA: CPT | Mod: NTX | Performed by: INTERNAL MEDICINE

## 2023-11-13 PROCEDURE — 85025 COMPLETE CBC W/AUTO DIFF WBC: CPT | Mod: TXP | Performed by: INTERNAL MEDICINE

## 2023-11-13 PROCEDURE — 99999 PR PBB SHADOW E&M-EST. PATIENT-LVL IV: ICD-10-PCS | Mod: PBBFAC,,, | Performed by: INTERNAL MEDICINE

## 2023-11-13 PROCEDURE — 99214 PR OFFICE/OUTPT VISIT, EST, LEVL IV, 30-39 MIN: ICD-10-PCS | Mod: S$PBB,,, | Performed by: INTERNAL MEDICINE

## 2023-11-13 PROCEDURE — 99214 OFFICE O/P EST MOD 30 MIN: CPT | Mod: PBBFAC | Performed by: INTERNAL MEDICINE

## 2023-11-13 PROCEDURE — 83036 HEMOGLOBIN GLYCOSYLATED A1C: CPT | Mod: TXP | Performed by: INTERNAL MEDICINE

## 2023-11-13 PROCEDURE — 36415 COLL VENOUS BLD VENIPUNCTURE: CPT | Mod: TXP | Performed by: INTERNAL MEDICINE

## 2023-11-13 PROCEDURE — 99214 OFFICE O/P EST MOD 30 MIN: CPT | Mod: S$PBB,,, | Performed by: INTERNAL MEDICINE

## 2023-11-13 PROCEDURE — 99999 PR PBB SHADOW E&M-EST. PATIENT-LVL IV: CPT | Mod: PBBFAC,,, | Performed by: INTERNAL MEDICINE

## 2023-11-13 PROCEDURE — 80061 LIPID PANEL: CPT | Mod: TXP | Performed by: INTERNAL MEDICINE

## 2023-11-13 NOTE — PROGRESS NOTES
78-year-old male       Appointment was set up as a hospital follow-up    He was admitted on November 2nd.  Discharge November 3rd.  Reason for the admission was that the day before through dialysis he had a hemoglobin in which the reading was 6.8 was told to go to the hospital for evaluation.  Repeat labs showed hemoglobin to be high at 8.1.  He was observed overnight.  At that time he was not feeling unusually weak.  There is a mild degree of weakness.  There was no acute shortness a breath chest pain or palpitations.  He did not notice any abdominal pain and there was no change in bowel function.    Also he was told that he needs to be seen by Cardiology.  On November 2nd he had nuclear medicine stress test that showed a prominent fixed defect in the septal and inferior septal wall.  There was some reversibility.  Two years ago he had a similar test which just showed a mild defect in inferior wall prior due to diaphragmatic attenuation.  The test was done because he is on kidney transplant list.  It was not done because of any particular symptom      Medical history   End-stage renal disease, on peritoneal dialysis but maybe transitioned to hemodialysis   Hypertension  Hyperlipidemia  Type 2 diabetes    In talking with the patient he was not told of having any prior cardiac condition.  He is followed by Dr. Garrison of Cardiology for number of years because of hypertension hyperlipidemia    Medications  Amlodipine 5 mg 1 tablet twice a day  Aspirin 81 mg   Atorvastatin 80 mg   Calcitriol 0.25 mcg once a week  Labetalol 100 mg 2 tablets twice a day  Levemir 10 units   Magnesium 400 mg   Renvela 800 mg t.i.d.    Review of symptoms   No palpitations.  No abdominal pain.  Regular bowel function.  He does not urinate    Examination   Weight 215 lb   Pulse 80   Blood pressure by me 144/72   Chest clear breath sounds  Heart regular rate rhythm   Abdominal exam is bowel sounds soft nontender no masses  2+ carotid pulses no  bruits  1+ edema right hand   Trace edema pedal pretibial    Impression  End-stage renal disease   Anemia chronic disease   Hypertension   Hyperlipidemia  Type 2 diabetes   Abnormal stress test    Plan  Today CBC basic metabolic profile hemoglobin A1c and lipid profile   Appointment with cardiology.

## 2023-11-15 ENCOUNTER — PATIENT MESSAGE (OUTPATIENT)
Dept: ENDOCRINOLOGY | Facility: CLINIC | Age: 78
End: 2023-11-15
Payer: MEDICARE

## 2023-11-17 ENCOUNTER — CLINICAL SUPPORT (OUTPATIENT)
Dept: REHABILITATION | Facility: HOSPITAL | Age: 78
End: 2023-11-17
Payer: MEDICARE

## 2023-11-17 DIAGNOSIS — H81.11 BPPV (BENIGN PAROXYSMAL POSITIONAL VERTIGO), RIGHT: ICD-10-CM

## 2023-11-17 DIAGNOSIS — R42 DIZZINESS: Primary | ICD-10-CM

## 2023-11-17 DIAGNOSIS — R26.89 IMBALANCE: ICD-10-CM

## 2023-11-17 PROCEDURE — 97161 PT EVAL LOW COMPLEX 20 MIN: CPT | Mod: PO

## 2023-11-17 NOTE — PLAN OF CARE
"[facility]  Physical Therapy Neurological Rehabilitation Initial Evaluation    Name: Teodoro Mast  Clinic Number: [id number]    Therapy Diagnosis:   Encounter Diagnoses   Name Primary?    BPPV (benign paroxysmal positional vertigo), right     Imbalance     Dizziness Yes     Physician: Claudia Weeks, NP    Physician Orders: PT Eval and Treat "Vestibular Therapy"  Medical Diagnosis from Referral: H81.11 (ICD-10-CM) - BPPV (benign paroxysmal positional vertigo), right R26.89 (ICD-10-CM) - Imbalance  Evaluation Date: 11/17/2023  Insurance Authorization Period: 10/30/2024  Plan of Care Expiration: 12/31/2023  Visit # / Visits authorized: 01/ 01 pending additional authorization     Time In: 1147  Time Out: 1238  Total Billable Time: 51 minutes    Precautions: Standard    FOTO      Subjective   Date of onset: 6 weeks ago   History of current condition - Teodoro reports: presented to the ED due to dizziness and imbalance. He couldn't walk or keep his head up straight. He was in the hospital for ~4 days. Symptoms are improved but are still present.   Triggers: Tilting head a certain way or moving too quickly; better at rest   Description of symptoms: vertigo and unsteadiness    Duration of symptoms: Seconds     Visual changes: None   Hearing Changes (hearing loss or tinnitus): None   Recent history of upper respiratory infection or GI infection: None known   Currently taking Meclizine: None per chart; Medication for nausea     Pts goals: Decrease/eliminate dizziness     Systems screening  Cardiovascular indicators: Hypertension, Hypercholesteremia, and Diabetes    Is patient currently taking medication for stated indicators: Yes - well-controlled  Neurological:  None recent; He reports chronic issues of LE weakness    Medical History:   Past Medical History:   Diagnosis Date    BPH (benign prostatic hyperplasia)     CKD (chronic kidney disease) stage 5, GFR less than 15 ml/min     Gout  " "   Hyperlipidemia     Hyperparathyroidism, secondary renal     Hypertension        Surgical History:   Teodoro Mast  has a past surgical history that includes hemorriodectomy; Finger surgery; Eye surgery (Bilateral); Peritoneal catheter insertion; Colonoscopy (N/A, 11/2/2021); AV fistula placement (Left, 3/31/2022); and AV fistula placement (Left, 9/30/2022).    Medications:   Teodoro has a current medication list which includes the following prescription(s): amlodipine, aspirin, atorvastatin, calcitriol, gentamicin, levemir flexpen, labetalol, magnesium oxide, ondansetron, pen needle, diabetic, sevelamer carbonate, and vitamin d, and the following Facility-Administered Medications: sodium chloride 0.9%, ceFAZolin (ANCEF) 3 gram in dextrose 5% IVPB, lidocaine (pf) 10 mg/ml (1%), and regadenoson.    Allergies:   Review of patient's allergies indicates:  No Known Allergies     Imaging: None recent    Vestibular Function Testing: None at this time  Audiogram: "Audiogram results revealed normal sloping to moderate sensorineural hearing loss (SNHL) in the right ear and normal sloping to moderate SNHL in the left ear." 10/2023    Prior Therapy: No prior physical therapy  Social History: Lives with son   Falls: None   DME: Wheelchair for long community ambulation, otherwise, no AD; has handrails throughout home  Home Environment: 2 story house   Exercise Routine / History: None  Family Present at time of Eval: No  Occupation: Retired  Prior Level of Function: Independent with ADLs, functional mobility, and recreational activities   Current Level of Function: Independent with ADLs, functional mobility, and recreational activities     Pain: Patient denies pain.      Objective   Outcome Measure:   Dizziness Handicap Inventory: 52 - Moderate    SYSTEMS SCREEN    - Follows commands: 100% of time   - Speech: no deficits     Mental status: normal mood, behavior, speech, dress, motor activity, and thought " processes  Appearance: Casually dressed  Behavior:  calm and cooperative  Attention Span and Concentration:  Normal    Posture Alignment: WFL     ROM:   CERVICAL SPINE  Flexion: WNL   Extension: WNL    L side bend: WFL   R side bend: WFL   L rotation: WNL   R rotation: WNL   Are concurrent symptoms present with any of these movements: None    Modified VAS (Vertebral Artery Screen), in sitting (rotation, then extension):  R: Negative  L: Negative    VESTIBULAR EXAMINATION    Oculomotor Screen in room light (fixation present):   Known eye dysfunction: None   Ocular ROM: WNL; eng-gaze nystagmus  Tracking/Smooth Pursuits: Impaired: Saccadic intrusions  Saccades: Undershooting in the vertical direction  Convergence: DNT   Spontaneous Nystagmus: Absent   Gaze Holding Nystagmus: Absent     Slow VOR Screen: WNL  VOR Cancellation: DNT  Head Thrust Test: Loss of gaze with R thrust    Oculomotor Screen with fixation suppressed (Infrared goggles):  Spontaneous Nystagmus: Absent   Gaze Holding Nystagmus: Absent   Head Shaking Nystagmus: Negative      Dynamic Visual Acuity: 4 line loss    POSITIONAL CANAL TESTING  Looking for nystagmus (slow phase followed by quick phase to the affected side for BPPV)    Big Arm Hallpike (posterior / CL anterior)   Right : Negative nystagmus, Negative dizziness   Left: Negative nystagmus, Negative dizziness  Horizontal Canals   Right: Negative nystagmus, Negative dizziness   Left: Negative nystagmus, Negative dizziness   Treatment Performed: Not indicated     Functional Gait Assessment:   1. Gait on level surface =  2   (3) Normal: less than 5.5 sec, no A.D., no imbalance, normal gait pattern, deviates< 6in   (2) Mild impairment: 7-5.6 sec, uses A.D., mild gait deviations, or deviates 6-10 in   (1) Moderate impairment: > 7 sec, slow speed, imbalance, deviates 10-15 in.   (0) Severe impairment: needs assist, deviates >15 in, reach/touch wall  2. Change in Gait Speed = 3   (3) Normal: smooth change w/o  loss of balance or gait deviation, deviates < 6 in, significant difference between speeds   (2) Mild impairment: changes speed, but demonstrates mild gait deviations, deviates 6-10 in, OR no deviations but unable to significantly speed, OR uses A.D.   (1) Moderate impairment: minor changes to speed, OR changes speed w/ significant deviations, deviates 10-15 in, OR  Changes speed , but loses balance & recovers   (0) Severe impairment: cannot change speed, deviates >15 in, or loses balance & needs assist  3. Gait with horizontal head turns  = 3   (3) Normal: no change in gait, deviates <6 in   (2) Mild impairment: slight change in speed, deviates 6-10 in, OR uses A.D.   (1) Moderate impairment: moderate change in speed, deviates 10-15 in   (0) Severe impairment: severe disruption of gait, deviates >15in  4. Gait with vertical head turns = 3   (3) Normal: no change in gait, deviates <6 in   (2) Mild impairment: slight change in speed, deviates 6-10 in OR uses A.D.   (1) Moderate impairment: moderate change in speed, deviates 10-15 in   (0) Severe impairment: severe disruption of gait, deviates >15 in  5. Gait with pivot turns = 3   (3) Normal: performs safely in 3 sec, no LOB   (2) Mild impairment: performs in >3 sec & no LOB, OR turns safely & requires several steps to regain LOB   (1) Moderate impairment: turns slow, OR requires several small steps for balance following turn & stop   (0) Severe impairment: cannot turn safely, needs assist  6. Step over obstacle = 3   (3) Normal: steps over 2 stacked boxes w/o change in speed or LOB   (2) Mild impairment: able to step over 1 box w/o change in speed or LOB   (1) Moderate impairment: steps over 1 box but must slow down, may require VC   (0) Severe impairment: cannot perform w/o assist  7. Gait with Narrow SUSHIL = 0   (3) Normal: 10 steps no staggering   (2) Mild impairment: 7-9 steps   (1) Moderate impairment: 4-7 steps   (0) Severe impairment: < 4 steps or cannot perform  w/o assist  8. Gait with eyes closed = 2   (3) Normal: < 7 sec, no A.D., no LOB, normal gait pattern, deviates <6 in   (2) Mild impairment: 7.1-9 sec, mild gait deviations, deviates 6-10 in   (1) Moderate impairment: > 9 sec, abnormal pattern, LOB, deviates 10-15 in   (0) Severe impairment: cannot perform w/o assist, LOB, deviates >15in  9. Ambulating Backwards = 3   (3) Normal: no A.D., no LOB, normal gait pattern, deviates <6in   (2) Mild impairment: uses A.D., slower speed, mild gait deviations, deviates 6-10 in   (1) Moderate impairment: slow speed, abnormal gait pattern, LOB, deviates 10-15 in   (0) Severe impairment: severe gait deviations or LOB, deviates >15in  10. Steps = 2   (3) Normal: alternating feet, no rail   (2) Mild Impairment: alternating feet, uses rail   (1) Moderate impairment: step-to, uses rail   (0) Severe impairment: cannot perform safely    Score 24/30     Score:   <22/30 fall risk   <20/30 fall risk in older adults   <18/30 fall risk in Parkinsons        Postural control: MCTSIB: Evaluation 11/17/2023 (Average of 3 trials)   1. Eyes Open/feet together/Firm: 30 seconds   2. Eyes Closed/feet together/Firm:  30 seconds   3. Eyes Open/feet together/Foam:  30 seconds   4. Eyes Closed/feet together/Foam:  20 seconds   (Trial 1: 10 seconds Trial 2: 30 seconds)   TOTAL 110/120       CMS Impairment/Limitation/Restriction for FOTO Vestibular Survey    Therapist reviewed FOTO scores for Teodoro Mast on 11/17/2023.   FOTO documents entered into EPIC - see Media section.    Functional Score: 52%  Category: Mobility       TREATMENT   No treatment provided today. All time spent on evaluation.     Home Exercises and Patient Education Provided    Education provided: Examination findings, POC, scheduling, goals of therapy    Written Home Exercises Provided: No. To be established at initial follow up session.     Assessment   Teodoro is a 78 y.o. male referred to outpatient Physical Therapy with a  medical diagnosis of BPPV (benign paroxysmal positional vertigo), right and Imbalance. Patient presents with chief complaint(s) of: dizziness and imbalance. Patient was screened for impairments of cervical range, oculomotor function, gaze stabilization, balance, and BPPV. Positional testing was negative for each canal bilaterally suggesting negative BPPV at this time. Cervical range was normal and without symptom provocation. He performed balance testing fairly well. Functional Gait Assessment score of 24/30 indicates some degree of dynamic balance impairment, but places him out of the elevated fall risk range. He struggled with vision eliminated static balance but ultimately was able to complete all conditions without failure (2 attempts required for condition #4). Four line loss with Dynamic Visual Acuity suggests impairment of gaze stabilization. Based on history of current condition and today's presentation, patient's presentation is consistent incompletely compensated peripheral vestibulopathy. He will benefit from vestibular rehabilitation to decrease dizziness and improve balance for return to prior level of function.    Pt prognosis is Good.   Pt will benefit from skilled outpatient Physical Therapy to address the deficits stated above and in the chart below, provide pt/family education, and to maximize pt's level of independence.     Plan of care discussed with patient: Yes   Pt's spiritual, cultural and educational needs considered and patient is agreeable to the plan of care and goals as stated below:     Anticipated Barriers for therapy: Poor activity tolerance    Medical Necessity is demonstrated by the following  History  Co-morbidities and personal factors that may impact the plan of care [] LOW: no personal factors / comorbidities  [] MODERATE: 1-2 personal factors / comorbidities  [x] HIGH: 3+ personal factors / comorbidities    Moderate / High Support Documentation:   Co-morbidities:   Diabetes  Type I or II, Hearing Impairment, High Blood Pressure, Kidney, Bladder,  Prostate or Urination Problems    Personal Factors:   no deficits     Examination  Body Structures and Functions, activity limitations and participation restrictions that may impact the plan of care [] LOW: addressing 1-2 elements  [] MODERATE: 3+ elements  [x] HIGH: 3+ elements (please support below)    Moderate / High Support Documentation:   Body Regions:   lower extremities    Body Systems:    balance    Participation Restrictions:   Community mobility    Activity limitations:   Learning and applying knowledge  no deficits    General Tasks and Commands  no deficits    Communication  no deficits    Mobility  walking    Self care  no deficits    Domestic Life  cooking    Interactions/Relationships  no deficits    Life Areas  no deficits    Community and Social Life  no deficits     Clinical Presentation [x] LOW: stable  [] MODERATE: Evolving  [] HIGH: Unstable     Decision Making/ Complexity Score: low       Goals:  Long Term Goals (LTG), 4 weeks.   Pt agrees to goals set. Eval date: 11/17/2023     Status   LTG: Patient will be independent with HEP emphasizing gaze stabilization and motion tolerance. Education required Ongoing   LTG: Patient will exhibit improved Dizziness Handicap Inventory to 32 indicating decreased self-perceived disability related to dizziness. 52 - Moderate Ongoing   LTG: Patient will exhibit </= 2 line loss with Dynamic Visual Acuity testing, indicating improved gaze stabilization 4 line loss Ongoing     Plan   Plan of care Certification: 11/17/2023 to 12/31/2023.    Outpatient Physical Therapy 2 times weekly for 4 weeks to include the following interventions: Neuromuscular Re-ed, Patient Education, Self Care, Therapeutic Activities, Therapeutic Exercise, and CRM.     Camille Mathis, PT

## 2023-11-22 ENCOUNTER — CLINICAL SUPPORT (OUTPATIENT)
Dept: AUDIOLOGY | Facility: CLINIC | Age: 78
End: 2023-11-22
Payer: MEDICARE

## 2023-11-22 DIAGNOSIS — H90.3 SENSORINEURAL HEARING LOSS, BILATERAL: Primary | ICD-10-CM

## 2023-11-22 NOTE — PROGRESS NOTES
Date:  11/22/2023    Patient:  Teodoro Mast    Pt seen today for a hearing aid evaluation.  He was fit with the following device.      Phonak L70-RT   Sand Beige   RT SN: [id number]   LT SN: [id number]    5: M1   Dome: Large open   SN: [id number]   Warranty Exp: 02/04/2027     Fit good and patient reported good subjective benefit.  Proper care and maintenance was discussed.  Purchase agreement was reviewed and signed.  Patient acknowledged understanding of the 30 day trial period, $250 restocking fee, and the fact that we do not file insurance on behalf of the patient. Pt should follow up in two weeks.

## 2023-11-28 RX ORDER — POTASSIUM CHLORIDE 20 MEQ/1
20 TABLET, EXTENDED RELEASE ORAL
Qty: 90 TABLET | Refills: 3 | Status: SHIPPED | OUTPATIENT
Start: 2023-11-28 | End: 2024-02-14

## 2023-12-06 ENCOUNTER — PATIENT MESSAGE (OUTPATIENT)
Dept: ENDOCRINOLOGY | Facility: CLINIC | Age: 78
End: 2023-12-06
Payer: MEDICARE

## 2023-12-06 ENCOUNTER — CLINICAL SUPPORT (OUTPATIENT)
Dept: AUDIOLOGY | Facility: CLINIC | Age: 78
End: 2023-12-06
Payer: MEDICARE

## 2023-12-06 DIAGNOSIS — H90.3 SENSORINEURAL HEARING LOSS, BILATERAL: Primary | ICD-10-CM

## 2023-12-06 PROCEDURE — 99499 UNLISTED E&M SERVICE: CPT | Mod: S$PBB,,, | Performed by: AUDIOLOGIST

## 2023-12-06 PROCEDURE — 99499 NO LOS: ICD-10-PCS | Mod: S$PBB,,, | Performed by: AUDIOLOGIST

## 2023-12-06 NOTE — PROGRESS NOTES
Teodoro Mast, a 78 y.o. male, was seen today for a two week hearing aid cleaning and check.  Pt reported that his aid(s) are working well and he noted improved hearing.  Aid(s) cleaned and checked.  Listening check confirmed aid(s) working well.  We reviewed proper insertion and how to change wax guards.  Pt it satisfied with hearing aids overall.  We discussed follow up care and how to contact the office if issues arise.  Pt will follow up annually unless otherwise needed.    Hearing Aid Information:  Phonak L70-RT   Yusra Beige   RT SN: [id number]   LT SN: [id number]    5: M1   Dome: Large open   SN: [id number]   Warranty Exp: 02/04/2027     Recommendations:  Daily use of hearing aids  Return for annual hearing testing and hearing aid check  3. Contact clinic as issues arise

## 2023-12-13 DIAGNOSIS — N18.6 ESRD (END STAGE RENAL DISEASE): Primary | ICD-10-CM

## 2023-12-14 ENCOUNTER — OFFICE VISIT (OUTPATIENT)
Dept: VASCULAR SURGERY | Facility: CLINIC | Age: 78
End: 2023-12-14
Attending: SURGERY
Payer: MEDICARE

## 2023-12-14 ENCOUNTER — HOSPITAL ENCOUNTER (OUTPATIENT)
Dept: VASCULAR SURGERY | Facility: CLINIC | Age: 78
Discharge: HOME | End: 2023-12-14
Attending: SURGERY
Payer: MEDICARE

## 2023-12-14 VITALS
HEIGHT: 74 IN | TEMPERATURE: 98 F | SYSTOLIC BLOOD PRESSURE: 140 MMHG | WEIGHT: 207.25 LBS | BODY MASS INDEX: 26.6 KG/M2 | DIASTOLIC BLOOD PRESSURE: 69 MMHG | HEART RATE: 73 BPM

## 2023-12-14 DIAGNOSIS — R94.30 ABNORMAL CARDIOVASCULAR FUNCTION STUDY: Primary | ICD-10-CM

## 2023-12-14 DIAGNOSIS — Z99.2 ARTERIOVENOUS FISTULA FOR HEMODIALYSIS IN PLACE, PRIMARY: Primary | ICD-10-CM

## 2023-12-14 DIAGNOSIS — N18.6 ESRD (END STAGE RENAL DISEASE): ICD-10-CM

## 2023-12-14 PROCEDURE — 99213 OFFICE O/P EST LOW 20 MIN: CPT | Mod: S$PBB,NTX,, | Performed by: SURGERY

## 2023-12-14 PROCEDURE — 99999 PR PBB SHADOW E&M-EST. PATIENT-LVL III: ICD-10-PCS | Mod: PBBFAC,TXP,, | Performed by: SURGERY

## 2023-12-14 PROCEDURE — 99213 PR OFFICE/OUTPT VISIT, EST, LEVL III, 20-29 MIN: ICD-10-PCS | Mod: S$PBB,NTX,, | Performed by: SURGERY

## 2023-12-14 PROCEDURE — 99999 PR PBB SHADOW E&M-EST. PATIENT-LVL III: CPT | Mod: PBBFAC,TXP,, | Performed by: SURGERY

## 2023-12-14 PROCEDURE — 99213 OFFICE O/P EST LOW 20 MIN: CPT | Mod: PBBFAC,NTX | Performed by: SURGERY

## 2023-12-14 PROCEDURE — 93990 DOPPLER FLOW TESTING: CPT | Mod: PBBFAC,NTX | Performed by: SURGERY

## 2023-12-14 PROCEDURE — 93990 PR DUPLEX HEMODIALYSIS ACCESS: ICD-10-PCS | Mod: 26,S$PBB,TXP, | Performed by: SURGERY

## 2023-12-14 PROCEDURE — 93990 DOPPLER FLOW TESTING: CPT | Mod: 26,S$PBB,TXP, | Performed by: SURGERY

## 2023-12-14 RX ORDER — DIPHENHYDRAMINE HCL 50 MG
50 CAPSULE ORAL ONCE
Status: CANCELLED | OUTPATIENT
Start: 2023-12-14 | End: 2023-12-14

## 2023-12-14 RX ORDER — SODIUM CHLORIDE 0.9 % (FLUSH) 0.9 %
10 SYRINGE (ML) INJECTION
Status: SHIPPED | OUTPATIENT
Start: 2023-12-14

## 2023-12-14 RX ORDER — SODIUM CHLORIDE 9 MG/ML
INJECTION, SOLUTION INTRAVENOUS CONTINUOUS
Status: CANCELLED | OUTPATIENT
Start: 2023-12-14 | End: 2023-12-14

## 2023-12-14 NOTE — PROGRESS NOTES
VASCULAR SURGERY NOTE    Patient ID: Teodoro Mast is a 78 y.o. male.    I. HISTORY     Chief Complaint: AV access    Interval history: Pt is here today for a a fu regarding some swelling in his right hand that began 2 weeks ago. Pt reports it does not bother or limit him in anyway but his nephrologist recommended getting it looked at.     12/14/23: Teodoro Mast is a 78 y.o. male who is here today for established patient appointment. He had L radiocephalic AVF with me 3/31/22. This thrombosed. He had repeat L radiocephalic 9/30/22 in mid forearm and returns for post-op visit. He is on the kidney transplant list.  He is currently on PD but PD is not filtering as much as before and his nephrologist was concerned that it will soon fail so AV access was created.  He has still not have to use his AV fistula.  He returns for evaluation today because his nephrologist noticed swelling of his hand.  I explained that this is a common occurrence in patients with AV fistula is in his likely due to some venous outflow stenosis.  He says that the hand swelling does not currently limit day-to-day activities.      ALLERGIES: NKA    Medications: reviewed in EMR       Past Surgical History:   Procedure Laterality Date    AV FISTULA PLACEMENT Left 3/31/2022    Procedure: CREATION, AV FISTULA RADIOCEPHALIC;  Surgeon: PEMA Martines II, MD;  Location: [facility] OR [address] [place] [address];  Service: Vascular;  Laterality: Left;    AV FISTULA PLACEMENT Left 9/30/2022    Procedure: CREATION, AV FISTULA;  Surgeon: PEMA Martines II, MD;  [facility];  Service: Vascular;  Laterality: Left;    COLONOSCOPY N/A 11/2/2021    Procedure: COLONOSCOPY;  Surgeon: Joe Jimenez MD;  Location: [facility] ENDO (4TH FLR);  Service: Endoscopy;  Laterality: N/A;  COVID test at [facility] on 10/30-GT      dialysis pt-labs prior    EYE SURGERY Bilateral     cataract removal    FINGER SURGERY      hemorriodectomy      PERITONEAL CATHETER INSERTION          Social History     Occupational History    Not on file   Tobacco Use    Smoking status: Former     Current packs/day: 0.00     Average packs/day: 0.5 packs/day for 10.0 years (5.0 ttl pk-yrs)     Types: Cigarettes     Start date:      Quit date:      Years since quittin.9    Smokeless tobacco: Never   Substance and Sexual Activity    Alcohol use: Not Currently    Drug use: No    Sexual activity: Not Currently     Partners: Female     Family History   Problem Relation Age of Onset    Heart disease Mother     Hypertension Mother     Heart disease Father         pacemaker    Seizures Sister     Hammer toes Brother     Heart disease Brother     Cancer Brother         prostate cancer    Premature birth Son     Cancer Sister         throat    No Known Problems Sister     No Known Problems Sister     No Known Problems Brother     No Known Problems Son        Review of Systems   Constitutional: Negative for weight loss.   HENT:  Negative for ear pain and nosebleeds.    Eyes:  Negative for discharge and pain.   Cardiovascular:  Negative for chest pain and palpitations.   Respiratory:  Negative for cough, shortness of breath and wheezing.    Endocrine: Negative for cold intolerance, heat intolerance and polyphagia.   Hematologic/Lymphatic: Negative for adenopathy. Does not bruise/bleed easily.   Skin:  Negative for itching and rash.   Musculoskeletal:  Negative for joint swelling and muscle cramps.   Gastrointestinal:  Negative for abdominal pain, diarrhea, nausea and vomiting.   Genitourinary:  Negative for dysuria and flank pain.   Neurological:  Negative for numbness and seizures.         II. PHYSICAL EXAM     Physical Exam  Constitutional:       Appearance: Normal appearance. He is not ill-appearing or diaphoretic.   HENT:      Head: Normocephalic and atraumatic.   Eyes:      General: No scleral icterus.        Right eye: No discharge.         Left eye: No discharge.      Extraocular Movements:  Extraocular movements intact.      Conjunctiva/sclera: Conjunctivae normal.   Cardiovascular:      Rate and Rhythm: Normal rate and regular rhythm.   Pulmonary:      Effort: Pulmonary effort is normal. No respiratory distress.   Musculoskeletal:         General: Normal range of motion.      Cervical back: Normal range of motion and neck supple.   Skin:     General: Skin is warm and dry.   Neurological:      General: No focal deficit present.      Mental Status: He is alert and oriented to person, place, and time.   Psychiatric:         Mood and Affect: Mood normal.         Behavior: Behavior normal.       Easily palpable continuous thrill over Left wrist radiocephalic AVF  moderate swelling over the left hand      III. ASSESSMENT & PLAN (MEDICAL DECISION MAKING)     Imaging Results: (I have personally reviewed all images and provided interpretation below)  AVF U/S 10/31/22:  Depth is less than 6 mm throughout.  Diameter is 5.7 proximally but >6mm in mid/distal fistula.  Volume flow is 1.1 L/min.  There is no evidence of hemodynamically significant stenosis.    AVF u/s 12/14/23:  Volume flow is 1.3 liters/minute.  There are some elevated velocities >660cm/s at the anastomosis however it appears patent without stenosis.  There was an area of tortuosity with velocity elevation however there was no evidence of stenosis.      Assessment/Diagnosis and Plan:    1. Arteriovenous fistula for hemodialysis in place, primary          78 y.o. male with ESRD on PD awaiting kidney transplant.  He had a failed left radiocephalic AV fistula at the wrist and had repeat forearm L radiocephalic fistula creation 9/30/22.  His new fistula matured nicely. Has had left hand swelling for the past 2 months but this is not limiting his day-to-day activities.  We discussed treatment options including fistulogram or ligation of side branches.  In the absence of lifestyle limiting symptoms, we decided that the risks associated with surgery  likely outweigh the benefits.  I recommended that he wear a compression glove to help alleviate the swelling in his hand.  If his symptoms worsen he will return to clinic as needed to discuss possible surgical intervention.    -Ok to cannulate AVF when needed  -Recommend a compression glove for the swelling  -RTC YOSEF Martines II, MD, [facility]  Vascular Surgery  [facility]

## 2023-12-18 ENCOUNTER — OFFICE VISIT (OUTPATIENT)
Dept: CARDIOLOGY | Facility: CLINIC | Age: 78
End: 2023-12-18
Payer: MEDICARE

## 2023-12-18 ENCOUNTER — PATIENT MESSAGE (OUTPATIENT)
Dept: CARDIOLOGY | Facility: CLINIC | Age: 78
End: 2023-12-18

## 2023-12-18 ENCOUNTER — TELEPHONE (OUTPATIENT)
Dept: CARDIOLOGY | Facility: CLINIC | Age: 78
End: 2023-12-18

## 2023-12-18 VITALS
OXYGEN SATURATION: 97 % | BODY MASS INDEX: 27.05 KG/M2 | WEIGHT: 210.75 LBS | DIASTOLIC BLOOD PRESSURE: 59 MMHG | HEART RATE: 62 BPM | SYSTOLIC BLOOD PRESSURE: 122 MMHG | HEIGHT: 74 IN

## 2023-12-18 DIAGNOSIS — I10 HYPERTENSION, UNSPECIFIED TYPE: ICD-10-CM

## 2023-12-18 DIAGNOSIS — E78.2 MIXED HYPERLIPIDEMIA: ICD-10-CM

## 2023-12-18 DIAGNOSIS — Z76.82 ORGAN TRANSPLANT CANDIDATE: ICD-10-CM

## 2023-12-18 DIAGNOSIS — I70.0 AORTIC ATHEROSCLEROSIS: Primary | ICD-10-CM

## 2023-12-18 DIAGNOSIS — Z99.2 ESRD (END STAGE RENAL DISEASE) ON DIALYSIS: ICD-10-CM

## 2023-12-18 DIAGNOSIS — N18.6 ESRD (END STAGE RENAL DISEASE) ON DIALYSIS: ICD-10-CM

## 2023-12-18 DIAGNOSIS — N18.6 ESRD (END STAGE RENAL DISEASE): ICD-10-CM

## 2023-12-18 DIAGNOSIS — N18.4 CKD (CHRONIC KIDNEY DISEASE) STAGE 4, GFR 15-29 ML/MIN: ICD-10-CM

## 2023-12-18 DIAGNOSIS — N18.5 ANEMIA OF CHRONIC RENAL FAILURE, STAGE 5: ICD-10-CM

## 2023-12-18 DIAGNOSIS — I12.0 BENIGN HYPERTENSION WITH CKD (CHRONIC KIDNEY DISEASE) STAGE V: ICD-10-CM

## 2023-12-18 DIAGNOSIS — N18.5 BENIGN HYPERTENSION WITH CKD (CHRONIC KIDNEY DISEASE) STAGE V: ICD-10-CM

## 2023-12-18 DIAGNOSIS — D63.1 ANEMIA OF CHRONIC RENAL FAILURE, STAGE 5: ICD-10-CM

## 2023-12-18 DIAGNOSIS — R94.39 ABNORMAL STRESS TEST: ICD-10-CM

## 2023-12-18 PROCEDURE — 99205 PR OFFICE/OUTPT VISIT, NEW, LEVL V, 60-74 MIN: ICD-10-PCS | Mod: S$PBB,TXP,, | Performed by: INTERNAL MEDICINE

## 2023-12-18 PROCEDURE — 99215 OFFICE O/P EST HI 40 MIN: CPT | Mod: PBBFAC,TXP | Performed by: INTERNAL MEDICINE

## 2023-12-18 PROCEDURE — 99999 PR PBB SHADOW E&M-EST. PATIENT-LVL V: CPT | Mod: PBBFAC,TXP,, | Performed by: INTERNAL MEDICINE

## 2023-12-18 PROCEDURE — 99999 PR PBB SHADOW E&M-EST. PATIENT-LVL V: ICD-10-PCS | Mod: PBBFAC,TXP,, | Performed by: INTERNAL MEDICINE

## 2023-12-18 PROCEDURE — 99205 OFFICE O/P NEW HI 60 MIN: CPT | Mod: S$PBB,TXP,, | Performed by: INTERNAL MEDICINE

## 2023-12-18 NOTE — ASSESSMENT & PLAN NOTE
Continue high intensity statin therapy.     The 10-year ASCVD risk score (Tony LEIGH, et al., 2019) is: 50%    Values used to calculate the score:      Age: 78 years      Sex: Male      Is Non- : Yes      Diabetic: Yes      Tobacco smoker: No      Systolic Blood Pressure: 140 mmHg      Is BP treated: Yes      HDL Cholesterol: 32 mg/dL      Total Cholesterol: 225 mg/dL

## 2023-12-18 NOTE — PROGRESS NOTES
Interventional Cardiology Clinic Note  Reason for Visit: Abnormal stress  Referring Provider: Karl Masterson    HPI:   79yo F with pmhx of ESRD (on PD), HTN, HLD, DM2 who presents to clinic for evaluation of CAD    Pt is an kidney transplant candidate.  Had a SPECT which was abnormal in the RCA distribution.  He presents today for clinic for evaluation.  He denies any CP or SOB with exertion.  Sleeps on 1 pillow.  Walks only 20-30ft due to leg pain.  No bleeding issues.  Is on procrit during his PD session.  Has 2 LUE AVF that are not in use.  Last hgb stable.      ROS:    Constitution: Negative for fever, chills, weight loss or gain.   HENT: Negative for sore throat, rhinorrhea, or headache.  Eyes: Negative for blurred or double vision.   Cardiovascular: See above  Pulmonary: Negative for SOB   Gastrointestinal: Negative for abdominal pain, nausea, vomiting, or diarrhea.   : Negative for dysuria.   Neurological: Negative for focal weakness or sensory changes.  PMH:     Past Medical History:   Diagnosis Date    BPH (benign prostatic hyperplasia)     CKD (chronic kidney disease) stage 5, GFR less than 15 ml/min     Gout     Hyperlipidemia     Hyperparathyroidism, secondary renal     Hypertension      Past Surgical History:   Procedure Laterality Date    AV FISTULA PLACEMENT Left 3/31/2022    Procedure: CREATION, AV FISTULA RADIOCEPHALIC;  Surgeon: PEMA Martines II, MD;  Location: [facility] OR [address] [place] [address];  Service: Vascular;  Laterality: Left;    AV FISTULA PLACEMENT Left 9/30/2022    Procedure: CREATION, AV FISTULA;  Surgeon: PEMA Martines II, MD;  [facility]  Service: Vascular;  Laterality: Left;    COLONOSCOPY N/A 11/2/2021    Procedure: COLONOSCOPY;  Surgeon: Joe Jimenez MD;  Location: [facility] ENDO (4TH FLR);  Service: Endoscopy;  Laterality: N/A;  COVID test at [place] on 10/30-GT      dialysis pt-labs prior    EYE SURGERY Bilateral     cataract removal    FINGER SURGERY       "hemorriodectomy      PERITONEAL CATHETER INSERTION       Allergies:   Review of patient's allergies indicates:  No Known Allergies  Medications:     Current Outpatient Medications on File Prior to Visit   Medication Sig Dispense Refill    amLODIPine (NORVASC) 5 MG tablet Take 2 tablets (10 mg total) by mouth once daily. (Patient taking differently: Take 5 mg by mouth 2 (two) times daily.) 60 tablet 11    aspirin 81 MG Chew Take 1 tablet by mouth once daily.      atorvastatin (LIPITOR) 80 MG tablet TAKE 1 TABLET(80 MG) BY MOUTH EVERY DAY 90 tablet 3    calcitRIOL (ROCALTROL) 0.25 MCG Cap Take 0.25 mcg by mouth once a week.      gentamicin (GARAMYCIN) 0.3 % ophthalmic solution Apply 1 drop to PD exit site daily with dressing changes.      insulin detemir U-100, Levemir, (LEVEMIR FLEXPEN) 100 unit/mL (3 mL) InPn pen Inject 10 Units into the skin every evening. 9 mL 3    labetaloL (NORMODYNE) 300 MG tablet Take 1 tablet (300 mg total) by mouth 2 (two) times daily. 180 tablet 2    magnesium oxide (MAG-OX) 400 mg (241.3 mg magnesium) tablet TAKE 1 TABLET BY MOUTH DAILY (Patient taking differently: Take 1 tablet by mouth once daily.) 90 tablet 2    ondansetron (ZOFRAN) 4 MG tablet Take 1 tablet (4 mg total) by mouth every 8 (eight) hours as needed for Nausea. 5 tablet 0    pen needle, diabetic (BD ULTRA-FINE KITA PEN NEEDLE) 32 gauge x 5/32" Ndle 1 injection daily for insulin 100 each 11    potassium chloride SA (K-DUR,KLOR-CON) 20 MEQ tablet TAKE 1 TABLET BY MOUTH DAILY 90 tablet 3    psyllium (METAMUCIL) powder Take 28 g by mouth once daily. Patient taking 2 tablespoon      sevelamer carbonate (RENVELA) 800 mg Tab Take 1 tablet (800 mg total) by mouth 3 (three) times daily with meals. 90 tablet 11    vitamin D (VITAMIN D3) 1000 units Tab Take 2,000 Units by mouth once daily.       Current Facility-Administered Medications on File Prior to Visit   Medication Dose Route Frequency Provider Last Rate Last Admin    0.9%  NaCl " infusion   Intravenous Continuous Enrico Woodruff  mL/hr at 21 1115 New Bag at 21 1115    ceFAZolin (ANCEF) 3 gram in dextrose 5% IVPB  3 g Intravenous On Call Procedure Enrico Woodruff MD        LIDOcaine (PF) 10 mg/ml (1%) injection 10 mg  1 mL Intradermal Once Enrico Woodruff MD        regadenoson injection 0.4 mg  0.4 mg Intravenous 1 time in Clinic/HOD Vandana Gomez PA-C        sodium chloride 0.9% flush 10 mL  10 mL Intravenous PRN Joe Pitts MD         Social History:     Social History     Tobacco Use    Smoking status: Former     Current packs/day: 0.00     Average packs/day: 0.5 packs/day for 10.0 years (5.0 ttl pk-yrs)     Types: Cigarettes     Start date:      Quit date:      Years since quittin.9    Smokeless tobacco: Never   Substance Use Topics    Alcohol use: Not Currently     Family History:     Family History   Problem Relation Age of Onset    Heart disease Mother     Hypertension Mother     Heart disease Father         pacemaker    Seizures Sister     Hammer toes Brother     Heart disease Brother     Cancer Brother         prostate cancer    Premature birth Son     Cancer Sister         throat    No Known Problems Sister     No Known Problems Sister     No Known Problems Brother     No Known Problems Son      Physical Exam:   There were no vitals taken for this visit.   Wt Readings from Last 4 Encounters:   23 94 kg (207 lb 3.7 oz)   23 97.9 kg (215 lb 13.3 oz)   23 96.2 kg (212 lb 1.3 oz)   23 93.4 kg (206 lb)         Constitutional: No distress, obese, conversant  HEENT: Sclera anicteric, PERRLA, EOMI  Neck: No JVD, no masses, good movement  CV: RRR, S1 and S2 normal, no additional heart sounds or murmurs. Pulses 2+ and equal bilaterally in radial arteries, Chele's normal on right. Distal pulses are 2+ and equal in the femoral, DP and PT areas bilaterally  Pulm: Clear to auscultation bilaterally with  "symmetrical expansion. Chest wall palpated for reproduction of pain symptoms, and no pain was able to be produced on palpation or resistance exercises  GI: Abdomen soft, non-tender, good bowel sounds  Extremities: Both extremities intact and grossly normal, skin is warm, no edema noted  Skin: No ecchymosis, erythema, or ulcers  Psych: AOx3, appropriate affect  Neuro: CNII-XII intact, no focal deficits      Labs:     Lab Results   Component Value Date     (L) 12/04/2023    K 4.0 12/11/2023    CL 99 12/04/2023    CO2 27 11/13/2023    BUN 43 (H) 11/13/2023    CREATININE 11.45 (H) 12/04/2023    ANIONGAP 15 11/13/2023     Lab Results   Component Value Date    HGBA1C 5.8 (H) 11/13/2023     No results found for: "BNP", "BNPTRIAGEBLO" Lab Results   Component Value Date    WBC 5.20 12/04/2023    HGB 10.5 (L) 12/04/2023    HCT 31.8 (L) 12/04/2023    HCT 26 (L) 09/30/2022     12/04/2023    GRAN 4.6 11/13/2023    GRAN 79.0 (H) 11/13/2023     Lab Results   Component Value Date    CHOL 225 (H) 11/13/2023    HDL 32 (L) 11/13/2023    LDLCALC 156.2 11/13/2023    TRIG 184 (H) 11/13/2023          Imaging:         EF   Date Value Ref Range Status   06/02/2022 50 % Final     Nuc Stress EF   Date Value Ref Range Status   11/02/2023 51 % Final     Nuc Rest EF   Date Value Ref Range Status   11/02/2023 50  Final       TTE:   Echo Saline Bubble? No    Result Date: 8/29/2023    Left Ventricle: The left ventricle is normal in size. Normal wall   thickness. There is concentric remodeling. Normal wall motion. There is   normal systolic function with a visually estimated ejection fraction of 55   - 60%.    Left Atrium: Left atrium is mildly dilated.    Right Ventricle: Normal right ventricular cavity size. Wall thickness   is normal. Right ventricle wall motion  is normal. Systolic function is   normal.    Aortic Valve: There is mild aortic valve sclerosis.    Tricuspid Valve: There is mild regurgitation.    Pulmonary Artery: There " is mild pulmonary hypertension. The estimated   pulmonary artery systolic pressure is 40 mmHg.    IVC/SVC: Normal venous pressure at 3 mmHg.    Pericardium: There is a trivial circumferential effusion.         Coronary Angiography:  Stress Test:  11/2023:    Abnormal myocardial perfusion scan.    There is a moderate to severe intensity, small to moderate sized, mostly fixed perfusion abnormality with some reversibilty in the basal to mid septal and inferoseptal wall(s) in the typical distribution of the RCA territory.    There are no other significant perfusion abnormalities.    The gated perfusion images showed an ejection fraction of 50% at rest. The gated perfusion images showed an ejection fraction of 51% post stress. Normal ejection fraction is greater than 47%.    There is normal wall motion at rest and post stress.    LV cavity size is normal at rest and normal at stress.    The ECG portion of the study is negative for ischemia.    The patient reported no chest pain during the stress test.    During stress, rare PVCs are noted.    When compared to the previous study from 8/3/2021, there is now a mainly fixed inferior/inferoseptal defect present.    11/2021:    Normal myocardial perfusion scan. There is no evidence of myocardial ischemia or infarction.    There is a  moderate intensity fixed defect in the inferior wall of the left ventricle secondary to diaphragm attenuation.    The LVEF is not accurate due to poor software boundary tracking at rest  and poor software boundary tracking during stress. The visually estimated ejection fraction is low-normal at rest and low-normal during stress.    LV cavity size is normal at rest and normal at stress.    The EKG portion of this study is negative for ischemia.    The patient reported no chest pain during the stress test.  Assessment:    79yo F with pmhx of ESRD (on PD), HTN, HLD, DM2 who presents to clinic for evaluation of CAD     Plan:       #Abnormal  SPECT:  Perfusion defect in RCA distritubtion  --LHC +/- PCI, patient is a QUIRINO candidate  - Anti-platelet Therapy: ASA (load with plavix day of)  - Access: Right radial  - Catheters: Luisito  - Creatinine/CrCl: 11.45 (ESRD)  - Allergies: No shellfish / Iodine allergy  - Pre-Hydration: NS  - Pre-Op Med: Bendaryl 50mg pO   - All patient's questions were answered.  -The risks, benefits and alternatives of the procedure were explained to the patient.   -The risks of coronary angiography include but are not limited to: bleeding, infection, heart rhythm abnormalities, allergic reactions, kidney injury and potential need for dialysis, stroke and death.   - Should stenting be indicated, the patient has agreed to dual anti-platelet therapy for 1-consecutive year with a drug-eluting stent and a minimum of 1-month with the use of a bare metal stent  - Additionally, pt is aware that non-compliance is likely to result in stent clotting with heart attack, heart failure, and/or death  -The risks of moderate sedation include hypotension, respiratory depression, arrhythmias, bronchospasm, and death.   - Informed consent was obtained and the  patient is agreeable to proceed with the procedure.     #HTN:  #HLD:  #DM2:  #ESRD:  - continue current medications  - followed by nephro  - stable    Signed:  Lion Dean MD  Cardiology Fellow  Pager - [phone]  [facility]  12/18/2023 9:03 AM

## 2023-12-18 NOTE — H&P (VIEW-ONLY)
Interventional Cardiology Clinic Note  Reason for Visit: Abnormal stress  Referring Provider: Karl Masterson    HPI:   79yo F with pmhx of ESRD (on PD), HTN, HLD, DM2 who presents to clinic for evaluation of CAD    Pt is an kidney transplant candidate.  Had a SPECT which was abnormal in the RCA distribution.  He presents today for clinic for evaluation.  He denies any CP or SOB with exertion.  Sleeps on 1 pillow.  Walks only 20-30ft due to leg pain.  No bleeding issues.  Is on procrit during his PD session.  Has 2 LUE AVF that are not in use.  Last hgb stable.      ROS:    Constitution: Negative for fever, chills, weight loss or gain.   HENT: Negative for sore throat, rhinorrhea, or headache.  Eyes: Negative for blurred or double vision.   Cardiovascular: See above  Pulmonary: Negative for SOB   Gastrointestinal: Negative for abdominal pain, nausea, vomiting, or diarrhea.   : Negative for dysuria.   Neurological: Negative for focal weakness or sensory changes.  PMH:     Past Medical History:   Diagnosis Date    BPH (benign prostatic hyperplasia)     CKD (chronic kidney disease) stage 5, GFR less than 15 ml/min     Gout     Hyperlipidemia     Hyperparathyroidism, secondary renal     Hypertension      Past Surgical History:   Procedure Laterality Date    AV FISTULA PLACEMENT Left 3/31/2022    Procedure: CREATION, AV FISTULA RADIOCEPHALIC;  Surgeon: PEMA Martines II, MD;  Location: [facility] OR [address] [place] [address];  Service: Vascular;  Laterality: Left;    AV FISTULA PLACEMENT Left 9/30/2022    Procedure: CREATION, AV FISTULA;  Surgeon: PEMA Martines II, MD;  [facility];  Service: Vascular;  Laterality: Left;    COLONOSCOPY N/A 11/2/2021    Procedure: COLONOSCOPY;  Surgeon: Joe Jimenez MD;  Location: [facility] ENDO (4TH FLR);  Service: Endoscopy;  Laterality: N/A;  COVID test at [place] on 10/30-GT      dialysis pt-labs prior    EYE SURGERY Bilateral     cataract removal    FINGER SURGERY       "hemorriodectomy      PERITONEAL CATHETER INSERTION       Allergies:   Review of patient's allergies indicates:  No Known Allergies  Medications:     Current Outpatient Medications on File Prior to Visit   Medication Sig Dispense Refill    amLODIPine (NORVASC) 5 MG tablet Take 2 tablets (10 mg total) by mouth once daily. (Patient taking differently: Take 5 mg by mouth 2 (two) times daily.) 60 tablet 11    aspirin 81 MG Chew Take 1 tablet by mouth once daily.      atorvastatin (LIPITOR) 80 MG tablet TAKE 1 TABLET(80 MG) BY MOUTH EVERY DAY 90 tablet 3    calcitRIOL (ROCALTROL) 0.25 MCG Cap Take 0.25 mcg by mouth once a week.      gentamicin (GARAMYCIN) 0.3 % ophthalmic solution Apply 1 drop to PD exit site daily with dressing changes.      insulin detemir U-100, Levemir, (LEVEMIR FLEXPEN) 100 unit/mL (3 mL) InPn pen Inject 10 Units into the skin every evening. 9 mL 3    labetaloL (NORMODYNE) 300 MG tablet Take 1 tablet (300 mg total) by mouth 2 (two) times daily. 180 tablet 2    magnesium oxide (MAG-OX) 400 mg (241.3 mg magnesium) tablet TAKE 1 TABLET BY MOUTH DAILY (Patient taking differently: Take 1 tablet by mouth once daily.) 90 tablet 2    ondansetron (ZOFRAN) 4 MG tablet Take 1 tablet (4 mg total) by mouth every 8 (eight) hours as needed for Nausea. 5 tablet 0    pen needle, diabetic (BD ULTRA-FINE KITA PEN NEEDLE) 32 gauge x 5/32" Ndle 1 injection daily for insulin 100 each 11    potassium chloride SA (K-DUR,KLOR-CON) 20 MEQ tablet TAKE 1 TABLET BY MOUTH DAILY 90 tablet 3    psyllium (METAMUCIL) powder Take 28 g by mouth once daily. Patient taking 2 tablespoon      sevelamer carbonate (RENVELA) 800 mg Tab Take 1 tablet (800 mg total) by mouth 3 (three) times daily with meals. 90 tablet 11    vitamin D (VITAMIN D3) 1000 units Tab Take 2,000 Units by mouth once daily.       Current Facility-Administered Medications on File Prior to Visit   Medication Dose Route Frequency Provider Last Rate Last Admin    0.9%  NaCl " infusion   Intravenous Continuous Enrico Woodruff  mL/hr at 21 1115 New Bag at 21 1115    ceFAZolin (ANCEF) 3 gram in dextrose 5% IVPB  3 g Intravenous On Call Procedure Enrico Woodruff MD        LIDOcaine (PF) 10 mg/ml (1%) injection 10 mg  1 mL Intradermal Once Enrico Woodruff MD        regadenoson injection 0.4 mg  0.4 mg Intravenous 1 time in Clinic/HOD Vandana Gomez PA-C        sodium chloride 0.9% flush 10 mL  10 mL Intravenous PRN Joe Pitts MD         Social History:     Social History     Tobacco Use    Smoking status: Former     Current packs/day: 0.00     Average packs/day: 0.5 packs/day for 10.0 years (5.0 ttl pk-yrs)     Types: Cigarettes     Start date:      Quit date:      Years since quittin.9    Smokeless tobacco: Never   Substance Use Topics    Alcohol use: Not Currently     Family History:     Family History   Problem Relation Age of Onset    Heart disease Mother     Hypertension Mother     Heart disease Father         pacemaker    Seizures Sister     Hammer toes Brother     Heart disease Brother     Cancer Brother         prostate cancer    Premature birth Son     Cancer Sister         throat    No Known Problems Sister     No Known Problems Sister     No Known Problems Brother     No Known Problems Son      Physical Exam:   There were no vitals taken for this visit.   Wt Readings from Last 4 Encounters:   23 94 kg (207 lb 3.7 oz)   23 97.9 kg (215 lb 13.3 oz)   23 96.2 kg (212 lb 1.3 oz)   23 93.4 kg (206 lb)         Constitutional: No distress, obese, conversant  HEENT: Sclera anicteric, PERRLA, EOMI  Neck: No JVD, no masses, good movement  CV: RRR, S1 and S2 normal, no additional heart sounds or murmurs. Pulses 2+ and equal bilaterally in radial arteries, Chele's normal on right. Distal pulses are 2+ and equal in the femoral, DP and PT areas bilaterally  Pulm: Clear to auscultation bilaterally with  "symmetrical expansion. Chest wall palpated for reproduction of pain symptoms, and no pain was able to be produced on palpation or resistance exercises  GI: Abdomen soft, non-tender, good bowel sounds  Extremities: Both extremities intact and grossly normal, skin is warm, no edema noted  Skin: No ecchymosis, erythema, or ulcers  Psych: AOx3, appropriate affect  Neuro: CNII-XII intact, no focal deficits      Labs:     Lab Results   Component Value Date     (L) 12/04/2023    K 4.0 12/11/2023    CL 99 12/04/2023    CO2 27 11/13/2023    BUN 43 (H) 11/13/2023    CREATININE 11.45 (H) 12/04/2023    ANIONGAP 15 11/13/2023     Lab Results   Component Value Date    HGBA1C 5.8 (H) 11/13/2023     No results found for: "BNP", "BNPTRIAGEBLO" Lab Results   Component Value Date    WBC 5.20 12/04/2023    HGB 10.5 (L) 12/04/2023    HCT 31.8 (L) 12/04/2023    HCT 26 (L) 09/30/2022     12/04/2023    GRAN 4.6 11/13/2023    GRAN 79.0 (H) 11/13/2023     Lab Results   Component Value Date    CHOL 225 (H) 11/13/2023    HDL 32 (L) 11/13/2023    LDLCALC 156.2 11/13/2023    TRIG 184 (H) 11/13/2023          Imaging:         EF   Date Value Ref Range Status   06/02/2022 50 % Final     Nuc Stress EF   Date Value Ref Range Status   11/02/2023 51 % Final     Nuc Rest EF   Date Value Ref Range Status   11/02/2023 50  Final       TTE:   Echo Saline Bubble? No    Result Date: 8/29/2023    Left Ventricle: The left ventricle is normal in size. Normal wall   thickness. There is concentric remodeling. Normal wall motion. There is   normal systolic function with a visually estimated ejection fraction of 55   - 60%.    Left Atrium: Left atrium is mildly dilated.    Right Ventricle: Normal right ventricular cavity size. Wall thickness   is normal. Right ventricle wall motion  is normal. Systolic function is   normal.    Aortic Valve: There is mild aortic valve sclerosis.    Tricuspid Valve: There is mild regurgitation.    Pulmonary Artery: There " is mild pulmonary hypertension. The estimated   pulmonary artery systolic pressure is 40 mmHg.    IVC/SVC: Normal venous pressure at 3 mmHg.    Pericardium: There is a trivial circumferential effusion.         Coronary Angiography:  Stress Test:  11/2023:    Abnormal myocardial perfusion scan.    There is a moderate to severe intensity, small to moderate sized, mostly fixed perfusion abnormality with some reversibilty in the basal to mid septal and inferoseptal wall(s) in the typical distribution of the RCA territory.    There are no other significant perfusion abnormalities.    The gated perfusion images showed an ejection fraction of 50% at rest. The gated perfusion images showed an ejection fraction of 51% post stress. Normal ejection fraction is greater than 47%.    There is normal wall motion at rest and post stress.    LV cavity size is normal at rest and normal at stress.    The ECG portion of the study is negative for ischemia.    The patient reported no chest pain during the stress test.    During stress, rare PVCs are noted.    When compared to the previous study from 8/3/2021, there is now a mainly fixed inferior/inferoseptal defect present.    11/2021:    Normal myocardial perfusion scan. There is no evidence of myocardial ischemia or infarction.    There is a  moderate intensity fixed defect in the inferior wall of the left ventricle secondary to diaphragm attenuation.    The LVEF is not accurate due to poor software boundary tracking at rest  and poor software boundary tracking during stress. The visually estimated ejection fraction is low-normal at rest and low-normal during stress.    LV cavity size is normal at rest and normal at stress.    The EKG portion of this study is negative for ischemia.    The patient reported no chest pain during the stress test.  Assessment:    79yo F with pmhx of ESRD (on PD), HTN, HLD, DM2 who presents to clinic for evaluation of CAD     Plan:       #Abnormal  SPECT:  Perfusion defect in RCA distritubtion  --LHC +/- PCI, patient is a QUIRINO candidate  - Anti-platelet Therapy: ASA (load with plavix day of)  - Access: Right radial  - Catheters: Luisito  - Creatinine/CrCl: 11.45 (ESRD)  - Allergies: No shellfish / Iodine allergy  - Pre-Hydration: NS  - Pre-Op Med: Bendaryl 50mg pO   - All patient's questions were answered.  -The risks, benefits and alternatives of the procedure were explained to the patient.   -The risks of coronary angiography include but are not limited to: bleeding, infection, heart rhythm abnormalities, allergic reactions, kidney injury and potential need for dialysis, stroke and death.   - Should stenting be indicated, the patient has agreed to dual anti-platelet therapy for 1-consecutive year with a drug-eluting stent and a minimum of 1-month with the use of a bare metal stent  - Additionally, pt is aware that non-compliance is likely to result in stent clotting with heart attack, heart failure, and/or death  -The risks of moderate sedation include hypotension, respiratory depression, arrhythmias, bronchospasm, and death.   - Informed consent was obtained and the  patient is agreeable to proceed with the procedure.     #HTN:  #HLD:  #DM2:  #ESRD:  - continue current medications  - followed by nephro  - stable    Signed:  Lion Dean MD  Cardiology Fellow  Pager - [phone]  [facility]  12/18/2023 9:03 AM

## 2023-12-19 DIAGNOSIS — E11.65 TYPE 2 DIABETES MELLITUS WITH HYPERGLYCEMIA, WITH LONG-TERM CURRENT USE OF INSULIN: ICD-10-CM

## 2023-12-19 DIAGNOSIS — Z79.4 TYPE 2 DIABETES MELLITUS WITH HYPERGLYCEMIA, WITH LONG-TERM CURRENT USE OF INSULIN: ICD-10-CM

## 2023-12-19 RX ORDER — PEN NEEDLE, DIABETIC 32GX 5/32"
NEEDLE, DISPOSABLE MISCELLANEOUS
Qty: 100 EACH | Refills: 11 | Status: SHIPPED | OUTPATIENT
Start: 2023-12-19

## 2024-01-08 ENCOUNTER — HOSPITAL ENCOUNTER (OUTPATIENT)
Facility: HOSPITAL | Age: 79
Discharge: HOME | End: 2024-01-08
Attending: INTERNAL MEDICINE | Admitting: INTERNAL MEDICINE
Payer: MEDICARE

## 2024-01-08 VITALS
RESPIRATION RATE: 17 BRPM | SYSTOLIC BLOOD PRESSURE: 150 MMHG | BODY MASS INDEX: 25.54 KG/M2 | OXYGEN SATURATION: 99 % | HEIGHT: 74 IN | DIASTOLIC BLOOD PRESSURE: 70 MMHG | HEART RATE: 77 BPM | TEMPERATURE: 98 F | WEIGHT: 199 LBS

## 2024-01-08 DIAGNOSIS — R94.30 ABNORMAL CARDIOVASCULAR FUNCTION STUDY: ICD-10-CM

## 2024-01-08 DIAGNOSIS — R94.39 ABNORMAL CARDIOVASCULAR STRESS TEST: ICD-10-CM

## 2024-01-08 LAB
ABO + RH BLD: ABNORMAL
ANION GAP SERPL CALC-SCNC: 14 MMOL/L (ref 8–16)
BLD GP AB SCN CELLS X3 SERPL QL: ABNORMAL
BLD GP AB SCN CELLS X3 SERPL QL: NORMAL
BUN SERPL-MCNC: 40 MG/DL (ref 8–23)
CALCIUM SERPL-MCNC: 8.9 MG/DL (ref 8.7–10.5)
CHLORIDE SERPL-SCNC: 94 MMOL/L (ref 95–110)
CO2 SERPL-SCNC: 28 MMOL/L (ref 23–29)
CREAT SERPL-MCNC: 11.9 MG/DL (ref 0.5–1.4)
ERYTHROCYTE [DISTWIDTH] IN BLOOD BY AUTOMATED COUNT: 14.3 % (ref 11.5–14.5)
EST. GFR  (NO RACE VARIABLE): 4 ML/MIN/1.73 M^2
GLUCOSE SERPL-MCNC: 77 MG/DL (ref 70–110)
HCT VFR BLD AUTO: 29.6 % (ref 40–54)
HGB BLD-MCNC: 10.1 G/DL (ref 14–18)
MCH RBC QN AUTO: 30.1 PG (ref 27–31)
MCHC RBC AUTO-ENTMCNC: 34.1 G/DL (ref 32–36)
MCV RBC AUTO: 88 FL (ref 82–98)
PLATELET # BLD AUTO: 279 K/UL (ref 150–450)
PMV BLD AUTO: 9.5 FL (ref 9.2–12.9)
POCT GLUCOSE: 77 MG/DL (ref 70–110)
POCT GLUCOSE: 88 MG/DL (ref 70–110)
POTASSIUM SERPL-SCNC: 2.7 MMOL/L (ref 3.5–5.1)
RBC # BLD AUTO: 3.35 M/UL (ref 4.6–6.2)
SODIUM SERPL-SCNC: 136 MMOL/L (ref 136–145)
SPECIMEN OUTDATE: ABNORMAL
WBC # BLD AUTO: 8.18 K/UL (ref 3.9–12.7)

## 2024-01-08 PROCEDURE — 80048 BASIC METABOLIC PNL TOTAL CA: CPT | Mod: TXP | Performed by: INTERNAL MEDICINE

## 2024-01-08 PROCEDURE — C1894 INTRO/SHEATH, NON-LASER: HCPCS | Mod: TXP | Performed by: INTERNAL MEDICINE

## 2024-01-08 PROCEDURE — 63600175 PHARM REV CODE 636 W HCPCS: Mod: JG,TXP | Performed by: INTERNAL MEDICINE

## 2024-01-08 PROCEDURE — 86850 RBC ANTIBODY SCREEN: CPT | Mod: TXP | Performed by: INTERNAL MEDICINE

## 2024-01-08 PROCEDURE — 93454 CORONARY ARTERY ANGIO S&I: CPT | Mod: 26,NTX,, | Performed by: INTERNAL MEDICINE

## 2024-01-08 PROCEDURE — 63600175 PHARM REV CODE 636 W HCPCS: Mod: JZ,JG,TXP | Performed by: INTERNAL MEDICINE

## 2024-01-08 PROCEDURE — 82962 GLUCOSE BLOOD TEST: CPT | Mod: TXP | Performed by: INTERNAL MEDICINE

## 2024-01-08 PROCEDURE — 25000003 PHARM REV CODE 250: Mod: TXP | Performed by: INTERNAL MEDICINE

## 2024-01-08 PROCEDURE — 85027 COMPLETE CBC AUTOMATED: CPT | Mod: TXP | Performed by: INTERNAL MEDICINE

## 2024-01-08 PROCEDURE — C1769 GUIDE WIRE: HCPCS | Mod: TXP | Performed by: INTERNAL MEDICINE

## 2024-01-08 PROCEDURE — 25500020 PHARM REV CODE 255: Mod: TXP | Performed by: INTERNAL MEDICINE

## 2024-01-08 PROCEDURE — 93010 ELECTROCARDIOGRAM REPORT: CPT | Mod: TXP,,, | Performed by: INTERNAL MEDICINE

## 2024-01-08 PROCEDURE — 86901 BLOOD TYPING SEROLOGIC RH(D): CPT | Mod: TXP | Performed by: INTERNAL MEDICINE

## 2024-01-08 PROCEDURE — 99152 MOD SED SAME PHYS/QHP 5/>YRS: CPT | Mod: TXP | Performed by: INTERNAL MEDICINE

## 2024-01-08 PROCEDURE — 99152 MOD SED SAME PHYS/QHP 5/>YRS: CPT | Mod: NTX,,, | Performed by: INTERNAL MEDICINE

## 2024-01-08 PROCEDURE — C1887 CATHETER, GUIDING: HCPCS | Mod: TXP | Performed by: INTERNAL MEDICINE

## 2024-01-08 PROCEDURE — 93454 CORONARY ARTERY ANGIO S&I: CPT | Mod: NTX | Performed by: INTERNAL MEDICINE

## 2024-01-08 PROCEDURE — 93005 ELECTROCARDIOGRAM TRACING: CPT | Mod: TXP

## 2024-01-08 RX ORDER — POTASSIUM CHLORIDE 20 MEQ/1
TABLET, EXTENDED RELEASE ORAL
Status: DISCONTINUED | OUTPATIENT
Start: 2024-01-08 | End: 2024-01-08 | Stop reason: HOSPADM

## 2024-01-08 RX ORDER — HEPARIN SOD,PORCINE/0.9 % NACL 1000/500ML
INTRAVENOUS SOLUTION INTRAVENOUS
Status: DISCONTINUED | OUTPATIENT
Start: 2024-01-08 | End: 2024-01-08 | Stop reason: HOSPADM

## 2024-01-08 RX ORDER — DIPHENHYDRAMINE HCL 50 MG
CAPSULE ORAL
Status: DISCONTINUED | OUTPATIENT
Start: 2024-01-08 | End: 2024-01-08 | Stop reason: HOSPADM

## 2024-01-08 RX ORDER — FENTANYL CITRATE 50 UG/ML
INJECTION, SOLUTION INTRAMUSCULAR; INTRAVENOUS
Status: DISCONTINUED | OUTPATIENT
Start: 2024-01-08 | End: 2024-01-08 | Stop reason: HOSPADM

## 2024-01-08 RX ORDER — MIDAZOLAM HYDROCHLORIDE 2 MG/2ML
INJECTION, SOLUTION INTRAMUSCULAR; INTRAVENOUS
Status: DISCONTINUED | OUTPATIENT
Start: 2024-01-08 | End: 2024-01-08 | Stop reason: HOSPADM

## 2024-01-08 RX ORDER — LIDOCAINE HYDROCHLORIDE 20 MG/ML
INJECTION, SOLUTION EPIDURAL; INFILTRATION; INTRACAUDAL; PERINEURAL
Status: DISCONTINUED | OUTPATIENT
Start: 2024-01-08 | End: 2024-01-08 | Stop reason: HOSPADM

## 2024-01-08 RX ORDER — ACETAMINOPHEN 325 MG/1
650 TABLET ORAL EVERY 4 HOURS PRN
Status: DISCONTINUED | OUTPATIENT
Start: 2024-01-08 | End: 2024-01-08 | Stop reason: HOSPADM

## 2024-01-08 RX ORDER — ONDANSETRON 8 MG/1
8 TABLET, ORALLY DISINTEGRATING ORAL EVERY 8 HOURS PRN
Status: DISCONTINUED | OUTPATIENT
Start: 2024-01-08 | End: 2024-01-08 | Stop reason: HOSPADM

## 2024-01-08 RX ORDER — GUAIFENESIN 100 MG/5ML
LIQUID (ML) ORAL
Status: DISCONTINUED | OUTPATIENT
Start: 2024-01-08 | End: 2024-01-08 | Stop reason: HOSPADM

## 2024-01-08 RX ORDER — HEPARIN SODIUM 1000 [USP'U]/ML
INJECTION, SOLUTION INTRAVENOUS; SUBCUTANEOUS
Status: DISCONTINUED | OUTPATIENT
Start: 2024-01-08 | End: 2024-01-08 | Stop reason: HOSPADM

## 2024-01-08 RX ORDER — SODIUM CHLORIDE 9 MG/ML
INJECTION, SOLUTION INTRAVENOUS CONTINUOUS
Status: ACTIVE | OUTPATIENT
Start: 2024-01-08 | End: 2024-01-08

## 2024-01-08 RX ORDER — DIPHENHYDRAMINE HCL 50 MG
50 CAPSULE ORAL ONCE
Status: COMPLETED | OUTPATIENT
Start: 2024-01-08 | End: 2024-01-08

## 2024-01-08 RX ADMIN — DIPHENHYDRAMINE HYDROCHLORIDE 50 MG: 50 CAPSULE ORAL at 11:01

## 2024-01-08 NOTE — INTERVAL H&P NOTE
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 She is s/p intubation in the ED on 4/1.    CNS: Dr Louise is her primary neurologist and is aware of her admission. She is s/p VNS placement, first in Nov 2012 and then replaced a second time last year. Last tonic-clonic seizure noted at 10:30pm 4/1.  - neurology consulted and following  - continue on 800mg IV solumedrol q24hr  - versed drip @ 0.6, titrating according to seizure activity  - 250mg BID ethosuxamide  - s/p Vimpat 200mg x1 loading dose, then continue 100mg BID   - 24hr VEEG ordered for this AM  - continue home medications:   - lamictal 150mg BID   - Onfi 10mg qAM and 20mg qHS    CV: no issues  - on continuous tele    Resp: s/p intubation in the ED for airway management during status epilepticus  - adjusting PS settings as required  - VBG q6hr and PRN  - CXR in AM    FENGI:   - NPO  - GI ppx  - mIVF  - strict I+Os  - AM labs    Heme/ID: no active issues    Social: Mother and maternal aunt at bedside. All questions asked and answered.  Dispo: Pending extubation and clinical improvement.   The patient has been examined and the H&P has been reviewed:    I concur with the findings and no changes have occurred since H&P was written.    Procedure risks, benefits and alternative options discussed and understood by patient/family.          There are no hospital problems to display for this patient.

## 2024-01-08 NOTE — NURSING
Vasc band removed without difficulty.  No bleeding or hematoma noted to right wrist puncture site.  Gauze/tegaderm applied to right wrist puncture site.  No c/o pain or SOB.  VSS.  IV d/c'd with cath tip intact.

## 2024-01-08 NOTE — NURSING
Deflating vasc band as per protocol.  One IV to right forearm d/c'd with cath tip intact.  Pt and pt son both verbalized an understanding of d/c instructions.

## 2024-01-08 NOTE — NURSING
Off unit via wheelchair for discharge home.  Pt son at his side.  Dressing to right wrist remains clean dry and intact.  Denies pain or SOB.

## 2024-01-08 NOTE — NURSING
Report from [facility].  Awake alert and oriented.  Denies pain or SOB.  VSS.  Right wrist vasc band in place without bleeding or hematoma noted.  Pt sitting up in bed eating.  Pt states his son is on his way to the hospital now to take patient home.  Will continue to monitor.

## 2024-01-08 NOTE — DISCHARGE INSTRUCTIONS
Cath discharge instructions:  1. Do not strain or lift anything greater than 4 lbs to wrist that was accessed.  2. Do not drive or operate any dangerous machinery for 24 hours.  3. Keep the dressing on, clean, and dry for 24 hours.  4. After 24 hours, the dressing may be removed and a shower is allowed.  5. Clean the area with mild soap and water and then cover it with a bandage.  6. Once the skin has healed, bathing in a tub or swimming is allowed.  7. Inspect the access site daily and report to the physician any swelling at the site that  cannot be controlled with manual pressure for 10 minutes, unusual pain at the  access site or affected extremity, unusual swelling at the access site, or signs or  symptoms of infection such as redness, pain, or fever.  Call 911 if you have:  Bleeding from the puncture site that you cannot stop by doing the following:  Keep your wrist straight and apply firm pressure to the site using your fingers and a gauze pad. Keep the pressure on for 20 minutes. Continue this until the bleeding stops. This may take awhile. When bleeding stops, cover the site with a sterile bandage and keep your wrist still as much as possible.

## 2024-01-08 NOTE — OP NOTE
Brief Operative Note:    : Joe Pitts MD     Referring Physician: Joe Pitts     All Operators: Surgeon(s):  Nils Jha MD Garikapati, Kiran, MD Tafur Soto, Jose D., MD     Preoperative Diagnosis: Abnormal cardiovascular function study [R94.30]     Postop Diagnosis: Abnormal cardiovascular function study [R94.30]    Treatments/Procedures: Procedure(s) (LRB):  ANGIOGRAM, CORONARY ARTERY (N/A)    Access: Right radial artery    Findings: non obstructive   coronary artery disease is present.     See catheterization report for full details.    Intervention: none    See catheterization report for full details.    Closure device:  TR band         Plan:  - Post cath protocol   - Bed rest x 2 hours   - Continue aspirin 81 mg daily indefinitely  - Continue high intensity statin therapy (LDL goal < 70)  - Risk factor reduction (BP <130/80 mmHg, glycemic control, etc)  - Follow up with outpatient cardiologist    Estimated Blood loss: 20 cc    Specimens removed: No    Horacio Malone MD  [facility]

## 2024-01-10 ENCOUNTER — TELEPHONE (OUTPATIENT)
Dept: CARDIOLOGY | Facility: CLINIC | Age: 79
End: 2024-01-10
Payer: MEDICARE

## 2024-01-10 DIAGNOSIS — R00.2 PALPITATIONS: Primary | ICD-10-CM

## 2024-01-11 ENCOUNTER — OFFICE VISIT (OUTPATIENT)
Dept: CARDIOLOGY | Facility: CLINIC | Age: 79
End: 2024-01-11
Payer: MEDICARE

## 2024-01-11 VITALS
DIASTOLIC BLOOD PRESSURE: 80 MMHG | WEIGHT: 203.13 LBS | SYSTOLIC BLOOD PRESSURE: 140 MMHG | OXYGEN SATURATION: 96 % | BODY MASS INDEX: 26.07 KG/M2 | HEIGHT: 74 IN | HEART RATE: 94 BPM

## 2024-01-11 DIAGNOSIS — N18.5 CKD (CHRONIC KIDNEY DISEASE), STAGE V: ICD-10-CM

## 2024-01-11 DIAGNOSIS — R97.20 ELEVATED PSA: ICD-10-CM

## 2024-01-11 DIAGNOSIS — E78.2 MIXED HYPERLIPIDEMIA: ICD-10-CM

## 2024-01-11 DIAGNOSIS — D50.9 IRON DEFICIENCY ANEMIA, UNSPECIFIED IRON DEFICIENCY ANEMIA TYPE: ICD-10-CM

## 2024-01-11 DIAGNOSIS — N18.5 BENIGN HYPERTENSION WITH CKD (CHRONIC KIDNEY DISEASE) STAGE V: ICD-10-CM

## 2024-01-11 DIAGNOSIS — I70.0 AORTIC ATHEROSCLEROSIS: ICD-10-CM

## 2024-01-11 DIAGNOSIS — G47.33 OSA (OBSTRUCTIVE SLEEP APNEA): ICD-10-CM

## 2024-01-11 DIAGNOSIS — Z99.2 PERITONEAL DIALYSIS CATHETER IN PLACE: ICD-10-CM

## 2024-01-11 DIAGNOSIS — Z76.82 ORGAN TRANSPLANT CANDIDATE: ICD-10-CM

## 2024-01-11 DIAGNOSIS — I25.10 CORONARY ARTERY DISEASE INVOLVING NATIVE CORONARY ARTERY OF NATIVE HEART WITHOUT ANGINA PECTORIS: Primary | ICD-10-CM

## 2024-01-11 DIAGNOSIS — I12.0 BENIGN HYPERTENSION WITH CKD (CHRONIC KIDNEY DISEASE) STAGE V: ICD-10-CM

## 2024-01-11 PROCEDURE — 99214 OFFICE O/P EST MOD 30 MIN: CPT | Mod: S$PBB,TXP,, | Performed by: INTERNAL MEDICINE

## 2024-01-11 PROCEDURE — 99999 PR PBB SHADOW E&M-EST. PATIENT-LVL V: CPT | Mod: PBBFAC,TXP,, | Performed by: INTERNAL MEDICINE

## 2024-01-11 PROCEDURE — 99215 OFFICE O/P EST HI 40 MIN: CPT | Mod: PBBFAC,PN,TXP | Performed by: INTERNAL MEDICINE

## 2024-01-11 NOTE — PROGRESS NOTES
Subjective:    Patient ID:  Teodoro Mast is a 78 y.o. male who presents for follow-up of hypertension ESRD    HPI  The patient is a 78 year old male retired  at [employer] is followed with hypertension, hyperlipidemia , ESRD on PD and Transplant list. A pre-op nuclear stress was abnormal and aLHC was done which revealed non obstructive disease [ see below]. He recently has developed vertigo the continues to be a problem He no FRAGA. He has lost leg strength and was instructed to exercise on a bike      1/8/24A  Left Main   Exhibits minimal luminal irregularities.      Left Anterior Descending   Exhibits minimal luminal irregularities.      Left Circumflex   Exhibits minimal luminal irregularities.      Right Coronary Artery   Exhibits minimal luminal irregularities.      Right Posterior Descending Artery   The vessel exhibits minimal luminal irregularities.      Intervention     No interventions have been documented     Conclusion 11/2/23         Abnormal myocardial perfusion scan.    There is a moderate to severe intensity, small to moderate sized, mostly fixed perfusion abnormality with some reversibilty in the basal to mid septal and inferoseptal wall(s) in the typical distribution of the RCA territory.    There are no other significant perfusion abnormalities.    The gated perfusion images showed an ejection fraction of 50% at rest. The gated perfusion images showed an ejection fraction of 51% post stress. Normal ejection fraction is greater than 47%.    There is normal wall motion at rest and post stress.    LV cavity size is normal at rest and normal at stress.    The ECG portion of the study is negative for ischemia.    The patient reported no chest pain during the stress test.    During stress, rare PVCs are noted.    When compared to the previous study from 8/3/2021, there is now a mainly fixed inferior/inferoseptal defect present.     Lab Results   Component Value Date     01/08/2024  "   K 2.7 (LL) 01/08/2024    CL 94 (L) 01/08/2024    CL 99 12/04/2023    CO2 28 01/08/2024    BUN 40 (H) 01/08/2024    CREATININE 11.9 (H) 01/08/2024    GLU 77 01/08/2024    HGBA1C 5.8 (H) 11/13/2023    MG 1.6 11/03/2023    AST 20 11/02/2023    ALT 26 12/04/2023    ALBUMIN 3.3 (L) 12/04/2023    PROT 5.7 (L) 11/02/2023    BILITOT 0.4 11/02/2023    WBC 8.18 01/08/2024    HGB 10.1 (L) 01/08/2024    HCT 29.6 (L) 01/08/2024    HCT 26 (L) 09/30/2022    MCV 88 01/08/2024     01/08/2024    INR 1.0 07/13/2021    PSA 14.2 (H) 07/13/2021         Lab Results   Component Value Date    CHOL 225 (H) 11/13/2023    HDL 32 (L) 11/13/2023    TRIG 184 (H) 11/13/2023       Lab Results   Component Value Date    LDLCALC 156.2 11/13/2023       Past Medical History:   Diagnosis Date    BPH (benign prostatic hyperplasia)     CKD (chronic kidney disease) stage 5, GFR less than 15 ml/min     Gout     Hyperlipidemia     Hyperparathyroidism, secondary renal     Hypertension     Sleep apnea        Current Outpatient Medications:     amLODIPine (NORVASC) 5 MG tablet, Take 2 tablets (10 mg total) by mouth once daily. (Patient taking differently: Take 5 mg by mouth 2 (two) times daily.), Disp: 60 tablet, Rfl: 11    aspirin 81 MG Chew, Take 1 tablet by mouth once daily., Disp: , Rfl:     atorvastatin (LIPITOR) 80 MG tablet, TAKE 1 TABLET(80 MG) BY MOUTH EVERY DAY, Disp: 90 tablet, Rfl: 3    BD KITA 2ND GEN PEN NEEDLE 32 gauge x 5/32" Ndle, USE AS DIRECTED DAILY FOR INSULIN, Disp: 100 each, Rfl: 11    calcitRIOL (ROCALTROL) 0.25 MCG Cap, Take 0.25 mcg by mouth once a week., Disp: , Rfl:     gentamicin (GARAMYCIN) 0.3 % ophthalmic solution, Apply 1 drop to PD exit site daily with dressing changes., Disp: , Rfl:     insulin detemir U-100, Levemir, (LEVEMIR FLEXPEN) 100 unit/mL (3 mL) InPn pen, Inject 10 Units into the skin every evening., Disp: 9 mL, Rfl: 3    labetaloL (NORMODYNE) 300 MG tablet, Take 1 tablet (300 mg total) by " mouth 2 (two) times daily., Disp: 180 tablet, Rfl: 2    magnesium oxide (MAG-OX) 400 mg (241.3 mg magnesium) tablet, TAKE 1 TABLET BY MOUTH DAILY (Patient taking differently: Take 1 tablet by mouth once daily.), Disp: 90 tablet, Rfl: 2    ondansetron (ZOFRAN) 4 MG tablet, Take 1 tablet (4 mg total) by mouth every 8 (eight) hours as needed for Nausea., Disp: 5 tablet, Rfl: 0    potassium chloride SA (K-DUR,KLOR-CON) 20 MEQ tablet, TAKE 1 TABLET BY MOUTH DAILY, Disp: 90 tablet, Rfl: 3    psyllium (METAMUCIL) powder, Take 28 g by mouth once daily. Patient taking 2 tablespoon, Disp: , Rfl:     sevelamer carbonate (RENVELA) 800 mg Tab, Take 1 tablet (800 mg total) by mouth 3 (three) times daily with meals., Disp: 90 tablet, Rfl: 11    vitamin D (VITAMIN D3) 1000 units Tab, Take 2,000 Units by mouth once daily., Disp: , Rfl:     Current Facility-Administered Medications:     regadenoson injection 0.4 mg, 0.4 mg, Intravenous, 1 time in Clinic/HOD, Vandana Gomez PA-C    sodium chloride 0.9% flush 10 mL, 10 mL, Intravenous, PRN, Joe Pitts MD    Facility-Administered Medications Ordered in Other Visits:     0.9%  NaCl infusion, , Intravenous, Continuous, Enrico Woodruff MD, Last Rate: 100 mL/hr at 05/05/21 1115, New Bag at 05/05/21 1115    ceFAZolin (ANCEF) 3 gram in dextrose 5% IVPB, 3 g, Intravenous, On Call Procedure, Enrico Woodruff MD    LIDOcaine (PF) 10 mg/ml (1%) injection 10 mg, 1 mL, Intradermal, Once, Enrico Woodruff MD          Review of Systems   Constitutional: Positive for weight loss. Negative for decreased appetite, diaphoresis, fever and malaise/fatigue.   HENT:  Negative for congestion, ear discharge, ear pain and nosebleeds.    Eyes:  Negative for blurred vision, double vision and visual disturbance.   Cardiovascular:  Negative for chest pain, claudication, cyanosis, dyspnea on exertion, irregular heartbeat, leg swelling, near-syncope, orthopnea, palpitations,  paroxysmal nocturnal dyspnea and syncope.   Respiratory:  Negative for cough, hemoptysis, shortness of breath, sleep disturbances due to breathing, snoring, sputum production and wheezing.    Endocrine: Negative for polydipsia, polyphagia and polyuria.   Hematologic/Lymphatic: Negative for adenopathy and bleeding problem. Does not bruise/bleed easily.   Skin:  Negative for color change, nail changes, poor wound healing and rash.   Musculoskeletal:  Positive for muscle weakness (thighs). Negative for muscle cramps.   Gastrointestinal:  Negative for abdominal pain, anorexia, change in bowel habit, hematochezia, nausea and vomiting.   Genitourinary:  Negative for dysuria, frequency and hematuria.   Neurological:  Positive for dizziness. Negative for brief paralysis, difficulty with concentration, excessive daytime sleepiness, focal weakness, headaches, light-headedness, seizures, vertigo and weakness.   Psychiatric/Behavioral:  Negative for altered mental status and depression.    Allergic/Immunologic: Negative for persistent infections.      Objective:There were no vitals taken for this visit.            Physical Exam  Constitutional:       Appearance: He is normal weight.   Cardiovascular:      Pulses:           Carotid pulses are 2+ on the right side and 2+ on the left side.       Dorsalis pedis pulses are 2+ on the right side and 2+ on the left side.   Abdominal:          Comments: PD catheter   Neurological:      Mental Status: He is alert.       Assessment:       No diagnosis found.     Plan:       There are no diagnoses linked to this encounter.

## 2024-01-11 NOTE — DISCHARGE SUMMARY
"Discharge Summary  Interventional Cardiology      Admit Date: 1/8/2024    Discharge Date:  1/11/2024    Attending Physician: No att. providers found    Discharge Physician: No att. providers found    Principal Diagnoses: <principal problem not specified>  Indication for Admission: ANGIOGRAM, CORONARY ARTERY (N/A)    Discharged Condition: Good    Hospital Course:   Patient presented for outpatient coronary angiogram which went without complication. Coronary angiogram was nonobstructive. See full cath report in Epic for details. Hemostasis of patient's R Radial access site was achieved with TR Band. Patient was monitored per post-cath protocol, and his R radial access site was c/d/i with no hematoma. Patient was able to ambulate without difficulty. He was feeling well and anticipating discharge home today.     Outpatient Plan:  - There were no medication changes    Diet: Cardiac diet    Activity: Ad pedro, wound care instructions provided    Disposition: Home or Self Care    Follow Up:      Discharge Medications:      Medication List        ASK your doctor about these medications      amLODIPine 5 MG tablet  Commonly known as: NORVASC  Take 2 tablets (10 mg total) by mouth once daily.     aspirin 81 MG Chew     atorvastatin 80 MG tablet  Commonly known as: LIPITOR  TAKE 1 TABLET(80 MG) BY MOUTH EVERY DAY     BD KITA 2ND GEN PEN NEEDLE 32 gauge x 5/32" Ndle  Generic drug: pen needle, diabetic  USE AS DIRECTED DAILY FOR INSULIN     calcitRIOL 0.25 MCG Cap  Commonly known as: ROCALTROL     gentamicin 0.3 % ophthalmic solution  Commonly known as: GARAMYCIN     labetaloL 300 MG tablet  Commonly known as: NORMODYNE  Take 1 tablet (300 mg total) by mouth 2 (two) times daily.     LEVEMIR FLEXPEN 100 unit/mL (3 mL) Inpn pen  Generic drug: insulin detemir U-100 (Levemir)  Inject 10 Units into the skin every evening.     magnesium oxide 400 mg (241.3 mg magnesium) tablet  Commonly known as: MAG-OX  TAKE 1 TABLET BY MOUTH DAILY   "   ondansetron 4 MG tablet  Commonly known as: ZOFRAN  Take 1 tablet (4 mg total) by mouth every 8 (eight) hours as needed for Nausea.     potassium chloride SA 20 MEQ tablet  Commonly known as: K-DUR,KLOR-CON  TAKE 1 TABLET BY MOUTH DAILY     psyllium powder  Commonly known as: METAMUCIL     sevelamer carbonate 800 mg Tab  Commonly known as: RENVELA  Take 1 tablet (800 mg total) by mouth 3 (three) times daily with meals.     vitamin D 1000 units Tab  Commonly known as: VITAMIN D3              Horacio Malone  01/11/2024

## 2024-01-12 ENCOUNTER — LAB VISIT (OUTPATIENT)
Dept: LAB | Facility: HOSPITAL | Age: 79
End: 2024-01-12
Attending: INTERNAL MEDICINE
Payer: MEDICARE

## 2024-01-12 DIAGNOSIS — E78.2 MIXED HYPERLIPIDEMIA: ICD-10-CM

## 2024-01-12 DIAGNOSIS — Z76.82 ORGAN TRANSPLANT CANDIDATE: Primary | ICD-10-CM

## 2024-01-12 DIAGNOSIS — I25.10 CORONARY ARTERY DISEASE INVOLVING NATIVE CORONARY ARTERY OF NATIVE HEART WITHOUT ANGINA PECTORIS: ICD-10-CM

## 2024-01-12 LAB
CHOLEST SERPL-MCNC: 178 MG/DL (ref 120–199)
CHOLEST/HDLC SERPL: 6.1 {RATIO} (ref 2–5)
HDLC SERPL-MCNC: 29 MG/DL (ref 40–75)
HDLC SERPL: 16.3 % (ref 20–50)
LDLC SERPL CALC-MCNC: 124.4 MG/DL (ref 63–159)
NONHDLC SERPL-MCNC: 149 MG/DL
TRIGL SERPL-MCNC: 123 MG/DL (ref 30–150)

## 2024-01-12 PROCEDURE — 80061 LIPID PANEL: CPT | Mod: NTX | Performed by: INTERNAL MEDICINE

## 2024-01-12 PROCEDURE — 36415 COLL VENOUS BLD VENIPUNCTURE: CPT | Mod: PN,NTX | Performed by: INTERNAL MEDICINE

## 2024-01-18 ENCOUNTER — PATIENT MESSAGE (OUTPATIENT)
Dept: CARDIOLOGY | Facility: CLINIC | Age: 79
End: 2024-01-18
Payer: MEDICARE

## 2024-01-24 ENCOUNTER — TELEPHONE (OUTPATIENT)
Dept: TRANSPLANT | Facility: CLINIC | Age: 79
End: 2024-01-24
Payer: MEDICARE

## 2024-01-24 ENCOUNTER — OFFICE VISIT (OUTPATIENT)
Dept: UROLOGY | Facility: CLINIC | Age: 79
End: 2024-01-24
Payer: MEDICARE

## 2024-01-24 VITALS
BODY MASS INDEX: 26.05 KG/M2 | HEIGHT: 74 IN | HEART RATE: 63 BPM | DIASTOLIC BLOOD PRESSURE: 64 MMHG | SYSTOLIC BLOOD PRESSURE: 117 MMHG | WEIGHT: 203 LBS

## 2024-01-24 DIAGNOSIS — R97.20 ELEVATED PSA: Primary | ICD-10-CM

## 2024-01-24 DIAGNOSIS — Z76.82 ORGAN TRANSPLANT CANDIDATE: ICD-10-CM

## 2024-01-24 PROCEDURE — 99999 PR PBB SHADOW E&M-EST. PATIENT-LVL IV: CPT | Mod: PBBFAC,TXP,, | Performed by: UROLOGY

## 2024-01-24 PROCEDURE — 99213 OFFICE O/P EST LOW 20 MIN: CPT | Mod: S$PBB,TXP,, | Performed by: UROLOGY

## 2024-01-24 PROCEDURE — 99214 OFFICE O/P EST MOD 30 MIN: CPT | Mod: PBBFAC,TXP | Performed by: UROLOGY

## 2024-01-24 NOTE — TELEPHONE ENCOUNTER
Spoke to pt confirming appts on 02/22/2024. Appt reminders were mailed on 01/24/2024 and pt is aware to bring care giver

## 2024-01-24 NOTE — PROGRESS NOTES
Subjective:       Patient ID: Teodoro Mast is a 78 y.o. male.    Chief Complaint: Elevated PSA (/Pt here for elevated psa and f/u. )    HPI  Patient is here with an elevated PSA of 10.3 it was 14 a couple of years ago when he had a negative truss with biopsy there was some chronic inflammation seen.  He is on dialysis this is not pre transplant evaluation he makes a small amount of urine in his about to make the decision to go to hemodialysis    Past Medical History:   Diagnosis Date    BPH (benign prostatic hyperplasia)     CKD (chronic kidney disease) stage 5, GFR less than 15 ml/min     Gout     Hyperlipidemia     Hyperparathyroidism, secondary renal     Hypertension     Sleep apnea        Past Surgical History:   Procedure Laterality Date    AV FISTULA PLACEMENT Left 3/31/2022    Procedure: CREATION, AV FISTULA RADIOCEPHALIC;  Surgeon: PEMA Martines II, MD;  Location: [facility] OR 2ND FLR;  Service: Vascular;  Laterality: Left;    AV FISTULA PLACEMENT Left 9/30/2022    Procedure: CREATION, AV FISTULA;  Surgeon: PEMA Martines II, MD;  Location: [facility] OR 2ND FLR;  Service: Vascular;  Laterality: Left;    COLONOSCOPY N/A 11/2/2021    Procedure: COLONOSCOPY;  Surgeon: Joe Jimenez MD;  Location: [facility] ENDO (4TH FLR);  Service: Endoscopy;  Laterality: N/A;  COVID test at [place] on 10/30-GT      dialysis pt-labs prior    CORONARY ANGIOGRAPHY N/A 1/8/2024    Procedure: ANGIOGRAM, CORONARY ARTERY;  Surgeon: Joe Pitts MD;  Location: [facility] CATH LAB;  Service: Cardiology;  Laterality: N/A;    EYE SURGERY Bilateral     cataract removal    FINGER SURGERY      hemorriodectomy      PERITONEAL CATHETER INSERTION         Family History   Problem Relation Age of Onset    Heart disease Mother     Hypertension Mother     Heart disease Father         pacemaker    Seizures Sister     Hammer toes Brother     Heart disease Brother     Cancer Brother         prostate cancer    Premature birth Son     Cancer Sister          throat    No Known Problems Sister     No Known Problems Sister     No Known Problems Brother     No Known Problems Son        Social History     Socioeconomic History    Marital status:     Number of children: 2   Tobacco Use    Smoking status: Former     Current packs/day: 0.00     Average packs/day: 0.5 packs/day for 10.0 years (5.0 ttl pk-yrs)     Types: Cigarettes     Start date:      Quit date:      Years since quittin.0    Smokeless tobacco: Never   Substance and Sexual Activity    Alcohol use: Not Currently    Drug use: No    Sexual activity: Not Currently     Partners: Female   Social History Narrative    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     Social Determinants of Health     Financial Resource Strain: Low Risk  (11/3/2023)    Overall Financial Resource Strain (CARDIA)     Difficulty of Paying Living Expenses: Not hard at all   Food Insecurity: No Food Insecurity (12/3/2023)    Hunger Vital Sign     Worried About Running Out of Food in the Last Year: Never true     Ran Out of Food in the Last Year: Never true   Transportation Needs: No Transportation Needs (12/3/2023)    PRAPARE - Transportation     Lack of Transportation (Medical): No     Lack of Transportation (Non-Medical): No   Physical Activity: Inactive (12/3/2023)    Exercise Vital Sign     Days of Exercise per Week: 0 days     Minutes of Exercise per Session: 0 min   Stress: No Stress Concern Present (12/3/2023)    [employer] - Occupational Stress Questionnaire     Feeling of Stress : Not at all   Social Connections: Unknown (12/3/2023)    Social Connection and Isolation Panel [NHANES]     Frequency of Communication with Friends and Family: Three times a week     Frequency of Social Gatherings with Friends and Family: Once a week     Active Member of Clubs or Organizations: No     Attends Club or Organization Meetings: Never     Marital Status:    Housing Stability: Low Risk  (12/3/2023)  "   Housing Stability Vital Sign     Unable to Pay for Housing in the Last Year: No     Number of Places Lived in the Last Year: 1     Unstable Housing in the Last Year: No       Allergies:  Patient has no known allergies.    Medications:    Current Outpatient Medications:     amLODIPine (NORVASC) 5 MG tablet, Take 2 tablets (10 mg total) by mouth once daily., Disp: 60 tablet, Rfl: 11    aspirin 81 MG Chew, Take 1 tablet by mouth once daily., Disp: , Rfl:     atorvastatin (LIPITOR) 80 MG tablet, TAKE 1 TABLET(80 MG) BY MOUTH EVERY DAY, Disp: 90 tablet, Rfl: 3    BD KITA 2ND GEN PEN NEEDLE 32 gauge x 5/32" Ndle, USE AS DIRECTED DAILY FOR INSULIN, Disp: 100 each, Rfl: 11    calcitRIOL (ROCALTROL) 0.25 MCG Cap, Take 0.25 mcg by mouth once a week., Disp: , Rfl:     gentamicin (GARAMYCIN) 0.3 % ophthalmic solution, Apply 1 drop to PD exit site daily with dressing changes., Disp: , Rfl:     insulin detemir U-100, Levemir, (LEVEMIR FLEXPEN) 100 unit/mL (3 mL) InPn pen, Inject 10 Units into the skin every evening., Disp: 9 mL, Rfl: 3    labetaloL (NORMODYNE) 300 MG tablet, Take 1 tablet (300 mg total) by mouth 2 (two) times daily., Disp: 180 tablet, Rfl: 2    magnesium oxide (MAG-OX) 400 mg (241.3 mg magnesium) tablet, TAKE 1 TABLET BY MOUTH DAILY (Patient taking differently: Take 1 tablet by mouth once daily.), Disp: 90 tablet, Rfl: 2    ondansetron (ZOFRAN) 4 MG tablet, Take 1 tablet (4 mg total) by mouth every 8 (eight) hours as needed for Nausea. (Patient not taking: Reported on 1/11/2024), Disp: 5 tablet, Rfl: 0    potassium chloride SA (K-DUR,KLOR-CON M) 10 MEQ tablet, TAKE 1 TABLET BY MOUTH DAILY, Disp: 90 tablet, Rfl: 0    potassium chloride SA (K-DUR,KLOR-CON) 20 MEQ tablet, TAKE 1 TABLET BY MOUTH DAILY (Patient not taking: Reported on 1/11/2024), Disp: 90 tablet, Rfl: 3    psyllium (METAMUCIL) powder, Take 28 g by mouth once daily. Patient taking 2 tablespoon, Disp: , Rfl:     sevelamer carbonate (RENVELA) 800 mg " Tab, Take 1 tablet (800 mg total) by mouth 3 (three) times daily with meals., Disp: 90 tablet, Rfl: 11    vitamin D (VITAMIN D3) 1000 units Tab, Take 2,000 Units by mouth once daily., Disp: , Rfl:     Current Facility-Administered Medications:     regadenoson injection 0.4 mg, 0.4 mg, Intravenous, 1 time in Clinic/HOD, Vandana Gomez PA-C    sodium chloride 0.9% flush 10 mL, 10 mL, Intravenous, PRN, Joe Pitts MD    Facility-Administered Medications Ordered in Other Visits:     0.9%  NaCl infusion, , Intravenous, Continuous, Enrico Woodruff MD, Last Rate: 100 mL/hr at 05/05/21 1115, New Bag at 05/05/21 1115    ceFAZolin (ANCEF) 3 gram in dextrose 5% IVPB, 3 g, Intravenous, On Call Procedure, Enrico Woodruff MD    LIDOcaine (PF) 10 mg/ml (1%) injection 10 mg, 1 mL, Intradermal, Once, Enrico Woodruff MD    Review of Systems    Objective:      Physical Exam  Constitutional:       Appearance: He is well-developed.   HENT:      Head: Normocephalic.   Cardiovascular:      Rate and Rhythm: Normal rate.   Pulmonary:      Effort: Pulmonary effort is normal.   Abdominal:      Palpations: Abdomen is soft.   Genitourinary:     Prostate: Normal.      Comments: 35 g benign  Skin:     General: Skin is warm.   Neurological:      Mental Status: He is alert.         Assessment:       1. Elevated PSA    2. Organ transplant candidate        Plan:       Teodoro was seen today for elevated psa.    Diagnoses and all orders for this visit:    Elevated PSA    Organ transplant candidate  -     Ambulatory consult to Urology

## 2024-01-30 ENCOUNTER — DOCUMENTATION ONLY (OUTPATIENT)
Dept: REHABILITATION | Facility: HOSPITAL | Age: 79
End: 2024-01-30
Payer: MEDICARE

## 2024-01-30 DIAGNOSIS — R42 DIZZINESS: Primary | ICD-10-CM

## 2024-01-30 NOTE — PROGRESS NOTES
OUTPATIENT PHYSICAL THERAPY DISCHARGE SUMMARY     Name: Teodoro Mast  Clinic Number: [id number]    Diagnosis:   Encounter Diagnosis   Name Primary?    Dizziness Yes     Physician: Claudia Weeks NP     Treatment Orders: Claudia Weeks NP     Past Medical History:   Diagnosis Date    BPH (benign prostatic hyperplasia)     CKD (chronic kidney disease) stage 5, GFR less than 15 ml/min     Gout     Hyperlipidemia     Hyperparathyroidism, secondary renal     Hypertension     Sleep apnea        Initial visit: 2023  Date of Last visit: 2023  Date of Discharge Note:  2024  Total Visits Received: 1  Missed Visits: 0    ASSESSMENT   Teodoro Mast only presented for the evaluation; he did not present for any follow up appointments. PT plan of care is now ; therefore, plan of care is being discontinued at this time. Due to such limited PT attendance and unexpected pt self discharge, no progress noted and no final discharge measures were assessed.     Discharge reason : Patient self discharge    PLAN   This patient is discharged from Outpatient Physical Therapy Services.     Camille Mathis, PT  2024

## 2024-02-07 ENCOUNTER — TELEPHONE (OUTPATIENT)
Dept: TRANSPLANT | Facility: CLINIC | Age: 79
End: 2024-02-07
Payer: MEDICARE

## 2024-02-08 ENCOUNTER — TELEPHONE (OUTPATIENT)
Dept: TRANSPLANT | Facility: CLINIC | Age: 79
End: 2024-02-08
Payer: MEDICARE

## 2024-02-14 ENCOUNTER — OFFICE VISIT (OUTPATIENT)
Dept: ENDOCRINOLOGY | Facility: CLINIC | Age: 79
End: 2024-02-14
Payer: MEDICARE

## 2024-02-14 VITALS
DIASTOLIC BLOOD PRESSURE: 58 MMHG | BODY MASS INDEX: 25.91 KG/M2 | HEART RATE: 85 BPM | SYSTOLIC BLOOD PRESSURE: 110 MMHG | WEIGHT: 201.81 LBS | OXYGEN SATURATION: 96 %

## 2024-02-14 DIAGNOSIS — E11.65 TYPE 2 DIABETES MELLITUS WITH HYPERGLYCEMIA, WITH LONG-TERM CURRENT USE OF INSULIN: Primary | ICD-10-CM

## 2024-02-14 DIAGNOSIS — N25.81 SECONDARY HYPERPARATHYROIDISM: ICD-10-CM

## 2024-02-14 DIAGNOSIS — N18.6 ESRD (END STAGE RENAL DISEASE): ICD-10-CM

## 2024-02-14 DIAGNOSIS — Z99.2 PERITONEAL DIALYSIS CATHETER IN PLACE: ICD-10-CM

## 2024-02-14 DIAGNOSIS — Z79.4 TYPE 2 DIABETES MELLITUS WITH HYPERGLYCEMIA, WITH LONG-TERM CURRENT USE OF INSULIN: Primary | ICD-10-CM

## 2024-02-14 PROCEDURE — 99999 PR PBB SHADOW E&M-EST. PATIENT-LVL IV: CPT | Mod: PBBFAC,TXP,, | Performed by: INTERNAL MEDICINE

## 2024-02-14 PROCEDURE — 99214 OFFICE O/P EST MOD 30 MIN: CPT | Mod: PBBFAC,NTX | Performed by: INTERNAL MEDICINE

## 2024-02-14 PROCEDURE — 99214 OFFICE O/P EST MOD 30 MIN: CPT | Mod: S$PBB,NTX,, | Performed by: INTERNAL MEDICINE

## 2024-02-14 RX ORDER — INSULIN GLARGINE 100 [IU]/ML
10 INJECTION, SOLUTION SUBCUTANEOUS NIGHTLY
Qty: 9 ML | Refills: 3 | Status: SHIPPED | OUTPATIENT
Start: 2024-02-14 | End: 2025-02-13

## 2024-02-14 NOTE — ASSESSMENT & PLAN NOTE
Hyperglycemia is a known effect of peritoneal dialysis.  Continue basal insulin to be given approximately 1 hour prior to starting PD.  See above.

## 2024-02-14 NOTE — ASSESSMENT & PLAN NOTE
Patient has new onset diabetes, most likely exacerbated by a combination of dietary indiscretion and peritoneal dialysis.    Reviewed goals of therapy are to get the best control we can without hypoglycemia.    A1c may be falsely low in the setting of significant anemia.  However, his blood sugar readings by fingerstick over the last week have been just slightly above goal.      Referral sent for diabetes education    Levemir is being discontinued, so we will switch to Tresiba.    Medication changes:   Stop:  Levemir  Start:   Tresiba 10 units daily-to be given 1 hour prior to start of peritoneal dialysis.       Reviewed patient's current insulin regimen. Clarified proper insulin dose and timing in relation to meals, etc. Insulin injection sites and proper rotation instructed.      Advised frequent self blood glucose monitoring. Patient encouraged to document glucose results and bring them to every clinic visit.      Hypoglycemia precautions discussed.  Discussed the rule of 15/15.  Baqsimi intranasal glucagon Rx was prescribed.    Discussed diet and exercise.    Discussed health maintenance topics.  Refer to podiatry for calluses and a diabetic foot exam.    Lab Results   Component Value Date    HGBA1C 5.8 (H) 11/13/2023    HGBA1C 8.4 (H) 08/10/2023    HGBA1C 5.9 07/28/2006

## 2024-02-14 NOTE — PROGRESS NOTES
FOLLOW-UP PATIENT VISIT    Subjective:      Chief Complaint:  type 2 diabetes    HPI: Teodoro Mast is a 78 y.o. male who is here for type 2 diabetes mellitus      Past Medical History:   Diagnosis Date    BPH (benign prostatic hyperplasia)     CKD (chronic kidney disease) stage 5, GFR less than 15 ml/min     Gout     Hyperlipidemia     Hyperparathyroidism, secondary renal     Hypertension     Sleep apnea      The patient's last visit with me was on 9/6/2023.      With regards to the type 2 diabetes:    His blood sugars have been doing excellent with the Levemir nightly.  His A1c went from 8.9 down to 5.8.  He is not having any hypoglycemic episodes.  Blood sugars range from , but the majority of his blood sugars are in the  range.    Most recent a1c was 5.8 on 11/13/2023. He does have significant anemia, so this may be falsely low.    Medical Therapy:  Current diabetic medications include:   Levemir 10 units nightly      Weight trend:  Wt Readings from Last 10 Encounters:   02/14/24 91.6 kg (201 lb 13.3 oz)   01/24/24 92.1 kg (203 lb)   01/11/24 92.1 kg (203 lb 2.5 oz)   01/08/24 90.3 kg (199 lb)   12/18/23 95.6 kg (210 lb 12.2 oz)   12/14/23 94 kg (207 lb 3.7 oz)   11/13/23 97.9 kg (215 lb 13.3 oz)   11/03/23 96.2 kg (212 lb 1.3 oz)   11/02/23 93.4 kg (206 lb)   10/04/23 93.5 kg (206 lb 0.3 oz)       Diabetes Management Status    Statin: Taking  ACE/ARB: Not taking    Hypertension Medications               labetaloL (NORMODYNE) 100 MG tablet Take 1 tablet (100 mg total) by mouth 2 (two) times daily.            BP Readings from Last 5 Encounters:   02/14/24 (!) 110/58   01/24/24 117/64   01/11/24 (!) 140/80   01/08/24 (!) 150/70   12/18/23 (!) 122/59       Hyperlipidemia Medications               atorvastatin (LIPITOR) 80 MG tablet TAKE 1 TABLET(80 MG) BY MOUTH EVERY DAY             Lab Results   Component Value Date    LDLCALC 124.4 01/12/2024    LDLCALC 156.2 11/13/2023    LDLCALC 121 (H)  "10/17/2022    HDL 29 (L) 01/12/2024    HDL 32 (L) 11/13/2023    HDL 32 10/17/2022    TRIG 123 01/12/2024    TRIG 184 (H) 11/13/2023    TRIG 261 (H) 10/17/2022      Diabetes Management Status    Statin: Taking  ACE/ARB: Not taking    Screening or Prevention Patient's value Goal Complete/Controlled?   HgA1C Testing and Control   Lab Results   Component Value Date    HGBA1C 5.8 (H) 11/13/2023      Annually/Less than 8% Yes   Lipid profile : 01/12/2024 Annually Yes   LDL control Lab Results   Component Value Date    LDLCALC 124.4 01/12/2024    Annually/Less than 100 mg/dl  No   Nephropathy screening No results found for: "LABMICR"  Lab Results   Component Value Date    PROTEINUA 3+ (A) 04/12/2021     Lab Results   Component Value Date    UTPCR 3.33 (H) 04/12/2021      Annually No   Blood pressure BP Readings from Last 1 Encounters:   02/14/24 (!) 110/58    Less than 140/90 Yes   Dilated retinal exam : 05/01/2023 Annually Yes   Foot exam   Most Recent Foot Exam Date: Not Found Annually No         History:  The patient was initially diagnosed with Type 2 diabetes mellitus:  8/2023 - elevated HbA1c.       Lab Results   Component Value Date    HGBA1C 5.8 (H) 11/13/2023    HGBA1C 8.4 (H) 08/10/2023    HGBA1C 5.9 07/28/2006     No results found for: "CPEPTIDE", "GLUTAMICACID"    Other medications tried:  None    Lifestyle:    Last visit with Diabetes Educator: : 10/04/2023         Objective:     Vitals:    02/14/24 1525   BP: (!) 110/58   Pulse: 85         BP Readings from Last 5 Encounters:   02/14/24 (!) 110/58   01/24/24 117/64   01/11/24 (!) 140/80   01/08/24 (!) 150/70   12/18/23 (!) 122/59         Physical Exam  Vitals and nursing note reviewed.   Constitutional:       General: He is not in acute distress.     Appearance: He is well-developed. He is not ill-appearing.   HENT:      Head: Normocephalic and atraumatic.   Eyes:      General:         Right eye: No discharge.         Left eye: No discharge.      " Conjunctiva/sclera: Conjunctivae normal.   Neck:      Thyroid: No thyromegaly.      Trachea: No tracheal deviation.   Pulmonary:      Effort: Pulmonary effort is normal. No respiratory distress.   Musculoskeletal:      Comments: No digital clubbing or extremity cyanosis   Neurological:      Mental Status: He is alert and oriented to person, place, and time.      Coordination: Coordination normal.   Psychiatric:         Mood and Affect: Mood normal.         Behavior: Behavior normal.           Wt Readings from Last 3 Encounters:   02/14/24 1525 91.6 kg (201 lb 13.3 oz)   01/24/24 1015 92.1 kg (203 lb)   01/11/24 1528 92.1 kg (203 lb 2.5 oz)       Assessment/Plan:     Type 2 diabetes mellitus with hyperglycemia, with long-term current use of insulin  Patient has new onset diabetes, most likely exacerbated by a combination of dietary indiscretion and peritoneal dialysis.    Reviewed goals of therapy are to get the best control we can without hypoglycemia.    A1c may be falsely low in the setting of significant anemia.  However, his blood sugar readings by fingerstick over the last week have been just slightly above goal.      Referral sent for diabetes education    Levemir is being discontinued, so we will switch to Tresiba.    Medication changes:   Stop:  Levemir  Start:   Tresiba 10 units daily-to be given 1 hour prior to start of peritoneal dialysis.       Reviewed patient's current insulin regimen. Clarified proper insulin dose and timing in relation to meals, etc. Insulin injection sites and proper rotation instructed.      Advised frequent self blood glucose monitoring. Patient encouraged to document glucose results and bring them to every clinic visit.      Hypoglycemia precautions discussed.  Discussed the rule of 15/15.  Baqsimi intranasal glucagon Rx was prescribed.    Discussed diet and exercise.    Discussed health maintenance topics.  Refer to podiatry for calluses and a diabetic foot exam.    Lab Results    Component Value Date    HGBA1C 5.8 (H) 11/13/2023    HGBA1C 8.4 (H) 08/10/2023    HGBA1C 5.9 07/28/2006         Peritoneal dialysis catheter in place  Hyperglycemia is a known effect of peritoneal dialysis.  Continue basal insulin to be given approximately 1 hour prior to starting PD.  See above.    ESRD (end stage renal disease)  Given end-stage renal disease, will carefully dose insulin to prevent fasting hypoglycemia.      Okay to follow-up with primary care going forward for management of diabetes.  He is always welcome to reach out if he has any questions or if he needs a return visit.

## 2024-02-22 ENCOUNTER — HOSPITAL ENCOUNTER (OUTPATIENT)
Dept: RADIOLOGY | Facility: HOSPITAL | Age: 79
Discharge: HOME | End: 2024-02-22
Attending: NURSE PRACTITIONER
Payer: MEDICARE

## 2024-02-22 ENCOUNTER — OFFICE VISIT (OUTPATIENT)
Dept: TRANSPLANT | Facility: CLINIC | Age: 79
End: 2024-02-22
Payer: MEDICARE

## 2024-02-22 VITALS
TEMPERATURE: 97 F | HEART RATE: 71 BPM | OXYGEN SATURATION: 94 % | WEIGHT: 202.63 LBS | SYSTOLIC BLOOD PRESSURE: 116 MMHG | BODY MASS INDEX: 26 KG/M2 | DIASTOLIC BLOOD PRESSURE: 58 MMHG | HEIGHT: 74 IN

## 2024-02-22 DIAGNOSIS — Z99.2 ESRD ON PERITONEAL DIALYSIS: ICD-10-CM

## 2024-02-22 DIAGNOSIS — N25.81 SECONDARY HYPERPARATHYROIDISM: ICD-10-CM

## 2024-02-22 DIAGNOSIS — I10 ESSENTIAL HYPERTENSION: ICD-10-CM

## 2024-02-22 DIAGNOSIS — N18.6 ESRD ON PERITONEAL DIALYSIS: ICD-10-CM

## 2024-02-22 DIAGNOSIS — Z76.82 ORGAN TRANSPLANT CANDIDATE: ICD-10-CM

## 2024-02-22 DIAGNOSIS — Z76.82 PATIENT ON WAITING LIST FOR KIDNEY TRANSPLANT: Primary | ICD-10-CM

## 2024-02-22 DIAGNOSIS — Z79.4 TYPE 2 DIABETES MELLITUS WITH HYPERGLYCEMIA, WITH LONG-TERM CURRENT USE OF INSULIN: ICD-10-CM

## 2024-02-22 DIAGNOSIS — E11.65 TYPE 2 DIABETES MELLITUS WITH HYPERGLYCEMIA, WITH LONG-TERM CURRENT USE OF INSULIN: ICD-10-CM

## 2024-02-22 PROCEDURE — 99215 OFFICE O/P EST HI 40 MIN: CPT | Mod: S$PBB,TXP,, | Performed by: NURSE PRACTITIONER

## 2024-02-22 PROCEDURE — 99999 PR PBB SHADOW E&M-EST. PATIENT-LVL V: CPT | Mod: PBBFAC,TXP,, | Performed by: NURSE PRACTITIONER

## 2024-02-22 PROCEDURE — 76770 US EXAM ABDO BACK WALL COMP: CPT | Mod: TC,TXP

## 2024-02-22 PROCEDURE — 76770 US EXAM ABDO BACK WALL COMP: CPT | Mod: 26,TXP,, | Performed by: RADIOLOGY

## 2024-02-22 PROCEDURE — 99215 OFFICE O/P EST HI 40 MIN: CPT | Mod: PBBFAC,25,TXP | Performed by: NURSE PRACTITIONER

## 2024-02-22 NOTE — LETTER
February 26, 2024        Shine Mcqueen  [address] [facility] [address]  Phone: [phone]  Fax: [phone]             [facility] 1st Fl  [address]  [facility] [address]  Phone: [phone]   Patient: Teodoro Mast   MR Number: [id number]   YOB: 1945   Date of Visit: 2/22/2024       Dear Dr. Shine Mcqueen    Thank you for referring Teodoro Mast to me for evaluation. Attached you will find relevant portions of my assessment and plan of care.    If you have questions, please do not hesitate to call me. I look forward to following Teodoro Mast along with you.    Sincerely,    Arianne Sesay NP    Enclosure    If you would like to receive this communication electronically, please contact [email or web address] or [phone] to request Incentive Link access.    Incentive Link is a tool which provides read-only access to select patient information with whom you have a relationship. Its easy to use and provides real time access to review your patients record including encounter summaries, notes, results, and demographic information.    If you feel you have received this communication in error or would no longer like to receive these types of communications, please e-mail [email or web address]

## 2024-02-22 NOTE — PROGRESS NOTES
"   Kidney Transplant Recipient Reevalulation    Referring Physician: Shine Mcqueen  Current Nephrologist: Shine Mcqueen  Waitlist Status: active  Dialysis Start Date: 5/13/2021    Subjective:     CC:  Six month reassessment of kidney transplant candidacy.    HPI:  Mr. Mast is a 78 y.o. year old Black or  male with ESRD secondary to HTN.  PMH BPH, anemia, HTN, proteinuria, and hyperparathyroidism, elevated PSA (negative prostate biopsy, follows with urology). He has been on the wait list for a kidney transplant at [facility] since 8/24/2020. Patient is currently on peritoneal dialysis started on 5/13/2021. Patient is dialyzing on cyclic peritoneal dialysis.  Patient reports that he is tolerating dialysis well. . He has a PD catheter. Does 1 manual exchange daily as well. Denies peritonitis or exit site infections.  Patient denies any recent hospitalizations or ED visits.    Retired . Looks younger than stated age. Reports some loss of muscle mass in his legs but remains active around the house.  Looks great, not frail.    CXR 8/29/2023: Pneumoperitoneum this is most likely from peritoneal dialysis.   PXR 8/29/2023: favorable for transplant.   Iliacs 11/10/2022: favorable for transplant   Renal US 2/22/2024: pending  Echo 8/29/2023: EF 55-60%, PA 40  LHC 1/8/2024: non obstructive CAD  Colonoscopy 11/3/2021: Tubular adenoma, repeat in 5 years-11/2026    Current Outpatient Medications   Medication Sig Dispense Refill    amLODIPine (NORVASC) 5 MG tablet Take 2 tablets (10 mg total) by mouth once daily. 60 tablet 11    aspirin 81 MG Chew Take 1 tablet by mouth once daily.      atorvastatin (LIPITOR) 80 MG tablet TAKE 1 TABLET(80 MG) BY MOUTH EVERY DAY 90 tablet 3    BD KITA 2ND GEN PEN NEEDLE 32 gauge x 5/32" Ndle USE AS DIRECTED DAILY FOR INSULIN 100 each 11    gentamicin (GARAMYCIN) 0.3 % ophthalmic solution Apply 1 drop to PD exit site daily with dressing changes.      labetaloL " (NORMODYNE) 300 MG tablet Take 1 tablet (300 mg total) by mouth 2 (two) times daily. 180 tablet 2    LANTUS SOLOSTAR U-100 INSULIN glargine 100 units/mL SubQ pen Inject 10 Units into the skin every evening. 9 mL 3    magnesium oxide (MAG-OX) 400 mg (241.3 mg magnesium) tablet TAKE 1 TABLET BY MOUTH DAILY (Patient taking differently: Take 1 tablet by mouth once daily.) 90 tablet 2    megestroL (MEGACE) 40 MG Tab Take 1 tablet (40 mg total) by mouth once daily. 30 tablet 2    potassium chloride SA (K-DUR,KLOR-CON M) 10 MEQ tablet TAKE 1 TABLET BY MOUTH DAILY 90 tablet 0    psyllium (METAMUCIL) powder Take 28 g by mouth once daily. Patient taking 2 tablespoon      sevelamer carbonate (RENVELA) 800 mg Tab Take 1 tablet (800 mg total) by mouth 3 (three) times daily with meals. 90 tablet 11    vitamin D (VITAMIN D3) 1000 units Tab Take 2,000 Units by mouth once daily.      calcitRIOL (ROCALTROL) 0.25 MCG Cap Take 0.25 mcg by mouth once a week.      ondansetron (ZOFRAN) 4 MG tablet Take 1 tablet (4 mg total) by mouth every 8 (eight) hours as needed for Nausea. (Patient not taking: Reported on 2/22/2024) 5 tablet 0     Current Facility-Administered Medications   Medication Dose Route Frequency Provider Last Rate Last Admin    regadenoson injection 0.4 mg  0.4 mg Intravenous 1 time in Clinic/HOD Vandana Gomez PA-C        sodium chloride 0.9% flush 10 mL  10 mL Intravenous PRN Joe Pitts MD         Facility-Administered Medications Ordered in Other Visits   Medication Dose Route Frequency Provider Last Rate Last Admin    0.9%  NaCl infusion   Intravenous Continuous Enrico Woodruff  mL/hr at 05/05/21 1115 New Bag at 05/05/21 1115    ceFAZolin (ANCEF) 3 gram in dextrose 5% IVPB  3 g Intravenous On Call Procedure Enrico Woodruff MD        LIDOcaine (PF) 10 mg/ml (1%) injection 10 mg  1 mL Intradermal Once Enrico Woodruff MD           Past Medical History:   Diagnosis Date    BPH (benign  "prostatic hyperplasia)     CKD (chronic kidney disease) stage 5, GFR less than 15 ml/min     Gout     Hyperlipidemia     Hyperparathyroidism, secondary renal     Hypertension     Sleep apnea        Review of Systems   Constitutional:  Negative for activity change, appetite change and fever.   HENT:  Negative for congestion, mouth sores and sore throat.    Eyes:  Negative for visual disturbance.   Respiratory:  Negative for cough, chest tightness and shortness of breath.    Cardiovascular:  Negative for chest pain, palpitations and leg swelling.   Gastrointestinal:  Negative for abdominal distention, abdominal pain, constipation, diarrhea and nausea.   Genitourinary:  Positive for decreased urine volume.        ANURIC   Musculoskeletal:  Negative for arthralgias and gait problem.   Skin:  Negative for wound.   Allergic/Immunologic: Negative for environmental allergies, food allergies and immunocompromised state.   Neurological:  Negative for dizziness, weakness and numbness.   Psychiatric/Behavioral:  Negative for sleep disturbance. The patient is not nervous/anxious.        Objective:   body mass index is 26.01 kg/m².  BP (!) 116/58 (BP Location: Right arm, Patient Position: Sitting, BP Method: Large (Manual))   Pulse 71   Temp 97.2 °F (36.2 °C) (Temporal)   Ht 6' 2" (1.88 m)   Wt 91.9 kg (202 lb 9.6 oz)   SpO2 (!) 94%   BMI 26.01 kg/m²     Physical Exam  Vitals and nursing note reviewed.   Constitutional:       Appearance: Normal appearance.   HENT:      Head: Normocephalic.   Cardiovascular:      Rate and Rhythm: Normal rate and regular rhythm.      Heart sounds: Normal heart sounds.   Pulmonary:      Effort: Pulmonary effort is normal.      Breath sounds: Normal breath sounds.   Abdominal:      General: Bowel sounds are normal. There is no distension.      Palpations: Abdomen is soft.      Tenderness: There is no abdominal tenderness.      Comments: PD catheter , no erythema, edema, tenderness or drainage.  "   Musculoskeletal:         General: Normal range of motion.   Skin:     General: Skin is warm and dry.   Neurological:      General: No focal deficit present.      Mental Status: He is alert.   Psychiatric:         Behavior: Behavior normal.         Labs:  Lab Results   Component Value Date    WBC 8.95 02/07/2024    HGB 9.6 (L) 02/07/2024    HCT 29.8 (L) 02/07/2024     02/07/2024    K 3.8 02/07/2024    CL 97 02/07/2024    CO2 28 01/08/2024    BUN 40 (H) 01/08/2024    CREATININE 10.24 (H) 02/07/2024    EGFRNONAA 3.5 (A) 03/07/2022    CALCIUM 8.9 02/07/2024    PHOS 3.3 02/07/2024    MG 1.8 01/11/2024    ALBUMIN 3.0 (L) 02/07/2024    AST 20 11/02/2023    ALT 16 02/07/2024    UTPCR 3.33 (H) 04/12/2021     (H) 02/07/2024       Lab Results   Component Value Date    BILIRUBINUA Negative 04/12/2021    PROTEINUA 3+ (A) 04/12/2021    NITRITE Negative 04/12/2021    RBCUA 0 04/12/2021    WBCUA 2 04/12/2021       Lab Results   Component Value Date    CPRA 0 09/06/2023    EK7BBCS B82 09/06/2023    CIIAB Negative 09/06/2023    ABCMT WEAK DQA1*05:05 11/02/2022       Labs were reviewed with the patient.    Pre-transplant Workup:   Reviewed with the patient.    Assessment:     1. Patient on waiting list for kidney transplant    2. ESRD on peritoneal dialysis    3. Essential hypertension    4. Type 2 diabetes mellitus with hyperglycemia, with long-term current use of insulin    5. Secondary hyperparathyroidism    6. BMI 26.0-26.9,adult      Plan:   Despite his advanced age he remains active and functional. I strongly encouraged seeking out living donors due to age as well as blood type O. He understands if his functional status were to deteriorate in the future he may be removed from the wait list.        Transplant Candidacy:   Mr. Mast is a suitable kidney transplant candidate.  Meets center eligibility for accepting HCV+ donor offer - yes.  Patient educated on HCV+ donors. Teodoro is willing  to accept HCV+ donor  offer -  yes   Patient is a candidate for KDPI > 85 kidney donor offer - no (weight >88kg).  He remains in overall stable health, and will remain active on the transplant list.    Patient advised that it is recommended that all transplant candidates, and their close contacts and household members receive Covid vaccination.    Arianne Sesay NP       Follow-up:   In addition to the tests noted in the plan, Mr. Mast will continue to have reevaluation as per the standing pre-kidney transplant protocol:  Monthly blood for PRA  Annual return to clinic, except HIV positive, > 65 years of age, or pancreas transplant candidates who will be scheduled to see transplant every 6 months while in pre-transplant phase  Annual re-testing: CXR, EKG, yearly mammograms for women over 40 and PSA for males over 40, cardiology follow-up as recommended by initial cardiology pre-transplant evaluation  Renal ultrasound every 2 years  Baseline colonoscopy after age 50 and repeated as recommended    UNOS Patient Status  Functional Status: 60% - Requires occasional assistance but is able to care for needs  Physical Capacity: No Limitations

## 2024-02-22 NOTE — PROGRESS NOTES
AA YEARLY PATIENT EDUCATION NOTE    Mr. Teodoro Mast was seen in pre-kidney transplant clinic for yearly (or six months) evaluation for kidney, kidney/pancreas or pancreas only transplant.  The patient attended a web based education session that discussed/reviewed the following aspects of transplantation: UNOS waitlist management/multiple listings, types of organs offered (KDPI < 85%, KDPI > 85%, PHS increased risk, DCD, HCV+), financial aspects, surgical procedures, dietary instruction pre- and post-transplant, health maintenance pre- and post-transplant, post-transplant hospitalization and outpatient follow-up, potential to participate in a research protocol, and medication management and side effects. A question and answer session was provided after the presentation.    The patient was seen by all members of the multi-disciplinary team to include: Nephrologist/PA, , , Pharmacist and Dietician (if applicable).    The Patient was educated on OPTN policy change regarding race based eGFR. For Black or  Americans, this eGFR could have shown that their kidneys were working better than they were.    Because of this change, we are looking at everyones record and assessing waiting time for people who are eligible. We will be reviewing your medical records and will notify you if you are eligible. We also encouraged patient to provide span 20 labs that are not in our electronic medical records.    The patient reviewed and signed all consents for evaluation which were witnessed and sent to scanning into the [facility] chart.    The patient was given an education book and plan for further evaluation based on his individual assessment.      The patient was encouraged to call with any questions or concerns.

## 2024-02-23 NOTE — PROGRESS NOTES
Transplant Psychosocial Evaluation Update:  Last psychosocial evaluation for transplant was completed on 05/09/23 by Ivone    Pt presents today in company of sonTeodoro Jr. . Pt and son  present as alert, oriented to person, place, time, purpose of visit. Pt and son deny concerns about going through with transplant and state motivation and sense of preparedness for transplantation, caregiver role, psychosocial risk factors, medical risk factors, financial aspects, and lifetime commitments. All concerns and education points as per transplant psychosocial evaluation process addressed (also refer to psychosocial dated 6/2/22). Pt actively participated in today's interview, with son participating as caregiver. Pt asking questions appropriately and denies changes to previous psychosocial evaluation for transplantation which we reviewed line by line with pt and, per pt choice, with pts caregiver today.    Primary Caregivers and Transportation for Transplant:  1. Teodoro Mast, 40 y/o son, [facility], [phone], drives  2. Nilesh Pro, 36 y/o son, [phone], drives.  3. Rip Jr Pro, 52 y/o nephew, [phone], drives    Both pt and caregiver/s plan to stay locally at pt's home in [place] - information reviewed in depth. Caregivers plan to stay at hospital as appropriate for transplant and post-transplant process.    Pts Vocation: Pt works at [employer].  Last day worked:  2/26/2010.    DME: CPAP, PD equipment and supplies.     ADLS: Pt states ability to independently accomplish all ADLs, however has difficulty completing task due to loss of muscle mass. Pt presented in a wheelchair (for long distances) and utilizes a walker in home as needed (due to vertigo). Pt's son assist as needed.     Household and Dependents: pt resides with Teodoro carrion Jr. and has no dependents at this time.    Income: Pt states ability to afford all monthly expenses and costs including for medications now and if transplanted  "based on income and on savings and assets.    Dialysis Adherence: Pt states current and expected compliance with all healthcare recommendations, including dialysis.    Pt and son deny any additional concerns, stating preparedness and motivation to proceed with organ transplant.     Psychiatric History:    Mental Health: Pt expressed feeling like a burden at times, due to requiring care from loved ones. Pt reports moments of sadness and frustration due to increased medical issues. SW provided acknowledgement, active listening, validation, normalization, support and pt's son confirmed no concerns with caring for patients. Pt reports experiences of sadness and frustration, "don't last long. I pray a lot and move past the moment". Pt reports family as highly supportive and no concerns with care.   Psychiatrist/Counselor: Pt denies currently or in the past and reports willingness to meet with psychiatry if required by transplant.   Medications:  Pt denies utilizing mental health medications currently or in the past  Suicide/Homicide Issues: Pt denies feelings of wanting to harm self or other currently or in the past  Safety at home: Pt reports feeling safe at home.        Payer/Plan Subscr  Sex Relation Sub. Ins. ID Effective Group Num   1. MEDICARE - [place]* [place],[place] MCC[place]OUGH 1945 Male Self [id number]                                    [address]     Suitability for Transplant:   Pt remains low risk for transplant at this time based on psychosocial risk factors and strengths.         "

## 2024-02-26 ENCOUNTER — EPISODE CHANGES (OUTPATIENT)
Dept: TRANSPLANT | Facility: HOSPITAL | Age: 79
End: 2024-02-26

## 2024-02-26 DIAGNOSIS — N28.1 CYST OF KIDNEY, ACQUIRED: Primary | ICD-10-CM

## 2024-03-06 ENCOUNTER — HOSPITAL ENCOUNTER (OUTPATIENT)
Dept: RADIOLOGY | Facility: HOSPITAL | Age: 79
Discharge: HOME | End: 2024-03-06
Attending: NURSE PRACTITIONER
Payer: MEDICARE

## 2024-03-06 DIAGNOSIS — N28.1 CYST OF KIDNEY, ACQUIRED: ICD-10-CM

## 2024-03-06 PROCEDURE — 25500020 PHARM REV CODE 255: Mod: TXP | Performed by: NURSE PRACTITIONER

## 2024-03-06 PROCEDURE — 74178 CT ABD&PLV WO CNTR FLWD CNTR: CPT | Mod: 26,TXP,, | Performed by: RADIOLOGY

## 2024-03-06 PROCEDURE — 74178 CT ABD&PLV WO CNTR FLWD CNTR: CPT | Mod: TC,TXP

## 2024-03-06 RX ADMIN — IOHEXOL 100 ML: 350 INJECTION, SOLUTION INTRAVENOUS at 01:03

## 2024-03-22 ENCOUNTER — PATIENT MESSAGE (OUTPATIENT)
Dept: AUDIOLOGY | Facility: CLINIC | Age: 79
End: 2024-03-22
Payer: MEDICARE

## 2024-05-16 DIAGNOSIS — Z76.82 ORGAN TRANSPLANT CANDIDATE: Primary | ICD-10-CM

## 2024-05-16 NOTE — PROGRESS NOTES
YEARLY LIST MANAGEMENT NOTE    Teodoro Mast's kidney transplant listing status reviewed.  Patient is due for follow-up appointments in August 2024.  Appointments will be scheduled per protocol.  HLA sample is up to date and being received on a regular basis.

## 2024-05-20 ENCOUNTER — TELEPHONE (OUTPATIENT)
Dept: TRANSPLANT | Facility: CLINIC | Age: 79
End: 2024-05-20
Payer: MEDICARE

## 2024-07-23 ENCOUNTER — TELEPHONE (OUTPATIENT)
Dept: SURGERY | Facility: CLINIC | Age: 79
End: 2024-07-23
Payer: MEDICARE

## 2024-07-23 NOTE — TELEPHONE ENCOUNTER
Spoke with patient  Appointment offered and accepted for 08/27/24 @ 7:00 am  Pt is aware of time and location

## 2024-07-23 NOTE — TELEPHONE ENCOUNTER
----- Message from Charlette Case sent at 7/23/2024 10:34 AM CDT -----  Regarding: referral /appt  Contact: @ [phone]   is calling to make a appointment from a referral for  N18.6 (ICD-10-CM) - ESRD (end stage renal disease) no available dates .....Please call and adv @ [phone]

## 2024-08-07 PROCEDURE — 99001 SPECIMEN HANDLING PT-LAB: CPT | Mod: TXP | Performed by: NURSE PRACTITIONER

## 2024-08-16 ENCOUNTER — TELEPHONE (OUTPATIENT)
Dept: TRANSPLANT | Facility: CLINIC | Age: 79
End: 2024-08-16
Payer: MEDICARE

## 2024-08-21 ENCOUNTER — LAB VISIT (OUTPATIENT)
Dept: LAB | Facility: HOSPITAL | Age: 79
End: 2024-08-21
Payer: COMMERCIAL

## 2024-08-21 DIAGNOSIS — Z76.82 ORGAN TRANSPLANT CANDIDATE: ICD-10-CM

## 2024-08-27 ENCOUNTER — TELEPHONE (OUTPATIENT)
Dept: SURGERY | Facility: CLINIC | Age: 79
End: 2024-08-27
Payer: MEDICARE

## 2024-08-27 ENCOUNTER — OFFICE VISIT (OUTPATIENT)
Dept: SURGERY | Facility: CLINIC | Age: 79
End: 2024-08-27
Payer: MEDICARE

## 2024-08-27 ENCOUNTER — TELEPHONE (OUTPATIENT)
Dept: PRIMARY CARE CLINIC | Facility: CLINIC | Age: 79
End: 2024-08-27
Payer: MEDICARE

## 2024-08-27 VITALS
DIASTOLIC BLOOD PRESSURE: 63 MMHG | BODY MASS INDEX: 24.08 KG/M2 | HEART RATE: 81 BPM | WEIGHT: 187.63 LBS | HEIGHT: 74 IN | SYSTOLIC BLOOD PRESSURE: 123 MMHG

## 2024-08-27 DIAGNOSIS — N18.6 ESRD (END STAGE RENAL DISEASE): Primary | ICD-10-CM

## 2024-08-27 DIAGNOSIS — N18.5 ANEMIA OF CHRONIC RENAL FAILURE, STAGE 5: ICD-10-CM

## 2024-08-27 DIAGNOSIS — D63.1 ANEMIA OF CHRONIC RENAL FAILURE, STAGE 5: ICD-10-CM

## 2024-08-27 PROCEDURE — 99215 OFFICE O/P EST HI 40 MIN: CPT | Mod: PBBFAC,TXP | Performed by: SURGERY

## 2024-08-27 PROCEDURE — 99999 PR PBB SHADOW E&M-EST. PATIENT-LVL V: CPT | Mod: PBBFAC,TXP,, | Performed by: SURGERY

## 2024-08-27 NOTE — TELEPHONE ENCOUNTER
----- Message from Missy Arevalo sent at 8/27/2024  3:09 PM CDT -----  Contact: [phone]  Patient is returning a phone call.    Who left a message for the patient: Dr Aaron's office    Does patient know what this is regarding:  yes    Would you like a call back, or a response through your [facility] portal?:   phone    Comments:

## 2024-08-29 ENCOUNTER — HOSPITAL ENCOUNTER (OUTPATIENT)
Dept: RADIOLOGY | Facility: HOSPITAL | Age: 79
Discharge: HOME | End: 2024-08-29
Attending: NURSE PRACTITIONER
Payer: MEDICARE

## 2024-08-29 ENCOUNTER — OFFICE VISIT (OUTPATIENT)
Dept: TRANSPLANT | Facility: CLINIC | Age: 79
End: 2024-08-29
Payer: MEDICARE

## 2024-08-29 ENCOUNTER — HOSPITAL ENCOUNTER (OUTPATIENT)
Dept: CARDIOLOGY | Facility: HOSPITAL | Age: 79
Discharge: HOME | End: 2024-08-29
Attending: NURSE PRACTITIONER
Payer: MEDICARE

## 2024-08-29 VITALS
HEIGHT: 74 IN | HEART RATE: 63 BPM | DIASTOLIC BLOOD PRESSURE: 70 MMHG | BODY MASS INDEX: 24 KG/M2 | WEIGHT: 187 LBS | SYSTOLIC BLOOD PRESSURE: 110 MMHG

## 2024-08-29 VITALS
WEIGHT: 185.88 LBS | BODY MASS INDEX: 23.85 KG/M2 | RESPIRATION RATE: 14 BRPM | HEART RATE: 67 BPM | DIASTOLIC BLOOD PRESSURE: 67 MMHG | TEMPERATURE: 98 F | OXYGEN SATURATION: 97 % | SYSTOLIC BLOOD PRESSURE: 107 MMHG | HEIGHT: 74 IN

## 2024-08-29 DIAGNOSIS — Z99.2 ESRD ON PERITONEAL DIALYSIS: ICD-10-CM

## 2024-08-29 DIAGNOSIS — N18.6 ESRD ON PERITONEAL DIALYSIS: ICD-10-CM

## 2024-08-29 DIAGNOSIS — D63.1 ANEMIA IN ESRD (END-STAGE RENAL DISEASE): ICD-10-CM

## 2024-08-29 DIAGNOSIS — Z76.82 PATIENT ON WAITING LIST FOR KIDNEY TRANSPLANT: Primary | ICD-10-CM

## 2024-08-29 DIAGNOSIS — Z99.2 TYPE 2 DM WITH HYPERTENSION AND ESRD ON DIALYSIS: ICD-10-CM

## 2024-08-29 DIAGNOSIS — Z76.82 ORGAN TRANSPLANT CANDIDATE: ICD-10-CM

## 2024-08-29 DIAGNOSIS — E78.2 MIXED HYPERLIPIDEMIA: ICD-10-CM

## 2024-08-29 DIAGNOSIS — N25.81 SECONDARY HYPERPARATHYROIDISM: ICD-10-CM

## 2024-08-29 DIAGNOSIS — N18.6 TYPE 2 DM WITH HYPERTENSION AND ESRD ON DIALYSIS: ICD-10-CM

## 2024-08-29 DIAGNOSIS — N18.6 ANEMIA IN ESRD (END-STAGE RENAL DISEASE): ICD-10-CM

## 2024-08-29 DIAGNOSIS — E11.22 TYPE 2 DM WITH HYPERTENSION AND ESRD ON DIALYSIS: ICD-10-CM

## 2024-08-29 DIAGNOSIS — I12.0 TYPE 2 DM WITH HYPERTENSION AND ESRD ON DIALYSIS: ICD-10-CM

## 2024-08-29 PROBLEM — R42 DIZZINESS: Status: RESOLVED | Noted: 2023-09-16 | Resolved: 2024-08-29

## 2024-08-29 PROBLEM — E87.6 HYPOKALEMIA: Status: RESOLVED | Noted: 2023-11-02 | Resolved: 2024-08-29

## 2024-08-29 PROBLEM — D64.9 ANEMIA: Status: RESOLVED | Noted: 2023-11-02 | Resolved: 2024-08-29

## 2024-08-29 PROBLEM — E88.09 HYPOALBUMINEMIA: Status: RESOLVED | Noted: 2021-04-18 | Resolved: 2024-08-29

## 2024-08-29 LAB
ASCENDING AORTA: 2.9 CM
BSA FOR ECHO PROCEDURE: 2.1 M2
CV ECHO LV RWT: 0.4 CM
DOP CALC LVOT AREA: 4.1 CM2
DOP CALC LVOT DIAMETER: 2.28 CM
DOP CALC LVOT PEAK VEL: 0.85 M/S
DOP CALC LVOT STROKE VOLUME: 61.13 CM3
DOP CALCLVOT PEAK VEL VTI: 14.98 CM
E WAVE DECELERATION TIME: 204.19 MSEC
E/A RATIO: 0.6
E/E' RATIO: 4.86 M/S
ECHO LV POSTERIOR WALL: 0.9 CM (ref 0.6–1.1)
FRACTIONAL SHORTENING: 33 % (ref 28–44)
INTERVENTRICULAR SEPTUM: 0.83 CM (ref 0.6–1.1)
LA MAJOR: 3.91 CM
LA MINOR: 4.52 CM
LA WIDTH: 3.01 CM
LEFT ATRIUM SIZE: 2.81 CM
LEFT ATRIUM VOLUME INDEX MOD: 13.1 ML/M2
LEFT ATRIUM VOLUME INDEX: 14.3 ML/M2
LEFT ATRIUM VOLUME MOD: 27.62 CM3
LEFT ATRIUM VOLUME: 30.14 CM3
LEFT INTERNAL DIMENSION IN SYSTOLE: 3.02 CM (ref 2.1–4)
LEFT VENTRICLE DIASTOLIC VOLUME INDEX: 44.54 ML/M2
LEFT VENTRICLE DIASTOLIC VOLUME: 93.98 ML
LEFT VENTRICLE MASS INDEX: 60 G/M2
LEFT VENTRICLE SYSTOLIC VOLUME INDEX: 16.9 ML/M2
LEFT VENTRICLE SYSTOLIC VOLUME: 35.68 ML
LEFT VENTRICULAR INTERNAL DIMENSION IN DIASTOLE: 4.53 CM (ref 3.5–6)
LEFT VENTRICULAR MASS: 127.36 G
LV LATERAL E/E' RATIO: 4.86 M/S
LV SEPTAL E/E' RATIO: 4.86 M/S
MV PEAK A VEL: 0.57 M/S
MV PEAK E VEL: 0.34 M/S
MV STENOSIS PRESSURE HALF TIME: 59.21 MS
MV VALVE AREA P 1/2 METHOD: 3.72 CM2
PISA TR MAX VEL: 1.95 M/S
RA MAJOR: 5.22 CM
RA PRESSURE ESTIMATED: 3 MMHG
RA WIDTH: 4.54 CM
RIGHT VENTRICLE DIASTOLIC BASEL DIMENSION: 4.6 CM
RV TB RVSP: 5 MMHG
SINUS: 3.18 CM
STJ: 2.78 CM
TDI LATERAL: 0.07 M/S
TDI SEPTAL: 0.07 M/S
TDI: 0.07 M/S
TR MAX PG: 15 MMHG
TRICUSPID ANNULAR PLANE SYSTOLIC EXCURSION: 2.08 CM
TV REST PULMONARY ARTERY PRESSURE: 18 MMHG
Z-SCORE OF LEFT VENTRICULAR DIMENSION IN END DIASTOLE: -3.78
Z-SCORE OF LEFT VENTRICULAR DIMENSION IN END SYSTOLE: -2.3

## 2024-08-29 PROCEDURE — 93306 TTE W/DOPPLER COMPLETE: CPT | Mod: TXP

## 2024-08-29 PROCEDURE — 71046 X-RAY EXAM CHEST 2 VIEWS: CPT | Mod: TC,TXP

## 2024-08-29 PROCEDURE — 99215 OFFICE O/P EST HI 40 MIN: CPT | Mod: PBBFAC,25,TXP | Performed by: NURSE PRACTITIONER

## 2024-08-29 PROCEDURE — 99215 OFFICE O/P EST HI 40 MIN: CPT | Mod: S$PBB,TXP,, | Performed by: NURSE PRACTITIONER

## 2024-08-29 PROCEDURE — 99999 PR PBB SHADOW E&M-EST. PATIENT-LVL V: CPT | Mod: PBBFAC,TXP,, | Performed by: NURSE PRACTITIONER

## 2024-08-29 NOTE — LETTER
September 3, 2024        Shine Mcqueen  [address] [address]  Phone: [phone]  Fax: [phone]             [facility] Transplant 1st Fl  [address]  [facility] [address]  Phone: [phone]   Patient: Teodoro Mast   MR Number: [id number]   YOB: 1945   Date of Visit: 8/29/2024       Dear Dr. Shine Mcqueen    Thank you for referring Teodoro Mast to me for evaluation. Attached you will find relevant portions of my assessment and plan of care.    If you have questions, please do not hesitate to call me. I look forward to following Teodoro Mast along with you.    Sincerely,    Jazmin Badillo, NP    Enclosure    If you would like to receive this communication electronically, please contact [email or web address] or [phone] to request [facility] access.    [facility] is a tool which provides read-only access to select patient information with whom you have a relationship. Its easy to use and provides real time access to review your patients record including encounter summaries, notes, results, and demographic information.    If you feel you have received this communication in error or would no longer like to receive these types of communications, please e-mail [email or web address]

## 2024-08-29 NOTE — PROGRESS NOTES
Kidney Transplant Recipient Reevalulation    Referring Physician: Shine Mcqueen  Current Nephrologist: Shine Mcqueen  Waitlist Status: active  Dialysis Start Date: 5/13/2021    Subjective:     CC:  Six month reassessment of kidney transplant candidacy.    HPI:  Mr. Mast is a 79 y.o. year old Black or  male with ESRD secondary to HTN.  He has been on the wait list for a kidney transplant at [facility] since 8/24/2020. Patient  Was on peritoneal dialysis started on 5/13/2021. He changed to HD ~ 6/2024 and is now dialyzing MWF for 4 hours per session.  Patient reports t BP will drop at dialysis and he holds BP meds on dialysis days . He has a PD catheter which he is scheduled for removal on 9/27/24 and he is using a LUE AV fistula for HD.     Patient denies any recent hospitalizations or ED visits.    BP -Pt reports taking Labetalol daily d/t low BP --is f/u with gen nephrology next month and plans to discuss BP and med adjustments   BP Readings from Last 3 Encounters:   08/29/24 107/67   08/29/24 110/70   08/28/24 107/63     HX elevated PSA--was last seen by urology Dr Martinez 1/24/24  Last PSA 10.3 --12/12/22      Fx assessment:    Retired . Looks younger than stated age. Reports some loss of muscle mass in his legs but remains active around the house.  Does not use assistive devices Looks great, not frail.    LOV 2/22/24  Lab /diagnostic results /TXP [facility] reviewed      CXR  8/29/24: No acute cardiopulmonary findings    PXR 8/29/2023: favorable for transplant.   Iliacs 11/10/2022: favorable for transplant   Renal US 2/22/2024: pending  Echo  8/29/24: EF 55-60%, PA 18 mmhg  LHC 1/8/2024: non obstructive CAD  Colonoscopy 11/3/2021: Tubular adenoma, repeat in 5 years-11/2026    3/6/24 CTA/P WO:   Bilateral renal cysts.  No soft tissue nodularity or abnormal enhancement.  Mild bronchiectasis and peripheral reticulations in the bilateral lower lobes, left greater than right  "suggestive of pulmonary fibrosis.Mild atherosclerosis of the abdominal aorta and its branches.     Past Medical History:   Diagnosis Date    BPH (benign prostatic hyperplasia)     CKD (chronic kidney disease) stage 5, GFR less than 15 ml/min     Gout     Hyperlipidemia     Hyperparathyroidism, secondary renal     Hypertension     Sleep apnea        Review of Systems   Constitutional:  Negative for activity change, appetite change and fever.   HENT:  Negative for congestion, mouth sores and sore throat.    Eyes:  Negative for visual disturbance.   Respiratory:  Negative for cough, chest tightness and shortness of breath.    Cardiovascular:  Negative for chest pain, palpitations and leg swelling.   Gastrointestinal:  Negative for abdominal distention, abdominal pain, constipation, diarrhea and nausea.   Genitourinary:  Positive for decreased urine volume.        ANURIC   Musculoskeletal:  Negative for arthralgias and gait problem.   Skin:  Negative for wound.   Allergic/Immunologic: Negative for environmental allergies, food allergies and immunocompromised state.   Neurological:  Negative for dizziness, weakness and numbness.   Psychiatric/Behavioral:  Negative for sleep disturbance. The patient is not nervous/anxious.        Objective:   body mass index is 23.86 kg/m².  /67 (BP Location: Right arm, Patient Position: Sitting, BP Method: Small (Automatic))   Pulse 67   Temp 97.7 °F (36.5 °C) (Temporal)   Resp 14   Ht 6' 2" (1.88 m)   Wt 84.3 kg (185 lb 13.6 oz)   SpO2 97%   BMI 23.86 kg/m²     Physical Exam  Vitals and nursing note reviewed.   Constitutional:       Appearance: Normal appearance.   HENT:      Head: Normocephalic.   Cardiovascular:      Rate and Rhythm: Normal rate and regular rhythm.      Heart sounds: Normal heart sounds.   Pulmonary:      Effort: Pulmonary effort is normal.      Breath sounds: Normal breath sounds.   Abdominal:      General: Bowel sounds are normal. There is no " distension.      Palpations: Abdomen is soft.      Tenderness: There is no abdominal tenderness.      Comments: PD catheter     Musculoskeletal:         General: Normal range of motion.        Arms:    Skin:     General: Skin is warm and dry.   Neurological:      General: No focal deficit present.      Mental Status: He is alert.   Psychiatric:         Behavior: Behavior normal.         Labs:  Lab Results   Component Value Date    WBC 5.96 08/07/2024    HGB 10.5 (L) 08/21/2024    HCT 33.2 (L) 08/21/2024     08/07/2024    K 4.8 08/07/2024     08/07/2024    CO2 28 01/08/2024    BUN 40 (H) 01/08/2024    CREATININE 8.66 (H) 08/07/2024    EGFRNONAA 3.5 (A) 03/07/2022    CALCIUM 9.4 08/07/2024    PHOS 4.7 (H) 08/07/2024    MG 1.8 01/11/2024    ALBUMIN 3.7 08/07/2024    AST 20 11/02/2023    ALT 11 08/07/2024    UTPCR 3.33 (H) 04/12/2021     (H) 08/07/2024       Lab Results   Component Value Date    BILIRUBINUA Negative 04/12/2021    PROTEINUA 3+ (A) 04/12/2021    NITRITE Negative 04/12/2021    RBCUA 0 04/12/2021    WBCUA 2 04/12/2021       Lab Results   Component Value Date    CPRA 0 03/01/2024    CPRA 0 03/01/2024    CPRA 0 03/01/2024    CE0UQZY B82, B78 03/01/2024    CIABCLM WEAK: B51, B54, B61 02/07/2024    CIIAB Negative 03/01/2024    ABCMT WEAK DQA1*05:05 11/02/2022       Labs were reviewed with the patient.    Pre-transplant Workup:   Reviewed with the patient.    Assessment:     1. Patient on waiting list for kidney transplant    2. 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 Anemia in ESRD (end-stage renal disease)    4. Secondary hyperparathyroidism    5. Mixed hyperlipidemia    6. ESRD on peritoneal dialysis        Plan:   Despite his advanced age he remains active and functional.  -Living donation strongly encouraged s   - He understands if his functional status were to deteriorate in the future he may be removed from the wait list.    -Get BP logs from dialysis   -add 6 minute walk      HX elevated PSA--was last seen by urology Dr Martinez 1/24/24  Last PSA 10.3 --12/12/22  Needs UTD PSA and urology comment/recs    Transplant Candidacy:   Mr. Mast is a suitable kidney transplant candidate.  Meets center eligibility for accepting HCV+ donor offer - yes.  Patient educated on HCV+ donors. Teodoro is willing  to accept HCV+ donor offer -  yes   Patient is a candidate for KDPI > 85 kidney donor offer - no (weight >88kg).  He remains in overall stable health, and will remain active on the transplant list.    Patient advised that it is recommended that all transplant candidates, and their close contacts and household members receive Covid vaccination.    Jazmin Badillo NP       Follow-up:   In addition to the tests noted in the plan, Mr. Mast will continue to have reevaluation as per the standing pre-kidney transplant protocol:  Monthly blood for PRA  Annual return to clinic, except HIV positive, > 65 years of age, or pancreas transplant candidates who will be scheduled to see transplant every 6 months while in pre-transplant phase  Annual re-testing: CXR, EKG, yearly mammograms for women over 40 and PSA for males over 40, cardiology follow-up as recommended by initial cardiology pre-transplant evaluation  Renal ultrasound every 2 years  Baseline colonoscopy after age 50 and repeated as recommended    UNOS Patient Status  Functional Status: 60% - Requires occasional assistance but is able to care for needs  Physical Capacity: No Limitations

## 2024-08-29 NOTE — PROGRESS NOTES
Kidney Transplant Recipient Reevalulation    Referring Physician: Shine Mcqueen  Current Nephrologist: Shine Mcqueen  Waitlist Status: active  Dialysis Start Date: 5/13/2021    Subjective:     CC:  Six month reassessment of kidney transplant candidacy.    HPI:  Mr. Mast is a 79 y.o. year old Black or  male with ESRD secondary to HTN.  He has been on the wait list for a kidney transplant at [facility] since 8/24/2020. Patient  Was on peritoneal dialysis started on 5/13/2021. He changed to HD ~ 6/2024 and is now dialyzing MWF for 4 hours per session.  Patient reports t BP will drop at dialysis and he holds BP meds on dialysis days . He has a PD catheter which he is scheduled for removal on 9/27/24 and he is using a LUE AV fistula for HD.     Patient denies any recent hospitalizations or ED visits.    BP -Pt reports taking Labetalol daily d/t low BP --is f/u with gen nephrology next month and plans to discuss BP and med adjustments   BP Readings from Last 3 Encounters:   08/29/24 107/67   08/29/24 110/70   08/28/24 107/63     Fx assessment:    Retired .   Pt reports having Vertigo problems over the past year he did not use assistive devices today in clinic.   Unchanged-->Looks younger than stated age. Reports some loss of muscle mass in his legs but remains active around the house.       LOV 2/22/24  Lab /diagnostic results /TXP [facility] reviewed      CXR  8/29/24: No acute cardiopulmonary findings    PXR 8/29/2023: favorable for transplant.   Iliacs 11/10/2022: favorable for transplant   Renal US 2/22/2024: pending  Echo  8/29/24: EF 55-60%, PA 18 mmhg  LHC 1/8/2024: non obstructive CAD  Colonoscopy 11/3/2021: Tubular adenoma, repeat in 5 years-11/2026    3/6/24 CTA/P WO:   Bilateral renal cysts.  No soft tissue nodularity or abnormal enhancement.  Mild bronchiectasis and peripheral reticulations in the bilateral lower lobes, left greater than right suggestive of pulmonary  "fibrosis.Mild atherosclerosis of the abdominal aorta and its branches.          Past Medical History:   Diagnosis Date    BPH (benign prostatic hyperplasia)     CKD (chronic kidney disease) stage 5, GFR less than 15 ml/min     Gout     Hyperlipidemia     Hyperparathyroidism, secondary renal     Hypertension     Sleep apnea        Review of Systems   Constitutional:  Negative for activity change, appetite change and fever.   HENT:  Negative for congestion, mouth sores and sore throat.    Eyes:  Negative for visual disturbance.   Respiratory:  Negative for cough, chest tightness and shortness of breath.    Cardiovascular:  Negative for chest pain, palpitations and leg swelling.   Gastrointestinal:  Negative for abdominal distention, abdominal pain, constipation, diarrhea and nausea.   Genitourinary:  Positive for decreased urine volume.        ANURIC   Musculoskeletal:  Negative for arthralgias and gait problem.   Skin:  Negative for wound.   Allergic/Immunologic: Negative for environmental allergies, food allergies and immunocompromised state.   Neurological:  Negative for dizziness, weakness and numbness.   Psychiatric/Behavioral:  Negative for sleep disturbance. The patient is not nervous/anxious.        Objective:   body mass index is 23.86 kg/m².  /67 (BP Location: Right arm, Patient Position: Sitting, BP Method: Small (Automatic))   Pulse 67   Temp 97.7 °F (36.5 °C) (Temporal)   Resp 14   Ht 6' 2" (1.88 m)   Wt 84.3 kg (185 lb 13.6 oz)   SpO2 97%   BMI 23.86 kg/m²     Physical Exam  Vitals and nursing note reviewed.   Constitutional:       Appearance: Normal appearance.   HENT:      Head: Normocephalic.   Cardiovascular:      Rate and Rhythm: Normal rate and regular rhythm.      Heart sounds: Normal heart sounds.   Pulmonary:      Effort: Pulmonary effort is normal.      Breath sounds: Normal breath sounds.   Abdominal:      General: Bowel sounds are normal. There is no distension.      Palpations: " Abdomen is soft.      Tenderness: There is no abdominal tenderness.      Comments: PD catheter     Musculoskeletal:         General: Normal range of motion.        Arms:    Skin:     General: Skin is warm and dry.   Neurological:      General: No focal deficit present.      Mental Status: He is alert.   Psychiatric:         Behavior: Behavior normal.         Labs:  Lab Results   Component Value Date    WBC 5.96 08/07/2024    HGB 10.5 (L) 08/21/2024    HCT 33.2 (L) 08/21/2024     08/07/2024    K 4.8 08/07/2024     08/07/2024    CO2 28 01/08/2024    BUN 40 (H) 01/08/2024    CREATININE 8.66 (H) 08/07/2024    EGFRNONAA 3.5 (A) 03/07/2022    CALCIUM 9.4 08/07/2024    PHOS 4.7 (H) 08/07/2024    MG 1.8 01/11/2024    ALBUMIN 3.7 08/07/2024    AST 20 11/02/2023    ALT 11 08/07/2024    UTPCR 3.33 (H) 04/12/2021     (H) 08/07/2024       Lab Results   Component Value Date    BILIRUBINUA Negative 04/12/2021    PROTEINUA 3+ (A) 04/12/2021    NITRITE Negative 04/12/2021    RBCUA 0 04/12/2021    WBCUA 2 04/12/2021       Lab Results   Component Value Date    CPRA 0 03/01/2024    CPRA 0 03/01/2024    CPRA 0 03/01/2024    RM6OCEH B82, B78 03/01/2024    CIABCLM WEAK: B51, B54, B61 02/07/2024    CIIAB Negative 03/01/2024    ABCMT WEAK DQA1*05:05 11/02/2022       Labs were reviewed with the patient.    Pre-transplant Workup:   Reviewed with the patient.    Assessment:     1. Patient on waiting list for kidney transplant    2. 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 Anemia in ESRD (end-stage renal disease)    4. Secondary hyperparathyroidism    5. Mixed hyperlipidemia    6. ESRD on peritoneal dialysis        Plan:   Despite his advanced age he remains active and functional.  -Living donation strongly encouraged s   - He understands if his functional status were to deteriorate in the future he may be removed from the wait list.    -Get BP logs from dialysis   -add 6 minute walk at next visit    HX elevated  PSA   -needs UTD PSA and urology clearance/recs  Last PSA 12/12/2022 10.3    Transplant Candidacy:   Mr. Mast is a suitable kidney transplant candidate.  Meets center eligibility for accepting HCV+ donor offer - yes.  Patient educated on HCV+ donors. Teodoro is willing  to accept HCV+ donor offer -  yes   Patient is a candidate for KDPI > 85 kidney donor offer - no (weight >88kg).  He remains in overall stable health, and will remain active on the transplant list.    Patient advised that it is recommended that all transplant candidates, and their close contacts and household members receive Covid vaccination.    Jazmin Badillo NP       Follow-up:   In addition to the tests noted in the plan, Mr. Mast will continue to have reevaluation as per the standing pre-kidney transplant protocol:  Monthly blood for PRA  Annual return to clinic, except HIV positive, > 65 years of age, or pancreas transplant candidates who will be scheduled to see transplant every 6 months while in pre-transplant phase  Annual re-testing: CXR, EKG, yearly mammograms for women over 40 and PSA for males over 40, cardiology follow-up as recommended by initial cardiology pre-transplant evaluation  Renal ultrasound every 2 years  Baseline colonoscopy after age 50 and repeated as recommended    UNOS Patient Status  Functional Status: 60% - Requires occasional assistance but is able to care for needs  Physical Capacity: No Limitations

## 2024-08-29 NOTE — PROGRESS NOTES
AA YEARLY PATIENT EDUCATION NOTE    Mr. Teodoro Mast was seen in pre-kidney transplant clinic for yearly (or six months) evaluation for kidney, kidney/pancreas or pancreas only transplant.  The patient attended a web based education session that discussed/reviewed the following aspects of transplantation: UNOS waitlist management/multiple listings, types of organs offered (KDPI < 85%, KDPI > 85%, PHS increased risk, DCD, HCV+), financial aspects, surgical procedures, dietary instruction pre- and post-transplant, health maintenance pre- and post-transplant, post-transplant hospitalization and outpatient follow-up, potential to participate in a research protocol, and medication management and side effects. A question and answer session was provided after the presentation.    The patient was seen by all members of the multi-disciplinary team to include: Nephrologist/PA, , , Pharmacist and Dietician (if applicable).    The Patient was educated on OPTN policy change regarding race based eGFR. For Black or  Americans, this eGFR could have shown that their kidneys were working better than they were.    Because of this change, we are looking at everyones record and assessing waiting time for people who are eligible. We will be reviewing your medical records and will notify you if you are eligible. We also encouraged patient to provide span 20 labs that are not in our electronic medical records.    The patient reviewed and signed all consents for evaluation which were witnessed and sent to scanning into the [facility] chart.    The patient was given an education book and plan for further evaluation based on his individual assessment.      The patient was encouraged to call with any questions or concerns.

## 2024-08-31 ENCOUNTER — PATIENT MESSAGE (OUTPATIENT)
Dept: SURGERY | Facility: CLINIC | Age: 79
End: 2024-08-31
Payer: MEDICARE

## 2024-09-03 NOTE — PROGRESS NOTES
VASCULAR SURGERY NOTE    Patient ID: Teodoro Mast is a 77 y.o. male.    I. HISTORY     Chief Complaint: AV access    HPI: Teodoro Mast is a 77 y.o. male who is here today for post-op appointment. He had L radiocephalic AVF with me 3/31/22. This thrombosed. He had repeat L radiocephalic 22 and returns for post-op visit. He is on the kidney transplant list.  He is currently on PD but PD is not filtering as much as before and his nephrologist was concerned that it will soon fail.  He denies any issues with his incision.       ALLERGIES: NKA    Medications: reviewed in EMR       Past Surgical History:   Procedure Laterality Date    AV FISTULA PLACEMENT Left 3/31/2022    Procedure: CREATION, AV FISTULA RADIOCEPHALIC;  Surgeon: PEMA Martines II, MD;  Location: [facility] OR 2ND FLR;  Service: Vascular;  Laterality: Left;    AV FISTULA PLACEMENT Left 2022    Procedure: CREATION, AV FISTULA;  Surgeon: PEMA Martines II, MD;  Location: [facility] OR 2ND FLR;  Service: Vascular;  Laterality: Left;    COLONOSCOPY N/A 2021    Procedure: COLONOSCOPY;  Surgeon: Joe Jimenez MD;  Location: [facility] ENDO (4TH FLR);  Service: Endoscopy;  Laterality: N/A;  COVID test at [place] on 10/30-GT      dialysis pt-labs prior    EYE SURGERY Bilateral     cataract removal    FINGER SURGERY      hemorriodectomy      PERITONEAL CATHETER INSERTION         Social History     Occupational History    Not on file   Tobacco Use    Smoking status: Former     Packs/day: 0.50     Years: 10.00     Pack years: 5.00     Types: Cigarettes     Quit date:      Years since quittin.8    Smokeless tobacco: Never   Substance and Sexual Activity    Alcohol use: Not Currently    Drug use: No    Sexual activity: Not Currently     Partners: Female     Family History   Problem Relation Age of Onset    Heart disease Mother     Hypertension Mother     Heart disease Father         pacemaker    Seizures Sister     Hammer toes Brother     Heart  disease Brother     Cancer Brother         prostate cancer    Premature birth Son     Cancer Sister         throat    No Known Problems Sister     No Known Problems Sister     No Known Problems Brother     No Known Problems Son        ROS      II. PHYSICAL EXAM     Physical Exam  Easily palpable continuous thrill over Left wrist radiocephalic AVF  Incision CDI, no erythema, no drainage, sutures in place (removed in clinic)      III. ASSESSMENT & PLAN (MEDICAL DECISION MAKING)     Imaging Results: (I have personally reviewed all images and provided interpretation below)  AVF U/S 10/31/22:  Depth is less than 6 mm throughout.  Diameter is 5.7 proximally but >6mm in mid/distal fistula.  Volume flow is 1.1 L/min.  There is no evidence of hemodynamically significant stenosis.    Assessment/Diagnosis and Plan:    No diagnosis found.      77 y.o. male with ESRD on PD awaiting kidney transplant.  He had a failed left radiocephalic AV fistula at the wrist and had repeat L radiocephalic fistula creation 9/30/22.  His new fistula has matured nicely and should be ready for cannulation 8 weeks postop (11/28/22).    -Ok to cannulate AVF beginning 11/28/22 if necessary  -RTC if difficulties with HD      PEMA Martines II, MD, [facility]  Vascular Surgery  [facility]                 Detail Level: Detailed Detail Level: Zone Detail Level: Simple Detail Level: Generalized

## 2024-09-04 LAB
HLA FREEZE AND HOLD INTERPRETATION: NORMAL
HLAFH COLLECTION DATE: NORMAL
HPRA INTERPRETATION: NORMAL

## 2024-09-12 ENCOUNTER — OFFICE VISIT (OUTPATIENT)
Facility: CLINIC | Age: 79
End: 2024-09-12
Payer: MEDICARE

## 2024-09-12 VITALS
OXYGEN SATURATION: 97 % | HEART RATE: 107 BPM | WEIGHT: 187.19 LBS | TEMPERATURE: 98 F | BODY MASS INDEX: 24.03 KG/M2 | DIASTOLIC BLOOD PRESSURE: 79 MMHG | SYSTOLIC BLOOD PRESSURE: 133 MMHG

## 2024-09-12 DIAGNOSIS — E78.2 MIXED HYPERLIPIDEMIA: ICD-10-CM

## 2024-09-12 DIAGNOSIS — N18.5 BENIGN HYPERTENSION WITH CKD (CHRONIC KIDNEY DISEASE) STAGE V: ICD-10-CM

## 2024-09-12 DIAGNOSIS — N25.81 SECONDARY HYPERPARATHYROIDISM: ICD-10-CM

## 2024-09-12 DIAGNOSIS — Z99.2 ESRD ON HEMODIALYSIS: ICD-10-CM

## 2024-09-12 DIAGNOSIS — Z99.2 PERITONEAL DIALYSIS CATHETER IN PLACE: ICD-10-CM

## 2024-09-12 DIAGNOSIS — N18.6 ESRD ON HEMODIALYSIS: ICD-10-CM

## 2024-09-12 DIAGNOSIS — Z76.82 PATIENT ON WAITING LIST FOR KIDNEY TRANSPLANT: ICD-10-CM

## 2024-09-12 DIAGNOSIS — Z79.4 TYPE 2 DIABETES MELLITUS WITH HYPERGLYCEMIA, WITH LONG-TERM CURRENT USE OF INSULIN: ICD-10-CM

## 2024-09-12 DIAGNOSIS — G47.33 OSA (OBSTRUCTIVE SLEEP APNEA): ICD-10-CM

## 2024-09-12 DIAGNOSIS — E11.65 TYPE 2 DIABETES MELLITUS WITH HYPERGLYCEMIA, WITH LONG-TERM CURRENT USE OF INSULIN: ICD-10-CM

## 2024-09-12 DIAGNOSIS — I70.0 AORTIC ATHEROSCLEROSIS: Primary | ICD-10-CM

## 2024-09-12 DIAGNOSIS — I12.0 BENIGN HYPERTENSION WITH CKD (CHRONIC KIDNEY DISEASE) STAGE V: ICD-10-CM

## 2024-09-12 DIAGNOSIS — Z01.810 PREOPERATIVE CARDIOVASCULAR EXAMINATION: ICD-10-CM

## 2024-09-12 PROCEDURE — 99213 OFFICE O/P EST LOW 20 MIN: CPT | Mod: PBBFAC,PN,TXP | Performed by: HOSPITALIST

## 2024-09-12 PROCEDURE — 99999 PR PBB SHADOW E&M-EST. PATIENT-LVL III: CPT | Mod: PBBFAC,TXP,, | Performed by: HOSPITALIST

## 2024-09-12 RX ORDER — PEN NEEDLE, DIABETIC 30 GX3/16"
1 NEEDLE, DISPOSABLE MISCELLANEOUS DAILY PRN
Qty: 100 EACH | Refills: 11 | Status: SHIPPED | OUTPATIENT
Start: 2024-09-12

## 2024-09-12 NOTE — ASSESSMENT & PLAN NOTE
Off antihypertensives due to hypotension associated with hemodialysis.  Blood pressure at goal today.

## 2024-09-12 NOTE — ASSESSMENT & PLAN NOTE
He is scheduled for elective removal of a peritoneal dialysis catheter; which would technically be considered a high-risk procedure due to involvement of the abdominal cavity.  He is capable of at least 4 METs activity, and recently done nuclear stress echo and LHC reassuring for no significant ischemic heart disease.  His ESRD status does pose a significant surgical risk factor, especially regarding impaired clearance of multiple medications such as sedatives, which could potentially result in prolonged sedation post-procedure.  His anuric status also puts him at risk of volume overload with intraoperative IV fluids, so these should be used sparingly unless needed to treat intraoperative hypotension.  His most recent 2D echo is also significant for moderate aortic stenosis, indicating that he may be more preload-dependent in maintaining cardiac output, so hypotension intraoperatively should be avoided.      Another major factor to consider in weighing this patient's risk stratification is the need for the procedure.  The peritoneal dialysis catheter needs to be removed due to inevitable risk of PD catheter-associated peritonitis, which if it occurs would put patient in a less ideal condition for removal than if it were removed electively.  Thus, not removing the catheter will likely result in greater harm to the patient.  He was counseled that even though his overall physical condition, age, and comorbidities put him at higher surgical risk than the average patient, delaying removal would likely only put him at greater risk of harm.  For these reasons I recommend proceeding to surgery with these considerations in mind:  Limit use of intravenous fluids during surgery unless needed to treat hypotension  Avoid hypotension  Coordinate timing of procedure with dialysis schedule to ensure patient is able to get dialysis in a timely fashion following procedure.

## 2024-09-12 NOTE — PROGRESS NOTES
Primary Care Provider Appointment - [facility] & Jaden Williamson MD  Initial Visit    Subjective:      Patient ID: Teodoro Mast is a 79 y.o. male with   Past Medical History:   Diagnosis Date    BPH (benign prostatic hyperplasia)     CKD (chronic kidney disease) stage 5, GFR less than 15 ml/min     Gout     Hyperlipidemia     Hyperparathyroidism, secondary renal     Hypertension     Sleep apnea            Chief Complaint: Establish Care    Prior to this visit, patient's last encounter with PCP was Visit date not found.    Pt reports prior PCP Dr. Masterson retired last month; needs preop eval for PD catheter removal.  He has been on peritoneal dialysis since May of 2021; pre-catheter he has in now is the only 1 he has ever had.  He denies history of PD catheter peritonitis.  He reports home PD became progressively more uncomfortable as larger volumes of dialysate were needed.  He was transitioned to hemodialysis 3 or 4 months ago and is dialyzing through an AV fistula in the left wrist which was placed a few years previously in anticipation of potential HD need.  Over the period of time he was on PD he notes a considerable amount of weight loss as well as loss in muscle mass.  His appetite has been poor for quite some time on PD, but has improved since transitioning to HD.  ESRD believed to be secondary to chronic hypertension; he has been on antihypertensives for nearly 50 years, but as his blood pressures continued to get lower on hemodialysis he stopped antihypertensives about 3 weeks ago.  He is anuric and on a fluid restriction.  He denies symptoms of volume overload prior to being due for dialysis.  He was diagnosed with type 2 diabetes about a year ago and is on 10 units of basal insulin nightly.  He reports blood sugars tend to range in the 90s.  He attributes this to eating a lot of junk food and sweets after being put on an appetite stimulant which perhaps worked a little too well.  He  lives independently but is reliant on his son to assist with most ADLs.  His son lives with him presently.  He ambulates short distances in the home by holding onto things and uses a rolling walker outside of the home.  His mobility and ability to perform most physical activity are limited by severe vertigo.  He is prescribed Zofran for this, but does not get nauseous prior to vomiting with vertigo symptoms.  He was referred to vestibular PT but only went to 1 session and had to quit due to vomiting and never went back.      4Ms for Medical Decision-Making in Older Adults    Last Completed EAWV: 2020    MOBILITY:  Get Up and Go:      2020    10:08 AM   Get Up and Go   Trial 1 5 seconds     Activities of Daily Livin/7/2020    10:03 AM   Activities of Daily Living   Ambulation Independent   Dressing Independent   Transfers Independent   Toileting Incontinent of bowel;Continent of bladder   Feeding Independent   Cleaning home/Chores Independent   Telephone use Independent   Shopping Independent   Paying bills Independent   Taking meds Independent     Whisper Test:      2020    10:08 AM   Whisper Test   Whisper Test Abnormal     Disability Status:      2020    10:05 AM   Disability Status   Are you deaf or do you have serious difficulty hearing? N   Are you blind or do you have serious difficulty seeing, even when wearing glasses? N   Because of a physical, mental, or emotional condition, do you have serious difficulty concentrating, remembering, or making decisions? N   Do you have serious difficulty walking or climbing stairs? N   Do you have difficulty dressing or bathing? N   Because of a physical, mental, or emotional condition, do you have difficulty doing errands alone such as visiting a doctor's office or shopping? N     Nutrition Screenin/7/2020     9:59 AM   Nutrition Screening   Has food intake declined over the past three months due to loss of appetite, digestive problems,  chewing or swallowing difficulties? Moderate decrease in food intake   Involuntary weight loss during the last 3 months? No weight loss   Mobility? Goes out   Has the patient suffered psychological stress or acute disease in the past three months? Yes   Neuropsychological problems? No psychological problems   Body Mass Index (BMI)?  BMI 23 or greater   Screening Score 11    Screening Score: 0-7 Malnourished, 8-11 At Risk, 12-14 Normal  Fall Risk:      2024    12:30 PM 2024    12:30 PM 2024     3:30 PM   Fall Risk Assessment - Outpatient   Mobility Status Ambulatory w/ assistance Ambulatory w/ assistance Ambulatory w/ assistance   Number of falls 0 0 0   Identified as fall risk True True True           MENTATION:   Depression Patient Health Questionnaire:      2024     7:13 AM   Depression Patient Health Questionnaire   Over the last two weeks how often have you been bothered by little interest or pleasure in doing things Not at all   Over the last two weeks how often have you been bothered by feeling down, depressed or hopeless Not at all   PHQ-2 Total Score 0     Has Dementia Dx: No  Has Anxiety Dx: No    Cognitive Function Screenin/7/2020    10:08 AM   Cognitive Function Screening   Clock Drawing Test 2   Mini-Cog 3 Minute Recall 2   Cognitive Function Screening 4     Cognitive Function Screening Total - Less than 4 = Abnormal,  Greater than or equal to 4 = Normal        MEDICATIONS:  High Risk Medications:  Total Active Medications: 0  This patient does not have an active medication from one of the medication groupers.    WHAT MATTERS MOST:  Advance Care Planning   ACP Status:   Patient does not have an ACP conversation on file  Living Will: No  Power of : No  [place]: No    What is most important right now is to focus on remaining as independent as possible    Accordingly, we have decided that the best plan to meet the patient's goals includes continuing with  treatment      What matters most to patient today is: getting ready for surgery             Social History     Socioeconomic History    Marital status:     Number of children: 2   Tobacco Use    Smoking status: Former     Current packs/day: 0.00     Average packs/day: 0.5 packs/day for 10.0 years (5.0 ttl pk-yrs)     Types: Cigarettes     Start date:      Quit date:      Years since quittin.7    Smokeless tobacco: Never   Substance and Sexual Activity    Alcohol use: Not Currently    Drug use: No    Sexual activity: Not Currently     Partners: Female   Social History Narrative    Caregiver Teodoro Mast II     Social Determinants of Health     Financial Resource Strain: Low Risk  (11/3/2023)    Overall Financial Resource Strain (CARDIA)     Difficulty of Paying Living Expenses: Not hard at all   Food Insecurity: No Food Insecurity (12/3/2023)    Hunger Vital Sign     Worried About Running Out of Food in the Last Year: Never true     Ran Out of Food in the Last Year: Never true   Transportation Needs: No Transportation Needs (12/3/2023)    PRAPARE - Transportation     Lack of Transportation (Medical): No     Lack of Transportation (Non-Medical): No   Physical Activity: Inactive (12/3/2023)    Exercise Vital Sign     Days of Exercise per Week: 0 days     Minutes of Exercise per Session: 0 min   Stress: No Stress Concern Present (12/3/2023)    [employer] - Occupational Stress Questionnaire     Feeling of Stress : Not at all   Housing Stability: Low Risk  (12/3/2023)    Housing Stability Vital Sign     Unable to Pay for Housing in the Last Year: No     Number of Places Lived in the Last Year: 1     Unstable Housing in the Last Year: No       Review of Systems   Constitutional:  Positive for appetite change and fatigue. Negative for activity change and unexpected weight change.   Respiratory:  Negative for cough, chest tightness and shortness of breath.     Cardiovascular:  Negative for chest pain, palpitations, leg swelling and claudication.   Gastrointestinal:  Negative for abdominal pain and change in bowel habit.   Genitourinary:  Positive for decreased urine volume.   Integumentary:  Negative for wound.   Neurological:  Positive for dizziness and weakness.        Objective:   /79   Pulse 107   Temp 97.5 °F (36.4 °C) (Oral)   Wt 84.9 kg (187 lb 2.7 oz)   SpO2 97%   BMI 24.03 kg/m²     Physical Exam  Vitals reviewed.   Constitutional:       General: He is not in acute distress.     Appearance: Normal appearance. He is normal weight.      Comments: Ambulates with a rolling walker.  Needs assistance to get onto and off of examination table.   HENT:      Head: Normocephalic and atraumatic.      Right Ear: Tympanic membrane, ear canal and external ear normal.      Left Ear: Tympanic membrane, ear canal and external ear normal.      Nose: Nose normal.      Mouth/Throat:      Mouth: Mucous membranes are moist.      Pharynx: Oropharynx is clear.   Eyes:      General: No scleral icterus.     Conjunctiva/sclera: Conjunctivae normal.   Cardiovascular:      Rate and Rhythm: Regular rhythm. Tachycardia present.      Pulses: Normal pulses.      Heart sounds: Murmur heard.      Crescendo decrescendo systolic murmur is present with a grade of 3/6.      Arteriovenous access: Left arteriovenous access is present.  Pulmonary:      Effort: Pulmonary effort is normal. No respiratory distress.      Breath sounds: Normal breath sounds.   Abdominal:      General: A surgical scar is present. Bowel sounds are normal. There is no distension.      Palpations: Abdomen is soft. There is fluid wave. There is no hepatomegaly or splenomegaly.      Tenderness: There is no abdominal tenderness.      Comments: PD catheter covered with bandage L abd with ports secured under a band of fabric secured at the level of the epigastrium   Musculoskeletal:      Cervical back: Normal range of  "motion and neck supple. No rigidity.      Right lower leg: No edema.      Left lower leg: No edema.   Lymphadenopathy:      Cervical: No cervical adenopathy.   Skin:     General: Skin is warm and dry.      Findings: No erythema or rash.   Neurological:      General: No focal deficit present.      Mental Status: He is alert and oriented to person, place, and time.              Lab Results   Component Value Date    WBC 9.94 09/04/2024    HGB 11.9 (L) 09/04/2024    HCT 38.0 (L) 09/04/2024     09/04/2024    CHOL 178 01/12/2024    TRIG 123 01/12/2024    HDL 29 (L) 01/12/2024    ALT 7 09/04/2024    AST 20 11/02/2023     09/04/2024    K 5.0 09/04/2024     09/04/2024    CREATININE 9.23 (H) 09/04/2024    BUN 40 (H) 01/08/2024    CO2 28 01/08/2024    PSA 14.2 (H) 07/13/2021    INR 1.0 07/13/2021    HGBA1C 5.3 07/01/2024       Current Outpatient Medications on File Prior to Visit   Medication Sig Dispense Refill    atorvastatin (LIPITOR) 80 MG tablet TAKE 1 TABLET(80 MG) BY MOUTH EVERY DAY 90 tablet 3    magnesium oxide (MAG-OX) 400 mg (241.3 mg magnesium) tablet TAKE 1 TABLET BY MOUTH DAILY 90 tablet 2    ondansetron (ZOFRAN) 4 MG tablet Take 1 tablet (4 mg total) by mouth every 8 (eight) hours as needed for Nausea. 30 tablet 2    sevelamer carbonate (RENVELA) 800 mg Tab Take 1 tablet (800 mg total) by mouth 3 (three) times daily with meals. 90 tablet 11    [DISCONTINUED] BD KITA 2ND GEN PEN NEEDLE 32 gauge x 5/32" Ndle USE AS DIRECTED DAILY FOR INSULIN 100 each 11    aspirin 81 MG Chew Take 1 tablet by mouth once daily.      LANTUS SOLOSTAR U-100 INSULIN glargine 100 units/mL SubQ pen Inject 10 Units into the skin every evening. 9 mL 3    psyllium (METAMUCIL) powder Take 28 g by mouth once daily. Patient taking 2 tablespoon      vitamin D (VITAMIN D3) 1000 units Tab Take 2,000 Units by mouth once daily.      [DISCONTINUED] amLODIPine (NORVASC) 5 MG tablet Take 2 tablets (10 mg total) by mouth once daily. " (Patient taking differently: Take 5 mg by mouth 2 (two) times daily.) 60 tablet 11    [DISCONTINUED] gentamicin (GARAMYCIN) 0.3 % ophthalmic solution 2 drops by Other route Daily. Apply 1 drop to PD exit site daily with dressing changes. 5 mL 3    [DISCONTINUED] labetaloL (NORMODYNE) 300 MG tablet Take 1 tablet (300 mg total) by mouth 2 (two) times daily. 180 tablet 2     Current Facility-Administered Medications on File Prior to Visit   Medication Dose Route Frequency Provider Last Rate Last Admin    [DISCONTINUED] 0.9%  NaCl infusion   Intravenous Continuous Enrico Woodruff  mL/hr at 05/05/21 1115 New Bag at 05/05/21 1115    [DISCONTINUED] ceFAZolin (ANCEF) 3 gram in dextrose 5% IVPB  3 g Intravenous On Call Procedure Enrico Woodruff MD        [DISCONTINUED] LIDOcaine (PF) 10 mg/ml (1%) injection 10 mg  1 mL Intradermal Once Enrico Woodruff MD        [DISCONTINUED] regadenoson injection 0.4 mg  0.4 mg Intravenous 1 time in Clinic/HOD Vandana Gomez PA-C        [DISCONTINUED] sodium chloride 0.9% flush 10 mL  10 mL Intravenous PRN Joe Pitts MD             Assessment:   79 y.o. male with multiple co-morbid illnesses here to establish care.    Plan:     Problem List Items Addressed This Visit       Mixed hyperlipidemia     Lipids done in January of this year not at goal; but it is not clear if patient was taking atorvastatin at that time.  Currently taking atorvastatin 80 mg.         GLYNN (obstructive sleep apnea)     Patient has not compliant with CPAP; he has fallen out of habit of using it.  Assessed for potential barriers to use, and there do not appear to be any.  He was counseled on decreased risk of perioperative cardiopulmonary complications in patients with GLYNN using CPAP perioperatively.  He will try to get back in the habit of using it prior to surgery.         Secondary hyperparathyroidism     On daily vitamin-D supplementation and Renvela as phosphate binder.          Benign hypertension with CKD (chronic kidney disease) stage V     Off antihypertensives due to hypotension associated with hemodialysis.  Blood pressure at goal today.         ESRD on hemodialysis     HD MWF.  Tolerating well presently without significant electrolyte disturbances or fluid overload between sessions.  Dose renally cleared medications adjusted accordingly.         Peritoneal dialysis catheter in place     Patient transitioned to HD 3 or 4 months ago due to increasing difficulty tolerating.  Per last flush attempt approximately a month ago catheter is occluded.  It is scheduled for removal 9/27, which must be done despite operative risk due to inevitable risk of PD catheter peritonitis.         Patient on waiting list for kidney transplant     Patient has been on wait list since 2020 for kidney transplant; has regular follow-up with Kidney Transplant to monitor candidacy status.         Type 2 diabetes mellitus with hyperglycemia, with long-term current use of insulin     Possibly secondary to dietary indiscretions related to appetite stimulant use while on peritoneal dialysis, as well as known side effect of hyperglycemia with peritoneal dialysis.  He is on 10 units of Lantus every evening with reported normal blood glucose levels.  Low threshold to discontinue now that he has transitioned to hemodialysis, especially should hypoglycemia develop.  He was instructed to take half his normal basal insulin dose the night before his upcoming surgery.  We may try stopping insulin altogether and seeing how his blood glucoses range, as stopping unnecessary insulin in setting of ESRD should reduce risk of iatrogenic hypoglycemia.  Hemoglobin A1C   Date Value Ref Range Status   07/01/2024 5.3 4.8 - 5.9 % Final   11/13/2023 5.8 (H) 4.0 - 5.6 % Final     Comment:     ADA Screening Guidelines:  5.7-6.4%  Consistent with prediabetes  >or=6.5%  Consistent with diabetes    High levels of fetal hemoglobin interfere  "with the HbA1C  assay. Heterozygous hemoglobin variants (HbS, HgC, etc)do  not significantly interfere with this assay.   However, presence of multiple variants may affect accuracy.     08/10/2023 8.4 (H) 4.8 - 5.9 % Final              Relevant Medications    pen needle, diabetic (BD KITA 2ND GEN PEN NEEDLE) 32 gauge x 5/32" Ndle    Aortic atherosclerosis - Primary     Incidental finding on prior imaging.  On ASA 81mg and atorvastatin 80mg.         Preoperative cardiovascular examination     He is scheduled for elective removal of a peritoneal dialysis catheter; which would technically be considered a high-risk procedure due to involvement of the abdominal cavity.  He is capable of at least 4 METs activity, and recently done nuclear stress echo and LHC reassuring for no significant ischemic heart disease.  His ESRD status does pose a significant surgical risk factor, especially regarding impaired clearance of multiple medications such as sedatives, which could potentially result in prolonged sedation post-procedure.  His anuric status also puts him at risk of volume overload with intraoperative IV fluids, so these should be used sparingly unless needed to treat intraoperative hypotension.  His most recent 2D echo is also significant for moderate aortic stenosis, indicating that he may be more preload-dependent in maintaining cardiac output, so hypotension intraoperatively should be avoided.      Another major factor to consider in weighing this patient's risk stratification is the need for the procedure.  The peritoneal dialysis catheter needs to be removed due to inevitable risk of PD catheter-associated peritonitis, which if it occurs would put patient in a less ideal condition for removal than if it were removed electively.  Thus, not removing the catheter will likely result in greater harm to the patient.  He was counseled that even though his overall physical condition, age, and comorbidities put him at higher " surgical risk than the average patient, delaying removal would likely only put him at greater risk of harm.  For these reasons I recommend proceeding to surgery with these considerations in mind:  Limit use of intravenous fluids during surgery unless needed to treat hypotension  Avoid hypotension  Coordinate timing of procedure with dialysis schedule to ensure patient is able to get dialysis in a timely fashion following procedure.            Health Maintenance         Date Due Completion Date    Shingles Vaccine (1 of 2) Never done ---    RSV Vaccine (Age 60+ and Pregnant patients) (1 - 1-dose 60+ series) Never done ---    Eye Exam 05/18/2024 5/18/2023    Override on 5/1/2023: Done    Influenza Vaccine (1) 09/01/2024 10/3/2023    COVID-19 Vaccine (5 - 2023-24 season) 09/01/2024 11/21/2022    Hemoglobin A1c 01/01/2025 7/1/2024    Lipid Panel 01/12/2025 1/12/2024    TETANUS VACCINE 08/07/2030 8/7/2020            Future Appointments   Date Time Provider Department Center   9/13/2024 10:00 AM CHAIR 06, [facility]   9/16/2024 10:00 AM CHAIR 06, [facility]   9/18/2024 10:00 AM CHAIR 06, [facility]   9/20/2024 10:00 AM CHAIR 06, [facility]   9/23/2024 10:00 AM CHAIR 06, [facility]   9/25/2024 10:15 AM CHAIR 16, [facility]   9/27/2024  2:45 PM CHAIR 16, [facility]   9/30/2024 10:15 AM CHAIR 16, [facility]   10/2/2024 10:15 AM CHAIR 16, [facility]   10/3/2024 10:00 AM Mega Aaron MD [facility]   10/4/2024 10:15 AM CHAIR 16, [facility]   10/7/2024 10:15 [facility]   10/8/2024  8:30 AM Enrico Woodruff MD [facility] [address]   10/9/2024 10:15 AM CHAIR [facility]  KIDNEY CARE [facility]   10/11/2024 10:15 AM CHAIR 16, [facility]   10/14/2024 10:15 AM CHAIR 16, [facility]   10/16/2024 10:15 AM CHAIR 16, [facility]   10/18/2024 10:15 AM CHAIR 16, [facility]   10/21/2024 10:15 AM CHAIR 16, [facility]   10/23/2024 10:15 AM CHAIR 16, [facility]   10/25/2024 10:15 AM CHAIR 16, [facility]   10/28/2024 10:15 AM CHAIR 16, [facility]   10/30/2024 10:15 AM CHAIR 16, [facility]   11/1/2024 10:15 AM CHAIR 16, [facility]   11/4/2024 10:15 AM CHAIR 16, [facility]   11/6/2024 10:15 AM CHAIR 16, [facility]   11/8/2024 10:15 AM CHAIR 16, [facility]   11/11/2024 10:15 AM CHAIR 16, [facility]   11/13/2024 10:15 AM CHAIR 16, [facility]   11/15/2024 10:15 AM CHAIR 16, [facility]   11/18/2024 10:15 AM CHAIR 16, [facility]   11/20/2024 10:15 AM CHAIR 16, [facility]   11/22/2024 10:15 AM CHAIR 16, [facility]   11/25/2024 10:15 AM CHAIR 16, [facility]   11/27/2024 10:15 AM CHAIR 16, [facility]   11/29/2024 10:15 AM CHAIR 16, [facility]   12/2/2024 10:15 AM CHAIR 16, [facility]   12/4/2024 10:15 AM CHAIR 16, [facility]   12/6/2024 10:15 AM CHAIR 16, [facility]   12/9/2024 10:15 AM CHAIR 16, [facility]   12/11/2024 10:15 AM  CHAIR 16, [facility]   12/12/2024 10:20 AM Phil Williamson MD [facility]   12/13/2024 10:15 AM CHAIR 16, [facility]   12/16/2024 10:15 AM CHAIR 16, [facility]   12/18/2024 10:15 AM CHAIR 16, [facility]   12/20/2024 10:15 AM CHAIR 16, [facility]   12/23/2024 10:15 AM CHAIR 16, [facility]   12/25/2024 10:15 AM CHAIR 16, [facility]   12/27/2024 10:15 AM CHAIR 16, [facility]   12/30/2024 10:15 AM CHAIR 16, [facility]         Follow up in about 3 months (around 12/12/2024). Total clinical care time was 60 min.    Phil Williamson MD  [facility]    This note was partially dictated using voice recognition software and although actively proofread during transcription, it is possible some errors in transcription may have been overlooked.

## 2024-09-12 NOTE — Clinical Note
Saw patient today to establish care and perform preoperative risk stratification and optimization.  Recommendations enclosed.

## 2024-09-12 NOTE — H&P (VIEW-ONLY)
Primary Care Provider Appointment - [facility] & Jaden Williamson MD  Initial Visit    Subjective:      Patient ID: Teodoro Mast is a 79 y.o. male with   Past Medical History:   Diagnosis Date    BPH (benign prostatic hyperplasia)     CKD (chronic kidney disease) stage 5, GFR less than 15 ml/min     Gout     Hyperlipidemia     Hyperparathyroidism, secondary renal     Hypertension     Sleep apnea            Chief Complaint: Establish Care    Prior to this visit, patient's last encounter with PCP was Visit date not found.    Pt reports prior PCP Dr. Masterson retired last month; needs preop eval for PD catheter removal.  He has been on peritoneal dialysis since May of 2021; pre-catheter he has in now is the only 1 he has ever had.  He denies history of PD catheter peritonitis.  He reports home PD became progressively more uncomfortable as larger volumes of dialysate were needed.  He was transitioned to hemodialysis 3 or 4 months ago and is dialyzing through an AV fistula in the left wrist which was placed a few years previously in anticipation of potential HD need.  Over the period of time he was on PD he notes a considerable amount of weight loss as well as loss in muscle mass.  His appetite has been poor for quite some time on PD, but has improved since transitioning to HD.  ESRD believed to be secondary to chronic hypertension; he has been on antihypertensives for nearly 50 years, but as his blood pressures continued to get lower on hemodialysis he stopped antihypertensives about 3 weeks ago.  He is anuric and on a fluid restriction.  He denies symptoms of volume overload prior to being due for dialysis.  He was diagnosed with type 2 diabetes about a year ago and is on 10 units of basal insulin nightly.  He reports blood sugars tend to range in the 90s.  He attributes this to eating a lot of junk food and sweets after being put on an appetite stimulant which perhaps worked a little too well.  He  lives independently but is reliant on his son to assist with most ADLs.  His son lives with him presently.  He ambulates short distances in the home by holding onto things and uses a rolling walker outside of the home.  His mobility and ability to perform most physical activity are limited by severe vertigo.  He is prescribed Zofran for this, but does not get nauseous prior to vomiting with vertigo symptoms.  He was referred to vestibular PT but only went to 1 session and had to quit due to vomiting and never went back.      4Ms for Medical Decision-Making in Older Adults    Last Completed EAWV: 2020    MOBILITY:  Get Up and Go:      2020    10:08 AM   Get Up and Go   Trial 1 5 seconds     Activities of Daily Livin/7/2020    10:03 AM   Activities of Daily Living   Ambulation Independent   Dressing Independent   Transfers Independent   Toileting Incontinent of bowel;Continent of bladder   Feeding Independent   Cleaning home/Chores Independent   Telephone use Independent   Shopping Independent   Paying bills Independent   Taking meds Independent     Whisper Test:      2020    10:08 AM   Whisper Test   Whisper Test Abnormal     Disability Status:      2020    10:05 AM   Disability Status   Are you deaf or do you have serious difficulty hearing? N   Are you blind or do you have serious difficulty seeing, even when wearing glasses? N   Because of a physical, mental, or emotional condition, do you have serious difficulty concentrating, remembering, or making decisions? N   Do you have serious difficulty walking or climbing stairs? N   Do you have difficulty dressing or bathing? N   Because of a physical, mental, or emotional condition, do you have difficulty doing errands alone such as visiting a doctor's office or shopping? N     Nutrition Screenin/7/2020     9:59 AM   Nutrition Screening   Has food intake declined over the past three months due to loss of appetite, digestive problems,  chewing or swallowing difficulties? Moderate decrease in food intake   Involuntary weight loss during the last 3 months? No weight loss   Mobility? Goes out   Has the patient suffered psychological stress or acute disease in the past three months? Yes   Neuropsychological problems? No psychological problems   Body Mass Index (BMI)?  BMI 23 or greater   Screening Score 11    Screening Score: 0-7 Malnourished, 8-11 At Risk, 12-14 Normal  Fall Risk:      2024    12:30 PM 2024    12:30 PM 2024     3:30 PM   Fall Risk Assessment - Outpatient   Mobility Status Ambulatory w/ assistance Ambulatory w/ assistance Ambulatory w/ assistance   Number of falls 0 0 0   Identified as fall risk True True True           MENTATION:   Depression Patient Health Questionnaire:      2024     7:13 AM   Depression Patient Health Questionnaire   Over the last two weeks how often have you been bothered by little interest or pleasure in doing things Not at all   Over the last two weeks how often have you been bothered by feeling down, depressed or hopeless Not at all   PHQ-2 Total Score 0     Has Dementia Dx: No  Has Anxiety Dx: No    Cognitive Function Screenin/7/2020    10:08 AM   Cognitive Function Screening   Clock Drawing Test 2   Mini-Cog 3 Minute Recall 2   Cognitive Function Screening 4     Cognitive Function Screening Total - Less than 4 = Abnormal,  Greater than or equal to 4 = Normal        MEDICATIONS:  High Risk Medications:  Total Active Medications: 0  This patient does not have an active medication from one of the medication groupers.    WHAT MATTERS MOST:  Advance Care Planning   ACP Status:   Patient does not have an ACP conversation on file  Living Will: No  Power of : No  [place]: No    What is most important right now is to focus on remaining as independent as possible    Accordingly, we have decided that the best plan to meet the patient's goals includes continuing with  treatment      What matters most to patient today is: getting ready for surgery             Social History     Socioeconomic History    Marital status:     Number of children: 2   Tobacco Use    Smoking status: Former     Current packs/day: 0.00     Average packs/day: 0.5 packs/day for 10.0 years (5.0 ttl pk-yrs)     Types: Cigarettes     Start date:      Quit date:      Years since quittin.7    Smokeless tobacco: Never   Substance and Sexual Activity    Alcohol use: Not Currently    Drug use: No    Sexual activity: Not Currently     Partners: Female   Social History Narrative    Caregiver Teodoro Mast II     Social Determinants of Health     Financial Resource Strain: Low Risk  (11/3/2023)    Overall Financial Resource Strain (CARDIA)     Difficulty of Paying Living Expenses: Not hard at all   Food Insecurity: No Food Insecurity (12/3/2023)    Hunger Vital Sign     Worried About Running Out of Food in the Last Year: Never true     Ran Out of Food in the Last Year: Never true   Transportation Needs: No Transportation Needs (12/3/2023)    PRAPARE - Transportation     Lack of Transportation (Medical): No     Lack of Transportation (Non-Medical): No   Physical Activity: Inactive (12/3/2023)    Exercise Vital Sign     Days of Exercise per Week: 0 days     Minutes of Exercise per Session: 0 min   Stress: No Stress Concern Present (12/3/2023)    [employer] - Occupational Stress Questionnaire     Feeling of Stress : Not at all   Housing Stability: Low Risk  (12/3/2023)    Housing Stability Vital Sign     Unable to Pay for Housing in the Last Year: No     Number of Places Lived in the Last Year: 1     Unstable Housing in the Last Year: No       Review of Systems   Constitutional:  Positive for appetite change and fatigue. Negative for activity change and unexpected weight change.   Respiratory:  Negative for cough, chest tightness and shortness of breath.     Cardiovascular:  Negative for chest pain, palpitations, leg swelling and claudication.   Gastrointestinal:  Negative for abdominal pain and change in bowel habit.   Genitourinary:  Positive for decreased urine volume.   Integumentary:  Negative for wound.   Neurological:  Positive for dizziness and weakness.        Objective:   /79   Pulse 107   Temp 97.5 °F (36.4 °C) (Oral)   Wt 84.9 kg (187 lb 2.7 oz)   SpO2 97%   BMI 24.03 kg/m²     Physical Exam  Vitals reviewed.   Constitutional:       General: He is not in acute distress.     Appearance: Normal appearance. He is normal weight.      Comments: Ambulates with a rolling walker.  Needs assistance to get onto and off of examination table.   HENT:      Head: Normocephalic and atraumatic.      Right Ear: Tympanic membrane, ear canal and external ear normal.      Left Ear: Tympanic membrane, ear canal and external ear normal.      Nose: Nose normal.      Mouth/Throat:      Mouth: Mucous membranes are moist.      Pharynx: Oropharynx is clear.   Eyes:      General: No scleral icterus.     Conjunctiva/sclera: Conjunctivae normal.   Cardiovascular:      Rate and Rhythm: Regular rhythm. Tachycardia present.      Pulses: Normal pulses.      Heart sounds: Murmur heard.      Crescendo decrescendo systolic murmur is present with a grade of 3/6.      Arteriovenous access: Left arteriovenous access is present.  Pulmonary:      Effort: Pulmonary effort is normal. No respiratory distress.      Breath sounds: Normal breath sounds.   Abdominal:      General: A surgical scar is present. Bowel sounds are normal. There is no distension.      Palpations: Abdomen is soft. There is fluid wave. There is no hepatomegaly or splenomegaly.      Tenderness: There is no abdominal tenderness.      Comments: PD catheter covered with bandage L abd with ports secured under a band of fabric secured at the level of the epigastrium   Musculoskeletal:      Cervical back: Normal range of  "motion and neck supple. No rigidity.      Right lower leg: No edema.      Left lower leg: No edema.   Lymphadenopathy:      Cervical: No cervical adenopathy.   Skin:     General: Skin is warm and dry.      Findings: No erythema or rash.   Neurological:      General: No focal deficit present.      Mental Status: He is alert and oriented to person, place, and time.              Lab Results   Component Value Date    WBC 9.94 09/04/2024    HGB 11.9 (L) 09/04/2024    HCT 38.0 (L) 09/04/2024     09/04/2024    CHOL 178 01/12/2024    TRIG 123 01/12/2024    HDL 29 (L) 01/12/2024    ALT 7 09/04/2024    AST 20 11/02/2023     09/04/2024    K 5.0 09/04/2024     09/04/2024    CREATININE 9.23 (H) 09/04/2024    BUN 40 (H) 01/08/2024    CO2 28 01/08/2024    PSA 14.2 (H) 07/13/2021    INR 1.0 07/13/2021    HGBA1C 5.3 07/01/2024       Current Outpatient Medications on File Prior to Visit   Medication Sig Dispense Refill    atorvastatin (LIPITOR) 80 MG tablet TAKE 1 TABLET(80 MG) BY MOUTH EVERY DAY 90 tablet 3    magnesium oxide (MAG-OX) 400 mg (241.3 mg magnesium) tablet TAKE 1 TABLET BY MOUTH DAILY 90 tablet 2    ondansetron (ZOFRAN) 4 MG tablet Take 1 tablet (4 mg total) by mouth every 8 (eight) hours as needed for Nausea. 30 tablet 2    sevelamer carbonate (RENVELA) 800 mg Tab Take 1 tablet (800 mg total) by mouth 3 (three) times daily with meals. 90 tablet 11    [DISCONTINUED] BD KITA 2ND GEN PEN NEEDLE 32 gauge x 5/32" Ndle USE AS DIRECTED DAILY FOR INSULIN 100 each 11    aspirin 81 MG Chew Take 1 tablet by mouth once daily.      LANTUS SOLOSTAR U-100 INSULIN glargine 100 units/mL SubQ pen Inject 10 Units into the skin every evening. 9 mL 3    psyllium (METAMUCIL) powder Take 28 g by mouth once daily. Patient taking 2 tablespoon      vitamin D (VITAMIN D3) 1000 units Tab Take 2,000 Units by mouth once daily.      [DISCONTINUED] amLODIPine (NORVASC) 5 MG tablet Take 2 tablets (10 mg total) by mouth once daily. " (Patient taking differently: Take 5 mg by mouth 2 (two) times daily.) 60 tablet 11    [DISCONTINUED] gentamicin (GARAMYCIN) 0.3 % ophthalmic solution 2 drops by Other route Daily. Apply 1 drop to PD exit site daily with dressing changes. 5 mL 3    [DISCONTINUED] labetaloL (NORMODYNE) 300 MG tablet Take 1 tablet (300 mg total) by mouth 2 (two) times daily. 180 tablet 2     Current Facility-Administered Medications on File Prior to Visit   Medication Dose Route Frequency Provider Last Rate Last Admin    [DISCONTINUED] 0.9%  NaCl infusion   Intravenous Continuous Enrico Woodruff  mL/hr at 05/05/21 1115 New Bag at 05/05/21 1115    [DISCONTINUED] ceFAZolin (ANCEF) 3 gram in dextrose 5% IVPB  3 g Intravenous On Call Procedure Enrico Woodruff MD        [DISCONTINUED] LIDOcaine (PF) 10 mg/ml (1%) injection 10 mg  1 mL Intradermal Once Enrico Woodruff MD        [DISCONTINUED] regadenoson injection 0.4 mg  0.4 mg Intravenous 1 time in Clinic/HOD Vandana Gomez PA-C        [DISCONTINUED] sodium chloride 0.9% flush 10 mL  10 mL Intravenous PRN Joe Pitts MD             Assessment:   79 y.o. male with multiple co-morbid illnesses here to establish care.    Plan:     Problem List Items Addressed This Visit       Mixed hyperlipidemia     Lipids done in January of this year not at goal; but it is not clear if patient was taking atorvastatin at that time.  Currently taking atorvastatin 80 mg.         GLYNN (obstructive sleep apnea)     Patient has not compliant with CPAP; he has fallen out of habit of using it.  Assessed for potential barriers to use, and there do not appear to be any.  He was counseled on decreased risk of perioperative cardiopulmonary complications in patients with GLYNN using CPAP perioperatively.  He will try to get back in the habit of using it prior to surgery.         Secondary hyperparathyroidism     On daily vitamin-D supplementation and Renvela as phosphate binder.          Benign hypertension with CKD (chronic kidney disease) stage V     Off antihypertensives due to hypotension associated with hemodialysis.  Blood pressure at goal today.         ESRD on hemodialysis     HD MWF.  Tolerating well presently without significant electrolyte disturbances or fluid overload between sessions.  Dose renally cleared medications adjusted accordingly.         Peritoneal dialysis catheter in place     Patient transitioned to HD 3 or 4 months ago due to increasing difficulty tolerating.  Per last flush attempt approximately a month ago catheter is occluded.  It is scheduled for removal 9/27, which must be done despite operative risk due to inevitable risk of PD catheter peritonitis.         Patient on waiting list for kidney transplant     Patient has been on wait list since 2020 for kidney transplant; has regular follow-up with Kidney Transplant to monitor candidacy status.         Type 2 diabetes mellitus with hyperglycemia, with long-term current use of insulin     Possibly secondary to dietary indiscretions related to appetite stimulant use while on peritoneal dialysis, as well as known side effect of hyperglycemia with peritoneal dialysis.  He is on 10 units of Lantus every evening with reported normal blood glucose levels.  Low threshold to discontinue now that he has transitioned to hemodialysis, especially should hypoglycemia develop.  He was instructed to take half his normal basal insulin dose the night before his upcoming surgery.  We may try stopping insulin altogether and seeing how his blood glucoses range, as stopping unnecessary insulin in setting of ESRD should reduce risk of iatrogenic hypoglycemia.  Hemoglobin A1C   Date Value Ref Range Status   07/01/2024 5.3 4.8 - 5.9 % Final   11/13/2023 5.8 (H) 4.0 - 5.6 % Final     Comment:     ADA Screening Guidelines:  5.7-6.4%  Consistent with prediabetes  >or=6.5%  Consistent with diabetes    High levels of fetal hemoglobin interfere  "with the HbA1C  assay. Heterozygous hemoglobin variants (HbS, HgC, etc)do  not significantly interfere with this assay.   However, presence of multiple variants may affect accuracy.     08/10/2023 8.4 (H) 4.8 - 5.9 % Final              Relevant Medications    pen needle, diabetic (BD KITA 2ND GEN PEN NEEDLE) 32 gauge x 5/32" Ndle    Aortic atherosclerosis - Primary     Incidental finding on prior imaging.  On ASA 81mg and atorvastatin 80mg.         Preoperative cardiovascular examination     He is scheduled for elective removal of a peritoneal dialysis catheter; which would technically be considered a high-risk procedure due to involvement of the abdominal cavity.  He is capable of at least 4 METs activity, and recently done nuclear stress echo and LHC reassuring for no significant ischemic heart disease.  His ESRD status does pose a significant surgical risk factor, especially regarding impaired clearance of multiple medications such as sedatives, which could potentially result in prolonged sedation post-procedure.  His anuric status also puts him at risk of volume overload with intraoperative IV fluids, so these should be used sparingly unless needed to treat intraoperative hypotension.  His most recent 2D echo is also significant for moderate aortic stenosis, indicating that he may be more preload-dependent in maintaining cardiac output, so hypotension intraoperatively should be avoided.      Another major factor to consider in weighing this patient's risk stratification is the need for the procedure.  The peritoneal dialysis catheter needs to be removed due to inevitable risk of PD catheter-associated peritonitis, which if it occurs would put patient in a less ideal condition for removal than if it were removed electively.  Thus, not removing the catheter will likely result in greater harm to the patient.  He was counseled that even though his overall physical condition, age, and comorbidities put him at higher " surgical risk than the average patient, delaying removal would likely only put him at greater risk of harm.  For these reasons I recommend proceeding to surgery with these considerations in mind:  Limit use of intravenous fluids during surgery unless needed to treat hypotension  Avoid hypotension  Coordinate timing of procedure with dialysis schedule to ensure patient is able to get dialysis in a timely fashion following procedure.            Health Maintenance         Date Due Completion Date    Shingles Vaccine (1 of 2) Never done ---    RSV Vaccine (Age 60+ and Pregnant patients) (1 - 1-dose 60+ series) Never done ---    Eye Exam 05/18/2024 5/18/2023    Override on 5/1/2023: Done    Influenza Vaccine (1) 09/01/2024 10/3/2023    COVID-19 Vaccine (5 - 2023-24 season) 09/01/2024 11/21/2022    Hemoglobin A1c 01/01/2025 7/1/2024    Lipid Panel 01/12/2025 1/12/2024    TETANUS VACCINE 08/07/2030 8/7/2020            Future Appointments   Date Time Provider Department Center   9/13/2024 10:00 AM CHAIR 06, [facility]   9/16/2024 10:00 AM CHAIR 06, [facility]   9/18/2024 10:00 AM CHAIR 06, [facility]   9/20/2024 10:00 AM CHAIR 06, [facility]   9/23/2024 10:00 AM CHAIR 06, [facility]   9/25/2024 10:15 AM CHAIR 16, [facility]   9/27/2024  2:45 PM CHAIR 16, [facility]   9/30/2024 10:15 AM CHAIR 16, [facility]   10/2/2024 10:15 AM CHAIR 16, [facility]   10/3/2024 10:00 AM Mega Aaron MD [facility]   10/4/2024 10:15 AM CHAIR 16, [facility]   10/7/2024 10:15 [facility]   10/8/2024  8:30 AM Enrico Woodruff MD [facility] [address]   10/9/2024 10:15 AM CHAIR [facility]  [facility]   10/11/2024 10:15 AM CHAIR 16, [facility]   10/14/2024 10:15 AM CHAIR 16, [facility]   10/16/2024 10:15 AM CHAIR 16, [facility]   10/18/2024 10:15 AM CHAIR 16, [facility]   10/21/2024 10:15 AM CHAIR 16, [facility]   10/23/2024 10:15 AM CHAIR 16, [facility]   10/25/2024 10:15 AM CHAIR 16, [facility]   10/28/2024 10:15 AM CHAIR 16, [facility]   10/30/2024 10:15 AM CHAIR 16, [facility]   11/1/2024 10:15 AM CHAIR 16, [facility]   11/4/2024 10:15 AM CHAIR 16, [facility]   11/6/2024 10:15 AM CHAIR 16, [facility]   11/8/2024 10:15 AM CHAIR 16, [facility]   11/11/2024 10:15 AM CHAIR 16, [facility]   11/13/2024 10:15 AM CHAIR 16, [facility]   11/15/2024 10:15 AM CHAIR 16, [facility]   11/18/2024 10:15 AM CHAIR 16, [facility]   11/20/2024 10:15 AM CHAIR 16, [facility]   11/22/2024 10:15 AM CHAIR 16, [facility]   11/25/2024 10:15 AM CHAIR 16, [facility]   11/27/2024 10:15 AM CHAIR 16, [facility]   11/29/2024 10:15 AM CHAIR 16, [facility]   12/2/2024 10:15 AM CHAIR 16, [facility]   12/4/2024 10:15 AM CHAIR 16, [facility]   12/6/2024 10:15 AM CHAIR 16, [facility]   12/9/2024 10:15 AM CHAIR 16, [facility]   12/11/2024 10:15 AM  CHAIR 16, [facility]   12/12/2024 10:20 AM Phil Williamson MD [facility]   12/13/2024 10:15 AM CHAIR 16, [facility]   12/16/2024 10:15 AM CHAIR 16, [facility]   12/18/2024 10:15 AM CHAIR 16, [facility]   12/20/2024 10:15 AM CHAIR 16, [facility]   12/23/2024 10:15 AM CHAIR 16, [facility]   12/25/2024 10:15 AM CHAIR 16, [facility]   12/27/2024 10:15 AM CHAIR 16, [facility]   12/30/2024 10:15 AM CHAIR 16, [facility]         Follow up in about 3 months (around 12/12/2024). Total clinical care time was 60 min.    Phil Williamson MD  [facility]    This note was partially dictated using voice recognition software and although actively proofread during transcription, it is possible some errors in transcription may have been overlooked.

## 2024-09-12 NOTE — ASSESSMENT & PLAN NOTE
Lipids done in January of this year not at goal; but it is not clear if patient was taking atorvastatin at that time.  Currently taking atorvastatin 80 mg.

## 2024-09-13 NOTE — ASSESSMENT & PLAN NOTE
HD MWF.  Tolerating well presently without significant electrolyte disturbances or fluid overload between sessions.  Dose renally cleared medications adjusted accordingly.

## 2024-09-13 NOTE — ASSESSMENT & PLAN NOTE
Possibly secondary to dietary indiscretions related to appetite stimulant use while on peritoneal dialysis, as well as known side effect of hyperglycemia with peritoneal dialysis.  He is on 10 units of Lantus every evening with reported normal blood glucose levels.  Low threshold to discontinue now that he has transitioned to hemodialysis, especially should hypoglycemia develop.  He was instructed to take half his normal basal insulin dose the night before his upcoming surgery.  We may try stopping insulin altogether and seeing how his blood glucoses range, as stopping unnecessary insulin in setting of ESRD should reduce risk of iatrogenic hypoglycemia.  Hemoglobin A1C   Date Value Ref Range Status   07/01/2024 5.3 4.8 - 5.9 % Final   11/13/2023 5.8 (H) 4.0 - 5.6 % Final     Comment:     ADA Screening Guidelines:  5.7-6.4%  Consistent with prediabetes  >or=6.5%  Consistent with diabetes    High levels of fetal hemoglobin interfere with the HbA1C  assay. Heterozygous hemoglobin variants (HbS, HgC, etc)do  not significantly interfere with this assay.   However, presence of multiple variants may affect accuracy.     08/10/2023 8.4 (H) 4.8 - 5.9 % Final

## 2024-09-13 NOTE — ASSESSMENT & PLAN NOTE
Patient has not compliant with CPAP; he has fallen out of habit of using it.  Assessed for potential barriers to use, and there do not appear to be any.  He was counseled on decreased risk of perioperative cardiopulmonary complications in patients with GLYNN using CPAP perioperatively.  He will try to get back in the habit of using it prior to surgery.

## 2024-09-13 NOTE — ASSESSMENT & PLAN NOTE
Patient transitioned to HD 3 or 4 months ago due to increasing difficulty tolerating.  Per last flush attempt approximately a month ago catheter is occluded.  It is scheduled for removal 9/27, which must be done despite operative risk due to inevitable risk of PD catheter peritonitis.

## 2024-09-13 NOTE — ASSESSMENT & PLAN NOTE
Patient has been on wait list since 2020 for kidney transplant; has regular follow-up with Kidney Transplant to monitor candidacy status.

## 2024-09-25 DIAGNOSIS — Z00.00 ENCOUNTER FOR MEDICARE ANNUAL WELLNESS EXAM: ICD-10-CM

## 2024-09-26 ENCOUNTER — ANESTHESIA EVENT (OUTPATIENT)
Dept: SURGERY | Facility: HOSPITAL | Age: 79
End: 2024-09-26
Payer: MEDICARE

## 2024-09-26 ENCOUNTER — TELEPHONE (OUTPATIENT)
Dept: SURGERY | Facility: CLINIC | Age: 79
End: 2024-09-26
Payer: MEDICARE

## 2024-09-26 NOTE — ANESTHESIA PREPROCEDURE EVALUATION
[facility]  Anesthesia Pre-Operative Evaluation         Patient Name: Teodoro Mast  YOB: 1945  MRN: [id number]    SUBJECTIVE:     Pre-operative evaluation for Procedure(s) (LRB):  REMOVAL, CATHETER, DIALYSIS, PERITONEAL, OPEN (N/A)     09/26/2024    Teodoro Mast is a 79 y.o. male w/ a significant PMHx of  HFpEF, HTN, GLYNN, DM II, HLD, ESRD on peritoneal dialysis .    Patient was transitioned to hemodialysis 3 or 4 months ago and is dialyzing through an AV fistula in the left wrist.    Patient now presents for the above procedure(s).      LDA:        Hemodialysis AV Fistula 09/30/22 0000 Left forearm (Active)   Needle Size 16ga 09/25/24 1010   Site Assessment Clean;Dry;Intact;No redness;No swelling 09/25/24 1010   Patency Present;Thrill;Bruit 09/25/24 1010   Status Accessed 09/25/24 1010   Flows Good 09/25/24 1414   Dressing Intervention First dressing 09/25/24 1414   Dressing Status Clean;Intact;Dry 09/25/24 1414   Site Condition No complications 09/25/24 1414   Dressing Gauze 09/25/24 1414   Number of days: 727       Prev airway:   Date/Time: 5/5/2021 11:36 AM  Performed by: Hortencia Serrano CRNA  Authorized by: Glenn Rodriguez Jr., MD      Intubation:     Induction:  Intravenous    Intubated:  Postinduction    Mask Ventilation:  Easy mask    Attempts:  1    Attempted By:  CRNA    Method of Intubation:  Direct    Blade:  Flores 2    Laryngeal View Grade: Grade I - full view of chords      Difficult Airway Encountered?: No      Complications:  None    Airway Device:  Oral endotracheal tube    Airway Device Size:  7.5    Style/Cuff Inflation:  Cuffed (inflated to minimal occlusive pressure)    Tube secured:  22    Secured at:  The lips    Placement Verified By:  Capnometry    Complicating Factors:  None    Findings Post-Intubation:  Atraumatic/condition of teeth unchanged and BS equal bilateral    Drips: None documented.    Echo:  8/29/2024   Left Ventricle: The left ventricle  is normal in size. Ventricular mass is normal. Normal wall thickness. Normal wall motion. There is normal systolic function with a visually estimated ejection fraction of 55 - 60%. There is normal diastolic function. Normal left ventricular filling pressure.    Right Ventricle: Normal right ventricular cavity size. Wall thickness is normal. Right ventricle wall motion  is normal. Systolic function is normal.    Left Atrium: Normal left atrial size.    Right Atrium: Normal right atrial size.    Aortic Valve: The aortic valve is a trileaflet valve. There is moderate aortic valve sclerosis. There is moderate annular calcification present.    Mitral Valve: There is mild bileaflet sclerosis.    Aorta: Aortic root is normal in size measuring 3.18 cm. Ascending aorta is normal measuring 2.90 cm.    Pulmonary Artery: The estimated pulmonary artery systolic pressure is 18 mmHg.    IVC/SVC: Normal venous pressure at 3 mmHg.    Patient Active Problem List   Diagnosis    Mixed hyperlipidemia    BPH with urinary obstruction    Kidney cysts    Proteinuria    Metabolic acidosis    GLYNN (obstructive sleep apnea)    Hyperuricemia    Secondary hyperparathyroidism    Vitamin D deficiency    Benign hypertension with CKD (chronic kidney disease) stage V    Sensorineural hearing loss (SNHL) of both ears    Nasal septal deviation    Elevated serum immunoglobulin free light chains    Vitamin B12 deficiency    Folate deficiency    Iron deficiency anemia    Anemia in ESRD (end-stage renal disease)    Type 2 DM with hypertension and ESRD on dialysis    ESRD on hemodialysis    Peritoneal dialysis catheter in place    Patient on waiting list for kidney transplant    Elevated PSA    Type 2 diabetes mellitus with hyperglycemia, with long-term current use of insulin    Aortic atherosclerosis    Preoperative cardiovascular examination       Review of patient's allergies indicates:  No Known Allergies    Current  "Outpatient Medications:  No current facility-administered medications for this encounter.    Current Outpatient Medications:     aspirin 81 MG Chew, Take 1 tablet by mouth once daily. (Patient not taking: Reported on 9/20/2024), Disp: , Rfl:     atorvastatin (LIPITOR) 80 MG tablet, TAKE 1 TABLET(80 MG) BY MOUTH EVERY DAY, Disp: 90 tablet, Rfl: 3    LANTUS SOLOSTAR U-100 INSULIN glargine 100 units/mL SubQ pen, Inject 10 Units into the skin every evening., Disp: 9 mL, Rfl: 3    magnesium oxide (MAG-OX) 400 mg (241.3 mg magnesium) tablet, TAKE 1 TABLET BY MOUTH DAILY, Disp: 90 tablet, Rfl: 2    ondansetron (ZOFRAN) 4 MG tablet, Take 1 tablet (4 mg total) by mouth every 8 (eight) hours as needed for Nausea., Disp: 30 tablet, Rfl: 2    pen needle, diabetic (BD KITA 2ND GEN PEN NEEDLE) 32 gauge x 5/32" Ndle, Inject 1 each into the skin daily as needed (insulin injection)., Disp: 100 each, Rfl: 11    psyllium (METAMUCIL) powder, Take 28 g by mouth once daily. Patient taking 2 tablespoon, Disp: , Rfl:     sevelamer carbonate (RENVELA) 800 mg Tab, Take 1 tablet (800 mg total) by mouth 3 (three) times daily with meals., Disp: 90 tablet, Rfl: 11    vitamin D (VITAMIN D3) 1000 units Tab, Take 2,000 Units by mouth once daily., Disp: , Rfl:     Past Surgical History:   Procedure Laterality Date    AV FISTULA PLACEMENT Left 3/31/2022    Procedure: CREATION, AV FISTULA RADIOCEPHALIC;  Surgeon: PEMA Martines II, MD;  Location: [facility] OR [address] [place] [address];  Service: Vascular;  Laterality: Left;    AV FISTULA PLACEMENT Left 9/30/2022    Procedure: CREATION, AV FISTULA;  Surgeon: PEMA Martines II, MD;  Location: [facility] OR [address] [place] [address];  Service: Vascular;  Laterality: Left;    COLONOSCOPY N/A 11/2/2021    Procedure: COLONOSCOPY;  Surgeon: Joe Jimenez MD;  Location: [facility] (4TH FLR);  Service: Endoscopy;  Laterality: N/A;  COVID test at [place] on 10/30-GT      dialysis pt-labs prior    CORONARY ANGIOGRAPHY N/A 1/8/2024    " Procedure: ANGIOGRAM, CORONARY ARTERY;  Surgeon: Joe Pitts MD;  Location: [facility] CATH LAB;  Service: Cardiology;  Laterality: N/A;    EYE SURGERY Bilateral     cataract removal    FINGER SURGERY      hemorriodectomy      PERITONEAL CATHETER INSERTION         Social History     Socioeconomic History    Marital status:     Number of children: 2   Tobacco Use    Smoking status: Former     Current packs/day: 0.00     Average packs/day: 0.5 packs/day for 10.0 years (5.0 ttl pk-yrs)     Types: Cigarettes     Start date:      Quit date:      Years since quittin.7    Smokeless tobacco: Never   Substance and Sexual Activity    Alcohol use: Not Currently    Drug use: No    Sexual activity: Not Currently     Partners: Female   Social History Narrative    Caregiver Teodoro Roger Mast II     Social Determinants of Health     Financial Resource Strain: Low Risk  (11/3/2023)    Overall Financial Resource Strain (CARDIA)     Difficulty of Paying Living Expenses: Not hard at all   Food Insecurity: No Food Insecurity (12/3/2023)    Hunger Vital Sign     Worried About Running Out of Food in the Last Year: Never true     Ran Out of Food in the Last Year: Never true   Transportation Needs: No Transportation Needs (12/3/2023)    PRAPARE - Transportation     Lack of Transportation (Medical): No     Lack of Transportation (Non-Medical): No   Physical Activity: Inactive (12/3/2023)    Exercise Vital Sign     Days of Exercise per Week: 0 days     Minutes of Exercise per Session: 0 min   Stress: No Stress Concern Present (12/3/2023)    [employer] - Occupational Stress Questionnaire     Feeling of Stress : Not at all   Housing Stability: Low Risk  (12/3/2023)    Housing Stability Vital Sign     Unable to Pay for Housing in the Last Year: No     Number of Places Lived in the Last Year: 1     Unstable Housing in the Last Year: No       OBJECTIVE:     Vital Signs  Range (Last 24H):  Pulse:  [71-74]   BP: (140-164)/(77-85)       Significant Labs:  Lab Results   Component Value Date    WBC 9.94 09/04/2024    HGB 10.3 (L) 09/13/2024    HCT 31.1 (L) 09/13/2024     09/04/2024    CHOL 178 01/12/2024    TRIG 123 01/12/2024    HDL 29 (L) 01/12/2024    ALT 7 09/04/2024    AST 20 11/02/2023     09/04/2024    K 5.0 09/04/2024     09/04/2024    CREATININE 9.23 (H) 09/04/2024    BUN 40 (H) 01/08/2024    CO2 28 01/08/2024    PSA 14.2 (H) 07/13/2021    INR 1.0 07/13/2021    HGBA1C 5.3 07/01/2024       Diagnostic Studies: No relevant studies.    EKG:   Results for orders placed or performed during the hospital encounter of 01/08/24   EKG 12-lead    Collection Time: 01/08/24 11:16 AM    Narrative    Test Reason : R94.39,    Vent. Rate : 067 BPM     Atrial Rate : 067 BPM     P-R Int : 136 ms          QRS Dur : 128 ms      QT Int : 504 ms       P-R-T Axes : 060 082 052 degrees     QTc Int : 532 ms    Normal sinus rhythm  Right bundle branch block  Abnormal ECG  When compared with ECG of 02-NOV-2023 10:10,  No major change  Confirmed by Ottoniel RODRIGEZ MD ([id number]) on 1/8/2024 12:34:17 PM    Referred By: VILMA JONES           Confirmed By:Ottoniel RODRIGEZ MD       2D ECHO:  TTE:  Results for orders placed or performed during the hospital encounter of 08/29/23   Echo Saline Bubble? No   Result Value Ref Range    BSA 2.26 m2    LVOT stroke volume 86.17 cm3    LVIDd 5.37 3.5 - 6.0 cm    LV Systolic Volume 68.94 mL    LV Systolic Volume Index 30.8 mL/m2    LVIDs 3.97 2.1 - 4.0 cm    LV Diastolic Volume 139.65 mL    LV Diastolic Volume Index 62.34 mL/m2    IVS 0.94 0.6 - 1.1 cm    LVOT diameter 2.14 cm    LVOT area 3.6 cm2    FS 26 (A) 28 - 44 %    Left Ventricle Relative Wall Thickness 0.43 cm    Posterior Wall 1.16 (A) 0.6 - 1.1 cm    LV mass 218.57 g    LV Mass Index 98 g/m2    MV Peak E Dick 0.42 m/s    TDI LATERAL 0.07 m/s    TDI SEPTAL 0.05 m/s    E/E' ratio 7.00 m/s    MV Peak A Dick  "0.92 m/s    TR Max Dick 3.06 m/s    E/A ratio 0.46     E wave deceleration time 146.26 msec    MV "A" wave duration 15.70 msec    LV SEPTAL E/E' RATIO 8.40 m/s    LA Volume Index 32.4 mL/m2    LV LATERAL E/E' RATIO 6.00 m/s    LA volume 72.48 cm3    PV Peak S Dick 0.48 m/s    PV Peak D Dick 0.27 m/s    Pulm vein S/D ratio 1.78     LVOT peak dick 1.21 m/s    LA Vol (MOD) 83.27 cm3    LA Volume Index (Mod) 37.2 mL/m2    LA size 3.93 cm    Left Atrium Major Axis 5.24 cm    Left Atrium Minor Axis 4.78 cm    LA WIDTH 4.34 cm    RVDD 3.67 cm    TAPSE 2.36 cm    RA Major Axis 4.26 cm    RA Width 3.74 cm    AV mean gradient 11 mmHg    AV peak gradient 22 mmHg    Ao peak dick 2.35 m/s    Ao VTI 40.53 cm    LVOT peak VTI 23.97 cm    AV valve area 2.13 cm²    AV Velocity Ratio 0.51     AV index (prosthetic) 0.59     GISELLE by Velocity Ratio 1.85 cm²    MV stenosis pressure 1/2 time 42.42 ms    MV valve area p 1/2 method 5.19 cm2    Triscuspid Valve Regurgitation Peak Gradient 37 mmHg    Sinus 3.56 cm    STJ 3.03 cm    Ascending aorta 3.44 cm    Mean e' 0.06 m/s    ZLVIDS -1.59     ZLVIDD -4.00     TV resting pulmonary artery pressure 40 mmHg    RV TB RVSP 6 mmHg    Est. RA pres 3 mmHg    Narrative      Left Ventricle: The left ventricle is normal in size. Normal wall   thickness. There is concentric remodeling. Normal wall motion. There is   normal systolic function with a visually estimated ejection fraction of 55   - 60%.    Left Atrium: Left atrium is mildly dilated.    Right Ventricle: Normal right ventricular cavity size. Wall thickness   is normal. Right ventricle wall motion  is normal. Systolic function is   normal.    Aortic Valve: There is mild aortic valve sclerosis.    Tricuspid Valve: There is mild regurgitation.    Pulmonary Artery: There is mild pulmonary hypertension. The estimated   pulmonary artery systolic pressure is 40 mmHg.    IVC/SVC: Normal venous pressure at 3 mmHg.    Pericardium: There is a trivial " circumferential effusion.         NARDA:  No results found. However, due to the size of the patient record, not all encounters were searched. Please check Results Review for a complete set of results.    ASSESSMENT/PLAN:       Pre-op Assessment    I have reviewed the Patient Summary Reports.     I have reviewed the Nursing Notes. I have reviewed the NPO Status.   I have reviewed the Medications.     Review of Systems  Anesthesia Hx:  No problems with previous Anesthesia   History of prior surgery of interest to airway management or planning:  Previous anesthesia: General, MAC        Denies Family Hx of Anesthesia complications.    Denies Personal Hx of Anesthesia complications.                    Social:  Former Smoker       Hematology/Oncology:    Oncology Normal    -- Anemia:                                  Cardiovascular:     Hypertension, well controlled           hyperlipidemia   ECG has been reviewed.                          Pulmonary:        Sleep Apnea                Renal/:  Chronic Renal Disease, ESRD, Dialysis  BPH              Endocrine:  Diabetes, type 2               Physical Exam  General: Well nourished, Cooperative and Alert    Airway:  Mallampati: II   Mouth Opening: Normal  TM Distance: Normal  Tongue: Normal  Neck ROM: Normal ROM    Dental:  Intact, Periodontal disease      Anesthesia Plan  Type of Anesthesia, risks & benefits discussed:    Anesthesia Type: Gen ETT, MAC  Intra-op Monitoring Plan: Standard ASA Monitors  Post Op Pain Control Plan: multimodal analgesia and IV/PO Opioids PRN  Induction:  IV  Airway Plan: Direct, Post-Induction  Informed Consent: Informed consent signed with the Patient and all parties understand the risks and agree with anesthesia plan.  All questions answered.   ASA Score: 4  Day of Surgery Review of History & Physical: H&P Update referred to the surgeon/provider.    Ready For Surgery From Anesthesia Perspective.     .

## 2024-09-27 ENCOUNTER — ANESTHESIA (OUTPATIENT)
Dept: SURGERY | Facility: HOSPITAL | Age: 79
End: 2024-09-27
Payer: MEDICARE

## 2024-09-27 ENCOUNTER — HOSPITAL ENCOUNTER (OUTPATIENT)
Facility: HOSPITAL | Age: 79
Discharge: HOME | End: 2024-09-27
Attending: SURGERY | Admitting: SURGERY
Payer: MEDICARE

## 2024-09-27 VITALS
RESPIRATION RATE: 19 BRPM | SYSTOLIC BLOOD PRESSURE: 144 MMHG | BODY MASS INDEX: 24.05 KG/M2 | HEART RATE: 70 BPM | DIASTOLIC BLOOD PRESSURE: 67 MMHG | WEIGHT: 187.38 LBS | TEMPERATURE: 98 F | OXYGEN SATURATION: 99 % | HEIGHT: 74 IN

## 2024-09-27 DIAGNOSIS — Z99.2 PERITONEAL DIALYSIS CATHETER IN PLACE: Primary | ICD-10-CM

## 2024-09-27 LAB
GRAM STN SPEC: NORMAL
GRAM STN SPEC: NORMAL
POCT GLUCOSE: 104 MG/DL (ref 70–110)
POCT GLUCOSE: 91 MG/DL (ref 70–110)

## 2024-09-27 PROCEDURE — 49422 REMOVE TUNNELED IP CATH: CPT | Mod: GC,NTX,, | Performed by: SURGERY

## 2024-09-27 PROCEDURE — 87102 FUNGUS ISOLATION CULTURE: CPT | Mod: NTX | Performed by: SURGERY

## 2024-09-27 PROCEDURE — 87186 SC STD MICRODIL/AGAR DIL: CPT | Mod: TXP | Performed by: SURGERY

## 2024-09-27 PROCEDURE — 25000003 PHARM REV CODE 250: Mod: TXP

## 2024-09-27 PROCEDURE — 87116 MYCOBACTERIA CULTURE: CPT | Mod: NTX | Performed by: SURGERY

## 2024-09-27 PROCEDURE — 36000705 HC OR TIME LEV I EA ADD 15 MIN: Mod: TXP | Performed by: SURGERY

## 2024-09-27 PROCEDURE — 63600175 PHARM REV CODE 636 W HCPCS: Mod: TXP

## 2024-09-27 PROCEDURE — 37000008 HC ANESTHESIA 1ST 15 MINUTES: Mod: NTX | Performed by: SURGERY

## 2024-09-27 PROCEDURE — 63600175 PHARM REV CODE 636 W HCPCS: Mod: JZ,JG,TXP | Performed by: SURGERY

## 2024-09-27 PROCEDURE — 87205 SMEAR GRAM STAIN: CPT | Mod: TXP | Performed by: SURGERY

## 2024-09-27 PROCEDURE — 87206 SMEAR FLUORESCENT/ACID STAI: CPT | Mod: TXP | Performed by: SURGERY

## 2024-09-27 PROCEDURE — 82962 GLUCOSE BLOOD TEST: CPT | Mod: NTX | Performed by: SURGERY

## 2024-09-27 PROCEDURE — 87075 CULTR BACTERIA EXCEPT BLOOD: CPT | Mod: TXP | Performed by: SURGERY

## 2024-09-27 PROCEDURE — 87015 SPECIMEN INFECT AGNT CONCNTJ: CPT | Mod: NTX | Performed by: SURGERY

## 2024-09-27 PROCEDURE — 88300 SURGICAL PATH GROSS: CPT | Mod: NTX | Performed by: PATHOLOGY

## 2024-09-27 PROCEDURE — 71000044 HC DOSC ROUTINE RECOVERY FIRST HOUR: Mod: TXP | Performed by: SURGERY

## 2024-09-27 PROCEDURE — 36000704 HC OR TIME LEV I 1ST 15 MIN: Mod: TXP | Performed by: SURGERY

## 2024-09-27 PROCEDURE — 88300 SURGICAL PATH GROSS: CPT | Mod: 26,NTX,, | Performed by: PATHOLOGY

## 2024-09-27 PROCEDURE — 71000015 HC POSTOP RECOV 1ST HR: Mod: TXP | Performed by: SURGERY

## 2024-09-27 PROCEDURE — 37000009 HC ANESTHESIA EA ADD 15 MINS: Mod: TXP | Performed by: SURGERY

## 2024-09-27 PROCEDURE — 87070 CULTURE OTHR SPECIMN AEROBIC: CPT | Mod: TXP | Performed by: SURGERY

## 2024-09-27 RX ORDER — CEFAZOLIN SODIUM 1 G/3ML
INJECTION, POWDER, FOR SOLUTION INTRAMUSCULAR; INTRAVENOUS
Status: DISCONTINUED | OUTPATIENT
Start: 2024-09-27 | End: 2024-09-27

## 2024-09-27 RX ORDER — SODIUM CHLORIDE 0.9 % (FLUSH) 0.9 %
10 SYRINGE (ML) INJECTION
Status: DISCONTINUED | OUTPATIENT
Start: 2024-09-27 | End: 2024-09-27 | Stop reason: HOSPADM

## 2024-09-27 RX ORDER — ACETAMINOPHEN 500 MG
1000 TABLET ORAL EVERY 8 HOURS PRN
COMMUNITY
Start: 2024-09-27

## 2024-09-27 RX ORDER — PROCHLORPERAZINE EDISYLATE 5 MG/ML
5 INJECTION INTRAMUSCULAR; INTRAVENOUS EVERY 6 HOURS PRN
Status: DISCONTINUED | OUTPATIENT
Start: 2024-09-27 | End: 2024-09-27 | Stop reason: HOSPADM

## 2024-09-27 RX ORDER — ONDANSETRON 8 MG/1
8 TABLET, ORALLY DISINTEGRATING ORAL EVERY 8 HOURS PRN
Status: DISCONTINUED | OUTPATIENT
Start: 2024-09-27 | End: 2024-09-27 | Stop reason: HOSPADM

## 2024-09-27 RX ORDER — ACETAMINOPHEN 500 MG
1000 TABLET ORAL
Status: COMPLETED | OUTPATIENT
Start: 2024-09-27 | End: 2024-09-27

## 2024-09-27 RX ORDER — DEXAMETHASONE SODIUM PHOSPHATE 4 MG/ML
INJECTION, SOLUTION INTRA-ARTICULAR; INTRALESIONAL; INTRAMUSCULAR; INTRAVENOUS; SOFT TISSUE
Status: DISCONTINUED | OUTPATIENT
Start: 2024-09-27 | End: 2024-09-27

## 2024-09-27 RX ORDER — ROCURONIUM BROMIDE 10 MG/ML
INJECTION, SOLUTION INTRAVENOUS
Status: DISCONTINUED | OUTPATIENT
Start: 2024-09-27 | End: 2024-09-27

## 2024-09-27 RX ORDER — PHENYLEPHRINE HYDROCHLORIDE 10 MG/ML
INJECTION INTRAVENOUS
Status: DISCONTINUED | OUTPATIENT
Start: 2024-09-27 | End: 2024-09-27

## 2024-09-27 RX ORDER — PROCHLORPERAZINE EDISYLATE 5 MG/ML
5 INJECTION INTRAMUSCULAR; INTRAVENOUS EVERY 30 MIN PRN
Status: DISCONTINUED | OUTPATIENT
Start: 2024-09-27 | End: 2024-09-27 | Stop reason: HOSPADM

## 2024-09-27 RX ORDER — BUPIVACAINE HYDROCHLORIDE 2.5 MG/ML
INJECTION, SOLUTION EPIDURAL; INFILTRATION; INTRACAUDAL
Status: DISCONTINUED | OUTPATIENT
Start: 2024-09-27 | End: 2024-09-27 | Stop reason: HOSPADM

## 2024-09-27 RX ORDER — LIDOCAINE HYDROCHLORIDE 20 MG/ML
INJECTION, SOLUTION EPIDURAL; INFILTRATION; INTRACAUDAL; PERINEURAL
Status: DISCONTINUED | OUTPATIENT
Start: 2024-09-27 | End: 2024-09-27

## 2024-09-27 RX ORDER — GLUCAGON 1 MG
1 KIT INJECTION
Status: DISCONTINUED | OUTPATIENT
Start: 2024-09-27 | End: 2024-09-27 | Stop reason: HOSPADM

## 2024-09-27 RX ORDER — PROPOFOL 10 MG/ML
VIAL (ML) INTRAVENOUS
Status: DISCONTINUED | OUTPATIENT
Start: 2024-09-27 | End: 2024-09-27

## 2024-09-27 RX ORDER — SUCCINYLCHOLINE CHLORIDE 20 MG/ML
INJECTION INTRAMUSCULAR; INTRAVENOUS
Status: DISCONTINUED | OUTPATIENT
Start: 2024-09-27 | End: 2024-09-27

## 2024-09-27 RX ORDER — ONDANSETRON HYDROCHLORIDE 2 MG/ML
INJECTION, SOLUTION INTRAVENOUS
Status: DISCONTINUED | OUTPATIENT
Start: 2024-09-27 | End: 2024-09-27

## 2024-09-27 RX ORDER — HYDROMORPHONE HYDROCHLORIDE 1 MG/ML
0.2 INJECTION, SOLUTION INTRAMUSCULAR; INTRAVENOUS; SUBCUTANEOUS EVERY 5 MIN PRN
Status: DISCONTINUED | OUTPATIENT
Start: 2024-09-27 | End: 2024-09-27 | Stop reason: HOSPADM

## 2024-09-27 RX ORDER — SODIUM CHLORIDE 9 MG/ML
INJECTION, SOLUTION INTRAVENOUS CONTINUOUS
Status: DISCONTINUED | OUTPATIENT
Start: 2024-09-27 | End: 2024-09-27 | Stop reason: HOSPADM

## 2024-09-27 RX ADMIN — PHENYLEPHRINE HYDROCHLORIDE 100 MCG: 10 INJECTION INTRAVENOUS at 08:09

## 2024-09-27 RX ADMIN — CEFAZOLIN 2 G: 330 INJECTION, POWDER, FOR SOLUTION INTRAMUSCULAR; INTRAVENOUS at 08:09

## 2024-09-27 RX ADMIN — PROPOFOL 180 MG: 10 INJECTION, EMULSION INTRAVENOUS at 08:09

## 2024-09-27 RX ADMIN — SUGAMMADEX 200 MG: 100 INJECTION, SOLUTION INTRAVENOUS at 08:09

## 2024-09-27 RX ADMIN — SUCCINYLCHOLINE 120 MG: 20 INJECTION, SOLUTION INTRAMUSCULAR; INTRAVENOUS at 08:09

## 2024-09-27 RX ADMIN — ACETAMINOPHEN 1000 MG: 500 TABLET ORAL at 06:09

## 2024-09-27 RX ADMIN — ONDANSETRON 4 MG: 2 INJECTION INTRAMUSCULAR; INTRAVENOUS at 08:09

## 2024-09-27 RX ADMIN — SODIUM CHLORIDE: 0.9 INJECTION, SOLUTION INTRAVENOUS at 07:09

## 2024-09-27 RX ADMIN — ROCURONIUM BROMIDE 20 MG: 10 INJECTION, SOLUTION INTRAVENOUS at 08:09

## 2024-09-27 RX ADMIN — PHENYLEPHRINE HYDROCHLORIDE 150 MCG: 10 INJECTION INTRAVENOUS at 08:09

## 2024-09-27 RX ADMIN — DEXAMETHASONE SODIUM PHOSPHATE 4 MG: 4 INJECTION, SOLUTION INTRAMUSCULAR; INTRAVENOUS at 08:09

## 2024-09-27 RX ADMIN — LIDOCAINE HYDROCHLORIDE 50 MG: 20 INJECTION, SOLUTION EPIDURAL; INFILTRATION; INTRACAUDAL; PERINEURAL at 08:09

## 2024-09-27 NOTE — OP NOTE
DATE OF PROCEDURE: 9/27/2024    PRE OP DIAGNOSIS: ESRD (end stage renal disease) [N18.6]    POST OP DIAGNOSIS: ESRD (end stage renal disease) [N18.6]    PROCEDURE: Procedure(s) (LRB):  REMOVAL, CATHETER, DIALYSIS, PERITONEAL, OPEN (N/A)    Surgeons and Role:     * Enrico Woodruff MD - Primary     * Aaron Kingsley MD - Resident - Assisting    ANESTHESIA: General.     Procedure:    The patient was placed under general anesthesia and the abdomen prepped and draped in usual manner.  The prior paramedian incision was injected with Marcaine and opened with a scalpel.  Subcutaneous tissues were divided with the Bovie.  The catheter was brought up into the wound and divided between snaps.  We cut the distal catheter at the skin and using a Bovie divided around the proximal cuff with the Bovie through the incision until it was released and we could remove the small piece of catheter.  We then used the Bovie to get the distal catheter by dividing around the distal cuff.  It appeared we had removed the entire catheter from the abdomen at the end of this.  There was milky like fluid within the catheter we did send a culture and we sent the catheter to pathology.  The fascia was repaired with 0 Vicryl and the skin was reapproximated with 4-0 plain catgut and reinforced with Dermabond.  The exit site was covered with a 2 x 2 sponge.  The patient tolerated procedure well was brought to recovery room in stable condition.  Sponge and needle counts were correct at the end of the case.  Blood loss was minimal, complications none and pathology peritoneal dialysis catheter for gross so only and culture was sent.      This dictation was done with voice recognition software and there may be errors.

## 2024-09-27 NOTE — DISCHARGE INSTRUCTIONS
Postoperative General Instructions    What to Expect:  It is normal to experience pain and swelling at the surgical site.  The pain usually decreases significantly after the first week but may last for many weeks.  Each day, the pain should be similar or better to the previous day. If it worsens, call the doctor's office.     Activity:  You should walk beginning on the day of surgery and increase activity slowly over the next two to four weeks.  Do not drive while taking prescription pain medication.  You may return to work when you feel ready.  Do not lift anything heavier than 15 lbs (gallon of milk) for 4 weeks.     Wound Care:  You may remove the gauze dressing and shower 48 hours after surgery. Let soap and water run over the incisions and pat dry. Do not submerge in a bathtub or pool.  Do not remove the Dermabond skin glue; it will flake off on its own after 1-2 weeks.          Medication:  Pain Control  Take acetaminophen (Tylenol) 1000 mg 3 times daily as needed for pain.  Bowel Regularity  Take an over-the-counter stool softener/laxative (Colace, Miralax, or Milk of Magnesia) to avoid constipation.  Previous Home Medication  Restart your previous home medication unless otherwise instructed.    Call Your Doctor's Office If You Experience:  Fevers greater than 101.3°F.  Vomiting.  Spreading redness or drainage from the incisions.  Opening of the incisions.  Worsening pain not controlled by medication.  Chest pain or shortness of breath.    Follow-Up:  Follow-up with your surgeon as scheduled in 2 weeks.  If no follow-up appointment has been scheduled, call to schedule an appointment within 1-2 weeks of discharge.     Contact Information:  During normal business hours call the clinic with any questions or concerns. On weekends and evenings call [phone] to have the operators page the surgery resident on call after hours with questions or concerns.

## 2024-09-27 NOTE — ANESTHESIA POSTPROCEDURE EVALUATION
Anesthesia Post Evaluation    Patient: Teodoro Mast    Procedure(s) Performed: Procedure(s) (LRB):  REMOVAL, CATHETER, DIALYSIS, PERITONEAL, OPEN (N/A)    Final Anesthesia Type: general      Patient location during evaluation: PACU  Patient participation: Yes- Able to Participate  Level of consciousness: awake and alert  Post-procedure vital signs: reviewed and stable  Pain management: adequate  Airway patency: patent    PONV status at discharge: No PONV  Anesthetic complications: no      Cardiovascular status: blood pressure returned to baseline  Respiratory status: unassisted  Hydration status: euvolemic  Follow-up not needed.          Vitals Value Taken Time   /67 09/27/24 1000   Temp 36.6 °C (97.9 °F) 09/27/24 0908   Pulse 70 09/27/24 1000   Resp 19 09/27/24 1000   SpO2 99 % 09/27/24 1000         No case tracking events are documented in the log.      Pain/Miranda Score: Pain Rating Prior to Med Admin: 0 (9/27/2024  6:14 AM)  Miranda Score: 8 (9/27/2024  9:08 AM)           [id number]

## 2024-09-27 NOTE — BRIEF OP NOTE
Operative Note       Surgery Date: 9/27/2024     Surgeons and Role:     * Enrico Woodruff MD - Primary     * Aaron Kingsley MD - Resident - Assisting    Pre-op Diagnosis:  ESRD (end stage renal disease) [N18.6]    Post-op Diagnosis:  ESRD (end stage renal disease) [N18.6]    Procedure(s) (LRB):  REMOVAL, CATHETER, DIALYSIS, PERITONEAL, OPEN (N/A)    Anesthesia: General    Procedure in Detail/Findings:  Removal pd without apparent complication. Culture sent as fluid was milky appearing.    Estimated Blood Loss: Minimal           Specimens (From admission, onward)       Start     Ordered    09/27/24 0852  Specimen to Pathology, Surgery General Surgery  Once        Comments: Pre-op Diagnosis: ESRD (end stage renal disease) [N18.6]Procedure(s):REMOVAL, CATHETER, DIALYSIS, PERITONEAL, OPEN 1. Peritoneal dialysis catheter--Gross only     References:    Click here for ordering Quick Tip   Question Answer Comment   Procedure Type: General Surgery    Specimen Class: Routine/Screening        09/27/24 0851                  Implants:   Implant Name Type Inv. Item Serial No.  Lot No. LRB No. Used Action   CATH FLEX-NECK ADULT STANDARD - [id number]  CATH FLEX-NECK ADULT STANDARD  [facility] [id number] N/A 1 Explanted              Disposition: PACU - hemodynamically stable.           Condition: Good    Attestation:  I was present and scrubbed for the entire procedure.

## 2024-09-27 NOTE — TRANSFER OF CARE
"Anesthesia Transfer of Care Note    Patient: Teodoro Mast    Procedure(s) Performed: Procedure(s) (LRB):  REMOVAL, CATHETER, DIALYSIS, PERITONEAL, OPEN (N/A)    Patient location: PACU    Anesthesia Type: general    Transport from OR: Transported from OR on 6-10 L/min O2 by face mask with adequate spontaneous ventilation    Post pain: adequate analgesia    Post assessment: no apparent anesthetic complications    Post vital signs: stable    Level of consciousness: awake and alert    Nausea/Vomiting: no nausea/vomiting    Complications: none    Transfer of care protocol was followed      Last vitals: Visit Vitals  /61 (BP Location: Right arm, Patient Position: Lying)   Pulse 64   Temp 36.7 °C (98.1 °F) (Temporal)   Resp 16   Ht 6' 2" (1.88 m)   Wt 85 kg (187 lb 6.3 oz)   SpO2 100%   BMI 24.06 kg/m²     "

## 2024-09-27 NOTE — H&P
Acute Care Surgery: History & Physical      Subjective:  Mr. Mast is a 79 y.o. male with a hx of ESRD who presents for peritoneal dialysis catheter removal. Denies recents changes to his medical history or medications. NPO since yesterday. Denies recent anticoagulation use. No known allergies. They have no further questions at this time and would like to proceed with surgery.        On examination, Mr. Mast is sitting comfortably in the room upon entering in no acute distress and in good spirits. He denies headaches, malaise, shortness of breath, chest pain, or nausea, vomiting, fever, chills, or nightsweats. He denies any abdominal pain, and the abdomen is soft, nontender, and nondistended on palpation.     PMHx, SHx, FHx, SocHx, Allergies, Medications:  Mr. Mast  has a past medical history of BPH (benign prostatic hyperplasia), CKD (chronic kidney disease) stage 5, GFR less than 15 ml/min, Gout, Hyperlipidemia, Hyperparathyroidism, secondary renal, Hypertension, and Sleep apnea.     He  has a past surgical history that includes hemorriodectomy; Finger surgery; Eye surgery (Bilateral); Peritoneal catheter insertion; Colonoscopy (N/A, 11/2/2021); AV fistula placement (Left, 3/31/2022); AV fistula placement (Left, 9/30/2022); and Coronary angiography (N/A, 1/8/2024).    He  reports that he quit smoking about 50 years ago. His smoking use included cigarettes. He started smoking about 60 years ago. He has a 5 pack-year smoking history. He has never used smokeless tobacco. He reports that he does not currently use alcohol. He reports that he does not use drugs.     Mr. Mast's family history includes Cancer in his brother and sister; Hammer toes in his brother; Heart disease in his brother, father, and mother; Hypertension in his mother; No Known Problems in his brother, sister, sister, and son; Premature birth in his son; Seizures in his sister.  He has No Known Allergies.    Mr. Mast has a current  "medication list which includes the following prescription(s): lantus solostar u-100 insulin, sevelamer carbonate, vitamin d, aspirin, atorvastatin, magnesium oxide, ondansetron, pen needle, diabetic, and psyllium, and the following Facility-Administered Medications: 0.9% nacl, acetaminophen, ceFAZolin 2 g in D5W 50 mL IVPB (MB+), ondansetron, and prochlorperazine.    ROS:  Pertinent items from 14 system review are noted in HPI, all others negative     Objective:  /61 (BP Location: Right arm, Patient Position: Lying)   Pulse 64   Temp 98.1 °F (36.7 °C) (Temporal)   Resp 16   Ht 6' 2" (1.88 m)   Wt 85 kg (187 lb 6.3 oz)   SpO2 100%   BMI 24.06 kg/m²     Physical Exam:  Gen: no acute distress, alert and oriented  HEENT: extraocular movements intact   CV: regular rate and rhythm, bilateral radial pulses 2+    Pulm: no increased work of breathing, symmetrical chest rise  Abd: Soft, nontender, nondistended. PD catheter present  Extr: moves all extremities well  Neuro: CN II-XII grossly intact w/o focal deficit  Integument: Normal turgor and tone. No jaundice.  Psych: appropriate affect, normal speech      Assessment:   Mr. Mast is a 79 y.o. male with a hx of ESRD who presents for peritoneal dialysis catheter removal. They have no further questions at this time and would like to proceed with surgery.    Plan:  -To OR today  -informed consent obtained     Aaron Kingsley MD  General Surgery PGY-1  [facility]    "

## 2024-09-27 NOTE — PLAN OF CARE
Patient being discharged to home via wheelchair, escorted by his son. Pt alert and talkative, vitals stable on room air, tolerating PO intake. Discharge instructions (written and verbal) and follow-up information given to patient who verbalized understanding, as well as a readiness for discharge. [facility] contact info provided for additional questions following discharge.

## 2024-09-27 NOTE — BRIEF OP NOTE
[facility] - Surgery ([facility])  Brief Operative Note    Surgery Date: 9/27/2024     Surgeons and Role:     * Enrico Woodruff MD - Primary     * Aaron Kingsley MD - Resident - Assisting        Pre-op Diagnosis:  ESRD (end stage renal disease) [N18.6]    Post-op Diagnosis:  Post-Op Diagnosis Codes:     * ESRD (end stage renal disease) [N18.6]    Procedure(s) (LRB):  REMOVAL, CATHETER, DIALYSIS, PERITONEAL, OPEN (N/A)    Anesthesia: General    Operative Findings: Peritoneal dialysis catheter removed without complication. Found to have purulent fluid in catheter lumen.    Estimated Blood Loss: Minimal  Specimens:   Specimen (24h ago, onward)       Start     Ordered    09/27/24 0852  Specimen to Pathology, Surgery General Surgery  Once        Comments: Pre-op Diagnosis: ESRD (end stage renal disease) [N18.6]Procedure(s):REMOVAL, CATHETER, DIALYSIS, PERITONEAL, OPEN 1. Peritoneal dialysis catheter--Gross only     References:    Click here for ordering Quick Tip   Question Answer Comment   Procedure Type: General Surgery    Specimen Class: Routine/Screening        09/27/24 0851                      Discharge Note    OUTCOME: Patient tolerated treatment/procedure well without complication and is now ready for discharge.    DISPOSITION: Home or Self Care    FINAL DIAGNOSIS:  ESRD (end stage renal disease) [N18.6]    FOLLOWUP: In clinic    DISCHARGE INSTRUCTIONS:    Discharge Procedure Orders   Lifting restrictions     Notify your health care provider if you experience any of the following:  increased confusion or weakness     Notify your health care provider if you experience any of the following:  persistent dizziness, light-headedness, or visual disturbances     Notify your health care provider if you experience any of the following:  worsening rash     Notify your health care provider if you experience any of the following:  severe persistent headache     Notify your health care provider if you experience any of the  following:  redness, tenderness, or signs of infection (pain, swelling, redness, odor or green/yellow discharge around incision site)     Notify your health care provider if you experience any of the following:  severe uncontrolled pain     Notify your health care provider if you experience any of the following:  persistent nausea and vomiting or diarrhea     Notify your health care provider if you experience any of the following:  temperature >100.4     Notify your health care provider if you experience any of the following:  difficulty breathing or increased cough     Remove dressing in 48 hours     Okay to remove gauze dressing and shower 48 hours after surgery. Please do not submerge wounds underwater (no bath, no pool, no lake, etc.) for at least 2 weeks,

## 2024-09-27 NOTE — ANESTHESIA PROCEDURE NOTES
Intubation    Date/Time: 9/27/2024 8:07 AM    Performed by: Tino Yepez DO  Authorized by: Erika Guerrero MD    Intubation:     Induction:  Rapid sequence induction    Intubated:  Postinduction    Mask Ventilation:  Not attempted    Attempts:  1    Attempted By:  Resident anesthesiologist    Method of Intubation:  Video laryngoscopy    Blade:  Hamilton 3    Laryngeal View Grade: Grade I - full view of cords      Difficult Airway Encountered?: No      Complications:  None    Airway Device:  Oral endotracheal tube    Airway Device Size:  7.5    Style/Cuff Inflation:  Cuffed (inflated to minimal occlusive pressure)    Tube secured:  23    Secured at:  The lips    Placement Verified By:  Capnometry    Complicating Factors:  None    Findings Post-Intubation:  BS equal bilateral and atraumatic/condition of teeth unchanged

## 2024-09-28 LAB
GLUCOSE SERPL-MCNC: 89 MG/DL (ref 70–110)
HCO3 UR-SCNC: 20.9 MMOL/L (ref 24–28)
HCT VFR BLD CALC: 28 %PCV (ref 36–54)
PCO2 BLDA: 34.5 MMHG (ref 35–45)
PH SMN: 7.39 [PH] (ref 7.35–7.45)
PO2 BLDA: 26 MMHG (ref 40–60)
POC BE: -4 MMOL/L
POC IONIZED CALCIUM: 1.21 MMOL/L (ref 1.06–1.42)
POC SATURATED O2: 48 % (ref 95–100)
POC TCO2: 22 MMOL/L (ref 24–29)
POTASSIUM BLD-SCNC: 4.7 MMOL/L (ref 3.5–5.1)
SAMPLE: ABNORMAL
SODIUM BLD-SCNC: 139 MMOL/L (ref 136–145)

## 2024-09-30 LAB
ACID FAST MOD KINY STN SPEC: NORMAL
BACTERIA SPEC AEROBE CULT: ABNORMAL
FINAL PATHOLOGIC DIAGNOSIS: NORMAL
GROSS: NORMAL
Lab: NORMAL
MYCOBACTERIUM SPEC QL CULT: NORMAL

## 2024-10-01 LAB — BACTERIA SPEC ANAEROBE CULT: NORMAL

## 2024-10-02 LAB — FUNGUS SPEC CULT: NORMAL

## 2024-10-08 ENCOUNTER — HOSPITAL ENCOUNTER (EMERGENCY)
Facility: HOSPITAL | Age: 79
Discharge: HOME | End: 2024-10-08
Attending: EMERGENCY MEDICINE
Payer: MEDICARE

## 2024-10-08 ENCOUNTER — OFFICE VISIT (OUTPATIENT)
Dept: SURGERY | Facility: CLINIC | Age: 79
End: 2024-10-08
Payer: MEDICARE

## 2024-10-08 ENCOUNTER — PATIENT MESSAGE (OUTPATIENT)
Dept: SURGERY | Facility: CLINIC | Age: 79
End: 2024-10-08

## 2024-10-08 VITALS
DIASTOLIC BLOOD PRESSURE: 70 MMHG | HEART RATE: 59 BPM | SYSTOLIC BLOOD PRESSURE: 110 MMHG | RESPIRATION RATE: 18 BRPM | WEIGHT: 187.38 LBS | HEIGHT: 74 IN | OXYGEN SATURATION: 99 % | BODY MASS INDEX: 24.05 KG/M2

## 2024-10-08 VITALS
DIASTOLIC BLOOD PRESSURE: 79 MMHG | HEART RATE: 68 BPM | OXYGEN SATURATION: 98 % | HEIGHT: 74 IN | WEIGHT: 187.38 LBS | TEMPERATURE: 98 F | SYSTOLIC BLOOD PRESSURE: 169 MMHG | BODY MASS INDEX: 24.05 KG/M2 | RESPIRATION RATE: 18 BRPM

## 2024-10-08 DIAGNOSIS — R55 SYNCOPE: ICD-10-CM

## 2024-10-08 DIAGNOSIS — Z09 POSTOP CHECK: Primary | ICD-10-CM

## 2024-10-08 DIAGNOSIS — R55 VASOVAGAL SYNCOPE: Primary | ICD-10-CM

## 2024-10-08 LAB
ALBUMIN SERPL BCP-MCNC: 3.1 G/DL (ref 3.5–5.2)
ALP SERPL-CCNC: 57 U/L (ref 55–135)
ALT SERPL W/O P-5'-P-CCNC: 8 U/L (ref 10–44)
ANION GAP SERPL CALC-SCNC: 13 MMOL/L (ref 8–16)
AST SERPL-CCNC: 14 U/L (ref 10–40)
BASOPHILS # BLD AUTO: 0.07 K/UL (ref 0–0.2)
BASOPHILS NFR BLD: 1 % (ref 0–1.9)
BILIRUB SERPL-MCNC: 0.5 MG/DL (ref 0.1–1)
BUN SERPL-MCNC: 24 MG/DL (ref 8–23)
CALCIUM SERPL-MCNC: 9.8 MG/DL (ref 8.7–10.5)
CHLORIDE SERPL-SCNC: 104 MMOL/L (ref 95–110)
CO2 SERPL-SCNC: 22 MMOL/L (ref 23–29)
CREAT SERPL-MCNC: 6.4 MG/DL (ref 0.5–1.4)
DIFFERENTIAL METHOD BLD: ABNORMAL
EOSINOPHIL # BLD AUTO: 0.2 K/UL (ref 0–0.5)
EOSINOPHIL NFR BLD: 2.5 % (ref 0–8)
ERYTHROCYTE [DISTWIDTH] IN BLOOD BY AUTOMATED COUNT: 17.2 % (ref 11.5–14.5)
EST. GFR  (NO RACE VARIABLE): 8.4 ML/MIN/1.73 M^2
GLUCOSE SERPL-MCNC: 108 MG/DL (ref 70–110)
HCT VFR BLD AUTO: 35.2 % (ref 40–54)
HGB BLD-MCNC: 10.7 G/DL (ref 14–18)
IMM GRANULOCYTES # BLD AUTO: 0.06 K/UL (ref 0–0.04)
IMM GRANULOCYTES NFR BLD AUTO: 0.8 % (ref 0–0.5)
LYMPHOCYTES # BLD AUTO: 0.7 K/UL (ref 1–4.8)
LYMPHOCYTES NFR BLD: 10.3 % (ref 18–48)
MAGNESIUM SERPL-MCNC: 1.9 MG/DL (ref 1.6–2.6)
MCH RBC QN AUTO: 29.5 PG (ref 27–31)
MCHC RBC AUTO-ENTMCNC: 30.4 G/DL (ref 32–36)
MCV RBC AUTO: 97 FL (ref 82–98)
MONOCYTES # BLD AUTO: 0.8 K/UL (ref 0.3–1)
MONOCYTES NFR BLD: 10.8 % (ref 4–15)
NEUTROPHILS # BLD AUTO: 5.4 K/UL (ref 1.8–7.7)
NEUTROPHILS NFR BLD: 74.6 % (ref 38–73)
NRBC BLD-RTO: 0 /100 WBC
OHS QRS DURATION: 106 MS
OHS QTC CALCULATION: 475 MS
PLATELET # BLD AUTO: 201 K/UL (ref 150–450)
PMV BLD AUTO: 9.8 FL (ref 9.2–12.9)
POCT GLUCOSE: 117 MG/DL (ref 70–110)
POTASSIUM SERPL-SCNC: 4.6 MMOL/L (ref 3.5–5.1)
PROT SERPL-MCNC: 7.3 G/DL (ref 6–8.4)
RBC # BLD AUTO: 3.63 M/UL (ref 4.6–6.2)
SODIUM SERPL-SCNC: 139 MMOL/L (ref 136–145)
WBC # BLD AUTO: 7.19 K/UL (ref 3.9–12.7)

## 2024-10-08 PROCEDURE — 99999 PR PBB SHADOW E&M-EST. PATIENT-LVL IV: CPT | Mod: PBBFAC,TXP,, | Performed by: SURGERY

## 2024-10-08 PROCEDURE — 99024 POSTOP FOLLOW-UP VISIT: CPT | Mod: NTX,POP,, | Performed by: SURGERY

## 2024-10-08 PROCEDURE — 94761 N-INVAS EAR/PLS OXIMETRY MLT: CPT | Mod: TXP

## 2024-10-08 PROCEDURE — 93010 ELECTROCARDIOGRAM REPORT: CPT | Mod: NTX,,, | Performed by: INTERNAL MEDICINE

## 2024-10-08 PROCEDURE — 99214 OFFICE O/P EST MOD 30 MIN: CPT | Mod: PBBFAC,25,TXP | Performed by: SURGERY

## 2024-10-08 PROCEDURE — 85025 COMPLETE CBC W/AUTO DIFF WBC: CPT | Mod: NTX | Performed by: EMERGENCY MEDICINE

## 2024-10-08 PROCEDURE — 93005 ELECTROCARDIOGRAM TRACING: CPT | Mod: NTX

## 2024-10-08 PROCEDURE — 83735 ASSAY OF MAGNESIUM: CPT | Mod: NTX | Performed by: EMERGENCY MEDICINE

## 2024-10-08 PROCEDURE — 80053 COMPREHEN METABOLIC PANEL: CPT | Mod: NTX | Performed by: EMERGENCY MEDICINE

## 2024-10-08 PROCEDURE — 82962 GLUCOSE BLOOD TEST: CPT | Mod: NTX

## 2024-10-08 PROCEDURE — 99284 EMERGENCY DEPT VISIT MOD MDM: CPT | Mod: 25,27,NTX

## 2024-10-08 NOTE — ED PROVIDER NOTES
Encounter Date: 10/8/2024       History     Chief Complaint   Patient presents with    Loss of Consciousness     Pt brought to ED by rapids team after having a syncopal episode.  Pt was having a post op wound packed.      HPI    Mr Mast is a 79 year old male with a medical history of ESRD on hemodialysis, HTN, T2DM on Lantus. He presents to the ED from [facility] with episode of pre-syncope and nausea. He states that he was at follow up for removal of his peritoneal dialysis catheter and wound check. While the nurse was probing with a qtip and re-packing the wound he started to feel nauseaous and light-headed.  He did does not believe he lost consciousness but the note states he was unresponsive briefly.  No seizure-like activity.  He has recently started hemodialysis in June 3x per week, and reports that he often feels light-headed and fatigued post dialysis session. He states that he felt similarly today while in clinic. He denies loss of consciousness, chest pain, shortness of breath, weakness, or blurry vision.  No palpitations.  No recent fevers or infectious symptoms.  It was on antibiotics for recent cultures that were positive but denies any significant symptoms.  No vision changes, speech changes, focal weakness or numbness.  No abdominal pain.  No black or bloody stool.  No vomiting or diarrhea.  States he feels completely fine now.          Review of patient's allergies indicates:  No Known Allergies  Past Medical History:   Diagnosis Date    BPH (benign prostatic hyperplasia)     CKD (chronic kidney disease) stage 5, GFR less than 15 ml/min     Gout     Hyperlipidemia     Hyperparathyroidism, secondary renal     Hypertension     Sleep apnea      Past Surgical History:   Procedure Laterality Date    AV FISTULA PLACEMENT Left 3/31/2022    Procedure: CREATION, AV FISTULA RADIOCEPHALIC;  Surgeon: PEMA Martines II, MD;  Location: [facility] OR [address] [place] [address];  Service: Vascular;  Laterality: Left;    AV  FISTULA PLACEMENT Left 2022    Procedure: CREATION, AV FISTULA;  Surgeon: PEMA Martines II, MD;  Location: [facility] OR 2ND FLR;  Service: Vascular;  Laterality: Left;    COLONOSCOPY N/A 2021    Procedure: COLONOSCOPY;  Surgeon: Joe Jimenez MD;  Location: [facility] ENDO (4TH FLR);  Service: Endoscopy;  Laterality: N/A;  COVID test at [place] on 10/30-GT      dialysis pt-labs prior    CORONARY ANGIOGRAPHY N/A 2024    Procedure: ANGIOGRAM, CORONARY ARTERY;  Surgeon: Joe Pitts MD;  Location: [facility] CATH LAB;  Service: Cardiology;  Laterality: N/A;    EYE SURGERY Bilateral     cataract removal    FINGER SURGERY      hemorriodectomy      PERITONEAL CATHETER INSERTION      PERITONEAL CATHETER REMOVAL N/A 2024    Procedure: REMOVAL, CATHETER, DIALYSIS, PERITONEAL, OPEN;  Surgeon: Enrico Woodruff MD;  Location: [facility] OR 2ND FLR;  Service: General;  Laterality: N/A;     Family History   Problem Relation Name Age of Onset    Heart disease Mother      Hypertension Mother      Heart disease Father          pacemaker    Seizures Sister      Hammer toes Brother Rip     Heart disease Brother Rip     Cancer Brother Rip         prostate cancer    Premature birth Son Teodoro     Cancer Sister          throat    No Known Problems Sister Lucía     No Known Problems Sister Ludmila     No Known Problems Brother      No Known Problems Son Nilesh      Social History     Tobacco Use    Smoking status: Former     Current packs/day: 0.00     Average packs/day: 0.5 packs/day for 10.0 years (5.0 ttl pk-yrs)     Types: Cigarettes     Start date:      Quit date:      Years since quittin.8    Smokeless tobacco: Never   Substance Use Topics    Alcohol use: Not Currently    Drug use: No     Review of Systems    Physical Exam     Initial Vitals [10/08/24 1000]   BP Pulse Resp Temp SpO2   (!) 115/59 60 18 97.5 °F (36.4 °C) 98 %      MAP       --         Physical Exam    Nursing note and vitals  reviewed.  Constitutional: He appears well-developed and well-nourished. No distress.   HENT:   Head: Normocephalic and atraumatic.   Nose: Nose normal.   Eyes: Conjunctivae and EOM are normal. Pupils are equal, round, and reactive to light.   Neck:   Normal range of motion.  Cardiovascular:  Normal rate, regular rhythm, normal heart sounds and intact distal pulses.     Exam reveals no gallop and no friction rub.       No murmur heard.  Pulmonary/Chest: Breath sounds normal. No respiratory distress. He has no wheezes. He has no rhonchi. He has no rales.   Abdominal: Abdomen is soft. Bowel sounds are normal. He exhibits no distension. There is no abdominal tenderness.   He was a small less than 1 cm wound with packing in the left abdomen.  No surrounding erythema.  No blood. There is no rebound and no guarding.   Musculoskeletal:         General: No tenderness or edema. Normal range of motion.      Cervical back: Normal range of motion.     Neurological: He is alert and oriented to person, place, and time. He has normal strength. No sensory deficit.   Skin: Skin is warm and dry. Capillary refill takes less than 2 seconds.   Psychiatric: He has a normal mood and affect.         ED Course   Procedures  Labs Reviewed   CBC W/ AUTO DIFFERENTIAL - Abnormal       Result Value    WBC 7.19      RBC 3.63 (*)     Hemoglobin 10.7 (*)     Hematocrit 35.2 (*)     MCV 97      MCH 29.5      MCHC 30.4 (*)     RDW 17.2 (*)     Platelets 201      MPV 9.8      Immature Granulocytes 0.8 (*)     Gran # (ANC) 5.4      Immature Grans (Abs) 0.06 (*)     Lymph # 0.7 (*)     Mono # 0.8      Eos # 0.2      Baso # 0.07      nRBC 0      Gran % 74.6 (*)     Lymph % 10.3 (*)     Mono % 10.8      Eosinophil % 2.5      Basophil % 1.0      Differential Method Automated     COMPREHENSIVE METABOLIC PANEL   MAGNESIUM          Imaging Results    None          Medications - No data to display  Medical Decision Making  79-year-old male who presents after a  syncopal episode in the clinic.  Occurred while he was getting his wound packed.  Felt lightheaded.  No chest pain or symptoms concerning for ACS.  History less suspicion for malignant arrhythmia.  No history concerning for GI bleed, AAA rupture, PE, global intracranial event, seizure.  He has a normal exam here and blood pressure has already improved.  History and exam is very suggestive for vasovagal syncope.  Labs are all at baseline.  We initially were going to have social work see him with help with home wound care however the patient states that he has some family members that are in healthcare and we will come change his dressing and packing every day.  Otherwise he will call his surgeon with any questions.  Stable for discharge with return precautions.    Amount and/or Complexity of Data Reviewed  Labs: ordered.  ECG/medicine tests: ordered and independent interpretation performed.     Details: Sinus, regular, incomplete right bundle-branch, rate 60, otherwise normal, no ST changes                                      Clinical Impression:  Final diagnoses:  [R55] Syncope                 Emely Rogers MD  10/08/24 9936

## 2024-10-08 NOTE — PROGRESS NOTES
"Packed wound with 22cm of 1/4" packing gauze  During wound packing patient became non responsive  Called for help - vitals taken BP 85/60 HR 55 O2 98%  Rapid Response Code  Patient nauseous and diaphoretic   Patient slowly became more responsive  Rapid Team in room and took over care  I covered the wound with 4x4 gauze and paper tape  Patient escorted to ER by rapid team  Gave packing instructions and supplies to patient's son.      "

## 2024-10-08 NOTE — SIGNIFICANT EVENT
"RAPID RESPONSE RESPIRATORY THERAPY NOTE             Code Status: Prior   : 1945  Bed: Room/bed info not found:   MRN: [id number]  Time page Received: 941  Time Rapid Response RT at Bedside: 943  Time Rapid Response RT left Bedside: 0959    SITUATION    Evaluated patient for: Syncope/vomiting    BACKGROUND    Why is the patient in the hospital?: <principal problem not specified>    Patient has a past medical history of BPH (benign prostatic hyperplasia), CKD (chronic kidney disease) stage 5, GFR less than 15 ml/min, Gout, Hyperlipidemia, Hyperparathyroidism, secondary renal, Hypertension, and Sleep apnea.    24 Hours Vitals Range:  Temp:  [97.5 °F (36.4 °C)]   Pulse:  [59-70]   Resp:  [18]   BP: ()/(56-70)   SpO2:  [96 %-99 %]     Labs:    No results for input(s): "NA", "K", "CL", "CO2", "BUN", "CREATININE", "GLU", "PHOS", "MG" in the last 72 hours.    Invalid input(s): "CMP", "TBIL"     No results for input(s): "PH", "PCO2", "PO2", "HCO3", "POCSATURATED", "BE" in the last 72 hours.    ASSESSMENT/INTERVENTIONS  Paged to 2nd floor surgery clinic room 05. Pt had been getting a PD Catheter wound packed and had a syncopal episode with vomiting. Pt hypotensive at 86/64 when we arrived, on RA, sat 99%, breathing unlabored, pulse 59. Pt states he is not SOB. BP came up to 110/70 on re-check. Rapid team transported pt by wheelchair to ED for registration. Left pt at ED desk at 0959.    Last Vitals: Temp: 97.5 °F (36.4 °C) (10/08 1000)  Pulse: 60 (10/08 1000)  Resp: 18 (10/08 1000)  BP: 115/59 (10/08 1000)  SpO2: 98 % (10/08 1000)  Level of Consciousness:    Respiratory Effort:    Expansion/Accessory Muscle Usage:    All Lung Field Breath Sounds:    O2 Device/Concentration: RA, 21%.  NIPPV: No Surgical airway: No Vent: No  ETCO2 monitored:    Ambu at bedside:      Active Orders   There are no active orders of the following types: Respiratory Care.       RECOMMENDATIONS  ?  We recommend: RRT Recs: Continue POC per " primary team.    ESCALATION        FOLLOW-UP    Disposition: Tx to ER bed unknown.    Please call back the Rapid Response RT, Oliver Jones, RRT at x [phone] for any questions or concerns.

## 2024-10-08 NOTE — ED NOTES
Patient comes into the emergency department by rapids team with complaints of syncope. Patient states that he was in the clinic getting a surgical wound re-packed when he began feeling dizzy, lightheaded, nauseous. Son at bedside reports pt went unresponsive briefly before coming to, rapids reports low BP upon their arrival. Upon arrival to ED bed pt reports slight dizziness, but states he feels better. Patient denies pain, SOB, CP, N/V/D.    LOC: The patient is awake, alert and aware of environment with an appropriate affect, the patient is oriented x 3 and speaking appropriately.   APPEARANCE: Patient appears comfortable and in no acute distress, patient is clean and well groomed.  SKIN: The skin is warm and dry, color consistent with ethnicity, patient has normal skin turgor and moist mucus membranes. Pt has midline wound to abdomen, dressing has small amount of tan drainage.  MUSCULOSKELETAL: Patient moving all extremities spontaneously, no swelling noted.  RESPIRATORY: Airway is open and patent, respirations are spontaneous, patient has a normal effort and rate, no accessory muscle. Denies SOB, currently on RA, no labored breathing noted.  CARDIAC: Pt placed on cardiac monitor. Patient has a normal rate and regular rhythm, no edema noted, capillary refill < 3 seconds. Denies CP.  GASTRO: Soft and non tender to palpation, no distention noted.  : Pt denies any pain or frequency with urination.  NEURO: Pt opens eyes spontaneously, behavior appropriate to situation, follows commands, facial expression symmetrical, bilateral hand grasp equal and even, purposeful motor response noted, normal sensation in all extremities when touched with a finger.

## 2024-10-08 NOTE — LETTER
October 8, 2024        Phil Williamson MD  [facility]  [address]  [facility] [address]             [facility] 2nd [facility]  [address]  [facility] [address]  Phone: [phone]   Patient: Teodoro Mast   MR Number: [id number]   YOB: 1945   Date of Visit: 10/8/2024       Dear Dr. Williamson:    Thank you for referring Teodoro Mast to me for evaluation. Attached you will find relevant portions of my assessment and plan of care.    If you have questions, please do not hesitate to call me. I look forward to following Teodoro Mast along with you.    Sincerely,      Enrico Woodruff MD            CC    No Recipients    Enclosure

## 2024-10-08 NOTE — CODE/ RAPID DOCUMENTATION
"RAPID RESPONSE NURSE NOTE        Admit Date: (Not on file)  LOS: 0  Code Status: Prior   Date of Consult: 10/08/2024  : 1945  Age: 79 y.o.  Weight:   Wt Readings from Last 1 Encounters:   10/08/24 85 kg (187 lb 6.3 oz)     Sex: male  Race: Black or    Bed: Room/bed info not found:   MRN: [id number]  Time Rapid Response Team page Received: 09:40 AM  Time Rapid Response Team at Bedside: 09:43 AM  Time Rapid Response Team left Bedside: 09:59 AM  Was the patient discharged from an ICU this admission? No  Was the patient discharged from a PACU within last 24 hours? No   Did the patient receive conscious sedation/general anesthesia in last 24 hours? No  Was the patient in the ED within the past 24 hours? No  Was the patient on NIPPV within the past 24 hours? No   Did this progress into an ARC or CPA: No  Attending Physician: NA  Primary Service: NA    SITUATION    Notified by overhead page.  Reason for alert: acute loss of consciousness  Called to evaluate the patient for Circulatory    BACKGROUND     Why is the patient in the hospital?: Follow up appointment s/p PD catheter removal, getting surgical wound packing changed     Patient has a past medical history of BPH (benign prostatic hyperplasia), CKD (chronic kidney disease) stage 5, GFR less than 15 ml/min, Gout, Hyperlipidemia, Hyperparathyroidism, secondary renal, Hypertension, and Sleep apnea.    ASSESSMENT/INTERVENTIONS    What did you find: Upon RRT arrival, pt sitting in chair AAO x4. No distress noted. BP 96/61, RR 14, SpO2 98%, and HR 58. RN in the clinic reported during packing change, pt became acutely unresponsive and hypotensive w/ BP 80s/60s. He quickly became responsive but had an episode of vomiting after waking. Pt stated he now "feels much better." However, he stated he was still immensely nauseated. Repeat vitals were as follows: /70, RR 16, SpO2 98%, and HR 56. Pt states his HR is typically in the 80s-90s. Pt transported to " ED via wheelchair by RRT w/o complication for additional w/u.     RECOMMENDATIONS    We recommend: ED transfer and evaluation.    PROVIDER ESCALATION    Orders received and case discussed with NA.    FOLLOW UP    Call the Rapid Response Nurse, Kirsten Polanco RN at [facility] for additional questions or concerns.

## 2024-10-08 NOTE — DISCHARGE INSTRUCTIONS
Please continue wound care as instructed by your surgeon.  If he was any questions about wound care or concerns about the wound, please call the surgeon's office 1st.  If you need any other supplies, please contact them as well.

## 2024-10-08 NOTE — PROGRESS NOTES
I have seen the patient, reviewed the Student's history and physical, assessment and plan. I have personally interviewed and examined the patient at bedside and: agree with the findings.     S/p pd removeal 9/27/24.  Cultures positive and under treatment.  He is doing well and has not finished his course of antibiotics.  He denies fevers and abdominal pain.  On PE he has some serosanguinous drainage at his medial wound site (5cm by 2cm by less than 1cm and the lateral one is closed.  Will start packing.  Rtc 2 weeks.

## 2024-10-08 NOTE — PROGRESS NOTES
"[facility]  General Surgery  Follow Up Clinic Note    Subjective:     Teodoro Mast is a 79 y.o. male with a past medical history of bmi 24, hld, essential htn, cesia on cpap but not using it, secondary hyperparathyroidism, dm2 with long term insulin use, aortic athero, bph and esrd with HD on MWF. He presents to clinic for follow up s/p peritoneal dialysis catheter removal on 09/27. Pathology confirmed gram negative cocci and he started a course of antibiotics (cefepime). Pt reports that they are feeling well. His only complaint today is due to drainage from his paramedian incision site, he states he changes the dressing every couple of days and it starts to leak through the dressing onto his clothes. He states it is mostly clear fluid with a tinge of blood that comes out. He has no abdominal pain, no tenderness near incision sites, no chills, denies fever. Tolerating a regular diet and having regular bowel movements. Denies chest pain, and shortness of breath. Pain is well controlled. Denies redness from incision.     Medications:  Current Outpatient Medications on File Prior to Visit   Medication Sig Dispense Refill    acetaminophen (TYLENOL) 500 MG tablet Take 2 tablets (1,000 mg total) by mouth every 8 (eight) hours as needed for Pain.      atorvastatin (LIPITOR) 80 MG tablet TAKE 1 TABLET(80 MG) BY MOUTH EVERY DAY 90 tablet 3    LANTUS SOLOSTAR U-100 INSULIN glargine 100 units/mL SubQ pen Inject 10 Units into the skin every evening. 9 mL 3    magnesium oxide (MAG-OX) 400 mg (241.3 mg magnesium) tablet TAKE 1 TABLET BY MOUTH DAILY 90 tablet 2    ondansetron (ZOFRAN) 4 MG tablet Take 1 tablet (4 mg total) by mouth every 8 (eight) hours as needed for Nausea. 30 tablet 2    pen needle, diabetic (BD KITA 2ND GEN PEN NEEDLE) 32 gauge x 5/32" Ndle Inject 1 each into the skin daily as needed (insulin injection). 100 each 11    psyllium (METAMUCIL) powder Take 28 g by mouth once daily. Patient " taking 2 tablespoon      sevelamer carbonate (RENVELA) 800 mg Tab Take 1 tablet (800 mg total) by mouth 3 (three) times daily with meals. 90 tablet 11    vitamin D (VITAMIN D3) 1000 units Tab Take 2,000 Units by mouth once daily.       Current Facility-Administered Medications on File Prior to Visit   Medication Dose Route Frequency Provider Last Rate Last Admin    [COMPLETED] ceFEPIme injection 1 g  1 g Intravenous [facility]   1 g at 10/07/24 1412    [COMPLETED] epoetin jasvir injection 3,000 Units  3,000 Units Intravenous [facility]   3,000 Units at 10/07/24 1030    [] heparin (porcine) injection 1,500 Units  1,500 Units Intravenous Continuous Cassandra Lopez NP   Stopped at 10/07/24 1309    [COMPLETED] heparin (porcine) injection 3,000 Units  3,000 Units Intravenous Once Cassandra Lopez NP   3,000 Units at 10/07/24 1009         Objective:     PHYSICAL EXAM:  Vital Signs (Most Recent)  Pulse: 64 (10/08/24 0848)  BP: (!) 99/56 (10/08/24 0848)  SpO2: 96 % (10/08/24 0848)    Physical Exam:  Gen: no apparent distress, awake and alert  CV: regular rate/rhythm  Pulm: non-labored breathing, equal and bilateral chest rise  Abd: soft, non-distended, non-tender.   Ext: warm and well perfused, no edema  Skin: warm and dry, no lesions appreciated  Incision: lateral incision site c/d/i, and median incision site draining serosanguinous fluid with  no surrounding erythema with mild edema. Upon probing q-tip entered at a depth of 5cm by 2cm by less than 1cm.   Neuro: motor and sensation grossly intact and symmetric     Imaging  No relevant imaging available.     Pathology: peritoneal dialysis catheter positive for gram negative cocci.       Assessment:     Teodoro Mast is a 79 y.o. male presenting to clinic today for follow up of peritoneal dialysis catheter removal on . In addition he started antibiotics (cefepime) after PD catheter tip was  found to grow gram - cocci. His lateral incision is c/d/i and healed while his medial incision is continually draining serosanguineous fluid. The medial incision was probed and packed with 1/4 inch gauze and dressed with 4x4 and tape.      Plan:     - educated on repacking midline incision and replacing dressings as needed  - follow-up in two weeks to assess progress on midline incision   - Patient is recovering well  - Continue with lifting restrictions for 4 more weeks      Michaela Rios, MS4

## 2024-10-10 ENCOUNTER — PATIENT OUTREACH (OUTPATIENT)
Dept: EMERGENCY MEDICINE | Facility: HOSPITAL | Age: 79
End: 2024-10-10
Payer: MEDICARE

## 2024-10-10 NOTE — PROGRESS NOTES
Call placed per ED Navigator to f/u from his recent ED  encounter for loss of consciousness. Pt states that he is felling better and feels that it was caused by witnessing the Dr mcwilliams his wound. He also suffers from Vertigo. He denies having any needs within the home at this time. States that he lives with his son and he helps him out. 10-22-24 f/u with Gen Sx.   Pt encouraged to reach out with any concerns at they arise.

## 2024-10-22 ENCOUNTER — OFFICE VISIT (OUTPATIENT)
Dept: SURGERY | Facility: CLINIC | Age: 79
End: 2024-10-22
Payer: MEDICARE

## 2024-10-22 VITALS
WEIGHT: 187.63 LBS | SYSTOLIC BLOOD PRESSURE: 130 MMHG | HEIGHT: 74 IN | DIASTOLIC BLOOD PRESSURE: 72 MMHG | HEART RATE: 78 BPM | BODY MASS INDEX: 24.08 KG/M2

## 2024-10-22 DIAGNOSIS — S31.109D CHRONIC WOUND INFECTION OF ABDOMEN, SUBSEQUENT ENCOUNTER: Primary | ICD-10-CM

## 2024-10-22 DIAGNOSIS — L08.9 CHRONIC WOUND INFECTION OF ABDOMEN, SUBSEQUENT ENCOUNTER: Primary | ICD-10-CM

## 2024-10-22 PROBLEM — S31.109A CHRONIC WOUND INFECTION OF ABDOMEN: Status: ACTIVE | Noted: 2024-10-22

## 2024-10-22 PROCEDURE — 99213 OFFICE O/P EST LOW 20 MIN: CPT | Mod: PBBFAC,TXP | Performed by: SURGERY

## 2024-10-22 PROCEDURE — 99999 PR PBB SHADOW E&M-EST. PATIENT-LVL III: CPT | Mod: PBBFAC,TXP,, | Performed by: SURGERY

## 2024-10-22 PROCEDURE — 99024 POSTOP FOLLOW-UP VISIT: CPT | Mod: NTX,POP,, | Performed by: SURGERY

## 2024-10-22 NOTE — PROGRESS NOTES
"[facility]  General Surgery  Follow Up Clinic Note    Subjective:     Teodoro Mast is a 79 y.o. male with a past medical history of bmi 24, hld, essential htn, cesia on cpap but not using it, secondary hyperparathyroidism, dm2 with long term insulin use, aortic athero, bph and esrd with HD on MWF. He presents to clinic for follow up s/p peritoneal dialysis catheter removal on 09/27.     Pathology confirmed gram negative rods, he finished a course of antibiotics last week. He is feeling well. He is doing daily wound changes with packing and the drainage has stopped. He is placing much less packing than before. He has no abdominal pain, no tenderness near incision sites, no chills, denies fever. Tolerating a regular diet and having regular bowel movements. Denies chest pain, and shortness of breath. Pain is well controlled.     Medications:  Current Outpatient Medications on File Prior to Visit   Medication Sig Dispense Refill    acetaminophen (TYLENOL) 500 MG tablet Take 2 tablets (1,000 mg total) by mouth every 8 (eight) hours as needed for Pain.      amLODIPine (NORVASC) 5 MG tablet Take 1 tablet (5 mg total) by mouth once daily. 90 tablet 3    atorvastatin (LIPITOR) 80 MG tablet TAKE 1 TABLET(80 MG) BY MOUTH EVERY DAY 90 tablet 3    labetaloL (NORMODYNE) 300 MG tablet Take 1 tablet (300 mg total) by mouth 2 (two) times daily. 180 tablet 3    LANTUS SOLOSTAR U-100 INSULIN glargine 100 units/mL SubQ pen Inject 10 Units into the skin every evening. 9 mL 3    magnesium oxide (MAG-OX) 400 mg (241.3 mg magnesium) tablet TAKE 1 TABLET BY MOUTH DAILY 90 tablet 2    ondansetron (ZOFRAN) 4 MG tablet Take 1 tablet (4 mg total) by mouth every 8 (eight) hours as needed for Nausea. 30 tablet 2    pen needle, diabetic (BD KITA 2ND GEN PEN NEEDLE) 32 gauge x 5/32" Ndle Inject 1 each into the skin daily as needed (insulin injection). 100 each 11    psyllium (METAMUCIL) powder Take 28 g by mouth once daily. " Patient taking 2 tablespoon      vitamin D (VITAMIN D3) 1000 units Tab Take 2,000 Units by mouth once daily.      sevelamer carbonate (RENVELA) 800 mg Tab Take 1 tablet (800 mg total) by mouth 3 (three) times daily with meals. 90 tablet 11     Current Facility-Administered Medications on File Prior to Visit   Medication Dose Route Frequency Provider Last Rate Last Admin    [COMPLETED] epoetin jasvir injection 4,000 Units  4,000 Units Intravenous 1 time in Clinic/HOD Jacquelyn Martell DO   4,000 Units at 10/21/24 1015    [] heparin (porcine) injection 1,500 Units  1,500 Units Intravenous Continuous Cassandra Lopez, NP   1,500 Units at 10/21/24 1015    [COMPLETED] heparin (porcine) injection 3,000 Units  3,000 Units Intravenous Once Cassandra Lopez, NP   3,000 Units at 10/21/24 1015         Objective:     PHYSICAL EXAM:  Vital Signs (Most Recent)  Pulse: 78 (10/22/24 0943)  BP: 130/72 (10/22/24 0943)    Physical Exam:  Gen: no apparent distress, awake and alert  CV: regular rate/rhythm  Pulm: non-labored breathing, equal and bilateral chest rise  Abd: soft, non-distended, non-tender.   Ext: warm and well perfused, no edema  Skin: warm and dry, no lesions appreciated  Incision: lateral incision site c/d/i, and median incision site with no surrounding erythema or tenderness. Upon probing q-tip entered at a depth of 2cm by 1.5cm by less than 0.5cm.   Neuro: motor and sensation grossly intact and symmetric     Imaging  No relevant imaging available.     Pathology: peritoneal dialysis catheter positive for gram negative cocci.       Assessment:     Teodoro Mast is a 79 y.o. male presenting to clinic today for follow up of peritoneal dialysis catheter removal on . In addition he started antibiotics (cefepime) after PD catheter tip was found to grow gram - rods. His lateral incision is c/d/i and healed while his medial incision remains open. The medial incision was probed and packed with 1/4 inch gauze  and dressed with 4x4 and tape. His incision is healing up well    Plan:     - continue repacking midline incision and replacing dressings as needed  - follow up PRN  - Continue with lifting restrictions for 2 more weeks      Juan De MD  [facility] PGY5

## 2024-11-12 ENCOUNTER — OFFICE VISIT (OUTPATIENT)
Dept: SURGERY | Facility: CLINIC | Age: 79
End: 2024-11-12
Payer: MEDICARE

## 2024-11-12 ENCOUNTER — PATIENT OUTREACH (OUTPATIENT)
Dept: EMERGENCY MEDICINE | Facility: HOSPITAL | Age: 79
End: 2024-11-12
Payer: MEDICARE

## 2024-11-12 VITALS
DIASTOLIC BLOOD PRESSURE: 70 MMHG | HEART RATE: 80 BPM | HEIGHT: 74 IN | SYSTOLIC BLOOD PRESSURE: 128 MMHG | WEIGHT: 187.81 LBS | BODY MASS INDEX: 24.1 KG/M2

## 2024-11-12 DIAGNOSIS — Z09 POSTOP CHECK: Primary | ICD-10-CM

## 2024-11-12 PROCEDURE — 99213 OFFICE O/P EST LOW 20 MIN: CPT | Mod: PBBFAC,TXP | Performed by: SURGERY

## 2024-11-12 PROCEDURE — 99999 PR PBB SHADOW E&M-EST. PATIENT-LVL III: CPT | Mod: PBBFAC,TXP,, | Performed by: SURGERY

## 2024-11-12 NOTE — PROGRESS NOTES
ED Navigator called to remind Pt [facility] 9[facility]40 [facility]-24. Pt verbalized understanding.

## 2024-11-12 NOTE — PROGRESS NOTES
Call placed per ED Navigator to f/u from last encounter. He states he has been doing ok and denies any concerns at this time. Pt encouraged to reach out with any concerns at they arise. Pt verbalized understanding. ED navigator will follow-up with patient and assist as needed.

## 2024-11-14 ENCOUNTER — OFFICE VISIT (OUTPATIENT)
Facility: CLINIC | Age: 79
End: 2024-11-14
Payer: MEDICARE

## 2024-11-14 VITALS
SYSTOLIC BLOOD PRESSURE: 116 MMHG | WEIGHT: 186.31 LBS | BODY MASS INDEX: 23.92 KG/M2 | DIASTOLIC BLOOD PRESSURE: 69 MMHG | HEART RATE: 84 BPM | TEMPERATURE: 98 F

## 2024-11-14 DIAGNOSIS — G47.33 OSA (OBSTRUCTIVE SLEEP APNEA): Primary | ICD-10-CM

## 2024-11-14 DIAGNOSIS — Z79.4 TYPE 2 DIABETES MELLITUS WITH HYPERGLYCEMIA, WITH LONG-TERM CURRENT USE OF INSULIN: ICD-10-CM

## 2024-11-14 DIAGNOSIS — H81.10 BENIGN PAROXYSMAL POSITIONAL VERTIGO, UNSPECIFIED LATERALITY: ICD-10-CM

## 2024-11-14 DIAGNOSIS — E11.65 TYPE 2 DIABETES MELLITUS WITH HYPERGLYCEMIA, WITH LONG-TERM CURRENT USE OF INSULIN: ICD-10-CM

## 2024-11-14 PROBLEM — Z01.810 PREOPERATIVE CARDIOVASCULAR EXAMINATION: Status: RESOLVED | Noted: 2024-09-12 | Resolved: 2024-11-14

## 2024-11-14 PROBLEM — Z99.2 PERITONEAL DIALYSIS CATHETER IN PLACE: Status: RESOLVED | Noted: 2021-05-28 | Resolved: 2024-11-14

## 2024-11-14 PROBLEM — L08.9 CHRONIC WOUND INFECTION OF ABDOMEN: Status: RESOLVED | Noted: 2024-10-22 | Resolved: 2024-11-14

## 2024-11-14 PROBLEM — E79.0 HYPERURICEMIA: Status: RESOLVED | Noted: 2017-08-30 | Resolved: 2024-11-14

## 2024-11-14 PROBLEM — S31.109A CHRONIC WOUND INFECTION OF ABDOMEN: Status: RESOLVED | Noted: 2024-10-22 | Resolved: 2024-11-14

## 2024-11-14 PROCEDURE — 99215 OFFICE O/P EST HI 40 MIN: CPT | Mod: PBBFAC,PN,TXP | Performed by: HOSPITALIST

## 2024-11-14 PROCEDURE — 99999 PR PBB SHADOW E&M-EST. PATIENT-LVL V: CPT | Mod: PBBFAC,TXP,, | Performed by: HOSPITALIST

## 2024-11-14 RX ORDER — VALINE, LYSINE, HISTIDINE, ISOLEUCINE, LEUCINE, PHENYLALANINE, THREONINE, METHIONINE, TRYPTOPHAN, ALANINE, GLYCINE, ARGININE, PROLINE, GLUTAMIC ACID, SERINE, ASPARTIC ACID AND TYROSINE 1.44; 1.35; 1.18; 1.08; 1.08; 1; 980; 760; 320; 2.76; 2.06; 1.96; 1.34; 1.02; 1.02; 600; 5 G/100ML; G/100ML; G/100ML; G/100ML; G/100ML; G/100ML; MG/100ML; MG/100ML; MG/100ML; G/100ML; G/100ML; G/100ML; G/100ML; G/100ML; G/100ML; MG/100ML; MG/100ML
INJECTION, SOLUTION INTRAVENOUS
COMMUNITY
Start: 2024-11-08

## 2024-11-14 NOTE — PATIENT INSTRUCTIONS
Get the RSV and shingles vaccines at your local pharmacy at your next convenience.      Make your eye appointment for this year as soon as possible.     Be watching for a call from Physical Therapy about restarting the vestibular therapy.  Try taking a meclizine or Dramamine before the session and see if that helps you tolerate it better.  If doing this more a month isn't helping we'll get you back in with ENT to discuss next steps.

## 2024-11-14 NOTE — ASSESSMENT & PLAN NOTE
Reviewed blood glucose log with results largely at goal.  On 10 units Lantus nightly, which is concerning in context of ESRD.  It is likely with increasing regular physical activity he may be able to come off of insulin.  We discussed goal of increasing physical exercise to this end.

## 2024-11-14 NOTE — ASSESSMENT & PLAN NOTE
Discomfort and in convenience likely presenting barriers to regular CPAP use.  Discussed various complications of untreated GLYNN including increased risk of dementia, as well as pulmonary hypertension and right heart failure, which would make it difficult to tolerate hemodialysis and make him a poor transplant candidate.  He is interested in resuming CPAP use.  Because he has old outdated equipment he would likely benefit from auto CPAP to improve comfort as well as trying a variety of different head gear or masks to maximize comfort.  Orders placed for new CPAP.

## 2024-11-14 NOTE — PROGRESS NOTES
Primary Care Provider Appointment - [facility] & Jaden Williamson MD      Subjective:      Patient ID: Teodoro Mast is a 79 y.o. male with   Past Medical History:   Diagnosis Date    BPH (benign prostatic hyperplasia)     CKD (chronic kidney disease) stage 5, GFR less than 15 ml/min     Gout     Hyperlipidemia     Hyperparathyroidism, secondary renal     Hypertension     Sleep apnea            Chief Complaint: Follow-up    Prior to this visit, patient's last encounter with PCP was 9/12/2024.    Doing well after PD catheter removal.  Pus noted in catheter at removal and sent for Cx which grew bacteria.  Got IV abx w/ HD for 2 weeks.  Saw surgeon 2 d/a and d/c'd from care after site closed up and healed.  Noting strength, endurance doing better since.  Also sleeping better.  Does note fatigue after HD if large volumes removed.    Brought BP and BG logs for review.  Still using 10 units Lantus nightly.  No hypoglycemia.  Not getting any regular exercise.  Activity limited by vertigo; no relief w/ meclizine and stopped vestibular therapy after not tolerating 1 session.  Not using CPAP; machine is at least 5 years old and he has been replacing supplies through [employer].    9/12: Pt reports prior PCP Dr. aMsterson retired last month; needs preop eval for PD catheter removal.  He has been on peritoneal dialysis since May of 2021; pre-catheter he has in now is the only 1 he has ever had.  He denies history of PD catheter peritonitis.  He reports home PD became progressively more uncomfortable as larger volumes of dialysate were needed.  He was transitioned to hemodialysis 3 or 4 months ago and is dialyzing through an AV fistula in the left wrist which was placed a few years previously in anticipation of potential HD need.  Over the period of time he was on PD he notes a considerable amount of weight loss as well as loss in muscle mass.  His appetite has been poor for quite some time on PD, but has improved  since transitioning to HD.  ESRD believed to be secondary to chronic hypertension; he has been on antihypertensives for nearly 50 years, but as his blood pressures continued to get lower on hemodialysis he stopped antihypertensives about 3 weeks ago.  He is anuric and on a fluid restriction.  He denies symptoms of volume overload prior to being due for dialysis.  He was diagnosed with type 2 diabetes about a year ago and is on 10 units of basal insulin nightly.  He reports blood sugars tend to range in the 90s.  He attributes this to eating a lot of junk food and sweets after being put on an appetite stimulant which perhaps worked a little too well.  He lives independently but is reliant on his son to assist with most ADLs.  His son lives with him presently.  He ambulates short distances in the home by holding onto things and uses a rolling walker outside of the home.  His mobility and ability to perform most physical activity are limited by severe vertigo.  He is prescribed Zofran for this, but does not get nauseous prior to vomiting with vertigo symptoms.  He was referred to vestibular PT but only went to 1 session and had to quit due to vomiting and never went back.      4Ms for Medical Decision-Making in Older Adults    Last Completed EAWV: 2020    MOBILITY:  Get Up and Go:      2020    10:08 AM   Get Up and Go   Trial 1 5 seconds     Activities of Daily Livin/7/2020    10:03 AM   Activities of Daily Living   Ambulation Independent   Dressing Independent   Transfers Independent   Toileting Incontinent of bowel;Continent of bladder   Feeding Independent   Cleaning home/Chores Independent   Telephone use Independent   Shopping Independent   Paying bills Independent   Taking meds Independent     Whisper Test:      2020    10:08 AM   Whisper Test   Whisper Test Abnormal     Disability Status:      2020    10:05 AM   Disability Status   Are you deaf or do you have serious difficulty hearing?  N   Are you blind or do you have serious difficulty seeing, even when wearing glasses? N   Because of a physical, mental, or emotional condition, do you have serious difficulty concentrating, remembering, or making decisions? N   Do you have serious difficulty walking or climbing stairs? N   Do you have difficulty dressing or bathing? N   Because of a physical, mental, or emotional condition, do you have difficulty doing errands alone such as visiting a doctor's office or shopping? N     Nutrition Screenin/7/2020     9:59 AM   Nutrition Screening   Has food intake declined over the past three months due to loss of appetite, digestive problems, chewing or swallowing difficulties? Moderate decrease in food intake   Involuntary weight loss during the last 3 months? No weight loss   Mobility? Goes out   Has the patient suffered psychological stress or acute disease in the past three months? Yes   Neuropsychological problems? No psychological problems   Body Mass Index (BMI)?  BMI 23 or greater   Screening Score 11    Screening Score: 0-7 Malnourished, 8-11 At Risk, 12-14 Normal  Fall Risk:      2024     9:45 AM 10/22/2024     9:30 AM 10/8/2024     8:30 AM   Fall Risk Assessment - Outpatient   Mobility Status Ambulatory Ambulatory w/ assistance Ambulatory   Number of falls 0 0 0   Identified as fall risk False True False           MENTATION:   Depression Patient Health Questionnaire:      10/2/2024    12:15 PM   Depression Patient Health Questionnaire   Over the last two weeks how often have you been bothered by little interest or pleasure in doing things Not at all   Over the last two weeks how often have you been bothered by feeling down, depressed or hopeless Not at all   PHQ-2 Total Score 0   PHQ-9 Total Score 0   PHQ-9 Interpretation Minimal or None     Has Dementia Dx: No  Has Anxiety Dx: No    Cognitive Function Screenin/7/2020    10:08 AM   Cognitive Function Screening   Clock Drawing Test  2   Mini-Cog 3 Minute Recall 2   Cognitive Function Screening 4     Cognitive Function Screening Total - Less than 4 = Abnormal,  Greater than or equal to 4 = Normal        MEDICATIONS:  High Risk Medications:  Total Active Medications: 0  This patient does not have an active medication from one of the medication groupers.    WHAT MATTERS MOST:  Advance Care Planning   ACP Status:   Patient has had an ACP conversation  Living Will: No  Power of : No  [place]: No    What is most important right now is to focus on remaining as independent as possible    Accordingly, we have decided that the best plan to meet the patient's goals includes continuing with treatment      What matters most to patient today is: getting ready for surgery             Social History     Socioeconomic History    Marital status:     Number of children: 2   Tobacco Use    Smoking status: Former     Current packs/day: 0.00     Average packs/day: 0.5 packs/day for 10.0 years (5.0 ttl pk-yrs)     Types: Cigarettes     Start date:      Quit date:      Years since quittin.9    Smokeless tobacco: Never   Substance and Sexual Activity    Alcohol use: Not Currently    Drug use: No    Sexual activity: Not Currently     Partners: Female   Social History Narrative    Caregiver Teodoro Roger Mast II     Social Drivers of Health     Financial Resource Strain: Low Risk  (10/10/2024)    Overall Financial Resource Strain (CARDIA)     Difficulty of Paying Living Expenses: Not hard at all   Food Insecurity: No Food Insecurity (10/10/2024)    Hunger Vital Sign     Worried About Running Out of Food in the Last Year: Never true     Ran Out of Food in the Last Year: Never true   Transportation Needs: No Transportation Needs (10/10/2024)    PRAPARE - Transportation     Lack of Transportation (Medical): No     Lack of Transportation (Non-Medical): No   Physical Activity: Inactive (12/3/2023)    Exercise Vital Sign     Days of Exercise per  Week: 0 days     Minutes of Exercise per Session: 0 min   Stress: No Stress Concern Present (12/3/2023)    [employer] [place] [employer] - Occupational Stress Questionnaire     Feeling of Stress : Not at all   Housing Stability: Low Risk  (10/10/2024)    Housing Stability Vital Sign     Unable to Pay for Housing in the Last Year: No     Homeless in the Last Year: No       Review of Systems   Constitutional:  Negative for activity change, fatigue and unexpected weight change.   HENT:  Negative for hearing loss, rhinorrhea and trouble swallowing.    Eyes:  Negative for discharge and visual disturbance.   Respiratory:  Negative for cough, chest tightness, shortness of breath and wheezing.    Cardiovascular:  Negative for chest pain, palpitations, leg swelling and claudication.   Gastrointestinal:  Negative for abdominal pain, blood in stool, change in bowel habit, constipation, diarrhea and vomiting.   Endocrine: Negative for polydipsia and polyuria.   Genitourinary:  Positive for decreased urine volume. Negative for difficulty urinating, hematuria and urgency.   Musculoskeletal:  Negative for arthralgias, joint swelling and neck pain.   Integumentary:  Negative for wound.   Neurological:  Positive for dizziness. Negative for headaches.   Psychiatric/Behavioral:  Negative for confusion and dysphoric mood.         Objective:   /69 (BP Location: Right arm, Patient Position: Sitting)   Pulse 84   Temp 97.9 °F (36.6 °C) (Oral)   Wt 84.5 kg (186 lb 4.6 oz)   SpO2 (P) 98%   BMI 23.92 kg/m²     Physical Exam  Vitals reviewed.   Constitutional:       General: He is not in acute distress.     Appearance: Normal appearance. He is normal weight.      Comments: Ambulates with a rolling walker.  Needs assistance to get onto and off of examination table.   HENT:      Head: Normocephalic and atraumatic.      Right Ear: Tympanic membrane, ear canal and external ear normal.      Left Ear: Tympanic membrane, ear  canal and external ear normal.      Nose: Nose normal.      Mouth/Throat:      Mouth: Mucous membranes are moist.      Pharynx: Oropharynx is clear.   Eyes:      General: No scleral icterus.     Conjunctiva/sclera: Conjunctivae normal.   Cardiovascular:      Rate and Rhythm: Regular rhythm. Tachycardia present.      Pulses: Normal pulses.      Heart sounds: Murmur heard.      Crescendo decrescendo systolic murmur is present with a grade of 3/6.      Arteriovenous access: Left arteriovenous access is present.  Pulmonary:      Effort: Pulmonary effort is normal. No respiratory distress.      Breath sounds: Normal breath sounds.   Abdominal:      General: A surgical scar is present. Bowel sounds are normal. There is no distension.      Palpations: Abdomen is soft. There is fluid wave. There is no hepatomegaly or splenomegaly.      Tenderness: There is no abdominal tenderness.      Comments: PD catheter site C/D/I; healed and closed-off.     Musculoskeletal:      Cervical back: Normal range of motion and neck supple. No rigidity.      Right lower leg: No edema.      Left lower leg: No edema.   Lymphadenopathy:      Cervical: No cervical adenopathy.   Skin:     General: Skin is warm and dry.      Findings: No erythema or rash.   Neurological:      General: No focal deficit present.      Mental Status: He is alert and oriented to person, place, and time.              Lab Results   Component Value Date    WBC 5.32 11/06/2024    HGB 11.6 (L) 11/06/2024    HCT 37.1 (L) 11/06/2024     11/06/2024    CHOL 178 01/12/2024    TRIG 123 01/12/2024    HDL 29 (L) 01/12/2024    ALT 11 11/06/2024    AST 14 10/08/2024     11/06/2024    K 4.6 11/06/2024     11/06/2024    CREATININE 8.44 (H) 11/06/2024    BUN 24 (H) 10/08/2024    CO2 22 (L) 10/08/2024    PSA 14.2 (H) 07/13/2021    INR 1.0 07/13/2021    HGBA1C 5.1 10/02/2024       Current Outpatient Medications on File Prior to Visit   Medication Sig Dispense Refill     "acetaminophen (TYLENOL) 500 MG tablet Take 2 tablets (1,000 mg total) by mouth every 8 (eight) hours as needed for Pain.      amLODIPine (NORVASC) 5 MG tablet Take 1 tablet (5 mg total) by mouth once daily. 90 tablet 3    atorvastatin (LIPITOR) 80 MG tablet TAKE 1 TABLET(80 MG) BY MOUTH EVERY DAY 90 tablet 3    labetaloL (NORMODYNE) 300 MG tablet Take 1 tablet (300 mg total) by mouth 2 (two) times daily. 180 tablet 3    LANTUS SOLOSTAR U-100 INSULIN glargine 100 units/mL SubQ pen Inject 10 Units into the skin every evening. 9 mL 3    magnesium oxide (MAG-OX) 400 mg (241.3 mg magnesium) tablet TAKE 1 TABLET BY MOUTH DAILY 90 tablet 2    ondansetron (ZOFRAN) 4 MG tablet Take 1 tablet (4 mg total) by mouth every 8 (eight) hours as needed for Nausea. 30 tablet 2    pen needle, diabetic (BD KITA 2ND GEN PEN NEEDLE) 32 gauge x 5/32" Ndle Inject 1 each into the skin daily as needed (insulin injection). 100 each 11    psyllium (METAMUCIL) powder Take 28 g by mouth once daily. Patient taking 2 tablespoon      sevelamer carbonate (RENVELA) 800 mg Tab Take 1 tablet (800 mg total) by mouth 3 (three) times daily with meals. 90 tablet 11    vitamin D (VITAMIN D3) 1000 units Tab Take 2,000 Units by mouth once daily.       Current Facility-Administered Medications on File Prior to Visit   Medication Dose Route Frequency Provider Last Rate Last Admin    [COMPLETED] epoetin jasvir injection 2,000 Units  2,000 Units Intravenous 1 time in Clinic/HOD Jacquelyn Martell DO   2,000 Units at 24 1011    [] heparin (porcine) injection 1,500 Units  1,500 Units Intravenous Continuous Cassandra Lopez NP   1,500 Units at 24 1211    [COMPLETED] heparin (porcine) injection 3,000 Units  3,000 Units Intravenous Once Cassandra Lopez NP   3,000 Units at 24 1011    [DISCONTINUED] acetaminophen tablet 650 mg  650 mg Oral Q4H PRN Jacquelyn Martell DO        [DISCONTINUED] nitroGLYCERIN SL tablet 0.4 mg  0.4 mg Sublingual " PRN Jacquelyn Martell,         [DISCONTINUED] ondansetron injection 4 mg  4 mg Intravenous Q15 Min PRN Jacquelyn Martell DO        [DISCONTINUED] promethazine injection 12.5 mg  12.5 mg Intramuscular Q15 Min PRN Jacquelyn Martell DO        [DISCONTINUED] sodium chloride 0.9% bolus 200 mL 200 mL  200 mL Intravenous PRN Jacquelyn Martell DO             Assessment:   79 y.o. male with multiple co-morbid illnesses here to follow-up for ongoing chronic disease management and preventive health maintenance.    Plan:     Problem List Items Addressed This Visit       GLYNN (obstructive sleep apnea) - Primary     Discomfort and in convenience likely presenting barriers to regular CPAP use.  Discussed various complications of untreated GLYNN including increased risk of dementia, as well as pulmonary hypertension and right heart failure, which would make it difficult to tolerate hemodialysis and make him a poor transplant candidate.  He is interested in resuming CPAP use.  Because he has old outdated equipment he would likely benefit from auto CPAP to improve comfort as well as trying a variety of different head gear or masks to maximize comfort.  Orders placed for new CPAP.         Relevant Orders    CPAP FOR HOME USE    Type 2 diabetes mellitus with hyperglycemia, with long-term current use of insulin     Reviewed blood glucose log with results largely at goal.  On 10 units Lantus nightly, which is concerning in context of ESRD.  It is likely with increasing regular physical activity he may be able to come off of insulin.  We discussed goal of increasing physical exercise to this end.         Benign paroxysmal positional vertigo     Discussed importance of managing vertigo better so that it does not limit his activity.  Regular physical exercise will help keep him in optimal condition to continue qualifying for kidney transplant wait list.  He is amenable to trying vestibular therapy again.  He was advised to try taking  meclizine or Dramamine prior to sessions to see if that helps him tolerate it better.  Per last ENT note from October of last year, if he fails to improve after a month of therapy there is further investigation that can be considered.  PT referral placed for vestibular therapy.         Relevant Orders    Ambulatory referral/consult to Physical/Occupational Therapy         Health Maintenance         Date Due Completion Date    Shingles Vaccine (1 of 2) Never done ---    RSV Vaccine (Age 60+ and Pregnant patients) (1 - 1-dose 75+ series) Never done ---    Eye Exam 05/18/2024 5/18/2023    Override on 5/1/2023: Done    Lipid Panel 01/12/2025 1/12/2024    Hemoglobin A1c 04/02/2025 10/2/2024    TETANUS VACCINE 08/07/2030 8/7/2020        Counseled patient on shingles and RSV vaccines, and advised him to schedule eye exam asap.    Future Appointments   Date Time Provider Department Center   11/15/2024 10:15 AM CHAIR 16, [facility] [place] [facility]   11/18/2024 10:15 AM CHAIR 16, [facility] [place] [facility]   11/20/2024 10:15 AM CHAIR 16, [facility] [place] [facility]   11/22/2024 10:15 AM CHAIR 16, [facility] [place] [facility]   11/25/2024 10:15 AM CHAIR 16, [facility] [place] [facility]   11/27/2024 10:15 AM CHAIR 16, [facility] [place] [facility]   11/29/2024 10:15 AM CHAIR 16, [facility] [place] [facility]   12/2/2024 10:15 AM CHAIR 16, [facility] [place] [facility]   12/4/2024 10:15 AM CHAIR 16, [facility]   12/6/2024 10:15 AM CHAIR 16, [facility] [place] [facility]   12/9/2024 10:15 AM CHAIR 16, [facility] [place] [facility]   12/11/2024 10:15 AM CHAIR 16, [facility]   12/12/2024 10:20 AM Phil Williamson MD [facility]   12/13/2024 10:15 AM CHAIR [facility]   12/16/2024 10:15 AM CHAIR [facility]  KIDNEY CARE [facility]   12/18/2024 10:15 AM CHAIR 16, [facility]   12/20/2024 10:15 AM CHAIR 16, [facility]   12/23/2024 10:15 AM CHAIR 16, [facility]   12/26/2024 10:15 AM CHAIR 08, [facility]   12/27/2024 10:15 AM CHAIR 16, [facility]   12/30/2024 10:15 AM CHAIR 16, [facility]         Follow up in about 3 months (around 2/14/2025). Total clinical care time was 40 min.    Phil Williamson MD  [address] [place] [address], [facility], [facility]    This note was partially dictated using voice recognition software and although actively proofread during transcription, it is possible some errors in transcription may have been overlooked.

## 2024-11-14 NOTE — ASSESSMENT & PLAN NOTE
Discussed importance of managing vertigo better so that it does not limit his activity.  Regular physical exercise will help keep him in optimal condition to continue qualifying for kidney transplant wait list.  He is amenable to trying vestibular therapy again.  He was advised to try taking meclizine or Dramamine prior to sessions to see if that helps him tolerate it better.  Per last ENT note from October of last year, if he fails to improve after a month of therapy there is further investigation that can be considered.  PT referral placed for vestibular therapy.

## 2024-12-03 DIAGNOSIS — Z76.82 PATIENT ON WAITING LIST FOR KIDNEY TRANSPLANT: Primary | ICD-10-CM

## 2024-12-05 ENCOUNTER — CLINICAL SUPPORT (OUTPATIENT)
Dept: REHABILITATION | Facility: HOSPITAL | Age: 79
End: 2024-12-05
Attending: HOSPITALIST
Payer: MEDICARE

## 2024-12-05 DIAGNOSIS — H81.8X9 DEFICIT OF VESTIBULO-OCULAR REFLEX: ICD-10-CM

## 2024-12-05 DIAGNOSIS — H81.10 BENIGN PAROXYSMAL POSITIONAL VERTIGO, UNSPECIFIED LATERALITY: ICD-10-CM

## 2024-12-05 DIAGNOSIS — R26.89 IMBALANCE: Primary | ICD-10-CM

## 2024-12-05 PROCEDURE — 97165 OT EVAL LOW COMPLEX 30 MIN: CPT | Mod: PO

## 2024-12-05 NOTE — PLAN OF CARE
[facility] Occupational Therapy  Initial Neurological Evaluation     Date: 12/5/2024  Patient: Teodoro Mast  Chart Number: [id number]    Therapy Diagnosis:   Encounter Diagnoses   Name Primary?    Benign paroxysmal positional vertigo, unspecified laterality     Imbalance Yes    Deficit of vestibulo-ocular reflex      Physician: Phil Williamson, *    Physician Orders: Eval and Treat - Vestibular Therapy   Medical Diagnosis: H81.10 (ICD-10-CM) - Benign paroxysmal positional vertigo, unspecified laterality   Evaluation Date: 12/5/2024  Plan of Care Expiration Period: 16 visits; 2/28/2025 (date extension 90 days due to scheduling constraints)   Insurance Authorization period Expiration: 11/14/2025  Date of Return to MD: dialysis M W F  Visit # / Visits Authorized: 1 / 1  FOTO: 1/2    Time In: 0850   Time Out: 0941  Total Billable (one on one) Time: 51 minutes    Precautions: Standard, Fall, and dialysis    Subjective     History of Current Condition: Teodoro Mast is a 79 y.o. male who presents to [facility] for evaluation and treatment secondary to medical dx of BPPV.  Pt reported that he mainly feels off balance. Very seldom he feels like the room is spinning typically with sit>stands. He also reported very occasional wooziness when he rolls in bed. He is a dialysis patient (3x/wk, M, W, F) and sometimes his BP drops. He tried vestibular therapy in the past (1 year ago) but only went to 1 visit (initial evaluation) due to poor tolerance and emesis episode when he got home. He stated that last year he was experiencing vertigo, but this has mainly resolved. Balance issues are limiting his functional mobility. He started using a rollator about 6 months ago, which he only uses for community mobility. He reported he can walk 10-15 feet in the home without AD.    Description of symptoms: Imbalance (sensation of spinning vs  "lightheadedness)   Onset: September 2023   Frequency: Daily    Duration: Constant    Limitations due to symptoms: Functional mobility   Visual changes: None    Hearing Changes (hearing loss or tinnitus): Wears hearing aids in bilateral ears    Currently taking Meclizine: No     History of migraines: No     Systems screening  Cardiovascular indicators: Hypertension and hyperlipidemia    Is patient currently taking medication for stated indicators: Yes - taking BP medication  Neurological: None     Falls: 0     Involved Side: N/A  Dominant Side: Right  Date of Onset: September 2023   Surgical Procedure: N/A  Imaging: None related to current dx   Vestibular Function Testing/Audiogram: "Audiogram results revealed normal sloping to moderate sensorineural hearing loss (SNHL) in the right ear and normal sloping to moderate SNHL in the left ear." 10/2023   Previous Therapy: Tried vestibular PT last year; received evaluation but got sick when he got home so did not return     Patient's Goals for Therapy: to improve balance     Pain:  Pain Related Behaviors Observed: No   Functional Pain Scale Rating 0-10:   n/a/10 on average  n/a/10 at best  n/a/10 at worst  Location: N/A  Description: N/A  Aggravating Factors: N/A  Easing Factors: N/A    Occupation:    Working presently: Retired   Duties: Basic self and home care     Functional Limitations/Social History:    Prior Level of Function: Independent with all ADLs, IADLs, functional mobility   Current Level of Function: Mod I for functional mobility using rollator; sponge bathes due to fear of falling in shower, Mod I for UB and LB dressing (performs seated), Independent for toileting; Mod I for IADLs, sits on stool to wash dishes and cooking    Home/Living environment : lives with their son  Home Access: 2 story home; 3 SILVERIO with bilateral handrails; 1 flight indoors with unilateral handrail on R with ascent; upstairs bedroom and bathroom with tub/shower unit; pt reported on " dialysis days sometimes his son has to assist him up the stairs due to fatigue   DME: TTB, grab bars in shower, rollator      Driving: No; stopped in June 2024     Past Medical History/Physical Systems Review:     Past Medical History:  Teodoro Mast  has a past medical history of BPH (benign prostatic hyperplasia), CKD (chronic kidney disease) stage 5, GFR less than 15 ml/min, Gout, Hyperlipidemia, Hyperparathyroidism, secondary renal, Hypertension, and Sleep apnea.    Past Surgical History:  Teodoro Mast  has a past surgical history that includes hemorriodectomy; Finger surgery; Eye surgery (Bilateral); Peritoneal catheter insertion; Colonoscopy (N/A, 11/2/2021); AV fistula placement (Left, 3/31/2022); AV fistula placement (Left, 9/30/2022); Coronary angiography (N/A, 1/8/2024); and Peritoneal catheter removal (N/A, 9/27/2024).    Current Medications:  Teodoro has a current medication list which includes the following prescription(s): acetaminophen, amlodipine, atorvastatin, labetalol, lantus solostar u-100 insulin, magnesium oxide, ondansetron, pen needle, diabetic, prosol 20 %, psyllium, sevelamer carbonate, and vitamin d.    Allergies:  Review of patient's allergies indicates:  No Known Allergies     Objective     Cognitive Exam:  Oriented: Person, Place, Time, and Situation  Behaviors: normal, cooperative  Follows Commands/attention: Follows multistep  commands  Communication: clear/fluent  Memory: No Deficits noted but not formally assessed   Safety awareness/insight to disability: aware of diagnosis, treatment, and prognosis  Coping skills/emotional control: Appropriate to situation    Oculomotor Exam:  Oculomotor ROM: WNL  Eye Alignment: WNL  Visual Field: NT  Acuity: Bilateral cataracts repaired; L eye for distance, R eye for reading; has glasses but does not need to wear them; has ophthalmology appt in about a month     Spontaneous Nystagmus: None  Gaze Holding Nystagmus: None   Gaze Holding (No  Fixation): NT  Smooth Pursuits:     Horizontal: Saccadic intrusions intermittently; no dizziness   Vertical: Saccadic intrusions intermittently; no dizziness   Saccades:    Horizontal: Intact    Vertical: Intact   Near Point Convergence (cm): WNL; 4 cm    VOR Cancellation: Performed at slowed speed, but no dizziness or slippage    VOR Slow Head Movement: Intact  Head Thrust: Positive bilaterally   Dynamic Visual Acuity (DVA): NT    Physical Exam: WNL posture, cervical. BUE, strength and coordination not tested but no complaints of deficits.     Balance/Functional Mobility Assessment:     Postural control: MCTSIB: Evaluation 12/05/2024 (Average of 3 trials)   1. Eyes Open/feet together/Firm: 30 seconds   2. Eyes Closed/feet together/Firm:  30 seconds  (Slight-min sway)   3. Eyes Open/feet together/Foam:  30 seconds  (Slight sway)   4. Eyes Closed/feet together/Foam:  10 seconds  (Avg 3 trials: 5, 14, 10)   TOTAL 100/120   Comments: Pt reported occasionally noting impaired sensation in feet     Functional Gait Assessment:   1. Gait on level surface =  3   (3) Normal: less than 5.5 sec, no A.D., no imbalance, normal gait pattern, deviates< 6in   (2) Mild impairment: 7-5.6 sec, uses A.D., mild gait deviations, or deviates 6-10 in   (1) Moderate impairment: > 7 sec, slow speed, imbalance, deviates 10-15 in.   (0) Severe impairment: needs assist, deviates >15 in, reach/touch wall  2. Change in Gait Speed = 3   (3) Normal: smooth change w/o loss of balance or gait deviation, deviates < 6 in, significant difference between speeds   (2) Mild impairment: changes speed, but demonstrates mild gait deviations, deviates 6-10 in, OR no deviations but unable to significantly speed, OR uses A.D.   (1) Moderate impairment: minor changes to speed, OR changes speed w/ significant deviations, deviates 10-15 in, OR  Changes speed , but loses balance & recovers   (0) Severe impairment: cannot change speed, deviates >15 in, or loses  balance & needs assist  3. Gait with horizontal head turns  = 3   (3) Normal: no change in gait, deviates <6 in   (2) Mild impairment: slight change in speed, deviates 6-10 in, OR uses A.D.   (1) Moderate impairment: moderate change in speed, deviates 10-15 in   (0) Severe impairment: severe disruption of gait, deviates >15in  4. Gait with vertical head turns = 3   (3) Normal: no change in gait, deviates <6 in   (2) Mild impairment: slight change in speed, deviates 6-10 in OR uses A.D.   (1) Moderate impairment: moderate change in speed, deviates 10-15 in   (0) Severe impairment: severe disruption of gait, deviates >15 in  5. Gait with pivot turns = 3   (3) Normal: performs safely in 3 sec, no LOB   (2) Mild impairment: performs in >3 sec & no LOB, OR turns safely & requires several steps to regain LOB   (1) Moderate impairment: turns slow, OR requires several small steps for balance following turn & stop   (0) Severe impairment: cannot turn safely, needs assist  6. Step over obstacle = 2; slightly slowed speed    (3) Normal: steps over 2 stacked boxes w/o change in speed or LOB   (2) Mild impairment: able to step over 1 box w/o change in speed or LOB   (1) Moderate impairment: steps over 1 box but must slow down, may require VC   (0) Severe impairment: cannot perform w/o assist  7. Gait with Narrow SUSHIL = 0; unable to perform without CGA-Min A but took 9 consecutive steps   (3) Normal: 10 steps no staggering   (2) Mild impairment: 7-9 steps   (1) Moderate impairment: 4-7 steps   (0) Severe impairment: < 4 steps or cannot perform w/o assist  8. Gait with eyes closed = 2; slight deviations and slowed speed    (3) Normal: < 7 sec, no A.D., no LOB, normal gait pattern, deviates <6 in   (2) Mild impairment: 7.1-9 sec, mild gait deviations, deviates 6-10 in   (1) Moderate impairment: > 9 sec, abnormal pattern, LOB, deviates 10-15 in   (0) Severe impairment: cannot perform w/o assist, LOB, deviates >15in  9. Ambulating  Backwards = 2; shortened steps   (3) Normal: no A.D., no LOB, normal gait pattern, deviates <6in   (2) Mild impairment: uses A.D., slower speed, mild gait deviations, deviates 6-10 in   (1) Moderate impairment: slow speed, abnormal gait pattern, LOB, deviates 10-15 in   (0) Severe impairment: severe gait deviations or LOB, deviates >15in  10. Steps = 2; reciprocal steps with B handrails    (3) Normal: alternating feet, no rail   (2) Mild Impairment: alternating feet, uses rail   (1) Moderate impairment: step-to, uses rail   (0) Severe impairment: cannot perform safely    Score 23/30     Score:   </=22/30 fall risk   <20/30 fall risk in older adults   <18/30 fall risk in Parkinsons     Positional Canal Testing: to be performed at first follow up session to rule out BPPV    Functional Status      Functional Mobility: Mod I with rollator     ADL's:   Feeding: I  Grooming: I  Hygiene: I  UB Dressing: Mod I  LB Dressing: Mod I  Toileting: I  Bathing: Mod I; sponge bathes     IADL's:   Homecare: Mod I  Cooking: Mod I  Use of telephone: I  Money management: I  Medication management: I    Intake Outcome Measure for FOTO Vestibular Survey    Therapist reviewed FOTO scores for Teodoro Mast on 12/5/2024.   FOTO documents entered into [employer] - see Media section.    Intake Score: 50%       - Dizziness Handicap Inventory (DHI): 48/100  16-34= mild   36-52= moderate   >/= 54= severe    Treatment     Eval only secondary to time constraints.     Home Exercises and Patient Education Provided    Education provided:   -role of OT, goals for OT, scheduling/cancellations, insurance limitations with patient.  -Additional Education provided: none     Assessment     Teodoro Mast is a 79 y.o. male referred to outpatient occupational therapy and presents with a medical diagnosis of BPPV. Main complaint was of imbalance. Pt only reported very occasional wooziness; no room spinning. Main objective deficits included impaired gaze  stabilization, static balance, and dynamic balance. Oculomotor function WFL. Central signs did not elicit symptoms, but saccadic intrusions were noted occasionally during tracking. Gaze stabilization impairment indicated by positive head thrust bilaterally. Pt had difficulty with vision eliminated conditions on the MCTSIB, especially condition 4 (compliant surface). FGA score of 23/30 indicates some degree of dynamic balance impairment, but this score does place pt just outside of elevated falls risk category. Of note, rest breaks were required frequently throughout testing due to poor activity tolerance. Also suspecting some LE weakness. Positional canal testing to be administered at first follow up session to rule out BPPV. Following medical record review it is determined that patient will benefit from occupational therapy services to improve gaze stabilization and balance needed to maximize pt safety with ADLs, IADLs, and functional mobility. However, likely that pt will eventually transition to PT for more gait training, LE strengthening, and endurance.     Patient prognosis is Good due to deficits/presentation   Patient will benefit from skilled outpatient Occupational Therapy to address the deficits stated above and in the chart below, provide patient/family education, and to maximize patient's level of independence.     Plan of care discussed with patient: Yes  Patient's spiritual, cultural and educational needs considered and patient is agreeable to the plan of care and goals as stated below:     Anticipated Barriers for therapy: scheduling     Medical Necessity is demonstrated by the following  Occupational Profile/History  Co-morbidities and personal factors that may impact the plan of care [x] LOW: Brief chart review  [] MODERATE: Expanded chart review   [] HIGH: Extensive chart review    Moderate / High Support Documentation: N/A     Examination  Performance deficits relating to physical, cognitive or  psychosocial skills that result in activity limitations and/or participation restrictions  [x] LOW: addressing 1-3 Performance deficits  [] MODERATE: 3-5 Performance deficits  [] HIGH: 5+ Performance deficits (please support below)    Moderate / High Support Documentation:    Physical:  Gaze stabilization, static balance, dynamic balance     Cognitive:  No Deficits    Psychosocial:    No Deficits     Treatment Options [x] LOW: Limited options  [] MODERATE: Several options  [] HIGH: Multiple options      Decision Making/ Complexity Score: low       The following goals were discussed with the patient and patient is in agreement with them as to be addressed in the treatment plan.     Goals:  Short Term Goals: 4 weeks   - Pt will be independent with oculomotor and/or habituation home exercise/activity program.   - Patient to perform VORx1 at 100 bpm WFL for improved gaze stabilization.  - Patient will exhibit improved MCTSIB score to 110/120 indicating improved static postural balance needed for improved safety during G/H and dressing.  - Pt will bathe seated on TTB with supervision from murali     Long Term Goals: 8 weeks   - Patient to perform VORx1 at 130 bpm WFL for improved gaze stabilization.  - Patient will exhibit improved MCTSIB score to 120/120 indicating improved static postural balance needed for improved safety during G/H and dressing.  - Patient to improve FGA score to 24/30 for improved balance in community environments.   - Pt will be confident enough to bathe seated on TTB Mod I     Plan     Certification Period/Plan of care expiration: 12/5/2024 to 2/28/2025 (16 total visits; 90 day date extensions secondary to scheduling constraints).    Outpatient Occupational Therapy 2 times weekly for 8 weeks to include the following interventions: Neuromuscular Re-ed, Patient Education, Self Care, Therapeutic Activities, and Therapeutic Exercise.    First Follow Up: Positional canal testing; likely will need to  transition to PT at some point in POC (suspecting around 4 weeks) for gait training, LE strengthening, and endurance     Cynthia Ibarra, OT      I certify the need for these services furnished under this plan of treatment and while under my care.  ____________________________________ Physician/Referring Practitioner   Date of Signature

## 2024-12-09 ENCOUNTER — TELEPHONE (OUTPATIENT)
Dept: TRANSPLANT | Facility: CLINIC | Age: 79
End: 2024-12-09
Payer: MEDICARE

## 2024-12-09 NOTE — LETTER
2024    Teodoro Mast  [address] [address]        Dear Teodoro Mast:  MRN: [id number]  : 1945    You are scheduled on 2025 for follow up in the transplant clinic to update your records and evaluate your health status as you wait for a transplant.  It is very important that we ensure you are ready for your transplant.      Please make arrangements to be in our transplant clinic from 12:30 pm- 4 pm.  During this time you will watch an educational video and then be seen by our transplant , financial counselor, and advance practice provider.     If you have had a change in your medical history since your last visit, please call your transplant coordinator to ensure we have the medical records to review prior to your appointment.     Please bring the following with you to this appointment:  A Caregiver is REQUIRED  Bring ALL insurance information including medication card  Current list of medications  Copies of any outside medical records from the past year, such as: mammogram, pap smear, ultrasound, CAT scan, colonoscopy, cardiac angiogram or cardiac stress test    To reschedule your appointments please call [phone] or [phone] ([phone]. [phone]). Please ask for the .      Failure to cancel in advance or arrive on time could result in a $50 cancellation fee.  Your transplant candidacy could be affected by no showing these appointments.  We look forward to seeing you.    Sincerely,    [facility] [place]  [address] [place] [address]  [phone]

## 2024-12-31 NOTE — PROGRESS NOTES
"[facility]  Occupational Therapy Treatment Note     Date: 1/2/2025  Name: Teodoro Mast  Clinic Number: [id number]    Therapy Diagnosis:   Encounter Diagnoses   Name Primary?    Imbalance Yes    Deficit of vestibulo-ocular reflex      Physician: Phil Williamson, *    Physician Orders: Eval and Treat - Vestibular Therapy   Medical Diagnosis: H81.10 (ICD-10-CM) - Benign paroxysmal positional vertigo, unspecified laterality   Evaluation Date: 12/5/2024  Insurance Authorization Period Expiration: 12/31/2025  Plan of Care Expiration: 16 visits; 2/28/2025 (date extension 90 days due to scheduling constraints)   Progress Note Due: Around visit 8   Date of Return to MD: tfifanie WILLIAM   FOTO: 1/2    Visit # / Visits authorized: 1 / 20 (+ eval)   Time In: 0716  Time Out: 0800  Total Billable Time: 44 minutes    Precautions:  Standard, Fall, and dialysis    SUBJECTIVE     Pt reports: that he has been dealing with dizziness for a year; "If I would have gone through the treatment for this last year I would be done. I'm committed now."   Response to previous treatment: first follow up session   Functional change: first follow up session      Date of Onset: September 2023     Pain: 0/10  Location: N/A    Patient's Goals for Therapy: to improve balance     OBJECTIVE     Objective Measures updated at progress report unless specified.  Positional canal testing 1/2/2025.     Treatment     Teodoro received the treatments listed below:     Canalith Repositioning for 44 minutes including:     Modified VAS (Vertebral Artery Screen), in sitting (rotation, then extension):  R: Negative   L: Negative    Positional Canal Testing:  Looking for nystagmus (slow phase followed by quick phase to the affected side for BPPV)    Trial 1:  Horizontal Canals (sidelying roll)    Right: Negative nystagmus, Positive dizziness   Left: Positive up/torsional nystagmus, Positive dizziness  [place] Hallpike (posterior / CL " anterior)   Right: Positive down beating nystagmus, Negative dizziness   Left: Positive up/torsional nystagmus, Positive dizziness (1st position of Epley, see below for details)       Treatment Performed:    Epley maneuver performed to treat   Left PSCC BPPV    Position 1: (+) up/torsional nystagmus; (+) dizziness   Position 2: (--) nystagmus; (--) dizziness   Position 3: (--) nystagmus; (+) dizziness   Position 4: (--) nystagmus; (+) dizziness    7 minute rest     Trial 2:  [place] Hallpike (posterior / CL anterior)   Left: Negative nystagmus, Negative dizziness    5 minute rest. Pt educated on post Epley precautions.     Pt escorted to [facility] floor [facility] with w/c follow to meet son. Pt's son informed on events of today's session and need for increased supervision throughout the rest of the day especially after dialysis treatment. Support staff brought patient from [facility] floor lobby to car in parking lot via w/c.     Patient Education and Home Exercises      Education provided:   - Post Epley precautions (see pt instructions)   - Progress towards goals      ASSESSMENT     Pt tolerated first follow up session well. Due to time constraints, positional canal testing was not performed during initial evaluation. Therefore, completed today to test pt for BPPV. Pt was positive for L PSCC BPPV as indicated by positive up/torsional nystagmus and dizziness in L roll rest and [place] Hallpike positions. Epley maneuver performed as treatment. Treatment was successful. Pt was negative for nystagmus and dizziness during post CRM re-test indicating negative BPPV at this time. Further positional canal testing warranted to ensure ongoing clearance. Goals to reflect BPPV were added today; see below. Pt would continue to benefit from skilled vestibular occupational therapy services for positional canal testing, and to improve gaze stabilization and balance to maximize pt safety with ADLs, IADLs, and functional mobility.     Teodoro is progressing  well towards his goals and there are no updates to goals at this time. Pt prognosis is Good.     Pt will continue to benefit from skilled outpatient occupational therapy to address the deficits listed in the problem list on initial evaluation provide pt/family education and to maximize pt's level of independence in the home and community environment.     Pt's spiritual, cultural and educational needs considered and pt agreeable to plan of care and goals.    Anticipated barriers to occupational therapy: scheduling     Goals:  Short Term Goals: 4 weeks   - Pt will be independent with oculomotor and/or habituation home exercise/activity program. ongoing  - Patient to perform VORx1 at 100 bpm WFL for improved gaze stabilization. ongoing  - Patient will exhibit improved MCTSIB score to 110/120 indicating improved static postural balance needed for improved safety during G/H and dressing. ongoing  - Pt will bathe seated on TTB with supervision from son ongoing     Added STG 1/2/2025:   - Pt to be negative for vertigo and nystagmus in both [place] Hallpike test positions for resolution of BPPV episode. ongoing  - Pt to be negative for vertigo and nystagmus in both sidelying horizontal test positions for resolution of BPPV episode. ongoing    Long Term Goals: 8 weeks   - Patient to perform VORx1 at 130 bpm WFL for improved gaze stabilization. ongoing  - Patient will exhibit improved MCTSIB score to 120/120 indicating improved static postural balance needed for improved safety during G/H and dressing. ongoing  - Patient to improve FGA score to 24/30 for improved balance in community environments. ongoing  - Pt will be confident enough to bathe seated on TTB Mod I ongoing    PLAN     Certification Period/Plan of care expiration: 12/5/2024 to 2/28/2025 (16 total visits; 90 day date extensions secondary to scheduling constraints).     Outpatient Occupational Therapy 2 times weekly for 8 weeks to include the following interventions:  Neuromuscular Re-ed, Patient Education, Self Care, Therapeutic Activities, and Therapeutic Exercise.     First Follow Up: cont positional canal testing until BPPV remains clear; likely will need to transition to PT at some point in POC (suspecting around 4 weeks) for gait training, LE strengthening, and endurance       Cynthia Ibarra, OT

## 2025-01-02 ENCOUNTER — CLINICAL SUPPORT (OUTPATIENT)
Dept: REHABILITATION | Facility: HOSPITAL | Age: 80
End: 2025-01-02
Payer: MEDICARE

## 2025-01-02 DIAGNOSIS — R26.89 IMBALANCE: Primary | ICD-10-CM

## 2025-01-02 DIAGNOSIS — H81.8X9 DEFICIT OF VESTIBULO-OCULAR REFLEX: ICD-10-CM

## 2025-01-02 PROCEDURE — 95992 CANALITH REPOSITIONING PROC: CPT | Mod: PO

## 2025-01-02 NOTE — PATIENT INSTRUCTIONS
For the next 2 nights:   - Do not sleep on your left side (right side and back are fine)   - Sleep elevated on 2 pillows   - Avoid big up and down head movements

## 2025-01-08 NOTE — PROGRESS NOTES
[facility]  Occupational Therapy Treatment Note     Date: 1/9/2025  Name: Teodoro Mast  Clinic Number: [id number]    Therapy Diagnosis:   Encounter Diagnoses   Name Primary?    Imbalance Yes    Deficit of vestibulo-ocular reflex        Physician: Phil Williamson, *    Physician Orders: Eval and Treat - Vestibular Therapy   Medical Diagnosis: H81.10 (ICD-10-CM) - Benign paroxysmal positional vertigo, unspecified laterality   Evaluation Date: 12/5/2024  Insurance Authorization Period Expiration: 12/31/2025  Plan of Care Expiration: 16 visits; 2/28/2025 (date extension 90 days due to scheduling constraints)   Progress Note Due: Around visit 8   Date of Return to MD: tiffanie WILLIAM   FOTO: 1/2    Visit # / Visits authorized: 2 / 20 (+ eval)   Time In: 0806  Time Out: 0842  Total Billable Time: 36 minutes    Precautions:  Standard, Fall, and dialysis    SUBJECTIVE     Pt reports: Overall he feels dizziness has been less but he experienced spinning for about 10 seconds when getting out of bed quickly this morning. He reported that he felt good yesterday. He was able to walk around the kitchen without holding on to things.   Response to previous treatment: improvement in symptoms about 2 days after last session   Functional change: dizziness improving     Date of Onset: September 2023     Pain: 0/10  Location: N/A    Patient's Goals for Therapy: to improve balance     OBJECTIVE     Objective Measures updated at progress report unless specified.  Positional canal testing 1/2/2025.     Treatment     Teodoro received the treatments listed below:     Canalith Repositioning for 36 minutes including:     Positional Canal Testing:  Looking for nystagmus (slow phase followed by quick phase to the affected side for BPPV)    Trial 1:  [place] Hallpike (posterior / CL anterior)   Right: Positive up/torsional nystagmus, Positive dizziness   Left: Negative nystagmus, Negative dizziness  Horizontal  Canals (sidelying roll)    Right: Slight, brief torsional nystagmus, Negative dizziness   Left: Negative nystagmus, Negative dizziness    Treatment Performed: Each position held x1 min   Epley maneuver performed to treat Right PSCC BPPV    Position 1: (+) up/torsional nystagmus; (+) dizziness   Position 2: (--) nystagmus; (+) dizziness (brief)    Position 3: (--) nystagmus; (+) dizziness    Position 4: (--) nystagmus; (--) dizziness   Comment: (+) dizziness after completing maneuver with down beating nystagmus     9 minute rest. Pt educated on post Epley precautions.     Trial 2:  [place] Hallpike (posterior / CL anterior)   Right: Occasional very brief/mild up/torsional nystagmus, Positive dizziness very mild and brief (1st position of Epley, see below for details)     Treatment Performed: Each position held x30-40 sec   Epley maneuver performed to treat Right PSCC BPPV    Position 1: (+) very occasional brief/mild up/torsional nystagmus; (+) dizziness (mild/brief)    Position 2: (--) nystagmus; (--) dizziness   Position 3: (--) nystagmus; (--) dizziness    Position 4: (--) nystagmus; (--) dizziness   Comment: (--) dizziness after completing maneuver     5 minute rest.     Pt escorted to son on 2nd floor lobby.     Patient Education and Home Exercises      Education provided:   - Post Epley precautions (see pt instructions)   - Progress towards goals      ASSESSMENT     Pt tolerated today's session well reporting decreased frequency/intensity of dizziness episodes since last session. However, he did experience about 10 seconds of room spinning dizziness this morning when getting out of bed. Per positional canal testing, pt pt remains (--) for L PSCC BPPV, which was treated last week. However, pt was (+) for R PSCC as indicated by (+) dizziness and up/torsional nystagmus in R [place] Hallpike. Horizontal BPPV (--). Epley maneuver performed to treat R PSCC BPPV. During post CRM re-test, pt with very mild/brief nystagmus and  dizziness; therefore, one additional Epley completed. Further positional canal testing warranted next session to ensure BPPV clearance or for treatment as indicated. Pt would continue to benefit from skilled vestibular occupational therapy services for positional canal testing, and to improve gaze stabilization and balance to maximize pt safety with ADLs, IADLs, and functional mobility.     Teodoro is progressing well towards his goals and there are no updates to goals at this time. Pt prognosis is Good.     Pt will continue to benefit from skilled outpatient occupational therapy to address the deficits listed in the problem list on initial evaluation provide pt/family education and to maximize pt's level of independence in the home and community environment.     Pt's spiritual, cultural and educational needs considered and pt agreeable to plan of care and goals.    Anticipated barriers to occupational therapy: scheduling     Goals:  Short Term Goals: 4 weeks   - Pt will be independent with oculomotor and/or habituation home exercise/activity program. ongoing  - Patient to perform VORx1 at 100 bpm WFL for improved gaze stabilization. ongoing  - Patient will exhibit improved MCTSIB score to 110/120 indicating improved static postural balance needed for improved safety during G/H and dressing. ongoing  - Pt will bathe seated on TTB with supervision from son ongoing     Added STG 1/2/2025:   - Pt to be negative for vertigo and nystagmus in both [place] Hallpike test positions for resolution of BPPV episode. ongoing  - Pt to be negative for vertigo and nystagmus in both sidelying horizontal test positions for resolution of BPPV episode. ongoing    Long Term Goals: 8 weeks   - Patient to perform VORx1 at 130 bpm WFL for improved gaze stabilization. ongoing  - Patient will exhibit improved MCTSIB score to 120/120 indicating improved static postural balance needed for improved safety during G/H and dressing. ongoing  - Patient  to improve FGA score to 24/30 for improved balance in community environments. ongoing  - Pt will be confident enough to bathe seated on TTB Mod I ongoing    PLAN     Certification Period/Plan of care expiration: 12/5/2024 to 2/28/2025 (16 total visits; 90 day date extensions secondary to scheduling constraints).     Outpatient Occupational Therapy 2 times weekly for 8 weeks to include the following interventions: Neuromuscular Re-ed, Patient Education, Self Care, Therapeutic Activities, and Therapeutic Exercise.     First Follow Up: cont positional canal testing until BPPV remains clear; likely will need to transition to PT at some point in POC (suspecting around 4 weeks) for gait training, LE strengthening, and endurance       Cynthia Ibarra, OT

## 2025-01-09 ENCOUNTER — CLINICAL SUPPORT (OUTPATIENT)
Dept: REHABILITATION | Facility: HOSPITAL | Age: 80
End: 2025-01-09
Payer: MEDICARE

## 2025-01-09 DIAGNOSIS — H81.8X9 DEFICIT OF VESTIBULO-OCULAR REFLEX: ICD-10-CM

## 2025-01-09 DIAGNOSIS — R26.89 IMBALANCE: Primary | ICD-10-CM

## 2025-01-09 PROCEDURE — 95992 CANALITH REPOSITIONING PROC: CPT | Mod: PO

## 2025-01-14 ENCOUNTER — CLINICAL SUPPORT (OUTPATIENT)
Dept: REHABILITATION | Facility: HOSPITAL | Age: 80
End: 2025-01-14
Payer: MEDICARE

## 2025-01-14 DIAGNOSIS — H81.8X9 DEFICIT OF VESTIBULO-OCULAR REFLEX: ICD-10-CM

## 2025-01-14 DIAGNOSIS — R26.89 IMBALANCE: Primary | ICD-10-CM

## 2025-01-14 PROCEDURE — 95992 CANALITH REPOSITIONING PROC: CPT | Mod: PO

## 2025-01-14 PROCEDURE — 97112 NEUROMUSCULAR REEDUCATION: CPT | Mod: PO

## 2025-01-14 NOTE — PROGRESS NOTES
[facility]  Occupational Therapy Treatment Note     Date: 1/14/2025  Name: Teodoro Mast  Clinic Number: [id number]    Therapy Diagnosis:   Encounter Diagnoses   Name Primary?    Imbalance Yes    Deficit of vestibulo-ocular reflex        Physician: Phil Williamson, *    Physician Orders: Eval and Treat - Vestibular Therapy   Medical Diagnosis: H81.10 (ICD-10-CM) - Benign paroxysmal positional vertigo, unspecified laterality   Evaluation Date: 12/5/2024  Insurance Authorization Period Expiration: 12/31/2025  Plan of Care Expiration: 16 visits; 2/28/2025 (date extension 90 days due to scheduling constraints)   Progress Note Due: Around visit 8   Date of Return to MD: dialysis SELVIN WILLIAM   FOTO: 1/2    Visit # / Visits authorized: 3 / 20 (+ eval)   Time In: 0805   Time Out: 0906  Total Billable Time: 61 minutes    Precautions:  Standard, Fall, and dialysis    SUBJECTIVE     Pt reports: that he has not had any room spinning dizziness but he has felt off balance. He has to take his time coming up from supine position.   Response to previous treatment: improvement in symptoms about 2 days after last session   Functional change: dizziness improving     Date of Onset: September 2023     Pain: 0/10  Location: N/A    Patient's Goals for Therapy: to improve balance     OBJECTIVE     Objective Measures updated at progress report unless specified.  Positional canal testing 1/2/2025.     Treatment     Teodoro received the treatments listed below:     Canalith Repositioning for 10 minutes including:     Positional Canal Testing:  Looking for nystagmus (slow phase followed by quick phase to the affected side for BPPV)    Trial 1:  [place] Hallpike (posterior / CL anterior)   Right: Negative nystagmus, Negative dizziness   Left: Negative nystagmus, Negative dizziness  Horizontal Canals (sidelying roll)    Right: Negative nystagmus, Negative dizziness   Left: Negative nystagmus, Negative  dizziness    Neuromuscular re-education activities to improve gaze stabilization and balance for 51 minutes. The following activities were included:  Oculomotor:  - VORx1, horizontal/vertical directions, 2 x 30 seconds each at 100 bpm    Balance:  - Static stance on firm surface, arms at side EO, 2 x 30 seconds; SBA, focus on upright posturing  - Static stance on firm surface, arms at side EC, 3 x 30 seconds; SBA  - Static stance on cushion, arms at side EO, 2 x 30 seconds; SBA-CGA  - Static stance on cushion, arms at side EC, 3 x 30 seconds; CGA  - Horizontal/vertical/diagonal HT standing on firm surface EO/EC, 1 x 30 seconds each; CGA; RUQ<>LLQ completed, LUQ<>RLQ deferred secondary to nausea     Patient Education and Home Exercises      Education provided:   - Post Epley precautions (see pt instructions)   - HEP  - Progress towards goals     Written Home Exercises Provided: yes.  Exercises were reviewed and Teodoro was able to demonstrate them prior to the end of the session.    Teodoro demonstrated good  understanding of the education provided.     See EMR under Patient Instructions for exercises provided 1/14/2025.  1/14/2025: VORx1 at 100 bpm x30 sec intervals; static balance firm/compliant EO/EC, standing with HT firm EO/EC    ASSESSMENT     Pt tolerated today's session well. Pt (--) for BPPV in all canals per positional canal testing. Initiated vestibular exercises for suspected lingering hypofunction with HEP establishment. Overall, pt did well. No visual slippage or dizziness with VORx1 at 100 bpm. During static balance, pt with baseline anterior posturing requiring verbal cueing for correction. Pt did better with eyes closed conditions today as compared to initial evaluation. Diagonal head turns were triggering; therefore only one diagonal completed due to nausea. Of note, pt with poor activity tolerance requiring frequent seated rest breaks throughout session. Pt would continue to benefit from skilled  vestibular occupational therapy services to improve gaze stabilization and balance to maximize pt safety with ADLs, IADLs, and functional mobility.     Teodoro is progressing well towards his goals and updates to goals can be seen below. Pt prognosis is Good.     Pt will continue to benefit from skilled outpatient occupational therapy to address the deficits listed in the problem list on initial evaluation provide pt/family education and to maximize pt's level of independence in the home and community environment.     Pt's spiritual, cultural and educational needs considered and pt agreeable to plan of care and goals.    Anticipated barriers to occupational therapy: scheduling     Goals:  Short Term Goals: 4 weeks   - Pt will be independent with oculomotor and/or habituation home exercise/activity program. Initiated 1/14/2025  - Patient to perform VORx1 at 100 bpm WFL for improved gaze stabilization. MET 1/14/2025  - Patient will exhibit improved MCTSIB score to 110/120 indicating improved static postural balance needed for improved safety during G/H and dressing. ongoing  - Pt will bathe seated on TTB with supervision from son ongoing     Added STG 1/2/2025:   - Pt to be negative for vertigo and nystagmus in both [place] Hallpike test positions for resolution of BPPV episode. MET 1/14/2025  - Pt to be negative for vertigo and nystagmus in both sidelying horizontal test positions for resolution of BPPV episode. MET 1/14/2025    Long Term Goals: 8 weeks   - Patient to perform VORx1 at 130 bpm WFL for improved gaze stabilization. ongoing  - Patient will exhibit improved MCTSIB score to 120/120 indicating improved static postural balance needed for improved safety during G/H and dressing. ongoing  - Patient to improve FGA score to 24/30 for improved balance in community environments. ongoing  - Pt will be confident enough to bathe seated on TTB Mod I ongoing    PLAN     Certification Period/Plan of care expiration:  12/5/2024 to 2/28/2025 (16 total visits; 90 day date extensions secondary to scheduling constraints).     Outpatient Occupational Therapy 2 times weekly for 8 weeks to include the following interventions: Neuromuscular Re-ed, Patient Education, Self Care, Therapeutic Activities, and Therapeutic Exercise.     First Follow Up: cont to progress gaze stabilization and balance exercises for vestibular hypofunction; likely will need to transition to PT at some point in POC (suspecting around 4 weeks) for gait training, LE strengthening, and endurance       Cynthia Ibarra, OT

## 2025-01-14 NOTE — PATIENT INSTRUCTIONS
Gaze Stabilization: Tip Card    1.Target must remain in focus, not blurry, and appear stationary while head is in motion.  2.Perform exercises with small head movements (45° to either side of midline).  3.Increase speed of head motion so long as target is in focus.  4.If you wear eyeglasses, be sure you can see target through lens (therapist will give specific instructions for bifocal / progressive lenses).  5.These exercises may provoke dizziness or nausea. Work through these symptoms. If too dizzy, slow head movement slightly. Rest between each exercise.  6.Exercises demand concentration; avoid distractions.  7.For safety, perform standing exercises close to a counter, wall, corner, or next to someone.    Copyright © [employer]. All rights reserved.       Gaze Stabilization (VORx1): Sitting        Keeping eyes on target held around arm's length, tilt head down 15-30° and move head side to side for 2 sets x 30 seconds. Repeat while moving head up and down for 2 sets x 30 seconds. Perform at 100 beats per minute on metronome. (See below for how to pull up metronome on personal cell phone)  Do 2 sessions per day.    Copyright © [employer]. All rights reserved.     To pull up metronome on phone:   - Go to the internet ([employer]) or [employer]   - Search metronome and press blue 'Go' button in bottom right corner   - Metronome will pull up; scroll until you see it     - Set speed to 100  - Press play and make sure volume is on/up  - Press pause to stop it    BALANCE - perform all balance exercises standing in the corner of a room with a stable chair in front of you for safety       1) Feet together on firm surface (floor), arms at side, with eyes open. 30 seconds - focus on standing up tall  2) Feet together on firm surface (floor), arms at side, with eyes closed. 2 sets x 30 seconds  3) Feet together on soft surface (couch cushion or pillow), arms at side, with eyes open. 30 seconds - focus on standing up tall  4) Feet together on soft  surface (couch cushion or pillow), arms at side, with eyes closed. 2-3 sets x 30 seconds - if you need to place one finger down for safety you can until this becomes easier         Standing on floor with feet together, perform horizontal, vertical, and diagonal head turns  1) 30 seconds each direction with eyes open  2) 30 seconds each direction with eyes closed     *if diagonals make you nauseous, you can sit during these   *Additionally, if you want to do one set of these sitting and the other standing due to fatigue, you can for now  Retrieved from:

## 2025-01-16 ENCOUNTER — CLINICAL SUPPORT (OUTPATIENT)
Dept: REHABILITATION | Facility: HOSPITAL | Age: 80
End: 2025-01-16
Payer: MEDICARE

## 2025-01-16 DIAGNOSIS — R26.89 IMBALANCE: Primary | ICD-10-CM

## 2025-01-16 DIAGNOSIS — H81.8X9 DEFICIT OF VESTIBULO-OCULAR REFLEX: ICD-10-CM

## 2025-01-16 PROCEDURE — 97530 THERAPEUTIC ACTIVITIES: CPT | Mod: PO

## 2025-01-16 PROCEDURE — 95992 CANALITH REPOSITIONING PROC: CPT | Mod: PO

## 2025-01-16 PROCEDURE — 97112 NEUROMUSCULAR REEDUCATION: CPT | Mod: PO

## 2025-01-16 NOTE — PROGRESS NOTES
"[facility]  Occupational Therapy Treatment Note     Date: 1/16/2025  Name: Teodoro Mast  Clinic Number: [id number]    Therapy Diagnosis:   Encounter Diagnoses   Name Primary?    Imbalance Yes    Deficit of vestibulo-ocular reflex        Physician: Phil Williamson, *    Physician Orders: Eval and Treat - Vestibular Therapy   Medical Diagnosis: H81.10 (ICD-10-CM) - Benign paroxysmal positional vertigo, unspecified laterality   Evaluation Date: 12/5/2024  Insurance Authorization Period Expiration: 12/31/2025  Plan of Care Expiration: 16 visits; 2/28/2025 (date extension 90 days due to scheduling constraints)   Progress Note Due: Around visit 8   Date of Return to MD: tiffanie WILLIAM   FOTO: 1/2    Visit # / Visits authorized: 4 / 20 (+ eval)   Time In: 0807   Time Out: 0849  Total Billable Time: 42 minutes    Precautions:  Standard, Fall, and dialysis    SUBJECTIVE     Pt reports: "not much improvement"; no spinning but still feels drained and a little off balance; he is still struggling with quick movements; he has not done any of the exercises. He reported that he is able to do more around the house now as compared to prior to starting therapy.   Response to previous treatment: improvement in symptoms about 2 days after last session   Functional change: dizziness improving; doing more IADLs    Date of Onset: September 2023     Pain: 0/10  Location: N/A    Patient's Goals for Therapy: to improve balance     OBJECTIVE     Objective Measures updated at progress report unless specified.  Positional canal testing 1/2/2025.     Treatment     Teodoro received the treatments listed below:     Canalith Repositioning for 9 minutes including:     Positional Canal Testing:  Looking for nystagmus (slow phase followed by quick phase to the affected side for BPPV)    Trial 1:  [place] Hallpike (posterior / CL anterior)   Right: Negative nystagmus, Negative dizziness   Left: Negative nystagmus, " Positive dizziness (mild/brief)  Horizontal Canals (sidelying roll)    Right: Negative nystagmus, Negative dizziness   Left: Negative nystagmus, Positive dizziness (mild/brief)     Neuromuscular re-education activities to improve gaze stabilization and balance for 18 minutes. The following activities were included:  Oculomotor:  - VORx1, horizontal/vertical directions, 2 x 30 seconds each at 100 bpm    Balance:  - Static stance on firm surface, arms at side EO, 1 x 30 seconds; SBA, focus on upright posturing  - Static stance on firm surface, arms at side EC, 1 x 30 seconds; SBA  - Static stance on cushion, arms at side EO, 2 x 30 seconds; SBA-CGA  - Static stance on cushion, arms at side EC, 3 x 30 seconds; SBA-CGA    Therapeutic activities to improve functional performance for 15 minutes, including:  Motion Tolerance:  Seated EOM:   - Horizontal/vertical/diagonal HT EO/EC, 1 x 30 seconds each   - Bent to  10 cups for floor with alternating placement R/L mat, x2 rounds    Patient Education and Home Exercises      Education provided:   - Post Epley precautions (see pt instructions)   - HEP  - Progress towards goals     Written Home Exercises Provided: yes.  Exercises were reviewed and Teodoro was able to demonstrate them prior to the end of the session.    Teodoro demonstrated good  understanding of the education provided.     See EMR under Patient Instructions for exercises provided 1/14/2025.  1/14/2025: VORx1 at 100 bpm x30 sec intervals; static balance firm/compliant EO/EC, standing with HT firm EO/EC  1/16/2025: seated motion tolerance HAP    ASSESSMENT     Pt tolerated today's session well. Pt remains negative for BPPV in all canals. Mild and brief bout of dizziness experienced in L [place] Hallpike and L sidelying roll test more so contributed to motion intolerance. HEP carryover significantly limited by fatigue post therapy sessions and after dialysis. Established seated motion tolerance HAP to perform  when fatigued. Explained that overall progress will be limited if all exercises have to be completed seated at home, but will continue working on standing balance and motion tolerance activities in sessions. Pt understanding. Overall, good performance with static balance, but reminders were needed for distal>proximal reactive balance strategies with Ec on cushion. Pt would continue to benefit from skilled vestibular occupational therapy services to improve gaze stabilization and balance to maximize pt safety with ADLs, IADLs, and functional mobility.     Teodoro is progressing well towards his goals and updates to goals can be seen below. Pt prognosis is Good.     Pt will continue to benefit from skilled outpatient occupational therapy to address the deficits listed in the problem list on initial evaluation provide pt/family education and to maximize pt's level of independence in the home and community environment.     Pt's spiritual, cultural and educational needs considered and pt agreeable to plan of care and goals.    Anticipated barriers to occupational therapy: scheduling     Goals:  Short Term Goals: 4 weeks   - Pt will be independent with oculomotor and/or habituation home exercise/activity program. Initiated 1/14/2025  - Patient to perform VORx1 at 100 bpm WFL for improved gaze stabilization. MET 1/14/2025  - Patient will exhibit improved MCTSIB score to 110/120 indicating improved static postural balance needed for improved safety during G/H and dressing. ongoing  - Pt will bathe seated on TTB with supervision from son ongoing     Added STG 1/2/2025:   - Pt to be negative for vertigo and nystagmus in both [place] Hallpike test positions for resolution of BPPV episode. MET 1/14/2025  - Pt to be negative for vertigo and nystagmus in both sidelying horizontal test positions for resolution of BPPV episode. MET 1/14/2025    Long Term Goals: 8 weeks   - Patient to perform VORx1 at 130 bpm WFL for improved gaze  stabilization. ongoing  - Patient will exhibit improved MCTSIB score to 120/120 indicating improved static postural balance needed for improved safety during G/H and dressing. ongoing  - Patient to improve FGA score to 24/30 for improved balance in community environments. ongoing  - Pt will be confident enough to bathe seated on TTB Mod I ongoing    PLAN     Certification Period/Plan of care expiration: 12/5/2024 to 2/28/2025 (16 total visits; 90 day date extensions secondary to scheduling constraints).     Outpatient Occupational Therapy 2 times weekly for 8 weeks to include the following interventions: Neuromuscular Re-ed, Patient Education, Self Care, Therapeutic Activities, and Therapeutic Exercise.     First Follow Up: cont to progress gaze stabilization and balance exercises for vestibular hypofunction; likely will need to transition to PT at some point in POC (suspecting around 4 weeks) for gait training, LE strengthening, and endurance       Cynthia Ibarra, OT

## 2025-01-16 NOTE — PATIENT INSTRUCTIONS
Perform the following seated:     *Focus on speed with each of the below activities, but slow down if dizziness symptoms reach 2 points from baseline.     Head Motion Side to Side        With feet flat on floor, hands resting on lap, move head to right and left. Perform 30 seconds eyes open. Repeat 30 seconds eyes closed.     Copyright © [employer]. All rights reserved.     Head Motion Up./.Down        With feet flat on floor, hands resting on lap, move head up and down. Perform 30 seconds eyes open. Repeat 30 seconds eyes closed.     Copyright © [employer]. All rights reserved.     Head Motion on Diagonal        With feet flat on floor, hands resting on lap, move head up to right and down to left. Perform 30 seconds eyes open. Repeat 30 seconds eyes closed.     Repeat moving head up to the left and down to the right. Perform 30 seconds eyes open. Repeat 30 seconds eyes closed.     Copyright © [employer]. All rights reserved.    Bending / Picking Up Objects        Sitting, slowly bend head down and  object on the floor. Return to upright position. Hold position until symptoms subside.    Can add seated turning by alternating placing the item to your right and left.     Copyright © [employer]. All rights reserved.     SEATED:    - Throwing a ball overhead and catching   - Bouncing a ball and catching

## 2025-01-23 ENCOUNTER — TELEPHONE (OUTPATIENT)
Dept: TRANSPLANT | Facility: CLINIC | Age: 80
End: 2025-01-23
Payer: MEDICARE

## 2025-01-24 ENCOUNTER — TELEPHONE (OUTPATIENT)
Dept: TRANSPLANT | Facility: CLINIC | Age: 80
End: 2025-01-24
Payer: MEDICARE

## 2025-01-28 ENCOUNTER — CLINICAL SUPPORT (OUTPATIENT)
Dept: REHABILITATION | Facility: HOSPITAL | Age: 80
End: 2025-01-28
Payer: MEDICARE

## 2025-01-28 DIAGNOSIS — R26.89 IMBALANCE: Primary | ICD-10-CM

## 2025-01-28 DIAGNOSIS — H81.8X9 DEFICIT OF VESTIBULO-OCULAR REFLEX: ICD-10-CM

## 2025-01-28 PROCEDURE — 97530 THERAPEUTIC ACTIVITIES: CPT | Mod: PO

## 2025-01-28 PROCEDURE — 97112 NEUROMUSCULAR REEDUCATION: CPT | Mod: PO

## 2025-01-28 NOTE — PROGRESS NOTES
"[facility]  Occupational Therapy Treatment Note     Date: 1/28/2025  Name: Teodoro Mast  Clinic Number: [id number]    Therapy Diagnosis:   Encounter Diagnoses   Name Primary?    Imbalance Yes    Deficit of vestibulo-ocular reflex        Physician: Phil Williamson, *    Physician Orders: Eval and Treat - Vestibular Therapy   Medical Diagnosis: H81.10 (ICD-10-CM) - Benign paroxysmal positional vertigo, unspecified laterality   Evaluation Date: 12/5/2024  Insurance Authorization Period Expiration: 12/31/2025  Plan of Care Expiration: 16 visits; 2/28/2025 (date extension 90 days due to scheduling constraints)   Progress Note Due: Around visit 8   Date of Return to MD: dialysis [facility]EL[facility] WILLIAM   FOTO: 1/2    Visit # / Visits authorized: 5 / 20 (+ eval)   Time In: 0850  Time Out: 0935  Total Billable Time: 45 minutes    Precautions:  Standard, Fall, and dialysis    SUBJECTIVE     Pt reports: he still feels like his balance is a little off but he has not experienced any dizziness. "I feel like my crystals are still in place." He has been completing motion tolerance HAP in seated, which was administered last session   Response to previous treatment: no dizziness  Functional change: dizziness improving; doing more IADLs    Date of Onset: September 2023     Pain: 0/10  Location: N/A    Patient's Goals for Therapy: to improve balance     OBJECTIVE     Objective Measures updated at progress report unless specified.  Positional canal testing 1/2/2025.     Treatment     Teodoro received the treatments listed below:     Neuromuscular re-education activities to improve gaze stabilization and balance for 35 minutes. The following activities were included:  Oculomotor:  - VORx1, horizontal/vertical directions, 2 x 45 seconds each at 110 bpm    Balance:  - Static stance on firm surface, arms at side EO, 1 x 30 seconds; SBA, focus on upright posturing  - Static stance on firm surface, arms at side EC, 2 x 30 " seconds; SBA  - Static stance on cushion, arms at side EO, 2 x 30 seconds; SBA-CGA  - Static stance on cushion, arms at side EC, 3 x 30 seconds; SBA-CGA  - Horizontal/vertical/diagonal HT standing on cushion EO, 1 x 30 seconds each; CGA  - Horizontal/vertical/diagonal HT standing on firm surface EC, 1 x 30 seconds each; CGA    Therapeutic activities to improve functional performance for 10 minutes, including:  Motion Tolerance:  - Seated, independent overhead ball toss <> reciprocal ball roll, 1 x 1 minute  - Standing, horizontal ball handoffs over R<>L shoulder, 1 x 1 minute  - Seated, diagonal cone transfer floor>overhead, x10 cones each diagonal    Patient Education and Home Exercises      Education provided:   - Post Epley precautions (see pt instructions)   - HEP  - Progress towards goals     Written Home Exercises Provided: yes.  Exercises were reviewed and Teodoro was able to demonstrate them prior to the end of the session.    Teodoro demonstrated good  understanding of the education provided.     See EMR under Patient Instructions for exercises provided 1/14/2025.  1/14/2025: VORx1 at 100 bpm x30 sec intervals; static balance firm/compliant EO/EC, standing with HT firm EO/EC  1/16/2025: seated motion tolerance HAP    ASSESSMENT     Pt tolerated today's session well. Pt continues without dizziness, but still feels somewhat off balance. Balance seems to be multifactorial. For this reason, messaged pt's PCP today requesting an order for neuro PT to address balance alongside LE strength and endurance. Plan to continue vestibular OT and transfer services once evaluation/POC is established. Good performance with balance interventions this date. Pt demonstrated improved upright posturing and more stability with EC conditions. Of note, pt's activity tolerance was much improved today. Rest breaks still taken to promote pacing, but breaks were shorter and less frequent. No dizziness reported with motion tolerance  activities, but one LOB during ball handoffs. Pt would continue to benefit from skilled vestibular occupational therapy services to improve gaze stabilization and balance to maximize pt safety with ADLs, IADLs, and functional mobility. Of note,     Teodoro is progressing well towards his goals and updates to goals can be seen below. Pt prognosis is Good.     Pt will continue to benefit from skilled outpatient occupational therapy to address the deficits listed in the problem list on initial evaluation provide pt/family education and to maximize pt's level of independence in the home and community environment.     Pt's spiritual, cultural and educational needs considered and pt agreeable to plan of care and goals.    Anticipated barriers to occupational therapy: scheduling     Goals:  Short Term Goals: 4 weeks   - Pt will be independent with oculomotor and/or habituation home exercise/activity program. Initiated 1/14/2025  - Patient to perform VORx1 at 100 bpm WFL for improved gaze stabilization. MET 1/14/2025  - Patient will exhibit improved MCTSIB score to 110/120 indicating improved static postural balance needed for improved safety during G/H and dressing. ongoing  - Pt will bathe seated on TTB with supervision from son ongoing     Added STG 1/2/2025:   - Pt to be negative for vertigo and nystagmus in both [place] Hallpike test positions for resolution of BPPV episode. MET 1/14/2025  - Pt to be negative for vertigo and nystagmus in both sidelying horizontal test positions for resolution of BPPV episode. MET 1/14/2025    Long Term Goals: 8 weeks   - Patient to perform VORx1 at 130 bpm WFL for improved gaze stabilization. ongoing  - Patient will exhibit improved MCTSIB score to 120/120 indicating improved static postural balance needed for improved safety during G/H and dressing. ongoing  - Patient to improve FGA score to 24/30 for improved balance in community environments. ongoing  - Pt will be confident enough to  bathe seated on TTB Mod I ongoing    PLAN     Certification Period/Plan of care expiration: 12/5/2024 to 2/28/2025 (16 total visits; 90 day date extensions secondary to scheduling constraints).     Outpatient Occupational Therapy 2 times weekly for 8 weeks to include the following interventions: Neuromuscular Re-ed, Patient Education, Self Care, Therapeutic Activities, and Therapeutic Exercise.     First Follow Up: RA scheduled 2/4; cont to progress gaze stabilization and balance exercises for vestibular hypofunction; likely will need to transition to PT at some point in POC (suspecting around 4 weeks) for gait training, LE strengthening, and endurance       Cynthia Ibarra, OT

## 2025-01-29 ENCOUNTER — TELEPHONE (OUTPATIENT)
Facility: CLINIC | Age: 80
End: 2025-01-29
Payer: MEDICARE

## 2025-01-29 DIAGNOSIS — R26.89 IMBALANCE: Primary | ICD-10-CM

## 2025-01-29 NOTE — TELEPHONE ENCOUNTER
----- Message from NAVEEN Marie sent at 1/28/2025  8:56 AM CST -----  Good morning Dr. Williamson,   I am messaging to request an order for neuro PT. I have been seeing Mr. Mast for vestibular occupational therapy. He was positive for BPPV bilaterally, which we have cleared. Since this has been resolved we have been addressing basic balance for vestibular function and motion tolerance. However, with his co-morbidities I feel like his balance issues are multifactorial. For this reason, I am recommending neuro physical therapy in order to address balance along with lower extremity strength and endurance. Please let me know if you have any questions or concerns.     Thanks,   Cynthia Ibarra, TRISTEN, LOTR  01/28/2025

## 2025-01-29 NOTE — PROGRESS NOTES
[facility]  Occupational Therapy Treatment Note     Date: 1/30/2025  Name: Teodoro Mast  Clinic Number: [id number]    Therapy Diagnosis:   Encounter Diagnoses   Name Primary?    Imbalance Yes    Deficit of vestibulo-ocular reflex          Physician: Phil Williamson, *    Physician Orders: Eval and Treat - Vestibular Therapy   Medical Diagnosis: H81.10 (ICD-10-CM) - Benign paroxysmal positional vertigo, unspecified laterality   Evaluation Date: 12/5/2024  Insurance Authorization Period Expiration: 12/31/2025  Plan of Care Expiration: 16 visits; 2/28/2025 (date extension 90 days due to scheduling constraints)   Progress Note Due: Around visit 8   Date of Return to MD: dialysis [facility]   FOTO: 1/2    Visit # / Visits authorized: 6 / 20 (+ eval)   Time In: 0801  Time Out: 0847  Total Billable Time: 46 minutes    Precautions:  Standard, Fall, and dialysis    SUBJECTIVE     Pt reports: Pt states that he has no feelings of dizziness with or without motion and his only concern is with fatigue. Pt states improved ambulatory balance and endurance and only uses his walker for longer distances. Pt believes that he can bathe seated using a tub transfer bench. However, he has not attempted. He was agreeable to attempt over the weekend under the supervision of his son.   Response to previous treatment: no dizziness  Functional change: dizziness improving; doing more IADLs    Date of Onset: September 2023     Pain: 0/10  Location: N/A    Patient's Goals for Therapy: to improve balance     OBJECTIVE     Objective Measures updated at progress report unless specified.  Positional canal testing 1/2/2025.     Treatment     Teodoro received the treatments listed below:     Neuromuscular re-education activities to improve gaze stabilization and balance for 37 minutes. The following activities were included:  Oculomotor:  - VORx1, horizontal/vertical directions, 2 x 45 seconds each 115 bpm     Balance:  -  Static stance on firm surface, arms at side EO, 1 x 30 seconds; SBA, focus on upright posturing  - Static stance on firm surface, arms at side EC, 2 x 30 seconds; SBA  - Static stance on cushion, arms at side EO, 2 x 30 seconds; SBA-CGA  - Static stance on cushion, arms at side EC, 3 x 30 seconds; SBA-CGA  - Horizontal/vertical/diagonal HT standing on cushion EO, 1 x 30 seconds each; CGA  - Horizontal/vertical/diagonal HT standing on firm surface EC, 1 x 30 seconds each; CGA  -Alternating cone taps, 1 x 10 each LE; Min-Mod    Therapeutic activities to improve functional performance for 9 minutes, including:  Motion Tolerance:  - Seated, independent overhead ball toss <> reciprocal ball roll, 1 x 10  - Seated, diagonal cone transfer floor>overhead, x10 cones each diagonal    Patient Education and Home Exercises      Education provided:   - Post Epley precautions (see pt instructions)   - HEP  - Progress towards goals     Written Home Exercises Provided: yes.  Exercises were reviewed and Teodoro was able to demonstrate them prior to the end of the session.    Teodoro demonstrated good  understanding of the education provided.     See EMR under Patient Instructions for exercises provided 1/14/2025.  1/14/2025: VORx1 at 100 bpm x30 sec intervals; static balance firm/compliant EO/EC, standing with HT firm EO/EC  1/16/2025: seated motion tolerance HAP    ASSESSMENT     Pt tolerated today's session well. Pt continues without dizziness, but still has trouble with endurance. He has orders for neuro PT to begin 2/13 to address balance alongside LE strength and endurance. Plan to discharge 2/04 from vestibular occupational therapy. Good performance with balance interventions this date. Pt continued to demonstrate better upright posturing and more stability with EC conditions. No LOB other than one during alternating cone taps. Pt again without dizziness during seated motion tolerance interventions. Rest breaks taken to promote  pacing provided, but breaks were shorter and less frequent. Pt had no difficulty with an increased VOR pace demonstrating improvement in gaze stabilization. Discussed bathing on TTB. Pt feels he would be able to complete at home with supervision and did not wish to practice in therapy this date. Encouraged pt to practice with supervision of son over the weekend. To discuss performance during next session. Pt would benefit from one additional OT session for formal reassessment/discharge and review of long term HEP/HAP.     Teodoro is progressing well towards his goals and updates to goals can be seen below. Pt prognosis is Good.     Pt will continue to benefit from skilled outpatient occupational therapy to address the deficits listed in the problem list on initial evaluation provide pt/family education and to maximize pt's level of independence in the home and community environment.     Pt's spiritual, cultural and educational needs considered and pt agreeable to plan of care and goals.    Anticipated barriers to occupational therapy: scheduling     Goals:  Short Term Goals: 4 weeks   - Pt will be independent with oculomotor and/or habituation home exercise/activity program. Initiated 1/14/2025  - Patient to perform VORx1 at 100 bpm WFL for improved gaze stabilization. MET 1/14/2025  - Patient will exhibit improved MCTSIB score to 110/120 indicating improved static postural balance needed for improved safety during G/H and dressing. ongoing  - Pt will bathe seated on TTB with supervision from son ongoing     Added STG 1/2/2025:   - Pt to be negative for vertigo and nystagmus in both [place] Hallpike test positions for resolution of BPPV episode. MET 1/14/2025  - Pt to be negative for vertigo and nystagmus in both sidelying horizontal test positions for resolution of BPPV episode. MET 1/14/2025    Long Term Goals: 8 weeks   - Patient to perform VORx1 at 130 bpm WFL for improved gaze stabilization. ongoing  - Patient will  exhibit improved MCTSIB score to 120/120 indicating improved static postural balance needed for improved safety during G/H and dressing. ongoing  - Patient to improve FGA score to 24/30 for improved balance in community environments. ongoing  - Pt will be confident enough to bathe seated on TTB Mod I ongoing    PLAN     Certification Period/Plan of care expiration: 12/5/2024 to 2/28/2025 (16 total visits; 90 day date extensions secondary to scheduling constraints).     Outpatient Occupational Therapy 2 times weekly for 8 weeks to include the following interventions: Neuromuscular Re-ed, Patient Education, Self Care, Therapeutic Activities, and Therapeutic Exercise.     First Follow Up: DC scheduled 2/4; CLEMENTE Jhaveri     I certify that I was present in the room directing the student in service delivery and guiding them using my skilled judgment. As the co-signing therapist I have reviewed the students documentation and am responsible for the treatment, assessment, and plan.     Cynthia Ibarra, TRISTEN, LOTR  01/30/2025

## 2025-01-30 ENCOUNTER — CLINICAL SUPPORT (OUTPATIENT)
Dept: REHABILITATION | Facility: HOSPITAL | Age: 80
End: 2025-01-30
Payer: MEDICARE

## 2025-01-30 DIAGNOSIS — Z00.00 ENCOUNTER FOR MEDICARE ANNUAL WELLNESS EXAM: ICD-10-CM

## 2025-01-30 DIAGNOSIS — H81.8X9 DEFICIT OF VESTIBULO-OCULAR REFLEX: ICD-10-CM

## 2025-01-30 DIAGNOSIS — R26.89 IMBALANCE: Primary | ICD-10-CM

## 2025-01-30 PROCEDURE — 97530 THERAPEUTIC ACTIVITIES: CPT | Mod: PO

## 2025-01-30 PROCEDURE — 97112 NEUROMUSCULAR REEDUCATION: CPT | Mod: PO

## 2025-02-03 NOTE — PROGRESS NOTES
"[facility]  Occupational Therapy Discharge Summary     Date: 2/4/2025  Name: Teodoro Mast  Clinic Number: [id number]    Therapy Diagnosis:   Encounter Diagnoses   Name Primary?    Imbalance Yes    Deficit of vestibulo-ocular reflex          Physician: Phil Williamson, *    Physician Orders: Eval and Treat - Vestibular Therapy   Medical Diagnosis: H81.10 (ICD-10-CM) - Benign paroxysmal positional vertigo, unspecified laterality   Evaluation Date: 12/5/2024  Insurance Authorization Period Expiration: 12/31/2025  Plan of Care Expiration: 16 visits; 2/28/2025 (date extension 90 days due to scheduling constraints)   Progress Note Due: Around visit 8   Date of Return to MD: dialysis SELVIN WILLIAM   FOTO: 1/2    Visit # / Visits authorized: 7 / 20 (+ eval)   Time In: 0849  Time Out: 0927  Total Billable Time: 38 minutes    Precautions:  Standard, Fall, and dialysis    SUBJECTIVE     Pt reports: no dizziness but he reported that he is very tired today. Dialysis took a lot out of him yesterday. He also reported "I have no strength in my legs." He reported that he was able to fix his own breakfast all last week sitting on stool. "My son didn't have to help me."   Response to previous treatment: no dizziness  Functional change: dizziness improving; doing more IADLs such as cooking     Date of Onset: September 2023     Pain: 0/10  Location: N/A    Patient's Goals for Therapy: to improve balance     OBJECTIVE     Objective Measures updated at progress report unless specified.  Positional canal testing 1/2/2025.     Reassessment:   Oculomotor Exam:  VOR Slow Head Movement: Intact  Head Thrust: Positive bilaterally   Dynamic Visual Acuity (DVA): NT    Balance/Functional Mobility Assessment:      Postural control: MCTSIB: Evaluation 12/05/2024 (Average of 3 trials) 2/4/2025   1. Eyes Open/feet together/Firm: 30 seconds 30 seconds   2. Eyes Closed/feet together/Firm:  30 seconds  (Slight-min sway) 30 " seconds  (Slight-min sway)    3. Eyes Open/feet together/Foam:  30 seconds  (Slight sway) 19 sec   (Multiple trials attempted)    4. Eyes Closed/feet together/Foam:  10 seconds  (Avg 3 trials: 5, 14, 10) NT secondary to fatigue   TOTAL 100/120 Unable to obtain total score 4/4 conditions   Comments: Pt reported occasionally noting impaired sensation in feet      Functional Gait Assessment:   1. Gait on level surface =  3              (3) Normal: less than 5.5 sec, no A.D., no imbalance, normal gait pattern, deviates< 6in              (2) Mild impairment: 7-5.6 sec, uses A.D., mild gait deviations, or deviates 6-10 in              (1) Moderate impairment: > 7 sec, slow speed, imbalance, deviates 10-15 in.              (0) Severe impairment: needs assist, deviates >15 in, reach/touch wall  2. Change in Gait Speed = 3              (3) Normal: smooth change w/o loss of balance or gait deviation, deviates < 6 in, significant difference between speeds              (2) Mild impairment: changes speed, but demonstrates mild gait deviations, deviates 6-10 in, OR no deviations but unable to significantly speed, OR uses A.D.              (1) Moderate impairment: minor changes to speed, OR changes speed w/ significant deviations, deviates 10-15 in, OR  Changes speed , but loses balance & recovers              (0) Severe impairment: cannot change speed, deviates >15 in, or loses balance & needs assist  3. Gait with horizontal head turns  = 3              (3) Normal: no change in gait, deviates <6 in              (2) Mild impairment: slight change in speed, deviates 6-10 in, OR uses A.D.              (1) Moderate impairment: moderate change in speed, deviates 10-15 in              (0) Severe impairment: severe disruption of gait, deviates >15in  4. Gait with vertical head turns = 3              (3) Normal: no change in gait, deviates <6 in              (2) Mild impairment: slight change in speed, deviates 6-10 in OR uses A.D.               (1) Moderate impairment: moderate change in speed, deviates 10-15 in              (0) Severe impairment: severe disruption of gait, deviates >15 in  5. Gait with pivot turns = 3              (3) Normal: performs safely in 3 sec, no LOB              (2) Mild impairment: performs in >3 sec & no LOB, OR turns safely & requires several steps to regain LOB              (1) Moderate impairment: turns slow, OR requires several small steps for balance following turn & stop              (0) Severe impairment: cannot turn safely, needs assist  6. Step over obstacle = 2; slightly slowed speed               (3) Normal: steps over 2 stacked boxes w/o change in speed or LOB              (2) Mild impairment: able to step over 1 box w/o change in speed or LOB              (1) Moderate impairment: steps over 1 box but must slow down, may require VC              (0) Severe impairment: cannot perform w/o assist  7. Gait with Narrow SUSHIL = 0; unable to perform without CGA-Min A but took 9 consecutive steps              (3) Normal: 10 steps no staggering              (2) Mild impairment: 7-9 steps              (1) Moderate impairment: 4-7 steps              (0) Severe impairment: < 4 steps or cannot perform w/o assist  8. Gait with eyes closed = 2; slight deviations and slowed speed               (3) Normal: < 7 sec, no A.D., no LOB, normal gait pattern, deviates <6 in              (2) Mild impairment: 7.1-9 sec, mild gait deviations, deviates 6-10 in              (1) Moderate impairment: > 9 sec, abnormal pattern, LOB, deviates 10-15 in              (0) Severe impairment: cannot perform w/o assist, LOB, deviates >15in  9. Ambulating Backwards = 2; shortened steps              (3) Normal: no A.D., no LOB, normal gait pattern, deviates <6in              (2) Mild impairment: uses A.D., slower speed, mild gait deviations, deviates 6-10 in              (1) Moderate impairment: slow speed, abnormal gait pattern, LOB, deviates 10-15  in              (0) Severe impairment: severe gait deviations or LOB, deviates >15in  10. Steps = 2; reciprocal steps with B handrails               (3) Normal: alternating feet, no rail              (2) Mild Impairment: alternating feet, uses rail              (1) Moderate impairment: step-to, uses rail              (0) Severe impairment: cannot perform safely     Score:   Intake: 23/30 2/4/2025: Not reassessed secondary to level of fatigue       Score:   </=22/30 fall risk   <20/30 fall risk in older adults   <18/30 fall risk in Parkinsons     FOTO Administered:   Intake: 50%  2/4/2025: 70%      Dizziness Handicap Inventory (DHI):   Intake: 48/100 2/4/2025: 30/100   16-34= mild   36-52= moderate   >/= 54= severe    Treatment     Teodoro received the treatments listed below:     Neuromuscular re-education activities to improve gaze stabilization and balance for 38 minutes. The following activities were included:  Oculomotor:  - Reassessed VOR via head thrust: see above for details   - VORx1, horizontal/vertical directions:   1 x 45 seconds each at 120 bpm  1 x 60 seconds each at 130 bpm     Balance:  - Reassessment attempted: see above for details     Positional Canal Testing:  Looking for nystagmus (slow phase followed by quick phase to the affected side for BPPV)    Trial 1:  Happy Hallpike (posterior / CL anterior)   Right: Negative nystagmus, Negative dizziness   Left: Negative nystagmus, Negative dizziness  Horizontal Canals   Right: Negative nystagmus, Negative dizziness   Left: Negative nystagmus, Negative dizziness    Patient Education and Home Exercises      Education provided:   - Post Epley precautions (see pt instructions)   - HEP  - Progress towards goals     Written Home Exercises Provided: yes.  Exercises were reviewed and Teodoro was able to demonstrate them prior to the end of the session.    Teodoro demonstrated good  understanding of the education provided.     See EMR under Patient  Instructions for exercises provided 1/14/2025.  1/14/2025: VORx1 at 100 bpm x30 sec intervals; static balance firm/compliant EO/EC, standing with HT firm EO/EC  1/16/2025: seated motion tolerance HAP    OCCUPATIONAL THERAPY ASSESSMENT & DISCHARGE SUMMARY      Total No-Shows: 0  Total Cancels: 0    Pt has made good progress with therapy but with poor tolerance to today's session secondary to fatigue and LE weakness. Decreased performance on condition 3 of MCTSIB today as compared to evaluation and previous sessions, and pt felt that he was not up to completing remaining assessments. For this reason, unable to fully complete static balance testing and dynamic balance testing deferred. Gaze stabilization is functional. Although head thrust was still positive bilaterally, pt is able to complete VORx1 at 130 bpm without slippage or dizziness, and pt is not complaining of functional deficits associated with VOR dysfunction. Main improvement with vestibular OT has been resolution of dizziness symptoms. Pt is able to complete ADLs and IADLs more independently without complaints of dizziness and remains negative for BPPV per positional canal testing. Main ongoing deficits include LE weakness, decreased activity tolerance, and balance. Pt has neuro PT evaluation scheduled for 2/13/2025 for transition of care to focus on these problems.     Pt's spiritual, cultural and educational needs considered and pt agreeable to plan of care and goals.    Anticipated barriers to occupational therapy: fatigue; scheduling due to dialysis schedule     Goals:  Short Term Goals: 4 weeks   - Pt will be independent with oculomotor and/or habituation home exercise/activity program. MET 2/4/2025  - Patient to perform VORx1 at 100 bpm WFL for improved gaze stabilization. MET 1/14/2025  - Patient will exhibit improved MCTSIB score to 110/120 indicating improved static postural balance needed for improved safety during G/H and dressing. NOT MET -  unable to formally RA on day if d/c secondary to fatigue level   - Pt will bathe seated on TTB with supervision from son NOT MET; ordered tub chair & waiting on arrival     Added STG 1/2/2025:   - Pt to be negative for vertigo and nystagmus in both Manisha Hallpike test positions for resolution of BPPV episode. MET 1/14/2025  - Pt to be negative for vertigo and nystagmus in both sidelying horizontal test positions for resolution of BPPV episode. MET 1/14/2025    Long Term Goals: 8 weeks   - Patient to perform VORx1 at 130 bpm WFL for improved gaze stabilization. MET 2/4/2025  - Patient will exhibit improved MCTSIB score to 120/120 indicating improved static postural balance needed for improved safety during G/H and dressing. NOT MET - unable to formally RA on day if d/c secondary to fatigue level   - Patient to improve FGA score to 24/30 for improved balance in community environments. NOT MET - unable to formally RA on day if d/c secondary to fatigue level   - Pt will be confident enough to bathe seated on TTB Mod I NOT MET; ordered tub chair & waiting on arrival    Status Towards Goals Met: RA limited today secondary to severity of fatigue  Goals Not achieved and why: unable to complete full RA; performance today does not reflect recent performance during therapy sessions    Discharge reason : Dizziness resolution; transfer to neuro PT to address LE strength, endurance, and static/dynamic balance     PLAN     This patient is discharged from Outpatient Occupational Therapy Services.     Cynthia Ibarra, OT  02/04/2025

## 2025-02-04 ENCOUNTER — LAB VISIT (OUTPATIENT)
Dept: LAB | Facility: HOSPITAL | Age: 80
End: 2025-02-04
Attending: UROLOGY
Payer: MEDICARE

## 2025-02-04 ENCOUNTER — OFFICE VISIT (OUTPATIENT)
Dept: UROLOGY | Facility: CLINIC | Age: 80
End: 2025-02-04
Payer: MEDICARE

## 2025-02-04 ENCOUNTER — CLINICAL SUPPORT (OUTPATIENT)
Dept: REHABILITATION | Facility: HOSPITAL | Age: 80
End: 2025-02-04
Payer: MEDICARE

## 2025-02-04 VITALS
SYSTOLIC BLOOD PRESSURE: 109 MMHG | WEIGHT: 188.94 LBS | HEART RATE: 79 BPM | DIASTOLIC BLOOD PRESSURE: 63 MMHG | BODY MASS INDEX: 24.25 KG/M2 | HEIGHT: 74 IN

## 2025-02-04 DIAGNOSIS — Z76.82 PATIENT ON WAITING LIST FOR KIDNEY TRANSPLANT: ICD-10-CM

## 2025-02-04 DIAGNOSIS — H81.8X9 DEFICIT OF VESTIBULO-OCULAR REFLEX: ICD-10-CM

## 2025-02-04 DIAGNOSIS — R97.20 ELEVATED PSA: Primary | ICD-10-CM

## 2025-02-04 DIAGNOSIS — R97.20 ELEVATED PSA: ICD-10-CM

## 2025-02-04 DIAGNOSIS — R26.89 IMBALANCE: Primary | ICD-10-CM

## 2025-02-04 LAB — COMPLEXED PSA SERPL-MCNC: 6.9 NG/ML (ref 0–4)

## 2025-02-04 PROCEDURE — 84153 ASSAY OF PSA TOTAL: CPT | Mod: TXP | Performed by: UROLOGY

## 2025-02-04 PROCEDURE — 99999 PR PBB SHADOW E&M-EST. PATIENT-LVL IV: CPT | Mod: PBBFAC,TXP,, | Performed by: UROLOGY

## 2025-02-04 PROCEDURE — 99214 OFFICE O/P EST MOD 30 MIN: CPT | Mod: PBBFAC,TXP | Performed by: UROLOGY

## 2025-02-04 PROCEDURE — 97112 NEUROMUSCULAR REEDUCATION: CPT | Mod: PO

## 2025-02-04 PROCEDURE — G2211 COMPLEX E/M VISIT ADD ON: HCPCS | Mod: S$PBB,TXP,, | Performed by: UROLOGY

## 2025-02-04 PROCEDURE — 36415 COLL VENOUS BLD VENIPUNCTURE: CPT | Mod: TXP | Performed by: UROLOGY

## 2025-02-04 PROCEDURE — 99214 OFFICE O/P EST MOD 30 MIN: CPT | Mod: S$PBB,TXP,, | Performed by: UROLOGY

## 2025-02-06 ENCOUNTER — HOSPITAL ENCOUNTER (OUTPATIENT)
Dept: PULMONOLOGY | Facility: CLINIC | Age: 80
Discharge: HOME | End: 2025-02-06
Payer: MEDICARE

## 2025-02-06 ENCOUNTER — OFFICE VISIT (OUTPATIENT)
Dept: TRANSPLANT | Facility: CLINIC | Age: 80
End: 2025-02-06
Payer: MEDICARE

## 2025-02-06 VITALS
SYSTOLIC BLOOD PRESSURE: 120 MMHG | RESPIRATION RATE: 18 BRPM | BODY MASS INDEX: 24.39 KG/M2 | WEIGHT: 190.06 LBS | TEMPERATURE: 97 F | DIASTOLIC BLOOD PRESSURE: 58 MMHG | HEIGHT: 74 IN | HEART RATE: 90 BPM | OXYGEN SATURATION: 94 %

## 2025-02-06 VITALS — WEIGHT: 188.25 LBS | BODY MASS INDEX: 24.16 KG/M2 | HEIGHT: 74 IN

## 2025-02-06 DIAGNOSIS — N18.6 ESRD ON HEMODIALYSIS: ICD-10-CM

## 2025-02-06 DIAGNOSIS — Z76.82 PATIENT ON WAITING LIST FOR KIDNEY TRANSPLANT: Primary | ICD-10-CM

## 2025-02-06 DIAGNOSIS — Z76.82 PATIENT ON WAITING LIST FOR KIDNEY TRANSPLANT: ICD-10-CM

## 2025-02-06 DIAGNOSIS — Z79.4 TYPE 2 DIABETES MELLITUS WITH HYPERGLYCEMIA, WITH LONG-TERM CURRENT USE OF INSULIN: ICD-10-CM

## 2025-02-06 DIAGNOSIS — I15.0 RENOVASCULAR HYPERTENSION: ICD-10-CM

## 2025-02-06 DIAGNOSIS — Z99.2 ESRD ON HEMODIALYSIS: ICD-10-CM

## 2025-02-06 DIAGNOSIS — E11.65 TYPE 2 DIABETES MELLITUS WITH HYPERGLYCEMIA, WITH LONG-TERM CURRENT USE OF INSULIN: ICD-10-CM

## 2025-02-06 DIAGNOSIS — N25.81 SECONDARY HYPERPARATHYROIDISM: ICD-10-CM

## 2025-02-06 PROCEDURE — 94618 PULMONARY STRESS TESTING: CPT | Mod: 26,S$PBB,TXP, | Performed by: INTERNAL MEDICINE

## 2025-02-06 PROCEDURE — 99215 OFFICE O/P EST HI 40 MIN: CPT | Mod: S$PBB,TXP,, | Performed by: NURSE PRACTITIONER

## 2025-02-06 PROCEDURE — 99215 OFFICE O/P EST HI 40 MIN: CPT | Mod: PBBFAC,TXP | Performed by: NURSE PRACTITIONER

## 2025-02-06 PROCEDURE — 94618 PULMONARY STRESS TESTING: CPT | Mod: PBBFAC,TXP | Performed by: INTERNAL MEDICINE

## 2025-02-06 PROCEDURE — 99999 PR PBB SHADOW E&M-EST. PATIENT-LVL V: CPT | Mod: PBBFAC,TXP,, | Performed by: NURSE PRACTITIONER

## 2025-02-06 NOTE — PROGRESS NOTES
AA YEARLY PATIENT EDUCATION NOTE    Mr. Teodoro Mast was seen in pre-kidney transplant clinic for yearly (or six months) evaluation for kidney, kidney/pancreas or pancreas only transplant.  The patient attended a web based education session that discussed/reviewed the following aspects of transplantation: UNOS waitlist management/multiple listings, types of organs offered (KDPI < 85%, KDPI > 85%, PHS increased risk, DCD, HCV+), financial aspects, surgical procedures, dietary instruction pre- and post-transplant, health maintenance pre- and post-transplant, post-transplant hospitalization and outpatient follow-up, potential to participate in a research protocol, and medication management and side effects. A question and answer session was provided after the presentation.    The patient was seen by all members of the multi-disciplinary team to include: Nephrologist/PA, , , Pharmacist and Dietician (if applicable).    The Patient was educated on OPTN policy change regarding race based eGFR. For Black or  Americans, this eGFR could have shown that their kidneys were working better than they were.    Because of this change, we are looking at everyones record and assessing waiting time for people who are eligible. We will be reviewing your medical records and will notify you if you are eligible. We also encouraged patient to provide span 20 labs that are not in our electronic medical records.    The patient reviewed and signed all consents for evaluation which were witnessed and sent to scanning into the [facility] chart.    The patient was given an education book and plan for further evaluation based on his individual assessment.      The patient was encouraged to call with any questions or concerns.

## 2025-02-06 NOTE — PROCEDURES
Teodoro Mast is a 79 y.o.   male patient, who presents for a 6 minute walk test ordered by LOVE Badillo.  The diagnosis is Pre-Operative Evaluation.  The patient's BMI is 24.2 kg/m2.  Predicted distance (lower limit of normal) is 316.86 meters.      Test Results:    The test was completed with stops.  The patient stopped 1 time for a total of 71 seconds.  The total time walked was 289 seconds.  During walking, the patient reported:  Leg/hip pain, Calf pain.  The patient used a wheelchair during testing.     02/06/2025---------Distance: 243.84 meters (800 feet)     O2 Sat % Supplemental Oxygen Heart Rate Blood Pressure Darian Scale   Pre-exercise  (Resting) 95 % Room Air 89 bpm 126/79 mmHg 0   During Exercise 94 % Room Air 109 bpm 136/68 mmHg 0   Post-exercise  (Recovery) 99 % Room Air  99 bpm       Recovery Time: 98 seconds    Performing nurse/tech: HANK Best      PREVIOUS STUDY:   The patient has not had a previous study.      CLINICAL INTERPRETATION:  Six minute walk distance is 243.84 meters (800 feet) with no dyspnea.  During exercise, there was desaturation while breathing room air.  Both blood pressure and heart rate remained stable with walking.  The patient reported non-pulmonary symptoms during exercise.  Significant exercise impairment is likely due to subjective symptoms.  The patient did complete the study, walking 289 seconds of the 360 second test.  No previous study performed.  Based upon age and body mass index, exercise capacity is less than predicted.

## 2025-02-06 NOTE — PROGRESS NOTES
Kidney Transplant Recipient Reevalulation    Referring Physician: Shine Mcqueen  Current Nephrologist: Shine Mcqueen  Waitlist Status: active  Dialysis Start Date: 5/13/2021    Subjective:     CC:  Six month reassessment of kidney transplant candidacy.    HPI:  Mr. Mast is a 79 y.o. year old Black or  male with ESRD secondary to HTN.  PMH BPH, anemia, HTN, proteinuria, and hyperparathyroidism, elevated PSA (negative prostate biopsy, follows with urology). He has been on the wait list for a kidney transplant at [facility] since 8/24/2020. He initially started on peritoneal dialysis 5/13/2021. He switched to hemodialysis in June 2024. Currently dialyzing MWF, tolerating well. Has LUE AVF for dialysis access.     Had recent urology follow up for elevated PSA, scheduled for prostate MRI. Last PSA actually decreasing, 6.5.    Retired . Looks younger than stated age. Reports some loss of muscle mass in his legs but remains active around the house.  He has been doing therapy to help increase his leg strength and endurance. Able to walk around exam room and clinic with steady gait unassisted.     CXR 8/29/2024: No acute cardiopulmonary findings.   CT abd/pelvis 3/2024: needs surgical review  Iliacs 11/10/2022: triphasic; favorable for transplant   Renal US 2/22/2024: no solid mass  Echo 8/29/2024: EF 55-60%, PA 18  LHC 1/8/2024: non obstructive CAD  Colonoscopy 11/3/2021: Tubular adenoma, repeat in 5 years-11/2026    Current Outpatient Medications   Medication Sig Dispense Refill    acetaminophen (TYLENOL) 500 MG tablet Take 2 tablets (1,000 mg total) by mouth every 8 (eight) hours as needed for Pain.      amLODIPine (NORVASC) 5 MG tablet Take 1 tablet (5 mg total) by mouth once daily. 90 tablet 3    atorvastatin (LIPITOR) 80 MG tablet TAKE 1 TABLET(80 MG) BY MOUTH EVERY DAY 90 tablet 3    B complex-vitamin C-folic acid (RENAL VITAMIN) 0.8 mg Tab Take 1 tablet (0.8 mg total) by mouth  "once daily. 90 tablet 1    labetaloL (NORMODYNE) 300 MG tablet Take 1 tablet (300 mg total) by mouth 2 (two) times daily. 180 tablet 3    LANTUS SOLOSTAR U-100 INSULIN 100 unit/mL (3 mL) InPn pen ADMINISTER 10 UNITS UNDER THE SKIN EVERY EVENING 9 mL 0    magnesium oxide (MAG-OX) 400 mg (241.3 mg magnesium) tablet Take 1 tablet (400 mg total) by mouth once daily. 90 tablet 1    ondansetron (ZOFRAN) 4 MG tablet Take 1 tablet (4 mg total) by mouth every 8 (eight) hours as needed for Nausea. 30 tablet 2    pen needle, diabetic (BD KITA 2ND GEN PEN NEEDLE) 32 gauge x 5/32" Ndle Inject 1 each into the skin daily as needed (insulin injection). 100 each 11    PROSOL 20 % SolP Inject into the vein.      psyllium (METAMUCIL) powder Take 28 g by mouth once daily. Patient taking 2 tablespoon      vitamin D (VITAMIN D3) 1000 units Tab Take 2,000 Units by mouth once daily.      sevelamer carbonate (RENVELA) 800 mg Tab Take 1 tablet (800 mg total) by mouth 3 (three) times daily with meals. 90 tablet 11     No current facility-administered medications for this visit.       Past Medical History:   Diagnosis Date    BPH (benign prostatic hyperplasia)     CKD (chronic kidney disease) stage 5, GFR less than 15 ml/min     Gout     Hyperlipidemia     Hyperparathyroidism, secondary renal     Hypertension     Sleep apnea        Review of Systems   Constitutional:  Negative for activity change and fever.   Eyes:  Negative for visual disturbance.   Respiratory:  Negative for cough and shortness of breath.    Cardiovascular:  Negative for chest pain and leg swelling.   Gastrointestinal:  Negative for abdominal pain, constipation, diarrhea and nausea.   Genitourinary:  Positive for decreased urine volume (ANURIC).   Musculoskeletal:  Negative for arthralgias and myalgias.   Skin:  Negative for wound.   Neurological:  Negative for weakness.   Psychiatric/Behavioral:  Negative for sleep disturbance.      Objective:   body mass index is 24.4 " "kg/m².  BP (!) 120/58 (BP Location: Right arm, Patient Position: Sitting)   Pulse 90   Temp 97.3 °F (36.3 °C) (Temporal)   Resp 18   Ht 6' 2" (1.88 m)   Wt 86.2 kg (190 lb 0.6 oz)   SpO2 (!) 94%   BMI 24.40 kg/m²     Physical Exam  Vitals and nursing note reviewed.   Constitutional:       Appearance: Normal appearance.   Cardiovascular:      Rate and Rhythm: Normal rate and regular rhythm.      Heart sounds: Normal heart sounds.   Pulmonary:      Effort: Pulmonary effort is normal.      Breath sounds: Normal breath sounds.   Abdominal:      General: There is no distension.   Musculoskeletal:         General: Normal range of motion.      Comments: LUE AV fistula + thrill    Skin:     General: Skin is warm and dry.   Neurological:      Mental Status: He is alert.       Labs:    Lab Results   Component Value Date    WBC 6.37 01/03/2025    HGB 10.5 (L) 01/15/2025    HCT 31.6 (L) 01/15/2025     01/03/2025    K 5.1 01/03/2025     01/03/2025    CO2 22 (L) 10/08/2024    BUN 24 (H) 10/08/2024    CREATININE 6.89 (H) 01/03/2025    EGFRNONAA 3.5 (A) 03/07/2022    CALCIUM 9.6 01/03/2025    PHOS 3.6 01/06/2025    MG 1.9 10/08/2024    ALBUMIN 3.8 01/03/2025    AST 14 10/08/2024    ALT 8 01/03/2025    UTPCR 3.33 (H) 04/12/2021     (H) 01/03/2025       Lab Results   Component Value Date    BILIRUBINUA Negative 04/12/2021    PROTEINUA 3+ (A) 04/12/2021    NITRITE Negative 04/12/2021    RBCUA 0 04/12/2021    WBCUA 2 04/12/2021       Lab Results   Component Value Date    CPRA 0 01/03/2025    CPRA 0 01/03/2025    CPRA 0 01/03/2025    BH2ZTRT B82 01/03/2025    CIABCLM WEAK: B51, B54, B61 02/07/2024    CIIAB Negative 01/03/2025    ABCMT WEAK DQA1*05:05 11/02/2022       Labs were reviewed with the patient.    Pre-transplant Workup:   Reviewed with the patient.    Assessment:     1. Patient on waiting list for kidney transplant    2. ESRD on hemodialysis    3. Type 2 diabetes mellitus with hyperglycemia, with " long-term current use of insulin    4. Renovascular hypertension    5. Secondary hyperparathyroidism    6. BMI 24.0-24.9, adult      Plan:   Despite his advanced age he remains active and functional. I strongly encouraged seeking out living donors due to age as well as blood type O. He understands if his functional status were to deteriorate in the future he may be removed from the wait list.        Transplant Candidacy:   Mr. Mast is a suitable kidney transplant candidate.  Meets center eligibility for accepting HCV+ donor offer - yes.  Patient educated on HCV+ donors. Teodoro is willing  to accept HCV+ donor offer -  yes   Patient is a candidate for KDPI > 85 kidney donor offer - Yes .  He remains in overall stable health, and will remain active on the transplant list.    Patient advised that it is recommended that all transplant candidates, and their close contacts and household members receive Covid vaccination.    Arianne Sesay NP       Follow-up:   In addition to the tests noted in the plan, Mr. Mast will continue to have reevaluation as per the standing pre-kidney transplant protocol:  Monthly blood for PRA  Annual return to clinic, except HIV positive, > 65 years of age, or pancreas transplant candidates who will be scheduled to see transplant every 6 months while in pre-transplant phase  Annual re-testing: CXR, EKG, yearly mammograms for women over 40 and PSA for males over 40, cardiology follow-up as recommended by initial cardiology pre-transplant evaluation  Renal ultrasound every 2 years  Baseline colonoscopy after age 50 and repeated as recommended    UNOS Patient Status  Functional Status: 60% - Requires occasional assistance but is able to care for needs  Physical Capacity: No Limitations

## 2025-02-06 NOTE — LETTER
February 10, 2025        Shine Mcqueen  [address] [facility] [address]  Phone: [phone]  Fax: [phone]             [facility] 1st Fl  [address]  [facility] [address]  Phone: [phone]   Patient: Teodoro Mast   MR Number: [id number]   YOB: 1945   Date of Visit: 2/6/2025       Dear Dr. Shine Mcqueen    Thank you for referring Teodoro Mast to me for evaluation. Attached you will find relevant portions of my assessment and plan of care.    If you have questions, please do not hesitate to call me. I look forward to following Teodoro Mast along with you.    Sincerely,    Arianne Sesay NP    Enclosure    If you would like to receive this communication electronically, please contact [email or web address] or [phone] to request [employer] access.    [employer] is a tool which provides read-only access to select patient information with whom you have a relationship. Its easy to use and provides real time access to review your patients record including encounter summaries, notes, results, and demographic information.    If you feel you have received this communication in error or would no longer like to receive these types of communications, please e-mail [email or web address]

## 2025-02-07 ENCOUNTER — TELEPHONE (OUTPATIENT)
Dept: UROLOGY | Facility: CLINIC | Age: 80
End: 2025-02-07
Payer: MEDICARE

## 2025-02-10 NOTE — PROGRESS NOTES
"Transplant Psychosocial Evaluation Update:  Last psychosocial evaluation for transplant was completed on 8/29/2024 by LAVERN Mcnamara LMSW.    Pt presents today in company of sonTeodoro (KJ) ERIC. . Pt and son present as alert, oriented to person, place, time, purpose of visit. Pt and son deny concerns about going through with transplant and state motivation and sense of preparedness for transplantation, caregiver role, psychosocial risk factors, medical risk factors, financial aspects, and lifetime commitments. All concerns and education points as per transplant psychosocial evaluation process addressed (also refer to psychosocial dated 8/29/2024). Pt actively participated in today's interview, with son participating as caregiver. Pt asking questions appropriately and denies changes to previous psychosocial evaluation for transplantation which we reviewed line by line with pt and, per pt choice, with pts caregiver today.    Primary Caregivers and Transportation for Transplant:  1. Teodoro GIBBS" ERIC Mast, 38 y/o son, [facility], [phone], drives  2. Nilesh Winslow, 37 y/o son, [phone], drives.  3. Rip WinslowJr, 54 y/o nephew, [phone] / [phone], drives    Both pt and caregiver/s plan to stay locally at pt's home in [facility] - information reviewed in depth. Caregivers plan to stay at hospital as appropriate for transplant and post-transplant process.    Pts Vocation: Pt works at retired.  Last day worked:  2/26/2010.    DME: CPAP; walker; wheelchair     ADLS: Pt states ability to independently accomplish all ADLs, however has difficulty completing task due to loss of muscle mass. Pt presented in a wheelchair (for long distances) and utilizes a walker in home as needed (due to vertigo). Pt's son assists as needed.  Patient does not drive and is typically transported by son.    Household and Dependents: pt resides with son, Teodoro Mast (KJ) ERIC and has no dependents at this " time.    Income: Pt states ability to afford all monthly expenses and costs including for medications now and if transplanted based on income and on savings and assets.    Dialysis Adherence: Pt states current and expected compliance with all healthcare recommendations, including dialysis.  Patient currently received in-center hemodialysis.  Dialysis adherence form completed and returned by patient's dialysis unit can be found under 'Media' section of EMR.  Compliance reported as satisfactory.    Pt and son deny any additional concerns, stating preparedness and motivation to proceed with organ transplant.     Psychiatric History:    Mental Health: Pt denies any past and/or present mental health symptoms and/or diagnoses.  Psychiatrist/Counselor: Pt denies currently or in the past and reports willingness to meet with psychiatry if required by transplant.   Medications:  Pt denies utilizing mental health medications currently or in the past  Suicide/Homicide Issues: Pt denies feelings of wanting to harm self or other currently or in the past  Safety at home: Pt reports feeling safe at home.      Payer/Plan Subscr  Sex Relation Sub. Ins. ID Effective Group Num   1. MEDICARE - ME* [employer] WALLJJ MCCULLOUGH 1945 Male Self [id number] 12                                    [address]   2. [employer]* [employer] WLALJJ MCCULLOUGH 1945 Male Self [id number]                                    [address]     Suitability for Transplant:   Pt remains low risk for transplant at this time based on psychosocial risk factors and strengths.    SW recommends that pt conduct fundraising to assist pt with pay for cost of medications, food, gas, and other transplant related needs.  SW recommends that pt remain aware of potential mental health concerns and contact the team if any concerns arise.  SW recommends that pt remain abstinent from tobacco, ETOH, and drug use.  SW supports pt's continued adherence. SW remains  available to answer any questions or concerns that arise as the pt moves through the transplant process.        Navid Mcnamara

## 2025-02-12 NOTE — PROGRESS NOTES
Outpatient Rehab    Physical Therapy Evaluation    Patient Name: Teodoro Mast  MRN: [id number]  YOB: 1945  Today's Date: 2/13/2025    Therapy Diagnosis:   Encounter Diagnoses   Name Primary?    Imbalance     Impaired functional mobility, balance, gait, and endurance Yes     Physician: Phil Williamson, *    Physician Orders: Eval and Treat  Medical Diagnosis: R26.89 (ICD-10-CM) - Imbalance     Visit # / Visits Authorized:  01 / 01   Date of Evaluation:  2/13/2025   Insurance Authorization Period: 1/29/2025 to 1/29/2026  Plan of Care Certification:  2/13/2025 to 4/10/2025      Time In: 1345   Time Out: 1425  Total Time: 40   Total Billable Time: 40 minutes     Intake Outcome Measure for FOTO Survey    Therapist reviewed FOTO scores for Teodoro Mast on 2/13/2025.   FOTO report - see Media section or FOTO account episode details.     Intake Score: 42%         Subjective   History of Present Illness  Teodoro is a 79 y.o. male who reports to physical therapy with a chief concern of balance. According to the patient's chart, Teodoro has a past medical history of BPH (benign prostatic hyperplasia), CKD (chronic kidney disease) stage 5, GFR less than 15 ml/min, Gout, Hyperlipidemia, Hyperparathyroidism, secondary renal, Hypertension, and Sleep apnea. Teodoro has a past surgical history that includes hemorriodectomy; Finger surgery; Eye surgery (Bilateral); Peritoneal catheter insertion; Colonoscopy (N/A, 11/2/2021); AV fistula placement (Left, 3/31/2022); AV fistula placement (Left, 9/30/2022); Coronary angiography (N/A, 1/8/2024); and Peritoneal catheter removal (N/A, 9/27/2024).                Dominant Hand: Right  History of Present Condition/Illness: That he was working with OT and had BBPV recently that has since resolved. Since the BPPV has resolved he has been able to stop using the RW but continues to feel slightly unstable with walking and moving around. He reports he will use WC for  long distances and RW when feeling off balance. He reports weakness in BLE for the past few years and reports progressively worsening endurance. The patient reports no recent falls.     Activities of Daily Living  Social history was obtained from Patient.    General Prior Level of Function Comments: independent with all functional mobility and ADLs  General Current Level of Function Comments: independent with all functional mobility and ADLs but requires increased time and caution  Patient Responsibilities: Community mobility, Meal prep        Pain     Patient reports a current pain level of 0/10. Pain at best is reported as 0/10. Pain at worst is reported as 0/10.   Location: NA         Treatment History  Treatments  Discharged From Past 30 Days: Outpatient therapy    Living Arrangements  Living Situation  Housing: Home with care/services  Type of Care/Services Provided at Home: Family care  Living Arrangements: Other (Comment)  Other Living Arrangements Comment: son    Home Setup  Type of Structure: House  Home Access: Stairs with rails  Entrance Stairs - Number of Steps: 2  Entrance Stairs - Rails: Right  Number of Levels in Home: Two level  Access to Alternate Level of Home: Stairs with rails  Primary Bedroom: 2nd floor  Primary Bathroom: 2nd floor  Location of Additional Bathrooms: 1st floor        Employment  Patient reports: Does the patient's condition impact their ability to work?  Employment Status: Retired          Past Medical History/Physical Systems Review:   Teodoro Mast  has a past medical history of BPH (benign prostatic hyperplasia), CKD (chronic kidney disease) stage 5, GFR less than 15 ml/min, Gout, Hyperlipidemia, Hyperparathyroidism, secondary renal, Hypertension, and Sleep apnea.    Teodoro Mast  has a past surgical history that includes hemorriodectomy; Finger surgery; Eye surgery (Bilateral); Peritoneal catheter insertion; Colonoscopy (N/A, 11/2/2021); AV fistula placement (Left,  "3/31/2022); AV fistula placement (Left, 9/30/2022); Coronary angiography (N/A, 1/8/2024); and Peritoneal catheter removal (N/A, 9/27/2024).    Teodoro has a current medication list which includes the following prescription(s): acetaminophen, amlodipine, atorvastatin, renal vitamin, labetalol, lantus solostar u-100 insulin, magnesium oxide, ondansetron, pen needle, diabetic, prosol 20 %, psyllium, sevelamer carbonate, and vitamin d.    Review of patient's allergies indicates:  No Known Allergies     Objective   Posture  Patient presents with a Forward head position. Increased thoracic kyphosis is observed.   Shoulders are Rounded.                   Lower Extremity Strength  Right LE  Left LE    Hip Flexion: 4/5 Hip Flexion: 4-/5   Hip Extension:  4/5 Hip Extension: 4/5   Hip Abduction: 4+/5 Hip Abduction: 4+/5   Hip Adduction: 5/5 Hip Adduction 5/5   Knee Extension: 5/5 Knee Extension: 5/5   Knee Flexion: 5/5 Knee Flexion: 5/5   Ankle Dorsiflexion: 5/5 Ankle Dorsiflexion: 5/5   Ankle Plantarflexion: 5/5 Ankle Plantarflexion: 5/5     Abdominal Strength: 3/5    Flexibility: WNL     Evaluation   Single Limb Stance R LE 2 seconds   (<10 sec = HIGH FALL RISK)   Single Limb Stance L LE 6 seconds   (<10 sec = HIGH FALL RISK)      Evaluation   30 second Chair Rise  (adults > 61 y/o) 7 completed with no arms   5 times sit-stand  (adults 18-63 y/o) 20 seconds  >12 sec= fall risk for general elderly  >16 sec= fall risk for Parkinson's disease  >10 sec= balance/vestibular dysfunction (<61 y/o)  >14.2 sec= balance/vestibular dysfunction (>61 y/o)  >12 sec= fall risk for CVA     DEVI SENSORY TESTING:  (P= Pass, F= Fail; note any sway; hold each position for 30")  Condition 1: (firm surface/feet together/eyes open) P  Condition 2: (firm surface/feet together/eyes closed) P  Condition 3: (firm surface/feet in tandem/eyes open) P RLE leading, F 4 seconds with LLE leading  Condition 4: (firm surface/feet in tandem/eyes closed) F 3 " seconds LLE leading, 2 seconds with RLE leading   Condition 5: (soft surface/feet together/eyes open) P  Condition 6: (soft surface/feet together/eyes closed) P max sway   Condition 7: (Fakuda step test), measure distance varied from center starting position NT     Gait Assessment:   - AD used: none   - Assistance: SBA  - Distance: ambulatory for short distances throughout session     GAIT DEVIATIONS:  Teodoro displays the following deviations with ambulation: decreased step length, bassam and velocity of limb movement, decreased BLE foot clearance, occasional excess trunk sway/ veering     Impairments contributing to deviations: impaired motor control, decreased static/ dynamic balance, decreased endurance, vestibular dysfunction     Endurance Deficit: severe       Evaluation   6 MWT To be assessed at follow up session    Self Selected Walking Speed 1.0 m/sec (6m/6s)   Fast Walking Speed 1.4 m/sec (6m/4s)       Functional Gait Assessment:   1. Gait on level surface =  3  2. Change in Gait Speed = 3  3. Gait with horizontal head turns  = 2  4. Gait with vertical head turns = 2  5. Gait with pivot turns = 3  6. Step over obstacle = 3  7. Gait with Narrow SUSHIL = 0  8. Gait with eyes closed = 1  9. Ambulating Backwards = 3  10. Steps = 2     Score 22/30   Score:   <22/30 fall risk   <20/30 fall risk in older adults   <18/30 fall risk in Parkinsons       Treatment:  Treatment not performed     Assessment & Plan   Assessment  Teodoro presents with a condition of Low complexity.   Presentation of Symptoms: Stable  Will Comorbidities Impact Care: Yes  GLYNN, HLD, HTN, T2DM, anemia    Functional Limitations: Ambulating on uneven surfaces, Activity tolerance, Carrying objects, Community integration, Completing self-care activities, Completing work/school activities, Decreased ambulation distance/endurance, Maintaining balance, Increased risk of fall, Getting off the floor, Gait limitations, Functional mobility, Participating  in leisure activities, Painful locomotion/ambulation, Pain with ADLs/IADLs, Standing tolerance, Squatting  Impairments: Abnormal gait, Impaired balance, Activity intolerance, Impaired physical strength    Patient Goal for Therapy (PT): improve balance and endurance  Prognosis: Good  Assessment Details:  Based on the patient's MMT scores, the patient presents with impaired fairly good strength with only slight strength deficits noted with hip flexion and hip extension. Based on the patient's SSWS, the patient falls into the community ambulator with increased time needs  category. The patient's score on the FGA places the patient in the elevated falls risk category and indicated impaired dynamic balance. The patient's 5 time sit to stand score also places the patient in the elevated fall risk category and indicates impaired LE strength and endurance. Decreased eccentric control when sitting down noted. Based on the patient's performance on the GST, the patient has the most difficulty with vision eliminated conditions, which aligns with vestibular dysfunction. The patient's SLS time places the patient in the elevated fall risk category. The patient recently completed vestibular occupation therapy in which they resolved his BPPV.  The patient will benefit from skilled physical therapy intervention to address residual dizziness, impaired motion tolerance, and impaired static/dynamic balance. He would also likely benefit from further assessment of his endurance in follow up session.     Plan  From a physical therapy perspective, the patient would benefit from: Skilled Rehab Services    Planned therapy interventions include: Therapeutic exercise, Therapeutic activities, Neuromuscular re-education, ADLs/IADLs, Canalith repositioning, Community/work reintegration, Cognitive functional training, Orthotic management and training, Prosthetic management and training, and Wheelchair management.            Visit Frequency: 2 times  Per Week for 8 Weeks.       This plan was discussed with Patient.   Discussion participants: Agreed Upon Plan of Care  Plan details: Dynamic balance. Perform  6 MWT           Patient's spiritual, cultural, and educational needs considered and patient agreeable to plan of care and goals.     Education  Education was done with Patient. The patient's learning style includes Demonstration and Listening. The patient Verbalizes understanding and Demonstrates understanding.         POC, purpose of PT, discharge planning, Neuro PT, attendance        Goals:   Active       Long term goals        improve SSWS to 1.2 m/sec        Start:  02/13/25    Expected End:  04/10/25       Initial: 1.0 m/sec         improve FGA score to 27/30       Start:  02/13/25    Expected End:  04/10/25            improve BLE SLS time to 7 seconds        Start:  02/13/25    Expected End:  04/10/25            improve GST condition 3 to 30 seconds BLE leading        Start:  02/13/25    Expected End:  04/10/25               Short term goals        improve BLE hip extension by 1/3 MMT        Start:  02/13/25    Expected End:  03/13/25            improve FGA score to 24/30        Start:  02/13/25    Expected End:  03/13/25       Initial: 22/30         Improve bilateral SLS time to 4 seconds        Start:  02/13/25    Expected End:  03/13/25            improve BLE leading GST condition 3 to 15 seconds        Start:  02/13/25    Expected End:  03/13/25                Becca Najera, PT

## 2025-02-13 ENCOUNTER — CLINICAL SUPPORT (OUTPATIENT)
Dept: REHABILITATION | Facility: HOSPITAL | Age: 80
End: 2025-02-13
Attending: HOSPITALIST
Payer: MEDICARE

## 2025-02-13 DIAGNOSIS — Z74.09 IMPAIRED FUNCTIONAL MOBILITY, BALANCE, GAIT, AND ENDURANCE: Primary | ICD-10-CM

## 2025-02-13 DIAGNOSIS — R26.89 IMBALANCE: ICD-10-CM

## 2025-02-13 PROCEDURE — 97161 PT EVAL LOW COMPLEX 20 MIN: CPT | Mod: PO

## 2025-02-20 ENCOUNTER — HOSPITAL ENCOUNTER (OUTPATIENT)
Dept: RADIOLOGY | Facility: HOSPITAL | Age: 80
Discharge: HOME | End: 2025-02-20
Attending: UROLOGY
Payer: MEDICARE

## 2025-02-20 ENCOUNTER — OFFICE VISIT (OUTPATIENT)
Facility: CLINIC | Age: 80
End: 2025-02-20
Payer: MEDICARE

## 2025-02-20 VITALS
HEIGHT: 74 IN | OXYGEN SATURATION: 98 % | BODY MASS INDEX: 24.03 KG/M2 | DIASTOLIC BLOOD PRESSURE: 82 MMHG | SYSTOLIC BLOOD PRESSURE: 147 MMHG | HEART RATE: 95 BPM | TEMPERATURE: 97 F | WEIGHT: 187.25 LBS

## 2025-02-20 DIAGNOSIS — I12.0 TYPE 2 DM WITH HYPERTENSION AND ESRD ON DIALYSIS: ICD-10-CM

## 2025-02-20 DIAGNOSIS — E11.22 TYPE 2 DM WITH HYPERTENSION AND ESRD ON DIALYSIS: ICD-10-CM

## 2025-02-20 DIAGNOSIS — N18.6 TYPE 2 DM WITH HYPERTENSION AND ESRD ON DIALYSIS: ICD-10-CM

## 2025-02-20 DIAGNOSIS — E78.2 MIXED HYPERLIPIDEMIA: Primary | ICD-10-CM

## 2025-02-20 DIAGNOSIS — I12.0 BENIGN HYPERTENSION WITH CKD (CHRONIC KIDNEY DISEASE) STAGE V: ICD-10-CM

## 2025-02-20 DIAGNOSIS — N18.5 BENIGN HYPERTENSION WITH CKD (CHRONIC KIDNEY DISEASE) STAGE V: ICD-10-CM

## 2025-02-20 DIAGNOSIS — I70.0 AORTIC ATHEROSCLEROSIS: ICD-10-CM

## 2025-02-20 DIAGNOSIS — R97.20 ELEVATED PSA: ICD-10-CM

## 2025-02-20 DIAGNOSIS — I15.0 RENOVASCULAR HYPERTENSION: ICD-10-CM

## 2025-02-20 DIAGNOSIS — R63.4 UNINTENTIONAL WEIGHT LOSS: ICD-10-CM

## 2025-02-20 DIAGNOSIS — Z99.2 TYPE 2 DM WITH HYPERTENSION AND ESRD ON DIALYSIS: ICD-10-CM

## 2025-02-20 PROCEDURE — 72195 MRI PELVIS W/O DYE: CPT | Mod: TC,TXP

## 2025-02-20 PROCEDURE — 99999 PR PBB SHADOW E&M-EST. PATIENT-LVL III: CPT | Mod: PBBFAC,TXP,, | Performed by: HOSPITALIST

## 2025-02-20 PROCEDURE — 99213 OFFICE O/P EST LOW 20 MIN: CPT | Mod: PBBFAC,25,PN,TXP | Performed by: HOSPITALIST

## 2025-02-20 RX ORDER — AMLODIPINE BESYLATE 5 MG/1
2.5 TABLET ORAL
Qty: 24 TABLET | Refills: 3 | Status: SHIPPED | OUTPATIENT
Start: 2025-02-20 | End: 2026-02-20

## 2025-02-20 NOTE — PROGRESS NOTES
Primary Care Provider Appointment - 67 [facility] & Jaden Williamson MD      Subjective:      Patient ID: Teodoro Mast is a 79 y.o. male with   Past Medical History:   Diagnosis Date    BPH (benign prostatic hyperplasia)     CKD (chronic kidney disease) stage 5, GFR less than 15 ml/min     Gout     Hyperlipidemia     Hyperparathyroidism, secondary renal     Hypertension     Sleep apnea            Chief Complaint: Follow-up    Prior to this visit, patient's last encounter with PCP was 11/14/2024.    Followed up with transplant earlier this month; still approved for waitlist which he has been on since 2020.  HD going well.  Went through vestibular therapy which has helped considerably for vertigo and is doing neuro PT/OT for his gait.  Now ambulating independently; no longer using walker.    Still using insulin; BG ranging 90s-low 100s.  No hypoglycemic events.  Had diabetic eye exam last month.    Has been having low BPs after dialysis and having to stay until it gets better.  Hasn't taken any BP meds for about 3 weeks now.  Has lost about 20 lbs since starting HD.    11/14: Doing well after PD catheter removal.  Pus noted in catheter at removal and sent for Cx which grew bacteria.  Got IV abx w/ HD for 2 weeks.  Saw surgeon 2 d/a and d/c'd from care after site closed up and healed.  Noting strength, endurance doing better since.  Also sleeping better.  Does note fatigue after HD if large volumes removed.    Brought BP and BG logs for review.  Still using 10 units Lantus nightly.  No hypoglycemia.  Not getting any regular exercise.  Activity limited by vertigo; no relief w/ meclizine and stopped vestibular therapy after not tolerating 1 session.  Not using CPAP; machine is at least 5 years old and he has been replacing supplies through [employer].      4Ms for Medical Decision-Making in Older Adults    Last Completed EAWV: 8/7/2020    MOBILITY:  Get Up and Go:      8/7/2020    10:08 AM   Get Up and Go    Trial 1 5 seconds     Activities of Daily Livin/7/2020    10:03 AM   Activities of Daily Living   Ambulation Independent   Dressing Independent   Transfers Independent   Toileting Incontinent of bowel;Continent of bladder   Feeding Independent   Cleaning home/Chores Independent   Telephone use Independent   Shopping Independent   Paying bills Independent   Taking meds Independent     Whisper Test:      2020    10:08 AM   Whisper Test   Whisper Test Abnormal     Disability Status:      2020    10:05 AM   Disability Status   Are you deaf or do you have serious difficulty hearing? N   Are you blind or do you have serious difficulty seeing, even when wearing glasses? N   Because of a physical, mental, or emotional condition, do you have serious difficulty concentrating, remembering, or making decisions? N   Do you have serious difficulty walking or climbing stairs? N   Do you have difficulty dressing or bathing? N   Because of a physical, mental, or emotional condition, do you have difficulty doing errands alone such as visiting a doctor's office or shopping? N     Nutrition Screenin/7/2020     9:59 AM   Nutrition Screening   Has food intake declined over the past three months due to loss of appetite, digestive problems, chewing or swallowing difficulties? Moderate decrease in food intake   Involuntary weight loss during the last 3 months? No weight loss   Mobility? Goes out   Has the patient suffered psychological stress or acute disease in the past three months? Yes   Neuropsychological problems? No psychological problems   Body Mass Index (BMI)?  BMI 23 or greater   Screening Score 11    Screening Score: 0-7 Malnourished, 8-11 At Risk, 12-14 Normal  Fall Risk:      2025    11:00 AM 2025     2:00 PM 2024     9:45 AM   Fall Risk Assessment - Outpatient   Mobility Status Ambulatory Ambulatory w/ assistance Ambulatory   Number of falls 0 0 0   Identified as fall risk False True  False           MENTATION:   Depression Patient Health Questionnaire:      2025    11:02 AM   Depression Patient Health Questionnaire   Over the last two weeks how often have you been bothered by little interest or pleasure in doing things Not at all   Over the last two weeks how often have you been bothered by feeling down, depressed or hopeless Not at all   PHQ-2 Total Score 0     Has Dementia Dx: No  Has Anxiety Dx: No    Cognitive Function Screenin/7/2020    10:08 AM   Cognitive Function Screening   Clock Drawing Test 2    Mini-Cog 3 Minute Recall 2   Cognitive Function Screening 4       Data saved with a previous flowsheet row definition     Cognitive Function Screening Total - Less than 4 = Abnormal,  Greater than or equal to 4 = Normal        MEDICATIONS:  High Risk Medications:  Total Active Medications: 0  This patient does not have an active medication from one of the medication groupers.    WHAT MATTERS MOST:  Advance Care Planning   ACP Status:   Patient has had an ACP conversation  Living Will: No  Power of : No  LaPOST: No    What is most important right now is to focus on remaining as independent as possible    Accordingly, we have decided that the best plan to meet the patient's goals includes continuing with treatment      What matters most to patient today is: getting ready for surgery             Social History     Socioeconomic History    Marital status:     Number of children: 2   Tobacco Use    Smoking status: Former     Current packs/day: 0.00     Average packs/day: 0.5 packs/day for 10.0 years (5.0 ttl pk-yrs)     Types: Cigarettes     Start date:      Quit date:      Years since quittin.1    Smokeless tobacco: Never   Substance and Sexual Activity    Alcohol use: Not Currently    Drug use: No    Sexual activity: Not Currently     Partners: Female   Social History Narrative    Caregiver Teodoro Mast II     Social Drivers of Health      Financial Resource Strain: Low Risk  (2/13/2025)    Overall Financial Resource Strain (CARDIA)     Difficulty of Paying Living Expenses: Not hard at all   Food Insecurity: No Food Insecurity (2/13/2025)    Hunger Vital Sign     Worried About Running Out of Food in the Last Year: Never true     Ran Out of Food in the Last Year: Never true   Transportation Needs: Unmet Transportation Needs (2/13/2025)    PRAPARE - Transportation     Lack of Transportation (Medical): Yes     Lack of Transportation (Non-Medical): No   Physical Activity: Inactive (2/13/2025)    Exercise Vital Sign     Days of Exercise per Week: 0 days     Minutes of Exercise per Session: 0 min   Stress: No Stress Concern Present (2/13/2025)    [employer] - Occupational Stress Questionnaire     Feeling of Stress : Not at all   Housing Stability: Low Risk  (2/13/2025)    Housing Stability Vital Sign     Unable to Pay for Housing in the Last Year: No     Number of Times Moved in the Last Year: 0     Homeless in the Last Year: No       Review of Systems   Constitutional:  Negative for activity change, fatigue and unexpected weight change.   HENT:  Negative for hearing loss, rhinorrhea and trouble swallowing.    Eyes:  Negative for discharge and visual disturbance.   Respiratory:  Negative for cough, chest tightness, shortness of breath and wheezing.    Cardiovascular:  Negative for chest pain, palpitations, leg swelling and claudication.   Gastrointestinal:  Negative for abdominal pain, blood in stool, change in bowel habit, constipation, diarrhea and vomiting.   Endocrine: Negative for polydipsia and polyuria.   Genitourinary:  Positive for decreased urine volume. Negative for difficulty urinating, hematuria and urgency.   Musculoskeletal:  Negative for arthralgias, joint swelling and neck pain.   Integumentary:  Negative for wound.   Neurological:  Positive for dizziness and weakness. Negative for headaches.  "  Psychiatric/Behavioral:  Negative for confusion and dysphoric mood.         Objective:   BP (!) 147/82 (BP Location: Left arm, Patient Position: Sitting)   Pulse 95   Temp 97.4 °F (36.3 °C) (Oral)   Ht 6' 2" (1.88 m)   Wt 84.9 kg (187 lb 4.5 oz)   SpO2 98%   BMI 24.05 kg/m²     Physical Exam  Vitals reviewed.   Constitutional:       General: He is not in acute distress.     Appearance: Normal appearance. He is normal weight.      Comments: Ambulates independently.   HENT:      Head: Normocephalic and atraumatic.      Right Ear: Tympanic membrane, ear canal and external ear normal.      Left Ear: Tympanic membrane, ear canal and external ear normal.      Nose: Nose normal.      Mouth/Throat:      Mouth: Mucous membranes are moist.      Pharynx: Oropharynx is clear.   Eyes:      General: No scleral icterus.     Conjunctiva/sclera: Conjunctivae normal.   Cardiovascular:      Rate and Rhythm: Regular rhythm. Tachycardia present.      Pulses: Normal pulses.      Heart sounds: Murmur heard.      Crescendo decrescendo systolic murmur is present with a grade of 3/6.      Arteriovenous access: Left arteriovenous access is present.  Pulmonary:      Effort: Pulmonary effort is normal. No respiratory distress.      Breath sounds: Normal breath sounds.   Abdominal:      General: A surgical scar is present. Bowel sounds are normal. There is no distension.      Palpations: Abdomen is soft. There is no fluid wave, hepatomegaly or splenomegaly.      Tenderness: There is no abdominal tenderness.      Comments: PD catheter site C/D/I; healed and closed-off.     Musculoskeletal:      Cervical back: Normal range of motion and neck supple. No rigidity.      Right lower leg: No edema.      Left lower leg: No edema.   Lymphadenopathy:      Cervical: No cervical adenopathy.   Skin:     General: Skin is warm and dry.      Findings: No erythema or rash.   Neurological:      General: No focal deficit present.      Mental Status: He is " "alert and oriented to person, place, and time.              Lab Results   Component Value Date    WBC 6.66 02/05/2025    HGB 11.2 (L) 02/05/2025    HCT 33.6 (L) 02/05/2025     02/05/2025    CHOL 178 01/12/2024    TRIG 123 01/12/2024    HDL 29 (L) 01/12/2024    ALT 12 02/05/2025    AST 14 10/08/2024     02/05/2025    K 4.9 02/07/2025     02/05/2025    CREATININE 9.81 (H) 02/05/2025    BUN 24 (H) 10/08/2024    CO2 22 (L) 10/08/2024    PSA 14.2 (H) 07/13/2021    INR 1.0 07/13/2021    HGBA1C 5.6 01/03/2025       Current Outpatient Medications on File Prior to Visit   Medication Sig Dispense Refill    amLODIPine (NORVASC) 5 MG tablet Take 1 tablet (5 mg total) by mouth once daily. 90 tablet 3    atorvastatin (LIPITOR) 80 MG tablet TAKE 1 TABLET(80 MG) BY MOUTH EVERY DAY 90 tablet 3    B complex-vitamin C-folic acid (RENAL VITAMIN) 0.8 mg Tab Take 1 tablet (0.8 mg total) by mouth once daily. 90 tablet 1    labetaloL (NORMODYNE) 300 MG tablet Take 1 tablet (300 mg total) by mouth 2 (two) times daily. 180 tablet 3    LANTUS SOLOSTAR U-100 INSULIN 100 unit/mL (3 mL) InPn pen ADMINISTER 10 UNITS UNDER THE SKIN EVERY EVENING 9 mL 0    magnesium oxide (MAG-OX) 400 mg (241.3 mg magnesium) tablet Take 1 tablet (400 mg total) by mouth once daily. 90 tablet 1    ondansetron (ZOFRAN) 4 MG tablet Take 1 tablet (4 mg total) by mouth every 8 (eight) hours as needed for Nausea. 30 tablet 2    pen needle, diabetic (BD KITA 2ND GEN PEN NEEDLE) 32 gauge x 5/32" Ndle Inject 1 each into the skin daily as needed (insulin injection). 100 each 11    PROSOL 20 % SolP Inject into the vein.      psyllium (METAMUCIL) powder Take 28 g by mouth once daily. Patient taking 2 tablespoon      vitamin D (VITAMIN D3) 1000 units Tab Take 2,000 Units by mouth once daily.      acetaminophen (TYLENOL) 500 MG tablet Take 2 tablets (1,000 mg total) by mouth every 8 (eight) hours as needed for Pain.      sevelamer carbonate (RENVELA) 800 mg Tab " Take 1 tablet (800 mg total) by mouth 3 (three) times daily with meals. 90 tablet 11     Current Facility-Administered Medications on File Prior to Visit   Medication Dose Route Frequency Provider Last Rate Last Admin    [] heparin (porcine) injection 1,500 Units  1,500 Units Intravenous Continuous Cassandra Lopez NP   1,500 Units at 25 1030         Assessment:   79 y.o. male with multiple co-morbid illnesses here to follow-up for ongoing chronic disease management and preventive health maintenance.    Plan:     Problem List Items Addressed This Visit       Mixed hyperlipidemia - Primary    On atorvastatin 80mg; due to update lipids.         Relevant Orders    LIPID PANEL    Benign hypertension with CKD (chronic kidney disease) stage V    Has been off antihypertensives for a few months due to hypotension following HD.  His BP is a bit above goal on this off-dialysis day; however, suggesting he should still be on a little something on those days.  Will discontinue labetalol and decrease amlodipine to 2.5mg to take only on off-dialysis days.         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  Due to update A1c.         Aortic atherosclerosis    Incidental finding on prior imaging.  On ASA 81mg and atorvastatin 80mg.          Other Visit Diagnoses         Renovascular hypertension        Relevant Medications    amLODIPine (NORVASC) 5 MG tablet      Unintentional weight loss        Relevant Orders    TSH                Health Maintenance         Date Due Completion Date    Shingles Vaccine (1 of 2) Never done ---    Diabetic Eye Exam 2024    Override on 2023: Done    Lipid Panel 2025    Hemoglobin A1c 2025 1/3/2025    TETANUS VACCINE 2030        Counseled patient on shingles vaccine and strongly recommended he complete it prior to having his transplant, and  advised him to schedule eye exam asap.    Future Appointments   Date Time Provider Department Center   2/24/2025 10:15 AM CHAIR 22, [facility]   2/26/2025 10:15 AM CHAIR 22, [facility]   2/28/2025 10:15 AM CHAIR 22, [facility]   3/3/2025 10:15 AM CHAIR 22, [facility]   3/5/2025 10:15 AM CHAIR 22, [facility]   3/6/2025  3:30 PM Becca Najera, [facility]   3/7/2025 10:15 AM CHAIR 22, [facility]   3/10/2025 10:15 AM CHAIR 22, [facility]   3/11/2025  9:30 AM Stephanie Gallagher, PT [facility]   3/12/2025 10:15 AM CHAIR 22, [facility]   3/13/2025  3:30 PM Becca Najera, [facility]   3/14/2025 10:15 AM CHAIR 22, [facility]   3/17/2025 10:15 AM CHAIR 22, [facility]   3/18/2025  8:45 AM Stephanie Gallagher, PT [facility]   3/19/2025 10:15 AM CHAIR 22, [facility]   3/20/2025 10:30 AM Stephanie Gallagher, PT [facility]   3/21/2025 10:15 AM CHAIR 22, [facility]   3/24/2025 10:15 AM CHAIR 22, [facility]   3/25/2025  9:30 AM Stephanie Gallagher, [facility]   3/26/2025 10:15 AM CHAIR 22, [facility]   3/27/2025  9:30 AM Stephanie Gallagher, PT [facility]   3/28/2025 10:15 AM CHAIR 22, [facility]   3/31/2025 10:15 AM CHAIR 22, [facility]   4/1/2025  8:45 AM Becca Najera, PT [facility] [facility]   4/2/2025 10:15 AM CHAIR 22, [facility]   4/3/2025  8:45 AM Stephanie Gallagher, PT [place] [address] [facility]   4/4/2025 10:15 AM CHAIR 22, [facility]   4/7/2025 10:15 AM CHAIR 22, [facility]   4/8/2025  8:45 AM Becca Najera, [facility]   4/9/2025 10:15 AM CHAIR 22, [facility]   4/10/2025  8:45 AM Stephanie Gallagher, [facility]   4/11/2025 10:15 AM CHAIR 22, [facility]   4/14/2025 10:15 AM CHAIR 22, [facility]   4/15/2025  8:45 AM Becca Najera, PT [facility]   4/16/2025 10:15 AM CHAIR 22, [facility]   4/17/2025  8:45 AM Stephanie Gallagher, [facility]   4/18/2025 10:15 AM CHAIR 22, [facility]   4/21/2025 10:15 AM CHAIR 22, [facility]   4/22/2025  8:45 AM Becca Najera, PT [facility]   4/23/2025 10:15 AM CHAIR 22, [facility]   4/24/2025 10:30 AM Becca Najera, PT [facility]   4/25/2025 10:15 AM CHAIR 22, [facility]   4/28/2025 10:15 AM CHAIR 22, [facility]   4/30/2025 10:15 AM CHAIR 22, [facility]   5/20/2025 10:20 AM Phil Williamson MD [address] [facility]         Follow up in about 3 months (around 5/20/2025). Total clinical care time was 40 min.    Phil Williamson MD  [address] [place] [address], [facility]    This note was partially dictated using voice recognition software and although actively proofread during transcription, it is possible some errors in transcription may have been overlooked.

## 2025-02-21 ENCOUNTER — RESULTS FOLLOW-UP (OUTPATIENT)
Dept: UROLOGY | Facility: HOSPITAL | Age: 80
End: 2025-02-21
Payer: MEDICARE

## 2025-02-24 NOTE — ASSESSMENT & PLAN NOTE
He remains on Lantus 10 units with blood glucoses at goal and no hypoglycemia.  Due to update A1c.

## 2025-02-24 NOTE — ASSESSMENT & PLAN NOTE
Has been off antihypertensives for a few months due to hypotension following HD.  His BP is a bit above goal on this off-dialysis day; however, suggesting he should still be on a little something on those days.  Will discontinue labetalol and decrease amlodipine to 2.5mg to take only on off-dialysis days.

## 2025-03-05 NOTE — PROGRESS NOTES
Outpatient Rehab    Physical Therapy Visit    Patient Name: Teodoro Mast  MRN: [id number]  YOB: 1945  Encounter Date: 3/6/2025    Therapy Diagnosis:   Encounter Diagnosis   Name Primary?    Impaired functional mobility, balance, gait, and endurance Yes     Physician: Phil Williamson, *    Physician Orders: Eval and Treat    Physician Orders: Eval and Treat  Medical Diagnosis: R26.89 (ICD-10-CM) - Imbalance      Visit # / Visits Authorized:     Date of Evaluation:  2025   Insurance Authorization Period: 2025 to 2026  Plan of Care Certification:  2025 to 4/10/2025      Time In: 1530   Time Out: 1608  Total Time: 38   Total Billable Time: 38 minutes     FOTO:  Intake Score:  %  Survey Score 1:  %  Survey Score 2:  %         Subjective   that he continues to feel very weak and unstable.  Pain reported as 0/10.      Objective      BP seated: 136/82 mmhg  BP standin/66  mmhg  BP following bike :105/66 mmhg        Treatment:  Therapeutic Exercise  Therapeutic Exercise Activity 1: 10 minutes on [employer] seated elliptical for CV endurance, level 5    Balance/Neuromuscular Re-Education  Balance/Neuromuscular Re-Education Activity 1: 2 x 30 seconds modified SLS in corner  Balance/Neuromuscular Re-Education Activity 2: 2 x 30 seconds tandem stance in corner  Balance/Neuromuscular Re-Education Activity 3: 4 laps tandem ambulation  Balance/Neuromuscular Re-Education Activity 4: 4 laps marching with three second holds    Therapeutic Activity  Therapeutic Activity 1: 2 x 100 feet ambulation in hallway with horizontal head turns  Therapeutic Activity 2: 2 x 100 feet ambulation in hallway with vertical head turns  Therapeutic Activity 3: time to assess BP    Assessment & Plan   Assessment: Teodoro tolerated today's session poorly due to orthostatic hypotension. The patient reports feeling extremely weak and unstable with minimal standing balance activities. The patient's BP was  assessed in sitting vs standing and dropped over 20 points. The patient was educated on orthostatic hypotension, and  associated symptoms. Seated elliptical was performed for the remainder of the session and BP was again assessed and read even lower. Session was ended early due to hypotension and patient was recommended to assess BP again once home and contact MD if he continues to feel symptomatic.   Evaluation/Treatment Tolerance: Treatment limited secondary to medical complications (Comment) (low blood pressure)    Patient will continue to benefit from skilled outpatient physical therapy to address the deficits listed in the problem list box on initial evaluation, provide pt/family education and to maximize pt's level of independence in the home and community environment.     Patient's spiritual, cultural, and educational needs considered and patient agreeable to plan of care and goals.           Plan: continue to work on static/ dynamic balance activities    Goals:   Active       Long term goals        improve SSWS to 1.2 m/sec  (Progressing)       Start:  02/13/25    Expected End:  04/10/25       Initial: 1.0 m/sec         improve FGA score to 27/30 (Progressing)       Start:  02/13/25    Expected End:  04/10/25            improve BLE SLS time to 7 seconds  (Progressing)       Start:  02/13/25    Expected End:  04/10/25            improve GST condition 3 to 30 seconds BLE leading  (Progressing)       Start:  02/13/25    Expected End:  04/10/25               Short term goals        improve BLE hip extension by 1/3 MMT  (Progressing)       Start:  02/13/25    Expected End:  03/13/25            improve FGA score to 24/30  (Progressing)       Start:  02/13/25    Expected End:  03/13/25       Initial: 22/30         Improve bilateral SLS time to 4 seconds  (Progressing)       Start:  02/13/25    Expected End:  03/13/25            improve BLE leading GST condition 3 to 15 seconds  (Progressing)       Start:  02/13/25     Expected End:  03/13/25                Becca Najera, PT

## 2025-03-06 ENCOUNTER — LAB VISIT (OUTPATIENT)
Dept: PRIMARY CARE CLINIC | Facility: CLINIC | Age: 80
End: 2025-03-06
Payer: MEDICARE

## 2025-03-06 ENCOUNTER — CLINICAL SUPPORT (OUTPATIENT)
Dept: REHABILITATION | Facility: HOSPITAL | Age: 80
End: 2025-03-06
Payer: MEDICARE

## 2025-03-06 DIAGNOSIS — Z74.09 IMPAIRED FUNCTIONAL MOBILITY, BALANCE, GAIT, AND ENDURANCE: Primary | ICD-10-CM

## 2025-03-06 DIAGNOSIS — R63.4 UNINTENTIONAL WEIGHT LOSS: ICD-10-CM

## 2025-03-06 DIAGNOSIS — E78.2 MIXED HYPERLIPIDEMIA: ICD-10-CM

## 2025-03-06 LAB
CHOLEST SERPL-MCNC: 147 MG/DL (ref 120–199)
CHOLEST/HDLC SERPL: 3.8 {RATIO} (ref 2–5)
HDLC SERPL-MCNC: 39 MG/DL (ref 40–75)
HDLC SERPL: 26.5 % (ref 20–50)
LDLC SERPL CALC-MCNC: 88.4 MG/DL (ref 63–159)
NONHDLC SERPL-MCNC: 108 MG/DL
TRIGL SERPL-MCNC: 98 MG/DL (ref 30–150)
TSH SERPL DL<=0.005 MIU/L-ACNC: 1.95 UIU/ML (ref 0.4–4)

## 2025-03-06 PROCEDURE — 97112 NEUROMUSCULAR REEDUCATION: CPT | Mod: PO

## 2025-03-06 PROCEDURE — 97530 THERAPEUTIC ACTIVITIES: CPT | Mod: PO

## 2025-03-06 PROCEDURE — 97110 THERAPEUTIC EXERCISES: CPT | Mod: PO

## 2025-03-06 PROCEDURE — 80061 LIPID PANEL: CPT | Mod: TXP | Performed by: HOSPITALIST

## 2025-03-06 PROCEDURE — 84443 ASSAY THYROID STIM HORMONE: CPT | Mod: TXP | Performed by: HOSPITALIST

## 2025-03-07 ENCOUNTER — RESULTS FOLLOW-UP (OUTPATIENT)
Facility: CLINIC | Age: 80
End: 2025-03-07

## 2025-03-11 ENCOUNTER — CLINICAL SUPPORT (OUTPATIENT)
Dept: REHABILITATION | Facility: HOSPITAL | Age: 80
End: 2025-03-11
Payer: MEDICARE

## 2025-03-11 DIAGNOSIS — Z74.09 IMPAIRED FUNCTIONAL MOBILITY, BALANCE, GAIT, AND ENDURANCE: Primary | ICD-10-CM

## 2025-03-11 PROCEDURE — 97530 THERAPEUTIC ACTIVITIES: CPT | Mod: PO

## 2025-03-11 PROCEDURE — 97112 NEUROMUSCULAR REEDUCATION: CPT | Mod: PO

## 2025-03-11 NOTE — PROGRESS NOTES
Outpatient Rehab    Physical Therapy Visit    Patient Name: Teodoro Mast  MRN: [id number]  YOB: 1945  Encounter Date: 3/11/2025    Therapy Diagnosis:   Encounter Diagnosis   Name Primary?    Impaired functional mobility, balance, gait, and endurance Yes       Physician: Phil Williamson, *    Physician Orders: Eval and Treat    Physician Orders: Eval and Treat  Medical Diagnosis: R26.89 (ICD-10-CM) - Imbalance      Visit # / Visits Authorized:  02 / 20   Date of Evaluation:  2/13/2025   Insurance Authorization Period: 1/29/2025 to 1/29/2026  Plan of Care Certification:  2/13/2025 to 4/10/2025      Time In: 0930   Time Out: 1015  Total Time: 45   Total Billable Time: 45 minutes          Subjective   that he is doing well this morning. BP is still running somewhat low, but HR has been running high..  Pain reported as 0/10.      Objective      Vitals taken in sitting prior to start of therapy session  RUE automatic cuff:  BP: 146/78, HR: 106 bpm    Vitals taken in sitting at end of session  RUE automatic cuff:  BP: 145/85, HR: 100-102 bpm, SPO2: 91%          Treatment:   Teodoro received therapeutic exercises to develop strength and endurance for 00 minutes including:   NP this date due to elevated HR at baseline.     Patient participated in dynamic functional therapeutic activities to improve functional performance for 25 minutes. Including:   Hallway ambulation:  2 x 100 feet ambulation with right<>left head movements, SBA, min sway  2 x 100 feet ambulation with up <> down head movements, SBA, slight sway  2 x 100 feet forward <> backward ambulation tossing <> catching ball, CGA    HR following hallway activities: HR: 67 bpm; SPO2, 88%  Seated rest breaks provided throughout    X 10 reps, each lower extremity single leg stance with alternate lower extremity tapping 2 large cones, CGA to occ Min A, no upper extremity support    Patient participated in neuromuscular re-education activities to  "improve: Balance, Coordination, and Sense for 20 minutes. The following activities were included:   // bars:  X 4 laps tandem ambulation, intermittent touchdown support, SBA  X 4 laps alternating marching, 3" hold, intermittent touchdown support, SBA    Foam pad:  3 x 30" stance with eyes closed, CGA to occ Min A, no upper extremity support    1 x 30" each lower extremity in front, tandem stance with eyes open, CGA   1 x 30" each lower extremity in front, modified tandem stance with eyes closed, CGA         Assessment & Plan   Assessment: Teodoro tolerated therapy session fairly well this morning and remains motivated to improve. HR slightly elevated at start of session, so cardiovascular endurance activity held to prevent further elevation of HR. Able to complete hallway ambulation activities with rest breaks throughout to allow for improved SPO2 and to prevent significant elevation in vitals. Multiple seated rest breaks provided during balance activities in // bars due to quick short of breath, but good effort throughout exercises. Patient remains appropriate for continued PT intervention to further progress remaining balance and activity tolerance deficits. PT instructed patient to monitor HR and SPO2 and to notify medical team if HR remains elevated since further evaluation may be warranted. Patient agreeable and plans to discuss with dialysis team tomorrow.        Patient will continue to benefit from skilled outpatient physical therapy to address the deficits listed in the problem list box on initial evaluation, provide pt/family education and to maximize pt's level of independence in the home and community environment.     Patient's spiritual, cultural, and educational needs considered and patient agreeable to plan of care and goals.           Plan: Monitor vitals throughout session. Progress balance and endurance activities as tolerated.    Goals:   Active       Long term goals        improve SSWS to 1.2 " m/sec  (Progressing)       Start:  02/13/25    Expected End:  04/10/25       Initial: 1.0 m/sec         improve FGA score to 27/30 (Progressing)       Start:  02/13/25    Expected End:  04/10/25            improve BLE SLS time to 7 seconds  (Progressing)       Start:  02/13/25    Expected End:  04/10/25            improve GST condition 3 to 30 seconds BLE leading  (Progressing)       Start:  02/13/25    Expected End:  04/10/25               Short term goals        improve BLE hip extension by 1/3 MMT  (Progressing)       Start:  02/13/25    Expected End:  03/13/25            improve FGA score to 24/30  (Progressing)       Start:  02/13/25    Expected End:  03/13/25       Initial: 22/30         Improve bilateral SLS time to 4 seconds  (Progressing)       Start:  02/13/25    Expected End:  03/13/25            improve BLE leading GST condition 3 to 15 seconds  (Progressing)       Start:  02/13/25    Expected End:  03/13/25                Stephanie Gallagher, PT

## 2025-03-13 ENCOUNTER — CLINICAL SUPPORT (OUTPATIENT)
Dept: REHABILITATION | Facility: HOSPITAL | Age: 80
End: 2025-03-13
Payer: MEDICARE

## 2025-03-13 DIAGNOSIS — Z74.09 IMPAIRED FUNCTIONAL MOBILITY, BALANCE, GAIT, AND ENDURANCE: Primary | ICD-10-CM

## 2025-03-13 PROCEDURE — 97110 THERAPEUTIC EXERCISES: CPT | Mod: PO

## 2025-03-13 PROCEDURE — 97112 NEUROMUSCULAR REEDUCATION: CPT | Mod: PO

## 2025-03-13 PROCEDURE — 97530 THERAPEUTIC ACTIVITIES: CPT | Mod: PO

## 2025-03-13 NOTE — PROGRESS NOTES
Outpatient Rehab    Physical Therapy Visit    Patient Name: Teodoro Mast  MRN: [id number]  YOB: 1945  Encounter Date: 3/13/2025    Therapy Diagnosis:   Encounter Diagnosis   Name Primary?    Impaired functional mobility, balance, gait, and endurance Yes     Physician: Phil Williamson, *    Physician Orders: Eval and Treat  Medical Diagnosis: R26.89 (ICD-10-CM) - Imbalance      Visit # / Visits Authorized:  03 / 20   Date of Evaluation:  2/13/2025   Insurance Authorization Period: 1/29/2025 to 1/29/2026  Plan of Care Certification:  2/13/2025 to 4/10/2025                    Time In: 1530   Time Out: 1615  Total Time: 45   Total Billable Time: 45 minutes     FOTO:  Intake Score:  %  Survey Score 1:  %  Survey Score 2:  %         Subjective   previous session went well and BP has been fine.  Pain reported as 0/10.      Objective        Seated at beginning of session: 134/78    Treatment:  Therapeutic Exercise  Therapeutic Exercise Activity 1: 10 minutes on [employer] seated elliptical for CV endurance, level 5    Balance/Neuromuscular Re-Education  Balance/Neuromuscular Re-Education Activity 3: 4 laps tandem ambulation  Balance/Neuromuscular Re-Education Activity 4: 4 laps marching with three second holds    Therapeutic Activity  Therapeutic Activity 1: 2 x 100 feet ambulation in hallway with horizontal head turns  Therapeutic Activity 2: 2 x 100 feet ambulation in hallway with vertical head turns  Therapeutic Activity 3: time to assess BP  Therapeutic Activity 4: 2 x 100 feet ambulation with diagonal head turns in each direction  Therapeutic Activity 5: 10 reps BLE leading step up to foam fitter with CGA    Assessment & Plan   Assessment: Teodoro tolerated today's session very well this afternoon. BP assessed at begging of session and [facility] through session and remained within normal limits. Teodoro requires frequent rest breaks throughout session due to SOB and faitgue, and elevated HR noted upon  assessment of vitals.  Evaluation/Treatment Tolerance: Patient tolerated treatment well    Patient will continue to benefit from skilled outpatient physical therapy to address the deficits listed in the problem list box on initial evaluation, provide pt/family education and to maximize pt's level of independence in the home and community environment.     Patient's spiritual, cultural, and educational needs considered and patient agreeable to plan of care and goals.           Plan: Monitor vitals throughout session. Progress balance and endurance activities as tolerated.    Goals:   Active       Long term goals        improve SSWS to 1.2 m/sec  (Progressing)       Start:  02/13/25    Expected End:  04/10/25       Initial: 1.0 m/sec         improve FGA score to 27/30 (Progressing)       Start:  02/13/25    Expected End:  04/10/25            improve BLE SLS time to 7 seconds  (Progressing)       Start:  02/13/25    Expected End:  04/10/25            improve GST condition 3 to 30 seconds BLE leading  (Progressing)       Start:  02/13/25    Expected End:  04/10/25               Short term goals        improve BLE hip extension by 1/3 MMT  (Progressing)       Start:  02/13/25    Expected End:  03/13/25            improve FGA score to 24/30  (Progressing)       Start:  02/13/25    Expected End:  03/13/25       Initial: 22/30         Improve bilateral SLS time to 4 seconds  (Progressing)       Start:  02/13/25    Expected End:  03/13/25            improve BLE leading GST condition 3 to 15 seconds  (Progressing)       Start:  02/13/25    Expected End:  03/13/25                Becca Najera PT

## 2025-03-18 ENCOUNTER — CLINICAL SUPPORT (OUTPATIENT)
Dept: REHABILITATION | Facility: HOSPITAL | Age: 80
End: 2025-03-18
Payer: MEDICARE

## 2025-03-18 DIAGNOSIS — Z74.09 IMPAIRED FUNCTIONAL MOBILITY, BALANCE, GAIT, AND ENDURANCE: Primary | ICD-10-CM

## 2025-03-18 PROCEDURE — 97112 NEUROMUSCULAR REEDUCATION: CPT | Mod: PO

## 2025-03-18 PROCEDURE — 97530 THERAPEUTIC ACTIVITIES: CPT | Mod: PO

## 2025-03-18 NOTE — PROGRESS NOTES
Outpatient Rehab    Physical Therapy Visit    Patient Name: Teodoro Mast  MRN: [id number]  YOB: 1945  Encounter Date: 3/18/2025    Therapy Diagnosis:   Encounter Diagnosis   Name Primary?    Impaired functional mobility, balance, gait, and endurance Yes       Physician: Phil Williamson, *    Physician Orders: Eval and Treat  Medical Diagnosis: R26.89 (ICD-10-CM) - Imbalance      Visit # / Visits Authorized:  04 / 20   Date of Evaluation:  2/13/2025   Insurance Authorization Period: 1/29/2025 to 1/29/2026  Plan of Care Certification:  2/13/2025 to 4/10/2025                    Time In: 0800   Time Out: 0845  Total Time: 45   Total Billable Time: 45 minutes          Subjective   that he is doing fine this morning. BP is doing better and HR has been trending in the 80's. He reports a greater sense of imbalance today compared to prior sessions..  Pain reported as 0/10.      Objective          Vitals taken in sitting prior to start of therapy session  RUE automatic cuff:  BP: 147/75, HR: 94 bpm  SPO2: 93%     Vitals taken in sitting at end of session  RUE automatic cuff:  BP: 119/73, HR: 123 bpm which reduced to 94 bpm within 2 min seated rest, SPO2: 93%    Treatment:  Teodoro received therapeutic exercises to develop strength and endurance for 00 minutes including:   NP this date due to elevated HR at baseline.      Patient participated in dynamic functional therapeutic activities to improve functional performance for 25 minutes. Including:   Hallway ambulation:  2 x 100 feet ambulation with right<>left head movements, SBA, min sway  2 x 100 feet ambulation with up <> down head movements, SBA, slight sway  2 x 100 feet forward <> backward ambulation tossing <> catching ball, CGA     HR following hallway activities: HR: 110 bpm; SPO2, 91%  Seated rest breaks provided throughout     X 10 reps, each lower extremity single leg stance with alternate lower extremity tapping 2 large cones, CGA to occ Min  "A, no upper extremity support     Patient participated in neuromuscular re-education activities to improve: Balance, Coordination, and Sense for 20 minutes. The following activities were included:   // bars:  X 4 laps tandem ambulation, intermittent touchdown support, SBA  X 4 laps alternating marching, 3" hold, intermittent touchdown support, SBA     Foam pad:  3 x 30" stance with eyes closed, CGA to occ Min A, no upper extremity support    Fitter board:  2 x 10 reps each lower extremity single leg stance with alternate lower extremity on fitter board + forward chest press with 5# kettle bell                 Assessment & Plan   Assessment: Teodoro tolerated therapy session better today with improved resting HR and SPO2 response to activities. However, he does report increased sense of unsteadiness to start session. Vitals monitored throughout session and multiple frequent rest breaks. Balance activities performed today remain appropriately challenging. Patient remains appropriate for continued PT to address activity tolerance and balance deficits.       Patient will continue to benefit from skilled outpatient physical therapy to address the deficits listed in the problem list box on initial evaluation, provide pt/family education and to maximize pt's level of independence in the home and community environment.     Patient's spiritual, cultural, and educational needs considered and patient agreeable to plan of care and goals.           Plan: Monitor vitals throughout session. Progress balance and endurance activities as tolerated.    Goals:   Active       Long term goals        improve SSWS to 1.2 m/sec  (Progressing)       Start:  02/13/25    Expected End:  04/10/25       Initial: 1.0 m/sec         improve FGA score to 27/30 (Progressing)       Start:  02/13/25    Expected End:  04/10/25            improve BLE SLS time to 7 seconds  (Progressing)       Start:  02/13/25    Expected End:  04/10/25            improve " GST condition 3 to 30 seconds BLE leading  (Progressing)       Start:  02/13/25    Expected End:  04/10/25               Short term goals        improve BLE hip extension by 1/3 MMT  (Progressing)       Start:  02/13/25    Expected End:  03/13/25            improve FGA score to 24/30  (Progressing)       Start:  02/13/25    Expected End:  03/13/25       Initial: 22/30         Improve bilateral SLS time to 4 seconds  (Progressing)       Start:  02/13/25    Expected End:  03/13/25            improve BLE leading GST condition 3 to 15 seconds  (Progressing)       Start:  02/13/25    Expected End:  03/13/25                Stephanie Gallagher, PT

## 2025-03-25 ENCOUNTER — CLINICAL SUPPORT (OUTPATIENT)
Dept: REHABILITATION | Facility: HOSPITAL | Age: 80
End: 2025-03-25
Payer: MEDICARE

## 2025-03-25 DIAGNOSIS — Z74.09 IMPAIRED FUNCTIONAL MOBILITY, BALANCE, GAIT, AND ENDURANCE: Primary | ICD-10-CM

## 2025-03-25 PROCEDURE — 97112 NEUROMUSCULAR REEDUCATION: CPT | Mod: PO

## 2025-03-25 PROCEDURE — 97530 THERAPEUTIC ACTIVITIES: CPT | Mod: PO

## 2025-03-25 NOTE — PROGRESS NOTES
Outpatient Rehab    Physical Therapy Visit    Patient Name: Teodoro Mast  MRN: [id number]  YOB: 1945  Encounter Date: 3/25/2025    Therapy Diagnosis:   Encounter Diagnosis   Name Primary?    Impaired functional mobility, balance, gait, and endurance Yes         Physician: Phil Williamson, *    Physician Orders: Eval and Treat  Medical Diagnosis: R26.89 (ICD-10-CM) - Imbalance      Visit # / Visits Authorized:  05 / 20   Date of Evaluation:  2/13/2025   Insurance Authorization Period: 1/29/2025 to 1/29/2026  Plan of Care Certification:  2/13/2025 to 4/10/2025                    Time In:     Time Out:    Total Time:     Total Billable Time: 45 minutes          Subjective   that he is doing better today. He is waiting to take his BP medication until after therapy since it dropped his pressure so low last week, he could not make it to the car..  Pain reported as 0/10.      Objective          Vitals taken in sitting prior to start of therapy session  RUE automatic cuff:  BP: 150/81, HR: 106 bpm  SPO2: 90%     Vitals taken in sitting at end of session  RUE automatic cuff:  BP: 113/76, HR: 120 bpm, SPO2: 93%    Treatment:  Teodoro received therapeutic exercises to develop strength and endurance for 00 minutes including:   NP this date due to elevated HR at baseline.      Patient participated in dynamic functional therapeutic activities to improve functional performance for 25 minutes. Including:   Hallway ambulation:  2 x 100 feet ambulation with right<>left head movements, SBA, min sway  2 x 100 feet ambulation with up <> down head movements, SBA, slight sway  2 x 100 feet backward ambulation tossing <> catching ball, CGA, min-mod sway     HR following hallway activities: HR: 111-112 bpm; SPO2, 91%  Seated rest breaks provided throughout     2 x 5 reps, each lower extremity forward step up onto fitter board with alternate lower extremity moving forward to tap large cone in front, CGA to occ Min A,  "occ touchdown support     Patient participated in neuromuscular re-education activities to improve: Balance, Coordination, and Sense for 20 minutes. The following activities were included:   // bars:  X 4 laps tandem ambulation, intermittent touchdown support, SBA  X 4 laps right<>left side stepping along 2 foam balance beams, CGA, occ touchdown support     Foam pad:  3 x 30" stance with eyes closed, CGA to occ Min A, no upper extremity support                   Assessment & Plan   Assessment: Teodoro tolerated therapy session fairly well this morning. Vitals remained within therapeutic range. Able to progress difficulty of balance activities for increased challenge with less reliance on UE support. Multiple rest breaks stil required throughout due to quick fatigue. Patient remains appropriate for continued PT to address activity tolerance and balance deficits.       Patient will continue to benefit from skilled outpatient physical therapy to address the deficits listed in the problem list box on initial evaluation, provide pt/family education and to maximize pt's level of independence in the home and community environment.     Patient's spiritual, cultural, and educational needs considered and patient agreeable to plan of care and goals.           Plan: Monitor vitals throughout session. Progress balance and endurance activities as tolerated.    Goals:   Active       Long term goals        improve SSWS to 1.2 m/sec  (Progressing)       Start:  02/13/25    Expected End:  04/10/25       Initial: 1.0 m/sec         improve FGA score to 27/30 (Progressing)       Start:  02/13/25    Expected End:  04/10/25            improve BLE SLS time to 7 seconds  (Progressing)       Start:  02/13/25    Expected End:  04/10/25            improve GST condition 3 to 30 seconds BLE leading  (Progressing)       Start:  02/13/25    Expected End:  04/10/25               Short term goals        improve BLE hip extension by 1/3 MMT  " (Progressing)       Start:  02/13/25    Expected End:  03/13/25            improve FGA score to 24/30  (Progressing)       Start:  02/13/25    Expected End:  03/13/25       Initial: 22/30         Improve bilateral SLS time to 4 seconds  (Progressing)       Start:  02/13/25    Expected End:  03/13/25            improve BLE leading GST condition 3 to 15 seconds  (Progressing)       Start:  02/13/25    Expected End:  03/13/25                Stephanie Gallagher, PT

## 2025-03-27 ENCOUNTER — CLINICAL SUPPORT (OUTPATIENT)
Dept: REHABILITATION | Facility: HOSPITAL | Age: 80
End: 2025-03-27
Payer: MEDICARE

## 2025-03-27 DIAGNOSIS — Z74.09 IMPAIRED FUNCTIONAL MOBILITY, BALANCE, GAIT, AND ENDURANCE: Primary | ICD-10-CM

## 2025-03-27 PROCEDURE — 97112 NEUROMUSCULAR REEDUCATION: CPT | Mod: PO

## 2025-03-27 PROCEDURE — 97530 THERAPEUTIC ACTIVITIES: CPT | Mod: PO

## 2025-03-27 NOTE — PROGRESS NOTES
Outpatient Rehab    Physical Therapy Visit    Patient Name: Teodoro Mast  MRN: [id number]  YOB: 1945  Encounter Date: 3/27/2025    Therapy Diagnosis:   Encounter Diagnosis   Name Primary?    Impaired functional mobility, balance, gait, and endurance Yes           Physician: Phil Williamson, *    Physician Orders: Eval and Treat  Medical Diagnosis: R26.89 (ICD-10-CM) - Imbalance      Visit # / Visits Authorized:  06 / 20   Date of Evaluation:  2/13/2025   Insurance Authorization Period: 1/29/2025 to 1/29/2026  Plan of Care Certification:  2/13/2025 to 4/10/2025                    Time In:     Time Out:    Total Time:     Total Billable Time: 45 minutes          Subjective   that he is feeling wiped out this morning following dialysis yesterday where they removed 3 L of fluid..  Pain reported as 0/10.      Objective          Vitals taken in sitting prior to start of therapy session  RUE automatic cuff:  BP: 129/78, HR: 111 bpm  SPO2: 93%     Vitals taken in sitting at end of session  RUE automatic cuff:  BP: 102/69, HR: 120 bpm, SPO2: 91%    Treatment:  Teodoro received therapeutic exercises to develop strength and endurance for 00 minutes including:   NP this date due to elevated HR at baseline.      Patient participated in dynamic functional therapeutic activities to improve functional performance for 25 minutes. Including:   Hallway ambulation:  2 x 100 feet ambulation with right<>left head movements, SBA, min sway  2 x 100 feet ambulation with up <> down head movements, SBA, slight sway  2 x 100 feet backward ambulation tossing <> catching ball, CGA, min-mod sway     HR following hallway activities: HR: 111-112 bpm; SPO2, 91-92%  Seated rest breaks provided throughout     Patient participated in neuromuscular re-education activities to improve: Balance, Coordination, and Sense for 20 minutes. The following activities were included:   // bars:  X 4 laps tandem ambulation, intermittent  "touchdown support, SBA     Foam pad:  3 x 30" stance with eyes closed, CGA to occ Min A, no upper extremity support  2 x 30" right<>left head movements, CGA, no upper extremity support  2 x 30" up <> down head movements, CGA, no upper extremity support                 Assessment & Plan   Assessment: Teodoro tolerated therapy session fair this morning. Vitals remained within therapeutic range, but significantly elevated fatigue resulting in increased imbalance. Extensive rest breaks provided this date to accommodate. Patient remains appropriate for continued PT to address activity tolerance and balance deficits.       Patient will continue to benefit from skilled outpatient physical therapy to address the deficits listed in the problem list box on initial evaluation, provide pt/family education and to maximize pt's level of independence in the home and community environment.     Patient's spiritual, cultural, and educational needs considered and patient agreeable to plan of care and goals.           Plan: Monitor vitals throughout session. Progress balance and endurance activities as tolerated.    Goals:   Active       Long term goals        improve SSWS to 1.2 m/sec  (Progressing)       Start:  02/13/25    Expected End:  04/10/25       Initial: 1.0 m/sec         improve FGA score to 27/30 (Progressing)       Start:  02/13/25    Expected End:  04/10/25            improve BLE SLS time to 7 seconds  (Progressing)       Start:  02/13/25    Expected End:  04/10/25            improve GST condition 3 to 30 seconds BLE leading  (Progressing)       Start:  02/13/25    Expected End:  04/10/25               Short term goals        improve BLE hip extension by 1/3 MMT  (Progressing)       Start:  02/13/25    Expected End:  03/13/25            improve FGA score to 24/30  (Progressing)       Start:  02/13/25    Expected End:  03/13/25       Initial: 22/30         Improve bilateral SLS time to 4 seconds  (Progressing)       Start: "  02/13/25    Expected End:  03/13/25            improve BLE leading GST condition 3 to 15 seconds  (Progressing)       Start:  02/13/25    Expected End:  03/13/25                Stephanie Gallagher, PT

## 2025-04-01 ENCOUNTER — CLINICAL SUPPORT (OUTPATIENT)
Dept: REHABILITATION | Facility: HOSPITAL | Age: 80
End: 2025-04-01
Payer: MEDICARE

## 2025-04-01 ENCOUNTER — TELEPHONE (OUTPATIENT)
Dept: TRANSPLANT | Facility: CLINIC | Age: 80
End: 2025-04-01
Payer: MEDICARE

## 2025-04-01 DIAGNOSIS — Z74.09 IMPAIRED FUNCTIONAL MOBILITY, BALANCE, GAIT, AND ENDURANCE: Primary | ICD-10-CM

## 2025-04-01 DIAGNOSIS — Z76.82 ORGAN TRANSPLANT CANDIDATE: Primary | ICD-10-CM

## 2025-04-01 PROCEDURE — 97530 THERAPEUTIC ACTIVITIES: CPT | Mod: PO

## 2025-04-01 PROCEDURE — 97110 THERAPEUTIC EXERCISES: CPT | Mod: PO

## 2025-04-01 NOTE — PROGRESS NOTES
"  Outpatient Rehab    Physical Therapy Progress Note : Updated Plan of Care    Patient Name: Teodoro Mast  MRN: [id number]  YOB: 1945  Encounter Date: 4/1/2025    Therapy Diagnosis:   Encounter Diagnosis   Name Primary?    Impaired functional mobility, balance, gait, and endurance Yes     Physician: Phil Williamson, *    Physician Orders: Eval and Treat  Medical Diagnosis: Imbalance    Visit # / Visits Authorized:  7 / 20  Insurance Authorization Period: 2/12/2025 to 12/31/2025  Plan of Care Certification:  2/13/2025 to 4/10/2025      PT/PTA:     Number of PTA visits since last PT visit:   Time In: 0845   Time Out: 0930  Total Time: 45   Total Billable Time:  45 minutes     FOTO:  Intake Score:  %  Survey Score 1:  %  Survey Score 2:  %         Subjective   doing fine today, no new complaints.  Pain reported as 0/10.      Objective        BP seated at beginning of session: 135/85  HR: 94     Lower Extremity Strength  Right LE  eval  4/1/2025 Left LE  eval  4/1/2025   Hip Flexion: 4/5 4+/5 Hip Flexion: 4-/5 4+/5   Hip Extension:  4/5 4+/5 Hip Extension: 4/5 4+/5   Hip Abduction: 4+/5 5/5 Hip Abduction: 4+/5 5/5   Hip Adduction: 5/5 5/5 Hip Adduction 5/5 5/5   Knee Extension: 5/5 5/5 Knee Extension: 5/5 5/5   Knee Flexion: 5/5 5/5 Knee Flexion: 5/5 5/5   Ankle Dorsiflexion: 5/5 5/5 Ankle Dorsiflexion: 5/5 5/5   Ankle Plantarflexion: 5/5 5/5 Ankle Plantarflexion: 5/5 5/5           Evaluation 4/1/2025   30 second Chair Rise  (adults > 59 y/o) 7 completed with no arms 12 completed with no arms    5 times sit-stand  (adults 18-65 y/o) 20 seconds  >12 sec= fall risk for general elderly  >16 sec= fall risk for Parkinson's disease  >10 sec= balance/vestibular dysfunction (<59 y/o)  >14.2 sec= balance/vestibular dysfunction (>59 y/o)  >12 sec= fall risk for CVA 12 seconds       DEVI SENSORY TESTING:  (P= Pass, F= Fail; note any sway; hold each position for 30")  Condition 1: (firm surface/feet " together/eyes open) P  Condition 2: (firm surface/feet together/eyes closed) P  Condition 3: (firm surface/feet in tandem/eyes open) P RLE leading, F 8 seconds   Condition 4: (firm surface/feet in tandem/eyes closed) F 2 seconds LLE leading, 2 seconds with RLE leading   Condition 5: (soft surface/feet together/eyes open) P  Condition 6: (soft surface/feet together/eyes closed) P moderate sway   Condition 7: (Fakuda step test), measure distance varied from center starting position NT         Evaluation 4/1/2025   Self Selected Walking Speed 1.0 m/sec (6m/6s) 1.0 m/sec (6m/6s)   Fast Walking Speed 1.4 m/sec (6m/4s) 1.4 m/sec (6m/4s)         Functional Gait Assessment:   1. Gait on level surface =  3  2. Change in Gait Speed = 3  3. Gait with horizontal head turns  = 3  4. Gait with vertical head turns = 3  5. Gait with pivot turns = 2  6. Step over obstacle = 3  7. Gait with Narrow SUSHIL = 2  8. Gait with eyes closed = 2  9. Ambulating Backwards = 2  10. Steps = 3     Score 26/30   Score:   <22/30 fall risk   <20/30 fall risk in older adults   <18/30 fall risk in Parkinsons         Treatment:  Treatment not performed          Time Entry(in minutes):  Therapeutic Activity Time Entry: 35  Therapeutic Exercise Time Entry: 10    Assessment & Plan   Assessment  Teodoro                 Assessment Details: Teodoro tolerated today's POC reassessment very well this morning. Teodoro has shown improvements with his BLE strength, endurance and balance. The patient's only remaining strength deficit includes mild hip flexion weakness. The patient's SSWS continues to place him in the community ambulator with increased time needs category. The patient's FGA score significantly improved and now places him outside of the elevated falls risk category. The patient was educated that his functional impairments are likely due to impaired endurance and other health issues such as BP fluctuation and dialysis. The patient was educated on the  importance of performing home exercise program on daily basis. Plan to provide advanced home exercise program to the patient to prepare him for discharge following remaining scheduled therapy appointments.     Plan  From a physical therapy perspective, the patient would benefit from: Skilled Rehab Services    Planned therapy interventions include: Therapeutic exercise, Therapeutic activities, Neuromuscular re-education, ADLs/IADLs, Community/work reintegration, Cognitive functional training, Orthotic management and training, Gait training, Wheelchair management, Work conditioning, Prosthetic management and training, and Work hardening.            Visit Frequency: 2 times Per Week for 8 Weeks.       This plan was discussed with Patient.   Discussion participants: Agreed Upon Plan of Care             Patient will continue to benefit from skilled outpatient physical therapy to address the deficits listed in the problem list box on initial evaluation, provide pt/family education and to maximize pt's level of independence in the home and community environment.     Patient's spiritual, cultural, and educational needs considered and patient agreeable to plan of care and goals.           Goals:   Active       Long term goals        improve SSWS to 1.2 m/sec  (Progressing)       Start:  02/13/25    Expected End:  04/10/25       Initial: 1.0 m/sec         improve FGA score to 27/30 (Progressing)       Start:  02/13/25    Expected End:  04/10/25            improve BLE SLS time to 7 seconds  (Progressing)       Start:  02/13/25    Expected End:  04/10/25            improve GST condition 3 to 30 seconds BLE leading  (Progressing)       Start:  02/13/25    Expected End:  04/10/25               Short term goals        improve BLE hip extension by 1/3 MMT  (Met)       Start:  02/13/25    Expected End:  03/13/25    Resolved:  04/01/25         improve FGA score to 24/30  (Met)       Start:  02/13/25    Expected End:  03/13/25     Resolved:  04/01/25    Initial: 22/30         Improve bilateral SLS time to 4 seconds  (Progressing)       Start:  02/13/25    Expected End:  03/13/25            improve BLE leading GST condition 3 to 15 seconds  (Progressing)       Start:  02/13/25    Expected End:  03/13/25                Becca Najera, PT

## 2025-04-03 ENCOUNTER — CLINICAL SUPPORT (OUTPATIENT)
Dept: REHABILITATION | Facility: HOSPITAL | Age: 80
End: 2025-04-03
Payer: MEDICARE

## 2025-04-03 DIAGNOSIS — Z74.09 IMPAIRED FUNCTIONAL MOBILITY, BALANCE, GAIT, AND ENDURANCE: Primary | ICD-10-CM

## 2025-04-03 PROCEDURE — 97112 NEUROMUSCULAR REEDUCATION: CPT | Mod: PO

## 2025-04-03 PROCEDURE — 97530 THERAPEUTIC ACTIVITIES: CPT | Mod: PO

## 2025-04-03 PROCEDURE — 97110 THERAPEUTIC EXERCISES: CPT | Mod: PO

## 2025-04-08 ENCOUNTER — CLINICAL SUPPORT (OUTPATIENT)
Dept: REHABILITATION | Facility: HOSPITAL | Age: 80
End: 2025-04-08
Payer: MEDICARE

## 2025-04-08 DIAGNOSIS — Z74.09 IMPAIRED FUNCTIONAL MOBILITY, BALANCE, GAIT, AND ENDURANCE: Primary | ICD-10-CM

## 2025-04-08 PROCEDURE — 97110 THERAPEUTIC EXERCISES: CPT | Mod: PO

## 2025-04-08 PROCEDURE — 97530 THERAPEUTIC ACTIVITIES: CPT | Mod: PO

## 2025-04-08 PROCEDURE — 97112 NEUROMUSCULAR REEDUCATION: CPT | Mod: PO

## 2025-04-08 NOTE — PROGRESS NOTES
Outpatient Rehab    Physical Therapy Visit    Patient Name: Teodoro Mast  MRN: [id number]  YOB: 1945  Encounter Date: 4/8/2025    Therapy Diagnosis:   Encounter Diagnosis   Name Primary?    Impaired functional mobility, balance, gait, and endurance Yes       Physician: Phil Williamson, *    Physician Orders: Eval and Treat  Medical Diagnosis: Imbalance    Visit # / Visits Authorized:  9 / 20  Insurance Authorization Period: 2/12/2025 to 12/31/2025  Date of Evaluation: 2/13/2025   Plan of Care Certification: 04/01/25 to 05/30/25     PT/PTA: PT   Number of PTA visits since last PT visit:0  Time In: 0845   Time Out: 0930  Total Time: 45   Total Billable Time: 45         Subjective             Objective    Vitals taken in sitting prior to start of therapy session  RUE automatic cuff:  BP: 125/70, HR: 107-111 bpm  SPO2: 94%     Vitals taken in sitting at end of session  RUE automatic cuff:  BP: 92/64, HR: 117-123 bpm,   SPO2: 97%        Treatment:     Teodoro received therapeutic exercises to develop strength and endurance for 08 minutes including:   X 8 min Sci Fit recumbent stepper L5 for cardiovascular endurance  Vitals post activity: SPO2: 94%, HR: 127 bpm     Patient participated in dynamic functional therapeutic activities to improve functional performance for 20 minutes. Including:   Hallway ambulation:  2 x 100 feet ambulation with right<>left head movements, SBA, min sway  2 x 100 feet ambulation with up <> down head movements, SBA, slight sway  2 x 100 feet backward ambulation tossing <> catching ball, CGA, min sway  Vitals post activity: SPO2: 95%, HR: 125 bpm    1 x 10 reps, each lower extremity leading, forward step up onto fitter board with alternate lower extremity moving forward to tap large cone in front, CGA, no upper extremity support  1 x 10 reps, each lower extremity single leg stance with alternate lower extremity on fitter board + forward chest press with 5# weight    "  Seated rest breaks provided throughout     Patient participated in neuromuscular re-education activities to improve: Balance, Coordination, and Sense for 17 minutes. The following activities were included:   // bars:    2 x 30" each lower extremity in front, tandem stance with eyes open, CGA     Foam pad:  3 x 30" stance with eyes closed, CGA to occ Min A, no upper extremity support  2 x 30" right<>left head movements, CGA, no upper extremity support  2 x 30" up <> down head movements, CGA, no upper extremity support    Time Entry(in minutes):   See above    Assessment & Plan   Assessment: Teodoro tolerated therapy session well this morning. Continued Sci Fit recumbent stepper to progress CV endurance since quick fatigue and SOB remain greatest limiting factors to functional mobility. Vitals monitored throughout activities and rest breaks provided as needed. Improved performance noted with static balance challenges so activities progressed for increased challenge. Patient remains appropriate for continued PT intervention to further progress remaining mobility deficits.       Patient will continue to benefit from skilled outpatient physical therapy to address the deficits listed in the problem list box on initial evaluation, provide pt/family education and to maximize pt's level of independence in the home and community environment.     Patient's spiritual, cultural, and educational needs considered and patient agreeable to plan of care and goals.           Plan: Monitor vitals throughout session. Progress balance and endurance activities as tolerated.    Goals:   Active       Long term goals        improve SSWS to 1.2 m/sec  (Progressing)       Start:  02/13/25    Expected End:  04/10/25       Initial: 1.0 m/sec         improve FGA score to 27/30 (Progressing)       Start:  02/13/25    Expected End:  04/10/25            improve BLE SLS time to 7 seconds  (Progressing)       Start:  02/13/25    Expected End:  " 04/10/25            improve GST condition 3 to 30 seconds BLE leading  (Progressing)       Start:  02/13/25    Expected End:  04/10/25               Short term goals        improve BLE hip extension by 1/3 MMT  (Met)       Start:  02/13/25    Expected End:  03/13/25    Resolved:  04/01/25         improve FGA score to 24/30  (Met)       Start:  02/13/25    Expected End:  03/13/25    Resolved:  04/01/25    Initial: 22/30         Improve bilateral SLS time to 4 seconds  (Progressing)       Start:  02/13/25    Expected End:  03/13/25            improve BLE leading GST condition 3 to 15 seconds  (Progressing)       Start:  02/13/25    Expected End:  03/13/25                Stephanie Gallagher, PT

## 2025-04-10 ENCOUNTER — CLINICAL SUPPORT (OUTPATIENT)
Dept: REHABILITATION | Facility: HOSPITAL | Age: 80
End: 2025-04-10
Payer: MEDICARE

## 2025-04-10 DIAGNOSIS — Z74.09 IMPAIRED FUNCTIONAL MOBILITY, BALANCE, GAIT, AND ENDURANCE: Primary | ICD-10-CM

## 2025-04-10 PROCEDURE — 97530 THERAPEUTIC ACTIVITIES: CPT | Mod: PO

## 2025-04-10 PROCEDURE — 97112 NEUROMUSCULAR REEDUCATION: CPT | Mod: PO

## 2025-04-10 PROCEDURE — 97110 THERAPEUTIC EXERCISES: CPT | Mod: PO

## 2025-04-10 NOTE — PROGRESS NOTES
Outpatient Rehab    Physical Therapy Visit    Patient Name: Teodoro Mast  MRN: [id number]  YOB: 1945  Encounter Date: 4/10/2025    Therapy Diagnosis:   Encounter Diagnosis   Name Primary?    Impaired functional mobility, balance, gait, and endurance Yes       Physician: Phil Williamson, *    Physician Orders: Eval and Treat  Medical Diagnosis: Imbalance    Visit # / Visits Authorized:  10 / 20  Insurance Authorization Period: 2/12/2025 to 12/31/2025  Date of Evaluation: 2/13/2025   Plan of Care Certification: 04/01/25 to 05/30/25     PT/PTA: PT   Number of PTA visits since last PT visit:0  Time In: 0845   Time Out: 0930  Total Time: 45   Total Billable Time: 45    Precautions     Standard, monitor vitals, dialysis MWF      Subjective   that he is doing fine this morning. No new complaints..  Pain reported as 0/10.      Objective    Vitals taken in sitting prior to start of therapy session  RUE automatic cuff:  BP: 150/75, HR: 103 bpm  SPO2: 92%     Vitals taken in sitting at end of session  RUE automatic cuff:  BP: 110/72, HR: 122 bpm,   SPO2: 94%        Treatment:     Teodoro received therapeutic exercises to develop strength and endurance for 08 minutes including:   X 8 min Sci Fit recumbent stepper L5 for cardiovascular endurance  Vitals post activity: SPO2: 96%, HR:  bpm     Patient participated in dynamic functional therapeutic activities to improve functional performance for 20 minutes. Including:   Hallway ambulation:  2 x 100 feet ambulation with right<>left head movements, SBA, min sway  2 x 100 feet ambulation with up <> down head movements, SBA, slight sway  2 x 100 feet backward ambulation tossing <> catching ball, CGA, min sway  Vitals post activity: SPO2: 92%, HR: 128 bpm    1 x 10 reps, each lower extremity leading, forward step up onto fitter board with alternate lower extremity moving forward to tap large cone in front, CGA, no upper extremity support  1 x 10 reps, each  "lower extremity single leg stance with alternate lower extremity on fitter board + forward chest press with 5# weight     Seated rest breaks provided throughout     Patient participated in neuromuscular re-education activities to improve: Balance, Coordination, and Sense for 17 minutes. The following activities were included:   // bars:    X 4 laps tandem ambulation in // bars, occ touchdown support, SBA     Foam pad:  3 x 30" stance with eyes closed, CGA to occ Min A, no upper extremity support  2 x 30" right<>left head movements, CGA, no upper extremity support  2 x 30" up <> down head movements, CGA, no upper extremity support    Time Entry(in minutes):   See above    Assessment & Plan   Assessment: Teodoro tolerated therapy session well this morning. Continued Sci Fit recumbent stepper to progress CV endurance since quick fatigue and SOB remain greatest limiting factors to functional mobility. Vitals monitored throughout activities and rest breaks provided as needed.Good effort remains throughout each activity with gradual progress noted. Patient remains appropriate for continued PT intervention to further progress remaining mobility deficits.       Patient will continue to benefit from skilled outpatient physical therapy to address the deficits listed in the problem list box on initial evaluation, provide pt/family education and to maximize pt's level of independence in the home and community environment.     Patient's spiritual, cultural, and educational needs considered and patient agreeable to plan of care and goals.           Plan: Monitor vitals throughout session. Progress balance and endurance activities as tolerated.    Goals:   Active       Long term goals        improve SSWS to 1.2 m/sec  (Progressing)       Start:  02/13/25    Expected End:  04/10/25       Initial: 1.0 m/sec         improve FGA score to 27/30 (Progressing)       Start:  02/13/25    Expected End:  04/10/25            improve BLE SLS " time to 7 seconds  (Progressing)       Start:  02/13/25    Expected End:  04/10/25            improve GST condition 3 to 30 seconds BLE leading  (Progressing)       Start:  02/13/25    Expected End:  04/10/25               Short term goals        improve BLE hip extension by 1/3 MMT  (Met)       Start:  02/13/25    Expected End:  03/13/25    Resolved:  04/01/25         improve FGA score to 24/30  (Met)       Start:  02/13/25    Expected End:  03/13/25    Resolved:  04/01/25    Initial: 22/30         Improve bilateral SLS time to 4 seconds  (Progressing)       Start:  02/13/25    Expected End:  03/13/25            improve BLE leading GST condition 3 to 15 seconds  (Progressing)       Start:  02/13/25    Expected End:  03/13/25                Stephanie Gallagher, PT

## 2025-04-15 ENCOUNTER — CLINICAL SUPPORT (OUTPATIENT)
Dept: REHABILITATION | Facility: HOSPITAL | Age: 80
End: 2025-04-15
Payer: MEDICARE

## 2025-04-15 DIAGNOSIS — Z74.09 IMPAIRED FUNCTIONAL MOBILITY, BALANCE, GAIT, AND ENDURANCE: Primary | ICD-10-CM

## 2025-04-15 PROCEDURE — 97110 THERAPEUTIC EXERCISES: CPT | Mod: PO

## 2025-04-15 PROCEDURE — 97112 NEUROMUSCULAR REEDUCATION: CPT | Mod: PO

## 2025-04-15 PROCEDURE — 97530 THERAPEUTIC ACTIVITIES: CPT | Mod: PO

## 2025-04-15 NOTE — PROGRESS NOTES
Outpatient Rehab    Physical Therapy Visit    Patient Name: Teodoro Mast  MRN: [id number]  YOB: 1945  Encounter Date: 4/15/2025    Therapy Diagnosis:   Encounter Diagnosis   Name Primary?    Impaired functional mobility, balance, gait, and endurance Yes     Physician: Phil Williamson, *    Physician Orders: Eval and Treat  Medical Diagnosis: Imbalance    Visit # / Visits Authorized:  11 / 20  Insurance Authorization Period: 2/12/2025 to 12/31/2025  Date of Evaluation: 2/13/2025   Plan of Care Certification: 04/01/25 to 05/30/25     PT/PTA:     Number of PTA visits since last PT visit:   Time In: 0845   Time Out: 0930  Total Time: 45   Total Billable Time:  45 minutes     FOTO:  Intake Score:  %  Survey Score 1:  %  Survey Score 2:  %         Subjective   was able to go to fly a kite with granchildren this weekend and had a great time.  Pain reported as 0/10.      Objective          118/17 mmhg seated at beginning of session   100 BPM   92%    Treatment:  Therapeutic Exercise  TE 1: 10 minutes on SCIFIT seated elliptical for CV endurance, level 5  Balance/Neuromuscular Re-Education  NMR 1: 15  reps BLE leading step up to foam fitter with opposite leg hike, no UE support, CGA  NMR 2: 2 x 30 seconds BLE leading tandem stance, CGA  NMR 3: 6 laps tandem ambulation  Therapeutic Activity  TA 2: 2 x 100 feet ambulation with vertical head turns  TA 3: 2 x 100 feet ambulation with horizontal head turns  TA 4: 2 x 100 feet ambulation with diagonal head turns  TA 5: vitals assessment  TA 6: 3 x 10 reps sit to stands at gold chair    Time Entry(in minutes):  Neuromuscular Re-Education Time Entry: 15  Therapeutic Activity Time Entry: 25  Therapeutic Exercise Time Entry: 10    Assessment & Plan   Assessment: Teodoro tolerated therapy session well this morning. Teodoro continues to be limited by frequent rest breaks due to SOB and fatigue although the patient does show improvements with CV endurance since  beginning therapy. The patient verbilizes understanding of the importance of performing HEP on daily basis. Patient remains appropriate for continued PT intervention to further progress remaining mobility deficits.  Evaluation/Treatment Tolerance: Patient tolerated treatment well    Patient will continue to benefit from skilled outpatient physical therapy to address the deficits listed in the problem list box on initial evaluation, provide pt/family education and to maximize pt's level of independence in the home and community environment.     Patient's spiritual, cultural, and educational needs considered and patient agreeable to plan of care and goals.           Plan: Monitor vitals throughout session. Progress balance and endurance activities as tolerated.    Goals:   Active       Long term goals        improve SSWS to 1.2 m/sec  (Progressing)       Start:  02/13/25    Expected End:  04/10/25       Initial: 1.0 m/sec         improve FGA score to 27/30 (Progressing)       Start:  02/13/25    Expected End:  04/10/25            improve BLE SLS time to 7 seconds  (Progressing)       Start:  02/13/25    Expected End:  04/10/25            improve GST condition 3 to 30 seconds BLE leading  (Progressing)       Start:  02/13/25    Expected End:  04/10/25               Short term goals        improve BLE hip extension by 1/3 MMT  (Met)       Start:  02/13/25    Expected End:  03/13/25    Resolved:  04/01/25         improve FGA score to 24/30  (Met)       Start:  02/13/25    Expected End:  03/13/25    Resolved:  04/01/25    Initial: 22/30         Improve bilateral SLS time to 4 seconds  (Progressing)       Start:  02/13/25    Expected End:  03/13/25            improve BLE leading GST condition 3 to 15 seconds  (Progressing)       Start:  02/13/25    Expected End:  03/13/25                Becca Najera PT

## 2025-04-17 ENCOUNTER — CLINICAL SUPPORT (OUTPATIENT)
Dept: REHABILITATION | Facility: HOSPITAL | Age: 80
End: 2025-04-17
Payer: MEDICARE

## 2025-04-17 DIAGNOSIS — Z74.09 IMPAIRED FUNCTIONAL MOBILITY, BALANCE, GAIT, AND ENDURANCE: Primary | ICD-10-CM

## 2025-04-17 PROCEDURE — 97530 THERAPEUTIC ACTIVITIES: CPT | Mod: PO

## 2025-04-17 PROCEDURE — 97110 THERAPEUTIC EXERCISES: CPT | Mod: PO

## 2025-04-17 PROCEDURE — 97112 NEUROMUSCULAR REEDUCATION: CPT | Mod: PO

## 2025-04-17 NOTE — PROGRESS NOTES
Outpatient Rehab    Physical Therapy Visit    Patient Name: Teodoro Mast  MRN: [id number]  YOB: 1945  Encounter Date: 4/17/2025    Therapy Diagnosis:   Encounter Diagnosis   Name Primary?    Impaired functional mobility, balance, gait, and endurance Yes         Physician: Phil Williamson, *    Physician Orders: Eval and Treat  Medical Diagnosis: Imbalance    Visit # / Visits Authorized:  12 / 20  Insurance Authorization Period: 2/12/2025 to 12/31/2025  Date of Evaluation: 2/13/2025   Plan of Care Certification: 04/01/25 to 05/30/25     PT/PTA: PT   Number of PTA visits since last PT visit:0  Time In: 0845   Time Out: 0930  Total Time: 45   Total Billable Time: 45    Precautions     Standard, monitor vitals, dialysis MWF      Subjective   that he is recovering from dialysis yesterday, but ready for therapy.  Pain reported as 0/10.      Objective    Vitals taken in sitting prior to start of therapy session  RUE automatic cuff:  BP: 112/67, HR: 69 bpm  SPO2: 99%     Vitals taken in sitting at end of session  RUE automatic cuff:  BP: 107/71, HR: 118 bpm,   SPO2: 97%        Treatment:     Teodoro received therapeutic exercises to develop strength and endurance for 10 minutes including:   X 10 min Sci Fit recumbent stepper L5 for cardiovascular endurance  Vitals post activity:   RUE automatic cuff BP: 112/71; SPO2: 96%, HR: 111 bpm     Patient participated in dynamic functional therapeutic activities to improve functional performance for 20 minutes. Including:   Hallway ambulation:  2 x 100 feet ambulation with right<>left head movements, SBA, min sway  2 x 100 feet ambulation with up <> down head movements, SBA, slight sway  2 x 100 feet backward ambulation tossing <> catching ball, CGA, min sway  Vitals post activity: SPO2: 98%, HR: 122 bpm    1 x 10 reps, each lower extremity leading, forward step up onto fitter board with alternate lower extremity moving forward to tap large cone in front, CGA,  "no upper extremity support  1 x 10 reps, each lower extremity single leg stance with alternate lower extremity on fitter board + forward chest press with 5# weight     Seated rest breaks provided throughout     Patient participated in neuromuscular re-education activities to improve: Balance, Coordination, and Sense for 15 minutes. The following activities were included:   // bars:    2 x 30" each lower extremity in front, tandem stance with eyes open, CGA     Foam pad:  3 x 30" stance with eyes closed, CGA to occ Min A, no upper extremity support  2 x 30" right<>left head movements, CGA, no upper extremity support  2 x 30" up <> down head movements, CGA, no upper extremity support    Time Entry(in minutes):   See above    Assessment & Plan   Assessment: Teodoro tolerated therapy session fairly well this morning, but does present slightly more deconditioned following dialysis yesterday. Still able to complete usual amount of exercises, but more frequent rest breaks needed. Good effort still noted throughout. Plan to review updated HEP next session in preparation for upcoming d/c.       Patient will continue to benefit from skilled outpatient physical therapy to address the deficits listed in the problem list box on initial evaluation, provide pt/family education and to maximize pt's level of independence in the home and community environment.     Patient's spiritual, cultural, and educational needs considered and patient agreeable to plan of care and goals.           Plan: Monitor vitals throughout session. Progress balance and endurance activities as tolerated. Update and review home exercise program.     Goals:   Active       Long term goals        improve SSWS to 1.2 m/sec  (Progressing)       Start:  02/13/25    Expected End:  04/10/25       Initial: 1.0 m/sec         improve FGA score to 27/30 (Progressing)       Start:  02/13/25    Expected End:  04/10/25            improve BLE SLS time to 7 seconds  " (Progressing)       Start:  02/13/25    Expected End:  04/10/25            improve GST condition 3 to 30 seconds BLE leading  (Progressing)       Start:  02/13/25    Expected End:  04/10/25               Short term goals        improve BLE hip extension by 1/3 MMT  (Met)       Start:  02/13/25    Expected End:  03/13/25    Resolved:  04/01/25         improve FGA score to 24/30  (Met)       Start:  02/13/25    Expected End:  03/13/25    Resolved:  04/01/25    Initial: 22/30         Improve bilateral SLS time to 4 seconds  (Progressing)       Start:  02/13/25    Expected End:  03/13/25            improve BLE leading GST condition 3 to 15 seconds  (Progressing)       Start:  02/13/25    Expected End:  03/13/25                Stephanie Gallagher, PT

## 2025-04-21 NOTE — PROGRESS NOTES
Outpatient Rehab    Physical Therapy Visit    Patient Name: Teodoro Mast  MRN: [id number]  YOB: 1945  Encounter Date: 4/22/2025    Therapy Diagnosis:   Encounter Diagnosis   Name Primary?    Impaired functional mobility, balance, gait, and endurance Yes     Physician: Phil Williamson, *    Physician Orders: Eval and Treat  Medical Diagnosis: Imbalance    Visit # / Visits Authorized:  13 / 20  Insurance Authorization Period: 2/12/2025 to 12/31/2025  Date of Evaluation: 2/13/2025   Plan of Care Certification: 04/01/25 to 05/30/25     PT/PTA:     Number of PTA visits since last PT visit:   Time In: 0845   Time Out: 0930  Total Time: 45   Total Billable Time:  45 minutes     FOTO:  Intake Score:  %  Survey Score 1:  %  Survey Score 2:  %         Subjective   doing well and feeling fine.  Pain reported as 0/10.      Objective    Seated at rest at beginning of session:     136/76 mmhg   101 BPM     Treatment:  Therapeutic Exercise  TE 1: 10 minutes on e[employer]ama seated elliptical for CV endurance, level 5  Balance/Neuromuscular Re-Education  NMR 2: 2 x 30 seconds BLE leading tandem stance, CGA in corner  NMR 3: 4 laps tandem ambulation  NMR 4: 4 laps marching with three second holds  NMR 5: 3 x 30 seconds eyes closed in corner  Therapeutic Activity  TA 1: discussion of updated HEP  TA 2: 2 x 100 feet ambulation with vertical head turns  TA 3: 2 x 100 feet ambulation with horizontal head turns  TA 4: seated rest breaks throughout session  TA 5: vitals assessment  TA 6: 3 x 10 reps sit to stands at black mat    Time Entry(in minutes):  Neuromuscular Re-Education Time Entry: 20  Therapeutic Activity Time Entry: 15  Therapeutic Exercise Time Entry: 10    Assessment & Plan   Assessment: Teodoro tolerated today's session well this morning. Teodoro was provided advanced HEP to prepare for discharge at following session. The patient reports feeling ready for discharge.  Evaluation/Treatment Tolerance: Patient  tolerated treatment well    Patient will continue to benefit from skilled outpatient physical therapy to address the deficits listed in the problem list box on initial evaluation, provide pt/family education and to maximize pt's level of independence in the home and community environment.     Patient's spiritual, cultural, and educational needs considered and patient agreeable to plan of care and goals.           Plan: Monitor vitals throughout session. Progress balance and endurance activities as tolerated.    Goals:   Active       Long term goals        improve SSWS to 1.2 m/sec  (Progressing)       Start:  02/13/25    Expected End:  04/10/25       Initial: 1.0 m/sec         improve FGA score to 27/30 (Progressing)       Start:  02/13/25    Expected End:  04/10/25            improve BLE SLS time to 7 seconds  (Progressing)       Start:  02/13/25    Expected End:  04/10/25            improve GST condition 3 to 30 seconds BLE leading  (Progressing)       Start:  02/13/25    Expected End:  04/10/25               Short term goals        improve BLE hip extension by 1/3 MMT  (Met)       Start:  02/13/25    Expected End:  03/13/25    Resolved:  04/01/25         improve FGA score to 24/30  (Met)       Start:  02/13/25    Expected End:  03/13/25    Resolved:  04/01/25    Initial: 22/30         Improve bilateral SLS time to 4 seconds  (Progressing)       Start:  02/13/25    Expected End:  03/13/25            improve BLE leading GST condition 3 to 15 seconds  (Progressing)       Start:  02/13/25    Expected End:  03/13/25                Becca Najera, PT

## 2025-04-22 ENCOUNTER — CLINICAL SUPPORT (OUTPATIENT)
Dept: REHABILITATION | Facility: HOSPITAL | Age: 80
End: 2025-04-22
Payer: MEDICARE

## 2025-04-22 DIAGNOSIS — Z74.09 IMPAIRED FUNCTIONAL MOBILITY, BALANCE, GAIT, AND ENDURANCE: Primary | ICD-10-CM

## 2025-04-22 PROCEDURE — 97112 NEUROMUSCULAR REEDUCATION: CPT | Mod: PO

## 2025-04-22 PROCEDURE — 97110 THERAPEUTIC EXERCISES: CPT | Mod: PO

## 2025-04-22 PROCEDURE — 97530 THERAPEUTIC ACTIVITIES: CPT | Mod: PO

## 2025-04-22 NOTE — PATIENT INSTRUCTIONS
Home exercises:   At counter top for support as needed:   Marching with three second holds   Tandem ambulation   3 x 10 reps Sit to stands   2 x 100 feet Walking with head turns in hallway  Vertical head turns   Horizontal head turns   In corner with hands up 3 x 30 seconds each :   Eyes closed   Tandem ( heel to toe)   Single leg stance or modified single leg stance      CARDIO DAILY!!!   98.6

## 2025-04-23 NOTE — PROGRESS NOTES
Outpatient Rehab    Physical Therapy Discharge    Patient Name: Teodoro Mast  MRN: [id number]  YOB: 1945  Encounter Date: 4/24/2025    Therapy Diagnosis:   Encounter Diagnosis   Name Primary?    Impaired functional mobility, balance, gait, and endurance Yes     Physician: Phil Williamson, *    Physician Orders: Eval and Treat  Medical Diagnosis: Imbalance    Visit # / Visits Authorized:  14 / 20  Insurance Authorization Period: 2/12/2025 to 12/31/2025  Date of Evaluation: 2/13/2025   Plan of Care Certification: 04/01/25 to 05/30/25     PT/PTA:     Number of PTA visits since last PT visit:   Time In: 1030   Time Out: 1100  Total Time: 30   Total Billable Time:  30 minutes    FOTO:  Intake Score:  %  Survey Score 1:  %  Survey Score 2:  %         Subjective   feels prepared for discharge.  Pain reported as 0/10.      Objective           BP seated at beginning of session: 144/77  HR: 114 BPM      Lower Extremity Strength  Right LE  eval  4/1/2025 4/24/2025 Left LE  eval  4/1/2025 4/24/2025   Hip Flexion: 4/5 4+/5 4+/5 Hip Flexion: 4-/5 4+/5 4+/5   Hip Extension:  4/5 4+/5 5/5 Hip Extension: 4/5 4+/5 5/5   Hip Abduction: 4+/5 5/5 5/5 Hip Abduction: 4+/5 5/5 5/5   Hip Adduction: 5/5 5/5 5/5 Hip Adduction 5/5 5/5 5/5   Knee Extension: 5/5 5/5 5/5 Knee Extension: 5/5 5/5 5/5   Knee Flexion: 5/5 5/5 5/5 Knee Flexion: 5/5 5/5 5/5   Ankle Dorsiflexion: 5/5 5/5 5/5 Ankle Dorsiflexion: 5/5 5/5 5/5   Ankle Plantarflexion: 5/5 5/5 5/5 Ankle Plantarflexion: 5/5 5/5 5/5           Evaluation 4/1/2025 4/24/2025   30 second Chair Rise  (adults > 61 y/o) 7 completed with no arms 12 completed with no arms  14 completed with no arms    5 times sit-stand  (adults 18-63 y/o) 20 seconds  >12 sec= fall risk for general elderly  >16 sec= fall risk for Parkinson's disease  >10 sec= balance/vestibular dysfunction (<61 y/o)  >14.2 sec= balance/vestibular dysfunction (>61 y/o)  >12 sec= fall risk for CVA 12 seconds  11  "seconds       DEVI SENSORY TESTING:  (P= Pass, F= Fail; note any sway; hold each position for 30")  Condition 1: (firm surface/feet together/eyes open) P  Condition 2: (firm surface/feet together/eyes closed) P  Condition 3: (firm surface/feet in tandem/eyes open) P RLE leading, F 16 seconds   Condition 4: (firm surface/feet in tandem/eyes closed) F9 seconds LLE leading; 11 seconds RLE leading   Condition 5: (soft surface/feet together/eyes open) P  Condition 6: (soft surface/feet together/eyes closed) P moderate sway   Condition 7: (Fakuda step test), measure distance varied from center starting position NT       R SLS: 6 seconds   L SLS: 11 seconds         Evaluation 4/1/2025 4/24/2025   Self Selected Walking Speed 1.0 m/sec (6m/6s) 1.0 m/sec (6m/6s) 1.2 m/sec (6m/5s)   Fast Walking Speed 1.4 m/sec (6m/4s) 1.4 m/sec (6m/4s) 1.4 m/sec (6m/4s)                Functional Gait Assessment:   1. Gait on level surface =  3  2. Change in Gait Speed = 3  3. Gait with horizontal head turns  = 3  4. Gait with vertical head turns = 3  5. Gait with pivot turns = 2  6. Step over obstacle = 3  7. Gait with Narrow SUSHIL = 2  8. Gait with eyes closed = 3  9. Ambulating Backwards = 3  10. Steps = 3     Score 28/30   Score:   <22/30 fall risk   <20/30 fall risk in older adults   <18/30 fall risk in Parkinsons         Treatment:  Treatment not performed      Treatment:  Therapeutic Activity  TA 1: objective measures and time to fill out FOTO  TA 2: frequent rest breaks    Time Entry(in minutes):  Therapeutic Activity Time Entry: 30      Goals:   Active       Long term goals        improve SSWS to 1.2 m/sec  (Met)       Start:  02/13/25    Expected End:  04/10/25    Resolved:  04/24/25    Initial: 1.0 m/sec         improve FGA score to 27/30 (Met)       Start:  02/13/25    Expected End:  04/10/25    Resolved:  04/24/25         improve BLE SLS time to 7 seconds  (Met)       Start:  02/13/25    Expected End:  04/10/25    Resolved:  " 04/24/25         improve GST condition 3 to 30 seconds BLE leading  (Progressing)       Start:  02/13/25    Expected End:  04/10/25              Resolved       Short term goals        improve BLE hip extension by 1/3 MMT  (Met)       Start:  02/13/25    Expected End:  03/13/25    Resolved:  04/01/25         improve FGA score to 24/30  (Met)       Start:  02/13/25    Expected End:  03/13/25    Resolved:  04/01/25    Initial: 22/30         Improve bilateral SLS time to 4 seconds  (Met)       Start:  02/13/25    Expected End:  03/13/25    Resolved:  04/24/25         improve BLE leading GST condition 3 to 15 seconds  (Met)       Start:  02/13/25    Expected End:  03/13/25    Resolved:  04/24/25             PHYSICAL THERAPY DISCHARGE SUMMARY   Total Visits:14  Missed Visits:0  Cancelled Visits: 0    Status Towards Goals Met: patient met 4/4 STG and 3/4 LTG     Goals Not achieved and why:   Teodoro has made excellent progress with therapy and has currently maximized the benefits of therapy at this time. Teodoro was provided advanced home exercise program at the previous session and verbalizes/ demonstrates understanding of home exercise program. Teodoro primary limitations at this point include fatigue and impaired CV endurance that is likely related to the patient receiving dialysis three times per week. Teodoro has met the majority of his goals and has shown significant improvements with his static and dynamic balance.         Discharge reason : Met goals and Patient has maximized benefit from PT at this time    PLAN   This patient is discharged from Outpatient Physical Therapy Services.     Becca Najera, PT  04/24/2025

## 2025-04-24 ENCOUNTER — CLINICAL SUPPORT (OUTPATIENT)
Dept: REHABILITATION | Facility: HOSPITAL | Age: 80
End: 2025-04-24
Payer: MEDICARE

## 2025-04-24 DIAGNOSIS — Z74.09 IMPAIRED FUNCTIONAL MOBILITY, BALANCE, GAIT, AND ENDURANCE: Primary | ICD-10-CM

## 2025-04-24 PROCEDURE — 97530 THERAPEUTIC ACTIVITIES: CPT | Mod: PO

## 2025-05-08 DIAGNOSIS — N18.6 END STAGE RENAL DISEASE: Primary | ICD-10-CM

## 2025-05-08 DIAGNOSIS — Z76.82 ORGAN TRANSPLANT CANDIDATE: ICD-10-CM

## 2025-05-08 NOTE — PROGRESS NOTES
YEAR[facility] LIST MANAGEMENT NOTE    Teodoro Mast's kidney transplant listing status reviewed.  Patient is due for follow-up appointments in August 2025..  Appointments will be scheduled per protocol.  HLA sample is up to date and being received on a regular basis.

## 2025-05-13 ENCOUNTER — PATIENT MESSAGE (OUTPATIENT)
Facility: CLINIC | Age: 80
End: 2025-05-13
Payer: MEDICARE

## 2025-05-13 DIAGNOSIS — E11.65 TYPE 2 DIABETES MELLITUS WITH HYPERGLYCEMIA, WITH LONG-TERM CURRENT USE OF INSULIN: ICD-10-CM

## 2025-05-13 DIAGNOSIS — Z79.4 TYPE 2 DIABETES MELLITUS WITH HYPERGLYCEMIA, WITH LONG-TERM CURRENT USE OF INSULIN: ICD-10-CM

## 2025-05-13 RX ORDER — INSULIN GLARGINE 100 [IU]/ML
10 INJECTION, SOLUTION SUBCUTANEOUS NIGHTLY
Qty: 9 ML | Refills: 6 | Status: SHIPPED | OUTPATIENT
Start: 2025-05-13

## 2025-05-15 ENCOUNTER — TELEPHONE (OUTPATIENT)
Dept: TRANSPLANT | Facility: CLINIC | Age: 80
End: 2025-05-15
Payer: MEDICARE

## 2025-05-15 NOTE — TELEPHONE ENCOUNTER
Spoke to pt confirming scheduled semi annual test and clinic visit on 08/19/2025. Appt reminders were mailed and pt is aware to bring care giver.

## 2025-06-05 ENCOUNTER — OFFICE VISIT (OUTPATIENT)
Facility: CLINIC | Age: 80
End: 2025-06-05
Payer: MEDICARE

## 2025-06-05 VITALS
HEIGHT: 74 IN | WEIGHT: 189.69 LBS | DIASTOLIC BLOOD PRESSURE: 78 MMHG | BODY MASS INDEX: 24.34 KG/M2 | TEMPERATURE: 98 F | HEART RATE: 93 BPM | OXYGEN SATURATION: 97 % | SYSTOLIC BLOOD PRESSURE: 141 MMHG

## 2025-06-05 DIAGNOSIS — E11.22 TYPE 2 DM WITH HYPERTENSION AND ESRD ON DIALYSIS: ICD-10-CM

## 2025-06-05 DIAGNOSIS — I15.0 RENOVASCULAR HYPERTENSION: ICD-10-CM

## 2025-06-05 DIAGNOSIS — I12.0 TYPE 2 DM WITH HYPERTENSION AND ESRD ON DIALYSIS: ICD-10-CM

## 2025-06-05 DIAGNOSIS — N18.6 TYPE 2 DM WITH HYPERTENSION AND ESRD ON DIALYSIS: ICD-10-CM

## 2025-06-05 DIAGNOSIS — Z71.89 ADVANCE CARE PLANNING: Primary | ICD-10-CM

## 2025-06-05 DIAGNOSIS — Z99.2 TYPE 2 DM WITH HYPERTENSION AND ESRD ON DIALYSIS: ICD-10-CM

## 2025-06-05 PROCEDURE — 99213 OFFICE O/P EST LOW 20 MIN: CPT | Mod: PBBFAC,PN,NTX | Performed by: HOSPITALIST

## 2025-06-05 PROCEDURE — 99999 PR PBB SHADOW E&M-EST. PATIENT-LVL III: CPT | Mod: PBBFAC,TXP,, | Performed by: HOSPITALIST

## 2025-06-05 RX ORDER — AMLODIPINE BESYLATE 5 MG/1
5 TABLET ORAL
Qty: 48 TABLET | Refills: 3 | Status: SHIPPED | OUTPATIENT
Start: 2025-06-05 | End: 2026-06-05

## 2025-06-05 NOTE — PROGRESS NOTES
Primary Care Provider Appointment - [facility] & Jaden Williamson MD      Subjective:      Patient ID: Teodoro Mast is a 79 y.o. male with   Past Medical History:   Diagnosis Date    BPH (benign prostatic hyperplasia)     CKD (chronic kidney disease) stage 5, GFR less than 15 ml/min     Gout     Hyperlipidemia     Hyperparathyroidism, secondary renal     Hypertension     Sleep apnea            Chief Complaint: Follow-up    Prior to this visit, patient's last encounter with PCP was 2/20/2025.    Has been feeling well.  BPs not getting as low after HD like they were previously.  Taking 5mg amlodipine on off-HD days; has record of BPs with him today.  Still taking Lantus 10 units nightly; BG ranging 90s-100s but never gets too low.  Finished PT last month; now ambulating independently but still has some difficulty with balance he has to be more mindful of.      2/20: Followed up with transplant earlier this month; still approved for waitlist which he has been on since 2020.  HD going well.  Went through vestibular therapy which has helped considerably for vertigo and is doing neuro PT/OT for his gait.  Now ambulating independently; no longer using walker.    Still using insulin; BG ranging 90s-low 100s.  No hypoglycemic events.  Had diabetic eye exam last month.    Has been having low BPs after dialysis and having to stay until it gets better.  Hasn't taken any BP meds for about 3 weeks now.  Has lost about 20 lbs since starting HD.    11/14: Doing well after PD catheter removal.  Pus noted in catheter at removal and sent for Cx which grew bacteria.  Got IV abx w/ HD for 2 weeks.  Saw surgeon 2 d/a and d/c'd from care after site closed up and healed.  Noting strength, endurance doing better since.  Also sleeping better.  Does note fatigue after HD if large volumes removed.    Brought BP and BG logs for review.  Still using 10 units Lantus nightly.  No hypoglycemia.  Not getting any regular exercise.   Activity limited by vertigo; no relief w/ meclizine and stopped vestibular therapy after not tolerating 1 session.  Not using CPAP; machine is at least 5 years old and he has been replacing supplies through [employer]      4Ms for Medical Decision-Making in Older Adults    Last Completed EAWV: 2020    MOBILITY:  Get Up and Go:      2020    10:08 AM   Get Up and Go   Trial 1 5 seconds     Activities of Daily Livin/7/2020    10:03 AM   Activities of Daily Living   Ambulation Independent   Dressing Independent   Transfers Independent   Toileting Incontinent of bowel;Continent of bladder   Feeding Independent   Cleaning home/Chores Independent   Telephone use Independent   Shopping Independent   Paying bills Independent   Taking meds Independent     Whisper Test:      2020    10:08 AM   Whisper Test   Whisper Test Abnormal     Disability Status:      2020    10:05 AM   Disability Status   Are you deaf or do you have serious difficulty hearing? N   Are you blind or do you have serious difficulty seeing, even when wearing glasses? N   Because of a physical, mental, or emotional condition, do you have serious difficulty concentrating, remembering, or making decisions? N   Do you have serious difficulty walking or climbing stairs? N   Do you have difficulty dressing or bathing? N   Because of a physical, mental, or emotional condition, do you have difficulty doing errands alone such as visiting a doctor's office or shopping? N     Nutrition Screenin/7/2020     9:59 AM   Nutrition Screening   Has food intake declined over the past three months due to loss of appetite, digestive problems, chewing or swallowing difficulties? Moderate decrease in food intake   Involuntary weight loss during the last 3 months? No weight loss   Mobility? Goes out   Has the patient suffered psychological stress or acute disease in the past three months? Yes   Neuropsychological problems? No psychological problems    Body Mass Index (BMI)?  BMI 23 or greater   Screening Score 11    Screening Score: 0-7 Malnourished, 8-11 At Risk, 12-14 Normal  Fall Risk:      2025     2:00 PM 2025    11:00 AM 2025     2:00 PM   Fall Risk Assessment - Outpatient   Mobility Status Ambulatory Ambulatory Ambulatory w/ assistance   Number of falls 0 0 0   Identified as fall risk False False True           MENTATION:   Depression Patient Health Questionnaire:      2025    11:02 AM   Depression Patient Health Questionnaire   Over the last two weeks how often have you been bothered by little interest or pleasure in doing things Not at all   Over the last two weeks how often have you been bothered by feeling down, depressed or hopeless Not at all   PHQ-2 Total Score 0     Has Dementia Dx: No  Has Anxiety Dx: No    Cognitive Function Screenin/7/2020    10:08 AM   Cognitive Function Screening   Clock Drawing Test 2    Mini-Cog 3 Minute Recall 2   Cognitive Function Screening 4       Data saved with a previous flowsheet row definition     Cognitive Function Screening Total - Less than 4 = Abnormal,  Greater than or equal to 4 = Normal        MEDICATIONS:  High Risk Medications:  Total Active Medications: 0  This patient does not have an active medication from one of the medication groupers.    WHAT MATTERS MOST:  Advance Care Planning   ACP Status:   Patient has had an ACP conversation  Living Will: No  Power of : No  LaPOST: No    What is most important right now is to focus on remaining as independent as possible    Accordingly, we have decided that the best plan to meet the patient's goals includes continuing with treatment      What matters most to patient today is: getting ready for surgery       Advance Care Planning     Date: 2025    Living Will  During this visit, I engaged the patient  in the voluntary advance care planning process.  The patient and I reviewed the role for advance directives and their  purpose in directing future healthcare if the patient's unable to speak for him/herself.  At this point in time, the patient does have full decision-making capacity.  We discussed different extreme health states that he could experience, and reviewed what kind of medical care he would want in those situations.  The patient communicated that if he were comatose and had little chance of a meaningful recovery, he would want machines/life-sustaining treatments used. In addition to the above preference, other important end-of-life issues for the patient include openness to short trial of life support but if not expected to have meaningful recovery would want to pass peacefully; short trial of artificial nutrition ok during this process . The patient has completed a living will to reflect these preferences.  I spent a total of 15 minutes engaging the patient in this advance care planning discussion.          Power of   I initiated the process of voluntary advance care planning today and explained the importance of this process to the patient.  I introduced the concept of advance directives to the patient, as well. Then the patient received detailed information about the importance of designating a Health Care Power of  (HCPOA). He was also instructed to communicate with this person about their wishes for future healthcare, should he become sick and lose decision-making capacity. The patient has previously appointed a HCPOA. After our discussion, the patient has decided to complete a HCPOA and has appointed his son, health care agent: Teodoro Mast II & health care agent number: [phone]. I encouraged him to communicate with this person about their wishes for future healthcare, should he become sick and lose decision-making capacity.      A total of 15 min was spent on advance care planning, goals of care discussion, emotional support, formulating and communicating prognosis and exploring  burden/benefit of various approaches of treatment. This discussion occurred on a fully voluntary basis with the verbal consent of the patient and/or family.           Social History     Socioeconomic History    Marital status:     Number of children: 2   Tobacco Use    Smoking status: Former     Current packs/day: 0.00     Average packs/day: 0.5 packs/day for 10.0 years (5.0 ttl pk-yrs)     Types: Cigarettes     Start date:      Quit date:      Years since quittin.4    Smokeless tobacco: Never   Substance and Sexual Activity    Alcohol use: Not Currently    Drug use: No    Sexual activity: Not Currently     Partners: Female   Social History Narrative    Caregiver Teodoro Mast ERIC     Social Drivers of Health     Financial Resource Strain: Low Risk  (2025)    Overall Financial Resource Strain (CARDIA)     Difficulty of Paying Living Expenses: Not hard at all   Food Insecurity: No Food Insecurity (2025)    Hunger Vital Sign     Worried About Running Out of Food in the Last Year: Never true     Ran Out of Food in the Last Year: Never true   Transportation Needs: Unmet Transportation Needs (2025)    PRAPARE - Transportation     Lack of Transportation (Medical): Yes     Lack of Transportation (Non-Medical): No   Physical Activity: Inactive (2025)    Exercise Vital Sign     Days of Exercise per Week: 0 days     Minutes of Exercise per Session: 0 min   Stress: No Stress Concern Present (2025)    [employer] - Occupational Stress Questionnaire     Feeling of Stress : Not at all   Housing Stability: Low Risk  (2025)    Housing Stability Vital Sign     Unable to Pay for Housing in the Last Year: No     Number of Times Moved in the Last Year: 0     Homeless in the Last Year: No       Review of Systems   Constitutional:  Negative for activity change, fatigue and unexpected weight change.   HENT:  Negative for hearing loss, rhinorrhea and  "trouble swallowing.    Eyes:  Negative for discharge and visual disturbance.   Respiratory:  Negative for cough, chest tightness, shortness of breath and wheezing.    Cardiovascular:  Negative for chest pain, palpitations, leg swelling and claudication.   Gastrointestinal:  Negative for abdominal pain, blood in stool, change in bowel habit, constipation, diarrhea and vomiting.   Endocrine: Negative for polydipsia and polyuria.   Genitourinary:  Positive for decreased urine volume. Negative for difficulty urinating, hematuria and urgency.   Musculoskeletal:  Negative for arthralgias, joint swelling and neck pain.   Integumentary:  Negative for wound.   Neurological:  Positive for dizziness and weakness. Negative for headaches.   Psychiatric/Behavioral:  Negative for confusion and dysphoric mood.         Objective:   BP (!) 141/78 (BP Location: Left arm, Patient Position: Sitting)   Pulse 93   Temp 97.9 °F (36.6 °C) (Oral)   Ht 6' 2" (1.88 m)   Wt 86 kg (189 lb 11.3 oz)   SpO2 97%   BMI 24.36 kg/m²     Physical Exam  Vitals reviewed.   Constitutional:       General: He is not in acute distress.     Appearance: Normal appearance. He is normal weight.      Comments: Ambulates independently.   HENT:      Head: Normocephalic and atraumatic.      Right Ear: Tympanic membrane, ear canal and external ear normal.      Left Ear: Tympanic membrane, ear canal and external ear normal.      Nose: Nose normal.      Mouth/Throat:      Mouth: Mucous membranes are moist.      Pharynx: Oropharynx is clear.   Eyes:      General: No scleral icterus.     Conjunctiva/sclera: Conjunctivae normal.   Cardiovascular:      Rate and Rhythm: Regular rhythm. Tachycardia present.      Pulses: Normal pulses.      Heart sounds: Murmur heard.      Crescendo decrescendo systolic murmur is present with a grade of 3/6.      Arteriovenous access: Left arteriovenous access is present.  Pulmonary:      Effort: Pulmonary effort is normal. No " respiratory distress.      Breath sounds: Normal breath sounds.   Abdominal:      General: A surgical scar is present. Bowel sounds are normal. There is no distension.      Palpations: Abdomen is soft. There is no fluid wave, hepatomegaly or splenomegaly.      Tenderness: There is no abdominal tenderness.      Comments: PD catheter site C/D/I; healed and closed-off.     Musculoskeletal:      Cervical back: Normal range of motion and neck supple. No rigidity.      Right lower leg: No edema.      Left lower leg: No edema.   Lymphadenopathy:      Cervical: No cervical adenopathy.   Skin:     General: Skin is warm and dry.      Findings: No erythema or rash.   Neurological:      General: No focal deficit present.      Mental Status: He is alert and oriented to person, place, and time.              Lab Results   Component Value Date    WBC 6.95 05/07/2025    HGB 10.6 (L) 05/21/2025    HCT 32.7 (L) 05/21/2025     05/07/2025    CHOL 147 03/06/2025    TRIG 98 03/06/2025    HDL 39 (L) 03/06/2025    ALT 11 05/07/2025    AST 14 10/08/2024     05/07/2025    K 4.7 05/07/2025     05/07/2025    CREATININE 9.66 (H) 05/07/2025    BUN 24 (H) 10/08/2024    CO2 22 (L) 10/08/2024    TSH 1.945 03/06/2025    PSA 14.2 (H) 07/13/2021    INR 1.0 07/13/2021    HGBA1C 4.9 04/02/2025       Current Outpatient Medications on File Prior to Visit   Medication Sig Dispense Refill    amLODIPine (NORVASC) 5 MG tablet Take 0.5 tablets (2.5 mg total) by mouth every Tuesday, Thursday, Saturday, Sunday. Take only on off-dialysis days 24 tablet 3    atorvastatin (LIPITOR) 80 MG tablet TAKE 1 TABLET(80 MG) BY MOUTH EVERY DAY 90 tablet 3    B complex-vitamin C-folic acid (RENAL VITAMIN) 0.8 mg Tab Take 1 tablet (0.8 mg total) by mouth once daily. 90 tablet 1    LANTUS SOLOSTAR U-100 INSULIN 100 unit/mL (3 mL) InPn pen Inject 10 Units into the skin every evening. 9 mL 6    magnesium oxide (MAG-OX) 400 mg (241.3 mg magnesium) tablet Take 1  "tablet (400 mg total) by mouth once daily. 90 tablet 1    ondansetron (ZOFRAN) 4 MG tablet Take 1 tablet (4 mg total) by mouth every 8 (eight) hours as needed for Nausea. 30 tablet 2    pen needle, diabetic (BD KITA 2ND GEN PEN NEEDLE) 32 gauge x 5/32" Ndle Inject 1 each into the skin daily as needed (insulin injection). 100 each 11    psyllium (METAMUCIL) powder Take 28 g by mouth once daily. Patient taking 2 tablespoon      vitamin D (VITAMIN D3) 1000 units Tab Take 2,000 Units by mouth once daily.      sevelamer carbonate (RENVELA) 800 mg Tab Take 1 tablet (800 mg total) by mouth 3 (three) times daily with meals. 90 tablet 11     Current Facility-Administered Medications on File Prior to Visit   Medication Dose Route Frequency Provider Last Rate Last Admin    [] heparin (porcine) injection 1,500 Units  1,500 Units Intravenous Continuous Cassandra Lopez NP   1,500 Units at 25 1130         Assessment:   79 y.o. male with multiple co-morbid illnesses here to follow-up for ongoing chronic disease management and preventive health maintenance.    Plan:     1. Advance care planning  Assessment & Plan:  Had ACP discussion w/ pt regarding his preferences for various hypothetical situations and completed LaPOST online with pt today to reflect his choices.      2. Renovascular hypertension  Assessment & Plan:  Adjusted amlodipine Rx to reflect actual use.    Orders:  -     amLODIPine (NORVASC) 5 MG tablet; Take 1 tablet (5 mg total) by mouth every Tuesday, Thursday, Saturday, . Take only on off-dialysis days  Dispense: 48 tablet; Refill: 3    3. 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  Will trial having him stop the small dose of insulin he has been using and have him keep a log of BG readings to review in 2 weeks.          Health Maintenance         " Date Due Completion Date    Shingles Vaccine (2 of 2) 06/03/2025 4/8/2025    Hemoglobin A1c 10/02/2025 4/2/2025    Diabetic Eye Exam 01/21/2026 1/21/2025 (Done)    Override on 1/21/2025: Done    Override on 5/1/2023: Done    Lipid Panel 03/06/2026 3/6/2025    TETANUS VACCINE 08/07/2030 8/7/2020            Future Appointments   Date Time Provider Department Center   6/6/2025 10:00 AM CHAIR 30, [facility] K[facility]NEY [facility] [place]   6/9/2025 10:00 AM CHAIR 30, [facility] K[facility]NEY CARE [facility] [place]   6/11/2025 10:00 AM CHAIR 30, [facility] K[facility]NEY CARE [facility] [place]   6/13/2025 10:00 AM CHAIR 30, [facility]   6/16/2025 10:00 AM CHAIR 30, [facility] [place]   6/18/2025 10:00 AM CHAIR 30, [facility] KIDNEY CARE [facility] [place]   6/20/2025 10:00 AM CHAIR 30, [facility]   6/23/2025 10:00 AM CHAIR 30, [facility] [place]   6/25/2025 10:00 AM CHAIR 30, [facility] [place]   6/27/2025 10:00 AM CHAIR 30, [facility]   6/30/2025 10:00 AM CHAIR 30, [facility] [place]   7/2/2025 10:00 AM CHAIR 30, [facility]   7/4/2025 10:00 AM CHAIR 30, [facility]   7/7/2025 10:00 AM CHAIR 30, [facility]   7/9/2025 10:00 AM CHAIR 30, [facility]   7/11/2025 10:00 AM CHAIR 30, [facility]   7/14/2025 10:00 AM CHAIR 30, [facility] [place]   7/16/2025 10:00 AM CHAIR 30, [facility] [place]   7/18/2025 10:00 AM CHAIR 30, [facility] [place]   7/21/2025 10:00 AM CHAIR 30, [facility] [place]   7/23/2025 10:00 AM CHAIR 30, [facility] [place]   7/25/2025 10:00 AM CHAIR 30, [facility] [place]    7/28/2025 10:00 AM CHAIR 30, [facility] KIDNEY CARE [facility] [place]   7/30/2025 10:00 AM CHAIR 30, [facility] K[facility]NEY CARE [facility] [place]   8/1/2025 10:00 AM CHAIR 30, [facility]NEY CARE [facility] [place]   8/4/2025 10:00 AM CHAIR 30, [facility]NEY CARE [facility] [place]   8/6/2025 10:00 AM CHAIR 30, [facility]NEY [facility] [place]   8/8/2025 10:00 AM CHAIR 30, [facility]NEY [facility] [place]   8/11/2025 10:00 AM CHAIR 30, [facility] KIDNEY [facility] [place]   8/13/2025 10:00 AM CHAIR 30, [facility]NEY CARE [facility] [place]   8/15/2025 10:00 AM CHAIR 30, [facility] KIDNEY [facility] [place]   8/18/2025 10:00 AM CHAIR 30, [facility] KIDNEY [facility] [place]   8/19/2025  9:00 AM [facility] OIC-XRAY [facility] XRAY [facility]   8/19/2025  9:30 AM [facility] OIC-US1 MASTER NOM ULTR [facility]   8/19/2025 11:00 AM ECHO, [facility] ECHOSTR [address]   8/19/2025 12:30 PM KIDNEY LISTED, TRANSPLANT [facility] KIDNTX [address]   8/20/2025 10:00 AM CHAIR 30, [facility] KIDNEY [facility] [place]   8/22/2025 10:00 AM CHAIR 30, [facility] KIDNEY [facility] [place]   8/25/2025 10:00 AM CHAIR 30, [facility] KIDNEY CARE [facility] [place]   8/27/2025 10:00 AM CHAIR 30, [facility] KIDNEY [facility] [place]   8/29/2025 10:00 AM CHAIR 30, [facility] KIDNEY [facility]         Follow up in about 2 weeks (around 6/19/2025) for audio. Total clinical care time was 40 min.    Phil Williamson MD  [facility]    This note was partially dictated using voice recognition software and although actively proofread during transcription, it is possible some errors in transcription may have been overlooked.

## 2025-06-05 NOTE — PATIENT INSTRUCTIONS
We can probably stop the insulin so that you don't drop too low.   Let's keep a log of your blood glucoses for the next few weeks off insulin.

## 2025-06-24 ENCOUNTER — PATIENT MESSAGE (OUTPATIENT)
Facility: CLINIC | Age: 80
End: 2025-06-24
Payer: MEDICARE

## 2025-06-26 ENCOUNTER — OFFICE VISIT (OUTPATIENT)
Facility: CLINIC | Age: 80
End: 2025-06-26
Payer: MEDICARE

## 2025-06-26 DIAGNOSIS — Z99.2 TYPE 2 DIABETES MELLITUS WITH CHRONIC KIDNEY DISEASE ON CHRONIC DIALYSIS, WITHOUT LONG-TERM CURRENT USE OF INSULIN: Primary | ICD-10-CM

## 2025-06-26 DIAGNOSIS — E11.22 TYPE 2 DIABETES MELLITUS WITH CHRONIC KIDNEY DISEASE ON CHRONIC DIALYSIS, WITHOUT LONG-TERM CURRENT USE OF INSULIN: Primary | ICD-10-CM

## 2025-06-26 DIAGNOSIS — N18.6 TYPE 2 DIABETES MELLITUS WITH CHRONIC KIDNEY DISEASE ON CHRONIC DIALYSIS, WITHOUT LONG-TERM CURRENT USE OF INSULIN: Primary | ICD-10-CM

## 2025-06-26 PROBLEM — Z74.09 IMPAIRED FUNCTIONAL MOBILITY, BALANCE, GAIT, AND ENDURANCE: Status: RESOLVED | Noted: 2025-02-13 | Resolved: 2025-06-26

## 2025-06-26 PROBLEM — R26.89 IMBALANCE: Status: RESOLVED | Noted: 2024-12-05 | Resolved: 2025-06-26

## 2025-06-26 PROBLEM — Z71.89 ADVANCE CARE PLANNING: Status: ACTIVE | Noted: 2025-06-26

## 2025-06-26 PROBLEM — I15.0 RENOVASCULAR HYPERTENSION: Status: ACTIVE | Noted: 2025-06-26

## 2025-06-26 NOTE — ASSESSMENT & PLAN NOTE
Had ACP discussion w/ pt regarding his preferences for various hypothetical situations and completed LaPOST online with pt today to reflect his choices.

## 2025-06-26 NOTE — ASSESSMENT & PLAN NOTE
Based on his BG readings he may not need insulin; which does put him at higher risk of hypoglycemia in setting of ESRD.  Will trial having him stop the small dose of insulin he has been using and have him keep a log of BG readings to review in 2 weeks.

## 2025-06-26 NOTE — PROGRESS NOTES
Audio Only Telehealth Visit     The patient location is: home  The chief complaint leading to consultation is: f/u  Visit type: Virtual visit with audio only (telephone)  Total time spent in medical discussion with patient: 5 minutes  Total time spent on date of the encounter:10 minutes       The reason for the audio only service rather than synchronous audio and video virtual visit was related to technical difficulties or patient preference/necessity.       Each patient to whom I provide medical services by telemedicine is:  (1) informed of the relationship between the physician and patient and the respective role of any other health care provider with respect to management of the patient; and (2) notified that they may decline to receive medical services by telemedicine and may withdraw from such care at any time. Patient verbally consented to receive this service via voice-only telephone call.       HPI: reviewed BG log pt submitted yesterday via portal; BG have remained normal off insulin.  Pt feeling well otherwise.     Assessment and plan:  Advised pt that we can stop the insulin as he doesn't seem to need it any further, especially since it does put him at increased risk of hypoglycemia in context of ESRD.  He is pleased with this development.  He denies any other needs at this time.  Will plan on f/u in 3 mos.                        This service was not originating from a related E/M service provided within the previous 7 days nor will  to an E/M service or procedure within the next 24 hours or my soonest available appointment.  Prevailing standard of care was able to be met in this audio-only visit.

## 2025-06-27 ENCOUNTER — TELEPHONE (OUTPATIENT)
Dept: PRIMARY CARE CLINIC | Facility: CLINIC | Age: 80
End: 2025-06-27
Payer: MEDICARE

## 2025-06-27 NOTE — TELEPHONE ENCOUNTER
----- Message from Nurse Hart sent at 6/27/2025  9:04 AM CDT -----  Patient scheduled in-person on 10/01/2025.

## 2025-08-07 ENCOUNTER — TELEPHONE (OUTPATIENT)
Dept: TRANSPLANT | Facility: CLINIC | Age: 80
End: 2025-08-07
Payer: MEDICARE

## 2025-08-11 ENCOUNTER — TELEPHONE (OUTPATIENT)
Dept: TRANSPLANT | Facility: CLINIC | Age: 80
End: 2025-08-11
Payer: MEDICARE

## 2025-08-19 ENCOUNTER — OFFICE VISIT (OUTPATIENT)
Dept: TRANSPLANT | Facility: CLINIC | Age: 80
End: 2025-08-19
Payer: MEDICARE

## 2025-08-19 ENCOUNTER — HOSPITAL ENCOUNTER (OUTPATIENT)
Dept: RADIOLOGY | Facility: HOSPITAL | Age: 80
Discharge: HOME | End: 2025-08-19
Attending: NURSE PRACTITIONER
Payer: MEDICARE

## 2025-08-19 ENCOUNTER — HOSPITAL ENCOUNTER (OUTPATIENT)
Dept: CARDIOLOGY | Facility: HOSPITAL | Age: 80
Discharge: HOME | End: 2025-08-19
Attending: NURSE PRACTITIONER
Payer: MEDICARE

## 2025-08-19 VITALS
HEART RATE: 88 BPM | TEMPERATURE: 97 F | SYSTOLIC BLOOD PRESSURE: 98 MMHG | BODY MASS INDEX: 24.3 KG/M2 | RESPIRATION RATE: 18 BRPM | DIASTOLIC BLOOD PRESSURE: 53 MMHG | HEIGHT: 74 IN | WEIGHT: 189.38 LBS | OXYGEN SATURATION: 98 %

## 2025-08-19 VITALS
BODY MASS INDEX: 24.34 KG/M2 | DIASTOLIC BLOOD PRESSURE: 75 MMHG | HEART RATE: 76 BPM | SYSTOLIC BLOOD PRESSURE: 123 MMHG | HEIGHT: 74 IN | WEIGHT: 189.63 LBS

## 2025-08-19 DIAGNOSIS — Z76.82 PATIENT ON WAITING LIST FOR KIDNEY TRANSPLANT: Primary | ICD-10-CM

## 2025-08-19 DIAGNOSIS — Z76.82 ORGAN TRANSPLANT CANDIDATE: ICD-10-CM

## 2025-08-19 DIAGNOSIS — N18.6 ESRD ON HEMODIALYSIS: ICD-10-CM

## 2025-08-19 DIAGNOSIS — I12.0 TYPE 2 DM WITH HYPERTENSION AND ESRD ON DIALYSIS: ICD-10-CM

## 2025-08-19 DIAGNOSIS — Z99.2 TYPE 2 DM WITH HYPERTENSION AND ESRD ON DIALYSIS: ICD-10-CM

## 2025-08-19 DIAGNOSIS — I10 MALIGNANT HYPERTENSION: ICD-10-CM

## 2025-08-19 DIAGNOSIS — Z99.2 ESRD ON HEMODIALYSIS: ICD-10-CM

## 2025-08-19 DIAGNOSIS — E11.22 TYPE 2 DM WITH HYPERTENSION AND ESRD ON DIALYSIS: ICD-10-CM

## 2025-08-19 DIAGNOSIS — N18.6 TYPE 2 DM WITH HYPERTENSION AND ESRD ON DIALYSIS: ICD-10-CM

## 2025-08-19 LAB
AORTIC SIZE INDEX (SOV): 1.7 CM/M2
AORTIC SIZE INDEX: 1.5 CM/M2
ASCENDING AORTA: 3.2 CM
AV AREA BY CONTINUOUS VTI: 2.2 CM2
AV INDEX (PROSTH): 0.61
AV LVOT MEAN GRADIENT: 3 MMHG
AV LVOT PEAK GRADIENT: 5 MMHG
AV MEAN GRADIENT: 14 MMHG
AV PEAK GRADIENT: 21 MMHG
AV VALVE AREA BY VELOCITY RATIO: 2 CM²
AV VALVE AREA: 2.3 CM2
AV VELOCITY RATIO: 0.52
BSA FOR ECHO PROCEDURE: 2.12 M2
CV ECHO LV RWT: 0.43 CM
DOP CALC AO PEAK VEL: 2.3 M/S
DOP CALC AO VTI: 45 CM
DOP CALC LVOT AREA: 3.8 CM2
DOP CALC LVOT DIAMETER: 2.2 CM
DOP CALC LVOT PEAK VEL: 1.2 M/S
DOP CALCLVOT PEAK VEL VTI: 27.4 CM
E WAVE DECELERATION TIME: 348 MS
E/A RATIO: 0.55
E/E' RATIO: 6 M/S
ECHO EF ESTIMATED: 66 %
ECHO LV POSTERIOR WALL: 0.9 CM (ref 0.6–1.1)
FRACTIONAL SHORTENING: 35.7 % (ref 28–44)
INTERVENTRICULAR SEPTUM: 1 CM (ref 0.6–1.1)
IVC DIAMETER: 1.16 CM
LA MAJOR: 6.2 CM
LA MINOR: 6.3 CM
LA WIDTH: 3.9 CM
LEFT ATRIUM SIZE: 3.9 CM
LEFT ATRIUM VOLUME INDEX MOD: 31 ML/M2
LEFT ATRIUM VOLUME INDEX: 38 ML/M2
LEFT ATRIUM VOLUME MOD: 65 ML
LEFT ATRIUM VOLUME: 81 CM3
LEFT INTERNAL DIMENSION IN SYSTOLE: 2.7 CM (ref 2.1–4)
LEFT VENTRICLE DIASTOLIC VOLUME INDEX: 36.32 ML/M2
LEFT VENTRICLE DIASTOLIC VOLUME: 77 ML
LEFT VENTRICLE MASS INDEX: 60.3 G/M2
LEFT VENTRICLE SYSTOLIC VOLUME INDEX: 12.3 ML/M2
LEFT VENTRICLE SYSTOLIC VOLUME: 26 ML
LEFT VENTRICULAR INTERNAL DIMENSION IN DIASTOLE: 4.2 CM (ref 3.5–6)
LEFT VENTRICULAR MASS: 127.8 G
LV LATERAL E/E' RATIO: 5.4
LV SEPTAL E/E' RATIO: 6.3
Lab: 1.7 CM/M
Lab: 1.9 CM/M
MV A" WAVE DURATION": 77.07 MS
MV PEAK A VEL: 0.69 M/S
MV PEAK E VEL: 0.38 M/S
OHS CV CPX PATIENT HEIGHT IN: 74
OHS CV RV/LV RATIO: 0.98 CM
PISA TR MAX VEL: 2.7 M/S
PULM VEIN A" WAVE DURATION": 77.07 MS
PULM VEIN S/D RATIO: 2.68
PULMONIC VEIN PEAK A VELOCITY: 0.3 M/S
PV PEAK D VEL: 0.22 M/S
PV PEAK S VEL: 0.59 M/S
RA MAJOR: 4.91 CM
RA PRESSURE ESTIMATED: 3 MMHG
RA WIDTH: 3.83 CM
RIGHT ATRIAL AREA: 14.6 CM2
RIGHT VENTRICLE DIASTOLIC BASEL DIMENSION: 4.1 CM
RV TB RVSP: 6 MMHG
RV TISSUE DOPPLER FREE WALL SYSTOLIC VELOCITY 1 (APICAL 4 CHAMBER VIEW): 11.74 CM/S
SINUS: 3.5 CM
STJ: 3.3 CM
TDI LATERAL: 0.07 M/S
TDI SEPTAL: 0.06 M/S
TDI: 0.07 M/S
TRICUSPID ANNULAR PLANE SYSTOLIC EXCURSION: 1.9 CM
TV PEAK GRADIENT: 28 MMHG
TV REST PULMONARY ARTERY PRESSURE: 32 MMHG
Z-SCORE OF LEFT VENTRICULAR DIMENSION IN END DIASTOLE: -4.67
Z-SCORE OF LEFT VENTRICULAR DIMENSION IN END SYSTOLE: -3.28

## 2025-08-19 PROCEDURE — 99999 PR PBB SHADOW E&M-EST. PATIENT-LVL IV: CPT | Mod: PBBFAC,TXP,, | Performed by: NURSE PRACTITIONER

## 2025-08-19 PROCEDURE — 71046 X-RAY EXAM CHEST 2 VIEWS: CPT | Mod: 26,TXP,, | Performed by: RADIOLOGY

## 2025-08-19 PROCEDURE — 99214 OFFICE O/P EST MOD 30 MIN: CPT | Mod: PBBFAC,25,TXP | Performed by: NURSE PRACTITIONER

## 2025-08-19 PROCEDURE — 99215 OFFICE O/P EST HI 40 MIN: CPT | Mod: S$PBB,TXP,, | Performed by: NURSE PRACTITIONER

## 2025-08-19 PROCEDURE — 71046 X-RAY EXAM CHEST 2 VIEWS: CPT | Mod: TC,TXP

## 2025-08-19 PROCEDURE — 93306 TTE W/DOPPLER COMPLETE: CPT | Mod: 26,TXP,, | Performed by: STUDENT IN AN ORGANIZED HEALTH CARE EDUCATION/TRAINING PROGRAM

## 2025-08-19 PROCEDURE — 76770 US EXAM ABDO BACK WALL COMP: CPT | Mod: TC,TXP

## 2025-08-19 PROCEDURE — 76770 US EXAM ABDO BACK WALL COMP: CPT | Mod: 26,TXP,, | Performed by: RADIOLOGY

## 2025-08-19 PROCEDURE — 93306 TTE W/DOPPLER COMPLETE: CPT | Mod: TXP

## (undated) DEVICE — DRESSING SURGILAST RET SM/MED

## (undated) DEVICE — ADHESIVE DERMABOND ADVANCED

## (undated) DEVICE — SUT MCRYL PLUS 4-0 PS2 27IN

## (undated) DEVICE — SET EXTENSION STAY SAFE LL

## (undated) DEVICE — SUT 3-0 12-18IN SILK

## (undated) DEVICE — SUT 4/0 18IN GUT PLAINPS-2

## (undated) DEVICE — GAUZE FLUFF XXLG 36X36 2 PLY

## (undated) DEVICE — ELECTRODE REM PLYHSV RETURN 9

## (undated) DEVICE — HEMOSTAT VASC BAND REG 24CM

## (undated) DEVICE — COVERS PROBE NR-48 STERILE

## (undated) DEVICE — SET DECANTER MEDICHOICE

## (undated) DEVICE — SUT SILK 2-0 SH 18IN BLACK

## (undated) DEVICE — SUT VICRYL 3-0 27 SH

## (undated) DEVICE — GAUZE SPONGE 4X4 12PLY

## (undated) DEVICE — SOL NS 1000CC

## (undated) DEVICE — Device

## (undated) DEVICE — NDL HYPO REG 25G X 1 1/2

## (undated) DEVICE — SUT 2-0 12-18IN SILK

## (undated) DEVICE — SYR ONLY LUER LOCK 20CC

## (undated) DEVICE — DRESSING SPONGE 8PLY 4X4 STRL

## (undated) DEVICE — PACK AV VASCULAR ACCESS OMC

## (undated) DEVICE — LOOP VESSEL YELLOW MAXI

## (undated) DEVICE — DRAPE LAP T SHT W/ INSTR PAD

## (undated) DEVICE — COVER LIGHT HANDLE 80/CA

## (undated) DEVICE — TOWEL OR DISP STRL BLUE 4/PK

## (undated) DEVICE — PAD DEFIB CADENCE ADULT R2

## (undated) DEVICE — SUT ETHILON 3-0 PS2 18 BLK

## (undated) DEVICE — TROCAR ENDOPATH XCEL 5MM 7.5CM

## (undated) DEVICE — SUT GUT PL. 4-0 27 FS-2

## (undated) DEVICE — DRESSING TRANS 8X12 TEGADERM

## (undated) DEVICE — DRAPE CORETEMP FLD WRM 56X62IN

## (undated) DEVICE — TUBING HF INSUFFLATION W/ FLTR

## (undated) DEVICE — TRAY MINOR GEN SURG OMC

## (undated) DEVICE — GOWN SURGICAL X-LARGE

## (undated) DEVICE — AV VASCULAR ACCESS PACK

## (undated) DEVICE — SPONGE GAUZE 16PLY 4X4

## (undated) DEVICE — SUT 4-0 12-18IN SILK BLACK

## (undated) DEVICE — SUT PROLENE 6-0 C-1 30IN BL

## (undated) DEVICE — DRAPE INCISE IOBAN 2 23X17IN

## (undated) DEVICE — CATH JACKY RADIAL 3.5 110CM

## (undated) DEVICE — GUIDEWIRE SUPRA CORE 035 190CM

## (undated) DEVICE — BLADE SURG CARBON STEEL SZ11

## (undated) DEVICE — DRAPE ANGIO BRACH 38X44IN

## (undated) DEVICE — SUT PROLENE 6-0 24 BV-1

## (undated) DEVICE — OMNIPAQUE 350 200ML

## (undated) DEVICE — SUT 0 VICRYL / UR6 (J603)

## (undated) DEVICE — KIT GLIDESHEATH SLEND 6FR 10CM

## (undated) DEVICE — APPLICATOR CHLORAPREP ORN 26ML

## (undated) DEVICE — TRAY CATH LAB OMC

## (undated) DEVICE — SET IV ADMIN 15DROP 3 CARESITE

## (undated) DEVICE — KIT CUSTOM MANIFOLD

## (undated) DEVICE — PACK DISP Y TEC

## (undated) DEVICE — TRAY MINOR GEN SURG

## (undated) DEVICE — TRANSDUCER ADULT DISP

## (undated) DEVICE — SUT MONOCRYL 3-0 SH U/D

## (undated) DEVICE — SPIKE SHORT LG BORE 1-WAY 2IN

## (undated) DEVICE — ELECTRODE REM POLYHESIVE II